# Patient Record
Sex: FEMALE | Race: WHITE | NOT HISPANIC OR LATINO | Employment: OTHER | URBAN - METROPOLITAN AREA
[De-identification: names, ages, dates, MRNs, and addresses within clinical notes are randomized per-mention and may not be internally consistent; named-entity substitution may affect disease eponyms.]

---

## 2017-01-16 DIAGNOSIS — C25.0 MALIGNANT NEOPLASM OF HEAD OF PANCREAS (H): ICD-10-CM

## 2017-01-16 LAB
ALBUMIN SERPL-MCNC: 3.6 G/DL (ref 3.4–5)
ALP SERPL-CCNC: 152 U/L (ref 40–150)
ALT SERPL W P-5'-P-CCNC: 41 U/L (ref 0–50)
ANION GAP SERPL CALCULATED.3IONS-SCNC: 6 MMOL/L (ref 3–14)
AST SERPL W P-5'-P-CCNC: 36 U/L (ref 0–45)
BASOPHILS # BLD AUTO: 0 10E9/L (ref 0–0.2)
BASOPHILS NFR BLD AUTO: 0.3 %
BILIRUB SERPL-MCNC: 0.6 MG/DL (ref 0.2–1.3)
BUN SERPL-MCNC: 9 MG/DL (ref 7–30)
CALCIUM SERPL-MCNC: 8.8 MG/DL (ref 8.5–10.1)
CHLORIDE SERPL-SCNC: 106 MMOL/L (ref 94–109)
CO2 SERPL-SCNC: 28 MMOL/L (ref 20–32)
CREAT SERPL-MCNC: 0.65 MG/DL (ref 0.52–1.04)
DIFFERENTIAL METHOD BLD: ABNORMAL
EOSINOPHIL # BLD AUTO: 0 10E9/L (ref 0–0.7)
EOSINOPHIL NFR BLD AUTO: 0.4 %
ERYTHROCYTE [DISTWIDTH] IN BLOOD BY AUTOMATED COUNT: 15.9 % (ref 10–15)
GFR SERPL CREATININE-BSD FRML MDRD: ABNORMAL ML/MIN/1.7M2
GLUCOSE SERPL-MCNC: 57 MG/DL (ref 70–99)
HCT VFR BLD AUTO: 36.5 % (ref 35–47)
HGB BLD-MCNC: 12.3 G/DL (ref 11.7–15.7)
IMM GRANULOCYTES # BLD: 0 10E9/L (ref 0–0.4)
IMM GRANULOCYTES NFR BLD: 0.5 %
LYMPHOCYTES # BLD AUTO: 1.7 10E9/L (ref 0.8–5.3)
LYMPHOCYTES NFR BLD AUTO: 22.9 %
MCH RBC QN AUTO: 34.4 PG (ref 26.5–33)
MCHC RBC AUTO-ENTMCNC: 33.7 G/DL (ref 31.5–36.5)
MCV RBC AUTO: 102 FL (ref 78–100)
MONOCYTES # BLD AUTO: 0.6 10E9/L (ref 0–1.3)
MONOCYTES NFR BLD AUTO: 8.2 %
NEUTROPHILS # BLD AUTO: 5 10E9/L (ref 1.6–8.3)
NEUTROPHILS NFR BLD AUTO: 67.7 %
NRBC # BLD AUTO: 0 10*3/UL
NRBC BLD AUTO-RTO: 0 /100
PLATELET # BLD AUTO: 288 10E9/L (ref 150–450)
POTASSIUM SERPL-SCNC: 3.9 MMOL/L (ref 3.4–5.3)
PROT SERPL-MCNC: 7.3 G/DL (ref 6.8–8.8)
RBC # BLD AUTO: 3.58 10E12/L (ref 3.8–5.2)
SODIUM SERPL-SCNC: 140 MMOL/L (ref 133–144)
WBC # BLD AUTO: 7.4 10E9/L (ref 4–11)

## 2017-01-16 PROCEDURE — 85025 COMPLETE CBC W/AUTO DIFF WBC: CPT | Performed by: INTERNAL MEDICINE

## 2017-01-16 PROCEDURE — 80053 COMPREHEN METABOLIC PANEL: CPT | Performed by: INTERNAL MEDICINE

## 2017-01-16 PROCEDURE — 25000125 ZZHC RX 250: Performed by: INTERNAL MEDICINE

## 2017-01-16 RX ORDER — HEPARIN SODIUM (PORCINE) LOCK FLUSH IV SOLN 100 UNIT/ML 100 UNIT/ML
5 SOLUTION INTRAVENOUS ONCE
Status: COMPLETED | OUTPATIENT
Start: 2017-01-16 | End: 2017-01-16

## 2017-01-16 RX ADMIN — SODIUM CHLORIDE, PRESERVATIVE FREE 5 ML: 5 INJECTION INTRAVENOUS at 10:36

## 2017-01-16 NOTE — PROGRESS NOTES
Chief Complaint   Patient presents with     Port Draw     labs drawn from port by RN     Port accessed, labs drawn, flushed with NS & heparin.  Then de-accessed, no further visits today.  Gardenia Scott RN

## 2017-01-17 ENCOUNTER — TELEPHONE (OUTPATIENT)
Dept: ONCOLOGY | Facility: CLINIC | Age: 62
End: 2017-01-17

## 2017-01-17 NOTE — TELEPHONE ENCOUNTER
Oral Chemotherapy Monitoring Program   Placed call to patient in follow up of Xeloda (capecitabine) therapy.   Left message requesting call back.   No drug names were mentioned.    Didier Buckner, PharmD  Therapy Management Oncology Pharmacist  [facility]   Phone: [phone]

## 2017-02-06 DIAGNOSIS — C25.0 MALIGNANT NEOPLASM OF HEAD OF PANCREAS (H): ICD-10-CM

## 2017-02-06 LAB
ALBUMIN SERPL-MCNC: 3.6 G/DL (ref 3.4–5)
ALP SERPL-CCNC: 156 U/L (ref 40–150)
ALT SERPL W P-5'-P-CCNC: 58 U/L (ref 0–50)
ANION GAP SERPL CALCULATED.3IONS-SCNC: 7 MMOL/L (ref 3–14)
AST SERPL W P-5'-P-CCNC: 49 U/L (ref 0–45)
BASOPHILS # BLD AUTO: 0 10E9/L (ref 0–0.2)
BASOPHILS NFR BLD AUTO: 0.4 %
BILIRUB SERPL-MCNC: 0.5 MG/DL (ref 0.2–1.3)
BUN SERPL-MCNC: 9 MG/DL (ref 7–30)
CALCIUM SERPL-MCNC: 8.7 MG/DL (ref 8.5–10.1)
CHLORIDE SERPL-SCNC: 105 MMOL/L (ref 94–109)
CO2 SERPL-SCNC: 29 MMOL/L (ref 20–32)
CREAT SERPL-MCNC: 0.7 MG/DL (ref 0.52–1.04)
DIFFERENTIAL METHOD BLD: ABNORMAL
EOSINOPHIL # BLD AUTO: 0.1 10E9/L (ref 0–0.7)
EOSINOPHIL NFR BLD AUTO: 1.3 %
ERYTHROCYTE [DISTWIDTH] IN BLOOD BY AUTOMATED COUNT: 16.6 % (ref 10–15)
GFR SERPL CREATININE-BSD FRML MDRD: 84 ML/MIN/1.7M2
GLUCOSE SERPL-MCNC: 151 MG/DL (ref 70–99)
HCT VFR BLD AUTO: 35.7 % (ref 35–47)
HGB BLD-MCNC: 11.8 G/DL (ref 11.7–15.7)
IMM GRANULOCYTES # BLD: 0 10E9/L (ref 0–0.4)
IMM GRANULOCYTES NFR BLD: 0.4 %
LYMPHOCYTES # BLD AUTO: 1 10E9/L (ref 0.8–5.3)
LYMPHOCYTES NFR BLD AUTO: 22.3 %
MCH RBC QN AUTO: 33.8 PG (ref 26.5–33)
MCHC RBC AUTO-ENTMCNC: 33.1 G/DL (ref 31.5–36.5)
MCV RBC AUTO: 102 FL (ref 78–100)
MONOCYTES # BLD AUTO: 0.4 10E9/L (ref 0–1.3)
MONOCYTES NFR BLD AUTO: 8.1 %
NEUTROPHILS # BLD AUTO: 3.1 10E9/L (ref 1.6–8.3)
NEUTROPHILS NFR BLD AUTO: 67.5 %
NRBC # BLD AUTO: 0 10*3/UL
NRBC BLD AUTO-RTO: 0 /100
PLATELET # BLD AUTO: 188 10E9/L (ref 150–450)
POTASSIUM SERPL-SCNC: 4.1 MMOL/L (ref 3.4–5.3)
PROT SERPL-MCNC: 7.1 G/DL (ref 6.8–8.8)
RBC # BLD AUTO: 3.49 10E12/L (ref 3.8–5.2)
SODIUM SERPL-SCNC: 140 MMOL/L (ref 133–144)
WBC # BLD AUTO: 4.6 10E9/L (ref 4–11)

## 2017-02-06 PROCEDURE — 80053 COMPREHEN METABOLIC PANEL: CPT | Performed by: INTERNAL MEDICINE

## 2017-02-06 PROCEDURE — 25000128 H RX IP 250 OP 636: Performed by: INTERNAL MEDICINE

## 2017-02-06 PROCEDURE — 85025 COMPLETE CBC W/AUTO DIFF WBC: CPT | Performed by: INTERNAL MEDICINE

## 2017-02-06 PROCEDURE — 86301 IMMUNOASSAY TUMOR CA 19-9: CPT | Performed by: INTERNAL MEDICINE

## 2017-02-06 RX ORDER — HEPARIN SODIUM (PORCINE) LOCK FLUSH IV SOLN 100 UNIT/ML 100 UNIT/ML
5 SOLUTION INTRAVENOUS DAILY PRN
Status: DISCONTINUED | OUTPATIENT
Start: 2017-02-06 | End: 2017-02-14 | Stop reason: HOSPADM

## 2017-02-06 RX ADMIN — SODIUM CHLORIDE, PRESERVATIVE FREE 5 ML: 5 INJECTION INTRAVENOUS at 10:35

## 2017-02-07 ENCOUNTER — TELEPHONE (OUTPATIENT)
Dept: ONCOLOGY | Facility: CLINIC | Age: 62
End: 2017-02-07

## 2017-02-07 LAB — CANCER AG19-9 SERPL-ACNC: NORMAL

## 2017-02-07 NOTE — TELEPHONE ENCOUNTER
Oral Chemotherapy Monitoring Program    Placed call to patient in follow up of lab results from 2/6/17. LFTs slightly elevated.    OK to continue at current dose of Xeloda per Dr. Wade. Patient aware and in agreement with plan.    Belem Mock, Pharmacy Intern  [facility]  [phone]

## 2017-02-15 ENCOUNTER — TELEPHONE (OUTPATIENT)
Dept: ONCOLOGY | Facility: CLINIC | Age: 62
End: 2017-02-15

## 2017-02-27 DIAGNOSIS — C25.0 MALIGNANT NEOPLASM OF HEAD OF PANCREAS (H): ICD-10-CM

## 2017-02-27 LAB
ALBUMIN SERPL-MCNC: 3.7 G/DL (ref 3.4–5)
ALP SERPL-CCNC: 183 U/L (ref 40–150)
ALT SERPL W P-5'-P-CCNC: 48 U/L (ref 0–50)
ANION GAP SERPL CALCULATED.3IONS-SCNC: 8 MMOL/L (ref 3–14)
AST SERPL W P-5'-P-CCNC: 46 U/L (ref 0–45)
BASOPHILS # BLD AUTO: 0 10E9/L (ref 0–0.2)
BASOPHILS NFR BLD AUTO: 0.6 %
BILIRUB SERPL-MCNC: 0.5 MG/DL (ref 0.2–1.3)
BUN SERPL-MCNC: 7 MG/DL (ref 7–30)
CALCIUM SERPL-MCNC: 8.9 MG/DL (ref 8.5–10.1)
CHLORIDE SERPL-SCNC: 102 MMOL/L (ref 94–109)
CO2 SERPL-SCNC: 28 MMOL/L (ref 20–32)
CREAT SERPL-MCNC: 0.82 MG/DL (ref 0.52–1.04)
DIFFERENTIAL METHOD BLD: ABNORMAL
EOSINOPHIL # BLD AUTO: 0 10E9/L (ref 0–0.7)
EOSINOPHIL NFR BLD AUTO: 0.3 %
ERYTHROCYTE [DISTWIDTH] IN BLOOD BY AUTOMATED COUNT: 16.9 % (ref 10–15)
GFR SERPL CREATININE-BSD FRML MDRD: 70 ML/MIN/1.7M2
GLUCOSE SERPL-MCNC: 96 MG/DL (ref 70–99)
HCT VFR BLD AUTO: 35.8 % (ref 35–47)
HGB BLD-MCNC: 11.9 G/DL (ref 11.7–15.7)
IMM GRANULOCYTES # BLD: 0 10E9/L (ref 0–0.4)
IMM GRANULOCYTES NFR BLD: 0.6 %
LYMPHOCYTES # BLD AUTO: 1 10E9/L (ref 0.8–5.3)
LYMPHOCYTES NFR BLD AUTO: 27.3 %
MCH RBC QN AUTO: 34.1 PG (ref 26.5–33)
MCHC RBC AUTO-ENTMCNC: 33.2 G/DL (ref 31.5–36.5)
MCV RBC AUTO: 103 FL (ref 78–100)
MONOCYTES # BLD AUTO: 0.5 10E9/L (ref 0–1.3)
MONOCYTES NFR BLD AUTO: 14.8 %
NEUTROPHILS # BLD AUTO: 2 10E9/L (ref 1.6–8.3)
NEUTROPHILS NFR BLD AUTO: 56.4 %
NRBC # BLD AUTO: 0 10*3/UL
NRBC BLD AUTO-RTO: 0 /100
PLATELET # BLD AUTO: 173 10E9/L (ref 150–450)
POTASSIUM SERPL-SCNC: 4.1 MMOL/L (ref 3.4–5.3)
PROT SERPL-MCNC: 7.2 G/DL (ref 6.8–8.8)
RBC # BLD AUTO: 3.49 10E12/L (ref 3.8–5.2)
SODIUM SERPL-SCNC: 138 MMOL/L (ref 133–144)
WBC # BLD AUTO: 3.5 10E9/L (ref 4–11)

## 2017-02-27 PROCEDURE — 85025 COMPLETE CBC W/AUTO DIFF WBC: CPT | Performed by: INTERNAL MEDICINE

## 2017-02-27 PROCEDURE — 80053 COMPREHEN METABOLIC PANEL: CPT | Performed by: INTERNAL MEDICINE

## 2017-02-27 PROCEDURE — 86301 IMMUNOASSAY TUMOR CA 19-9: CPT | Performed by: INTERNAL MEDICINE

## 2017-02-28 ENCOUNTER — TELEPHONE (OUTPATIENT)
Dept: ONCOLOGY | Facility: CLINIC | Age: 62
End: 2017-02-28

## 2017-03-01 ENCOUNTER — TELEPHONE (OUTPATIENT)
Dept: ONCOLOGY | Facility: CLINIC | Age: 62
End: 2017-03-01

## 2017-03-01 LAB — CANCER AG19-9 SERPL-ACNC: 2

## 2017-03-01 NOTE — TELEPHONE ENCOUNTER
Oral Chemotherapy Monitoring Program    Patient aware of lab results and has no questions/concerns at this time.    Belem Mock, Pharmacy Intern  [facility]  [phone]

## 2017-03-01 NOTE — TELEPHONE ENCOUNTER
Oral Chemotherapy Monitoring Program    Placed call to patient in follow up of lab results from 2/27/17.    Labs showed no concerning abnormalities with the exception of elevation in alk phos (up from 156 on 2/6 to 183).    Messaged Dr. Wade to see if he would like to make any therapy changes at this time.    Left message with patient to please call back in follow up of therapy. No patient or drug names were mentioned.    Belem Mock, Pharmacy Intern  [facility]  [phone]

## 2017-03-03 PROCEDURE — 25000128 H RX IP 250 OP 636: Performed by: INTERNAL MEDICINE

## 2017-03-03 PROCEDURE — 96523 IRRIG DRUG DELIVERY DEVICE: CPT

## 2017-03-03 RX ORDER — HEPARIN SODIUM (PORCINE) LOCK FLUSH IV SOLN 100 UNIT/ML 100 UNIT/ML
5 SOLUTION INTRAVENOUS EVERY 8 HOURS
Status: DISCONTINUED | OUTPATIENT
Start: 2017-03-03 | End: 2017-03-03 | Stop reason: HOSPADM

## 2017-03-03 RX ADMIN — SODIUM CHLORIDE, PRESERVATIVE FREE 5 ML: 5 INJECTION INTRAVENOUS at 09:12

## 2017-03-06 ENCOUNTER — ONCOLOGY VISIT (OUTPATIENT)
Dept: ONCOLOGY | Facility: CLINIC | Age: 62
End: 2017-03-06
Attending: INTERNAL MEDICINE
Payer: MEDICARE

## 2017-03-06 VITALS
BODY MASS INDEX: 19.59 KG/M2 | HEIGHT: 66 IN | TEMPERATURE: 97.6 F | WEIGHT: 121.9 LBS | HEART RATE: 82 BPM | SYSTOLIC BLOOD PRESSURE: 147 MMHG | OXYGEN SATURATION: 99 % | RESPIRATION RATE: 16 BRPM | DIASTOLIC BLOOD PRESSURE: 85 MMHG

## 2017-03-06 DIAGNOSIS — C25.0 MALIGNANT NEOPLASM OF HEAD OF PANCREAS (H): Primary | ICD-10-CM

## 2017-03-06 PROCEDURE — 99212 OFFICE O/P EST SF 10 MIN: CPT | Mod: ZF

## 2017-03-06 PROCEDURE — 99214 OFFICE O/P EST MOD 30 MIN: CPT | Mod: ZP | Performed by: INTERNAL MEDICINE

## 2017-03-06 RX ORDER — LIDOCAINE/PRILOCAINE 2.5 %-2.5%
CREAM (GRAM) TOPICAL PRN
Qty: 30 G | Refills: 3 | Status: SHIPPED | OUTPATIENT
Start: 2017-03-06 | End: 2019-09-09

## 2017-03-06 RX ORDER — PANTOPRAZOLE SODIUM 20 MG/1
TABLET, DELAYED RELEASE ORAL
COMMUNITY
End: 2017-05-22

## 2017-03-06 ASSESSMENT — PAIN SCALES - GENERAL: PAINLEVEL: NO PAIN (0)

## 2017-03-06 NOTE — LETTER
3/6/2017      RE: Emelia Weathers  [address]  [place] [facility] [address]       HISTORY OF PRESENT ILLNESS:  Emelia Weathers is here today in followup of recurrent pancreatic cancer.  She has regionally recurrent disease that recurred within a few months of completing adjuvant gemcitabine after having had neoadjuvant gem/Abraxane.  She started FOLFIRINOX at the end of 2014, did not tolerate that well and then was on XELOX for quite a while and now has been on just Xeloda with good control since 11/2015.  She tells me things are going quite well for her.  She continues to have modest hand-foot syndrome.  Generally that is not a large issue unless she is walking a lot.  She sometimes gets blisters on her feet.  She still has a little bit of residual numbness from her prior oxaliplatin, but aside that issue, she feels great.  She is participating in all physical activities she would like.  Her appetite is good.  She does not have much trouble with her bowels.        REVIEW OF SYSTEMS:  The remainder of a 10-point review of systems is otherwise unremarkable.      PHYSICAL EXAMINATION:   GENERAL:  Ms. Weathers is slender as always, but appears quite well.   VITAL SIGNS:  Noted in the chart and are unremarkable.   HEENT:  She has no icterus.  She has no visible lesions in the oropharynx.   NECK:  No adenopathy is palpable in the neck or supraclavicular spaces.   LUNGS:  Clear to auscultation without dullness to percussion.   HEART:  Rate and rhythm are regular with a grade 2/6 systolic ejection murmur.   ABDOMEN:  Soft and nontender, without palpable mass, organomegaly or ascites.   EXTREMITIES:  She has no peripheral edema.  There is no tenderness in her calves or thighs.   SKIN:  She has modest dry desquamation on her palms.  I did not examine her feet.   NEUROLOGIC:  Her speech is fluent.  Her cranial nerves are grossly intact.  She has an unremarkable gait and station.      RESULTS:  I reviewed with the patient and  her daughter her lab work and CT scan.  She has minimal elevation of her liver enzymes and a marginally low white count with a mildly elevated MCV consistent with her chemotherapy.  Her CT scan shows minimal residual haziness in the mesenteric fat and numerous small mesenteric nodes.  All of this is stable to better.  There are no new sites of disease apparent.      ASSESSMENT:  Regionally recurrent pancreatic cancer.  The patient has excellent ongoing control with single-agent Xeloda with modest tolerable toxicity and mild steatohepatitis.  We will continue on without change to her regimen and re-evaluate after another 3 cycles of therapy.         Yasmany Wade MD

## 2017-03-06 NOTE — NURSING NOTE
"Emelia Weathers is a 61 year old female who presents for:  Chief Complaint   Patient presents with     Oncology Clinic Visit     Patient is seeing MD relating to Scan result        Initial Vitals:  /85 (BP Location: Left arm, Patient Position: Chair, Cuff Size: Adult Regular)  Pulse 82  Temp 97.6  F (36.4  C) (Oral)  Resp 16  Ht 1.676 m (5' 6\")  Wt 55.3 kg (121 lb 14.4 oz)  SpO2 99%  BMI 19.68 kg/m2 Estimated body mass index is 19.68 kg/(m^2) as calculated from the following:    Height as of this encounter: 1.676 m (5' 6\").    Weight as of this encounter: 55.3 kg (121 lb 14.4 oz).. Body surface area is 1.6 meters squared. BP completed using cuff size: regular  No Pain (0) No LMP recorded. Patient is postmenopausal. Allergies and medications reviewed.     Medications: MEDICATION REFILLS NEEDED TODAY.  Pharmacy name entered into [facility]:    [id number] - [place], [place] - [address] [place] [address] PHARMACY [facility] [place], [place] - [address] [facility] PH[facility]ACY [place], [place] - [address] [place] [address] [phone]  [facility] # [id number] - [place], [place] - [facility] [address]    Comments: Patient is not in any pain today. Refill for EMLA cream.    6 minutes for nursing intake (face to face time)   Rosemary Mcdonald LPN      "

## 2017-03-06 NOTE — PROGRESS NOTES
HISTORY OF PRESENT ILLNESS:  Emelia Weathers is here today in followup of recurrent pancreatic cancer.  She has regionally recurrent disease that recurred within a few months of completing adjuvant gemcitabine after having had neoadjuvant gem/Abraxane.  She started FOLFIRINOX at the end of 2014, did not tolerate that well and then was on XELOX for quite a while and now has been on just Xeloda with good control since 11/2015.  She tells me things are going quite well for her.  She continues to have modest hand-foot syndrome.  Generally that is not a large issue unless she is walking a lot.  She sometimes gets blisters on her feet.  She still has a little bit of residual numbness from her prior oxaliplatin, but aside that issue, she feels great.  She is participating in all physical activities she would like.  Her appetite is good.  She does not have much trouble with her bowels.        REVIEW OF SYSTEMS:  The remainder of a 10-point review of systems is otherwise unremarkable.      PHYSICAL EXAMINATION:   GENERAL:  Ms. Weathers is slender as always, but appears quite well.   VITAL SIGNS:  Noted in the chart and are unremarkable.   HEENT:  She has no icterus.  She has no visible lesions in the oropharynx.   NECK:  No adenopathy is palpable in the neck or supraclavicular spaces.   LUNGS:  Clear to auscultation without dullness to percussion.   HEART:  Rate and rhythm are regular with a grade 2/6 systolic ejection murmur.   ABDOMEN:  Soft and nontender, without palpable mass, organomegaly or ascites.   EXTREMITIES:  She has no peripheral edema.  There is no tenderness in her calves or thighs.   SKIN:  She has modest dry desquamation on her palms.  I did not examine her feet.   NEUROLOGIC:  Her speech is fluent.  Her cranial nerves are grossly intact.  She has an unremarkable gait and station.      RESULTS:  I reviewed with the patient and her daughter her lab work and CT scan.  She has minimal elevation of her liver enzymes  and a marginally low white count with a mildly elevated MCV consistent with her chemotherapy.  Her CT scan shows minimal residual haziness in the mesenteric fat and numerous small mesenteric nodes.  All of this is stable to better.  There are no new sites of disease apparent.      ASSESSMENT:  Regionally recurrent pancreatic cancer.  The patient has excellent ongoing control with single-agent Xeloda with modest tolerable toxicity and mild steatohepatitis.  We will continue on without change to her regimen and re-evaluate after another 3 cycles of therapy.

## 2017-03-06 NOTE — Clinical Note
3/6/2017       RE: Emelia Weathers  [address]  [place] [facility] [address]     Dear Colleague,    Thank you for referring your patient, Emelia Weathers, to the [facility]. Please see a copy of my visit note below.     Dictation on: 03/06/2017  7:44 AM by: NIDIA NERI [[id number]]         HISTORY OF PRESENT ILLNESS:  Emelia Weathers is here today in followup of recurrent pancreatic cancer.  She has regionally recurrent disease that recurred within a few months of completing adjuvant gemcitabine after having had neoadjuvant gem/Abraxane.  She started FOLFIRINOX at the end of 2014, did not tolerate that well and then was on XELOX for quite a while and now has been on just Xeloda with good control since 11/2015.  She tells me things are going quite well for her.  She continues to have modest hand-foot syndrome.  Generally that is not a large issue unless she is walking a lot.  She sometimes gets blisters on her feet.  She still has a little bit of residual numbness from her prior oxaliplatin, but aside that issue, she feels great.  She is participating in all physical activities she would like.  Her appetite is good.  She does not have much trouble with her bowels.        REVIEW OF SYSTEMS:  The remainder of a 10-point review of systems is otherwise unremarkable.      PHYSICAL EXAMINATION:   GENERAL:  Ms. Weathers is slender as always, but appears quite well.   VITAL SIGNS:  Noted in the chart and are unremarkable.   HEENT:  She has no icterus.  She has no visible lesions in the oropharynx.   NECK:  No adenopathy is palpable in the neck or supraclavicular spaces.   LUNGS:  Clear to auscultation without dullness to percussion.   HEART:  Rate and rhythm are regular with a grade 2/6 systolic ejection murmur.   ABDOMEN:  Soft and nontender, without palpable mass, organomegaly or ascites.   EXTREMITIES:  She has no peripheral edema.  There is no tenderness in her calves or thighs.   SKIN:  She has modest dry  desquamation on her palms.  I did not examine her feet.   NEUROLOGIC:  Her speech is fluent.  Her cranial nerves are grossly intact.  She has an unremarkable gait and station.      RESULTS:  I reviewed with the patient and her daughter her lab work and CT scan.  She has minimal elevation of her liver enzymes and a marginally low white count with a mildly elevated MCV consistent with her chemotherapy.  Her CT scan shows minimal residual haziness in the mesenteric fat and numerous small mesenteric nodes.  All of this is stable to better.  There are no new sites of disease apparent.      ASSESSMENT:  Regionally recurrent pancreatic cancer.  The patient has excellent ongoing control with single-agent Xeloda with modest tolerable toxicity and mild steatohepatitis.  We will continue on without change to her regimen and re-evaluate after another 3 cycles of therapy.      Again, thank you for allowing me to participate in the care of your patient.      Sincerely,    Yasmany Wade MD

## 2017-03-06 NOTE — MR AVS SNAPSHOT
After Visit Summary   3/6/2017    Emelia Weathers    MRN: [id number]           Patient Information     Date Of Birth          1955        Visit Information        Provider Department      3/6/2017 7:15 AM Yasmany Wade MD [facility]        Today's Diagnoses     Malignant neoplasm of head of pancreas (H)    -  1       Follow-ups after your visit        Follow-up notes from your care team     Return in about 3 months (around 5/22/2017) for MD visit with CT and labs.      Your next 10 appointments already scheduled     Mar 20, 2017 10:00 AM CDT   [facility] Lab Draw with  MASONIC LAB DRAW   [facility] Lab Draw ([facility])    [address] [facility] [address] [facility]  [address] [facility] [address]   [phone]            Apr 10, 2017 10:00 AM CDT   [facility]ic Lab Draw with  MASONIC LAB DRAW   [facility] Lab Draw ([facility])    [address]  [address] [facility] [address]   [phone]            May 01, 2017 10:00 AM CDT   Masonic Lab Draw with  MASONIC LAB DRAW   [facility] Lab Draw ([facility])    [address] [address]   [phone]            May 19, 2017  9:00 AM CDT   Masonic Lab Draw with  MASONIC LAB DRAW   [facility]ic Lab Draw ([facility])    [address] [address]   [phone]            May 19, 2017  9:40 AM CDT   (Arrive by 9:25 AM)   CT CHEST ABDOMEN PELVIS W/O & W CONTRAST with UCCT2   [facility] Imaging [place] [facility] ([facility])    [address] [place] [address]  [address] [facility] [address]   [phone]           Please bring any scans or X-rays taken at other hospitals, if similar tests were done. Also bring a list of your medicines, including vitamins, minerals and over-the-counter drugs. It is safest to leave personal items at home.  Be  sure to tell your doctor:   If you have any allergies.   If there s any chance you are pregnant.   If you are breastfeeding.   If you have any special needs.  You may have contrast for this exam. To prepare:   Do not eat or drink for 2 hours before your exam. If you need to take medicine, you may take it with small sips of water. (We may ask you to take liquid medicine as well.)   The day before your exam, drink extra fluids at least six 8-ounce glasses (unless your doctor tells you to restrict your fluids).  Patients over 70 or patients with diabetes or kidney problems:   If you haven t had a blood test (creatinine test) within the last 30 days, go to your clinic or Diagnostic Imaging Department for this test.  If you have diabetes:   If your kidney function is normal, continue taking your metformin (Avandamet, Glucophage, Glucovance, Metaglip) on the day of your exam.   If your kidney function is abnormal, wait 48 hours before restarting this medicine.  You will have oral contrast for this exam:   You will drink the contrast at home. Get this from your clinic or Diagnostic Imaging Department. Please follow the directions given.  Please wear loose clothing, such as a sweat suit or jogging clothes. Avoid snaps, zippers and other metal. We may ask you to undress and put on a hospital gown.  If you have any questions, please call the Imaging Department where you will have your exam.            May 22, 2017  7:15 AM CDT   (Arrive by 7:00 AM)   Return Visit with Yasmany Wade MD   [facility] ([facility])    [address] [place] [address] [phone]              Future tests that were ordered for you today     Open Future Orders        Priority Expected Expires Ordered    CT Chest Abdomen Pelvis w/o & w Contrast Routine 5/22/2017 6/26/2017 3/6/2017    Comprehensive metabolic panel Routine 5/22/2017 6/26/2017 3/6/2017    CBC with  "platelets differential Routine 5/22/2017 6/26/2017 3/6/2017            Who to contact     If you have questions or need follow up information about today's clinic visit or your schedule please contact [facility] directly at [phone].  Normal or non-critical lab and imaging results will be communicated to you by MyChart, letter or phone within 4 business days after the clinic has received the results. If you do not hear from us within 7 days, please contact the clinic through [facility] or phone. If you have a critical or abnormal lab result, we will notify you by phone as soon as possible.  Submit refill requests through [facility] or call your pharmacy and they will forward the refill request to us. Please allow 3 business days for your refill to be completed.          Additional Information About Your Visit        [facility] Information     Presdo gives you secure access to your electronic health record. If you see a primary care provider, you can also send messages to your care team and make appointments. If you have questions, please call your primary care clinic.  If you do not have a primary care provider, please call [phone] and they will assist you.        Care EveryWhere ID     This is your Care EveryWhere ID. This could be used by other organizations to access your [facility] medical records  [id number]        Your Vitals Were     Pulse Temperature Respirations Height Pulse Oximetry BMI (Body Mass Index)    82 97.6  F (36.4  C) (Oral) 16 1.676 m (5' 6\") 99% 19.68 kg/m2       Blood Pressure from Last 3 Encounters:   03/06/17 147/85   12/05/16 156/82   08/29/16 134/82    Weight from Last 3 Encounters:   03/06/17 55.3 kg (121 lb 14.4 oz)   12/05/16 54.3 kg (119 lb 12.8 oz)   08/29/16 53.6 kg (118 lb 1.6 oz)                 Where to get your medicines      These medications were sent to [facility] [place], [place] - [phone], [facility] [address]    Hours:  TRANSPLANT PHONE NUMBER [phone] Phone:  [phone]     lidocaine-prilocaine cream          Primary Care Provider Office Phone # Fax #    Benny Garrett [phone]       [address]  [facility] [address]        Thank you!     Thank you for choosing [facility]  for your care. Our goal is always to provide you with excellent care. Hearing back from our patients is one way we can continue to improve our services. Please take a few minutes to complete the written survey that you may receive in the mail after your visit with us. Thank you!             Your Updated Medication List - Protect others around you: Learn how to safely use, store and throw away your medicines at www.disposemymeds.org.          This list is accurate as of: 3/6/17  8:09 AM.  Always use your most recent med list.                   Brand Name Dispense Instructions for use    amylase-lipase-protease 84986 UNITS Cpep    CREON 12    540 capsule    Take 1 capsule (12,000 Units) by mouth 3 times daily (with meals)       CALCIUM CITRATE + D PO      2 tablets daily       * capecitabine 500 MG tablet CHEMO    XELODA    42 tablet    Take 2 tabs in the AM and 1 tab in the PM, Days 1 through 14, then off for 7 days. Take within 30 mins after meal.       * capecitabine 500 MG tablet CHEMO    XELODA    42 tablet    Take 2 tabs in the AM and 1 tab in the PM, Days 1 through 14, then off for 7 days. Take within 30 mins after meal.       diphenoxylate-atropine 2.5-0.025 MG per tablet    LOMOTIL    100 tablet    Take 1-2 tablets by mouth 4 times daily as needed for diarrhea       lidocaine-prilocaine cream    EMLA    30 g    Apply topically as needed for moderate pain       MELATONIN PO      Take 3 mg by mouth At Bedtime       MULTIVITAMIN TABS   OR      1 TABLET DAILY       ondansetron 8 MG tablet    ZOFRAN    30 tablet    Take 1 tablet (8 mg) by mouth every 8  hours as needed for nausea       * pantoprazole 20 MG EC tablet    PROTONIX         * pantoprazole 40 MG EC tablet    PROTONIX    90 tablet    Take 1 tablet (40 mg) by mouth every morning (before breakfast)       TYLENOL 325 MG tablet   Generic drug:  acetaminophen      Take 500 mg by mouth every 6 hours as needed       VITAMIN C PO      Take 500 mg by mouth daily.       * Notice:  This list has 4 medication(s) that are the same as other medications prescribed for you. Read the directions carefully, and ask your doctor or other care provider to review them with you.

## 2017-03-08 NOTE — TELEPHONE ENCOUNTER
Oral Chemotherapy Monitoring Program    No changes at this time per Dr. Wade.    Belem Mock, Pharmacy Intern  Oral Chemotherapy Monitoring Program  [facility]  [phone]

## 2017-03-14 DIAGNOSIS — C25.0 MALIGNANT NEOPLASM OF HEAD OF PANCREAS (H): Primary | ICD-10-CM

## 2017-03-14 RX ORDER — CAPECITABINE 500 MG/1
TABLET, FILM COATED ORAL
Qty: 42 TABLET | Refills: 3 | Status: SHIPPED | OUTPATIENT
Start: 2017-03-14 | End: 2018-05-21

## 2017-03-20 DIAGNOSIS — C25.0 MALIGNANT NEOPLASM OF HEAD OF PANCREAS (H): ICD-10-CM

## 2017-03-20 LAB
ALBUMIN SERPL-MCNC: 3.7 G/DL (ref 3.4–5)
ALP SERPL-CCNC: 163 U/L (ref 40–150)
ALT SERPL W P-5'-P-CCNC: 47 U/L (ref 0–50)
ANION GAP SERPL CALCULATED.3IONS-SCNC: 6 MMOL/L (ref 3–14)
AST SERPL W P-5'-P-CCNC: 43 U/L (ref 0–45)
BASOPHILS # BLD AUTO: 0 10E9/L (ref 0–0.2)
BASOPHILS NFR BLD AUTO: 0.2 %
BILIRUB SERPL-MCNC: 0.5 MG/DL (ref 0.2–1.3)
BUN SERPL-MCNC: 9 MG/DL (ref 7–30)
CALCIUM SERPL-MCNC: 8.6 MG/DL (ref 8.5–10.1)
CHLORIDE SERPL-SCNC: 107 MMOL/L (ref 94–109)
CO2 SERPL-SCNC: 28 MMOL/L (ref 20–32)
CREAT SERPL-MCNC: 0.57 MG/DL (ref 0.52–1.04)
DIFFERENTIAL METHOD BLD: ABNORMAL
EOSINOPHIL # BLD AUTO: 0.1 10E9/L (ref 0–0.7)
EOSINOPHIL NFR BLD AUTO: 1.2 %
ERYTHROCYTE [DISTWIDTH] IN BLOOD BY AUTOMATED COUNT: 17.1 % (ref 10–15)
GFR SERPL CREATININE-BSD FRML MDRD: ABNORMAL ML/MIN/1.7M2
GLUCOSE SERPL-MCNC: 84 MG/DL (ref 70–99)
HCT VFR BLD AUTO: 35 % (ref 35–47)
HGB BLD-MCNC: 11.7 G/DL (ref 11.7–15.7)
IMM GRANULOCYTES # BLD: 0 10E9/L (ref 0–0.4)
IMM GRANULOCYTES NFR BLD: 0.2 %
LYMPHOCYTES # BLD AUTO: 1.1 10E9/L (ref 0.8–5.3)
LYMPHOCYTES NFR BLD AUTO: 25.3 %
MCH RBC QN AUTO: 34.4 PG (ref 26.5–33)
MCHC RBC AUTO-ENTMCNC: 33.4 G/DL (ref 31.5–36.5)
MCV RBC AUTO: 103 FL (ref 78–100)
MONOCYTES # BLD AUTO: 0.3 10E9/L (ref 0–1.3)
MONOCYTES NFR BLD AUTO: 7.1 %
NEUTROPHILS # BLD AUTO: 2.9 10E9/L (ref 1.6–8.3)
NEUTROPHILS NFR BLD AUTO: 66 %
NRBC # BLD AUTO: 0 10*3/UL
NRBC BLD AUTO-RTO: 0 /100
PLATELET # BLD AUTO: 174 10E9/L (ref 150–450)
POTASSIUM SERPL-SCNC: 4.1 MMOL/L (ref 3.4–5.3)
PROT SERPL-MCNC: 7 G/DL (ref 6.8–8.8)
RBC # BLD AUTO: 3.4 10E12/L (ref 3.8–5.2)
SODIUM SERPL-SCNC: 142 MMOL/L (ref 133–144)
WBC # BLD AUTO: 4.3 10E9/L (ref 4–11)

## 2017-03-20 PROCEDURE — 80053 COMPREHEN METABOLIC PANEL: CPT | Performed by: INTERNAL MEDICINE

## 2017-03-20 PROCEDURE — 85025 COMPLETE CBC W/AUTO DIFF WBC: CPT | Performed by: INTERNAL MEDICINE

## 2017-03-20 PROCEDURE — 25000128 H RX IP 250 OP 636: Performed by: INTERNAL MEDICINE

## 2017-03-20 RX ORDER — HEPARIN SODIUM (PORCINE) LOCK FLUSH IV SOLN 100 UNIT/ML 100 UNIT/ML
5 SOLUTION INTRAVENOUS EVERY 8 HOURS
Status: DISCONTINUED | OUTPATIENT
Start: 2017-03-20 | End: 2017-03-28 | Stop reason: HOSPADM

## 2017-03-20 RX ADMIN — SODIUM CHLORIDE, PRESERVATIVE FREE 5 ML: 5 INJECTION INTRAVENOUS at 10:35

## 2017-04-10 DIAGNOSIS — C25.0 MALIGNANT NEOPLASM OF HEAD OF PANCREAS (H): ICD-10-CM

## 2017-04-10 LAB
ALBUMIN SERPL-MCNC: 3.6 G/DL (ref 3.4–5)
ALP SERPL-CCNC: 155 U/L (ref 40–150)
ALT SERPL W P-5'-P-CCNC: 57 U/L (ref 0–50)
ANION GAP SERPL CALCULATED.3IONS-SCNC: 7 MMOL/L (ref 3–14)
AST SERPL W P-5'-P-CCNC: 52 U/L (ref 0–45)
BASOPHILS # BLD AUTO: 0 10E9/L (ref 0–0.2)
BASOPHILS NFR BLD AUTO: 0.4 %
BILIRUB SERPL-MCNC: 0.6 MG/DL (ref 0.2–1.3)
BUN SERPL-MCNC: 6 MG/DL (ref 7–30)
CALCIUM SERPL-MCNC: 8.9 MG/DL (ref 8.5–10.1)
CHLORIDE SERPL-SCNC: 108 MMOL/L (ref 94–109)
CO2 SERPL-SCNC: 27 MMOL/L (ref 20–32)
CREAT SERPL-MCNC: 0.72 MG/DL (ref 0.52–1.04)
DIFFERENTIAL METHOD BLD: ABNORMAL
EOSINOPHIL # BLD AUTO: 0.1 10E9/L (ref 0–0.7)
EOSINOPHIL NFR BLD AUTO: 1.3 %
ERYTHROCYTE [DISTWIDTH] IN BLOOD BY AUTOMATED COUNT: 17.1 % (ref 10–15)
GFR SERPL CREATININE-BSD FRML MDRD: 82 ML/MIN/1.7M2
GLUCOSE SERPL-MCNC: 161 MG/DL (ref 70–99)
HCT VFR BLD AUTO: 34.7 % (ref 35–47)
HGB BLD-MCNC: 11.5 G/DL (ref 11.7–15.7)
IMM GRANULOCYTES # BLD: 0 10E9/L (ref 0–0.4)
IMM GRANULOCYTES NFR BLD: 0.4 %
LYMPHOCYTES # BLD AUTO: 1.2 10E9/L (ref 0.8–5.3)
LYMPHOCYTES NFR BLD AUTO: 25.7 %
MCH RBC QN AUTO: 34.6 PG (ref 26.5–33)
MCHC RBC AUTO-ENTMCNC: 33.1 G/DL (ref 31.5–36.5)
MCV RBC AUTO: 105 FL (ref 78–100)
MONOCYTES # BLD AUTO: 0.3 10E9/L (ref 0–1.3)
MONOCYTES NFR BLD AUTO: 7 %
NEUTROPHILS # BLD AUTO: 3.1 10E9/L (ref 1.6–8.3)
NEUTROPHILS NFR BLD AUTO: 65.2 %
NRBC # BLD AUTO: 0 10*3/UL
NRBC BLD AUTO-RTO: 0 /100
PLATELET # BLD AUTO: 169 10E9/L (ref 150–450)
POTASSIUM SERPL-SCNC: 4.2 MMOL/L (ref 3.4–5.3)
PROT SERPL-MCNC: 6.8 G/DL (ref 6.8–8.8)
RBC # BLD AUTO: 3.32 10E12/L (ref 3.8–5.2)
SODIUM SERPL-SCNC: 141 MMOL/L (ref 133–144)
WBC # BLD AUTO: 4.7 10E9/L (ref 4–11)

## 2017-04-10 PROCEDURE — 86301 IMMUNOASSAY TUMOR CA 19-9: CPT | Performed by: INTERNAL MEDICINE

## 2017-04-10 PROCEDURE — 85025 COMPLETE CBC W/AUTO DIFF WBC: CPT | Performed by: INTERNAL MEDICINE

## 2017-04-10 PROCEDURE — 25000128 H RX IP 250 OP 636: Performed by: INTERNAL MEDICINE

## 2017-04-10 PROCEDURE — 80053 COMPREHEN METABOLIC PANEL: CPT | Performed by: INTERNAL MEDICINE

## 2017-04-10 RX ORDER — HEPARIN SODIUM (PORCINE) LOCK FLUSH IV SOLN 100 UNIT/ML 100 UNIT/ML
5 SOLUTION INTRAVENOUS EVERY 8 HOURS
Status: DISCONTINUED | OUTPATIENT
Start: 2017-04-10 | End: 2017-04-18 | Stop reason: HOSPADM

## 2017-04-10 RX ADMIN — SODIUM CHLORIDE, PRESERVATIVE FREE 5 ML: 5 INJECTION INTRAVENOUS at 10:31

## 2017-04-10 NOTE — NURSING NOTE
Chief Complaint   Patient presents with     Port Draw     Patient is here today for labs to be drawn via her Port by RN. Vitals charted,labs collected and patient checked into next appt.

## 2017-04-12 LAB — CANCER AG19-9 SERPL-ACNC: 1

## 2017-04-27 ENCOUNTER — TELEPHONE (OUTPATIENT)
Dept: ONCOLOGY | Facility: CLINIC | Age: 62
End: 2017-04-27

## 2017-04-27 NOTE — TELEPHONE ENCOUNTER
Oral Chemotherapy Monitoring Program   Placed call to patient in follow up of Xeloda (capecitabine) therapy.     Called Emelia on 4/25 and 4/27. Unable to leave voicemail, voicemail box is full.    Didier Buckner, PharmD  Therapy Management Oncology Pharmacist  [facility]   Phone: [phone]

## 2017-05-01 DIAGNOSIS — C25.0 MALIGNANT NEOPLASM OF HEAD OF PANCREAS (H): ICD-10-CM

## 2017-05-01 LAB
ALBUMIN SERPL-MCNC: 3.4 G/DL (ref 3.4–5)
ALP SERPL-CCNC: 158 U/L (ref 40–150)
ALT SERPL W P-5'-P-CCNC: 67 U/L (ref 0–50)
ANION GAP SERPL CALCULATED.3IONS-SCNC: 9 MMOL/L (ref 3–14)
AST SERPL W P-5'-P-CCNC: 72 U/L (ref 0–45)
BASOPHILS # BLD AUTO: 0 10E9/L (ref 0–0.2)
BASOPHILS NFR BLD AUTO: 0.2 %
BILIRUB SERPL-MCNC: 0.7 MG/DL (ref 0.2–1.3)
BUN SERPL-MCNC: 5 MG/DL (ref 7–30)
CALCIUM SERPL-MCNC: 8.4 MG/DL (ref 8.5–10.1)
CHLORIDE SERPL-SCNC: 107 MMOL/L (ref 94–109)
CO2 SERPL-SCNC: 25 MMOL/L (ref 20–32)
CREAT SERPL-MCNC: 0.53 MG/DL (ref 0.52–1.04)
DIFFERENTIAL METHOD BLD: ABNORMAL
EOSINOPHIL # BLD AUTO: 0.1 10E9/L (ref 0–0.7)
EOSINOPHIL NFR BLD AUTO: 1.5 %
ERYTHROCYTE [DISTWIDTH] IN BLOOD BY AUTOMATED COUNT: 17.1 % (ref 10–15)
GFR SERPL CREATININE-BSD FRML MDRD: >90 ML/MIN/1.7M2
GLUCOSE SERPL-MCNC: 136 MG/DL (ref 70–99)
HCT VFR BLD AUTO: 34.6 % (ref 35–47)
HGB BLD-MCNC: 11.6 G/DL (ref 11.7–15.7)
IMM GRANULOCYTES # BLD: 0 10E9/L (ref 0–0.4)
IMM GRANULOCYTES NFR BLD: 0.5 %
LYMPHOCYTES # BLD AUTO: 1 10E9/L (ref 0.8–5.3)
LYMPHOCYTES NFR BLD AUTO: 24.5 %
MCH RBC QN AUTO: 34.8 PG (ref 26.5–33)
MCHC RBC AUTO-ENTMCNC: 33.5 G/DL (ref 31.5–36.5)
MCV RBC AUTO: 104 FL (ref 78–100)
MONOCYTES # BLD AUTO: 0.4 10E9/L (ref 0–1.3)
MONOCYTES NFR BLD AUTO: 8.5 %
NEUTROPHILS # BLD AUTO: 2.7 10E9/L (ref 1.6–8.3)
NEUTROPHILS NFR BLD AUTO: 64.8 %
NRBC # BLD AUTO: 0 10*3/UL
NRBC BLD AUTO-RTO: 0 /100
PLATELET # BLD AUTO: 173 10E9/L (ref 150–450)
POTASSIUM SERPL-SCNC: 4 MMOL/L (ref 3.4–5.3)
PROT SERPL-MCNC: 6.8 G/DL (ref 6.8–8.8)
RBC # BLD AUTO: 3.33 10E12/L (ref 3.8–5.2)
SODIUM SERPL-SCNC: 141 MMOL/L (ref 133–144)
WBC # BLD AUTO: 4.1 10E9/L (ref 4–11)

## 2017-05-01 PROCEDURE — 80053 COMPREHEN METABOLIC PANEL: CPT | Performed by: INTERNAL MEDICINE

## 2017-05-01 PROCEDURE — 25000128 H RX IP 250 OP 636: Performed by: INTERNAL MEDICINE

## 2017-05-01 PROCEDURE — 85025 COMPLETE CBC W/AUTO DIFF WBC: CPT | Performed by: INTERNAL MEDICINE

## 2017-05-01 RX ORDER — HEPARIN SODIUM (PORCINE) LOCK FLUSH IV SOLN 100 UNIT/ML 100 UNIT/ML
5 SOLUTION INTRAVENOUS
Status: COMPLETED | OUTPATIENT
Start: 2017-05-01 | End: 2017-05-01

## 2017-05-01 RX ADMIN — SODIUM CHLORIDE, PRESERVATIVE FREE 5 ML: 5 INJECTION INTRAVENOUS at 10:29

## 2017-05-01 NOTE — NURSING NOTE
"Chief Complaint   Patient presents with     Port Draw     port accessed and labs drawn by rn.       Port accessed with 20g 3/4\" gripper needle, labs drawn, port flushed with saline and heparin, port de-accessed.  Meghna Leonardo RN    "

## 2017-05-19 ENCOUNTER — TELEPHONE (OUTPATIENT)
Dept: ONCOLOGY | Facility: CLINIC | Age: 62
End: 2017-05-19

## 2017-05-19 DIAGNOSIS — C25.0 MALIGNANT NEOPLASM OF HEAD OF PANCREAS (H): ICD-10-CM

## 2017-05-19 LAB
ALBUMIN SERPL-MCNC: 3.6 G/DL (ref 3.4–5)
ALP SERPL-CCNC: 144 U/L (ref 40–150)
ALT SERPL W P-5'-P-CCNC: 46 U/L (ref 0–50)
ANION GAP SERPL CALCULATED.3IONS-SCNC: 8 MMOL/L (ref 3–14)
AST SERPL W P-5'-P-CCNC: 49 U/L (ref 0–45)
BASOPHILS # BLD AUTO: 0 10E9/L (ref 0–0.2)
BASOPHILS NFR BLD AUTO: 0.6 %
BILIRUB SERPL-MCNC: 0.5 MG/DL (ref 0.2–1.3)
BUN SERPL-MCNC: 7 MG/DL (ref 7–30)
CALCIUM SERPL-MCNC: 8.7 MG/DL (ref 8.5–10.1)
CHLORIDE SERPL-SCNC: 107 MMOL/L (ref 94–109)
CO2 SERPL-SCNC: 28 MMOL/L (ref 20–32)
CREAT SERPL-MCNC: 0.59 MG/DL (ref 0.52–1.04)
DIFFERENTIAL METHOD BLD: ABNORMAL
EOSINOPHIL # BLD AUTO: 0.1 10E9/L (ref 0–0.7)
EOSINOPHIL NFR BLD AUTO: 1.4 %
ERYTHROCYTE [DISTWIDTH] IN BLOOD BY AUTOMATED COUNT: 16.9 % (ref 10–15)
GFR SERPL CREATININE-BSD FRML MDRD: ABNORMAL ML/MIN/1.7M2
GLUCOSE SERPL-MCNC: 95 MG/DL (ref 70–99)
HCT VFR BLD AUTO: 33.8 % (ref 35–47)
HGB BLD-MCNC: 11.3 G/DL (ref 11.7–15.7)
IMM GRANULOCYTES # BLD: 0 10E9/L (ref 0–0.4)
IMM GRANULOCYTES NFR BLD: 0.3 %
LYMPHOCYTES # BLD AUTO: 1.2 10E9/L (ref 0.8–5.3)
LYMPHOCYTES NFR BLD AUTO: 33.4 %
MCH RBC QN AUTO: 34.5 PG (ref 26.5–33)
MCHC RBC AUTO-ENTMCNC: 33.4 G/DL (ref 31.5–36.5)
MCV RBC AUTO: 103 FL (ref 78–100)
MONOCYTES # BLD AUTO: 0.3 10E9/L (ref 0–1.3)
MONOCYTES NFR BLD AUTO: 8.1 %
NEUTROPHILS # BLD AUTO: 2 10E9/L (ref 1.6–8.3)
NEUTROPHILS NFR BLD AUTO: 56.2 %
NRBC # BLD AUTO: 0 10*3/UL
NRBC BLD AUTO-RTO: 0 /100
PLATELET # BLD AUTO: 174 10E9/L (ref 150–450)
POTASSIUM SERPL-SCNC: 3.8 MMOL/L (ref 3.4–5.3)
PROT SERPL-MCNC: 6.8 G/DL (ref 6.8–8.8)
RBC # BLD AUTO: 3.28 10E12/L (ref 3.8–5.2)
SODIUM SERPL-SCNC: 143 MMOL/L (ref 133–144)
WBC # BLD AUTO: 3.6 10E9/L (ref 4–11)

## 2017-05-19 PROCEDURE — 80053 COMPREHEN METABOLIC PANEL: CPT | Performed by: INTERNAL MEDICINE

## 2017-05-19 PROCEDURE — 85025 COMPLETE CBC W/AUTO DIFF WBC: CPT | Performed by: INTERNAL MEDICINE

## 2017-05-19 PROCEDURE — 25000128 H RX IP 250 OP 636: Performed by: INTERNAL MEDICINE

## 2017-05-19 RX ORDER — HEPARIN SODIUM (PORCINE) LOCK FLUSH IV SOLN 100 UNIT/ML 100 UNIT/ML
5 SOLUTION INTRAVENOUS
Status: COMPLETED | OUTPATIENT
Start: 2017-05-19 | End: 2017-05-19

## 2017-05-19 RX ADMIN — SODIUM CHLORIDE, PRESERVATIVE FREE 5 ML: 5 INJECTION INTRAVENOUS at 09:26

## 2017-05-19 NOTE — NURSING NOTE
"Chief Complaint   Patient presents with     Port Draw     port accessed and labs drawn by rn.       Port accessed with 20g 3/4\" power needle, labs drawn, port flushed with saline and heparin.  Meghna Leonardo RN    "

## 2017-05-19 NOTE — TELEPHONE ENCOUNTER
Oral Chemotherapy Monitoring Program   Placed call to patient in follow up of Xeloda (capecitabine) therapy.   Emelia was busy and not able to talk, she requested I call back on Monday, 5/22.     Didier Buckner, PharmD  Therapy Management Oncology Pharmacist  [facility]   Phone: [phone]

## 2017-05-22 ENCOUNTER — ONCOLOGY VISIT (OUTPATIENT)
Dept: ONCOLOGY | Facility: CLINIC | Age: 62
End: 2017-05-22
Attending: INTERNAL MEDICINE
Payer: MEDICARE

## 2017-05-22 VITALS
TEMPERATURE: 97.5 F | WEIGHT: 123.8 LBS | HEIGHT: 66 IN | SYSTOLIC BLOOD PRESSURE: 152 MMHG | RESPIRATION RATE: 18 BRPM | OXYGEN SATURATION: 97 % | HEART RATE: 69 BPM | BODY MASS INDEX: 19.89 KG/M2 | DIASTOLIC BLOOD PRESSURE: 88 MMHG

## 2017-05-22 DIAGNOSIS — R73.02 GLUCOSE INTOLERANCE (IMPAIRED GLUCOSE TOLERANCE): ICD-10-CM

## 2017-05-22 DIAGNOSIS — C25.0 MALIGNANT NEOPLASM OF HEAD OF PANCREAS (H): Primary | ICD-10-CM

## 2017-05-22 PROCEDURE — 99215 OFFICE O/P EST HI 40 MIN: CPT | Mod: ZP | Performed by: INTERNAL MEDICINE

## 2017-05-22 PROCEDURE — 99212 OFFICE O/P EST SF 10 MIN: CPT | Mod: ZF

## 2017-05-22 ASSESSMENT — PAIN SCALES - GENERAL: PAINLEVEL: NO PAIN (0)

## 2017-05-22 NOTE — MR AVS SNAPSHOT
After Visit Summary   5/22/2017    Emelia Weathers    MRN: [id number]           Patient Information     Date Of Birth          1955        Visit Information        Provider Department      5/22/2017 7:15 AM Yasmany Wade MD [facility]        Today's Diagnoses     Malignant neoplasm of head of pancreas (H)    -  1    Glucose intolerance (impaired glucose tolerance)           Follow-ups after your visit        Follow-up notes from your care team     Return in about 3 months (around 8/22/2017) for MD visit with CT and labs.      Your next 10 appointments already scheduled     Jun 12, 2017 10:00 AM CDT   [facility] Draw with [facility]   [facility] ([facility])    [address] [address]   [phone]            Jul 03, 2017 10:00 AM CDT   [facility] Draw with [facility] DRAW   [facility] ([facility])    [address] [address]   [phone]            Jul 24, 2017 10:00 AM CDT   [facility] Draw with [facility] DRAW   [facility] Draw ([facility])    [address] [address]   [phone]            Aug 14, 2017 10:00 AM CDT   [facility] Draw with [facility] DRAW   [facility] ([facility])    [address] [address]   [phone]            Aug 18, 2017  9:00 AM CDT   [facility] Lab Draw with [facility] DRAW   [facility] Draw ([facility])    [address] [address]   [phone]            Aug 18, 2017  9:40 AM CDT   (Arrive by 9:25 AM)   CT CHEST ABDOMEN PELVIS W/O & W CONTRAST with UCCT2   [facility] Imaging [place] [facility] ([facility])    [address] [place] [address]  [facility] [address] [phone]           Please bring any scans or X-rays taken at other hospitals, if similar tests were done. Also bring a list of your medicines, including vitamins, minerals and over-the-counter drugs. It is safest to leave personal items at home.  Be sure to tell your doctor:   If you have any allergies.   If there s any chance you are pregnant.   If you are breastfeeding.   If you have any special needs.  You may have contrast for this exam. To prepare:   Do not eat or drink for 2 hours before your exam. If you need to take medicine, you may take it with small sips of water. (We may ask you to take liquid medicine as well.)   The day before your exam, drink extra fluids at least six 8-ounce glasses (unless your doctor tells you to restrict your fluids).  Patients over 70 or patients with diabetes or kidney problems:   If you haven t had a blood test (creatinine test) within the last 30 days, go to your clinic or Diagnostic Imaging Department for this test.  If you have diabetes:   If your kidney function is normal, continue taking your metformin (Avandamet, Glucophage, Glucovance, Metaglip) on the day of your exam.   If your kidney function is abnormal, wait 48 hours before restarting this medicine.  You will have oral contrast for this exam:   You will drink the contrast at home. Get this from your clinic or Diagnostic Imaging Department. Please follow the directions given.  Please wear loose clothing, such as a sweat suit or jogging clothes. Avoid snaps, zippers and other metal. We may ask you to undress and put on a hospital gown.  If you have any questions, please call the Imaging Department where you will have your exam.            Aug 21, 2017  7:15 AM CDT   (Arrive by 7:00 AM)   Return Visit with Yasmany Wade MD   [facility] ([facility])    [address] [facility]  [address]nd [facility] [id number] [phone]     "          Future tests that were ordered for you today     Open Future Orders        Priority Expected Expires Ordered    Hemoglobin A1c Routine 6/19/2017 5/22/2018 5/22/2017    CT Chest Abdomen Pelvis w/o & w Contrast Routine 8/21/2017 9/25/2017 5/22/2017    Comprehensive metabolic panel Routine 8/21/2017 9/25/2017 5/22/2017    CBC with platelets differential Routine 8/21/2017 9/25/2017 5/22/2017            Who to contact     If you have questions or need follow up information about today's clinic visit or your schedule please contact [facility] directly at [phone].  Normal or non-critical lab and imaging results will be communicated to you by OYE!hart, letter or phone within 4 business days after the clinic has received the results. If you do not hear from us within 7 days, please contact the clinic through "[employer]t or phone. If you have a critical or abnormal lab result, we will notify you by phone as soon as possible.  Submit refill requests through [employer] or call your pharmacy and they will forward the refill request to us. Please allow 3 business days for your refill to be completed.          Additional Information About Your Visit        [facility] Information     [facility] gives you secure access to your electronic health record. If you see a primary care provider, you can also send messages to your care team and make appointments. If you have questions, please call your primary care clinic.  If you do not have a primary care provider, please call [phone] and they will assist you.        Care EveryWhere ID     This is your Care EveryWhere ID. This could be used by other organizations to access your [place] medical records  [id number]        Your Vitals Were     Pulse Temperature Respirations Height Pulse Oximetry BMI (Body Mass Index)    69 97.5  F (36.4  C) (Oral) 18 1.676 m (5' 6\") 97% 19.98 kg/m2       Blood Pressure from Last 3 Encounters:   05/22/17 152/88   03/06/17 147/85 "   12/05/16 156/82    Weight from Last 3 Encounters:   05/22/17 56.2 kg (123 lb 12.8 oz)   03/06/17 55.3 kg (121 lb 14.4 oz)   12/05/16 54.3 kg (119 lb 12.8 oz)                 Today's Medication Changes          These changes are accurate as of: 5/22/17  8:00 AM.  If you have any questions, ask your nurse or doctor.               These medicines have changed or have updated prescriptions.        Dose/Directions    amylase-lipase-protease 75757 UNITS Cpep   Commonly known as:  CREON 12   This may have changed:  how much to take   Used for:  Malignant neoplasm of head of pancreas (H)        Dose:  1 capsule   Take 1 capsule (12,000 Units) by mouth 3 times daily (with meals)   Quantity:  540 capsule   Refills:  3                Primary Care Provider Office Phone # Fax #    Benny Garrett [phone]       [address]  [facility] [address]        Thank you!     Thank you for choosing [facility]  for your care. Our goal is always to provide you with excellent care. Hearing back from our patients is one way we can continue to improve our services. Please take a few minutes to complete the written survey that you may receive in the mail after your visit with us. Thank you!             Your Updated Medication List - Protect others around you: Learn how to safely use, store and throw away your medicines at www.disposemymeds.org.          This list is accurate as of: 5/22/17  8:00 AM.  Always use your most recent med list.                   Brand Name Dispense Instructions for use    amylase-lipase-protease 55514 UNITS Cpep    CREON 12    540 capsule    Take 1 capsule (12,000 Units) by mouth 3 times daily (with meals)       CALCIUM CITRATE + D PO      2 tablets daily       * capecitabine 500 MG tablet CHEMO    XELODA    42 tablet    Take 2 tabs in the AM and 1 tab in the PM, Days 1 through 14, then off for 7 days. Take within 30 mins after meal.       * capecitabine  500 MG tablet CHEMO    XELODA    42 tablet    Take 2 tabs in the AM and 1 tab in the PM, Days 1 through 14, then off for 7 days. Take within 30 mins after meal.       * capecitabine 500 MG tablet CHEMO    XELODA    42 tablet    Take 2 tabs in the AM and 1 tab in the PM, Days 1 through 14, then off for 7 days. Take within 30 mins after meal.       diphenoxylate-atropine 2.5-0.025 MG per tablet    LOMOTIL    100 tablet    Take 1-2 tablets by mouth 4 times daily as needed for diarrhea       lidocaine-prilocaine cream    EMLA    30 g    Apply topically as needed for moderate pain       MELATONIN PO      Take 3 mg by mouth At Bedtime       MULTIVITAMIN TABS   OR      1 TABLET DAILY       ondansetron 8 MG tablet    ZOFRAN    30 tablet    Take 1 tablet (8 mg) by mouth every 8 hours as needed for nausea       pantoprazole 40 MG EC tablet    PROTONIX    90 tablet    Take 1 tablet (40 mg) by mouth every morning (before breakfast)       TYLENOL 325 MG tablet   Generic drug:  acetaminophen      Take 500 mg by mouth every 6 hours as needed       VITAMIN C PO      Take 500 mg by mouth daily.       * Notice:  This list has 3 medication(s) that are the same as other medications prescribed for you. Read the directions carefully, and ask your doctor or other care provider to review them with you.

## 2017-05-22 NOTE — LETTER
5/22/2017      RE: Emelia Weathers  [address]  [place] [facility] [address]       HISTORY OF PRESENT ILLNESS:  Emelia Weathers is here today in followup of pancreas cancer.  She has regionally recurrent disease for which she started FOLFIRINOX back at the end of 2014.  This was reduced to XELOX and now she has been on just Xeloda alone since 11/2015.  She is doing quite well.  She weighs more now than she has in the last couple of years at 125 pounds.  Her appetite is good.  She gets just a little bit of hand-foot toxicity and some tenderness in her mouth, occasionally a little bit of a sore spot on the buccal mucosa on the left, but otherwise not a lot of toxicity from her Xeloda.  She still has just a little bit of residual numbness from the oxaliplatin.  She has been doing a lot of bike riding and other exercises.      REVIEW OF SYSTEMS:  The remainder of a 10-point review of systems is otherwise completely unremarkable.      PHYSICAL EXAMINATION:   GENERAL:  She appears well.   VITAL SIGNS:  She has unremarkable vital signs.   HEENT:  She has no scleral icterus.  There are no visible lesions within the oropharynx.   NECK:  She has no adenopathy palpable in the neck or supraclavicular spaces.   LUNGS:  Clear to auscultation without dullness to percussion.   HEART:  Rate and rhythm are regular with a grade 2/6 systolic ejection murmur.   ABDOMEN:  Soft and nontender, without palpable mass or organomegaly and no demonstrable ascites.   EXTREMITIES:  She has no tenderness in her calves or thighs.   NEUROLOGIC:  Her speech is fluent.  Her cranial nerves are grossly intact.  Her gait and station are unremarkable.      RESULTS:  I reviewed with the patient and her family her lab work and CT scan.  She has some mild cytopenias consistent with her chemotherapy.  Her electrolytes, renal function and liver enzymes are all normal.  Her CT scan shows some tiny lung nodules that are unchanged and probably benign.  She has  very little residual stranding in her resection bed.  She has a little bit of a fatty liver and nothing to suggest disease progression.      ASSESSMENT:  Recurrent pancreatic cancer with limited regional disease.  She has excellent ongoing control with Xeloda, which she is tolerating quite well.  Her fatty liver is probably a function of her Xeloda and perhaps a little bit of glucose intolerance.  We will recheck her hemoglobin A1c, but she has been occasionally checking her blood sugars in the morning and her fasting sugars are rarely over 100.  We will see her back for another response assessment after 3 months.           Yasmany Wade MD

## 2017-05-22 NOTE — PROGRESS NOTES
HISTORY OF PRESENT ILLNESS:  Emelia Weathers is here today in followup of pancreas cancer.  She has regionally recurrent disease for which she started FOLFIRINOX back at the end of 2014.  This was reduced to XELOX and now she has been on just Xeloda alone since 11/2015.  She is doing quite well.  She weighs more now than she has in the last couple of years at 125 pounds.  Her appetite is good.  She gets just a little bit of hand-foot toxicity and some tenderness in her mouth, occasionally a little bit of a sore spot on the buccal mucosa on the left, but otherwise not a lot of toxicity from her Xeloda.  She still has just a little bit of residual numbness from the oxaliplatin.  She has been doing a lot of bike riding and other exercises.      REVIEW OF SYSTEMS:  The remainder of a 10-point review of systems is otherwise completely unremarkable.      PHYSICAL EXAMINATION:   GENERAL:  She appears well.   VITAL SIGNS:  She has unremarkable vital signs.   HEENT:  She has no scleral icterus.  There are no visible lesions within the oropharynx.   NECK:  She has no adenopathy palpable in the neck or supraclavicular spaces.   LUNGS:  Clear to auscultation without dullness to percussion.   HEART:  Rate and rhythm are regular with a grade 2/6 systolic ejection murmur.   ABDOMEN:  Soft and nontender, without palpable mass or organomegaly and no demonstrable ascites.   EXTREMITIES:  She has no tenderness in her calves or thighs.   NEUROLOGIC:  Her speech is fluent.  Her cranial nerves are grossly intact.  Her gait and station are unremarkable.      RESULTS:  I reviewed with the patient and her family her lab work and CT scan.  She has some mild cytopenias consistent with her chemotherapy.  Her electrolytes, renal function and liver enzymes are all normal.  Her CT scan shows some tiny lung nodules that are unchanged and probably benign.  She has very little residual stranding in her resection bed.  She has a little bit of a fatty  liver and nothing to suggest disease progression.      ASSESSMENT:  Recurrent pancreatic cancer with limited regional disease.  She has excellent ongoing control with Xeloda, which she is tolerating quite well.  Her fatty liver is probably a function of her Xeloda and perhaps a little bit of glucose intolerance.  We will recheck her hemoglobin A1c, but she has been occasionally checking her blood sugars in the morning and her fasting sugars are rarely over 100.  We will see her back for another response assessment after 3 months.

## 2017-05-22 NOTE — NURSING NOTE
"Oncology Rooming Note    May 22, 2017 7:24 AM   Emelia Weathers is a 61 year old female who presents for:    Chief Complaint   Patient presents with     Oncology Clinic Visit     Patient with Hypokalemia here for provider visit      Initial Vitals: /88 (BP Location: Left arm, Patient Position: Chair, Cuff Size: Adult Regular)  Pulse 69  Temp 97.5  F (36.4  C) (Oral)  Resp 18  Ht 1.676 m (5' 6\")  Wt 56.2 kg (123 lb 12.8 oz)  SpO2 97%  BMI 19.98 kg/m2 Estimated body mass index is 19.98 kg/(m^2) as calculated from the following:    Height as of this encounter: 1.676 m (5' 6\").    Weight as of this encounter: 56.2 kg (123 lb 12.8 oz). Body surface area is 1.62 meters squared.  No Pain (0) Comment: Data Unavailable   No LMP recorded. Patient is postmenopausal.  Allergies reviewed: Yes  Medications reviewed: Yes    Medications: Medication refills not needed today.  Pharmacy name entered into [facility]:    [id number] - [place], [place] - [address] [place] [address] PHARMACY [facility] - [place], [place] - [address] [facility] PHARMACY [facility] [place], [place] [address] [facility] [phone]  [id number] - [place], [place] - [address] [place] [address] [facility] - [place], [place] - [address] [place] [address]    Clinical concerns:   5 minutes for nursing intake (face to face time)     Korin Saez CMA              "

## 2017-06-06 DIAGNOSIS — C25.0 MALIGNANT NEOPLASM OF HEAD OF PANCREAS (H): Primary | ICD-10-CM

## 2017-06-06 RX ORDER — CAPECITABINE 500 MG/1
TABLET, FILM COATED ORAL
Qty: 42 TABLET | Refills: 3 | Status: SHIPPED | OUTPATIENT
Start: 2017-06-06 | End: 2018-05-21

## 2017-06-13 NOTE — NURSING NOTE
Pt's port flushed with 5 mL (100units/mL) heparin flush and 20ml normal saline flush per protocol.  Unable to chart in mar due to downtime.   Meghna Leonardo RN

## 2017-06-13 NOTE — NURSING NOTE
"Late entry (downtime on 6/12)  Chief Complaint   Patient presents with     Port Draw     labs drawn from port by rn.  lab only appointment.     Port accessed with 20g 3/4\" gripper needle, labs drawn, port flushed with saline and heparin, port de-accessed.  Meghna Leonardo RN    "

## 2017-07-03 ENCOUNTER — TELEPHONE (OUTPATIENT)
Dept: ONCOLOGY | Facility: CLINIC | Age: 62
End: 2017-07-03

## 2017-07-03 DIAGNOSIS — R73.02 GLUCOSE INTOLERANCE (IMPAIRED GLUCOSE TOLERANCE): ICD-10-CM

## 2017-07-03 DIAGNOSIS — C25.0 MALIGNANT NEOPLASM OF HEAD OF PANCREAS (H): ICD-10-CM

## 2017-07-03 LAB
ALBUMIN SERPL-MCNC: 3.6 G/DL (ref 3.4–5)
ALP SERPL-CCNC: 153 U/L (ref 40–150)
ALT SERPL W P-5'-P-CCNC: 46 U/L (ref 0–50)
ANION GAP SERPL CALCULATED.3IONS-SCNC: 7 MMOL/L (ref 3–14)
AST SERPL W P-5'-P-CCNC: 42 U/L (ref 0–45)
BASOPHILS # BLD AUTO: 0 10E9/L (ref 0–0.2)
BASOPHILS NFR BLD AUTO: 0.4 %
BILIRUB SERPL-MCNC: 0.6 MG/DL (ref 0.2–1.3)
BUN SERPL-MCNC: 11 MG/DL (ref 7–30)
CALCIUM SERPL-MCNC: 8.5 MG/DL (ref 8.5–10.1)
CHLORIDE SERPL-SCNC: 106 MMOL/L (ref 94–109)
CO2 SERPL-SCNC: 28 MMOL/L (ref 20–32)
CREAT SERPL-MCNC: 0.68 MG/DL (ref 0.52–1.04)
DIFFERENTIAL METHOD BLD: ABNORMAL
EOSINOPHIL # BLD AUTO: 0.1 10E9/L (ref 0–0.7)
EOSINOPHIL NFR BLD AUTO: 1.6 %
ERYTHROCYTE [DISTWIDTH] IN BLOOD BY AUTOMATED COUNT: 17.2 % (ref 10–15)
GFR SERPL CREATININE-BSD FRML MDRD: 86 ML/MIN/1.7M2
GLUCOSE SERPL-MCNC: 94 MG/DL (ref 70–99)
HBA1C MFR BLD: 6 % (ref 4.3–6)
HCT VFR BLD AUTO: 34.2 % (ref 35–47)
HGB BLD-MCNC: 11.2 G/DL (ref 11.7–15.7)
IMM GRANULOCYTES # BLD: 0 10E9/L (ref 0–0.4)
IMM GRANULOCYTES NFR BLD: 0.4 %
LYMPHOCYTES # BLD AUTO: 1 10E9/L (ref 0.8–5.3)
LYMPHOCYTES NFR BLD AUTO: 23.3 %
MCH RBC QN AUTO: 34 PG (ref 26.5–33)
MCHC RBC AUTO-ENTMCNC: 32.7 G/DL (ref 31.5–36.5)
MCV RBC AUTO: 104 FL (ref 78–100)
MONOCYTES # BLD AUTO: 0.4 10E9/L (ref 0–1.3)
MONOCYTES NFR BLD AUTO: 9.9 %
NEUTROPHILS # BLD AUTO: 2.9 10E9/L (ref 1.6–8.3)
NEUTROPHILS NFR BLD AUTO: 64.4 %
NRBC # BLD AUTO: 0 10*3/UL
NRBC BLD AUTO-RTO: 0 /100
PLATELET # BLD AUTO: 182 10E9/L (ref 150–450)
POTASSIUM SERPL-SCNC: 3.9 MMOL/L (ref 3.4–5.3)
PROT SERPL-MCNC: 6.9 G/DL (ref 6.8–8.8)
RBC # BLD AUTO: 3.29 10E12/L (ref 3.8–5.2)
SODIUM SERPL-SCNC: 141 MMOL/L (ref 133–144)
WBC # BLD AUTO: 4.5 10E9/L (ref 4–11)

## 2017-07-03 PROCEDURE — 25000128 H RX IP 250 OP 636: Performed by: INTERNAL MEDICINE

## 2017-07-03 PROCEDURE — 36591 DRAW BLOOD OFF VENOUS DEVICE: CPT

## 2017-07-03 RX ORDER — HEPARIN SODIUM (PORCINE) LOCK FLUSH IV SOLN 100 UNIT/ML 100 UNIT/ML
500 SOLUTION INTRAVENOUS ONCE
Status: COMPLETED | OUTPATIENT
Start: 2017-07-03 | End: 2017-07-03

## 2017-07-03 RX ADMIN — SODIUM CHLORIDE, PRESERVATIVE FREE 500 UNITS: 5 INJECTION INTRAVENOUS at 10:27

## 2017-07-03 NOTE — TELEPHONE ENCOUNTER
Oral Chemo Monitoring Program    Placed call to patient to discuss 7/03/17 labs    Left message for patient to call back. No patient or drug names were used.    No clinically relevant abnormalities at this time.      Wong Franklin  Pharmacy Intern  [facility]  [phone]

## 2017-07-03 NOTE — NURSING NOTE
Chief Complaint   Patient presents with     Port Draw     Labs Drawn      Port accessed. Labs drawn. Flushed with heparin and NS. Port de accessed.     Lauren Schoen, RN

## 2017-07-04 LAB — CANCER AG19-9 SERPL-ACNC: NORMAL

## 2017-07-07 ENCOUNTER — TELEPHONE (OUTPATIENT)
Dept: ONCOLOGY | Facility: CLINIC | Age: 62
End: 2017-07-07

## 2017-07-07 NOTE — TELEPHONE ENCOUNTER
Oral Chemo Monitoring Program    Placed call to patient to discuss 7/03/17 labs  No clinically relevant abnormalities at this time.    Wong Franklin  Pharmacy Intern  [facility]  [phone]

## 2017-07-15 ENCOUNTER — HEALTH MAINTENANCE LETTER (OUTPATIENT)
Age: 62
End: 2017-07-15

## 2017-07-24 DIAGNOSIS — C25.0 MALIGNANT NEOPLASM OF HEAD OF PANCREAS (H): ICD-10-CM

## 2017-07-24 LAB
ALBUMIN SERPL-MCNC: 3.4 G/DL (ref 3.4–5)
ALP SERPL-CCNC: 152 U/L (ref 40–150)
ALT SERPL W P-5'-P-CCNC: 32 U/L (ref 0–50)
ANION GAP SERPL CALCULATED.3IONS-SCNC: 6 MMOL/L (ref 3–14)
AST SERPL W P-5'-P-CCNC: 34 U/L (ref 0–45)
BASOPHILS # BLD AUTO: 0 10E9/L (ref 0–0.2)
BASOPHILS NFR BLD AUTO: 0.5 %
BILIRUB SERPL-MCNC: 0.5 MG/DL (ref 0.2–1.3)
BUN SERPL-MCNC: 8 MG/DL (ref 7–30)
CALCIUM SERPL-MCNC: 8.6 MG/DL (ref 8.5–10.1)
CHLORIDE SERPL-SCNC: 107 MMOL/L (ref 94–109)
CO2 SERPL-SCNC: 28 MMOL/L (ref 20–32)
CREAT SERPL-MCNC: 0.67 MG/DL (ref 0.52–1.04)
DIFFERENTIAL METHOD BLD: ABNORMAL
EOSINOPHIL # BLD AUTO: 0.1 10E9/L (ref 0–0.7)
EOSINOPHIL NFR BLD AUTO: 1.3 %
ERYTHROCYTE [DISTWIDTH] IN BLOOD BY AUTOMATED COUNT: 17.2 % (ref 10–15)
GFR SERPL CREATININE-BSD FRML MDRD: 89 ML/MIN/1.7M2
GLUCOSE SERPL-MCNC: 110 MG/DL (ref 70–99)
HCT VFR BLD AUTO: 33 % (ref 35–47)
HGB BLD-MCNC: 10.9 G/DL (ref 11.7–15.7)
IMM GRANULOCYTES # BLD: 0 10E9/L (ref 0–0.4)
IMM GRANULOCYTES NFR BLD: 0.5 %
LYMPHOCYTES # BLD AUTO: 1.1 10E9/L (ref 0.8–5.3)
LYMPHOCYTES NFR BLD AUTO: 26.4 %
MCH RBC QN AUTO: 34.5 PG (ref 26.5–33)
MCHC RBC AUTO-ENTMCNC: 33 G/DL (ref 31.5–36.5)
MCV RBC AUTO: 104 FL (ref 78–100)
MONOCYTES # BLD AUTO: 0.4 10E9/L (ref 0–1.3)
MONOCYTES NFR BLD AUTO: 9.1 %
NEUTROPHILS # BLD AUTO: 2.5 10E9/L (ref 1.6–8.3)
NEUTROPHILS NFR BLD AUTO: 62.2 %
NRBC # BLD AUTO: 0 10*3/UL
NRBC BLD AUTO-RTO: 0 /100
PLATELET # BLD AUTO: 182 10E9/L (ref 150–450)
POTASSIUM SERPL-SCNC: 3.8 MMOL/L (ref 3.4–5.3)
PROT SERPL-MCNC: 6.6 G/DL (ref 6.8–8.8)
RBC # BLD AUTO: 3.16 10E12/L (ref 3.8–5.2)
SODIUM SERPL-SCNC: 140 MMOL/L (ref 133–144)
WBC # BLD AUTO: 4 10E9/L (ref 4–11)

## 2017-07-24 PROCEDURE — 25000128 H RX IP 250 OP 636: Performed by: INTERNAL MEDICINE

## 2017-07-24 PROCEDURE — 85025 COMPLETE CBC W/AUTO DIFF WBC: CPT | Performed by: INTERNAL MEDICINE

## 2017-07-24 PROCEDURE — 36592 COLLECT BLOOD FROM PICC: CPT

## 2017-07-24 PROCEDURE — 80053 COMPREHEN METABOLIC PANEL: CPT | Performed by: INTERNAL MEDICINE

## 2017-07-24 RX ORDER — HEPARIN SODIUM (PORCINE) LOCK FLUSH IV SOLN 100 UNIT/ML 100 UNIT/ML
5 SOLUTION INTRAVENOUS ONCE
Status: COMPLETED | OUTPATIENT
Start: 2017-07-24 | End: 2017-07-24

## 2017-07-24 RX ADMIN — SODIUM CHLORIDE, PRESERVATIVE FREE 5 ML: 5 INJECTION INTRAVENOUS at 10:25

## 2017-07-24 NOTE — NURSING NOTE
Chief Complaint   Patient presents with     Port Draw     labs drawn     There were no vitals taken for this visit.    Port accessed by RN. Labs collected and sent. Line flushed with NS & Heparin. Pt tolerated well.    Priscilla Gonzalez RN

## 2017-07-25 ENCOUNTER — TELEPHONE (OUTPATIENT)
Dept: ONCOLOGY | Facility: CLINIC | Age: 62
End: 2017-07-25

## 2017-07-25 NOTE — TELEPHONE ENCOUNTER
Oral Chemotherapy Monitoring Program    Placed a call to Emelia in review of her lab results from 7/24. No answer. Left message stating that no clinically significant abnormalities were found at this time, and that she can call [phone] if she has any questions. No patient or medication names were mentioned.    Michelle Aldana  Pharmacy Intern   [facility]  Oral Chemotherapy Monitoring Program  [phone]

## 2017-08-11 ENCOUNTER — TELEPHONE (OUTPATIENT)
Dept: ONCOLOGY | Facility: CLINIC | Age: 62
End: 2017-08-11

## 2017-08-11 NOTE — TELEPHONE ENCOUNTER
Oral Chemotherapy Monitoring Program    Primary Oncologist: Dr. Wade  Primary Oncology Clinic: [facility]  Cancer Diagnosis: Pancreatic     Therapy History: Patient verbally confirmed capecitabine 500mg- 2 tablets (1000mg) every morning and 1 tablet (500mg) every evening days 1 to 14, then 7 days off  Xeloda alone started February 2015.     Drug Interaction Assessment: No new drug interactions.    Lab Monitoring Plan    Subjective/Objective:  Emelia Weathers is a 61 year old female contacted by phone for a follow-up visit for oral chemotherapy.  Emelia reports she has continued to do well on capecitabine, denies side effects and medication changes. No nausea, vomiting, constipation, diarrhea, oral mucositis, myalgias, fatigue, HFS, and other side effects. She continues to stay active and go out biking. She likes to continue to  her medication at site [facility] ([facility] pharmacy) following labs. She is completing her off week and will start her next cycle 8/15. She verbally confirmed her dose and takes her medication after meals. Her Medication Possession Ratio (MPR) = 0.93, patient is adherent and reports not missing a dose in over 2 years.     ORAL CHEMOTHERAPY 12/1/2015 12/14/2015 12/30/2015 1/27/2016 8/11/2017   Drug Name Xeloda (Capecitabine) Xeloda (Capecitabine) Xeloda (Capecitabine) Xeloda (Capecitabine) Xeloda (Capecitabine)   Current Dosage 1000mg 1000mg 1000mg 1000mg Other   Current Schedule Daily Daily Daily Daily BID   Cycle Details 2 weeks on 1 week off 2 weeks on 1 week off 2 weeks on 1 week off 2 weeks on 1 week off 2 weeks on 1 week off   Start Date of Last Cycle 11/24/2015 12/15/2015 12/15/2015 1/26/2016 7/25/2017   Planned next cycle start date - - - - 8/15/2017   Doses missed in last 2 weeks 0 0 0 0 0   Adherence Assessment - - - - Adherent   Adverse Effects None None None Hand-Foot Syndrome/ Hand-Foot Skin Reaction No AE identified during assessment   Palmar-plantar Erythrodysethesia  "syndrome[hand-foot syndrome] - - - mild -   Home BPs - not needed not needed not needed not needed   Any new drug interactions? - - - - No   Is the dose as ordered appropriate for the patient? - - - - Yes   Is the patient currently in pain? - - - - No   Has the patient been assessed within the past 6 months for depression? - - - - Yes   Has the patient missed any days of school, work, or other routine activity? - - - - No       Last PHQ-2 Score on record:   PHQ-2 ( [facility]) 3/6/2017 12/5/2016   Q1: Little interest or pleasure in doing things 0 0   Q2: Feeling down, depressed or hopeless 0 0   PHQ-2 Score 0 0       Patient does not report depression symptoms.      Vitals:  BP:   BP Readings from Last 1 Encounters:   05/22/17 152/88     Wt Readings from Last 1 Encounters:   05/22/17 56.2 kg (123 lb 12.8 oz)     Estimated body surface area is 1.62 meters squared as calculated from the following:    Height as of 5/22/17: 1.676 m (5' 6\").    Weight as of 5/22/17: 56.2 kg (123 lb 12.8 oz).    Labs:  Lab Results   Component Value Date     07/24/2017      Lab Results   Component Value Date    POTASSIUM 3.8 07/24/2017     Lab Results   Component Value Date    LEONOR 8.6 07/24/2017     Lab Results   Component Value Date    ALBUMIN 3.4 07/24/2017     Lab Results   Component Value Date    MAG 1.7 04/20/2015     Lab Results   Component Value Date    PHOS 3.0 04/20/2015     Lab Results   Component Value Date    BUN 8 07/24/2017     Lab Results   Component Value Date    CR 0.67 07/24/2017       Lab Results   Component Value Date    AST 34 07/24/2017     Lab Results   Component Value Date    ALT 32 07/24/2017     Lab Results   Component Value Date    BILITOTAL 0.5 07/24/2017       Lab Results   Component Value Date    WBC 4.0 07/24/2017     Lab Results   Component Value Date    HGB 10.9 07/24/2017     Lab Results   Component Value Date     07/24/2017     Lab Results   Component Value Date    ANEU 2.5 07/24/2017 "           Assessment:  Emelia is tolerating capecitabine well with no side effects.    Plan:  Continue capecitabine 1000mg qAM and 500mg qPM x 14 days then 7 days off.     Follow-Up:  Labs 8/14/2017.  Labs and radiology 8/18/2017.  Dr. Wade clinic appointment 8/21/2017.      Refill Due:  Emelia will  refill at site [facility] on 8/14.  Next refill released 8/28/2017.     Thank you for the opportunity to care for the above patient,    Didier Buckner, PharmD  Therapy Management Oncology Pharmacist  [facility]   Phone: [phone]

## 2017-08-18 DIAGNOSIS — C25.0 MALIGNANT NEOPLASM OF HEAD OF PANCREAS (H): ICD-10-CM

## 2017-08-18 LAB
ALBUMIN SERPL-MCNC: 3.6 G/DL (ref 3.4–5)
ALP SERPL-CCNC: 175 U/L (ref 40–150)
ALT SERPL W P-5'-P-CCNC: 42 U/L (ref 0–50)
ANION GAP SERPL CALCULATED.3IONS-SCNC: 7 MMOL/L (ref 3–14)
AST SERPL W P-5'-P-CCNC: 47 U/L (ref 0–45)
BASOPHILS # BLD AUTO: 0 10E9/L (ref 0–0.2)
BASOPHILS NFR BLD AUTO: 0.7 %
BILIRUB SERPL-MCNC: 0.6 MG/DL (ref 0.2–1.3)
BUN SERPL-MCNC: 6 MG/DL (ref 7–30)
CALCIUM SERPL-MCNC: 8.7 MG/DL (ref 8.5–10.1)
CHLORIDE SERPL-SCNC: 106 MMOL/L (ref 94–109)
CO2 SERPL-SCNC: 28 MMOL/L (ref 20–32)
CREAT SERPL-MCNC: 0.67 MG/DL (ref 0.52–1.04)
DIFFERENTIAL METHOD BLD: ABNORMAL
EOSINOPHIL # BLD AUTO: 0 10E9/L (ref 0–0.7)
EOSINOPHIL NFR BLD AUTO: 0.9 %
ERYTHROCYTE [DISTWIDTH] IN BLOOD BY AUTOMATED COUNT: 16.9 % (ref 10–15)
GFR SERPL CREATININE-BSD FRML MDRD: 89 ML/MIN/1.7M2
GLUCOSE SERPL-MCNC: 91 MG/DL (ref 70–99)
HCT VFR BLD AUTO: 35 % (ref 35–47)
HGB BLD-MCNC: 11.2 G/DL (ref 11.7–15.7)
IMM GRANULOCYTES # BLD: 0 10E9/L (ref 0–0.4)
IMM GRANULOCYTES NFR BLD: 0.5 %
LYMPHOCYTES # BLD AUTO: 1 10E9/L (ref 0.8–5.3)
LYMPHOCYTES NFR BLD AUTO: 22 %
MCH RBC QN AUTO: 33.4 PG (ref 26.5–33)
MCHC RBC AUTO-ENTMCNC: 32 G/DL (ref 31.5–36.5)
MCV RBC AUTO: 105 FL (ref 78–100)
MONOCYTES # BLD AUTO: 0.4 10E9/L (ref 0–1.3)
MONOCYTES NFR BLD AUTO: 8.4 %
NEUTROPHILS # BLD AUTO: 3 10E9/L (ref 1.6–8.3)
NEUTROPHILS NFR BLD AUTO: 67.5 %
NRBC # BLD AUTO: 0 10*3/UL
NRBC BLD AUTO-RTO: 0 /100
PLATELET # BLD AUTO: 186 10E9/L (ref 150–450)
POTASSIUM SERPL-SCNC: 4 MMOL/L (ref 3.4–5.3)
PROT SERPL-MCNC: 7.3 G/DL (ref 6.8–8.8)
RBC # BLD AUTO: 3.35 10E12/L (ref 3.8–5.2)
SODIUM SERPL-SCNC: 141 MMOL/L (ref 133–144)
WBC # BLD AUTO: 4.4 10E9/L (ref 4–11)

## 2017-08-18 PROCEDURE — 85025 COMPLETE CBC W/AUTO DIFF WBC: CPT | Performed by: INTERNAL MEDICINE

## 2017-08-18 PROCEDURE — 25000128 H RX IP 250 OP 636: Performed by: PHYSICIAN ASSISTANT

## 2017-08-18 PROCEDURE — 80053 COMPREHEN METABOLIC PANEL: CPT | Performed by: INTERNAL MEDICINE

## 2017-08-18 RX ORDER — HEPARIN SODIUM (PORCINE) LOCK FLUSH IV SOLN 100 UNIT/ML 100 UNIT/ML
5 SOLUTION INTRAVENOUS DAILY PRN
Status: DISCONTINUED | OUTPATIENT
Start: 2017-08-18 | End: 2017-08-26 | Stop reason: HOSPADM

## 2017-08-18 RX ADMIN — SODIUM CHLORIDE, PRESERVATIVE FREE 5 ML: 5 INJECTION INTRAVENOUS at 09:20

## 2017-08-18 NOTE — NURSING NOTE
Chief Complaint   Patient presents with     Port Draw     powerport for CT used to access port-lab only appt.     Pt tolerated well-line flushed with NS and heparin. Itzel Benitez

## 2017-08-21 ENCOUNTER — ONCOLOGY VISIT (OUTPATIENT)
Dept: ONCOLOGY | Facility: CLINIC | Age: 62
End: 2017-08-21
Attending: INTERNAL MEDICINE
Payer: MEDICARE

## 2017-08-21 VITALS
HEART RATE: 70 BPM | OXYGEN SATURATION: 99 % | RESPIRATION RATE: 16 BRPM | SYSTOLIC BLOOD PRESSURE: 151 MMHG | WEIGHT: 118.6 LBS | TEMPERATURE: 96.8 F | DIASTOLIC BLOOD PRESSURE: 82 MMHG | BODY MASS INDEX: 19.06 KG/M2 | HEIGHT: 66 IN

## 2017-08-21 DIAGNOSIS — C25.0 MALIGNANT NEOPLASM OF HEAD OF PANCREAS (H): Primary | ICD-10-CM

## 2017-08-21 PROCEDURE — 99214 OFFICE O/P EST MOD 30 MIN: CPT | Mod: GC | Performed by: INTERNAL MEDICINE

## 2017-08-21 PROCEDURE — 99212 OFFICE O/P EST SF 10 MIN: CPT | Mod: ZF

## 2017-08-21 ASSESSMENT — PAIN SCALES - GENERAL: PAINLEVEL: NO PAIN (0)

## 2017-08-21 NOTE — LETTER
"8/21/2017       RE: Emelia Weathers  [address]  [place] [facility] [address]     Dear Colleague,    Thank you for referring your patient, Emelia Weathers, to the [facility]. Please see a copy of my visit note below.    Date of Service:August 21, 2017    HISTORY OF PRESENT ILLNESS:  Emelia Weathers is here today in followup of pancreas cancer.  She has regionally recurrent disease for which she started FOLFIRINOX back at the end of 2014.  This was reduced to XELOX and now she has been on just Xeloda alone since 11/2015.  She is doing quite well.     Interval History: She is doing well overall. She continues to have neuropathy in both hands and feet. She does have peeling of skin in her hands and feet. She has diarrhea but takes Creon. She has 3-4 episodes of bowel movement in the morning hours. She denies any blood in stool. She takes Xeloda 2 weeks on and one-week off. She is planning to have a trip to [facility] next week. She remains physically very active.     REVIEW OF SYSTEMS:  The remainder of a 10-point review of systems is otherwise completely unremarkable.      PHYSICAL EXAMINATION:   /82  Pulse 70  Temp 96.8  F (36  C) (Oral)  Resp 16  Ht 1.676 m (5' 5.98\")  Wt 53.8 kg (118 lb 9.6 oz)  SpO2 99%  BMI 19.15 kg/m2  GENERAL:  Alert, oriented, comfortable  HEENT:  anicterus.  Mucous membranes moist  NECK:  No adenopathy palpable in the neck or supraclavicular spaces.   LUNGS:  Clear to auscultation without dullness to percussion. No obese, no crackles  HEART:  Rate and rhythm are regular , no murmur  ABDOMEN:  Soft and nontender, without palpable mass or organomegaly.  EXTREMITIES:  No pedal edema, peeling skin noted in the hands.  NEUROLOGIC: Motor function grossly intact    Labs:  Component      Latest Ref Rng & Units 8/18/2017   WBC      4.0 - 11.0 10e9/L 4.4   RBC Count      3.8 - 5.2 10e12/L 3.35 (L)   Hemoglobin      11.7 - 15.7 g/dL 11.2 (L)   Hematocrit      35.0 - 47.0 % " 35.0   MCV      78 - 100 fl 105 (H)   MCH      26.5 - 33.0 pg 33.4 (H)   MCHC      31.5 - 36.5 g/dL 32.0   RDW      10.0 - 15.0 % 16.9 (H)   Platelet Count      150 - 450 10e9/L 186   Diff Method       Automated Method   % Neutrophils      % 67.5   % Lymphocytes      % 22.0   % Monocytes      % 8.4   % Eosinophils      % 0.9   % Basophils      % 0.7   % Immature Granulocytes      % 0.5   Nucleated RBCs      0 /100 0   Absolute Neutrophil      1.6 - 8.3 10e9/L 3.0   Absolute Lymphocytes      0.8 - 5.3 10e9/L 1.0   Absolute Monocytes      0.0 - 1.3 10e9/L 0.4   Absolute Eosinophils      0.0 - 0.7 10e9/L 0.0   Absolute Basophils      0.0 - 0.2 10e9/L 0.0   Abs Immature Granulocytes      0 - 0.4 10e9/L 0.0   Absolute Nucleated RBC       0.0   Sodium      133 - 144 mmol/L 141   Potassium      3.4 - 5.3 mmol/L 4.0   Chloride      94 - 109 mmol/L 106   Carbon Dioxide      20 - 32 mmol/L 28   Anion Gap      3 - 14 mmol/L 7   Glucose      70 - 99 mg/dL 91   Urea Nitrogen      7 - 30 mg/dL 6 (L)   Creatinine      0.52 - 1.04 mg/dL 0.67   GFR Estimate      >60 mL/min/1.7m2 89   GFR Estimate If Black      >60 mL/min/1.7m2 >90   Calcium      8.5 - 10.1 mg/dL 8.7   Bilirubin Total      0.2 - 1.3 mg/dL 0.6   Albumin      3.4 - 5.0 g/dL 3.6   Protein Total      6.8 - 8.8 g/dL 7.3   Alkaline Phosphatase      40 - 150 U/L 175 (H)   ALT      0 - 50 U/L 42   AST      0 - 45 U/L 47 (H)     8/18/2017:CT CAP  Impression:   1. Stable postoperative changes in the upper abdomen after Whipple  procedure, without evidence of local recurrence or metastatic disease  in the chest, abdomen, or pelvis.  2. No significant change in appearance of prominent mesenteric and  retroperitoneal lymph nodes dating back to 2014.    ASSESSMENT:  Recurrent pancreatic cancer with limited regional disease.  She has excellent ongoing control with Xeloda, which she is tolerating quite well.  We will see her back for another CT CAP after 3 months.     Patient seen  and discussed with AURELIO Vickers, MS.  Hematology/Oncology Fellow  [facility]            Attending note: I saw the patient in conjunction with the fellow and agree with the above.    Again, thank you for allowing me to participate in the care of your patient.      Sincerely,    Yasmany Wade MD

## 2017-08-21 NOTE — MR AVS SNAPSHOT
After Visit Summary   8/21/2017    Emelia Weathers    MRN: [id number]           Patient Information     Date Of Birth          1955        Visit Information        Provider Department      8/21/2017 11:15 AM Yasmany Wade MD [facility]        Today's Diagnoses     Malignant neoplasm of head of pancreas (H)    -  1       Follow-ups after your visit        Follow-up notes from your care team     Return in about 3 months (around 11/21/2017) for Physical Exam, Lab Work.      Your next 10 appointments already scheduled     Sep 12, 2017 10:00 AM CDT   [facility] Lab Draw with [facility]RAW   [facility] Draw ([facility])    [address] [address]   [phone]            Sep 25, 2017 10:00 AM CDT   [facility] Lab Draw with [facility] DRAW   [facility] Draw ([facility])    [address] [place] [address]  2nd [facility] [address]   [phone]            Oct 16, 2017 10:00 AM CDT   [facility] Lab Draw with [facility] DRAW   [facility] Draw ([facility])    [address] [place] [address]   [phone]            Nov 06, 2017 10:00 AM CST   [facility] Lab Draw with [facility] DRAW   [facility] Lab Draw ([facility])    [address] [place] [address]   [phone]            Nov 17, 2017  9:00 AM CST   [facility] Lab Draw with [facility] LAB DRAW   [facility] Lab Draw ([facility])    [address] [place] [address]   [phone]            Nov 17, 2017  9:40 AM CST   (Arrive by 9:25 AM)   CT CHEST/ABDOMEN/PELVIS W CONTRAST with UCCT2   [facility] Imaging [place] ([facility])    [address] [place] [address]  [address]st [facility] [address]   [phone]           Please  bring any scans or X-rays taken at other hospitals, if similar tests were done. Also bring a list of your medicines, including vitamins, minerals and over-the-counter drugs. It is safest to leave personal items at home.  Be sure to tell your doctor:   If you have any allergies.   If there s any chance you are pregnant.   If you are breastfeeding.   If you have any special needs.  You may have contrast for this exam. To prepare:   Do not eat or drink for 2 hours before your exam. If you need to take medicine, you may take it with small sips of water. (We may ask you to take liquid medicine as well.)   The day before your exam, drink extra fluids at least six 8-ounce glasses (unless your doctor tells you to restrict your fluids).  Patients over 70 or patients with diabetes or kidney problems:   If you haven t had a blood test (creatinine test) within the last 30 days, go to your clinic or Diagnostic Imaging Department for this test.  If you have diabetes:   If your kidney function is normal, continue taking your metformin (Avandamet, Glucophage, Glucovance, Metaglip) on the day of your exam.   If your kidney function is abnormal, wait 48 hours before restarting this medicine.  You will have oral contrast for this exam:   You will drink the contrast at home. Get this from your clinic or Diagnostic Imaging Department. Please follow the directions given.  Please wear loose clothing, such as a sweat suit or jogging clothes. Avoid snaps, zippers and other metal. We may ask you to undress and put on a hospital gown.  If you have any questions, please call the Imaging Department where you will have your exam.            Nov 20, 2017  7:45 AM CST   (Arrive by 7:30 AM)   Return Visit with Yasmany Wade MD   [facility] ([facility])    [address] [facility]  2nd [facility] [address] [phone]              Future tests that were ordered for you today     Open  "Future Orders        Priority Expected Expires Ordered    CT Chest/Abdomen/Pelvis w Contrast Routine 11/21/2017 3/19/2018 8/21/2017    CBC with platelets differential Routine 11/21/2017 8/21/2018 8/21/2017    Comprehensive metabolic panel Routine 11/21/2017 8/21/2018 8/21/2017            Who to contact     If you have questions or need follow up information about today's clinic visit or your schedule please contact [facility] directly at [phone].  Normal or non-critical lab and imaging results will be communicated to you by [employer], letter or phone within 4 business days after the clinic has received the results. If you do not hear from us within 7 days, please contact the clinic through [facility] or phone. If you have a critical or abnormal lab result, we will notify you by phone as soon as possible.  Submit refill requests through [facility] or call your pharmacy and they will forward the refill request to us. Please allow 3 business days for your refill to be completed.          Additional Information About Your Visit        [facility] Information     [facility] gives you secure access to your electronic health record. If you see a primary care provider, you can also send messages to your care team and make appointments. If you have questions, please call your primary care clinic.  If you do not have a primary care provider, please call [phone] and they will assist you.        Care EveryWhere ID     This is your Care EveryWhere ID. This could be used by other organizations to access your [place] medical records  [id number]        Your Vitals Were     Pulse Temperature Respirations Height Pulse Oximetry BMI (Body Mass Index)    70 96.8  F (36  C) (Oral) 16 1.676 m (5' 5.98\") 99% 19.15 kg/m2       Blood Pressure from Last 3 Encounters:   08/21/17 151/82   05/22/17 152/88   03/06/17 147/85    Weight from Last 3 Encounters:   08/21/17 53.8 kg (118 lb 9.6 oz)   05/22/17 56.2 kg (123 lb 12.8 oz) "   03/06/17 55.3 kg (121 lb 14.4 oz)                 Today's Medication Changes          These changes are accurate as of: 8/21/17 12:29 PM.  If you have any questions, ask your nurse or doctor.               These medicines have changed or have updated prescriptions.        Dose/Directions    amylase-lipase-protease 36465 UNITS Cpep   Commonly known as:  CREON 12   This may have changed:  how much to take   Used for:  Malignant neoplasm of head of pancreas (H)        Dose:  1 capsule   Take 1 capsule (12,000 Units) by mouth 3 times daily (with meals)   Quantity:  540 capsule   Refills:  3                Primary Care Provider Office Phone # Fax #    Delmar MD Gerry [phone]       [address] [place] [address] [address] [place] [phone]        Equal Access to Services     MAVERICK HALE : Zina beaver Soshannan, waaxkorey luqadaha, qaybta kaalmada adeegyada, rich perez . So [facility] [phone].    ATENCIÓN: Si habla español, tiene a hull disposición servicios gratuitos de asistencia lingüística. Ll[facility] [phone].    We comply with applicable federal civil rights laws and [place] laws. We do not discriminate on the basis of race, color, national origin, age, disability sex, sexual orientation or gender identity.            Thank you!     Thank you for choosing [facility]  for your care. Our goal is always to provide you with excellent care. Hearing back from our patients is one way we can continue to improve our services. Please take a few minutes to complete the written survey that you may receive in the mail after your visit with us. Thank you!             Your Updated Medication List - Protect others around you: Learn how to safely use, store and throw away your medicines at www.disposemymeds.org.          This list is accurate as of: 8/21/17 12:29 PM.  Always use your most recent med list.                   Brand Name Dispense Instructions for use  Diagnosis    amylase-lipase-protease 84081 UNITS Cpep    CREON 12    540 capsule    Take 1 capsule (12,000 Units) by mouth 3 times daily (with meals)    Malignant neoplasm of head of pancreas (H)       CALCIUM CITRATE + D PO      2 tablets daily        * capecitabine 500 MG tablet CHEMO    XELODA    42 tablet    Take 2 tabs in the AM and 1 tab in the PM, Days 1 through 14, then off for 7 days. Take within 30 mins after meal.    Malignant neoplasm of head of pancreas (H)       * capecitabine 500 MG tablet CHEMO    XELODA    42 tablet    Take 2 tabs in the AM and 1 tab in the PM, Days 1 through 14, then off for 7 days. Take within 30 mins after meal.    Malignant neoplasm of head of pancreas (H)       * capecitabine 500 MG tablet CHEMO    XELODA    42 tablet    Take 2 tabs in the AM and 1 tab in the PM, Days 1 through 14, then off for 7 days. Take within 30 mins after meal.    Malignant neoplasm of head of pancreas (H)       * capecitabine 500 MG tablet CHEMO    XELODA    42 tablet    Take 2 tabs in the AM and 1 tab in the PM, Days 1 through 14, then off for 7 days. Take within 30 mins after meal.    Malignant neoplasm of head of pancreas (H)       diphenoxylate-atropine 2.5-0.025 MG per tablet    LOMOTIL    100 tablet    Take 1-2 tablets by mouth 4 times daily as needed for diarrhea    Malignant neoplasm of head of pancreas (H), Diarrhea       lidocaine-prilocaine cream    EMLA    30 g    Apply topically as needed for moderate pain    Malignant neoplasm of head of pancreas (H)       MELATONIN PO      Take 3 mg by mouth At Bedtime        MULTIVITAMIN TABS   OR      1 TABLET DAILY        ondansetron 8 MG tablet    ZOFRAN    30 tablet    Take 1 tablet (8 mg) by mouth every 8 hours as needed for nausea    Malignant neoplasm of head of pancreas (H)       pantoprazole 40 MG EC tablet    PROTONIX    90 tablet    Take 1 tablet (40 mg) by mouth every morning (before breakfast)    Malignant neoplasm of head of pancreas (H)        TYLENOL 325 MG tablet   Generic drug:  acetaminophen      Take 500 mg by mouth every 6 hours as needed        VITAMIN C PO      Take 500 mg by mouth daily.        * Notice:  This list has 4 medication(s) that are the same as other medications prescribed for you. Read the directions carefully, and ask your doctor or other care provider to review them with you.

## 2017-08-21 NOTE — PROGRESS NOTES
"Date of Service:August 21, 2017    HISTORY OF PRESENT ILLNESS:  Emelia Weathers is here today in followup of pancreas cancer.  She has regionally recurrent disease for which she started FOLFIRINOX back at the end of 2014.  This was reduced to XELOX and now she has been on just Xeloda alone since 11/2015.  She is doing quite well.     Interval History: She is doing well overall. She continues to have neuropathy in both hands and feet. She does have peeling of skin in her hands and feet. She has diarrhea but takes Creon. She has 3-4 episodes of bowel movement in the morning hours. She denies any blood in stool. She takes Xeloda 2 weeks on and one-week off. She is planning to have a trip to [facility] next week. She remains physically very active.     REVIEW OF SYSTEMS:  The remainder of a 10-point review of systems is otherwise completely unremarkable.      PHYSICAL EXAMINATION:   /82  Pulse 70  Temp 96.8  F (36  C) (Oral)  Resp 16  Ht 1.676 m (5' 5.98\")  Wt 53.8 kg (118 lb 9.6 oz)  SpO2 99%  BMI 19.15 kg/m2  GENERAL:  Alert, oriented, comfortable  HEENT:  anicterus.  Mucous membranes moist  NECK:  No adenopathy palpable in the neck or supraclavicular spaces.   LUNGS:  Clear to auscultation without dullness to percussion. No obese, no crackles  HEART:  Rate and rhythm are regular , no murmur  ABDOMEN:  Soft and nontender, without palpable mass or organomegaly.  EXTREMITIES:  No pedal edema, peeling skin noted in the hands.  NEUROLOGIC: Motor function grossly intact    Labs:  Component      Latest Ref Rng & Units 8/18/2017   WBC      4.0 - 11.0 10e9/L 4.4   RBC Count      3.8 - 5.2 10e12/L 3.35 (L)   Hemoglobin      11.7 - 15.7 g/dL 11.2 (L)   Hematocrit      35.0 - 47.0 % 35.0   MCV      78 - 100 fl 105 (H)   MCH      26.5 - 33.0 pg 33.4 (H)   MCHC      31.5 - 36.5 g/dL 32.0   RDW      10.0 - 15.0 % 16.9 (H)   Platelet Count      150 - 450 10e9/L 186   Diff Method       Automated Method   % Neutrophils      % " 67.5   % Lymphocytes      % 22.0   % Monocytes      % 8.4   % Eosinophils      % 0.9   % Basophils      % 0.7   % Immature Granulocytes      % 0.5   Nucleated RBCs      0 /100 0   Absolute Neutrophil      1.6 - 8.3 10e9/L 3.0   Absolute Lymphocytes      0.8 - 5.3 10e9/L 1.0   Absolute Monocytes      0.0 - 1.3 10e9/L 0.4   Absolute Eosinophils      0.0 - 0.7 10e9/L 0.0   Absolute Basophils      0.0 - 0.2 10e9/L 0.0   Abs Immature Granulocytes      0 - 0.4 10e9/L 0.0   Absolute Nucleated RBC       0.0   Sodium      133 - 144 mmol/L 141   Potassium      3.4 - 5.3 mmol/L 4.0   Chloride      94 - 109 mmol/L 106   Carbon Dioxide      20 - 32 mmol/L 28   Anion Gap      3 - 14 mmol/L 7   Glucose      70 - 99 mg/dL 91   Urea Nitrogen      7 - 30 mg/dL 6 (L)   Creatinine      0.52 - 1.04 mg/dL 0.67   GFR Estimate      >60 mL/min/1.7m2 89   GFR Estimate If Black      >60 mL/min/1.7m2 >90   Calcium      8.5 - 10.1 mg/dL 8.7   Bilirubin Total      0.2 - 1.3 mg/dL 0.6   Albumin      3.4 - 5.0 g/dL 3.6   Protein Total      6.8 - 8.8 g/dL 7.3   Alkaline Phosphatase      40 - 150 U/L 175 (H)   ALT      0 - 50 U/L 42   AST      0 - 45 U/L 47 (H)     8/18/2017:CT CAP  Impression:   1. Stable postoperative changes in the upper abdomen after Whipple  procedure, without evidence of local recurrence or metastatic disease  in the chest, abdomen, or pelvis.  2. No significant change in appearance of prominent mesenteric and  retroperitoneal lymph nodes dating back to 2014.    ASSESSMENT:  Recurrent pancreatic cancer with limited regional disease.  She has excellent ongoing control with Xeloda, which she is tolerating quite well.  We will see her back for another CT CAP after 3 months.     Patient seen and discussed with Dr Mauro Guevara, AURELIO, MS.  Hematology/Oncology Fellow  [facility]            Attending note: I saw the patient in conjunction with the fellow and agree with the above.

## 2017-08-21 NOTE — NURSING NOTE
"Oncology Rooming Note    August 21, 2017 11:03 AM   Emelia Weathers is a 61 year old female who presents for:    Chief Complaint   Patient presents with     Oncology Clinic Visit     Return: Pancreatic ca      Initial Vitals: /82  Pulse 70  Temp 96.8  F (36  C) (Oral)  Resp 16  Ht 1.676 m (5' 5.98\")  Wt 53.8 kg (118 lb 9.6 oz)  SpO2 99%  BMI 19.15 kg/m2 Estimated body mass index is 19.15 kg/(m^2) as calculated from the following:    Height as of this encounter: 1.676 m (5' 5.98\").    Weight as of this encounter: 53.8 kg (118 lb 9.6 oz). Body surface area is 1.58 meters squared.  No Pain (0) Comment: Data Unavailable   No LMP recorded. Patient is postmenopausal.  Allergies reviewed: Yes  Medications reviewed: Yes    Medications: Medication refills not needed today.  Pharmacy name entered into [facility]:    [id number] - [place], [place] - [address] [place] [address] E  [facility] - [place], [place] - [address] [facility] [place] [address] [facility] [phone]  [id number] - [place], [place] - [address] [place], [place] [address]E  [facility] - [place], [place] - [address] [place] [address]    Clinical concerns: no new concerns     6 minutes for nursing intake (face to face time)     Liz Coleman CMA                "

## 2017-08-25 DIAGNOSIS — C25.0 MALIGNANT NEOPLASM OF HEAD OF PANCREAS (H): Primary | ICD-10-CM

## 2017-08-25 RX ORDER — CAPECITABINE 500 MG/1
TABLET, FILM COATED ORAL
Qty: 42 TABLET | Refills: 3 | Status: SHIPPED | OUTPATIENT
Start: 2017-08-25 | End: 2018-05-21

## 2017-09-25 DIAGNOSIS — C25.0 MALIGNANT NEOPLASM OF HEAD OF PANCREAS (H): ICD-10-CM

## 2017-09-25 LAB
ALBUMIN SERPL-MCNC: 3.5 G/DL (ref 3.4–5)
ALP SERPL-CCNC: 192 U/L (ref 40–150)
ALT SERPL W P-5'-P-CCNC: 61 U/L (ref 0–50)
ANION GAP SERPL CALCULATED.3IONS-SCNC: 5 MMOL/L (ref 3–14)
AST SERPL W P-5'-P-CCNC: 67 U/L (ref 0–45)
BASOPHILS # BLD AUTO: 0 10E9/L (ref 0–0.2)
BASOPHILS NFR BLD AUTO: 0.4 %
BILIRUB SERPL-MCNC: 1 MG/DL (ref 0.2–1.3)
BUN SERPL-MCNC: 7 MG/DL (ref 7–30)
CALCIUM SERPL-MCNC: 8.7 MG/DL (ref 8.5–10.1)
CHLORIDE SERPL-SCNC: 108 MMOL/L (ref 94–109)
CO2 SERPL-SCNC: 26 MMOL/L (ref 20–32)
CREAT SERPL-MCNC: 0.66 MG/DL (ref 0.52–1.04)
DIFFERENTIAL METHOD BLD: ABNORMAL
EOSINOPHIL # BLD AUTO: 0.1 10E9/L (ref 0–0.7)
EOSINOPHIL NFR BLD AUTO: 1.1 %
ERYTHROCYTE [DISTWIDTH] IN BLOOD BY AUTOMATED COUNT: 17.2 % (ref 10–15)
GFR SERPL CREATININE-BSD FRML MDRD: >90 ML/MIN/1.7M2
GLUCOSE SERPL-MCNC: 86 MG/DL (ref 70–99)
HCT VFR BLD AUTO: 34.4 % (ref 35–47)
HGB BLD-MCNC: 11.2 G/DL (ref 11.7–15.7)
IMM GRANULOCYTES # BLD: 0 10E9/L (ref 0–0.4)
IMM GRANULOCYTES NFR BLD: 0.2 %
LYMPHOCYTES # BLD AUTO: 1.2 10E9/L (ref 0.8–5.3)
LYMPHOCYTES NFR BLD AUTO: 23.7 %
MCH RBC QN AUTO: 33.9 PG (ref 26.5–33)
MCHC RBC AUTO-ENTMCNC: 32.6 G/DL (ref 31.5–36.5)
MCV RBC AUTO: 104 FL (ref 78–100)
MONOCYTES # BLD AUTO: 0.4 10E9/L (ref 0–1.3)
MONOCYTES NFR BLD AUTO: 7.1 %
NEUTROPHILS # BLD AUTO: 3.5 10E9/L (ref 1.6–8.3)
NEUTROPHILS NFR BLD AUTO: 67.5 %
NRBC # BLD AUTO: 0 10*3/UL
NRBC BLD AUTO-RTO: 0 /100
PLATELET # BLD AUTO: 217 10E9/L (ref 150–450)
POTASSIUM SERPL-SCNC: 4.4 MMOL/L (ref 3.4–5.3)
PROT SERPL-MCNC: 7.2 G/DL (ref 6.8–8.8)
RBC # BLD AUTO: 3.3 10E12/L (ref 3.8–5.2)
SODIUM SERPL-SCNC: 139 MMOL/L (ref 133–144)
WBC # BLD AUTO: 5.2 10E9/L (ref 4–11)

## 2017-09-25 PROCEDURE — 80053 COMPREHEN METABOLIC PANEL: CPT | Performed by: INTERNAL MEDICINE

## 2017-09-25 PROCEDURE — 85025 COMPLETE CBC W/AUTO DIFF WBC: CPT | Performed by: INTERNAL MEDICINE

## 2017-09-25 PROCEDURE — 25000128 H RX IP 250 OP 636: Performed by: INTERNAL MEDICINE

## 2017-09-25 RX ORDER — HEPARIN SODIUM (PORCINE) LOCK FLUSH IV SOLN 100 UNIT/ML 100 UNIT/ML
5 SOLUTION INTRAVENOUS EVERY 8 HOURS
Status: DISCONTINUED | OUTPATIENT
Start: 2017-09-25 | End: 2017-10-03 | Stop reason: HOSPADM

## 2017-09-25 RX ADMIN — SODIUM CHLORIDE, PRESERVATIVE FREE 5 ML: 5 INJECTION INTRAVENOUS at 11:08

## 2017-09-26 ENCOUNTER — TELEPHONE (OUTPATIENT)
Dept: ONCOLOGY | Facility: CLINIC | Age: 62
End: 2017-09-26

## 2017-09-26 NOTE — TELEPHONE ENCOUNTER
Oral Chemotherapy Monitoring Program    Placed call to patient on Xeloda labs from 9/25/17. CBC looked stable and no concerning lab abnormalities. CMP showed elevated liver enzymes. Emelia said these have fluctuated in the past. Will monitor AST, ALT, and alkaline phosphatase and Dr. Wade messaged to make aware.     Alena Zhao   Pharmacy Intern  [facility]   [phone]

## 2017-10-16 DIAGNOSIS — C25.0 MALIGNANT NEOPLASM OF HEAD OF PANCREAS (H): ICD-10-CM

## 2017-10-16 LAB
ALBUMIN SERPL-MCNC: 3.4 G/DL (ref 3.4–5)
ALP SERPL-CCNC: 177 U/L (ref 40–150)
ALT SERPL W P-5'-P-CCNC: 42 U/L (ref 0–50)
ANION GAP SERPL CALCULATED.3IONS-SCNC: 5 MMOL/L (ref 3–14)
AST SERPL W P-5'-P-CCNC: 38 U/L (ref 0–45)
BASOPHILS # BLD AUTO: 0 10E9/L (ref 0–0.2)
BASOPHILS NFR BLD AUTO: 0.2 %
BILIRUB SERPL-MCNC: 0.9 MG/DL (ref 0.2–1.3)
BUN SERPL-MCNC: 13 MG/DL (ref 7–30)
CALCIUM SERPL-MCNC: 8.5 MG/DL (ref 8.5–10.1)
CHLORIDE SERPL-SCNC: 106 MMOL/L (ref 94–109)
CO2 SERPL-SCNC: 26 MMOL/L (ref 20–32)
CREAT SERPL-MCNC: 0.73 MG/DL (ref 0.52–1.04)
DIFFERENTIAL METHOD BLD: ABNORMAL
EOSINOPHIL # BLD AUTO: 0 10E9/L (ref 0–0.7)
EOSINOPHIL NFR BLD AUTO: 0.8 %
ERYTHROCYTE [DISTWIDTH] IN BLOOD BY AUTOMATED COUNT: 17 % (ref 10–15)
GFR SERPL CREATININE-BSD FRML MDRD: 81 ML/MIN/1.7M2
GLUCOSE SERPL-MCNC: 93 MG/DL (ref 70–99)
HCT VFR BLD AUTO: 34.4 % (ref 35–47)
HGB BLD-MCNC: 11.3 G/DL (ref 11.7–15.7)
IMM GRANULOCYTES # BLD: 0 10E9/L (ref 0–0.4)
IMM GRANULOCYTES NFR BLD: 0.4 %
LYMPHOCYTES # BLD AUTO: 1.1 10E9/L (ref 0.8–5.3)
LYMPHOCYTES NFR BLD AUTO: 23.2 %
MCH RBC QN AUTO: 34.2 PG (ref 26.5–33)
MCHC RBC AUTO-ENTMCNC: 32.8 G/DL (ref 31.5–36.5)
MCV RBC AUTO: 104 FL (ref 78–100)
MONOCYTES # BLD AUTO: 0.4 10E9/L (ref 0–1.3)
MONOCYTES NFR BLD AUTO: 8.8 %
NEUTROPHILS # BLD AUTO: 3.2 10E9/L (ref 1.6–8.3)
NEUTROPHILS NFR BLD AUTO: 66.6 %
NRBC # BLD AUTO: 0 10*3/UL
NRBC BLD AUTO-RTO: 0 /100
PLATELET # BLD AUTO: 192 10E9/L (ref 150–450)
POTASSIUM SERPL-SCNC: 4 MMOL/L (ref 3.4–5.3)
PROT SERPL-MCNC: 7 G/DL (ref 6.8–8.8)
RBC # BLD AUTO: 3.3 10E12/L (ref 3.8–5.2)
SODIUM SERPL-SCNC: 138 MMOL/L (ref 133–144)
WBC # BLD AUTO: 4.8 10E9/L (ref 4–11)

## 2017-10-16 PROCEDURE — 85025 COMPLETE CBC W/AUTO DIFF WBC: CPT | Performed by: INTERNAL MEDICINE

## 2017-10-16 PROCEDURE — 80053 COMPREHEN METABOLIC PANEL: CPT | Performed by: INTERNAL MEDICINE

## 2017-10-16 PROCEDURE — 25000128 H RX IP 250 OP 636: Performed by: INTERNAL MEDICINE

## 2017-10-16 RX ORDER — HEPARIN SODIUM (PORCINE) LOCK FLUSH IV SOLN 100 UNIT/ML 100 UNIT/ML
5 SOLUTION INTRAVENOUS EVERY 8 HOURS PRN
Status: DISCONTINUED | OUTPATIENT
Start: 2017-10-16 | End: 2017-10-24 | Stop reason: HOSPADM

## 2017-10-16 RX ADMIN — SODIUM CHLORIDE, PRESERVATIVE FREE 5 ML: 5 INJECTION INTRAVENOUS at 10:13

## 2017-10-16 NOTE — NURSING NOTE
Chief Complaint   Patient presents with     Port Draw     Labs drawn via port by RN. Line flushed, hep locked and deaccessed.     Shu Fajardo RN

## 2017-10-17 ENCOUNTER — TELEPHONE (OUTPATIENT)
Dept: ONCOLOGY | Facility: CLINIC | Age: 62
End: 2017-10-17

## 2017-10-17 NOTE — TELEPHONE ENCOUNTER
Oral Chemotherapy Monitoring Program.    Patient currently on Xeloda therapy.    Called to discuss lab results from 10/16/17.    No concerning abnormalities, liver enzymes have improved.    Questions answered to patient's satisfaction.    Will follow up on 11/6 lab results     Jazzy Quintero, Pharmacist  [facility]  [phone]

## 2017-11-06 DIAGNOSIS — C25.0 MALIGNANT NEOPLASM OF HEAD OF PANCREAS (H): ICD-10-CM

## 2017-11-06 LAB
ALBUMIN SERPL-MCNC: 3.5 G/DL (ref 3.4–5)
ALP SERPL-CCNC: 155 U/L (ref 40–150)
ALT SERPL W P-5'-P-CCNC: 44 U/L (ref 0–50)
ANION GAP SERPL CALCULATED.3IONS-SCNC: 8 MMOL/L (ref 3–14)
AST SERPL W P-5'-P-CCNC: 44 U/L (ref 0–45)
BASOPHILS # BLD AUTO: 0 10E9/L (ref 0–0.2)
BASOPHILS NFR BLD AUTO: 0.5 %
BILIRUB SERPL-MCNC: 0.4 MG/DL (ref 0.2–1.3)
BUN SERPL-MCNC: 5 MG/DL (ref 7–30)
CALCIUM SERPL-MCNC: 8.5 MG/DL (ref 8.5–10.1)
CHLORIDE SERPL-SCNC: 108 MMOL/L (ref 94–109)
CO2 SERPL-SCNC: 24 MMOL/L (ref 20–32)
CREAT SERPL-MCNC: 0.63 MG/DL (ref 0.52–1.04)
DIFFERENTIAL METHOD BLD: ABNORMAL
EOSINOPHIL # BLD AUTO: 0 10E9/L (ref 0–0.7)
EOSINOPHIL NFR BLD AUTO: 1 %
ERYTHROCYTE [DISTWIDTH] IN BLOOD BY AUTOMATED COUNT: 16.9 % (ref 10–15)
GFR SERPL CREATININE-BSD FRML MDRD: >90 ML/MIN/1.7M2
GLUCOSE SERPL-MCNC: 132 MG/DL (ref 70–99)
HCT VFR BLD AUTO: 34 % (ref 35–47)
HGB BLD-MCNC: 11.1 G/DL (ref 11.7–15.7)
IMM GRANULOCYTES # BLD: 0 10E9/L (ref 0–0.4)
IMM GRANULOCYTES NFR BLD: 0.2 %
LYMPHOCYTES # BLD AUTO: 1 10E9/L (ref 0.8–5.3)
LYMPHOCYTES NFR BLD AUTO: 25.5 %
MCH RBC QN AUTO: 34.4 PG (ref 26.5–33)
MCHC RBC AUTO-ENTMCNC: 32.6 G/DL (ref 31.5–36.5)
MCV RBC AUTO: 105 FL (ref 78–100)
MONOCYTES # BLD AUTO: 0.3 10E9/L (ref 0–1.3)
MONOCYTES NFR BLD AUTO: 7.6 %
NEUTROPHILS # BLD AUTO: 2.7 10E9/L (ref 1.6–8.3)
NEUTROPHILS NFR BLD AUTO: 65.2 %
NRBC # BLD AUTO: 0 10*3/UL
NRBC BLD AUTO-RTO: 0 /100
PLATELET # BLD AUTO: 175 10E9/L (ref 150–450)
POTASSIUM SERPL-SCNC: 4.2 MMOL/L (ref 3.4–5.3)
PROT SERPL-MCNC: 6.6 G/DL (ref 6.8–8.8)
RBC # BLD AUTO: 3.23 10E12/L (ref 3.8–5.2)
SODIUM SERPL-SCNC: 139 MMOL/L (ref 133–144)
WBC # BLD AUTO: 4.1 10E9/L (ref 4–11)

## 2017-11-06 PROCEDURE — 25000128 H RX IP 250 OP 636: Performed by: INTERNAL MEDICINE

## 2017-11-06 PROCEDURE — 85025 COMPLETE CBC W/AUTO DIFF WBC: CPT | Performed by: INTERNAL MEDICINE

## 2017-11-06 PROCEDURE — 80053 COMPREHEN METABOLIC PANEL: CPT | Performed by: INTERNAL MEDICINE

## 2017-11-06 RX ORDER — HEPARIN SODIUM (PORCINE) LOCK FLUSH IV SOLN 100 UNIT/ML 100 UNIT/ML
5 SOLUTION INTRAVENOUS ONCE
Status: COMPLETED | OUTPATIENT
Start: 2017-11-06 | End: 2017-11-06

## 2017-11-06 RX ADMIN — SODIUM CHLORIDE, PRESERVATIVE FREE 5 ML: 5 INJECTION INTRAVENOUS at 10:07

## 2017-11-06 NOTE — NURSING NOTE
"Chief Complaint   Patient presents with     Port Draw     Labs drawn from port by RN. Line flushed with saline and heparin.     Port accessed with 20g 3/4\" gripper needle by RN, labs collected, line flushed with saline and heparin.    Alia Jauregui, RN  "

## 2017-11-07 ENCOUNTER — TELEPHONE (OUTPATIENT)
Dept: ONCOLOGY | Facility: CLINIC | Age: 62
End: 2017-11-07

## 2017-11-07 NOTE — TELEPHONE ENCOUNTER
Oral Chemotherapy Monitoring Program    Patient currently on Xeloda therapy.    Called to discuss lab results from 11/6/17.    Left message for patient to follow up in regards to the results. No patient or drug names were mentioned.     No concerning abnormalities.    Jazzy Quintero [facility]  [phone]

## 2017-11-09 ENCOUNTER — TELEPHONE (OUTPATIENT)
Dept: ONCOLOGY | Facility: CLINIC | Age: 62
End: 2017-11-09

## 2017-11-09 NOTE — ORAL ONC MGMT
Oral Chemotherapy Monitoring Program     Placed call to patient in follow up of Xeloda therapy. Lab results show no concerning abnormalities. Emelia expressed understanding and thanked me for the call and care.    Angel OsullivanD  [facility]  [phone]  November 9, 2017

## 2017-11-17 PROCEDURE — 36591 DRAW BLOOD OFF VENOUS DEVICE: CPT

## 2017-11-17 PROCEDURE — 25000128 H RX IP 250 OP 636: Performed by: INTERNAL MEDICINE

## 2017-11-17 RX ORDER — HEPARIN SODIUM (PORCINE) LOCK FLUSH IV SOLN 100 UNIT/ML 100 UNIT/ML
5 SOLUTION INTRAVENOUS
Status: COMPLETED | OUTPATIENT
Start: 2017-11-17 | End: 2017-11-17

## 2017-11-17 RX ADMIN — SODIUM CHLORIDE, PRESERVATIVE FREE 5 ML: 5 INJECTION INTRAVENOUS at 09:19

## 2017-11-17 NOTE — NURSING NOTE
"Chief Complaint   Patient presents with     Port Draw     port accessed for ct by rn.       Port accessed with 20g 3/4\" power needle, labs drawn, port flushed with saline and heparin.  Meghna Leonardo RN    "

## 2017-11-20 ENCOUNTER — ONCOLOGY VISIT (OUTPATIENT)
Dept: ONCOLOGY | Facility: CLINIC | Age: 62
End: 2017-11-20
Attending: INTERNAL MEDICINE
Payer: MEDICARE

## 2017-11-20 VITALS
WEIGHT: 119.6 LBS | OXYGEN SATURATION: 98 % | HEART RATE: 78 BPM | BODY MASS INDEX: 19.31 KG/M2 | TEMPERATURE: 97.1 F | SYSTOLIC BLOOD PRESSURE: 132 MMHG | DIASTOLIC BLOOD PRESSURE: 83 MMHG | RESPIRATION RATE: 18 BRPM

## 2017-11-20 DIAGNOSIS — C25.0 MALIGNANT NEOPLASM OF HEAD OF PANCREAS (H): Primary | ICD-10-CM

## 2017-11-20 PROCEDURE — 99215 OFFICE O/P EST HI 40 MIN: CPT | Mod: ZP | Performed by: INTERNAL MEDICINE

## 2017-11-20 PROCEDURE — 99213 OFFICE O/P EST LOW 20 MIN: CPT | Mod: ZF

## 2017-11-20 RX ORDER — PANTOPRAZOLE SODIUM 40 MG/1
40 TABLET, DELAYED RELEASE ORAL
Qty: 90 TABLET | Refills: 3 | Status: SHIPPED | OUTPATIENT
Start: 2017-11-20 | End: 2019-01-21

## 2017-11-20 RX ORDER — CAPECITABINE 500 MG/1
TABLET, FILM COATED ORAL
Qty: 42 TABLET | Refills: 3 | Status: SHIPPED | OUTPATIENT
Start: 2017-11-20 | End: 2018-05-21

## 2017-11-20 ASSESSMENT — PAIN SCALES - GENERAL: PAINLEVEL: NO PAIN (0)

## 2017-11-20 NOTE — NURSING NOTE
"Oncology Rooming Note    November 20, 2017 7:59 AM   Emelia Weathers is a 62 year old female who presents for:    Chief Complaint   Patient presents with     RECHECK     Patient with pancreatic CA here for provider     Initial Vitals: /83  Pulse 78  Temp 97.1  F (36.2  C) (Oral)  Resp 18  Wt 54.3 kg (119 lb 9.6 oz)  SpO2 98%  BMI 19.31 kg/m2 Estimated body mass index is 19.31 kg/(m^2) as calculated from the following:    Height as of 8/21/17: 1.676 m (5' 5.98\").    Weight as of this encounter: 54.3 kg (119 lb 9.6 oz). Body surface area is 1.59 meters squared.  No Pain (0) Comment: Data Unavailable   No LMP recorded. Patient is postmenopausal.  Allergies reviewed: Yes  Medications reviewed: Yes    Medications: Medication refills not needed today.  Pharmacy name entered into [employer]:    [id number] - [place], [place] - [address] [place] [address] [facility] - [place], [place] - [address] [facility] - [place], [place] - [address] [place] [address] [phone]  [facility] [place], [place] - [address] [place] [address] [facility] - [place], [place] - [address] [place] [address]    Clinical concerns: NA NA was NOT notified.    9 minutes for nursing intake (face to face time)     Ximena Jordan CMA              "

## 2017-11-20 NOTE — LETTER
11/20/2017      RE: Emelia Weathers  [address]  [place] [facility] [address]       Emelia Weathers is here today in follow-up of recurrent pancreatic cancer.    She has regionally recurrent disease for which she started folfirinox 3 years ago with a good response, a subsequent reduction to xelox and now has been on just single agent Xeloda for the last 2 years. She continues to do quite well participating in a lot of physical activity. She remains quite thin as always, but her weight is stable. She has chronically loose stools and continues on Creon. She feels like her toxicity from the Xeloda is minor and stable. She had one episode of fairly severe crampy abdominal pain for several hours a few weeks ago that resolved spontaneously. The remainder of her 10 point review of systems is otherwise completely unremarkable.    On exam she is alert she appears well and younger than her stated age. Her vital signs as noted in the chart are unremarkable.  She has no scleral icterus and no visible lesions in the oropharynx.  She has no palpable adenopathy in the neck or supra-clavicular spaces.  Her lungs are clear to auscultation without dullness to percussion.  Her heart rate and rhythm or regular without audible murmur or gallop, and her jugular venous pressure appears normal.  Her abdomen is soft and nontender without palpable mass or organomegaly.  She has no peripheral edema, no tenderness in her calves or thighs, and no cyanosis or clubbing of her digits. She has mild dry desquamation of the skin on her palms. There is a little bit of fissuring.  Her speech is fluent, her cranial nerves are grossly intact and she has an unremarkable gait and station.    I reviewed with her her CT scan and lab work the CT scan shows just little bits of residual stranding in her resection bed with no evidence of disease progression she continues to have fatty liver. Labs from a couple weeks ago are unremarkable with essentially normal  blood counts, and unremarkable liver enzymes.    Assessment/plan regionally recurrent pancreatic cancer with ongoing stable disease and good tolerance of her current regimen with single agent Xeloda. We'll plan on another 3 months of therapy and have her back for repeat physical exam and lab work and CT scan.    Yasmany Wade MD

## 2017-11-20 NOTE — MR AVS SNAPSHOT
After Visit Summary   11/20/2017    Emelia Weathers    MRN: [id number]           Patient Information     Date Of Birth          1955        Visit Information        Provider Department      11/20/2017 7:45 AM Yasmany Wade MD [facility]        Today's Diagnoses     Malignant neoplasm of head of pancreas (H)    -  1       Follow-ups after your visit        Follow-up notes from your care team     Return in about 3 months (around 2/20/2018) for MD visit with CT and labs.      Your next 10 appointments already scheduled     Nov 27, 2017  1:00 PM CST   [facility] with [facility]   [facility] ([facility])    [address] [address]   [phone]            Dec 18, 2017  1:00 PM CST   [facility] Draw with [facility]   [facility] ([facility])    [address] [place] [address]   [phone]            Jan 08, 2018  1:00 PM CST   [facility] Draw with [facility]RAW   [facility] ([facility])    [address] [facility]  [address] [facility] [address]   [phone]            Jan 29, 2018  1:00 PM CST   [facility] Draw with [facility]RAW   [facility] Draw ([facility])    [address] [place] [address]   [phone]            Feb 16, 2018  9:00 AM CST   [facility] Lab Draw with [facility] DRAW   [facility] Draw ([facility])    [address] [place] [address]   [phone]            Feb 16, 2018  9:40 AM CST   (Arrive by 9:25 AM)   CT CHEST ABDOMEN PELVIS W/O & W CONTRAST with UCCT2   [facility] Imaging [place] [facility] ([facility])    [address] [facility]  [address] [facility] [address]   [phone]            Please bring any scans or X-rays taken at other hospitals, if similar tests were done. Also bring a list of your medicines, including vitamins, minerals and over-the-counter drugs. It is safest to leave personal items at home.  Be sure to tell your doctor:   If you have any allergies.   If there s any chance you are pregnant.   If you are breastfeeding.   If you have any special needs.  You may have contrast for this exam. To prepare:   Do not eat or drink for 2 hours before your exam. If you need to take medicine, you may take it with small sips of water. (We may ask you to take liquid medicine as well.)   The day before your exam, drink extra fluids at least six 8-ounce glasses (unless your doctor tells you to restrict your fluids).  Patients over 70 or patients with diabetes or kidney problems:   If you haven t had a blood test (creatinine test) within the last 30 days, go to your clinic or Diagnostic Imaging Department for this test.  If you have diabetes:   If your kidney function is normal, continue taking your metformin (Avandamet, Glucophage, Glucovance, Metaglip) on the day of your exam.   If your kidney function is abnormal, wait 48 hours before restarting this medicine.  You will have oral contrast for this exam:   You will drink the contrast at home. Get this from your clinic or Diagnostic Imaging Department. Please follow the directions given.  Please wear loose clothing, such as a sweat suit or jogging clothes. Avoid snaps, zippers and other metal. We may ask you to undress and put on a hospital gown.  If you have any questions, please call the Imaging Department where you will have your exam.            Feb 19, 2018  7:15 AM CST   (Arrive by 7:00 AM)   Return Visit with Yasmany Wade MD   [facility] ([facility])    [address] [facility]  2nd [facility] [address] [phone]              Future tests that were ordered for you today      Open Future Orders        Priority Expected Expires Ordered    CT Chest Abdomen Pelvis w/o & w Contrast Routine 2/19/2018 3/20/2018 11/20/2017    Comprehensive metabolic panel Routine 2/19/2018 3/20/2018 11/20/2017    CBC with platelets differential Routine 2/19/2018 3/20/2018 11/20/2017            Who to contact     If you have questions or need follow up information about today's clinic visit or your schedule please contact [facility] directly at [phone].  Normal or non-critical lab and imaging results will be communicated to you by [employer], letter or phone within 4 business days after the clinic has received the results. If you do not hear from us within 7 days, please contact the clinic through [employer] or phone. If you have a critical or abnormal lab result, we will notify you by phone as soon as possible.  Submit refill requests through [employer] or call your pharmacy and they will forward the refill request to us. Please allow 3 business days for your refill to be completed.          Additional Information About Your Visit        [employer] Information     [employer] gives you secure access to your electronic health record. If you see a primary care provider, you can also send messages to your care team and make appointments. If you have questions, please call your primary care clinic.  If you do not have a primary care provider, please call [phone] and they will assist you.        Care EveryWhere ID     This is your Care EveryWhere ID. This could be used by other organizations to access your [place] medical records  [id number]        Your Vitals Were     Pulse Temperature Respirations Pulse Oximetry BMI (Body Mass Index)       78 97.1  F (36.2  C) (Oral) 18 98% 19.31 kg/m2        Blood Pressure from Last 3 Encounters:   11/20/17 132/83   08/21/17 151/82   05/22/17 152/88    Weight from Last 3 Encounters:   11/20/17 54.3 kg (119 lb 9.6 oz)   08/21/17 53.8 kg (118 lb 9.6 oz)   05/22/17  56.2 kg (123 lb 12.8 oz)                 Today's Medication Changes          These changes are accurate as of: 11/20/17  9:56 AM.  If you have any questions, ask your nurse or doctor.               These medicines have changed or have updated prescriptions.        Dose/Directions    amylase-lipase-protease 73240 UNITS Cpep   Commonly known as:  CREON 12   This may have changed:  how much to take   Used for:  Malignant neoplasm of head of pancreas (H)        Dose:  1 capsule   Take 1 capsule (12,000 Units) by mouth 3 times daily (with meals)   Quantity:  540 capsule   Refills:  3            Where to get your medicines      These medications were sent to [id number] - [place], [place] - [address]  [address], [facility] [address]     Phone:  [phone]     pantoprazole 40 MG EC tablet                Primary Care Provider Office Phone # Fax #    Benny Garrett [phone]       [address] [place] [address]  [facility] [address]        Equal Access to Services     MAVERICK HALE AH: Hadii aad ku hadasho Soomaali, waaxda luqadaha, qaybta kaalmada adeegyada, waxay idiin haycamilan king perez . So [facility] [phone].    ATENCIÓN: Si habla español, tiene a hull disposición servicios gratuitos de asistencia lingüística. [facility] [phone].    We comply with applicable federal civil rights laws and [place] laws. We do not discriminate on the basis of race, color, national origin, age, disability, sex, sexual orientation, or gender identity.            Thank you!     Thank you for choosing [facility]  for your care. Our goal is always to provide you with excellent care. Hearing back from our patients is one way we can continue to improve our services. Please take a few minutes to complete the written survey that you may receive in the mail after your visit with us. Thank you!             Your Updated Medication List - Protect others around you: Learn how to safely use, store  and throw away your medicines at www.disposemymeds.org.          This list is accurate as of: 11/20/17  9:56 AM.  Always use your most recent med list.                   Brand Name Dispense Instructions for use Diagnosis    amylase-lipase-protease 38686 UNITS Cpep    CREON 12    540 capsule    Take 1 capsule (12,000 Units) by mouth 3 times daily (with meals)    Malignant neoplasm of head of pancreas (H)       CALCIUM CITRATE + D PO      2 tablets daily        * capecitabine 500 MG tablet CHEMO    XELODA    42 tablet    Take 2 tabs in the AM and 1 tab in the PM, Days 1 through 14, then off for 7 days. Take within 30 mins after meal.    Malignant neoplasm of head of pancreas (H)       * capecitabine 500 MG tablet CHEMO    XELODA    42 tablet    Take 2 tabs in the AM and 1 tab in the PM, Days 1 through 14, then off for 7 days. Take within 30 mins after meal.    Malignant neoplasm of head of pancreas (H)       * capecitabine 500 MG tablet CHEMO    XELODA    42 tablet    Take 2 tabs in the AM and 1 tab in the PM, Days 1 through 14, then off for 7 days. Take within 30 mins after meal.    Malignant neoplasm of head of pancreas (H)       * capecitabine 500 MG tablet CHEMO    XELODA    42 tablet    Take 2 tabs in the AM and 1 tab in the PM, Days 1 through 14, then off for 7 days. Take within 30 mins after meal.    Malignant neoplasm of head of pancreas (H)       * capecitabine 500 MG tablet CHEMO    XELODA    42 tablet    Take 2 tabs in the AM and 1 tab in the PM, Days 1 through 14, then off for 7 days. Take within 30 mins after meal.    Malignant neoplasm of head of pancreas (H)       diphenoxylate-atropine 2.5-0.025 MG per tablet    LOMOTIL    100 tablet    Take 1-2 tablets by mouth 4 times daily as needed for diarrhea    Malignant neoplasm of head of pancreas (H), Diarrhea       lidocaine-prilocaine cream    EMLA    30 g    Apply topically as needed for moderate pain    Malignant neoplasm of head of pancreas (H)        MELATONIN PO      Take 3 mg by mouth At Bedtime        MULTIVITAMIN TABS   OR      1 TABLET DAILY        ondansetron 8 MG tablet    ZOFRAN    30 tablet    Take 1 tablet (8 mg) by mouth every 8 hours as needed for nausea    Malignant neoplasm of head of pancreas (H)       pantoprazole 40 MG EC tablet    PROTONIX    90 tablet    Take 1 tablet (40 mg) by mouth every morning (before breakfast)    Malignant neoplasm of head of pancreas (H)       TYLENOL 325 MG tablet   Generic drug:  acetaminophen      Take 500 mg by mouth every 6 hours as needed        VITAMIN C PO      Take 500 mg by mouth daily.        * Notice:  This list has 5 medication(s) that are the same as other medications prescribed for you. Read the directions carefully, and ask your doctor or other care provider to review them with you.

## 2017-11-20 NOTE — PROGRESS NOTES
Emelia Weathers is here today in follow-up of recurrent pancreatic cancer.    She has regionally recurrent disease for which she started folfirinox 3 years ago with a good response, a subsequent reduction to xelox and now has been on just single agent Xeloda for the last 2 years. She continues to do quite well participating in a lot of physical activity. She remains quite thin as always, but her weight is stable. She has chronically loose stools and continues on Creon. She feels like her toxicity from the Xeloda is minor and stable. She had one episode of fairly severe crampy abdominal pain for several hours a few weeks ago that resolved spontaneously. The remainder of her 10 point review of systems is otherwise completely unremarkable.    On exam she is alert she appears well and younger than her stated age. Her vital signs as noted in the chart are unremarkable.  She has no scleral icterus and no visible lesions in the oropharynx.  She has no palpable adenopathy in the neck or supra-clavicular spaces.  Her lungs are clear to auscultation without dullness to percussion.  Her heart rate and rhythm or regular without audible murmur or gallop, and her jugular venous pressure appears normal.  Her abdomen is soft and nontender without palpable mass or organomegaly.  She has no peripheral edema, no tenderness in her calves or thighs, and no cyanosis or clubbing of her digits. She has mild dry desquamation of the skin on her palms. There is a little bit of fissuring.  Her speech is fluent, her cranial nerves are grossly intact and she has an unremarkable gait and station.    I reviewed with her her CT scan and lab work the CT scan shows just little bits of residual stranding in her resection bed with no evidence of disease progression she continues to have fatty liver. Labs from a couple weeks ago are unremarkable with essentially normal blood counts, and unremarkable liver enzymes.    Assessment/plan regionally recurrent  pancreatic cancer with ongoing stable disease and good tolerance of her current regimen with single agent Xeloda. We'll plan on another 3 months of therapy and have her back for repeat physical exam and lab work and CT scan.

## 2017-11-27 DIAGNOSIS — C25.0 MALIGNANT NEOPLASM OF HEAD OF PANCREAS (H): ICD-10-CM

## 2017-11-27 LAB
ALBUMIN SERPL-MCNC: 3.6 G/DL (ref 3.4–5)
ALP SERPL-CCNC: 129 U/L (ref 40–150)
ALT SERPL W P-5'-P-CCNC: 47 U/L (ref 0–50)
ANION GAP SERPL CALCULATED.3IONS-SCNC: 6 MMOL/L (ref 3–14)
AST SERPL W P-5'-P-CCNC: 44 U/L (ref 0–45)
BASOPHILS # BLD AUTO: 0 10E9/L (ref 0–0.2)
BASOPHILS NFR BLD AUTO: 0.3 %
BILIRUB SERPL-MCNC: 0.4 MG/DL (ref 0.2–1.3)
BUN SERPL-MCNC: 7 MG/DL (ref 7–30)
CALCIUM SERPL-MCNC: 8.7 MG/DL (ref 8.5–10.1)
CHLORIDE SERPL-SCNC: 107 MMOL/L (ref 94–109)
CO2 SERPL-SCNC: 27 MMOL/L (ref 20–32)
CREAT SERPL-MCNC: 0.77 MG/DL (ref 0.52–1.04)
DIFFERENTIAL METHOD BLD: ABNORMAL
EOSINOPHIL # BLD AUTO: 0.1 10E9/L (ref 0–0.7)
EOSINOPHIL NFR BLD AUTO: 0.8 %
ERYTHROCYTE [DISTWIDTH] IN BLOOD BY AUTOMATED COUNT: 17 % (ref 10–15)
GFR SERPL CREATININE-BSD FRML MDRD: 75 ML/MIN/1.7M2
GLUCOSE SERPL-MCNC: 131 MG/DL (ref 70–99)
HCT VFR BLD AUTO: 32.4 % (ref 35–47)
HGB BLD-MCNC: 10.7 G/DL (ref 11.7–15.7)
IMM GRANULOCYTES # BLD: 0 10E9/L (ref 0–0.4)
IMM GRANULOCYTES NFR BLD: 0.2 %
LYMPHOCYTES # BLD AUTO: 1.4 10E9/L (ref 0.8–5.3)
LYMPHOCYTES NFR BLD AUTO: 23 %
MCH RBC QN AUTO: 34.1 PG (ref 26.5–33)
MCHC RBC AUTO-ENTMCNC: 33 G/DL (ref 31.5–36.5)
MCV RBC AUTO: 103 FL (ref 78–100)
MONOCYTES # BLD AUTO: 0.4 10E9/L (ref 0–1.3)
MONOCYTES NFR BLD AUTO: 6.3 %
NEUTROPHILS # BLD AUTO: 4.2 10E9/L (ref 1.6–8.3)
NEUTROPHILS NFR BLD AUTO: 69.4 %
NRBC # BLD AUTO: 0 10*3/UL
NRBC BLD AUTO-RTO: 0 /100
PLATELET # BLD AUTO: 194 10E9/L (ref 150–450)
POTASSIUM SERPL-SCNC: 4 MMOL/L (ref 3.4–5.3)
PROT SERPL-MCNC: 6.7 G/DL (ref 6.8–8.8)
RBC # BLD AUTO: 3.14 10E12/L (ref 3.8–5.2)
SODIUM SERPL-SCNC: 140 MMOL/L (ref 133–144)
WBC # BLD AUTO: 6.1 10E9/L (ref 4–11)

## 2017-11-27 PROCEDURE — 80053 COMPREHEN METABOLIC PANEL: CPT | Performed by: INTERNAL MEDICINE

## 2017-11-27 PROCEDURE — 25000128 H RX IP 250 OP 636: Performed by: INTERNAL MEDICINE

## 2017-11-27 PROCEDURE — 85025 COMPLETE CBC W/AUTO DIFF WBC: CPT | Performed by: INTERNAL MEDICINE

## 2017-11-27 RX ORDER — HEPARIN SODIUM (PORCINE) LOCK FLUSH IV SOLN 100 UNIT/ML 100 UNIT/ML
5 SOLUTION INTRAVENOUS EVERY 8 HOURS
Status: DISCONTINUED | OUTPATIENT
Start: 2017-11-27 | End: 2017-12-05 | Stop reason: HOSPADM

## 2017-11-27 RX ADMIN — SODIUM CHLORIDE, PRESERVATIVE FREE 5 ML: 5 INJECTION INTRAVENOUS at 13:11

## 2017-11-27 NOTE — NURSING NOTE
Chief Complaint   Patient presents with     Port Draw     accessed with Gripper needle forlabs, heparin locked,

## 2017-12-18 DIAGNOSIS — C25.0 MALIGNANT NEOPLASM OF HEAD OF PANCREAS (H): ICD-10-CM

## 2017-12-18 LAB
ALBUMIN SERPL-MCNC: 3.4 G/DL (ref 3.4–5)
ALP SERPL-CCNC: 152 U/L (ref 40–150)
ALT SERPL W P-5'-P-CCNC: 65 U/L (ref 0–50)
ANION GAP SERPL CALCULATED.3IONS-SCNC: 8 MMOL/L (ref 3–14)
AST SERPL W P-5'-P-CCNC: 96 U/L (ref 0–45)
BASOPHILS # BLD AUTO: 0 10E9/L (ref 0–0.2)
BASOPHILS NFR BLD AUTO: 0.1 %
BILIRUB SERPL-MCNC: 0.8 MG/DL (ref 0.2–1.3)
BUN SERPL-MCNC: 8 MG/DL (ref 7–30)
CALCIUM SERPL-MCNC: 8 MG/DL (ref 8.5–10.1)
CHLORIDE SERPL-SCNC: 102 MMOL/L (ref 94–109)
CO2 SERPL-SCNC: 25 MMOL/L (ref 20–32)
CREAT SERPL-MCNC: 0.76 MG/DL (ref 0.52–1.04)
DIFFERENTIAL METHOD BLD: ABNORMAL
EOSINOPHIL # BLD AUTO: 0 10E9/L (ref 0–0.7)
EOSINOPHIL NFR BLD AUTO: 0 %
ERYTHROCYTE [DISTWIDTH] IN BLOOD BY AUTOMATED COUNT: 17.1 % (ref 10–15)
GFR SERPL CREATININE-BSD FRML MDRD: 76 ML/MIN/1.7M2
GLUCOSE SERPL-MCNC: 207 MG/DL (ref 70–99)
HCT VFR BLD AUTO: 33.5 % (ref 35–47)
HGB BLD-MCNC: 10.9 G/DL (ref 11.7–15.7)
IMM GRANULOCYTES # BLD: 0 10E9/L (ref 0–0.4)
IMM GRANULOCYTES NFR BLD: 0.3 %
LYMPHOCYTES # BLD AUTO: 0.4 10E9/L (ref 0.8–5.3)
LYMPHOCYTES NFR BLD AUTO: 4.7 %
MCH RBC QN AUTO: 34 PG (ref 26.5–33)
MCHC RBC AUTO-ENTMCNC: 32.5 G/DL (ref 31.5–36.5)
MCV RBC AUTO: 104 FL (ref 78–100)
MONOCYTES # BLD AUTO: 0.2 10E9/L (ref 0–1.3)
MONOCYTES NFR BLD AUTO: 2.8 %
NEUTROPHILS # BLD AUTO: 7.3 10E9/L (ref 1.6–8.3)
NEUTROPHILS NFR BLD AUTO: 92.1 %
NRBC # BLD AUTO: 0 10*3/UL
NRBC BLD AUTO-RTO: 0 /100
PLATELET # BLD AUTO: 151 10E9/L (ref 150–450)
POTASSIUM SERPL-SCNC: 4.1 MMOL/L (ref 3.4–5.3)
PROT SERPL-MCNC: 6.9 G/DL (ref 6.8–8.8)
RBC # BLD AUTO: 3.21 10E12/L (ref 3.8–5.2)
SODIUM SERPL-SCNC: 136 MMOL/L (ref 133–144)
WBC # BLD AUTO: 7.9 10E9/L (ref 4–11)

## 2017-12-18 PROCEDURE — 85025 COMPLETE CBC W/AUTO DIFF WBC: CPT | Performed by: INTERNAL MEDICINE

## 2017-12-18 PROCEDURE — 25000128 H RX IP 250 OP 636: Performed by: INTERNAL MEDICINE

## 2017-12-18 PROCEDURE — 80053 COMPREHEN METABOLIC PANEL: CPT | Performed by: INTERNAL MEDICINE

## 2017-12-18 RX ORDER — HEPARIN SODIUM (PORCINE) LOCK FLUSH IV SOLN 100 UNIT/ML 100 UNIT/ML
5 SOLUTION INTRAVENOUS
Status: COMPLETED | OUTPATIENT
Start: 2017-12-18 | End: 2017-12-18

## 2017-12-18 RX ADMIN — SODIUM CHLORIDE, PRESERVATIVE FREE 5 ML: 5 INJECTION INTRAVENOUS at 13:08

## 2017-12-18 NOTE — NURSING NOTE
"Chief Complaint   Patient presents with     Port Draw     labs drawn from port by rn.       Port accessed with 20 gauge 3/4\" gripper needle and labs drawn by rn.  Port flushed with NS and heparin.  Pt tolerated well.   Quin Perdue RN      "

## 2018-01-08 DIAGNOSIS — C25.0 MALIGNANT NEOPLASM OF HEAD OF PANCREAS (H): ICD-10-CM

## 2018-01-08 LAB
ALBUMIN SERPL-MCNC: 3.7 G/DL (ref 3.4–5)
ALP SERPL-CCNC: 124 U/L (ref 40–150)
ALT SERPL W P-5'-P-CCNC: 41 U/L (ref 0–50)
ANION GAP SERPL CALCULATED.3IONS-SCNC: 5 MMOL/L (ref 3–14)
AST SERPL W P-5'-P-CCNC: 39 U/L (ref 0–45)
BASOPHILS # BLD AUTO: 0 10E9/L (ref 0–0.2)
BASOPHILS NFR BLD AUTO: 0.4 %
BILIRUB SERPL-MCNC: 0.5 MG/DL (ref 0.2–1.3)
BUN SERPL-MCNC: 7 MG/DL (ref 7–30)
CALCIUM SERPL-MCNC: 8.5 MG/DL (ref 8.5–10.1)
CHLORIDE SERPL-SCNC: 106 MMOL/L (ref 94–109)
CO2 SERPL-SCNC: 27 MMOL/L (ref 20–32)
CREAT SERPL-MCNC: 0.72 MG/DL (ref 0.52–1.04)
DIFFERENTIAL METHOD BLD: ABNORMAL
EOSINOPHIL # BLD AUTO: 0 10E9/L (ref 0–0.7)
EOSINOPHIL NFR BLD AUTO: 0.8 %
ERYTHROCYTE [DISTWIDTH] IN BLOOD BY AUTOMATED COUNT: 17.2 % (ref 10–15)
GFR SERPL CREATININE-BSD FRML MDRD: 82 ML/MIN/1.7M2
GLUCOSE SERPL-MCNC: 134 MG/DL (ref 70–99)
HCT VFR BLD AUTO: 33.3 % (ref 35–47)
HGB BLD-MCNC: 11 G/DL (ref 11.7–15.7)
IMM GRANULOCYTES # BLD: 0 10E9/L (ref 0–0.4)
IMM GRANULOCYTES NFR BLD: 0.2 %
LYMPHOCYTES # BLD AUTO: 1.3 10E9/L (ref 0.8–5.3)
LYMPHOCYTES NFR BLD AUTO: 24.8 %
MCH RBC QN AUTO: 34.6 PG (ref 26.5–33)
MCHC RBC AUTO-ENTMCNC: 33 G/DL (ref 31.5–36.5)
MCV RBC AUTO: 105 FL (ref 78–100)
MONOCYTES # BLD AUTO: 0.3 10E9/L (ref 0–1.3)
MONOCYTES NFR BLD AUTO: 5.9 %
NEUTROPHILS # BLD AUTO: 3.5 10E9/L (ref 1.6–8.3)
NEUTROPHILS NFR BLD AUTO: 67.9 %
NRBC # BLD AUTO: 0 10*3/UL
NRBC BLD AUTO-RTO: 0 /100
PLATELET # BLD AUTO: 166 10E9/L (ref 150–450)
POTASSIUM SERPL-SCNC: 3.9 MMOL/L (ref 3.4–5.3)
PROT SERPL-MCNC: 7.1 G/DL (ref 6.8–8.8)
RBC # BLD AUTO: 3.18 10E12/L (ref 3.8–5.2)
SODIUM SERPL-SCNC: 138 MMOL/L (ref 133–144)
WBC # BLD AUTO: 5.1 10E9/L (ref 4–11)

## 2018-01-08 PROCEDURE — 25000128 H RX IP 250 OP 636: Performed by: INTERNAL MEDICINE

## 2018-01-08 PROCEDURE — 80053 COMPREHEN METABOLIC PANEL: CPT | Performed by: INTERNAL MEDICINE

## 2018-01-08 PROCEDURE — 85025 COMPLETE CBC W/AUTO DIFF WBC: CPT | Performed by: INTERNAL MEDICINE

## 2018-01-08 RX ORDER — HEPARIN SODIUM (PORCINE) LOCK FLUSH IV SOLN 100 UNIT/ML 100 UNIT/ML
5 SOLUTION INTRAVENOUS ONCE
Status: COMPLETED | OUTPATIENT
Start: 2018-01-08 | End: 2018-01-08

## 2018-01-08 RX ADMIN — SODIUM CHLORIDE, PRESERVATIVE FREE 5 ML: 5 INJECTION INTRAVENOUS at 12:56

## 2018-01-08 NOTE — NURSING NOTE
"Chief Complaint   Patient presents with     Port Draw     Labs drawn from port by RN. Line flushed with saline and heparin.     Port accessed with 20g 3/4\" gripper needle by RN, labs collected, line flushed with saline and heparin.     Alia Jauregui, DENZEL  "

## 2018-01-12 ENCOUNTER — TELEPHONE (OUTPATIENT)
Dept: ONCOLOGY | Facility: CLINIC | Age: 63
End: 2018-01-12

## 2018-01-12 NOTE — TELEPHONE ENCOUNTER
Oral Chemotherapy Monitoring Program   Placed call to patient in follow up of Xeloda (capecitabine) therapy.   Left message requesting call back.   No drug names were mentioned.    Miladis Mcmillan RN  [facility]  [phone]

## 2018-01-26 DIAGNOSIS — C25.0 MALIGNANT NEOPLASM OF HEAD OF PANCREAS (H): ICD-10-CM

## 2018-01-26 LAB
ALBUMIN SERPL-MCNC: 3.7 G/DL (ref 3.4–5)
ALP SERPL-CCNC: 120 U/L (ref 40–150)
ALT SERPL W P-5'-P-CCNC: 40 U/L (ref 0–50)
ANION GAP SERPL CALCULATED.3IONS-SCNC: 6 MMOL/L (ref 3–14)
AST SERPL W P-5'-P-CCNC: 37 U/L (ref 0–45)
BASOPHILS # BLD AUTO: 0 10E9/L (ref 0–0.2)
BASOPHILS NFR BLD AUTO: 0.4 %
BILIRUB SERPL-MCNC: 0.4 MG/DL (ref 0.2–1.3)
BUN SERPL-MCNC: 10 MG/DL (ref 7–30)
CALCIUM SERPL-MCNC: 8.7 MG/DL (ref 8.5–10.1)
CHLORIDE SERPL-SCNC: 107 MMOL/L (ref 94–109)
CO2 SERPL-SCNC: 26 MMOL/L (ref 20–32)
CREAT SERPL-MCNC: 0.82 MG/DL (ref 0.52–1.04)
DIFFERENTIAL METHOD BLD: ABNORMAL
EOSINOPHIL # BLD AUTO: 0 10E9/L (ref 0–0.7)
EOSINOPHIL NFR BLD AUTO: 0.8 %
ERYTHROCYTE [DISTWIDTH] IN BLOOD BY AUTOMATED COUNT: 17.1 % (ref 10–15)
GFR SERPL CREATININE-BSD FRML MDRD: 71 ML/MIN/1.7M2
GLUCOSE SERPL-MCNC: 86 MG/DL (ref 70–99)
HCT VFR BLD AUTO: 33.9 % (ref 35–47)
HGB BLD-MCNC: 11.1 G/DL (ref 11.7–15.7)
IMM GRANULOCYTES # BLD: 0 10E9/L (ref 0–0.4)
IMM GRANULOCYTES NFR BLD: 0.2 %
LYMPHOCYTES # BLD AUTO: 1.2 10E9/L (ref 0.8–5.3)
LYMPHOCYTES NFR BLD AUTO: 23.4 %
MCH RBC QN AUTO: 34 PG (ref 26.5–33)
MCHC RBC AUTO-ENTMCNC: 32.7 G/DL (ref 31.5–36.5)
MCV RBC AUTO: 104 FL (ref 78–100)
MONOCYTES # BLD AUTO: 0.4 10E9/L (ref 0–1.3)
MONOCYTES NFR BLD AUTO: 7.7 %
NEUTROPHILS # BLD AUTO: 3.5 10E9/L (ref 1.6–8.3)
NEUTROPHILS NFR BLD AUTO: 67.5 %
NRBC # BLD AUTO: 0 10*3/UL
NRBC BLD AUTO-RTO: 0 /100
PLATELET # BLD AUTO: 168 10E9/L (ref 150–450)
POTASSIUM SERPL-SCNC: 4 MMOL/L (ref 3.4–5.3)
PROT SERPL-MCNC: 7.2 G/DL (ref 6.8–8.8)
RBC # BLD AUTO: 3.26 10E12/L (ref 3.8–5.2)
SODIUM SERPL-SCNC: 138 MMOL/L (ref 133–144)
WBC # BLD AUTO: 5.2 10E9/L (ref 4–11)

## 2018-01-26 PROCEDURE — 25000128 H RX IP 250 OP 636: Performed by: INTERNAL MEDICINE

## 2018-01-26 PROCEDURE — 80053 COMPREHEN METABOLIC PANEL: CPT | Performed by: INTERNAL MEDICINE

## 2018-01-26 PROCEDURE — 85025 COMPLETE CBC W/AUTO DIFF WBC: CPT | Performed by: INTERNAL MEDICINE

## 2018-01-26 RX ORDER — HEPARIN SODIUM (PORCINE) LOCK FLUSH IV SOLN 100 UNIT/ML 100 UNIT/ML
5 SOLUTION INTRAVENOUS
Status: COMPLETED | OUTPATIENT
Start: 2018-01-26 | End: 2018-01-26

## 2018-01-26 RX ADMIN — SODIUM CHLORIDE, PRESERVATIVE FREE 5 ML: 5 INJECTION INTRAVENOUS at 12:01

## 2018-01-26 NOTE — NURSING NOTE
"Chief Complaint   Patient presents with     Lab Only     port accessed and labs drawn by rn.       Port accessed with 20g 3/4\" gripper needle, labs drawn, port flushed with saline and heparin.  Port de-accessed.  Meghna Leonardo RN    "

## 2018-02-12 DIAGNOSIS — C25.0 MALIGNANT NEOPLASM OF HEAD OF PANCREAS (H): Primary | ICD-10-CM

## 2018-02-12 RX ORDER — CAPECITABINE 500 MG/1
TABLET, FILM COATED ORAL
Qty: 42 TABLET | Refills: 3 | Status: SHIPPED | OUTPATIENT
Start: 2018-02-12 | End: 2018-05-21

## 2018-02-13 ENCOUNTER — TELEPHONE (OUTPATIENT)
Dept: ONCOLOGY | Facility: CLINIC | Age: 63
End: 2018-02-13

## 2018-02-13 NOTE — TELEPHONE ENCOUNTER
Oral Chemotherapy Monitoring Program   Placed call to patient in follow up of Xeloda (capecitabine) therapy.   Left message requesting call back.   No drug names were mentioned.  Miladis Mcmillan RN  [facility]  [phone]

## 2018-02-16 ENCOUNTER — RADIANT APPOINTMENT (OUTPATIENT)
Dept: CT IMAGING | Facility: CLINIC | Age: 63
End: 2018-02-16
Attending: INTERNAL MEDICINE
Payer: MEDICARE

## 2018-02-16 DIAGNOSIS — C25.0 MALIGNANT NEOPLASM OF HEAD OF PANCREAS (H): ICD-10-CM

## 2018-02-16 LAB
ALBUMIN SERPL-MCNC: 3.9 G/DL (ref 3.4–5)
ALP SERPL-CCNC: 118 U/L (ref 40–150)
ALT SERPL W P-5'-P-CCNC: 44 U/L (ref 0–50)
ANION GAP SERPL CALCULATED.3IONS-SCNC: 6 MMOL/L (ref 3–14)
AST SERPL W P-5'-P-CCNC: 44 U/L (ref 0–45)
BASOPHILS # BLD AUTO: 0 10E9/L (ref 0–0.2)
BASOPHILS NFR BLD AUTO: 0.6 %
BILIRUB SERPL-MCNC: 0.5 MG/DL (ref 0.2–1.3)
BUN SERPL-MCNC: 7 MG/DL (ref 7–30)
CALCIUM SERPL-MCNC: 8.6 MG/DL (ref 8.5–10.1)
CHLORIDE SERPL-SCNC: 107 MMOL/L (ref 94–109)
CO2 SERPL-SCNC: 27 MMOL/L (ref 20–32)
CREAT SERPL-MCNC: 0.66 MG/DL (ref 0.52–1.04)
DIFFERENTIAL METHOD BLD: ABNORMAL
EOSINOPHIL # BLD AUTO: 0.1 10E9/L (ref 0–0.7)
EOSINOPHIL NFR BLD AUTO: 0.9 %
ERYTHROCYTE [DISTWIDTH] IN BLOOD BY AUTOMATED COUNT: 17.2 % (ref 10–15)
GFR SERPL CREATININE-BSD FRML MDRD: 90 ML/MIN/1.7M2
GLUCOSE SERPL-MCNC: 98 MG/DL (ref 70–99)
HCT VFR BLD AUTO: 34 % (ref 35–47)
HGB BLD-MCNC: 11.4 G/DL (ref 11.7–15.7)
IMM GRANULOCYTES # BLD: 0 10E9/L (ref 0–0.4)
IMM GRANULOCYTES NFR BLD: 0.6 %
LYMPHOCYTES # BLD AUTO: 1.2 10E9/L (ref 0.8–5.3)
LYMPHOCYTES NFR BLD AUTO: 23.1 %
MCH RBC QN AUTO: 34.9 PG (ref 26.5–33)
MCHC RBC AUTO-ENTMCNC: 33.5 G/DL (ref 31.5–36.5)
MCV RBC AUTO: 104 FL (ref 78–100)
MONOCYTES # BLD AUTO: 0.4 10E9/L (ref 0–1.3)
MONOCYTES NFR BLD AUTO: 6.7 %
NEUTROPHILS # BLD AUTO: 3.7 10E9/L (ref 1.6–8.3)
NEUTROPHILS NFR BLD AUTO: 68.1 %
NRBC # BLD AUTO: 0 10*3/UL
NRBC BLD AUTO-RTO: 0 /100
PLATELET # BLD AUTO: 188 10E9/L (ref 150–450)
POTASSIUM SERPL-SCNC: 4 MMOL/L (ref 3.4–5.3)
PROT SERPL-MCNC: 7.1 G/DL (ref 6.8–8.8)
RBC # BLD AUTO: 3.27 10E12/L (ref 3.8–5.2)
SODIUM SERPL-SCNC: 140 MMOL/L (ref 133–144)
WBC # BLD AUTO: 5.4 10E9/L (ref 4–11)

## 2018-02-16 PROCEDURE — 25000128 H RX IP 250 OP 636: Performed by: INTERNAL MEDICINE

## 2018-02-16 PROCEDURE — 85025 COMPLETE CBC W/AUTO DIFF WBC: CPT | Performed by: INTERNAL MEDICINE

## 2018-02-16 PROCEDURE — 80053 COMPREHEN METABOLIC PANEL: CPT | Performed by: INTERNAL MEDICINE

## 2018-02-16 RX ORDER — HEPARIN SODIUM (PORCINE) LOCK FLUSH IV SOLN 100 UNIT/ML 100 UNIT/ML
5 SOLUTION INTRAVENOUS ONCE
Status: COMPLETED | OUTPATIENT
Start: 2018-02-16 | End: 2018-02-16

## 2018-02-16 RX ORDER — IOPAMIDOL 755 MG/ML
73 INJECTION, SOLUTION INTRAVASCULAR ONCE
Status: COMPLETED | OUTPATIENT
Start: 2018-02-16 | End: 2018-02-16

## 2018-02-16 RX ADMIN — SODIUM CHLORIDE, PRESERVATIVE FREE 5 ML: 5 INJECTION INTRAVENOUS at 08:50

## 2018-02-16 RX ADMIN — IOPAMIDOL 73 ML: 755 INJECTION, SOLUTION INTRAVASCULAR at 09:31

## 2018-02-16 RX ADMIN — HEPARIN SODIUM (PORCINE) LOCK FLUSH IV SOLN 100 UNIT/ML 5 ML: 100 SOLUTION at 09:39

## 2018-02-16 NOTE — DISCHARGE INSTRUCTIONS

## 2018-02-16 NOTE — NURSING NOTE
Chief Complaint   Patient presents with     Labs Only     labs drawn via port by RN     There were no vitals taken for this visit.    Port accessed by RN. Labs collected and sent. Line flushed with NS & Heparin. Pt tolerated wel.    Priscilla Gonzalez, RN

## 2018-02-19 ENCOUNTER — ONCOLOGY VISIT (OUTPATIENT)
Dept: ONCOLOGY | Facility: CLINIC | Age: 63
End: 2018-02-19
Attending: INTERNAL MEDICINE
Payer: MEDICARE

## 2018-02-19 VITALS
OXYGEN SATURATION: 100 % | RESPIRATION RATE: 16 BRPM | SYSTOLIC BLOOD PRESSURE: 139 MMHG | WEIGHT: 122.4 LBS | HEART RATE: 72 BPM | DIASTOLIC BLOOD PRESSURE: 83 MMHG | HEIGHT: 66 IN | BODY MASS INDEX: 19.67 KG/M2 | TEMPERATURE: 97 F

## 2018-02-19 DIAGNOSIS — C25.0 MALIGNANT NEOPLASM OF HEAD OF PANCREAS (H): Primary | ICD-10-CM

## 2018-02-19 PROCEDURE — 99215 OFFICE O/P EST HI 40 MIN: CPT | Mod: ZP | Performed by: INTERNAL MEDICINE

## 2018-02-19 PROCEDURE — G0463 HOSPITAL OUTPT CLINIC VISIT: HCPCS | Mod: ZF

## 2018-02-19 ASSESSMENT — PAIN SCALES - GENERAL: PAINLEVEL: NO PAIN (0)

## 2018-02-19 NOTE — MR AVS SNAPSHOT
After Visit Summary   2/19/2018    Emelia Weathers    MRN: [id number]           Patient Information     Date Of Birth          1955        Visit Information        Provider Department      2/19/2018 7:15 AM Yasmany Wade MD [facility]        Today's Diagnoses     Malignant neoplasm of head of pancreas (H)    -  1       Follow-ups after your visit        Follow-up notes from your care team     Return in about 3 months (around 5/19/2018) for MD visit with CT and labs.      Your next 10 appointments already scheduled     May 18, 2018  9:00 AM CDT   [facility] Lab Draw with  [facility] DRAW   [facility] ([facility])    [address]  [address]  [facility] [id number] [phone]            May 18, 2018  9:40 AM CDT   (Arrive by 9:25 AM)   CT CHEST ABDOMEN PELVIS W/O & W CONTRAST with UCCT1   [facility] [place] [facility] ([facility])    [address]  1st Floor  [facility] [id number] [phone]           Please bring any scans or X-rays taken at other hospitals, if similar tests were done. Also bring a list of your medicines, including vitamins, minerals and over-the-counter drugs. It is safest to leave personal items at home.  Be sure to tell your doctor:   If you have any allergies.   If there s any chance you are pregnant.   If you are breastfeeding.  You may have contrast for this exam. To prepare:   Do not eat or drink for 2 hours before your exam. If you need to take medicine, you may take it with small sips of water. (We may ask you to take liquid medicine as well.)   The day before your exam, drink extra fluids at least six 8-ounce glasses (unless your doctor tells you to restrict your fluids).   You will be given instructions on how to drink the contrast.  Patients over 70 or patients with diabetes or kidney problems:   If you haven t had a blood test (creatinine test) within  the last 30 days, the Cardiologist/Radiologist may require you to get this test prior to your exam.  If you have diabetes:   Continue to take your metformin medication on the day of your exam  Please wear loose clothing, such as a sweat suit or jogging clothes. Avoid snaps, zippers and other metal. We may ask you to undress and put on a hospital gown.  If you have any questions, please call the Imaging Department where you will have your exam.            May 21, 2018  7:15 AM CDT   (Arrive by 7:00 AM)   Return Visit with Yasmany Wade MD   [facility] ([facility] [place])    [address]  [address]  [facility] [address] [phone]              Who to contact     If you have questions or need follow up information about today's clinic visit or your schedule please contact [facility] directly at [phone].  Normal or non-critical lab and imaging results will be communicated to you by [employer], letter or phone within 4 business days after the clinic has received the results. If you do not hear from us within 7 days, please contact the clinic through [employer] or phone. If you have a critical or abnormal lab result, we will notify you by phone as soon as possible.  Submit refill requests through MD2U or call your pharmacy and they will forward the refill request to us. Please allow 3 business days for your refill to be completed.          Additional Information About Your Visit        [employer] Information     [employer]U gives you secure access to your electronic health record. If you see a primary care provider, you can also send messages to your care team and make appointments. If you have questions, please call your primary care clinic.  If you do not have a primary care provider, please call [phone] and they will assist you.        Care EveryWhere ID     This is your Care EveryWhere ID. This could be used by other organizations to  "access your [facility] medical records  [id number]        Your Vitals Were     Pulse Temperature Respirations Height Pulse Oximetry BMI (Body Mass Index)    72 97  F (36.1  C) (Oral) 16 1.676 m (5' 5.98\") 100% 19.77 kg/m2       Blood Pressure from Last 3 Encounters:   02/19/18 139/83   11/20/17 132/83   08/21/17 151/82    Weight from Last 3 Encounters:   02/19/18 55.5 kg (122 lb 6.4 oz)   11/20/17 54.3 kg (119 lb 9.6 oz)   08/21/17 53.8 kg (118 lb 9.6 oz)                 Today's Medication Changes          These changes are accurate as of 2/19/18 11:59 PM.  If you have any questions, ask your nurse or doctor.               These medicines have changed or have updated prescriptions.        Dose/Directions    amylase-lipase-protease 04640 UNITS Cpep   Commonly known as:  CREON 12   This may have changed:  how much to take   Used for:  Malignant neoplasm of head of pancreas (H)        Dose:  1 capsule   Take 1 capsule (12,000 Units) by mouth 3 times daily (with meals)   Quantity:  540 capsule   Refills:  3                Primary Care Provider Office Phone # Fax #    Benny Garrett [phone]       [address] [place] [address]        Equal Access to Services     MAVERICK HALE : Zina bello Soshannan, waaxda luqadaha, qaybta kaalmada elizabeth, rich amaral. So [facility] [phone].    ATENCIÓN: Si habla español, tiene a hull disposición servicios gratuitos de asistencia lingüística. Garo al [phone].    We comply with applicable federal civil rights laws and [place] laws. We do not discriminate on the basis of race, color, national origin, age, disability, sex, sexual orientation, or gender identity.            Thank you!     Thank you for choosing [facility]  for your care. Our goal is always to provide you with excellent care. Hearing back from our patients is one way we can continue to improve our services. Please take a few " minutes to complete the written survey that you may receive in the mail after your visit with us. Thank you!             Your Updated Medication List - Protect others around you: Learn how to safely use, store and throw away your medicines at www.disposemymeds.org.          This list is accurate as of 2/19/18 11:59 PM.  Always use your most recent med list.                   Brand Name Dispense Instructions for use Diagnosis    amylase-lipase-protease 91161 UNITS Cpep    CREON 12    540 capsule    Take 1 capsule (12,000 Units) by mouth 3 times daily (with meals)    Malignant neoplasm of head of pancreas (H)       CALCIUM CITRATE + D PO      2 tablets daily        * capecitabine 500 MG tablet CHEMO    XELODA    42 tablet    Take 2 tabs in the AM and 1 tab in the PM, Days 1 through 14, then off for 7 days. Take within 30 mins after meal.    Malignant neoplasm of head of pancreas (H)       * capecitabine 500 MG tablet CHEMO    XELODA    42 tablet    Take 2 tabs in the AM and 1 tab in the PM, Days 1 through 14, then off for 7 days. Take within 30 mins after meal.    Malignant neoplasm of head of pancreas (H)       * capecitabine 500 MG tablet CHEMO    XELODA    42 tablet    Take 2 tabs in the AM and 1 tab in the PM, Days 1 through 14, then off for 7 days. Take within 30 mins after meal.    Malignant neoplasm of head of pancreas (H)       * capecitabine 500 MG tablet CHEMO    XELODA    42 tablet    Take 2 tabs in the AM and 1 tab in the PM, Days 1 through 14, then off for 7 days. Take within 30 mins after meal.    Malignant neoplasm of head of pancreas (H)       * capecitabine 500 MG tablet CHEMO    XELODA    42 tablet    Take 2 tabs in the AM and 1 tab in the PM, Days 1 through 14, then off for 7 days. Take within 30 mins after meal.    Malignant neoplasm of head of pancreas (H)       * capecitabine 500 MG tablet CHEMO    XELODA    42 tablet    Take 2 tabs in the AM and 1 tab in the PM, Days 1 through 14, then off for 7  days. Take within 30 mins after meal.    Malignant neoplasm of head of pancreas (H)       * capecitabine 500 MG tablet CHEMO    XELODA    42 tablet    Take 2 tabs in the AM and 1 tab in the PM, Days 1 through 14, then off for 7 days. Take within 30 mins after meal.    Malignant neoplasm of head of pancreas (H)       diphenoxylate-atropine 2.5-0.025 MG per tablet    LOMOTIL    100 tablet    Take 1-2 tablets by mouth 4 times daily as needed for diarrhea    Malignant neoplasm of head of pancreas (H), Diarrhea       lidocaine-prilocaine cream    EMLA    30 g    Apply topically as needed for moderate pain    Malignant neoplasm of head of pancreas (H)       MELATONIN PO      Take 3 mg by mouth At Bedtime        MULTIVITAMIN TABS   OR      1 TABLET DAILY        ondansetron 8 MG tablet    ZOFRAN    30 tablet    Take 1 tablet (8 mg) by mouth every 8 hours as needed for nausea    Malignant neoplasm of head of pancreas (H)       pantoprazole 40 MG EC tablet    PROTONIX    90 tablet    Take 1 tablet (40 mg) by mouth every morning (before breakfast)    Malignant neoplasm of head of pancreas (H)       TYLENOL 325 MG tablet   Generic drug:  acetaminophen      Take 500 mg by mouth every 6 hours as needed        VITAMIN C PO      Take 500 mg by mouth daily.        * Notice:  This list has 7 medication(s) that are the same as other medications prescribed for you. Read the directions carefully, and ask your doctor or other care provider to review them with you.

## 2018-02-19 NOTE — PROGRESS NOTES
Emelia Weathers is here today in follow-up of recurrent pancreatic cancer.    She has disease that recurred in the resection bed and has been on active treatment with good control for about 3 years now. She was originally on folfirinox which was subsequently reduced to xelox and now is just on single agent Xeloda. She is here for a planned response assessment. She continues to do well, and has increased her exercise now to where she is going back to the gym for spin classes on a regular basis. She has family does cut back from a cruise in the [place]. She has chronically loose stools which have not changed in pattern. She gets some minor peeling and cracking on her hands and feet. She's had no fevers chills or sweats. She's had no other interval illnesses. The remainder of the 10 point review of systems is otherwise unremarkable.    On exam today she appears well and other than her stated age. Her vital signs as noted in the chart unremarkable.  She has no scleral icterus and no visible lesions in the oropharynx.  No adenopathy is palpable in the neck or supraclavicular spaces under thyroid is unremarkable.  Her lungs are clear to auscultation without office to percussion.  Her heart rate and rhythm are regular without audible murmur or gallop. Her port site is unremarkable.  Her abdomen is soft and nontender without palpable mass or organomegaly.  She has no peripheral edema no tenderness in her calves or thighs and no cyanosis or clubbing of her digits. She has moderate dry desquamation on her palms.  Her speech is fluent cranial nerves are grossly intact just unremarkable gait and station.    Reviewed with the patient and her family her lab work and CT scan. She has no evidence of disease progression. She still has a little bit of fatty liver although that looks better. She has normal electrolytes renal function and liver enzymes. She has mild abnormalities of her blood counts consistent with her  chemotherapy.    Assessment/plan: Recurrent pancreatic cancer with ongoing excellent disease control. We will continue on with the same dose and schedule of Xeloda and reevaluate her disease status in another 3 months.

## 2018-02-19 NOTE — NURSING NOTE
"Oncology Rooming Note    February 19, 2018 7:30 AM   Emelia Weathers is a 62 year old female who presents for:    Chief Complaint   Patient presents with     Oncology Clinic Visit     F/U Pancreatic ca     Initial Vitals: /83 (BP Location: Left arm, Patient Position: Sitting, Cuff Size: Adult Regular)  Pulse 72  Temp 97  F (36.1  C) (Oral)  Resp 16  Ht 1.676 m (5' 5.98\")  Wt 55.5 kg (122 lb 6.4 oz)  SpO2 100%  BMI 19.77 kg/m2 Estimated body mass index is 19.77 kg/(m^2) as calculated from the following:    Height as of this encounter: 1.676 m (5' 5.98\").    Weight as of this encounter: 55.5 kg (122 lb 6.4 oz). Body surface area is 1.61 meters squared.  No Pain (0) Comment: Data Unavailable   No LMP recorded. Patient is postmenopausal.  Allergies reviewed: Yes  Medications reviewed: Yes    Medications: Medication refills not needed today.  Pharmacy name entered into [facility]:    [id number] [place], [place] - [address] [place] [address] PHARMACY [facility] - [place], [place] - [address] [facility] PHARMACY [facility] [place] [address]  [id number] - [place], [place] - [address] [place] [address] [facility] - [place], [place] - [address] [place] [address]    Clinical concerns: none Dr Wade was NOT notified.    10 minutes for nursing intake (face to face time)     MARGARITA ACHARYA LPN            "

## 2018-03-12 DIAGNOSIS — C25.0 MALIGNANT NEOPLASM OF HEAD OF PANCREAS (H): ICD-10-CM

## 2018-03-12 LAB
ALBUMIN SERPL-MCNC: 3.8 G/DL (ref 3.4–5)
ALP SERPL-CCNC: 142 U/L (ref 40–150)
ALT SERPL W P-5'-P-CCNC: 48 U/L (ref 0–50)
ANION GAP SERPL CALCULATED.3IONS-SCNC: 8 MMOL/L (ref 3–14)
AST SERPL W P-5'-P-CCNC: 44 U/L (ref 0–45)
BASOPHILS # BLD AUTO: 0 10E9/L (ref 0–0.2)
BASOPHILS NFR BLD AUTO: 0.6 %
BILIRUB SERPL-MCNC: 0.4 MG/DL (ref 0.2–1.3)
BUN SERPL-MCNC: 9 MG/DL (ref 7–30)
CALCIUM SERPL-MCNC: 8.8 MG/DL (ref 8.5–10.1)
CHLORIDE SERPL-SCNC: 105 MMOL/L (ref 94–109)
CO2 SERPL-SCNC: 27 MMOL/L (ref 20–32)
CREAT SERPL-MCNC: 0.68 MG/DL (ref 0.52–1.04)
DIFFERENTIAL METHOD BLD: ABNORMAL
EOSINOPHIL # BLD AUTO: 0.1 10E9/L (ref 0–0.7)
EOSINOPHIL NFR BLD AUTO: 1 %
ERYTHROCYTE [DISTWIDTH] IN BLOOD BY AUTOMATED COUNT: 17.1 % (ref 10–15)
GFR SERPL CREATININE-BSD FRML MDRD: 86 ML/MIN/1.7M2
GLUCOSE SERPL-MCNC: 90 MG/DL (ref 70–99)
HCT VFR BLD AUTO: 35.2 % (ref 35–47)
HGB BLD-MCNC: 11.5 G/DL (ref 11.7–15.7)
IMM GRANULOCYTES # BLD: 0 10E9/L (ref 0–0.4)
IMM GRANULOCYTES NFR BLD: 0.2 %
LYMPHOCYTES # BLD AUTO: 1 10E9/L (ref 0.8–5.3)
LYMPHOCYTES NFR BLD AUTO: 21 %
MCH RBC QN AUTO: 34.4 PG (ref 26.5–33)
MCHC RBC AUTO-ENTMCNC: 32.7 G/DL (ref 31.5–36.5)
MCV RBC AUTO: 105 FL (ref 78–100)
MONOCYTES # BLD AUTO: 0.3 10E9/L (ref 0–1.3)
MONOCYTES NFR BLD AUTO: 7.1 %
NEUTROPHILS # BLD AUTO: 3.4 10E9/L (ref 1.6–8.3)
NEUTROPHILS NFR BLD AUTO: 70.1 %
NRBC # BLD AUTO: 0 10*3/UL
NRBC BLD AUTO-RTO: 0 /100
PLATELET # BLD AUTO: 186 10E9/L (ref 150–450)
POTASSIUM SERPL-SCNC: 4.2 MMOL/L (ref 3.4–5.3)
PROT SERPL-MCNC: 7.2 G/DL (ref 6.8–8.8)
RBC # BLD AUTO: 3.34 10E12/L (ref 3.8–5.2)
SODIUM SERPL-SCNC: 139 MMOL/L (ref 133–144)
WBC # BLD AUTO: 4.8 10E9/L (ref 4–11)

## 2018-03-12 PROCEDURE — 25000128 H RX IP 250 OP 636: Performed by: INTERNAL MEDICINE

## 2018-03-12 PROCEDURE — 85025 COMPLETE CBC W/AUTO DIFF WBC: CPT | Performed by: INTERNAL MEDICINE

## 2018-03-12 PROCEDURE — 80053 COMPREHEN METABOLIC PANEL: CPT | Performed by: INTERNAL MEDICINE

## 2018-03-12 RX ORDER — HEPARIN SODIUM (PORCINE) LOCK FLUSH IV SOLN 100 UNIT/ML 100 UNIT/ML
5 SOLUTION INTRAVENOUS DAILY PRN
Status: DISCONTINUED | OUTPATIENT
Start: 2018-03-12 | End: 2018-03-20 | Stop reason: HOSPADM

## 2018-03-12 RX ADMIN — SODIUM CHLORIDE, PRESERVATIVE FREE 5 ML: 5 INJECTION INTRAVENOUS at 09:03

## 2018-03-12 NOTE — NURSING NOTE
Port accessed by RN, pt tolerated well. Labs collected and sent, line flushed with NS and heparin de-accessed immediately.. Vitals taken and pt checked in for next appt.   Itzel Benitez

## 2018-04-02 DIAGNOSIS — C25.0 MALIGNANT NEOPLASM OF HEAD OF PANCREAS (H): ICD-10-CM

## 2018-04-02 LAB
ALBUMIN SERPL-MCNC: 3.8 G/DL (ref 3.4–5)
ALP SERPL-CCNC: 135 U/L (ref 40–150)
ALT SERPL W P-5'-P-CCNC: 34 U/L (ref 0–50)
ANION GAP SERPL CALCULATED.3IONS-SCNC: 6 MMOL/L (ref 3–14)
AST SERPL W P-5'-P-CCNC: 37 U/L (ref 0–45)
BASOPHILS # BLD AUTO: 0 10E9/L (ref 0–0.2)
BASOPHILS NFR BLD AUTO: 0.2 %
BILIRUB SERPL-MCNC: 0.5 MG/DL (ref 0.2–1.3)
BUN SERPL-MCNC: 10 MG/DL (ref 7–30)
CALCIUM SERPL-MCNC: 8.8 MG/DL (ref 8.5–10.1)
CHLORIDE SERPL-SCNC: 108 MMOL/L (ref 94–109)
CO2 SERPL-SCNC: 26 MMOL/L (ref 20–32)
CREAT SERPL-MCNC: 0.7 MG/DL (ref 0.52–1.04)
DIFFERENTIAL METHOD BLD: ABNORMAL
EOSINOPHIL # BLD AUTO: 0.1 10E9/L (ref 0–0.7)
EOSINOPHIL NFR BLD AUTO: 1.2 %
ERYTHROCYTE [DISTWIDTH] IN BLOOD BY AUTOMATED COUNT: 17.3 % (ref 10–15)
GFR SERPL CREATININE-BSD FRML MDRD: 84 ML/MIN/1.7M2
GLUCOSE SERPL-MCNC: 89 MG/DL (ref 70–99)
HCT VFR BLD AUTO: 34.6 % (ref 35–47)
HGB BLD-MCNC: 11.4 G/DL (ref 11.7–15.7)
IMM GRANULOCYTES # BLD: 0 10E9/L (ref 0–0.4)
IMM GRANULOCYTES NFR BLD: 0.2 %
LYMPHOCYTES # BLD AUTO: 1.1 10E9/L (ref 0.8–5.3)
LYMPHOCYTES NFR BLD AUTO: 27.6 %
MCH RBC QN AUTO: 34.4 PG (ref 26.5–33)
MCHC RBC AUTO-ENTMCNC: 32.9 G/DL (ref 31.5–36.5)
MCV RBC AUTO: 105 FL (ref 78–100)
MONOCYTES # BLD AUTO: 0.4 10E9/L (ref 0–1.3)
MONOCYTES NFR BLD AUTO: 8.6 %
NEUTROPHILS # BLD AUTO: 2.5 10E9/L (ref 1.6–8.3)
NEUTROPHILS NFR BLD AUTO: 62.2 %
NRBC # BLD AUTO: 0 10*3/UL
NRBC BLD AUTO-RTO: 0 /100
PLATELET # BLD AUTO: 173 10E9/L (ref 150–450)
POTASSIUM SERPL-SCNC: 4.1 MMOL/L (ref 3.4–5.3)
PROT SERPL-MCNC: 7.2 G/DL (ref 6.8–8.8)
RBC # BLD AUTO: 3.31 10E12/L (ref 3.8–5.2)
SODIUM SERPL-SCNC: 141 MMOL/L (ref 133–144)
WBC # BLD AUTO: 4.1 10E9/L (ref 4–11)

## 2018-04-02 PROCEDURE — 25000128 H RX IP 250 OP 636: Performed by: INTERNAL MEDICINE

## 2018-04-02 PROCEDURE — 85025 COMPLETE CBC W/AUTO DIFF WBC: CPT | Performed by: INTERNAL MEDICINE

## 2018-04-02 PROCEDURE — 36591 DRAW BLOOD OFF VENOUS DEVICE: CPT

## 2018-04-02 PROCEDURE — 80053 COMPREHEN METABOLIC PANEL: CPT | Performed by: INTERNAL MEDICINE

## 2018-04-02 RX ORDER — HEPARIN SODIUM (PORCINE) LOCK FLUSH IV SOLN 100 UNIT/ML 100 UNIT/ML
5 SOLUTION INTRAVENOUS
Status: COMPLETED | OUTPATIENT
Start: 2018-04-02 | End: 2018-04-02

## 2018-04-02 RX ADMIN — SODIUM CHLORIDE, PRESERVATIVE FREE 5 ML: 5 INJECTION INTRAVENOUS at 08:58

## 2018-04-02 NOTE — NURSING NOTE
"Chief Complaint   Patient presents with     Port Draw     port accessed and labs drawn by rn.       Port accessed with 20g 3/4\" gripper needle, labs drawn, port flushed with saline and heparin, vitals checked, port de-accessed.  Meghna Leonardo RN    "

## 2018-04-19 DIAGNOSIS — C25.0 MALIGNANT NEOPLASM OF HEAD OF PANCREAS (H): ICD-10-CM

## 2018-04-19 LAB
ALBUMIN SERPL-MCNC: 3.6 G/DL (ref 3.4–5)
ALP SERPL-CCNC: 122 U/L (ref 40–150)
ALT SERPL W P-5'-P-CCNC: 31 U/L (ref 0–50)
ANION GAP SERPL CALCULATED.3IONS-SCNC: 8 MMOL/L (ref 3–14)
AST SERPL W P-5'-P-CCNC: 35 U/L (ref 0–45)
BASOPHILS # BLD AUTO: 0 10E9/L (ref 0–0.2)
BASOPHILS NFR BLD AUTO: 0.5 %
BILIRUB SERPL-MCNC: 0.6 MG/DL (ref 0.2–1.3)
BUN SERPL-MCNC: 8 MG/DL (ref 7–30)
CALCIUM SERPL-MCNC: 8.6 MG/DL (ref 8.5–10.1)
CHLORIDE SERPL-SCNC: 107 MMOL/L (ref 94–109)
CO2 SERPL-SCNC: 28 MMOL/L (ref 20–32)
CREAT SERPL-MCNC: 0.64 MG/DL (ref 0.52–1.04)
DIFFERENTIAL METHOD BLD: ABNORMAL
EOSINOPHIL # BLD AUTO: 0.1 10E9/L (ref 0–0.7)
EOSINOPHIL NFR BLD AUTO: 1.4 %
ERYTHROCYTE [DISTWIDTH] IN BLOOD BY AUTOMATED COUNT: 17.2 % (ref 10–15)
GFR SERPL CREATININE-BSD FRML MDRD: >90 ML/MIN/1.7M2
GLUCOSE SERPL-MCNC: 111 MG/DL (ref 70–99)
HCT VFR BLD AUTO: 32.9 % (ref 35–47)
HGB BLD-MCNC: 11.3 G/DL (ref 11.7–15.7)
IMM GRANULOCYTES # BLD: 0 10E9/L (ref 0–0.4)
IMM GRANULOCYTES NFR BLD: 0.2 %
LYMPHOCYTES # BLD AUTO: 1.2 10E9/L (ref 0.8–5.3)
LYMPHOCYTES NFR BLD AUTO: 27.1 %
MCH RBC QN AUTO: 35 PG (ref 26.5–33)
MCHC RBC AUTO-ENTMCNC: 34.3 G/DL (ref 31.5–36.5)
MCV RBC AUTO: 102 FL (ref 78–100)
MONOCYTES # BLD AUTO: 0.3 10E9/L (ref 0–1.3)
MONOCYTES NFR BLD AUTO: 7.8 %
NEUTROPHILS # BLD AUTO: 2.7 10E9/L (ref 1.6–8.3)
NEUTROPHILS NFR BLD AUTO: 63 %
NRBC # BLD AUTO: 0 10*3/UL
NRBC BLD AUTO-RTO: 0 /100
PLATELET # BLD AUTO: 186 10E9/L (ref 150–450)
POTASSIUM SERPL-SCNC: 3.9 MMOL/L (ref 3.4–5.3)
PROT SERPL-MCNC: 6.9 G/DL (ref 6.8–8.8)
RBC # BLD AUTO: 3.23 10E12/L (ref 3.8–5.2)
SODIUM SERPL-SCNC: 142 MMOL/L (ref 133–144)
WBC # BLD AUTO: 4.2 10E9/L (ref 4–11)

## 2018-04-19 PROCEDURE — 80053 COMPREHEN METABOLIC PANEL: CPT | Performed by: INTERNAL MEDICINE

## 2018-04-19 PROCEDURE — 25000128 H RX IP 250 OP 636: Performed by: INTERNAL MEDICINE

## 2018-04-19 PROCEDURE — 85025 COMPLETE CBC W/AUTO DIFF WBC: CPT | Performed by: INTERNAL MEDICINE

## 2018-04-19 RX ORDER — HEPARIN SODIUM (PORCINE) LOCK FLUSH IV SOLN 100 UNIT/ML 100 UNIT/ML
5 SOLUTION INTRAVENOUS ONCE
Status: COMPLETED | OUTPATIENT
Start: 2018-04-19 | End: 2018-04-19

## 2018-04-19 RX ADMIN — SODIUM CHLORIDE, PRESERVATIVE FREE 5 ML: 5 INJECTION INTRAVENOUS at 09:26

## 2018-04-19 NOTE — NURSING NOTE
Chief Complaint   Patient presents with     Labs Only     labs drawn via port by RN     There were no vitals taken for this visit.    Port accessed by RN. Labs collected and sent. Line flushed with NS & Heparin. Pt tolerated well.    Priscilla Gonzalez RN

## 2018-05-08 DIAGNOSIS — C25.0 MALIGNANT NEOPLASM OF HEAD OF PANCREAS (H): Primary | ICD-10-CM

## 2018-05-08 RX ORDER — CAPECITABINE 500 MG/1
TABLET, FILM COATED ORAL
Qty: 42 TABLET | Refills: 3 | Status: SHIPPED | OUTPATIENT
Start: 2018-05-08 | End: 2019-04-08

## 2018-05-08 NOTE — ORAL ONC MGMT
Patient will get labs drawn on 5/14 @ 8:45 and we will call patient with lab results and if okay to start next cycle of Xeloda.  I released Xeloda to [facility] on 5/8 so she could  after her lab appointment on 5/14 but knows not to start until we review her labs and call her.      Gardenia Ortega, Pharm.D., [facility]  [phone]  05/08/18

## 2018-05-14 ENCOUNTER — TELEPHONE (OUTPATIENT)
Dept: ONCOLOGY | Facility: CLINIC | Age: 63
End: 2018-05-14

## 2018-05-14 DIAGNOSIS — C25.0 MALIGNANT NEOPLASM OF HEAD OF PANCREAS (H): ICD-10-CM

## 2018-05-14 LAB
ALBUMIN SERPL-MCNC: 3.6 G/DL (ref 3.4–5)
ALP SERPL-CCNC: 145 U/L (ref 40–150)
ALT SERPL W P-5'-P-CCNC: 37 U/L (ref 0–50)
ANION GAP SERPL CALCULATED.3IONS-SCNC: 6 MMOL/L (ref 3–14)
AST SERPL W P-5'-P-CCNC: 35 U/L (ref 0–45)
BASOPHILS # BLD AUTO: 0 10E9/L (ref 0–0.2)
BASOPHILS NFR BLD AUTO: 0.5 %
BILIRUB SERPL-MCNC: 0.3 MG/DL (ref 0.2–1.3)
BUN SERPL-MCNC: 6 MG/DL (ref 7–30)
CALCIUM SERPL-MCNC: 8.9 MG/DL (ref 8.5–10.1)
CHLORIDE SERPL-SCNC: 110 MMOL/L (ref 94–109)
CO2 SERPL-SCNC: 26 MMOL/L (ref 20–32)
CREAT SERPL-MCNC: 0.65 MG/DL (ref 0.52–1.04)
DIFFERENTIAL METHOD BLD: ABNORMAL
EOSINOPHIL # BLD AUTO: 0.1 10E9/L (ref 0–0.7)
EOSINOPHIL NFR BLD AUTO: 1.3 %
ERYTHROCYTE [DISTWIDTH] IN BLOOD BY AUTOMATED COUNT: 17 % (ref 10–15)
GFR SERPL CREATININE-BSD FRML MDRD: >90 ML/MIN/1.7M2
GLUCOSE SERPL-MCNC: 74 MG/DL (ref 70–99)
HCT VFR BLD AUTO: 33 % (ref 35–47)
HGB BLD-MCNC: 10.9 G/DL (ref 11.7–15.7)
IMM GRANULOCYTES # BLD: 0 10E9/L (ref 0–0.4)
IMM GRANULOCYTES NFR BLD: 0.3 %
LYMPHOCYTES # BLD AUTO: 1 10E9/L (ref 0.8–5.3)
LYMPHOCYTES NFR BLD AUTO: 26.2 %
MCH RBC QN AUTO: 34.2 PG (ref 26.5–33)
MCHC RBC AUTO-ENTMCNC: 33 G/DL (ref 31.5–36.5)
MCV RBC AUTO: 103 FL (ref 78–100)
MONOCYTES # BLD AUTO: 0.3 10E9/L (ref 0–1.3)
MONOCYTES NFR BLD AUTO: 7.9 %
NEUTROPHILS # BLD AUTO: 2.5 10E9/L (ref 1.6–8.3)
NEUTROPHILS NFR BLD AUTO: 63.8 %
NRBC # BLD AUTO: 0 10*3/UL
NRBC BLD AUTO-RTO: 0 /100
PLATELET # BLD AUTO: 201 10E9/L (ref 150–450)
POTASSIUM SERPL-SCNC: 4.3 MMOL/L (ref 3.4–5.3)
PROT SERPL-MCNC: 7 G/DL (ref 6.8–8.8)
RBC # BLD AUTO: 3.19 10E12/L (ref 3.8–5.2)
SODIUM SERPL-SCNC: 141 MMOL/L (ref 133–144)
WBC # BLD AUTO: 3.9 10E9/L (ref 4–11)

## 2018-05-14 PROCEDURE — 80053 COMPREHEN METABOLIC PANEL: CPT | Performed by: INTERNAL MEDICINE

## 2018-05-14 PROCEDURE — 85025 COMPLETE CBC W/AUTO DIFF WBC: CPT | Performed by: INTERNAL MEDICINE

## 2018-05-14 PROCEDURE — 25000128 H RX IP 250 OP 636: Performed by: INTERNAL MEDICINE

## 2018-05-14 RX ORDER — HEPARIN SODIUM (PORCINE) LOCK FLUSH IV SOLN 100 UNIT/ML 100 UNIT/ML
5 SOLUTION INTRAVENOUS ONCE
Status: COMPLETED | OUTPATIENT
Start: 2018-05-14 | End: 2018-05-14

## 2018-05-14 RX ADMIN — SODIUM CHLORIDE, PRESERVATIVE FREE 5 ML: 5 INJECTION INTRAVENOUS at 09:23

## 2018-05-14 NOTE — NURSING NOTE
Chief Complaint   Patient presents with     Port Draw     labs drawn from port by RN     Port accessed, labs drawn, flushed with NS & heparin, then de-accessed.  No further visits.  Gardenia Scott RN

## 2018-05-14 NOTE — ORAL ONC MGMT
Oral Chemotherapy Monitoring Program     Placed call to patient in follow up of Xeloda therapy. Lab results are stable and therefore Emelia may start Xeloda cycle tomorrow night as planned. She expressed understanding and agreement with this plan and thanked me for the call.    Angel OsullivanD  [facility]  [phone]  May 14, 2018

## 2018-05-18 ENCOUNTER — RADIANT APPOINTMENT (OUTPATIENT)
Dept: CT IMAGING | Facility: CLINIC | Age: 63
End: 2018-05-18
Attending: INTERNAL MEDICINE
Payer: MEDICARE

## 2018-05-18 DIAGNOSIS — C25.0 MALIGNANT NEOPLASM OF HEAD OF PANCREAS (H): ICD-10-CM

## 2018-05-18 RX ORDER — IOPAMIDOL 755 MG/ML
74 INJECTION, SOLUTION INTRAVASCULAR ONCE
Status: COMPLETED | OUTPATIENT
Start: 2018-05-18 | End: 2018-05-18

## 2018-05-18 RX ORDER — HEPARIN SODIUM (PORCINE) LOCK FLUSH IV SOLN 100 UNIT/ML 100 UNIT/ML
5 SOLUTION INTRAVENOUS ONCE
Status: COMPLETED | OUTPATIENT
Start: 2018-05-18 | End: 2018-05-18

## 2018-05-18 RX ADMIN — HEPARIN SODIUM (PORCINE) LOCK FLUSH IV SOLN 100 UNIT/ML 5 ML: 100 SOLUTION at 09:56

## 2018-05-18 RX ADMIN — IOPAMIDOL 74 ML: 755 INJECTION, SOLUTION INTRAVASCULAR at 09:44

## 2018-05-18 NOTE — NURSING NOTE
Chief Complaint   Patient presents with     Blood Draw     PORT ACCESSED FOR CT BY RN   Vitals done and labs drawn, see flow sheets.  ROSAURA NevesA

## 2018-05-18 NOTE — DISCHARGE INSTRUCTIONS

## 2018-05-21 ENCOUNTER — ONCOLOGY VISIT (OUTPATIENT)
Dept: ONCOLOGY | Facility: CLINIC | Age: 63
End: 2018-05-21
Attending: INTERNAL MEDICINE
Payer: MEDICARE

## 2018-05-21 VITALS
SYSTOLIC BLOOD PRESSURE: 145 MMHG | TEMPERATURE: 97.3 F | WEIGHT: 121.25 LBS | DIASTOLIC BLOOD PRESSURE: 79 MMHG | RESPIRATION RATE: 16 BRPM | HEIGHT: 66 IN | BODY MASS INDEX: 19.49 KG/M2 | HEART RATE: 80 BPM | OXYGEN SATURATION: 99 %

## 2018-05-21 DIAGNOSIS — C25.0 MALIGNANT NEOPLASM OF HEAD OF PANCREAS (H): Primary | ICD-10-CM

## 2018-05-21 PROCEDURE — 99214 OFFICE O/P EST MOD 30 MIN: CPT | Mod: ZP | Performed by: INTERNAL MEDICINE

## 2018-05-21 PROCEDURE — G0463 HOSPITAL OUTPT CLINIC VISIT: HCPCS | Mod: ZF

## 2018-05-21 ASSESSMENT — PAIN SCALES - GENERAL: PAINLEVEL: NO PAIN (0)

## 2018-05-21 NOTE — NURSING NOTE
"Oncology Rooming Note    May 21, 2018 7:15 AM   Emelia Weathers is a 62 year old female who presents for:    Chief Complaint   Patient presents with     Oncology Clinic Visit     Return: Pancreatic CA     Initial Vitals: /79  Pulse 80  Temp 97.3  F (36.3  C) (Oral)  Resp 16  Ht 1.676 m (5' 6\")  Wt 55 kg (121 lb 4 oz)  SpO2 99%  BMI 19.57 kg/m2 Estimated body mass index is 19.57 kg/(m^2) as calculated from the following:    Height as of this encounter: 1.676 m (5' 6\").    Weight as of this encounter: 55 kg (121 lb 4 oz). Body surface area is 1.6 meters squared.  No Pain (0) Comment: Data Unavailable   No LMP recorded. Patient is postmenopausal.  Allergies reviewed: Yes  Medications reviewed: Yes    Medications: MEDICATION REFILLS NEEDED TODAY. Provider was notified.  Creon  Pharmacy name entered into [employer]:    [id number] - [place], [place] - [address] [place] [address] E  [place] PHARMACY [facility] [place], [place] - [address] [facility] PHARMACY [facility] [place], [place] - [address]  [facility] # 10[facility] [place], [place] - [address] [place] [address] [facility] - [place], [place] - [address] [place] [address]    Clinical concerns: No new concerns Dr. Wade was notified.    10 minutes for nursing intake (face to face time)     Dwight Melo CMA              "

## 2018-05-21 NOTE — PROGRESS NOTES
Emelia Weathers is here today in follow-up for locally recurrent pancreatic cancer.    She was originally diagnosed in 2013 with stage IIb disease, had a couple months of neoadjuvant gemcitabine plus Abraxane and then 4 months of adjuvant gemcitabine following her Whipple. Within a few months of completing her adjuvant therapy she began to have changes in the resection bed and mesentery suggestive of recurrence. These grew only very slowly but by November 2014, she had more clear-cut radiographic evidence of progression and had an endoscopic biopsy confirming the presence of recurrent disease. She had a few months of folfirinox with response with poor tolerance, and then xelox until October 2015, and now just single agent capecitabine. She continues to do quite well. She gets occasional mouth sores and some minor erythema and peeling of her palms and soles. None of this interferes with her doing all the things she would like including a recent trip mountain climbing out to [place]. She continues to use to use her pancreatic enzyme replacement, and occasionally has only minor problems with gas or diarrhea. Her weight has been stable. She has no pain. The remainder of a 10 point complete review of systems is otherwise remarkable only for some minor nasal stuffiness and congestion, which she attributes to her usual spring allergies.    On physical exam Mrs. Weathers is quite slender as always but appears robustly healthy. Her vital signs as noted in the chart are unremarkable.  She has no scleral icterus and no visible lesions in the oropharynx.  She has no adenopathy in the neck or supra-clavicular spaces.  Her lungs are clear to auscultation without dullness to percussion.  Her heart rate and rhythm are regular without audible murmur or gallop.  Her abdomen is soft and nontender without palpable mass or organomegaly.  She has no peripheral edema and no tenderness in the thighs. There is no cyanosis or clubbing of her  digits.  Her speech is fluent and her cranial nerves are grossly intact. Her gait and station are unremarkable.    I reviewed with patient and her family her lab work and CT scan. CT scan shows the mild haziness around her mesenteric vessels with some stable adenopathy. There is nothing to suggest progression of her disease. Her electrolytes, renal function and liver enzymes are all normal. Her blood counts are mildly depressed as expected with her chemotherapy.    Assessment/plan:     Locally recurrent pancreatic cancer currently without any evidence of disease progression with ongoing disease control and a normal performance status on single agent capecitabine.     We discussed whether she will take a break from chemotherapy or continue, and she is quite clear that she sees no reason to discontinue her current treatment has she has minimal toxicity and I can't assure her that her disease won't progress as soon as we stopped therapy. Given her long period of stability we will begin to stretch out the intervals between her response assessment. I'll plan on seeing her back in about 4 months with repeat labs and CT scan.

## 2018-05-21 NOTE — MR AVS SNAPSHOT
After Visit Summary   5/21/2018    Emelia Weathers    MRN: [id number]           Patient Information     Date Of Birth          1955        Visit Information        Provider Department      5/21/2018 7:15 AM Yasmany Wade MD [facility]        Today's Diagnoses     Malignant neoplasm of head of pancreas (H)    -  1       Follow-ups after your visit        Follow-up notes from your care team     Return in about 4 months (around 9/21/2018) for MD visit with CT and labs.      Your next 10 appointments already scheduled     Jun 25, 2018  8:45 AM CDT   [facility] Draw with [facility]   [facility] ([facility])    [address]  [address]  [facility] [address]   [phone]            Jul 16, 2018  8:45 AM CDT   [facility] Lab Draw with [facility] DRAW   [facility] ([facility])    [address]  [address]  [facility] [address]   [phone]            Aug 06, 2018  8:45 AM CDT   [facility] Lab Draw with [facility] DRAW   [facility] ([facility])    [address]  [address]  [facility] [address]   [phone]            Aug 27, 2018  8:45 AM CDT   [facility] Lab Draw with [facility] DRAW   [facility] ([facility])    [address]  [address]  [facility] [address]   [phone]            Sep 14, 2018  9:00 AM CDT   [facility] Lab Draw with [facility] DRAW   [facility] Draw ([facility])    [address] [place] [address]  [address]  [facility] [address]   [phone]            Sep 14, 2018  9:40 AM CDT   CT CHEST ABDOMEN PELVIS W/O & W CONTRAST with UCCT2   [facility] [place] [facility] ([facility])    [address]  1st Floor  [facility] [address]   [phone]           Please bring any scans or  X-rays taken at other hospitals, if similar tests were done. Also bring a list of your medicines, including vitamins, minerals and over-the-counter drugs. It is safest to leave personal items at home.  Be sure to tell your doctor:   If you have any allergies.   If there s any chance you are pregnant.   If you are breastfeeding.  How to prepare:   Do not eat or drink for 2 hours before your exam. If you need to take medicine, you may take it with small sips of water. (We may ask you to take liquid medicine as well.)   Please wear loose clothing, such as a sweat suit or jogging clothes. Avoid snaps, zippers and other metal. We may ask you to undress and put on a hospital gown.  Please arrive 30 minutes early for your CT. Once in the department you might be asked to drink water 15-20 minutes prior to your exam.  If indicated you may be asked to drink an oral contrast in advance of your CT.  If this is the case, the imaging team will let you know or be in contact with you prior to your appointment  Patients over 70 or patients with diabetes or kidney problems:   If you haven t had a blood test (creatinine test) within the last 30 days, the Cardiologist/Radiologist may require you to get this test prior to your exam.  If you have diabetes:   Continue to take your metformin medication on the day of your exam  If you have any questions, please call the Imaging Department where you will have your exam.            Sep 17, 2018  7:15 AM CDT   (Arrive by 7:00 AM)   Return Visit with Yasmany Wade MD   [facility] ([facility])    [address] [place] [address]  [address]  [facility] [address] [phone]              Future tests that were ordered for you today     Open Future Orders        Priority Expected Expires Ordered    CT Chest Abdomen Pelvis w/o & w Contrast Routine 9/17/2018 10/21/2018 5/21/2018    Comprehensive metabolic panel Routine 9/17/2018 10/21/2018 5/21/2018  "   CBC with platelets differential Routine 9/17/2018 10/21/2018 5/21/2018            Who to contact     If you have questions or need follow up information about today's clinic visit or your schedule please contact [facility] directly at [phone].  Normal or non-critical lab and imaging results will be communicated to you by OrthoPediactricshart, letter or phone within 4 business days after the clinic has received the results. If you do not hear from us within 7 days, please contact the clinic through OrthoPediactricshart or phone. If you have a critical or abnormal lab result, we will notify you by phone as soon as possible.  Submit refill requests through [employer] or call your pharmacy and they will forward the refill request to us. Please allow 3 business days for your refill to be completed.          Additional Information About Your Visit        [employer] [employer] Information     [employer] gives you secure access to your electronic health record. If you see a primary care provider, you can also send messages to your care team and make appointments. If you have questions, please call your primary care clinic.  If you do not have a primary care provider, please call [phone] and they will assist you.        Care EveryWhere ID     This is your Care EveryWhere ID. This could be used by other organizations to access your [facility] medical records  [id number]        Your Vitals Were     Pulse Temperature Respirations Height Pulse Oximetry BMI (Body Mass Index)    80 97.3  F (36.3  C) (Oral) 16 1.676 m (5' 6\") 99% 19.57 kg/m2       Blood Pressure from Last 3 Encounters:   05/21/18 145/79   02/19/18 139/83   11/20/17 132/83    Weight from Last 3 Encounters:   05/21/18 55 kg (121 lb 4 oz)   02/19/18 55.5 kg (122 lb 6.4 oz)   11/20/17 54.3 kg (119 lb 9.6 oz)                 Today's Medication Changes          These changes are accurate as of 5/21/18  8:13 AM.  If you have any questions, ask your nurse or doctor.               Start " taking these medicines.        Dose/Directions    magic mouthwash suspension   Commonly known as:  ENTER INGREDIENTS IN COMMENTS   Used for:  Malignant neoplasm of head of pancreas (H)   Started by:  Yasmany Wade MD        As directed   Quantity:  250 mL   Refills:  11         These medicines have changed or have updated prescriptions.        Dose/Directions    amylase-lipase-protease 89854 units Cpep   Commonly known as:  CREON 12   This may have changed:  how much to take   Used for:  Malignant neoplasm of head of pancreas (H)   Changed by:  Yasmany Wade MD        Dose:  3 capsule   Take 3 capsules (36,000 Units) by mouth 3 times daily (with meals)   Quantity:  540 capsule   Refills:  3       * capecitabine 500 MG tablet CHEMO   Commonly known as:  XELODA   This may have changed:  Another medication with the same name was removed. Continue taking this medication, and follow the directions you see here.   Used for:  Malignant neoplasm of head of pancreas (H)   Changed by:  Yasmany Wade MD        Take 2 tabs in the AM and 1 tab in the PM, Days 1 through 14, then off for 7 days. Take within 30 mins after meal.   Quantity:  42 tablet   Refills:  3       * capecitabine 500 MG tablet CHEMO   Commonly known as:  XELODA   This may have changed:  Another medication with the same name was removed. Continue taking this medication, and follow the directions you see here.   Used for:  Malignant neoplasm of head of pancreas (H)   Changed by:  Yasmany Wdae MD        Take 2 tabs in the AM and 1 tab in the PM, Days 1 through 14, then off for 7 days. Take within 30 mins after meal.   Quantity:  42 tablet   Refills:  3       * Notice:  This list has 2 medication(s) that are the same as other medications prescribed for you. Read the directions carefully, and ask your doctor or other care provider to review them with you.         Where to get your medicines      These medications were sent  to [id number] - [place], [place] - [address]  [address], [facility] [address]     Phone:  [phone]     amylase-lipase-protease 31866 units Cpep         Some of these will need a paper prescription and others can be bought over the counter.  Ask your nurse if you have questions.     Bring a paper prescription for each of these medications     magic mouthwash suspension                Primary Care Provider Office Phone # Fax #    Benny Garrett [phone]       [address] [place] [address]  [facility] [address]        Equal Access to Services     [facility]: Hadii aad ku hadasho Soomaali, waaxda luqadaha, qaybta kaalmada adeegyada, rich perez . So [facility] [phone].    ATENCIÓN: Si habla español, tiene a hull disposición servicios gratuitos de asistencia lingüística. [facility] [phone].    We comply with applicable federal civil rights laws and [place] laws. We do not discriminate on the basis of race, color, national origin, age, disability, sex, sexual orientation, or gender identity.            Thank you!     Thank you for choosing [facility]  for your care. Our goal is always to provide you with excellent care. Hearing back from our patients is one way we can continue to improve our services. Please take a few minutes to complete the written survey that you may receive in the mail after your visit with us. Thank you!             Your Updated Medication List - Protect others around you: Learn how to safely use, store and throw away your medicines at www.disposemymeds.org.          This list is accurate as of 5/21/18  8:13 AM.  Always use your most recent med list.                   Brand Name Dispense Instructions for use Diagnosis    amylase-lipase-protease 55078 units Cpep    CREON 12    540 capsule    Take 3 capsules (36,000 Units) by mouth 3 times daily (with meals)    Malignant neoplasm of head of pancreas (H)       CALCIUM  CITRATE + D PO      2 tablets daily        * capecitabine 500 MG tablet CHEMO    XELODA    42 tablet    Take 2 tabs in the AM and 1 tab in the PM, Days 1 through 14, then off for 7 days. Take within 30 mins after meal.    Malignant neoplasm of head of pancreas (H)       * capecitabine 500 MG tablet CHEMO    XELODA    42 tablet    Take 2 tabs in the AM and 1 tab in the PM, Days 1 through 14, then off for 7 days. Take within 30 mins after meal.    Malignant neoplasm of head of pancreas (H)       diphenoxylate-atropine 2.5-0.025 MG per tablet    LOMOTIL    100 tablet    Take 1-2 tablets by mouth 4 times daily as needed for diarrhea    Malignant neoplasm of head of pancreas (H), Diarrhea       lidocaine-prilocaine cream    EMLA    30 g    Apply topically as needed for moderate pain    Malignant neoplasm of head of pancreas (H)       magic mouthwash suspension    ENTER INGREDIENTS IN COMMENTS    250 mL    As directed    Malignant neoplasm of head of pancreas (H)       MELATONIN PO      Take 3 mg by mouth At Bedtime        MULTIVITAMIN TABS   OR      1 TABLET DAILY        ondansetron 8 MG tablet    ZOFRAN    30 tablet    Take 1 tablet (8 mg) by mouth every 8 hours as needed for nausea    Malignant neoplasm of head of pancreas (H)       pantoprazole 40 MG EC tablet    PROTONIX    90 tablet    Take 1 tablet (40 mg) by mouth every morning (before breakfast)    Malignant neoplasm of head of pancreas (H)       TYLENOL 325 MG tablet   Generic drug:  acetaminophen      Take 500 mg by mouth every 6 hours as needed        VITAMIN C PO      Take 500 mg by mouth daily.        * Notice:  This list has 2 medication(s) that are the same as other medications prescribed for you. Read the directions carefully, and ask your doctor or other care provider to review them with you.

## 2018-05-21 NOTE — LETTER
5/21/2018      RE: Emelia Weathers  [address]  [place] [facility] [address]       Emelia Weathers is here today in follow-up for locally recurrent pancreatic cancer.    She was originally diagnosed in 2013 with stage IIb disease, had a couple months of neoadjuvant gemcitabine plus Abraxane and then 4 months of adjuvant gemcitabine following her Whipple. Within a few months of completing her adjuvant therapy she began to have changes in the resection bed and mesentery suggestive of recurrence. These grew only very slowly but by November 2014, she had more clear-cut radiographic evidence of progression and had an endoscopic biopsy confirming the presence of recurrent disease. She had a few months of folfirinox with response with poor tolerance, and then xelox until October 2015, and now just single agent capecitabine. She continues to do quite well. She gets occasional mouth sores and some minor erythema and peeling of her palms and soles. None of this interferes with her doing all the things she would like including a recent trip mountain climbing out to [place]. She continues to use to use her pancreatic enzyme replacement, and occasionally has only minor problems with gas or diarrhea. Her weight has been stable. She has no pain. The remainder of a 10 point complete review of systems is otherwise remarkable only for some minor nasal stuffiness and congestion, which she attributes to her usual spring allergies.    On physical exam Mrs. Weathers is quite slender as always but appears robustly healthy. Her vital signs as noted in the chart are unremarkable.  She has no scleral icterus and no visible lesions in the oropharynx.  She has no adenopathy in the neck or supra-clavicular spaces.  Her lungs are clear to auscultation without dullness to percussion.  Her heart rate and rhythm are regular without audible murmur or gallop.  Her abdomen is soft and nontender without palpable mass or organomegaly.  She has no peripheral  edema and no tenderness in the thighs. There is no cyanosis or clubbing of her digits.  Her speech is fluent and her cranial nerves are grossly intact. Her gait and station are unremarkable.    I reviewed with patient and her family her lab work and CT scan. CT scan shows the mild haziness around her mesenteric vessels with some stable adenopathy. There is nothing to suggest progression of her disease. Her electrolytes, renal function and liver enzymes are all normal. Her blood counts are mildly depressed as expected with her chemotherapy.    Assessment/plan:     Locally recurrent pancreatic cancer currently without any evidence of disease progression with ongoing disease control and a normal performance status on single agent capecitabine.     We discussed whether she will take a break from chemotherapy or continue, and she is quite clear that she sees no reason to discontinue her current treatment has she has minimal toxicity and I can't assure her that her disease won't progress as soon as we stopped therapy. Given her long period of stability we will begin to stretch out the intervals between her response assessment. I'll plan on seeing her back in about 4 months with repeat labs and CT scan.    Yasmany Wade MD

## 2018-06-04 DIAGNOSIS — C25.0 MALIGNANT NEOPLASM OF HEAD OF PANCREAS (H): ICD-10-CM

## 2018-06-04 LAB
ALBUMIN SERPL-MCNC: 3.5 G/DL (ref 3.4–5)
ALP SERPL-CCNC: 135 U/L (ref 40–150)
ALT SERPL W P-5'-P-CCNC: 33 U/L (ref 0–50)
ANION GAP SERPL CALCULATED.3IONS-SCNC: 9 MMOL/L (ref 3–14)
AST SERPL W P-5'-P-CCNC: 34 U/L (ref 0–45)
BASOPHILS # BLD AUTO: 0 10E9/L (ref 0–0.2)
BASOPHILS NFR BLD AUTO: 0.4 %
BILIRUB SERPL-MCNC: 0.5 MG/DL (ref 0.2–1.3)
BUN SERPL-MCNC: 9 MG/DL (ref 7–30)
CALCIUM SERPL-MCNC: 8.6 MG/DL (ref 8.5–10.1)
CHLORIDE SERPL-SCNC: 109 MMOL/L (ref 94–109)
CO2 SERPL-SCNC: 22 MMOL/L (ref 20–32)
CREAT SERPL-MCNC: 0.75 MG/DL (ref 0.52–1.04)
DIFFERENTIAL METHOD BLD: ABNORMAL
EOSINOPHIL # BLD AUTO: 0.1 10E9/L (ref 0–0.7)
EOSINOPHIL NFR BLD AUTO: 1.8 %
ERYTHROCYTE [DISTWIDTH] IN BLOOD BY AUTOMATED COUNT: 17.2 % (ref 10–15)
GFR SERPL CREATININE-BSD FRML MDRD: 78 ML/MIN/1.7M2
GLUCOSE SERPL-MCNC: 102 MG/DL (ref 70–99)
HCT VFR BLD AUTO: 34.2 % (ref 35–47)
HGB BLD-MCNC: 11.3 G/DL (ref 11.7–15.7)
IMM GRANULOCYTES # BLD: 0 10E9/L (ref 0–0.4)
IMM GRANULOCYTES NFR BLD: 0.2 %
LYMPHOCYTES # BLD AUTO: 1.1 10E9/L (ref 0.8–5.3)
LYMPHOCYTES NFR BLD AUTO: 22.3 %
MCH RBC QN AUTO: 34.5 PG (ref 26.5–33)
MCHC RBC AUTO-ENTMCNC: 33 G/DL (ref 31.5–36.5)
MCV RBC AUTO: 104 FL (ref 78–100)
MONOCYTES # BLD AUTO: 0.4 10E9/L (ref 0–1.3)
MONOCYTES NFR BLD AUTO: 8.5 %
NEUTROPHILS # BLD AUTO: 3.3 10E9/L (ref 1.6–8.3)
NEUTROPHILS NFR BLD AUTO: 66.8 %
NRBC # BLD AUTO: 0 10*3/UL
NRBC BLD AUTO-RTO: 0 /100
PLATELET # BLD AUTO: 232 10E9/L (ref 150–450)
POTASSIUM SERPL-SCNC: 4.3 MMOL/L (ref 3.4–5.3)
PROT SERPL-MCNC: 7 G/DL (ref 6.8–8.8)
RBC # BLD AUTO: 3.28 10E12/L (ref 3.8–5.2)
SODIUM SERPL-SCNC: 140 MMOL/L (ref 133–144)
WBC # BLD AUTO: 5 10E9/L (ref 4–11)

## 2018-06-04 PROCEDURE — 25000128 H RX IP 250 OP 636: Performed by: INTERNAL MEDICINE

## 2018-06-04 PROCEDURE — 85025 COMPLETE CBC W/AUTO DIFF WBC: CPT | Performed by: INTERNAL MEDICINE

## 2018-06-04 PROCEDURE — 80053 COMPREHEN METABOLIC PANEL: CPT | Performed by: INTERNAL MEDICINE

## 2018-06-04 RX ORDER — HEPARIN SODIUM (PORCINE) LOCK FLUSH IV SOLN 100 UNIT/ML 100 UNIT/ML
5 SOLUTION INTRAVENOUS ONCE
Status: COMPLETED | OUTPATIENT
Start: 2018-06-04 | End: 2018-06-04

## 2018-06-04 RX ADMIN — SODIUM CHLORIDE, PRESERVATIVE FREE 5 ML: 5 INJECTION INTRAVENOUS at 08:54

## 2018-06-25 DIAGNOSIS — C25.0 MALIGNANT NEOPLASM OF HEAD OF PANCREAS (H): Primary | ICD-10-CM

## 2018-06-25 LAB
ALBUMIN SERPL-MCNC: 3.7 G/DL (ref 3.4–5)
ALP SERPL-CCNC: 168 U/L (ref 40–150)
ALT SERPL W P-5'-P-CCNC: 35 U/L (ref 0–50)
ANION GAP SERPL CALCULATED.3IONS-SCNC: 7 MMOL/L (ref 3–14)
AST SERPL W P-5'-P-CCNC: 37 U/L (ref 0–45)
BASOPHILS # BLD AUTO: 0 10E9/L (ref 0–0.2)
BASOPHILS NFR BLD AUTO: 0.4 %
BILIRUB SERPL-MCNC: 0.4 MG/DL (ref 0.2–1.3)
BUN SERPL-MCNC: 9 MG/DL (ref 7–30)
CALCIUM SERPL-MCNC: 9 MG/DL (ref 8.5–10.1)
CHLORIDE SERPL-SCNC: 108 MMOL/L (ref 94–109)
CO2 SERPL-SCNC: 27 MMOL/L (ref 20–32)
CREAT SERPL-MCNC: 0.72 MG/DL (ref 0.52–1.04)
DIFFERENTIAL METHOD BLD: ABNORMAL
EOSINOPHIL # BLD AUTO: 0.1 10E9/L (ref 0–0.7)
EOSINOPHIL NFR BLD AUTO: 1.2 %
ERYTHROCYTE [DISTWIDTH] IN BLOOD BY AUTOMATED COUNT: 17.3 % (ref 10–15)
GFR SERPL CREATININE-BSD FRML MDRD: 81 ML/MIN/1.7M2
GLUCOSE SERPL-MCNC: 89 MG/DL (ref 70–99)
HCT VFR BLD AUTO: 35.6 % (ref 35–47)
HGB BLD-MCNC: 11.5 G/DL (ref 11.7–15.7)
IMM GRANULOCYTES # BLD: 0 10E9/L (ref 0–0.4)
IMM GRANULOCYTES NFR BLD: 0.3 %
LYMPHOCYTES # BLD AUTO: 1.1 10E9/L (ref 0.8–5.3)
LYMPHOCYTES NFR BLD AUTO: 15.6 %
MCH RBC QN AUTO: 33.8 PG (ref 26.5–33)
MCHC RBC AUTO-ENTMCNC: 32.3 G/DL (ref 31.5–36.5)
MCV RBC AUTO: 105 FL (ref 78–100)
MONOCYTES # BLD AUTO: 0.5 10E9/L (ref 0–1.3)
MONOCYTES NFR BLD AUTO: 7.2 %
NEUTROPHILS # BLD AUTO: 5.1 10E9/L (ref 1.6–8.3)
NEUTROPHILS NFR BLD AUTO: 75.3 %
NRBC # BLD AUTO: 0 10*3/UL
NRBC BLD AUTO-RTO: 0 /100
PLATELET # BLD AUTO: 228 10E9/L (ref 150–450)
POTASSIUM SERPL-SCNC: 4 MMOL/L (ref 3.4–5.3)
PROT SERPL-MCNC: 7.3 G/DL (ref 6.8–8.8)
RBC # BLD AUTO: 3.4 10E12/L (ref 3.8–5.2)
SODIUM SERPL-SCNC: 142 MMOL/L (ref 133–144)
WBC # BLD AUTO: 6.8 10E9/L (ref 4–11)

## 2018-06-25 PROCEDURE — 85025 COMPLETE CBC W/AUTO DIFF WBC: CPT | Performed by: INTERNAL MEDICINE

## 2018-06-25 PROCEDURE — 80053 COMPREHEN METABOLIC PANEL: CPT | Performed by: INTERNAL MEDICINE

## 2018-06-25 PROCEDURE — 25000128 H RX IP 250 OP 636: Performed by: INTERNAL MEDICINE

## 2018-06-25 RX ORDER — HEPARIN SODIUM (PORCINE) LOCK FLUSH IV SOLN 100 UNIT/ML 100 UNIT/ML
5 SOLUTION INTRAVENOUS EVERY 8 HOURS
Status: DISCONTINUED | OUTPATIENT
Start: 2018-06-25 | End: 2018-07-03 | Stop reason: HOSPADM

## 2018-06-25 RX ADMIN — SODIUM CHLORIDE, PRESERVATIVE FREE 5 ML: 5 INJECTION INTRAVENOUS at 09:43

## 2018-07-12 DIAGNOSIS — C25.0 MALIGNANT NEOPLASM OF HEAD OF PANCREAS (H): Primary | ICD-10-CM

## 2018-07-16 DIAGNOSIS — C25.0 MALIGNANT NEOPLASM OF HEAD OF PANCREAS (H): ICD-10-CM

## 2018-07-16 LAB
ALBUMIN SERPL-MCNC: 3.6 G/DL (ref 3.4–5)
ALP SERPL-CCNC: 138 U/L (ref 40–150)
ALT SERPL W P-5'-P-CCNC: 39 U/L (ref 0–50)
ANION GAP SERPL CALCULATED.3IONS-SCNC: 5 MMOL/L (ref 3–14)
AST SERPL W P-5'-P-CCNC: 42 U/L (ref 0–45)
BASOPHILS # BLD AUTO: 0 10E9/L (ref 0–0.2)
BASOPHILS NFR BLD AUTO: 0.2 %
BILIRUB SERPL-MCNC: 0.5 MG/DL (ref 0.2–1.3)
BUN SERPL-MCNC: 11 MG/DL (ref 7–30)
CALCIUM SERPL-MCNC: 8.5 MG/DL (ref 8.5–10.1)
CHLORIDE SERPL-SCNC: 108 MMOL/L (ref 94–109)
CO2 SERPL-SCNC: 27 MMOL/L (ref 20–32)
CREAT SERPL-MCNC: 0.69 MG/DL (ref 0.52–1.04)
DIFFERENTIAL METHOD BLD: ABNORMAL
EOSINOPHIL # BLD AUTO: 0.1 10E9/L (ref 0–0.7)
EOSINOPHIL NFR BLD AUTO: 1.7 %
ERYTHROCYTE [DISTWIDTH] IN BLOOD BY AUTOMATED COUNT: 17.2 % (ref 10–15)
GFR SERPL CREATININE-BSD FRML MDRD: 85 ML/MIN/1.7M2
GLUCOSE SERPL-MCNC: 95 MG/DL (ref 70–99)
HCT VFR BLD AUTO: 34.4 % (ref 35–47)
HGB BLD-MCNC: 11.3 G/DL (ref 11.7–15.7)
IMM GRANULOCYTES # BLD: 0 10E9/L (ref 0–0.4)
IMM GRANULOCYTES NFR BLD: 0.2 %
LYMPHOCYTES # BLD AUTO: 1 10E9/L (ref 0.8–5.3)
LYMPHOCYTES NFR BLD AUTO: 21.8 %
MCH RBC QN AUTO: 34.3 PG (ref 26.5–33)
MCHC RBC AUTO-ENTMCNC: 32.8 G/DL (ref 31.5–36.5)
MCV RBC AUTO: 105 FL (ref 78–100)
MONOCYTES # BLD AUTO: 0.4 10E9/L (ref 0–1.3)
MONOCYTES NFR BLD AUTO: 9.2 %
NEUTROPHILS # BLD AUTO: 3.1 10E9/L (ref 1.6–8.3)
NEUTROPHILS NFR BLD AUTO: 66.9 %
NRBC # BLD AUTO: 0 10*3/UL
NRBC BLD AUTO-RTO: 0 /100
PLATELET # BLD AUTO: 194 10E9/L (ref 150–450)
POTASSIUM SERPL-SCNC: 4.3 MMOL/L (ref 3.4–5.3)
PROT SERPL-MCNC: 7.1 G/DL (ref 6.8–8.8)
RBC # BLD AUTO: 3.29 10E12/L (ref 3.8–5.2)
SODIUM SERPL-SCNC: 140 MMOL/L (ref 133–144)
WBC # BLD AUTO: 4.6 10E9/L (ref 4–11)

## 2018-07-16 PROCEDURE — 85025 COMPLETE CBC W/AUTO DIFF WBC: CPT | Performed by: INTERNAL MEDICINE

## 2018-07-16 PROCEDURE — 80053 COMPREHEN METABOLIC PANEL: CPT | Performed by: INTERNAL MEDICINE

## 2018-07-16 PROCEDURE — 25000128 H RX IP 250 OP 636: Performed by: INTERNAL MEDICINE

## 2018-07-16 RX ORDER — HEPARIN SODIUM (PORCINE) LOCK FLUSH IV SOLN 100 UNIT/ML 100 UNIT/ML
5 SOLUTION INTRAVENOUS EVERY 8 HOURS PRN
Status: DISCONTINUED | OUTPATIENT
Start: 2018-07-16 | End: 2018-07-24 | Stop reason: HOSPADM

## 2018-07-16 RX ADMIN — SODIUM CHLORIDE, PRESERVATIVE FREE 5 ML: 5 INJECTION INTRAVENOUS at 09:38

## 2018-07-16 NOTE — NURSING NOTE
Chief Complaint   Patient presents with     Port Draw     Labs drawn via port by RN. Line flushed and hep locked, then de-accessed. VS taken.     Shu Fajardo RN

## 2018-07-23 ENCOUNTER — TELEPHONE (OUTPATIENT)
Dept: ONCOLOGY | Facility: CLINIC | Age: 63
End: 2018-07-23

## 2018-07-23 NOTE — ORAL ONC MGMT
Oral Chemotherapy Monitoring Program   Placed call to patient in follow up of Xeloda (capecitabine) therapy.   Left message requesting call back on 7/19, 7/23, and 7/27.   No drug names were mentioned. Schedule next follow up calls in 3 months.     Medication possession ratio (MPR) = 1.01 with 17 fills over the past 12 months, refill history appropriate for q3week cycle. Last filled at [facility] pharmacy and no refills remaining on Rx. Next Rx release 7/31/2018. Last cycle start date 7/17. Next cycle start is 8/7.       Thank you for the opportunity to participate in the care of the above patient,  Didier Buckner, PharmD  Therapy Management Oncology Pharmacist  [facility]  [phone]

## 2018-07-27 ENCOUNTER — TELEPHONE (OUTPATIENT)
Dept: ONCOLOGY | Facility: CLINIC | Age: 63
End: 2018-07-27

## 2018-07-27 NOTE — ORAL ONC MGMT
Oral Chemotherapy Monitoring Program    Primary Oncologist: Dr. Wade  Primary Oncology Clinic: [facility]  Cancer Diagnosis: Pancreatic      Therapy History: Patient verbally confirmed capecitabine 500mg- 2 tablets (1000mg) every morning and 1 tablet (500mg) every evening days 1 to 14, then 7 days off  Xeloda alone started February 2015.      Drug Interaction Assessment: No new drug interactions.     Lab Monitoring Plan    Subjective/Objective:  Emelia Weathers is a 62 year old female contacted by phone for a follow-up visit for oral chemotherapy. She discussed sun burn blisters on her lips this month. Discussed using lip-balm with sun screen and reapply during the day. Encouraged increased water intake during summer months. Advised her to discard the magic mouth wash she has since it will not help her lips and is over 3-years-old. Emelia reports she has continued to do well on capecitabine, denies side effects and medication changes. No nausea, vomiting, constipation, diarrhea, oral mucositis, myalgias, fatigue, HFS, and other side effects. She continues to stay active and just returned from a 2-mile walk this morning. She likes to continue to  her medication at site 19 ([facility]) following labs. She is on her second week today and her next cycle will begin 8/7/2018. She verbally confirmed her dose and takes her medication after meals. Her Medication possession ratio (MPR) = 1.01 with 17 fills over the past 12 months, refill history appropriate for q3week cycle. Patient is adherent and reports not missing a dose in over 3 years.     ORAL CHEMOTHERAPY 2/20/2018 3/13/2018 4/3/2018 4/24/2018 5/8/2018 5/14/2018 7/27/2018   Drug Name Xeloda (Capecitabine) Xeloda (Capecitabine) Xeloda (Capecitabine) Xeloda (Capecitabine) Xeloda (Capecitabine) Xeloda (Capecitabine) Xeloda (Capecitabine)   Current Dosage Other Other Other Other Other - Other   Current Schedule BID BID BID BID BID - BID   Cycle Details 2 weeks  "on 1 week off 2 weeks on 1 week off 2 weeks on 1 week off 2 weeks on 1 week off 2 weeks on 1 week off - 2 weeks on 1 week off   Start Date of Last Cycle 1/30/2018 2/20/2018 3/13/2018 4/3/2018 4/24/2018 - 7/17/2018   Planned next cycle start date 2/20/2018 3/13/2018 4/3/2018 4/24/2018 5/15/2018 5/15/2018 8/7/2018   Doses missed in last 2 weeks - - - - - - 0   Adherence Assessment - - - - - - Adherent   Adverse Effects - - - - - - No AE identified during assessment   Palmar-plantar Erythrodysethesia syndrome[hand-foot syndrome] - - - - - - -   Home BPs - - - - - - not needed   Any new drug interactions? - - - - - - No   Is the dose as ordered appropriate for the patient? - - - - - - Yes   Is the patient currently in pain? - - - - - - No   Has the patient been assessed within the past 6 months for depression? - - - - - - Yes   Has the patient missed any days of school, work, or other routine activity? - - - - - - No       Last PHQ-2 Score on record:   PHQ-2 ( 1999 [employer]) 3/6/2017 12/5/2016   Q1: Little interest or pleasure in doing things 0 0   Q2: Feeling down, depressed or hopeless 0 0   PHQ-2 Score 0 0       Patient does not report depression symptoms.      Vitals:  BP:   BP Readings from Last 1 Encounters:   05/21/18 145/79     Wt Readings from Last 1 Encounters:   05/21/18 55 kg (121 lb 4 oz)     Estimated body surface area is 1.6 meters squared as calculated from the following:    Height as of 5/21/18: 1.676 m (5' 6\").    Weight as of 5/21/18: 55 kg (121 lb 4 oz).    Labs:  _  Result Component Current Result Ref Range   Sodium 140 (7/16/2018) 133 - 144 mmol/L     _  Result Component Current Result Ref Range   Potassium 4.3 (7/16/2018) 3.4 - 5.3 mmol/L     _  Result Component Current Result Ref Range   Calcium 8.5 (7/16/2018) 8.5 - 10.1 mg/dL     No results found for Mag within last 30 days.     No results found for Phos within last 30 days.     _  Result Component Current Result Ref Range   Albumin 3.6 " (7/16/2018) 3.4 - 5.0 g/dL     _  Result Component Current Result Ref Range   Urea Nitrogen 11 (7/16/2018) 7 - 30 mg/dL     _  Result Component Current Result Ref Range   Creatinine 0.69 (7/16/2018) 0.52 - 1.04 mg/dL       _  Result Component Current Result Ref Range   AST 42 (7/16/2018) 0 - 45 U/L     _  Result Component Current Result Ref Range   ALT 39 (7/16/2018) 0 - 50 U/L     _  Result Component Current Result Ref Range   Bilirubin Total 0.5 (7/16/2018) 0.2 - 1.3 mg/dL       _  Result Component Current Result Ref Range   WBC 4.6 (7/16/2018) 4.0 - 11.0 10e9/L     _  Result Component Current Result Ref Range   Hemoglobin 11.3 (L) (7/16/2018) 11.7 - 15.7 g/dL     _  Result Component Current Result Ref Range   Platelet Count 194 (7/16/2018) 150 - 450 10e9/L     _  Result Component Current Result Ref Range   Absolute Neutrophil 3.1 (7/16/2018) 1.6 - 8.3 10e9/L           Assessment:  Emelia is tolerating capecitabine well with no side effects.     Plan:  Continue capecitabine 1000mg qAM and 500mg qPM x 14 days then 7 days off.      Follow-Up:  Labs 8/6/2018.  Labs and radiology 9/14/2018.  Dr. Wade clinic appointment 9/17/2018.        Refill Due:  Emelia will  refill at site 19 on 8/6/2018.  Next refill released scheduled for 7/31/2018.       Thank you for the opportunity to participate in the care of the above patient,  Didier Buckner, PharmD  Therapy Management Oncology Pharmacist  [facility]  [phone]

## 2018-07-31 DIAGNOSIS — C25.0 MALIGNANT NEOPLASM OF HEAD OF PANCREAS (H): Primary | ICD-10-CM

## 2018-07-31 RX ORDER — CAPECITABINE 500 MG/1
TABLET, FILM COATED ORAL
Qty: 42 TABLET | Refills: 3 | Status: SHIPPED | OUTPATIENT
Start: 2018-07-31 | End: 2019-04-08

## 2018-08-06 DIAGNOSIS — C25.0 MALIGNANT NEOPLASM OF HEAD OF PANCREAS (H): Primary | ICD-10-CM

## 2018-08-06 LAB
ALBUMIN SERPL-MCNC: 3.4 G/DL (ref 3.4–5)
ALP SERPL-CCNC: 136 U/L (ref 40–150)
ALT SERPL W P-5'-P-CCNC: 30 U/L (ref 0–50)
ANION GAP SERPL CALCULATED.3IONS-SCNC: 9 MMOL/L (ref 3–14)
AST SERPL W P-5'-P-CCNC: 28 U/L (ref 0–45)
BASOPHILS # BLD AUTO: 0 10E9/L (ref 0–0.2)
BASOPHILS NFR BLD AUTO: 0.5 %
BILIRUB SERPL-MCNC: 0.5 MG/DL (ref 0.2–1.3)
BUN SERPL-MCNC: 7 MG/DL (ref 7–30)
CALCIUM SERPL-MCNC: 8.6 MG/DL (ref 8.5–10.1)
CHLORIDE SERPL-SCNC: 109 MMOL/L (ref 94–109)
CO2 SERPL-SCNC: 24 MMOL/L (ref 20–32)
CREAT SERPL-MCNC: 0.68 MG/DL (ref 0.52–1.04)
DIFFERENTIAL METHOD BLD: ABNORMAL
EOSINOPHIL # BLD AUTO: 0.1 10E9/L (ref 0–0.7)
EOSINOPHIL NFR BLD AUTO: 1.4 %
ERYTHROCYTE [DISTWIDTH] IN BLOOD BY AUTOMATED COUNT: 17.3 % (ref 10–15)
GFR SERPL CREATININE-BSD FRML MDRD: 87 ML/MIN/1.7M2
GLUCOSE SERPL-MCNC: 96 MG/DL (ref 70–99)
HCT VFR BLD AUTO: 32.8 % (ref 35–47)
HGB BLD-MCNC: 10.8 G/DL (ref 11.7–15.7)
IMM GRANULOCYTES # BLD: 0 10E9/L (ref 0–0.4)
IMM GRANULOCYTES NFR BLD: 0.2 %
LYMPHOCYTES # BLD AUTO: 1.1 10E9/L (ref 0.8–5.3)
LYMPHOCYTES NFR BLD AUTO: 25.4 %
MCH RBC QN AUTO: 34.5 PG (ref 26.5–33)
MCHC RBC AUTO-ENTMCNC: 32.9 G/DL (ref 31.5–36.5)
MCV RBC AUTO: 105 FL (ref 78–100)
MONOCYTES # BLD AUTO: 0.3 10E9/L (ref 0–1.3)
MONOCYTES NFR BLD AUTO: 7.9 %
NEUTROPHILS # BLD AUTO: 2.7 10E9/L (ref 1.6–8.3)
NEUTROPHILS NFR BLD AUTO: 64.6 %
NRBC # BLD AUTO: 0 10*3/UL
NRBC BLD AUTO-RTO: 0 /100
PLATELET # BLD AUTO: 183 10E9/L (ref 150–450)
POTASSIUM SERPL-SCNC: 3.9 MMOL/L (ref 3.4–5.3)
PROT SERPL-MCNC: 6.7 G/DL (ref 6.8–8.8)
RBC # BLD AUTO: 3.13 10E12/L (ref 3.8–5.2)
SODIUM SERPL-SCNC: 142 MMOL/L (ref 133–144)
WBC # BLD AUTO: 4.2 10E9/L (ref 4–11)

## 2018-08-06 PROCEDURE — 80053 COMPREHEN METABOLIC PANEL: CPT | Performed by: INTERNAL MEDICINE

## 2018-08-06 PROCEDURE — 85025 COMPLETE CBC W/AUTO DIFF WBC: CPT | Performed by: INTERNAL MEDICINE

## 2018-08-06 PROCEDURE — 25000128 H RX IP 250 OP 636: Performed by: INTERNAL MEDICINE

## 2018-08-06 RX ORDER — HEPARIN SODIUM (PORCINE) LOCK FLUSH IV SOLN 100 UNIT/ML 100 UNIT/ML
5 SOLUTION INTRAVENOUS ONCE
Status: COMPLETED | OUTPATIENT
Start: 2018-08-06 | End: 2018-08-06

## 2018-08-06 RX ADMIN — SODIUM CHLORIDE, PRESERVATIVE FREE 5 ML: 5 INJECTION INTRAVENOUS at 09:07

## 2018-08-06 NOTE — NURSING NOTE
"Chief Complaint   Patient presents with     Port Draw     port accessed and labs drawn by rn.      Port accessed with 20g 3/4\" gripper needle and labs drawn by RN.  Port flushed with saline and heparin.  Port de-accessed.    Meghna Leonardo RN    "

## 2018-08-27 DIAGNOSIS — C25.0 MALIGNANT NEOPLASM OF HEAD OF PANCREAS (H): ICD-10-CM

## 2018-08-27 LAB
BASOPHILS # BLD AUTO: 0 10E9/L (ref 0–0.2)
BASOPHILS NFR BLD AUTO: 0.4 %
DIFFERENTIAL METHOD BLD: ABNORMAL
EOSINOPHIL # BLD AUTO: 0.1 10E9/L (ref 0–0.7)
EOSINOPHIL NFR BLD AUTO: 1.8 %
ERYTHROCYTE [DISTWIDTH] IN BLOOD BY AUTOMATED COUNT: 17.2 % (ref 10–15)
HCT VFR BLD AUTO: 35.1 % (ref 35–47)
HGB BLD-MCNC: 11.6 G/DL (ref 11.7–15.7)
IMM GRANULOCYTES # BLD: 0 10E9/L (ref 0–0.4)
IMM GRANULOCYTES NFR BLD: 0.4 %
LYMPHOCYTES # BLD AUTO: 1 10E9/L (ref 0.8–5.3)
LYMPHOCYTES NFR BLD AUTO: 22.9 %
MCH RBC QN AUTO: 34.1 PG (ref 26.5–33)
MCHC RBC AUTO-ENTMCNC: 33 G/DL (ref 31.5–36.5)
MCV RBC AUTO: 103 FL (ref 78–100)
MONOCYTES # BLD AUTO: 0.4 10E9/L (ref 0–1.3)
MONOCYTES NFR BLD AUTO: 9.7 %
NEUTROPHILS # BLD AUTO: 3 10E9/L (ref 1.6–8.3)
NEUTROPHILS NFR BLD AUTO: 64.8 %
NRBC # BLD AUTO: 0 10*3/UL
NRBC BLD AUTO-RTO: 0 /100
PLATELET # BLD AUTO: 192 10E9/L (ref 150–450)
RBC # BLD AUTO: 3.4 10E12/L (ref 3.8–5.2)
WBC # BLD AUTO: 4.6 10E9/L (ref 4–11)

## 2018-08-27 PROCEDURE — 85025 COMPLETE CBC W/AUTO DIFF WBC: CPT | Performed by: INTERNAL MEDICINE

## 2018-08-27 PROCEDURE — 25000128 H RX IP 250 OP 636: Performed by: INTERNAL MEDICINE

## 2018-08-27 RX ORDER — HEPARIN SODIUM (PORCINE) LOCK FLUSH IV SOLN 100 UNIT/ML 100 UNIT/ML
5 SOLUTION INTRAVENOUS ONCE
Status: COMPLETED | OUTPATIENT
Start: 2018-08-27 | End: 2018-08-27

## 2018-08-27 RX ADMIN — SODIUM CHLORIDE, PRESERVATIVE FREE 5 ML: 5 INJECTION INTRAVENOUS at 09:22

## 2018-08-27 NOTE — NURSING NOTE
"Chief Complaint   Patient presents with     Port Draw     Labs drawn from port by RN. Line flushed with saline and heparin.     Port accessed with 20g 3/4\" gripper needle by RN, labs collected, line flushed with saline and heparin. Paged [facility] to place CMP order if needed. JIC tube drawn.    Alia Kan, RN  "

## 2018-08-31 DIAGNOSIS — C25.0 MALIGNANT NEOPLASM OF HEAD OF PANCREAS (H): Primary | ICD-10-CM

## 2018-09-14 ENCOUNTER — RADIANT APPOINTMENT (OUTPATIENT)
Dept: CT IMAGING | Facility: CLINIC | Age: 63
End: 2018-09-14
Attending: INTERNAL MEDICINE
Payer: COMMERCIAL

## 2018-09-14 DIAGNOSIS — C25.0 MALIGNANT NEOPLASM OF HEAD OF PANCREAS (H): ICD-10-CM

## 2018-09-14 LAB
ALBUMIN SERPL-MCNC: 3.7 G/DL (ref 3.4–5)
ALP SERPL-CCNC: 120 U/L (ref 40–150)
ALT SERPL W P-5'-P-CCNC: 35 U/L (ref 0–50)
ANION GAP SERPL CALCULATED.3IONS-SCNC: 7 MMOL/L (ref 3–14)
AST SERPL W P-5'-P-CCNC: 36 U/L (ref 0–45)
BASOPHILS # BLD AUTO: 0 10E9/L (ref 0–0.2)
BASOPHILS NFR BLD AUTO: 0.6 %
BILIRUB SERPL-MCNC: 0.5 MG/DL (ref 0.2–1.3)
BUN SERPL-MCNC: 6 MG/DL (ref 7–30)
CALCIUM SERPL-MCNC: 8.9 MG/DL (ref 8.5–10.1)
CHLORIDE SERPL-SCNC: 108 MMOL/L (ref 94–109)
CO2 SERPL-SCNC: 26 MMOL/L (ref 20–32)
CREAT SERPL-MCNC: 0.72 MG/DL (ref 0.52–1.04)
DIFFERENTIAL METHOD BLD: ABNORMAL
EOSINOPHIL # BLD AUTO: 0.1 10E9/L (ref 0–0.7)
EOSINOPHIL NFR BLD AUTO: 1.1 %
ERYTHROCYTE [DISTWIDTH] IN BLOOD BY AUTOMATED COUNT: 17.3 % (ref 10–15)
GFR SERPL CREATININE-BSD FRML MDRD: 81 ML/MIN/1.7M2
GLUCOSE SERPL-MCNC: 94 MG/DL (ref 70–99)
HCT VFR BLD AUTO: 34.3 % (ref 35–47)
HGB BLD-MCNC: 11.2 G/DL (ref 11.7–15.7)
IMM GRANULOCYTES # BLD: 0 10E9/L (ref 0–0.4)
IMM GRANULOCYTES NFR BLD: 0.2 %
LYMPHOCYTES # BLD AUTO: 1.2 10E9/L (ref 0.8–5.3)
LYMPHOCYTES NFR BLD AUTO: 22.9 %
MCH RBC QN AUTO: 33.6 PG (ref 26.5–33)
MCHC RBC AUTO-ENTMCNC: 32.7 G/DL (ref 31.5–36.5)
MCV RBC AUTO: 103 FL (ref 78–100)
MONOCYTES # BLD AUTO: 0.4 10E9/L (ref 0–1.3)
MONOCYTES NFR BLD AUTO: 8.2 %
NEUTROPHILS # BLD AUTO: 3.6 10E9/L (ref 1.6–8.3)
NEUTROPHILS NFR BLD AUTO: 67 %
NRBC # BLD AUTO: 0 10*3/UL
NRBC BLD AUTO-RTO: 0 /100
PLATELET # BLD AUTO: 204 10E9/L (ref 150–450)
POTASSIUM SERPL-SCNC: 4 MMOL/L (ref 3.4–5.3)
PROT SERPL-MCNC: 7.1 G/DL (ref 6.8–8.8)
RBC # BLD AUTO: 3.33 10E12/L (ref 3.8–5.2)
SODIUM SERPL-SCNC: 142 MMOL/L (ref 133–144)
WBC # BLD AUTO: 5.4 10E9/L (ref 4–11)

## 2018-09-14 PROCEDURE — 85025 COMPLETE CBC W/AUTO DIFF WBC: CPT | Performed by: INTERNAL MEDICINE

## 2018-09-14 PROCEDURE — 25000128 H RX IP 250 OP 636: Performed by: INTERNAL MEDICINE

## 2018-09-14 PROCEDURE — 80053 COMPREHEN METABOLIC PANEL: CPT | Performed by: INTERNAL MEDICINE

## 2018-09-14 RX ORDER — HEPARIN SODIUM (PORCINE) LOCK FLUSH IV SOLN 100 UNIT/ML 100 UNIT/ML
5 SOLUTION INTRAVENOUS EVERY 8 HOURS PRN
Status: DISCONTINUED | OUTPATIENT
Start: 2018-09-14 | End: 2018-09-22 | Stop reason: HOSPADM

## 2018-09-14 RX ORDER — IOPAMIDOL 755 MG/ML
74 INJECTION, SOLUTION INTRAVASCULAR ONCE
Status: COMPLETED | OUTPATIENT
Start: 2018-09-14 | End: 2018-09-14

## 2018-09-14 RX ORDER — HEPARIN SODIUM (PORCINE) LOCK FLUSH IV SOLN 100 UNIT/ML 100 UNIT/ML
5 SOLUTION INTRAVENOUS ONCE
Status: COMPLETED | OUTPATIENT
Start: 2018-09-14 | End: 2018-09-14

## 2018-09-14 RX ADMIN — IOPAMIDOL 74 ML: 755 INJECTION, SOLUTION INTRAVASCULAR at 09:48

## 2018-09-14 RX ADMIN — Medication 5 ML: at 09:17

## 2018-09-14 RX ADMIN — HEPARIN SODIUM (PORCINE) LOCK FLUSH IV SOLN 100 UNIT/ML 5 ML: 100 SOLUTION at 09:54

## 2018-09-14 NOTE — DISCHARGE INSTRUCTIONS

## 2018-09-14 NOTE — NURSING NOTE
Chief Complaint   Patient presents with     Port Draw     Labs drawn via port by RN. Line flushed and hep locked. Ready for CT. Lab only appointment.     Shu Fajardo RN

## 2018-09-17 ENCOUNTER — ONCOLOGY VISIT (OUTPATIENT)
Dept: ONCOLOGY | Facility: CLINIC | Age: 63
End: 2018-09-17
Attending: INTERNAL MEDICINE
Payer: MEDICARE

## 2018-09-17 VITALS
DIASTOLIC BLOOD PRESSURE: 86 MMHG | BODY MASS INDEX: 19.39 KG/M2 | RESPIRATION RATE: 16 BRPM | OXYGEN SATURATION: 99 % | WEIGHT: 120.15 LBS | HEART RATE: 77 BPM | TEMPERATURE: 97.4 F | SYSTOLIC BLOOD PRESSURE: 142 MMHG

## 2018-09-17 DIAGNOSIS — C25.0 MALIGNANT NEOPLASM OF HEAD OF PANCREAS (H): Primary | ICD-10-CM

## 2018-09-17 PROCEDURE — 99214 OFFICE O/P EST MOD 30 MIN: CPT | Mod: ZP | Performed by: INTERNAL MEDICINE

## 2018-09-17 PROCEDURE — G0463 HOSPITAL OUTPT CLINIC VISIT: HCPCS | Mod: ZF

## 2018-09-17 ASSESSMENT — PAIN SCALES - GENERAL: PAINLEVEL: NO PAIN (0)

## 2018-09-17 NOTE — MR AVS SNAPSHOT
After Visit Summary   9/17/2018    Emelia Weathers    MRN: [id number]           Patient Information     Date Of Birth          1955        Visit Information        Provider Department      9/17/2018 11:30 AM Yasmany Wade MD [facility]        Today's Diagnoses     Malignant neoplasm of head of pancreas (H)    -  1       Follow-ups after your visit        Follow-up notes from your care team     Return in about 4 months (around 1/17/2019) for MD visit with CT and labs.      Your next 10 appointments already scheduled     Oct 08, 2018  8:45 AM CDT   [facility] with [facility]   [facility] ([facility])    [address] [place] [address]  [address]  [facility] [address]   [phone]            Oct 29, 2018  8:45 AM CDT   [facility] with [facility]   [facility] ([facility])    [address] [place] [address]  [address]  [facility] [address]   [phone]            Nov 19, 2018  8:45 AM CST   [facility] Draw with [facility]   [facility] ([facility])    [address] [place] [address]  [address]  [facility] [address]   [phone]            Dec 10, 2018  8:45 AM CST   [facility] with [facility]   [facility] ([facility])    [address] [place] [address]  [address]  [facility] [address]   [phone]            Dec 31, 2018  8:45 AM CST   [facility] Draw with [facility]   [facility] ([facility])    [address] [place] [address]  [address]  [facility] [address]   [phone]            Jan 18, 2019  9:00 AM CST   [facility] Draw with [facility]   [facility] ([facility])    [address] [place] [address]  [address]  [facility] [address]   [phone]            Jan 18, 2019  9:40 AM CST   CT  CHEST ABDOMEN PELVIS W/O & W CONTRAST with UCCT2   [facility] CT ([facility])    [address]  1st Floor  [facility] [address] [phone]           How do I prepare for my exam? (Food and drink instructions) To prepare: Do not eat or drink for 2 hours before your exam. If you need to take medicine, you may take it with small sips of water. (We may ask you to take liquid medicine as well.)  How do I prepare for my exam? (Other instructions) Please arrive 30 minutes early for your CT.  Once in the department you might be asked to drink water 15-20 minutes prior to your exam.  If indicated you may be asked to drink an oral contrast in advance of your CT.  If this is the case, the imaging team will let you know or be in contact with you prior to your appointment  Patients over 70 or patients with diabetes or kidney problems: If you haven t had a blood test (creatinine test) within the last 30 days, the Cardiologist/Radiologist may require you to get this test prior to your exam.  If you have diabetes:  Continue to take your metformin medication on the day of your exam  What should I wear: Please wear loose clothing, such as a sweat suit or jogging clothes. Avoid snaps, zippers and other metal. We may ask you to undress and put on a hospital gown.  How long does the exam take: Most scans take less than 20 minutes.  What should I bring: Please bring any scans or X-rays taken at other hospitals, if similar tests were done. Also bring a list of your medicines, including vitamins, minerals and over-the-counter drugs. It is safest to leave personal items at home.  Do I need a : No  is needed.  What do I need to tell my doctor? Be sure to tell your doctor: * If you have any allergies. * If there s any chance you are pregnant. * If you are breastfeeding.  What should I do after the exam: No restrictions, You may resume normal activities.  What is this test: A CT  (computed tomography) scan is a series of pictures that allows us to look inside your body. The scanner creates images of the body in cross sections, much like slices of bread. This helps us see any problems more clearly. You may receive contrast (X-ray dye) before or during your scan. You will be asked to drink the contrast.  Who should I call with questions: If you have any questions, please call the Imaging Department where you will have your exam. Directions, parking instructions, and other information is available on our website, [email or web address].            Jan 21, 2019  7:15 AM CST   (Arrive by 7:00 AM)   Return Visit with Yasmany Wade MD   [facility] ([facility])    [address]  [address]  [facility] [address] [phone]              Who to contact     If you have questions or need follow up information about today's clinic visit or your schedule please contact [facility] directly at [phone].  Normal or non-critical lab and imaging results will be communicated to you by MyChart, letter or phone within 4 business days after the clinic has received the results. If you do not hear from us within 7 days, please contact the clinic through [employer] or phone. If you have a critical or abnormal lab result, we will notify you by phone as soon as possible.  Submit refill requests through [facility] or call your pharmacy and they will forward the refill request to us. Please allow 3 business days for your refill to be completed.          Additional Information About Your Visit        [facility] Information     [facility] gives you secure access to your electronic health record. If you see a primary care provider, you can also send messages to your care team and make appointments. If you have questions, please call your primary care clinic.  If you do not have a primary care provider, please call [phone] and they will assist  you.        Care EveryWhere ID     This is your Care EveryWhere ID. This could be used by other organizations to access your [facility] medical records  [id number]        Your Vitals Were     Pulse Temperature Respirations Pulse Oximetry BMI (Body Mass Index)       77 97.4  F (36.3  C) (Oral) 16 99% 19.39 kg/m2        Blood Pressure from Last 3 Encounters:   09/17/18 142/86   05/21/18 145/79   02/19/18 139/83    Weight from Last 3 Encounters:   09/17/18 54.5 kg (120 lb 2.4 oz)   05/21/18 55 kg (121 lb 4 oz)   02/19/18 55.5 kg (122 lb 6.4 oz)               Primary Care Provider Office Phone # Fax #    Benny Garrett [phone]       [address] [place] [address]        Equal Access to Services     CAS[facility]ENDA : Hadii irene bello Soshannan, waaxda luqadaha, qaybta kaalmada adebrnedayada, rich perez . So [facility] [phone].    ATENCIÓN: Si habla español, tiene a hull disposición servicios gratuitos de asistencia lingüística. Garo al [phone].    We comply with applicable federal civil rights laws and [place] laws. We do not discriminate on the basis of race, color, national origin, age, disability, sex, sexual orientation, or gender identity.            Thank you!     Thank you for choosing [facility]  for your care. Our goal is always to provide you with excellent care. Hearing back from our patients is one way we can continue to improve our services. Please take a few minutes to complete the written survey that you may receive in the mail after your visit with us. Thank you!             Your Updated Medication List - Protect others around you: Learn how to safely use, store and throw away your medicines at www.disposemymeds.org.          This list is accurate as of 9/17/18 11:59 PM.  Always use your most recent med list.                   Brand Name Dispense Instructions for use Diagnosis    amylase-lipase-protease 77943 units Cpep     CREON 12    540 capsule    Take 3 capsules (36,000 Units) by mouth 3 times daily (with meals)    Malignant neoplasm of head of pancreas (H)       CALCIUM CITRATE + D PO      2 tablets daily        * capecitabine 500 MG tablet CHEMO    XELODA    42 tablet    Take 2 tabs in the AM and 1 tab in the PM, Days 1 through 14, then off for 7 days. Take within 30 mins after meal.    Malignant neoplasm of head of pancreas (H)       * capecitabine 500 MG tablet CHEMO    XELODA    42 tablet    Take 2 tabs in the AM and 1 tab in the PM, Days 1 through 14, then off for 7 days. Take within 30 mins after meal.    Malignant neoplasm of head of pancreas (H)       * capecitabine 500 MG tablet CHEMO    XELODA    42 tablet    Take 2 tabs in the AM and 1 tab in the PM, Days 1 through 14, then off for 7 days. Take within 30 mins after meal.    Malignant neoplasm of head of pancreas (H)       diphenoxylate-atropine 2.5-0.025 MG per tablet    LOMOTIL    100 tablet    Take 1-2 tablets by mouth 4 times daily as needed for diarrhea    Malignant neoplasm of head of pancreas (H), Diarrhea       lidocaine-prilocaine cream    EMLA    30 g    Apply topically as needed for moderate pain    Malignant neoplasm of head of pancreas (H)       magic mouthwash suspension    ENTER INGREDIENTS IN COMMENTS    250 mL    As directed    Malignant neoplasm of head of pancreas (H)       MELATONIN PO      Take 3 mg by mouth At Bedtime        MULTIVITAMIN TABS   OR      1 TABLET DAILY        ondansetron 8 MG tablet    ZOFRAN    30 tablet    Take 1 tablet (8 mg) by mouth every 8 hours as needed for nausea    Malignant neoplasm of head of pancreas (H)       pantoprazole 40 MG EC tablet    PROTONIX    90 tablet    Take 1 tablet (40 mg) by mouth every morning (before breakfast)    Malignant neoplasm of head of pancreas (H)       TYLENOL 325 MG tablet   Generic drug:  acetaminophen      Take 500 mg by mouth every 6 hours as needed        VITAMIN C PO      Take 500 mg  by mouth daily.        * Notice:  This list has 3 medication(s) that are the same as other medications prescribed for you. Read the directions carefully, and ask your doctor or other care provider to review them with you.

## 2018-09-17 NOTE — LETTER
9/17/2018      RE: Emelia Weathers  [address]  [place] [address]       Emelia Weathers is here today in follow-up of recurrent pancreatic cancer.    She has regionally recurrent disease, now for about 4 years with an excellent response to chemotherapy.  She was originally on XELOX but we eventually dropped the oxaliplatin and now she is on single agent Xeloda.  She returns today for a planned response assessment. She tells me overall she continues to feel great. She has been riding her bicycle and carrying on with all of her desired activities without much limitation.  The skin on her palms and soles is a little bit shiny and tender but not enough to cause any interference with activity.  She has regular bowel habits.  She has no pain she has no respiratory symptoms.  Remainder of her 10 point review of systems is unremarkable.    On physical exam she is always appears quite healthy.  Her vital signs are unremarkable.  She has no scleral icterus and no visible lesion in the oropharynx.  She has no adenopathy palpable in the neck or supraventricular spaces.  Her lungs are clear to auscultation without dullness to percussion.  Her heart rate and rhythm are regular.  Her abdomen is soft and nontender without palpable mass organomegaly.  She has no peripheral edema no tenderness in her calves or thighs.  Her speech is fluent and cranial nerves are grossly intact.  Her gait and station are unremarkable.    I reviewed with her her CT scan and lab work.  She has unremarkable electrolytes, renal function, liver enzymes and nearly normal blood counts.  On her CT scan she has some minor haziness in her resection bed but no significant tumor remaining.    Assessment/plan: Regionally recurrent pancreatic cancer currently with ongoing excellent disease control with single agent capecitabine.  We will continue on with the same dose and schedule and reevaluate her disease status after another 4 months.    Edward  Laron Wade MD

## 2018-09-17 NOTE — PROGRESS NOTES
Emelia Weathers is here today in follow-up of recurrent pancreatic cancer.    She has regionally recurrent disease, now for about 4 years with an excellent response to chemotherapy.  She was originally on XELOX but we eventually dropped the oxaliplatin and now she is on single agent Xeloda.  She returns today for a planned response assessment. She tells me overall she continues to feel great. She has been riding her bicycle and carrying on with all of her desired activities without much limitation.  The skin on her palms and soles is a little bit shiny and tender but not enough to cause any interference with activity.  She has regular bowel habits.  She has no pain she has no respiratory symptoms.  Remainder of her 10 point review of systems is unremarkable.    On physical exam she is always appears quite healthy.  Her vital signs are unremarkable.  She has no scleral icterus and no visible lesion in the oropharynx.  She has no adenopathy palpable in the neck or supraventricular spaces.  Her lungs are clear to auscultation without dullness to percussion.  Her heart rate and rhythm are regular.  Her abdomen is soft and nontender without palpable mass organomegaly.  She has no peripheral edema no tenderness in her calves or thighs.  Her speech is fluent and cranial nerves are grossly intact.  Her gait and station are unremarkable.    I reviewed with her her CT scan and lab work.  She has unremarkable electrolytes, renal function, liver enzymes and nearly normal blood counts.  On her CT scan she has some minor haziness in her resection bed but no significant tumor remaining.    Assessment/plan: Regionally recurrent pancreatic cancer currently with ongoing excellent disease control with single agent capecitabine.  We will continue on with the same dose and schedule and reevaluate her disease status after another 4 months.

## 2018-09-17 NOTE — NURSING NOTE
"Oncology Rooming Note    September 17, 2018 11:35 AM   Emelia Weathers is a 63 year old female who presents for:    Chief Complaint   Patient presents with     Oncology Clinic Visit     Return for Pancreatic Ca, CT , Lab results      Initial Vitals: /86  Pulse 77  Temp 97.4  F (36.3  C) (Oral)  Resp 16  Wt 54.5 kg (120 lb 2.4 oz)  SpO2 99%  BMI 19.39 kg/m2 Estimated body mass index is 19.39 kg/(m^2) as calculated from the following:    Height as of 5/21/18: 1.676 m (5' 6\").    Weight as of this encounter: 54.5 kg (120 lb 2.4 oz). Body surface area is 1.59 meters squared.  No Pain (0) Comment: Data Unavailable   No LMP recorded. Patient is postmenopausal.  Allergies reviewed: Yes  Medications reviewed: Yes    Medications: Medication refills not needed today.  Pharmacy name entered into [employer]:    [id number] - [place], [place] - [address] [place] [address] E  [facility] [place], [place] - [address] [facility] [place] - [address] [place] [address] [phone]  [id number] [place], [place] - [address] [place] [address] [facility] - [place], [place] - [address] [place] [address]    Clinical concerns: CT, lab results  Mauro was notified.    8  minutes for nursing intake (face to face time)     Saba Dotson MA              "

## 2018-10-08 DIAGNOSIS — C25.0 MALIGNANT NEOPLASM OF HEAD OF PANCREAS (H): ICD-10-CM

## 2018-10-08 LAB
ALBUMIN SERPL-MCNC: 3.5 G/DL (ref 3.4–5)
ALP SERPL-CCNC: 148 U/L (ref 40–150)
ALT SERPL W P-5'-P-CCNC: 37 U/L (ref 0–50)
ANION GAP SERPL CALCULATED.3IONS-SCNC: 5 MMOL/L (ref 3–14)
AST SERPL W P-5'-P-CCNC: 35 U/L (ref 0–45)
BASOPHILS # BLD AUTO: 0 10E9/L (ref 0–0.2)
BASOPHILS NFR BLD AUTO: 0.5 %
BILIRUB SERPL-MCNC: 0.5 MG/DL (ref 0.2–1.3)
BUN SERPL-MCNC: 7 MG/DL (ref 7–30)
CALCIUM SERPL-MCNC: 8.5 MG/DL (ref 8.5–10.1)
CHLORIDE SERPL-SCNC: 107 MMOL/L (ref 94–109)
CO2 SERPL-SCNC: 28 MMOL/L (ref 20–32)
CREAT SERPL-MCNC: 0.69 MG/DL (ref 0.52–1.04)
DIFFERENTIAL METHOD BLD: ABNORMAL
EOSINOPHIL # BLD AUTO: 0.1 10E9/L (ref 0–0.7)
EOSINOPHIL NFR BLD AUTO: 1.5 %
ERYTHROCYTE [DISTWIDTH] IN BLOOD BY AUTOMATED COUNT: 17.4 % (ref 10–15)
GFR SERPL CREATININE-BSD FRML MDRD: 86 ML/MIN/1.7M2
GLUCOSE SERPL-MCNC: 91 MG/DL (ref 70–99)
HCT VFR BLD AUTO: 34.1 % (ref 35–47)
HGB BLD-MCNC: 11.3 G/DL (ref 11.7–15.7)
IMM GRANULOCYTES # BLD: 0 10E9/L (ref 0–0.4)
IMM GRANULOCYTES NFR BLD: 0.2 %
LYMPHOCYTES # BLD AUTO: 1 10E9/L (ref 0.8–5.3)
LYMPHOCYTES NFR BLD AUTO: 23.4 %
MCH RBC QN AUTO: 33.9 PG (ref 26.5–33)
MCHC RBC AUTO-ENTMCNC: 33.1 G/DL (ref 31.5–36.5)
MCV RBC AUTO: 102 FL (ref 78–100)
MONOCYTES # BLD AUTO: 0.4 10E9/L (ref 0–1.3)
MONOCYTES NFR BLD AUTO: 9.5 %
NEUTROPHILS # BLD AUTO: 2.7 10E9/L (ref 1.6–8.3)
NEUTROPHILS NFR BLD AUTO: 64.9 %
NRBC # BLD AUTO: 0 10*3/UL
NRBC BLD AUTO-RTO: 0 /100
PLATELET # BLD AUTO: 173 10E9/L (ref 150–450)
POTASSIUM SERPL-SCNC: 4 MMOL/L (ref 3.4–5.3)
PROT SERPL-MCNC: 7 G/DL (ref 6.8–8.8)
RBC # BLD AUTO: 3.33 10E12/L (ref 3.8–5.2)
SODIUM SERPL-SCNC: 139 MMOL/L (ref 133–144)
WBC # BLD AUTO: 4.1 10E9/L (ref 4–11)

## 2018-10-08 PROCEDURE — 85025 COMPLETE CBC W/AUTO DIFF WBC: CPT | Performed by: INTERNAL MEDICINE

## 2018-10-08 PROCEDURE — 25000128 H RX IP 250 OP 636: Performed by: INTERNAL MEDICINE

## 2018-10-08 PROCEDURE — 80053 COMPREHEN METABOLIC PANEL: CPT | Performed by: INTERNAL MEDICINE

## 2018-10-08 RX ORDER — HEPARIN SODIUM (PORCINE) LOCK FLUSH IV SOLN 100 UNIT/ML 100 UNIT/ML
5 SOLUTION INTRAVENOUS EVERY 8 HOURS PRN
Status: DISCONTINUED | OUTPATIENT
Start: 2018-10-08 | End: 2018-10-16 | Stop reason: HOSPADM

## 2018-10-08 RX ADMIN — Medication 5 ML: at 09:01

## 2018-10-08 NOTE — NURSING NOTE
Chief Complaint   Patient presents with     Port Draw     Labs drawn via port by RN. Line flushed and hep locked, then de-accessed. Lab only appointment.     Shu Fajardo RN

## 2018-10-23 ENCOUNTER — TELEPHONE (OUTPATIENT)
Dept: PHARMACY | Facility: CLINIC | Age: 63
End: 2018-10-23

## 2018-10-23 DIAGNOSIS — C25.0 MALIGNANT NEOPLASM OF HEAD OF PANCREAS (H): Primary | ICD-10-CM

## 2018-10-23 RX ORDER — CAPECITABINE 500 MG/1
TABLET, FILM COATED ORAL
Qty: 42 TABLET | Refills: 3 | Status: SHIPPED | OUTPATIENT
Start: 2018-10-23 | End: 2019-04-08

## 2018-10-23 NOTE — ORAL ONC MGMT
Oral Chemotherapy Monitoring Program   Placed call to patient in follow up of Xeloda (capecitabine) therapy.   Left message requesting call back.   No drug names were mentioned.    Jan Alves, PharmD, MS  Hematology/Oncology Clinical Pharmacist  [facility]  [facility]

## 2018-10-24 ENCOUNTER — TELEPHONE (OUTPATIENT)
Dept: PHARMACY | Facility: CLINIC | Age: 63
End: 2018-10-24

## 2018-10-24 NOTE — ORAL ONC MGMT
Oral Chemotherapy Monitoring Program     Primary Oncologist: Dr. Wade  Primary Oncology Clinic: [facility]  Cancer Diagnosis: Pancreatic       Therapy History: Patient verbally confirmed capecitabine 500mg- 2 tablets (1000mg) every morning and 1 tablet (500mg) every evening days 1 to 14, then 7 days off  Xeloda alone started February 2015.       Drug Interaction Assessment: No new drug interactions.      Lab Monitoring Plan    Subjective/Objective:  Emelia Weathers is a 63 year old female contacted by phone for a follow-up visit for oral chemotherapy. Emelia started her last cycle of xeloda on 10/9/18 and continues on 2 tabs qAM and 1 tab qPM. She has been on this therapy since 2015 and tolerating therapy very well. She denies any new/worsening side effects, missed doses, or medication changes.     ORAL CHEMOTHERAPY 4/3/2018 4/24/2018 5/8/2018 5/14/2018 7/27/2018 10/24/2018 10/24/2018   Drug Name Xeloda (Capecitabine) Xeloda (Capecitabine) Xeloda (Capecitabine) Xeloda (Capecitabine) Xeloda (Capecitabine) Xeloda (Capecitabine) Xeloda (Capecitabine)   Current Dosage Other Other Other - Other 1000mg 500mg   Current Schedule BID BID BID - BID AM PM   Cycle Details 2 weeks on 1 week off 2 weeks on 1 week off 2 weeks on 1 week off - 2 weeks on 1 week off 2 weeks on 1 week off 2 weeks on 1 week off   Start Date of Last Cycle 3/13/2018 4/3/2018 4/24/2018 - 7/17/2018 10/9/2018 10/9/2018   Planned next cycle start date 4/3/2018 4/24/2018 5/15/2018 5/15/2018 8/7/2018 10/30/2018 10/30/2018   Doses missed in last 2 weeks - - - - 0 0 0   Adherence Assessment - - - - Adherent Adherent Adherent   Adverse Effects - - - - No AE identified during assessment No AE identified during assessment No AE identified during assessment   Palmar-plantar Erythrodysethesia syndrome[hand-foot syndrome] - - - - - - -   Home BPs - - - - not needed - -   Any new drug interactions? - - - - No No No   Is the dose as ordered appropriate for the  "patient? - - - - Yes Yes Yes   Is the patient currently in pain? - - - - No - -   Has the patient been assessed within the past 6 months for depression? - - - - Yes - -   Has the patient missed any days of school, work, or other routine activity? - - - - No - -       Vitals:  BP:   BP Readings from Last 1 Encounters:   09/17/18 142/86     Wt Readings from Last 1 Encounters:   09/17/18 54.5 kg (120 lb 2.4 oz)     Estimated body surface area is 1.59 meters squared as calculated from the following:    Height as of 5/21/18: 1.676 m (5' 6\").    Weight as of 9/17/18: 54.5 kg (120 lb 2.4 oz).    Labs:  _  Result Component Current Result Ref Range   Sodium 139 (10/8/2018) 133 - 144 mmol/L     _  Result Component Current Result Ref Range   Potassium 4.0 (10/8/2018) 3.4 - 5.3 mmol/L     _  Result Component Current Result Ref Range   Calcium 8.5 (10/8/2018) 8.5 - 10.1 mg/dL     No results found for Mag within last 30 days.     No results found for Phos within last 30 days.     _  Result Component Current Result Ref Range   Albumin 3.5 (10/8/2018) 3.4 - 5.0 g/dL     _  Result Component Current Result Ref Range   Urea Nitrogen 7 (10/8/2018) 7 - 30 mg/dL     _  Result Component Current Result Ref Range   Creatinine 0.69 (10/8/2018) 0.52 - 1.04 mg/dL       _  Result Component Current Result Ref Range   AST 35 (10/8/2018) 0 - 45 U/L     _  Result Component Current Result Ref Range   ALT 37 (10/8/2018) 0 - 50 U/L     _  Result Component Current Result Ref Range   Bilirubin Total 0.5 (10/8/2018) 0.2 - 1.3 mg/dL       _  Result Component Current Result Ref Range   WBC 4.1 (10/8/2018) 4.0 - 11.0 10e9/L     _  Result Component Current Result Ref Range   Hemoglobin 11.3 (L) (10/8/2018) 11.7 - 15.7 g/dL     _  Result Component Current Result Ref Range   Platelet Count 173 (10/8/2018) 150 - 450 10e9/L     _  Result Component Current Result Ref Range   Absolute Neutrophil 2.7 (10/8/2018) 1.6 - 8.3 10e9/L       Assessment:  Emelia continues " to tolerate therapy with minimal side effects. PDC = 1.0, no issues with adherence.     Plan:  Continue capecitabine 1000mg qAM and 500mg qPM for 2 weeks on and 1 week off.     Follow-Up:  January 2019 - quarterly assessment    Refill Due:  10/29: patient will  at [facility] after her labs. Medication is ready for  at [facility].    Jan Alves, PharmD, MS  Hematology/Oncology Clinical Pharmacist  [facility]  [facility]

## 2018-10-29 DIAGNOSIS — C25.0 MALIGNANT NEOPLASM OF HEAD OF PANCREAS (H): ICD-10-CM

## 2018-10-29 LAB
ALBUMIN SERPL-MCNC: 3.6 G/DL (ref 3.4–5)
ALP SERPL-CCNC: 168 U/L (ref 40–150)
ALT SERPL W P-5'-P-CCNC: 38 U/L (ref 0–50)
ANION GAP SERPL CALCULATED.3IONS-SCNC: 8 MMOL/L (ref 3–14)
AST SERPL W P-5'-P-CCNC: 41 U/L (ref 0–45)
BASOPHILS # BLD AUTO: 0 10E9/L (ref 0–0.2)
BASOPHILS NFR BLD AUTO: 0.4 %
BILIRUB SERPL-MCNC: 0.5 MG/DL (ref 0.2–1.3)
BUN SERPL-MCNC: 8 MG/DL (ref 7–30)
CALCIUM SERPL-MCNC: 8.6 MG/DL (ref 8.5–10.1)
CHLORIDE SERPL-SCNC: 106 MMOL/L (ref 94–109)
CO2 SERPL-SCNC: 25 MMOL/L (ref 20–32)
CREAT SERPL-MCNC: 0.8 MG/DL (ref 0.52–1.04)
DIFFERENTIAL METHOD BLD: ABNORMAL
EOSINOPHIL # BLD AUTO: 0.1 10E9/L (ref 0–0.7)
EOSINOPHIL NFR BLD AUTO: 1.7 %
ERYTHROCYTE [DISTWIDTH] IN BLOOD BY AUTOMATED COUNT: 17.5 % (ref 10–15)
GFR SERPL CREATININE-BSD FRML MDRD: 72 ML/MIN/1.7M2
GLUCOSE SERPL-MCNC: 99 MG/DL (ref 70–99)
HCT VFR BLD AUTO: 35.2 % (ref 35–47)
HGB BLD-MCNC: 11.5 G/DL (ref 11.7–15.7)
IMM GRANULOCYTES # BLD: 0 10E9/L (ref 0–0.4)
IMM GRANULOCYTES NFR BLD: 0.2 %
LYMPHOCYTES # BLD AUTO: 1 10E9/L (ref 0.8–5.3)
LYMPHOCYTES NFR BLD AUTO: 21.7 %
MCH RBC QN AUTO: 33.2 PG (ref 26.5–33)
MCHC RBC AUTO-ENTMCNC: 32.7 G/DL (ref 31.5–36.5)
MCV RBC AUTO: 102 FL (ref 78–100)
MONOCYTES # BLD AUTO: 0.4 10E9/L (ref 0–1.3)
MONOCYTES NFR BLD AUTO: 9.1 %
NEUTROPHILS # BLD AUTO: 3.1 10E9/L (ref 1.6–8.3)
NEUTROPHILS NFR BLD AUTO: 66.9 %
NRBC # BLD AUTO: 0 10*3/UL
NRBC BLD AUTO-RTO: 0 /100
PLATELET # BLD AUTO: 187 10E9/L (ref 150–450)
POTASSIUM SERPL-SCNC: 4.3 MMOL/L (ref 3.4–5.3)
PROT SERPL-MCNC: 7.2 G/DL (ref 6.8–8.8)
RBC # BLD AUTO: 3.46 10E12/L (ref 3.8–5.2)
SODIUM SERPL-SCNC: 140 MMOL/L (ref 133–144)
WBC # BLD AUTO: 4.6 10E9/L (ref 4–11)

## 2018-10-29 PROCEDURE — 80053 COMPREHEN METABOLIC PANEL: CPT | Performed by: INTERNAL MEDICINE

## 2018-10-29 PROCEDURE — 25000128 H RX IP 250 OP 636: Performed by: INTERNAL MEDICINE

## 2018-10-29 PROCEDURE — 85025 COMPLETE CBC W/AUTO DIFF WBC: CPT | Performed by: INTERNAL MEDICINE

## 2018-10-29 RX ORDER — HEPARIN SODIUM (PORCINE) LOCK FLUSH IV SOLN 100 UNIT/ML 100 UNIT/ML
5 SOLUTION INTRAVENOUS EVERY 8 HOURS PRN
Status: DISCONTINUED | OUTPATIENT
Start: 2018-10-29 | End: 2018-11-06 | Stop reason: HOSPADM

## 2018-10-29 RX ADMIN — Medication 5 ML: at 09:17

## 2018-10-29 NOTE — NURSING NOTE
Chief Complaint   Patient presents with     Port Draw     Labs drawn via port by RN. Line flushed and hep locked. Lab only appointment.     Shu Fajardo RN

## 2018-11-19 DIAGNOSIS — C25.0 MALIGNANT NEOPLASM OF HEAD OF PANCREAS (H): ICD-10-CM

## 2018-11-19 LAB
ALBUMIN SERPL-MCNC: 3.6 G/DL (ref 3.4–5)
ALP SERPL-CCNC: 146 U/L (ref 40–150)
ALT SERPL W P-5'-P-CCNC: 33 U/L (ref 0–50)
ANION GAP SERPL CALCULATED.3IONS-SCNC: 6 MMOL/L (ref 3–14)
AST SERPL W P-5'-P-CCNC: 36 U/L (ref 0–45)
BASOPHILS # BLD AUTO: 0 10E9/L (ref 0–0.2)
BASOPHILS NFR BLD AUTO: 0.4 %
BILIRUB SERPL-MCNC: 0.4 MG/DL (ref 0.2–1.3)
BUN SERPL-MCNC: 11 MG/DL (ref 7–30)
CALCIUM SERPL-MCNC: 8.5 MG/DL (ref 8.5–10.1)
CHLORIDE SERPL-SCNC: 108 MMOL/L (ref 94–109)
CO2 SERPL-SCNC: 25 MMOL/L (ref 20–32)
CREAT SERPL-MCNC: 0.9 MG/DL (ref 0.52–1.04)
DIFFERENTIAL METHOD BLD: ABNORMAL
EOSINOPHIL # BLD AUTO: 0.1 10E9/L (ref 0–0.7)
EOSINOPHIL NFR BLD AUTO: 1.2 %
ERYTHROCYTE [DISTWIDTH] IN BLOOD BY AUTOMATED COUNT: 17.8 % (ref 10–15)
GFR SERPL CREATININE-BSD FRML MDRD: 63 ML/MIN/1.7M2
GLUCOSE SERPL-MCNC: 86 MG/DL (ref 70–99)
HCT VFR BLD AUTO: 34.6 % (ref 35–47)
HGB BLD-MCNC: 11.4 G/DL (ref 11.7–15.7)
IMM GRANULOCYTES # BLD: 0 10E9/L (ref 0–0.4)
IMM GRANULOCYTES NFR BLD: 0.2 %
LYMPHOCYTES # BLD AUTO: 1 10E9/L (ref 0.8–5.3)
LYMPHOCYTES NFR BLD AUTO: 19.2 %
MCH RBC QN AUTO: 33.7 PG (ref 26.5–33)
MCHC RBC AUTO-ENTMCNC: 32.9 G/DL (ref 31.5–36.5)
MCV RBC AUTO: 102 FL (ref 78–100)
MONOCYTES # BLD AUTO: 0.4 10E9/L (ref 0–1.3)
MONOCYTES NFR BLD AUTO: 8.6 %
NEUTROPHILS # BLD AUTO: 3.5 10E9/L (ref 1.6–8.3)
NEUTROPHILS NFR BLD AUTO: 70.4 %
NRBC # BLD AUTO: 0 10*3/UL
NRBC BLD AUTO-RTO: 0 /100
PLATELET # BLD AUTO: 193 10E9/L (ref 150–450)
POTASSIUM SERPL-SCNC: 4.1 MMOL/L (ref 3.4–5.3)
PROT SERPL-MCNC: 7.1 G/DL (ref 6.8–8.8)
RBC # BLD AUTO: 3.38 10E12/L (ref 3.8–5.2)
SODIUM SERPL-SCNC: 139 MMOL/L (ref 133–144)
WBC # BLD AUTO: 5 10E9/L (ref 4–11)

## 2018-11-19 PROCEDURE — 85025 COMPLETE CBC W/AUTO DIFF WBC: CPT | Performed by: INTERNAL MEDICINE

## 2018-11-19 PROCEDURE — 80053 COMPREHEN METABOLIC PANEL: CPT | Performed by: INTERNAL MEDICINE

## 2018-11-19 PROCEDURE — 25000128 H RX IP 250 OP 636: Performed by: INTERNAL MEDICINE

## 2018-11-19 RX ORDER — HEPARIN SODIUM (PORCINE) LOCK FLUSH IV SOLN 100 UNIT/ML 100 UNIT/ML
5 SOLUTION INTRAVENOUS EVERY 8 HOURS
Status: DISCONTINUED | OUTPATIENT
Start: 2018-11-19 | End: 2018-11-27 | Stop reason: HOSPADM

## 2018-11-19 RX ADMIN — SODIUM CHLORIDE, PRESERVATIVE FREE 5 ML: 5 INJECTION INTRAVENOUS at 09:35

## 2018-12-10 DIAGNOSIS — C25.0 MALIGNANT NEOPLASM OF HEAD OF PANCREAS (H): ICD-10-CM

## 2018-12-10 LAB
ALBUMIN SERPL-MCNC: 3.6 G/DL (ref 3.4–5)
ALP SERPL-CCNC: 127 U/L (ref 40–150)
ALT SERPL W P-5'-P-CCNC: 32 U/L (ref 0–50)
ANION GAP SERPL CALCULATED.3IONS-SCNC: 6 MMOL/L (ref 3–14)
AST SERPL W P-5'-P-CCNC: 33 U/L (ref 0–45)
BASOPHILS # BLD AUTO: 0 10E9/L (ref 0–0.2)
BASOPHILS NFR BLD AUTO: 0.4 %
BILIRUB SERPL-MCNC: 0.5 MG/DL (ref 0.2–1.3)
BUN SERPL-MCNC: 10 MG/DL (ref 7–30)
CALCIUM SERPL-MCNC: 8.7 MG/DL (ref 8.5–10.1)
CHLORIDE SERPL-SCNC: 108 MMOL/L (ref 94–109)
CO2 SERPL-SCNC: 27 MMOL/L (ref 20–32)
CREAT SERPL-MCNC: 0.7 MG/DL (ref 0.52–1.04)
DIFFERENTIAL METHOD BLD: ABNORMAL
EOSINOPHIL # BLD AUTO: 0.1 10E9/L (ref 0–0.7)
EOSINOPHIL NFR BLD AUTO: 1.5 %
ERYTHROCYTE [DISTWIDTH] IN BLOOD BY AUTOMATED COUNT: 18 % (ref 10–15)
GFR SERPL CREATININE-BSD FRML MDRD: 83 ML/MIN/1.7M2
GLUCOSE SERPL-MCNC: 68 MG/DL (ref 70–99)
HCT VFR BLD AUTO: 34.7 % (ref 35–47)
HGB BLD-MCNC: 11.3 G/DL (ref 11.7–15.7)
IMM GRANULOCYTES # BLD: 0 10E9/L (ref 0–0.4)
IMM GRANULOCYTES NFR BLD: 0.2 %
LYMPHOCYTES # BLD AUTO: 1 10E9/L (ref 0.8–5.3)
LYMPHOCYTES NFR BLD AUTO: 19.3 %
MCH RBC QN AUTO: 33.2 PG (ref 26.5–33)
MCHC RBC AUTO-ENTMCNC: 32.6 G/DL (ref 31.5–36.5)
MCV RBC AUTO: 102 FL (ref 78–100)
MONOCYTES # BLD AUTO: 0.5 10E9/L (ref 0–1.3)
MONOCYTES NFR BLD AUTO: 10.1 %
NEUTROPHILS # BLD AUTO: 3.7 10E9/L (ref 1.6–8.3)
NEUTROPHILS NFR BLD AUTO: 68.5 %
NRBC # BLD AUTO: 0 10*3/UL
NRBC BLD AUTO-RTO: 0 /100
PLATELET # BLD AUTO: 196 10E9/L (ref 150–450)
POTASSIUM SERPL-SCNC: 4.1 MMOL/L (ref 3.4–5.3)
PROT SERPL-MCNC: 7 G/DL (ref 6.8–8.8)
RBC # BLD AUTO: 3.4 10E12/L (ref 3.8–5.2)
SODIUM SERPL-SCNC: 142 MMOL/L (ref 133–144)
WBC # BLD AUTO: 5.4 10E9/L (ref 4–11)

## 2018-12-10 PROCEDURE — 36591 DRAW BLOOD OFF VENOUS DEVICE: CPT

## 2018-12-10 PROCEDURE — 25000128 H RX IP 250 OP 636: Performed by: INTERNAL MEDICINE

## 2018-12-10 PROCEDURE — 80053 COMPREHEN METABOLIC PANEL: CPT | Performed by: INTERNAL MEDICINE

## 2018-12-10 PROCEDURE — 85025 COMPLETE CBC W/AUTO DIFF WBC: CPT | Performed by: INTERNAL MEDICINE

## 2018-12-10 RX ORDER — HEPARIN SODIUM (PORCINE) LOCK FLUSH IV SOLN 100 UNIT/ML 100 UNIT/ML
5 SOLUTION INTRAVENOUS ONCE
Status: COMPLETED | OUTPATIENT
Start: 2018-12-10 | End: 2018-12-10

## 2018-12-10 RX ADMIN — SODIUM CHLORIDE, PRESERVATIVE FREE 5 ML: 5 INJECTION INTRAVENOUS at 09:58

## 2018-12-31 DIAGNOSIS — C25.0 MALIGNANT NEOPLASM OF HEAD OF PANCREAS (H): ICD-10-CM

## 2018-12-31 LAB
ALBUMIN SERPL-MCNC: 3.3 G/DL (ref 3.4–5)
ALP SERPL-CCNC: 163 U/L (ref 40–150)
ALT SERPL W P-5'-P-CCNC: 34 U/L (ref 0–50)
ANION GAP SERPL CALCULATED.3IONS-SCNC: 8 MMOL/L (ref 3–14)
AST SERPL W P-5'-P-CCNC: 36 U/L (ref 0–45)
BASOPHILS # BLD AUTO: 0 10E9/L (ref 0–0.2)
BASOPHILS NFR BLD AUTO: 0.7 %
BILIRUB SERPL-MCNC: 0.5 MG/DL (ref 0.2–1.3)
BUN SERPL-MCNC: 8 MG/DL (ref 7–30)
CALCIUM SERPL-MCNC: 8.5 MG/DL (ref 8.5–10.1)
CHLORIDE SERPL-SCNC: 107 MMOL/L (ref 94–109)
CO2 SERPL-SCNC: 24 MMOL/L (ref 20–32)
CREAT SERPL-MCNC: 0.73 MG/DL (ref 0.52–1.04)
DIFFERENTIAL METHOD BLD: ABNORMAL
EOSINOPHIL # BLD AUTO: 0.1 10E9/L (ref 0–0.7)
EOSINOPHIL NFR BLD AUTO: 1.8 %
ERYTHROCYTE [DISTWIDTH] IN BLOOD BY AUTOMATED COUNT: 18 % (ref 10–15)
GFR SERPL CREATININE-BSD FRML MDRD: 86 ML/MIN/{1.73_M2}
GLUCOSE SERPL-MCNC: 131 MG/DL (ref 70–99)
HCT VFR BLD AUTO: 33.2 % (ref 35–47)
HGB BLD-MCNC: 10.8 G/DL (ref 11.7–15.7)
IMM GRANULOCYTES # BLD: 0 10E9/L (ref 0–0.4)
IMM GRANULOCYTES NFR BLD: 0.2 %
LYMPHOCYTES # BLD AUTO: 1 10E9/L (ref 0.8–5.3)
LYMPHOCYTES NFR BLD AUTO: 21.8 %
MCH RBC QN AUTO: 32.8 PG (ref 26.5–33)
MCHC RBC AUTO-ENTMCNC: 32.5 G/DL (ref 31.5–36.5)
MCV RBC AUTO: 101 FL (ref 78–100)
MONOCYTES # BLD AUTO: 0.4 10E9/L (ref 0–1.3)
MONOCYTES NFR BLD AUTO: 9.3 %
NEUTROPHILS # BLD AUTO: 3 10E9/L (ref 1.6–8.3)
NEUTROPHILS NFR BLD AUTO: 66.2 %
NRBC # BLD AUTO: 0 10*3/UL
NRBC BLD AUTO-RTO: 0 /100
PLATELET # BLD AUTO: 196 10E9/L (ref 150–450)
POTASSIUM SERPL-SCNC: 4.3 MMOL/L (ref 3.4–5.3)
PROT SERPL-MCNC: 6.9 G/DL (ref 6.8–8.8)
RBC # BLD AUTO: 3.29 10E12/L (ref 3.8–5.2)
SODIUM SERPL-SCNC: 138 MMOL/L (ref 133–144)
WBC # BLD AUTO: 4.5 10E9/L (ref 4–11)

## 2018-12-31 PROCEDURE — 80053 COMPREHEN METABOLIC PANEL: CPT | Performed by: INTERNAL MEDICINE

## 2018-12-31 PROCEDURE — 25000128 H RX IP 250 OP 636: Performed by: INTERNAL MEDICINE

## 2018-12-31 PROCEDURE — 86301 IMMUNOASSAY TUMOR CA 19-9: CPT | Performed by: INTERNAL MEDICINE

## 2018-12-31 PROCEDURE — 85025 COMPLETE CBC W/AUTO DIFF WBC: CPT | Performed by: INTERNAL MEDICINE

## 2018-12-31 RX ORDER — HEPARIN SODIUM (PORCINE) LOCK FLUSH IV SOLN 100 UNIT/ML 100 UNIT/ML
5 SOLUTION INTRAVENOUS EVERY 8 HOURS
Status: DISCONTINUED | OUTPATIENT
Start: 2018-12-31 | End: 2019-01-08 | Stop reason: HOSPADM

## 2018-12-31 RX ADMIN — Medication 5 ML: at 09:15

## 2018-12-31 NOTE — NURSING NOTE
Chief Complaint   Patient presents with     Port Draw     lab RN accessed with Gripper needle, heparin locked     Thelma Turcios RN on 12/31/2018 at 9:15 AM

## 2019-01-02 LAB — CANCER AG19-9 SERPL-ACNC: 2 U/ML (ref 0–37)

## 2019-01-08 ENCOUNTER — TELEPHONE (OUTPATIENT)
Dept: ONCOLOGY | Facility: CLINIC | Age: 64
End: 2019-01-08

## 2019-01-08 NOTE — ORAL ONC MGMT
Oral Chemotherapy Monitoring Program    Primary Oncologist: Dr. Wade  Primary Oncology Clinic: [facility]  Cancer Diagnosis: Pancreatic       Therapy History: Patient verbally confirmed capecitabine 500mg- 2 tablets (1000mg) every morning and 1 tablet (500mg) every evening days 1 to 14, then 7 days off  Xeloda alone started February 2015.     Cycle start dates:  12/19/2017, 1/9/2018, 1/30, 2/20, 3/13, 4/3, 4/24, 5/15, 6/5, 6/26, 7/17, 8/7, 8/28, 9/18, 10/9, 10/30, 11/20, 12/11, 1/1/2019, 1/22     Drug Interaction Assessment: No new drug interactions.      Lab Monitoring Plan      Subjective/Objective:  Emelia Weathers is a 63 year old female contacted by phone for a follow-up visit for oral chemotherapy. Emelia started her last cycle of Xeloda on 1/1/19 and continues on 2 tabs qAM and 1 tab qPM. She has been on this therapy since 2015 and tolerating therapy very well. She denies any new/worsening side effects, missed doses, or medication changes.  She would prefer to continue picking up medication at [facility] pharmacy and ok not getting refill calls.       ORAL CHEMOTHERAPY 5/8/2018 5/14/2018 7/27/2018 10/24/2018 10/24/2018 1/8/2019 1/8/2019   Drug Name Xeloda (Capecitabine) Xeloda (Capecitabine) Xeloda (Capecitabine) Xeloda (Capecitabine) Xeloda (Capecitabine) Xeloda (Capecitabine) Xeloda (Capecitabine)   Current Dosage Other - Other 1000mg 500mg 1000mg 500mg   Current Schedule BID - BID AM PM AM PM   Cycle Details 2 weeks on 1 week off - 2 weeks on 1 week off 2 weeks on 1 week off 2 weeks on 1 week off 2 weeks on 1 week off 2 weeks on 1 week off   Start Date of Last Cycle 4/24/2018 - 7/17/2018 10/9/2018 10/9/2018 1/1/2019 1/1/2019   Planned next cycle start date 5/15/2018 5/15/2018 8/7/2018 10/30/2018 10/30/2018 1/22/2019 1/22/2019   Doses missed in last 2 weeks - - 0 0 0 0 0   Adherence Assessment - - Adherent Adherent Adherent Adherent Adherent   Adverse Effects - - No AE identified during assessment No AE  "identified during assessment No AE identified during assessment No AE identified during assessment No AE identified during assessment   Palmar-plantar Erythrodysethesia syndrome[hand-foot syndrome] - - - - - - -   Home BPs - - not needed - - Not applicable Not applicable   Any new drug interactions? - - No No No No No   Is the dose as ordered appropriate for the patient? - - Yes Yes Yes Yes Yes   Is the patient currently in pain? - - No - - No No   Has the patient been assessed within the past 6 months for depression? - - Yes - - Yes Yes   Has the patient missed any days of school, work, or other routine activity? - - No - - No No       Last PHQ-2 Score on record:   PHQ-2 ( 1999 [employer]) 3/6/2017 12/5/2016   Q1: Little interest or pleasure in doing things 0 0   Q2: Feeling down, depressed or hopeless 0 0   PHQ-2 Score 0 0       Patient does not report depression symptoms.      Vitals:  BP:   BP Readings from Last 1 Encounters:   09/17/18 142/86     Wt Readings from Last 1 Encounters:   09/17/18 54.5 kg (120 lb 2.4 oz)     Estimated body surface area is 1.59 meters squared as calculated from the following:    Height as of 5/21/18: 1.676 m (5' 6\").    Weight as of 9/17/18: 54.5 kg (120 lb 2.4 oz).    Labs:  _  Result Component Current Result Ref Range   Sodium 138 (12/31/2018) 133 - 144 mmol/L     _  Result Component Current Result Ref Range   Potassium 4.3 (12/31/2018) 3.4 - 5.3 mmol/L     _  Result Component Current Result Ref Range   Calcium 8.5 (12/31/2018) 8.5 - 10.1 mg/dL     No results found for Mag within last 30 days.     No results found for Phos within last 30 days.     _  Result Component Current Result Ref Range   Albumin 3.3 (L) (12/31/2018) 3.4 - 5.0 g/dL     _  Result Component Current Result Ref Range   Urea Nitrogen 8 (12/31/2018) 7 - 30 mg/dL     _  Result Component Current Result Ref Range   Creatinine 0.73 (12/31/2018) 0.52 - 1.04 mg/dL       _  Result Component Current Result Ref Range   AST 36 " (12/31/2018) 0 - 45 U/L     _  Result Component Current Result Ref Range   ALT 34 (12/31/2018) 0 - 50 U/L     _  Result Component Current Result Ref Range   Bilirubin Total 0.5 (12/31/2018) 0.2 - 1.3 mg/dL       _  Result Component Current Result Ref Range   WBC 4.5 (12/31/2018) 4.0 - 11.0 10e9/L     _  Result Component Current Result Ref Range   Hemoglobin 10.8 (L) (12/31/2018) 11.7 - 15.7 g/dL     _  Result Component Current Result Ref Range   Platelet Count 196 (12/31/2018) 150 - 450 10e9/L     _  Result Component Current Result Ref Range   Absolute Neutrophil 3.0 (12/31/2018) 1.6 - 8.3 10e9/L       Assessment:  Day 8 of current cycle. Emelia continues to tolerate therapy with minimal side effects. PDC = 1.0, no issues with adherence.      Plan:  Continue capecitabine 1000mg qAM and 500mg qPM for 2 weeks on and 1 week off.      Follow-Up:  April 2019 - quarterly assessment     Refill Due:  Next cycle day 1: 1/22/19   She will  at [facility] pharmacy after 1/21/19 Dr. Wade appointment.       Thank you for the opportunity to participate in the care of the above patient,  Didier Buckner, PharmD  Hematology/Oncology Clinical Pharmacist  [facility] and [facility]   [phone]

## 2019-01-18 ENCOUNTER — ANCILLARY PROCEDURE (OUTPATIENT)
Dept: CT IMAGING | Facility: CLINIC | Age: 64
End: 2019-01-18
Attending: INTERNAL MEDICINE
Payer: MEDICARE

## 2019-01-18 DIAGNOSIS — C25.0 MALIGNANT NEOPLASM OF HEAD OF PANCREAS (H): ICD-10-CM

## 2019-01-18 LAB
ALBUMIN SERPL-MCNC: 3.6 G/DL (ref 3.4–5)
ALP SERPL-CCNC: 170 U/L (ref 40–150)
ALT SERPL W P-5'-P-CCNC: 34 U/L (ref 0–50)
ANION GAP SERPL CALCULATED.3IONS-SCNC: 7 MMOL/L (ref 3–14)
AST SERPL W P-5'-P-CCNC: 40 U/L (ref 0–45)
BASOPHILS # BLD AUTO: 0 10E9/L (ref 0–0.2)
BASOPHILS NFR BLD AUTO: 0.6 %
BILIRUB SERPL-MCNC: 0.4 MG/DL (ref 0.2–1.3)
BUN SERPL-MCNC: 6 MG/DL (ref 7–30)
CALCIUM SERPL-MCNC: 8.4 MG/DL (ref 8.5–10.1)
CHLORIDE SERPL-SCNC: 108 MMOL/L (ref 94–109)
CO2 SERPL-SCNC: 25 MMOL/L (ref 20–32)
CREAT SERPL-MCNC: 0.68 MG/DL (ref 0.52–1.04)
DIFFERENTIAL METHOD BLD: ABNORMAL
EOSINOPHIL # BLD AUTO: 0.1 10E9/L (ref 0–0.7)
EOSINOPHIL NFR BLD AUTO: 1.7 %
ERYTHROCYTE [DISTWIDTH] IN BLOOD BY AUTOMATED COUNT: 18.4 % (ref 10–15)
GFR SERPL CREATININE-BSD FRML MDRD: >90 ML/MIN/{1.73_M2}
GLUCOSE SERPL-MCNC: 95 MG/DL (ref 70–99)
HCT VFR BLD AUTO: 34.8 % (ref 35–47)
HGB BLD-MCNC: 11.2 G/DL (ref 11.7–15.7)
IMM GRANULOCYTES # BLD: 0 10E9/L (ref 0–0.4)
IMM GRANULOCYTES NFR BLD: 0.2 %
LYMPHOCYTES # BLD AUTO: 1.2 10E9/L (ref 0.8–5.3)
LYMPHOCYTES NFR BLD AUTO: 24.3 %
MCH RBC QN AUTO: 32.6 PG (ref 26.5–33)
MCHC RBC AUTO-ENTMCNC: 32.2 G/DL (ref 31.5–36.5)
MCV RBC AUTO: 101 FL (ref 78–100)
MONOCYTES # BLD AUTO: 0.4 10E9/L (ref 0–1.3)
MONOCYTES NFR BLD AUTO: 7.8 %
NEUTROPHILS # BLD AUTO: 3.1 10E9/L (ref 1.6–8.3)
NEUTROPHILS NFR BLD AUTO: 65.4 %
NRBC # BLD AUTO: 0 10*3/UL
NRBC BLD AUTO-RTO: 0 /100
PLATELET # BLD AUTO: 203 10E9/L (ref 150–450)
POTASSIUM SERPL-SCNC: 4 MMOL/L (ref 3.4–5.3)
PROT SERPL-MCNC: 7.2 G/DL (ref 6.8–8.8)
RBC # BLD AUTO: 3.44 10E12/L (ref 3.8–5.2)
SODIUM SERPL-SCNC: 140 MMOL/L (ref 133–144)
WBC # BLD AUTO: 4.7 10E9/L (ref 4–11)

## 2019-01-18 PROCEDURE — 25000128 H RX IP 250 OP 636: Performed by: INTERNAL MEDICINE

## 2019-01-18 PROCEDURE — 80053 COMPREHEN METABOLIC PANEL: CPT | Performed by: INTERNAL MEDICINE

## 2019-01-18 PROCEDURE — 85025 COMPLETE CBC W/AUTO DIFF WBC: CPT | Performed by: INTERNAL MEDICINE

## 2019-01-18 RX ORDER — HEPARIN SODIUM (PORCINE) LOCK FLUSH IV SOLN 100 UNIT/ML 100 UNIT/ML
500 SOLUTION INTRAVENOUS ONCE
Status: COMPLETED | OUTPATIENT
Start: 2019-01-18 | End: 2019-01-18

## 2019-01-18 RX ORDER — IOPAMIDOL 755 MG/ML
73 INJECTION, SOLUTION INTRAVASCULAR ONCE
Status: COMPLETED | OUTPATIENT
Start: 2019-01-18 | End: 2019-01-18

## 2019-01-18 RX ORDER — HEPARIN SODIUM (PORCINE) LOCK FLUSH IV SOLN 100 UNIT/ML 100 UNIT/ML
5 SOLUTION INTRAVENOUS ONCE
Status: COMPLETED | OUTPATIENT
Start: 2019-01-18 | End: 2019-01-18

## 2019-01-18 RX ADMIN — HEPARIN 5 ML: 100 SYRINGE at 09:06

## 2019-01-18 RX ADMIN — HEPARIN SODIUM (PORCINE) LOCK FLUSH IV SOLN 100 UNIT/ML 500 UNITS: 100 SOLUTION at 09:40

## 2019-01-18 RX ADMIN — IOPAMIDOL 73 ML: 755 INJECTION, SOLUTION INTRAVASCULAR at 09:21

## 2019-01-18 NOTE — DISCHARGE INSTRUCTIONS

## 2019-01-18 NOTE — NURSING NOTE
Chief Complaint   Patient presents with     Port Draw     labs drawn via port by RN     There were no vitals taken for this visit.    Port accessed by RN in lab. Labs collected and sent. Line flushed with NS & Heparin. Pt tolerated well.       Priscilla Gonzalez RN

## 2019-01-21 ENCOUNTER — ONCOLOGY VISIT (OUTPATIENT)
Dept: ONCOLOGY | Facility: CLINIC | Age: 64
End: 2019-01-21
Attending: INTERNAL MEDICINE
Payer: MEDICARE

## 2019-01-21 VITALS
TEMPERATURE: 95.4 F | HEART RATE: 77 BPM | DIASTOLIC BLOOD PRESSURE: 84 MMHG | RESPIRATION RATE: 16 BRPM | WEIGHT: 123.24 LBS | OXYGEN SATURATION: 100 % | BODY MASS INDEX: 19.89 KG/M2 | SYSTOLIC BLOOD PRESSURE: 149 MMHG

## 2019-01-21 DIAGNOSIS — C25.0 MALIGNANT NEOPLASM OF HEAD OF PANCREAS (H): Primary | ICD-10-CM

## 2019-01-21 PROCEDURE — 99214 OFFICE O/P EST MOD 30 MIN: CPT | Mod: ZP | Performed by: INTERNAL MEDICINE

## 2019-01-21 PROCEDURE — G0463 HOSPITAL OUTPT CLINIC VISIT: HCPCS | Mod: ZF

## 2019-01-21 RX ORDER — CAPECITABINE 500 MG/1
TABLET, FILM COATED ORAL
Qty: 42 TABLET | Refills: 3 | Status: SHIPPED | OUTPATIENT
Start: 2019-01-21 | End: 2020-04-27

## 2019-01-21 RX ORDER — PANTOPRAZOLE SODIUM 40 MG/1
40 TABLET, DELAYED RELEASE ORAL
Qty: 90 TABLET | Refills: 3 | Status: SHIPPED | OUTPATIENT
Start: 2019-01-21 | End: 2019-09-09

## 2019-01-21 RX ORDER — CAPECITABINE 500 MG/1
TABLET, FILM COATED ORAL
Qty: 42 TABLET | Refills: 3 | Status: CANCELLED | OUTPATIENT
Start: 2019-01-21

## 2019-01-21 ASSESSMENT — PAIN SCALES - GENERAL: PAINLEVEL: NO PAIN (0)

## 2019-01-21 NOTE — PROGRESS NOTES
Tiana Weathers is here today in follow-up of locally recurrent pancreatic cancer.    She was originally diagnosed more than 5 years ago, had neoadjuvant gem/Abraxane followed by a successful Whipple followed by 4 months of adjuvant gemcitabine.  She had a biopsy proven regional recurrence more than 4 years ago and had initial treatment with FOLFIRINOX which she did not tolerate.  She subsequently received XELOX, with the oxaliplatin dropped after several months for neuropathy, and now has been on single agent Xeloda for just about 4 years.  She is here today for ongoing response assessment.  Since I saw her last she continues to feel quite well with good appetite and energy.  Although slender as always she reports no weight loss.  She continues to take her Creon though at a relatively low dose.  She has no gas, diarrhea or steatorrhea.  She has ongoing hand-foot toxicity which does not anyway limit her activity and otherwise is unaware of any significant toxicity from her treatment.  A 10 point complete review of systems is otherwise negative.    On physical exam Ms. Weathers appears well with unremarkable vital signs.  She has no apparent scalp lesion.  She has no adenopathy palpable in her neck or supraclavicular spaces.    She has no icterus.  She has no oral lesions other than some very mild chelitis.  Her lungs are clear to auscultation without dullness to percussion.  Her heart rate and rhythm are regular.  Her jugular venous pressure appears normal.  Her abdomen is soft and nontender without palpable mass organomegaly.  She has no peripheral edema no tenderness in the calves or thighs.  She has marked dry desquamation on her palms without any fissuring or blistering and no significant tenderness.  Her speech is fluent.  Her cranial nerves are grossly intact.  Her gait and station are unremarkable.    I reviewed with her her CT scan and lab work.  She has normal electrolytes, renal function, will liver enzymes and  blood counts remarkable only for mild macrocytic anemia consistent with her chemotherapy.  She is a CA-19-9 not produce her.  Her CT scan shows no evidence of her disease currently.  Radiology questioned whether there might be some increased thickening of the endometrial stripe.    Assessment/plan: Regionally recurrent pancreatic cancer.  Patient has had a remarkably long period of control with reasonable level of toxicity from a very low dose of capecitabine (currently a gram in the morning and 1/2 g in the evening with a 2 weeks on, 1 week off schedule.)  We talked some about whether stopping or perhaps stretching out her time off treatment to 2 weeks made sense and she appropriately does not want to make any changes at this point.  I went over the questionable endometrial thickening and at this point in the face of her recurrent pancreatic cancer I do not think we need to be too aggressive in working that up.  We will get a view of that on her next CT scan and can work it up further at that point. In this context we also talked some about whether other typical screening makes sense since she has survived so long already but decided for now to defer other cancer screenings.  We will continue her current dose and schedule of capecitabine and plan on seeing her back in 4 months with repeat CT scan and labs

## 2019-01-21 NOTE — LETTER
1/21/2019      RE: Emelia Weathers  [address]  [place] [address]       Tiana Weathers is here today in follow-up of locally recurrent pancreatic cancer.    She was originally diagnosed more than 5 years ago, had neoadjuvant gem/Abraxane followed by a successful Whipple followed by 4 months of adjuvant gemcitabine.  She had a biopsy proven regional recurrence more than 4 years ago and had initial treatment with FOLFIRINOX which she did not tolerate.  She subsequently received XELOX, with the oxaliplatin dropped after several months for neuropathy, and now has been on single agent Xeloda for just about 4 years.  She is here today for ongoing response assessment.  Since I saw her last she continues to feel quite well with good appetite and energy.  Although slender as always she reports no weight loss.  She continues to take her Creon though at a relatively low dose.  She has no gas, diarrhea or steatorrhea.  She has ongoing hand-foot toxicity which does not anyway limit her activity and otherwise is unaware of any significant toxicity from her treatment.  A 10 point complete review of systems is otherwise negative.    On physical exam Ms. Weathers appears well with unremarkable vital signs.  She has no apparent scalp lesion.  She has no adenopathy palpable in her neck or supraclavicular spaces.    She has no icterus.  She has no oral lesions other than some very mild chelitis.  Her lungs are clear to auscultation without dullness to percussion.  Her heart rate and rhythm are regular.  Her jugular venous pressure appears normal.  Her abdomen is soft and nontender without palpable mass organomegaly.  She has no peripheral edema no tenderness in the calves or thighs.  She has marked dry desquamation on her palms without any fissuring or blistering and no significant tenderness.  Her speech is fluent.  Her cranial nerves are grossly intact.  Her gait and station are unremarkable.    I reviewed with her her CT  scan and lab work.  She has normal electrolytes, renal function, will liver enzymes and blood counts remarkable only for mild macrocytic anemia consistent with her chemotherapy.  She is a CA-19-9 not produce her.  Her CT scan shows no evidence of her disease currently.  Radiology questioned whether there might be some increased thickening of the endometrial stripe.    Assessment/plan: Regionally recurrent pancreatic cancer.  Patient has had a remarkably long period of control with reasonable level of toxicity from a very low dose of capecitabine (currently a gram in the morning and 1/2 g in the evening with a 2 weeks on, 1 week off schedule.)  We talked some about whether stopping or perhaps stretching out her time off treatment to 2 weeks made sense and she appropriately does not want to make any changes at this point.  I went over the questionable endometrial thickening and at this point in the face of her recurrent pancreatic cancer I do not think we need to be too aggressive in working that up.  We will get a view of that on her next CT scan and can work it up further at that point. In this context we also talked some about whether other typical screening makes sense since she has survived so long already but decided for now to defer other cancer screenings.  We will continue her current dose and schedule of capecitabine and plan on seeing her back in 4 months with repeat CT scan and labs    Yasmany Wade MD

## 2019-01-21 NOTE — NURSING NOTE
"Oncology Rooming Note    January 21, 2019 7:19 AM   Emelia Weathers is a 63 year old female who presents for:    Chief Complaint   Patient presents with     Oncology Clinic Visit     Pancreatic Ca , CT results      Initial Vitals: /84   Pulse 77   Temp 95.4  F (35.2  C) (Oral)   Resp 16   Wt 55.9 kg (123 lb 3.8 oz)   SpO2 100%   BMI 19.89 kg/m   Estimated body mass index is 19.89 kg/m  as calculated from the following:    Height as of 5/21/18: 1.676 m (5' 6\").    Weight as of this encounter: 55.9 kg (123 lb 3.8 oz). Body surface area is 1.61 meters squared.  No Pain (0) Comment: Data Unavailable   No LMP recorded. Patient is postmenopausal.  Allergies reviewed: Yes  Medications reviewed: Yes    Medications: MEDICATION REFILLS NEEDED TODAY. Provider was notified.  Pharmacy name entered into [employer]:    [id number] - [place], [place] - [address] [place] [address] PHARM[address] [facility] - [place], [place] - [address] [facility] [place], [place] - [address] [place] [address] [phone]  [id number] [place], [place] - [address] [place] [address] [facility] - [place], [place] - [address] [place] [address]    Clinical concerns: Refill needed  Mauro was notified.    8 minutes for nursing intake (face to face time)     Saba Dotson MA              "

## 2019-02-11 DIAGNOSIS — C25.0 MALIGNANT NEOPLASM OF HEAD OF PANCREAS (H): ICD-10-CM

## 2019-02-11 LAB
ALBUMIN SERPL-MCNC: 3.6 G/DL (ref 3.4–5)
ALP SERPL-CCNC: 161 U/L (ref 40–150)
ALT SERPL W P-5'-P-CCNC: 30 U/L (ref 0–50)
ANION GAP SERPL CALCULATED.3IONS-SCNC: 6 MMOL/L (ref 3–14)
AST SERPL W P-5'-P-CCNC: 29 U/L (ref 0–45)
BASOPHILS # BLD AUTO: 0 10E9/L (ref 0–0.2)
BASOPHILS NFR BLD AUTO: 0.3 %
BILIRUB SERPL-MCNC: 0.6 MG/DL (ref 0.2–1.3)
BUN SERPL-MCNC: 11 MG/DL (ref 7–30)
CALCIUM SERPL-MCNC: 8.6 MG/DL (ref 8.5–10.1)
CHLORIDE SERPL-SCNC: 107 MMOL/L (ref 94–109)
CO2 SERPL-SCNC: 27 MMOL/L (ref 20–32)
CREAT SERPL-MCNC: 0.72 MG/DL (ref 0.52–1.04)
DIFFERENTIAL METHOD BLD: ABNORMAL
EOSINOPHIL # BLD AUTO: 0 10E9/L (ref 0–0.7)
EOSINOPHIL NFR BLD AUTO: 0.7 %
ERYTHROCYTE [DISTWIDTH] IN BLOOD BY AUTOMATED COUNT: 18.7 % (ref 10–15)
GFR SERPL CREATININE-BSD FRML MDRD: 87 ML/MIN/{1.73_M2}
GLUCOSE SERPL-MCNC: 121 MG/DL (ref 70–99)
HCT VFR BLD AUTO: 34.4 % (ref 35–47)
HGB BLD-MCNC: 11 G/DL (ref 11.7–15.7)
IMM GRANULOCYTES # BLD: 0 10E9/L (ref 0–0.4)
IMM GRANULOCYTES NFR BLD: 0.3 %
LYMPHOCYTES # BLD AUTO: 1.2 10E9/L (ref 0.8–5.3)
LYMPHOCYTES NFR BLD AUTO: 19.5 %
MCH RBC QN AUTO: 32.1 PG (ref 26.5–33)
MCHC RBC AUTO-ENTMCNC: 32 G/DL (ref 31.5–36.5)
MCV RBC AUTO: 100 FL (ref 78–100)
MONOCYTES # BLD AUTO: 0.3 10E9/L (ref 0–1.3)
MONOCYTES NFR BLD AUTO: 5.5 %
NEUTROPHILS # BLD AUTO: 4.4 10E9/L (ref 1.6–8.3)
NEUTROPHILS NFR BLD AUTO: 73.7 %
NRBC # BLD AUTO: 0 10*3/UL
NRBC BLD AUTO-RTO: 0 /100
PLATELET # BLD AUTO: 200 10E9/L (ref 150–450)
POTASSIUM SERPL-SCNC: 3.6 MMOL/L (ref 3.4–5.3)
PROT SERPL-MCNC: 6.9 G/DL (ref 6.8–8.8)
RBC # BLD AUTO: 3.43 10E12/L (ref 3.8–5.2)
SODIUM SERPL-SCNC: 141 MMOL/L (ref 133–144)
WBC # BLD AUTO: 6 10E9/L (ref 4–11)

## 2019-02-11 PROCEDURE — 25000128 H RX IP 250 OP 636: Performed by: INTERNAL MEDICINE

## 2019-02-11 PROCEDURE — 85025 COMPLETE CBC W/AUTO DIFF WBC: CPT | Performed by: INTERNAL MEDICINE

## 2019-02-11 PROCEDURE — 36591 DRAW BLOOD OFF VENOUS DEVICE: CPT

## 2019-02-11 PROCEDURE — 80053 COMPREHEN METABOLIC PANEL: CPT | Performed by: INTERNAL MEDICINE

## 2019-02-11 RX ORDER — HEPARIN SODIUM (PORCINE) LOCK FLUSH IV SOLN 100 UNIT/ML 100 UNIT/ML
5 SOLUTION INTRAVENOUS ONCE
Status: COMPLETED | OUTPATIENT
Start: 2019-02-11 | End: 2019-02-11

## 2019-02-11 RX ADMIN — HEPARIN 5 ML: 100 SYRINGE at 13:14

## 2019-03-04 DIAGNOSIS — C25.0 MALIGNANT NEOPLASM OF HEAD OF PANCREAS (H): ICD-10-CM

## 2019-03-04 LAB
ALBUMIN SERPL-MCNC: 3.6 G/DL (ref 3.4–5)
ALP SERPL-CCNC: 162 U/L (ref 40–150)
ALT SERPL W P-5'-P-CCNC: 29 U/L (ref 0–50)
ANION GAP SERPL CALCULATED.3IONS-SCNC: 4 MMOL/L (ref 3–14)
AST SERPL W P-5'-P-CCNC: 28 U/L (ref 0–45)
BASOPHILS # BLD AUTO: 0 10E9/L (ref 0–0.2)
BASOPHILS NFR BLD AUTO: 0.2 %
BILIRUB SERPL-MCNC: 0.4 MG/DL (ref 0.2–1.3)
BUN SERPL-MCNC: 10 MG/DL (ref 7–30)
CALCIUM SERPL-MCNC: 8.4 MG/DL (ref 8.5–10.1)
CHLORIDE SERPL-SCNC: 107 MMOL/L (ref 94–109)
CO2 SERPL-SCNC: 27 MMOL/L (ref 20–32)
CREAT SERPL-MCNC: 0.7 MG/DL (ref 0.52–1.04)
DIFFERENTIAL METHOD BLD: ABNORMAL
EOSINOPHIL # BLD AUTO: 0.1 10E9/L (ref 0–0.7)
EOSINOPHIL NFR BLD AUTO: 0.8 %
ERYTHROCYTE [DISTWIDTH] IN BLOOD BY AUTOMATED COUNT: 18.9 % (ref 10–15)
GFR SERPL CREATININE-BSD FRML MDRD: >90 ML/MIN/{1.73_M2}
GLUCOSE SERPL-MCNC: 73 MG/DL (ref 70–99)
HCT VFR BLD AUTO: 33.8 % (ref 35–47)
HGB BLD-MCNC: 11.2 G/DL (ref 11.7–15.7)
IMM GRANULOCYTES # BLD: 0 10E9/L (ref 0–0.4)
IMM GRANULOCYTES NFR BLD: 0.2 %
LYMPHOCYTES # BLD AUTO: 1 10E9/L (ref 0.8–5.3)
LYMPHOCYTES NFR BLD AUTO: 16.6 %
MCH RBC QN AUTO: 33.4 PG (ref 26.5–33)
MCHC RBC AUTO-ENTMCNC: 33.1 G/DL (ref 31.5–36.5)
MCV RBC AUTO: 101 FL (ref 78–100)
MONOCYTES # BLD AUTO: 0.5 10E9/L (ref 0–1.3)
MONOCYTES NFR BLD AUTO: 8 %
NEUTROPHILS # BLD AUTO: 4.4 10E9/L (ref 1.6–8.3)
NEUTROPHILS NFR BLD AUTO: 74.2 %
NRBC # BLD AUTO: 0 10*3/UL
NRBC BLD AUTO-RTO: 0 /100
PLATELET # BLD AUTO: 197 10E9/L (ref 150–450)
POTASSIUM SERPL-SCNC: 4 MMOL/L (ref 3.4–5.3)
PROT SERPL-MCNC: 7 G/DL (ref 6.8–8.8)
RBC # BLD AUTO: 3.35 10E12/L (ref 3.8–5.2)
SODIUM SERPL-SCNC: 138 MMOL/L (ref 133–144)
WBC # BLD AUTO: 5.9 10E9/L (ref 4–11)

## 2019-03-04 PROCEDURE — 80053 COMPREHEN METABOLIC PANEL: CPT | Performed by: INTERNAL MEDICINE

## 2019-03-04 PROCEDURE — 36591 DRAW BLOOD OFF VENOUS DEVICE: CPT

## 2019-03-04 PROCEDURE — 85025 COMPLETE CBC W/AUTO DIFF WBC: CPT | Performed by: INTERNAL MEDICINE

## 2019-03-25 DIAGNOSIS — C25.0 MALIGNANT NEOPLASM OF HEAD OF PANCREAS (H): ICD-10-CM

## 2019-03-25 LAB
ALBUMIN SERPL-MCNC: 3.7 G/DL (ref 3.4–5)
ALP SERPL-CCNC: 157 U/L (ref 40–150)
ALT SERPL W P-5'-P-CCNC: 34 U/L (ref 0–50)
ANION GAP SERPL CALCULATED.3IONS-SCNC: 5 MMOL/L (ref 3–14)
AST SERPL W P-5'-P-CCNC: 39 U/L (ref 0–45)
BASOPHILS # BLD AUTO: 0 10E9/L (ref 0–0.2)
BASOPHILS NFR BLD AUTO: 0.4 %
BILIRUB SERPL-MCNC: 0.6 MG/DL (ref 0.2–1.3)
BUN SERPL-MCNC: 9 MG/DL (ref 7–30)
CALCIUM SERPL-MCNC: 8.9 MG/DL (ref 8.5–10.1)
CHLORIDE SERPL-SCNC: 106 MMOL/L (ref 94–109)
CO2 SERPL-SCNC: 28 MMOL/L (ref 20–32)
CREAT SERPL-MCNC: 0.69 MG/DL (ref 0.52–1.04)
DIFFERENTIAL METHOD BLD: ABNORMAL
EOSINOPHIL # BLD AUTO: 0.1 10E9/L (ref 0–0.7)
EOSINOPHIL NFR BLD AUTO: 1.5 %
ERYTHROCYTE [DISTWIDTH] IN BLOOD BY AUTOMATED COUNT: 19 % (ref 10–15)
GFR SERPL CREATININE-BSD FRML MDRD: >90 ML/MIN/{1.73_M2}
GLUCOSE SERPL-MCNC: 74 MG/DL (ref 70–99)
HCT VFR BLD AUTO: 34.8 % (ref 35–47)
HGB BLD-MCNC: 11.4 G/DL (ref 11.7–15.7)
IMM GRANULOCYTES # BLD: 0 10E9/L (ref 0–0.4)
IMM GRANULOCYTES NFR BLD: 0.2 %
LYMPHOCYTES # BLD AUTO: 1.1 10E9/L (ref 0.8–5.3)
LYMPHOCYTES NFR BLD AUTO: 21.1 %
MCH RBC QN AUTO: 32.9 PG (ref 26.5–33)
MCHC RBC AUTO-ENTMCNC: 32.8 G/DL (ref 31.5–36.5)
MCV RBC AUTO: 101 FL (ref 78–100)
MONOCYTES # BLD AUTO: 0.4 10E9/L (ref 0–1.3)
MONOCYTES NFR BLD AUTO: 7.6 %
NEUTROPHILS # BLD AUTO: 3.6 10E9/L (ref 1.6–8.3)
NEUTROPHILS NFR BLD AUTO: 69.2 %
NRBC # BLD AUTO: 0 10*3/UL
NRBC BLD AUTO-RTO: 0 /100
PLATELET # BLD AUTO: 198 10E9/L (ref 150–450)
POTASSIUM SERPL-SCNC: 3.9 MMOL/L (ref 3.4–5.3)
PROT SERPL-MCNC: 6.8 G/DL (ref 6.8–8.8)
RBC # BLD AUTO: 3.46 10E12/L (ref 3.8–5.2)
SODIUM SERPL-SCNC: 139 MMOL/L (ref 133–144)
WBC # BLD AUTO: 5.3 10E9/L (ref 4–11)

## 2019-03-25 PROCEDURE — 25000128 H RX IP 250 OP 636: Performed by: INTERNAL MEDICINE

## 2019-03-25 PROCEDURE — 80053 COMPREHEN METABOLIC PANEL: CPT | Performed by: INTERNAL MEDICINE

## 2019-03-25 PROCEDURE — 85025 COMPLETE CBC W/AUTO DIFF WBC: CPT | Performed by: INTERNAL MEDICINE

## 2019-03-25 RX ORDER — HEPARIN SODIUM (PORCINE) LOCK FLUSH IV SOLN 100 UNIT/ML 100 UNIT/ML
5 SOLUTION INTRAVENOUS EVERY 8 HOURS
Status: DISCONTINUED | OUTPATIENT
Start: 2019-03-25 | End: 2019-04-02 | Stop reason: HOSPADM

## 2019-03-25 RX ADMIN — HEPARIN SODIUM (PORCINE) LOCK FLUSH IV SOLN 100 UNIT/ML 5 ML: 100 SOLUTION at 10:32

## 2019-03-25 NOTE — NURSING NOTE
Chief Complaint   Patient presents with     Port Draw     Labs drawn via PORT by RN in lab. Line flushed with saline and heparin. VS taken.      Tara Mesa RN

## 2019-04-02 ENCOUNTER — TELEPHONE (OUTPATIENT)
Dept: ONCOLOGY | Facility: CLINIC | Age: 64
End: 2019-04-02

## 2019-04-02 NOTE — TELEPHONE ENCOUNTER
LTD paperwork completed, signed and faxed to Lauro @ [phone]. A copy was made, filed and original mailed to patient at home address.    ANDREW BRADLEY CMA

## 2019-04-10 DIAGNOSIS — C25.0 MALIGNANT NEOPLASM OF HEAD OF PANCREAS (H): Primary | ICD-10-CM

## 2019-04-10 DIAGNOSIS — C25.0 MALIGNANT NEOPLASM OF HEAD OF PANCREAS (H): ICD-10-CM

## 2019-04-10 RX ORDER — CAPECITABINE 500 MG/1
TABLET, FILM COATED ORAL
Qty: 42 TABLET | Refills: 3 | Status: SHIPPED | OUTPATIENT
Start: 2019-04-10 | End: 2019-04-11

## 2019-04-11 DIAGNOSIS — C25.0 MALIGNANT NEOPLASM OF HEAD OF PANCREAS (H): ICD-10-CM

## 2019-04-11 RX ORDER — CAPECITABINE 500 MG/1
TABLET, FILM COATED ORAL
Qty: 42 TABLET | Refills: 3 | Status: SHIPPED | OUTPATIENT
Start: 2019-04-11 | End: 2019-09-09

## 2019-04-16 DIAGNOSIS — C25.0 MALIGNANT NEOPLASM OF HEAD OF PANCREAS (H): ICD-10-CM

## 2019-04-16 LAB
ALBUMIN SERPL-MCNC: 3.5 G/DL (ref 3.4–5)
ALP SERPL-CCNC: 119 U/L (ref 40–150)
ALT SERPL W P-5'-P-CCNC: 28 U/L (ref 0–50)
ANION GAP SERPL CALCULATED.3IONS-SCNC: 4 MMOL/L (ref 3–14)
AST SERPL W P-5'-P-CCNC: 27 U/L (ref 0–45)
BASOPHILS # BLD AUTO: 0 10E9/L (ref 0–0.2)
BASOPHILS NFR BLD AUTO: 0.4 %
BILIRUB SERPL-MCNC: 0.5 MG/DL (ref 0.2–1.3)
BUN SERPL-MCNC: 10 MG/DL (ref 7–30)
CALCIUM SERPL-MCNC: 8.9 MG/DL (ref 8.5–10.1)
CHLORIDE SERPL-SCNC: 108 MMOL/L (ref 94–109)
CO2 SERPL-SCNC: 27 MMOL/L (ref 20–32)
CREAT SERPL-MCNC: 0.68 MG/DL (ref 0.52–1.04)
DIFFERENTIAL METHOD BLD: ABNORMAL
EOSINOPHIL # BLD AUTO: 0.1 10E9/L (ref 0–0.7)
EOSINOPHIL NFR BLD AUTO: 1.1 %
ERYTHROCYTE [DISTWIDTH] IN BLOOD BY AUTOMATED COUNT: 18.9 % (ref 10–15)
GFR SERPL CREATININE-BSD FRML MDRD: >90 ML/MIN/{1.73_M2}
GLUCOSE SERPL-MCNC: 69 MG/DL (ref 70–99)
HCT VFR BLD AUTO: 32.3 % (ref 35–47)
HGB BLD-MCNC: 10.6 G/DL (ref 11.7–15.7)
IMM GRANULOCYTES # BLD: 0 10E9/L (ref 0–0.4)
IMM GRANULOCYTES NFR BLD: 0.4 %
LYMPHOCYTES # BLD AUTO: 1.1 10E9/L (ref 0.8–5.3)
LYMPHOCYTES NFR BLD AUTO: 23.1 %
MCH RBC QN AUTO: 33.2 PG (ref 26.5–33)
MCHC RBC AUTO-ENTMCNC: 32.8 G/DL (ref 31.5–36.5)
MCV RBC AUTO: 101 FL (ref 78–100)
MONOCYTES # BLD AUTO: 0.4 10E9/L (ref 0–1.3)
MONOCYTES NFR BLD AUTO: 9 %
NEUTROPHILS # BLD AUTO: 3 10E9/L (ref 1.6–8.3)
NEUTROPHILS NFR BLD AUTO: 66 %
NRBC # BLD AUTO: 0 10*3/UL
NRBC BLD AUTO-RTO: 0 /100
PLATELET # BLD AUTO: 179 10E9/L (ref 150–450)
POTASSIUM SERPL-SCNC: 4 MMOL/L (ref 3.4–5.3)
PROT SERPL-MCNC: 6.6 G/DL (ref 6.8–8.8)
RBC # BLD AUTO: 3.19 10E12/L (ref 3.8–5.2)
SODIUM SERPL-SCNC: 139 MMOL/L (ref 133–144)
WBC # BLD AUTO: 4.6 10E9/L (ref 4–11)

## 2019-04-16 PROCEDURE — 25000128 H RX IP 250 OP 636: Performed by: INTERNAL MEDICINE

## 2019-04-16 PROCEDURE — 85025 COMPLETE CBC W/AUTO DIFF WBC: CPT | Performed by: NURSE PRACTITIONER

## 2019-04-16 PROCEDURE — 80053 COMPREHEN METABOLIC PANEL: CPT | Performed by: NURSE PRACTITIONER

## 2019-04-16 RX ORDER — HEPARIN SODIUM (PORCINE) LOCK FLUSH IV SOLN 100 UNIT/ML 100 UNIT/ML
5 SOLUTION INTRAVENOUS EVERY 8 HOURS PRN
Status: DISCONTINUED | OUTPATIENT
Start: 2019-04-16 | End: 2019-04-24 | Stop reason: HOSPADM

## 2019-04-16 RX ADMIN — HEPARIN 5 ML: 100 SYRINGE at 11:30

## 2019-04-16 NOTE — NURSING NOTE
Chief Complaint   Patient presents with     Port Draw     Labs drawn via port by RN in lab. Line flushed and hep locked, then de-accessed. VS taken.     Shu Fajardo RN

## 2019-05-06 DIAGNOSIS — C25.0 MALIGNANT NEOPLASM OF HEAD OF PANCREAS (H): ICD-10-CM

## 2019-05-06 LAB
ALBUMIN SERPL-MCNC: 3.6 G/DL (ref 3.4–5)
ALP SERPL-CCNC: 126 U/L (ref 40–150)
ALT SERPL W P-5'-P-CCNC: 32 U/L (ref 0–50)
ANION GAP SERPL CALCULATED.3IONS-SCNC: 5 MMOL/L (ref 3–14)
AST SERPL W P-5'-P-CCNC: 32 U/L (ref 0–45)
BASOPHILS # BLD AUTO: 0 10E9/L (ref 0–0.2)
BASOPHILS NFR BLD AUTO: 0.4 %
BILIRUB SERPL-MCNC: 0.5 MG/DL (ref 0.2–1.3)
BUN SERPL-MCNC: 7 MG/DL (ref 7–30)
CALCIUM SERPL-MCNC: 9 MG/DL (ref 8.5–10.1)
CHLORIDE SERPL-SCNC: 108 MMOL/L (ref 94–109)
CO2 SERPL-SCNC: 26 MMOL/L (ref 20–32)
CREAT SERPL-MCNC: 0.71 MG/DL (ref 0.52–1.04)
DIFFERENTIAL METHOD BLD: ABNORMAL
EOSINOPHIL # BLD AUTO: 0.1 10E9/L (ref 0–0.7)
EOSINOPHIL NFR BLD AUTO: 1 %
ERYTHROCYTE [DISTWIDTH] IN BLOOD BY AUTOMATED COUNT: 19.1 % (ref 10–15)
GFR SERPL CREATININE-BSD FRML MDRD: 89 ML/MIN/{1.73_M2}
GLUCOSE SERPL-MCNC: 73 MG/DL (ref 70–99)
HCT VFR BLD AUTO: 34 % (ref 35–47)
HGB BLD-MCNC: 11.1 G/DL (ref 11.7–15.7)
IMM GRANULOCYTES # BLD: 0 10E9/L (ref 0–0.4)
IMM GRANULOCYTES NFR BLD: 0.4 %
LYMPHOCYTES # BLD AUTO: 1.1 10E9/L (ref 0.8–5.3)
LYMPHOCYTES NFR BLD AUTO: 22.2 %
MCH RBC QN AUTO: 33.1 PG (ref 26.5–33)
MCHC RBC AUTO-ENTMCNC: 32.6 G/DL (ref 31.5–36.5)
MCV RBC AUTO: 102 FL (ref 78–100)
MONOCYTES # BLD AUTO: 0.4 10E9/L (ref 0–1.3)
MONOCYTES NFR BLD AUTO: 8.9 %
NEUTROPHILS # BLD AUTO: 3.3 10E9/L (ref 1.6–8.3)
NEUTROPHILS NFR BLD AUTO: 67.1 %
NRBC # BLD AUTO: 0 10*3/UL
NRBC BLD AUTO-RTO: 0 /100
PLATELET # BLD AUTO: 197 10E9/L (ref 150–450)
POTASSIUM SERPL-SCNC: 4.3 MMOL/L (ref 3.4–5.3)
PROT SERPL-MCNC: 7 G/DL (ref 6.8–8.8)
RBC # BLD AUTO: 3.35 10E12/L (ref 3.8–5.2)
SODIUM SERPL-SCNC: 139 MMOL/L (ref 133–144)
WBC # BLD AUTO: 5 10E9/L (ref 4–11)

## 2019-05-06 PROCEDURE — 80053 COMPREHEN METABOLIC PANEL: CPT | Performed by: INTERNAL MEDICINE

## 2019-05-06 PROCEDURE — 85025 COMPLETE CBC W/AUTO DIFF WBC: CPT | Performed by: INTERNAL MEDICINE

## 2019-05-06 PROCEDURE — 25000128 H RX IP 250 OP 636: Performed by: INTERNAL MEDICINE

## 2019-05-06 RX ORDER — HEPARIN SODIUM (PORCINE) LOCK FLUSH IV SOLN 100 UNIT/ML 100 UNIT/ML
5 SOLUTION INTRAVENOUS ONCE
Status: COMPLETED | OUTPATIENT
Start: 2019-05-06 | End: 2019-05-06

## 2019-05-06 RX ADMIN — Medication 5 ML: at 11:10

## 2019-05-17 ENCOUNTER — ANCILLARY PROCEDURE (OUTPATIENT)
Dept: CT IMAGING | Facility: CLINIC | Age: 64
End: 2019-05-17
Payer: MEDICARE

## 2019-05-17 DIAGNOSIS — C25.0 MALIGNANT NEOPLASM OF HEAD OF PANCREAS (H): ICD-10-CM

## 2019-05-17 LAB
ALBUMIN SERPL-MCNC: 3.7 G/DL (ref 3.4–5)
ALP SERPL-CCNC: 120 U/L (ref 40–150)
ALT SERPL W P-5'-P-CCNC: 31 U/L (ref 0–50)
ANION GAP SERPL CALCULATED.3IONS-SCNC: 7 MMOL/L (ref 3–14)
AST SERPL W P-5'-P-CCNC: 36 U/L (ref 0–45)
BASOPHILS # BLD AUTO: 0 10E9/L (ref 0–0.2)
BASOPHILS NFR BLD AUTO: 0.5 %
BILIRUB SERPL-MCNC: 0.5 MG/DL (ref 0.2–1.3)
BUN SERPL-MCNC: 7 MG/DL (ref 7–30)
CALCIUM SERPL-MCNC: 8.9 MG/DL (ref 8.5–10.1)
CHLORIDE SERPL-SCNC: 109 MMOL/L (ref 94–109)
CO2 SERPL-SCNC: 25 MMOL/L (ref 20–32)
CREAT SERPL-MCNC: 0.71 MG/DL (ref 0.52–1.04)
DIFFERENTIAL METHOD BLD: ABNORMAL
EOSINOPHIL # BLD AUTO: 0.1 10E9/L (ref 0–0.7)
EOSINOPHIL NFR BLD AUTO: 1.1 %
ERYTHROCYTE [DISTWIDTH] IN BLOOD BY AUTOMATED COUNT: 18.5 % (ref 10–15)
GFR SERPL CREATININE-BSD FRML MDRD: 88 ML/MIN/{1.73_M2}
GLUCOSE SERPL-MCNC: 92 MG/DL (ref 70–99)
HCT VFR BLD AUTO: 33.4 % (ref 35–47)
HGB BLD-MCNC: 10.8 G/DL (ref 11.7–15.7)
IMM GRANULOCYTES # BLD: 0 10E9/L (ref 0–0.4)
IMM GRANULOCYTES NFR BLD: 0.2 %
LYMPHOCYTES # BLD AUTO: 1.1 10E9/L (ref 0.8–5.3)
LYMPHOCYTES NFR BLD AUTO: 25.3 %
MCH RBC QN AUTO: 32.9 PG (ref 26.5–33)
MCHC RBC AUTO-ENTMCNC: 32.3 G/DL (ref 31.5–36.5)
MCV RBC AUTO: 102 FL (ref 78–100)
MONOCYTES # BLD AUTO: 0.4 10E9/L (ref 0–1.3)
MONOCYTES NFR BLD AUTO: 8.4 %
NEUTROPHILS # BLD AUTO: 2.8 10E9/L (ref 1.6–8.3)
NEUTROPHILS NFR BLD AUTO: 64.5 %
NRBC # BLD AUTO: 0 10*3/UL
NRBC BLD AUTO-RTO: 0 /100
PLATELET # BLD AUTO: 160 10E9/L (ref 150–450)
POTASSIUM SERPL-SCNC: 4 MMOL/L (ref 3.4–5.3)
PROT SERPL-MCNC: 6.8 G/DL (ref 6.8–8.8)
RBC # BLD AUTO: 3.28 10E12/L (ref 3.8–5.2)
SODIUM SERPL-SCNC: 141 MMOL/L (ref 133–144)
WBC # BLD AUTO: 4.4 10E9/L (ref 4–11)

## 2019-05-17 PROCEDURE — 25000128 H RX IP 250 OP 636: Performed by: INTERNAL MEDICINE

## 2019-05-17 PROCEDURE — 85025 COMPLETE CBC W/AUTO DIFF WBC: CPT | Performed by: INTERNAL MEDICINE

## 2019-05-17 PROCEDURE — 80053 COMPREHEN METABOLIC PANEL: CPT | Performed by: INTERNAL MEDICINE

## 2019-05-17 RX ORDER — HEPARIN SODIUM (PORCINE) LOCK FLUSH IV SOLN 100 UNIT/ML 100 UNIT/ML
5 SOLUTION INTRAVENOUS ONCE
Status: COMPLETED | OUTPATIENT
Start: 2019-05-17 | End: 2019-05-17

## 2019-05-17 RX ORDER — HEPARIN SODIUM (PORCINE) LOCK FLUSH IV SOLN 100 UNIT/ML 100 UNIT/ML
5 SOLUTION INTRAVENOUS
Status: COMPLETED | OUTPATIENT
Start: 2019-05-17 | End: 2019-05-17

## 2019-05-17 RX ORDER — IOPAMIDOL 755 MG/ML
76 INJECTION, SOLUTION INTRAVASCULAR ONCE
Status: COMPLETED | OUTPATIENT
Start: 2019-05-17 | End: 2019-05-17

## 2019-05-17 RX ADMIN — IOPAMIDOL 76 ML: 755 INJECTION, SOLUTION INTRAVASCULAR at 09:53

## 2019-05-17 RX ADMIN — HEPARIN SODIUM (PORCINE) LOCK FLUSH IV SOLN 100 UNIT/ML 5 ML: 100 SOLUTION at 10:12

## 2019-05-17 RX ADMIN — HEPARIN 5 ML: 100 SYRINGE at 08:56

## 2019-05-17 NOTE — NURSING NOTE
"Chief Complaint   Patient presents with     Lab Only     labs drawn from port by rn.      Port accessed with 20 gauge 3/4\" Power needle and labs drawn by rn.  Port flushed with NS and heparin then de-accessed.  Pt tolerated well.      Quin Perdue RN      "

## 2019-05-17 NOTE — DISCHARGE INSTRUCTIONS

## 2019-05-20 ENCOUNTER — ONCOLOGY VISIT (OUTPATIENT)
Dept: ONCOLOGY | Facility: CLINIC | Age: 64
End: 2019-05-20
Attending: INTERNAL MEDICINE
Payer: MEDICARE

## 2019-05-20 VITALS
WEIGHT: 122.8 LBS | OXYGEN SATURATION: 99 % | HEART RATE: 69 BPM | DIASTOLIC BLOOD PRESSURE: 77 MMHG | BODY MASS INDEX: 19.82 KG/M2 | SYSTOLIC BLOOD PRESSURE: 149 MMHG | TEMPERATURE: 97.2 F

## 2019-05-20 DIAGNOSIS — C25.0 MALIGNANT NEOPLASM OF HEAD OF PANCREAS (H): Primary | ICD-10-CM

## 2019-05-20 PROCEDURE — 99214 OFFICE O/P EST MOD 30 MIN: CPT | Mod: ZP | Performed by: INTERNAL MEDICINE

## 2019-05-20 PROCEDURE — G0463 HOSPITAL OUTPT CLINIC VISIT: HCPCS | Mod: ZF

## 2019-05-20 ASSESSMENT — PAIN SCALES - GENERAL: PAINLEVEL: NO PAIN (0)

## 2019-05-20 NOTE — NURSING NOTE
"Oncology Rooming Note    May 20, 2019 7:17 AM   Emelia Weathers is a 63 year old female who presents for:    Chief Complaint   Patient presents with     Oncology Clinic Visit     Return; Pancreatic Ca     Initial Vitals: /77   Pulse 69   Temp 97.2  F (36.2  C) (Oral)   Wt 55.7 kg (122 lb 12.8 oz)   SpO2 99%   BMI 19.82 kg/m   Estimated body mass index is 19.82 kg/m  as calculated from the following:    Height as of 5/21/18: 1.676 m (5' 6\").    Weight as of this encounter: 55.7 kg (122 lb 12.8 oz). Body surface area is 1.61 meters squared.  No Pain (0) Comment: Data Unavailable   No LMP recorded. Patient is postmenopausal.  Allergies reviewed: Yes  Medications reviewed: Yes    Medications: Medication refills not needed today.  Pharmacy name entered into [employer]:    [id number] [place] [facility] - [place], [place] - [address] [place] [address] E  [place] [facility] - [place], [place] - [address] [facility] - [place], [place] - [address] [facility] [phone]  [id number] - [place], [place] - [address] [place] [address] [facility] - [place], [place] - [address] [place] [address]    Clinical concerns: No Concerns Mauro was NOT notified.      Shi Good CMA              "

## 2019-05-20 NOTE — PROGRESS NOTES
Tiana Weathers is here today in follow-up of recurrent pancreatic cancer.    She is status post Whipple with a biopsy-proven local recurrence more than 4 years ago.  She had initial treatment with combination therapy and ultimately had such poor tolerance that she ended up on single agent Xeloda.  She has now been on that for about 4 years with excellent control of her disease.  She has required signifcant dose reductions because of fairly severe hand-foot toxicity but with her current schedule continues to have only modest problems related to that.  She notes that the peeling of her hands is about the same as always.  She has no interference with function from that.  She has no blistering or fissuring.  She has rarely a mouth sore.  She has minimal to no diarrhea.  She has had no problems with fevers chills or sweats.  Her appetite is good and her weight is stable.  She continues to be physically quite active with a variety of outdoor activities.  Remainder of a 10 point review of systems is otherwise unremarkable.    On physical exam Ms. Weathers is quite slender as always but otherwise appears healthy.  Her vital signs are unremarkable.  She has no scleral icterus and no visible lesions in the oropharynx.  She has no adenopathy palpable in her neck or supraclavicular spaces.  Her lungs are clear to auscultation with dullness to percussion.  Her heart rate and rhythm are regular without audible murmur gallop.  The jugular venous pressure appears normal.  Her abdomen is soft and nontender without palpable mass or organomegaly.  She has no peripheral edema and no tenderness in her calves or thighs.  She has mild dry desquamation on her palms I did not examine her soles.  She has no cyanosis or clubbing of her digits.  Her speech is fluent and her cranial nerves are grossly intact.  Her gait and station are unremarkable.    I reviewed with the patient and her daughter her CT scan and lab work.  She has normal  electrolytes, renal function, liver enzymes, and mildly depressed blood counts as always.  Her CT scan shows no evidence of local progression or new sites of metastatic disease.  The previously questioned endometrial thickening is not commented on on the current scan and to my review of her uterus looks no different than previous.    Assessment/plan: Recurrent pancreatic cancer with ongoing complete disease control with single agent Xeloda.  We will continue on with the same reduced schedule and dose and reevaluate her disease status again in another 4 months.

## 2019-05-28 DIAGNOSIS — C25.0 MALIGNANT NEOPLASM OF HEAD OF PANCREAS (H): ICD-10-CM

## 2019-05-28 LAB
ALBUMIN SERPL-MCNC: 3.6 G/DL (ref 3.4–5)
ALP SERPL-CCNC: 124 U/L (ref 40–150)
ALT SERPL W P-5'-P-CCNC: 29 U/L (ref 0–50)
ANION GAP SERPL CALCULATED.3IONS-SCNC: 5 MMOL/L (ref 3–14)
AST SERPL W P-5'-P-CCNC: 29 U/L (ref 0–45)
BASOPHILS # BLD AUTO: 0 10E9/L (ref 0–0.2)
BASOPHILS NFR BLD AUTO: 0.4 %
BILIRUB SERPL-MCNC: 0.6 MG/DL (ref 0.2–1.3)
BUN SERPL-MCNC: 10 MG/DL (ref 7–30)
CALCIUM SERPL-MCNC: 8.8 MG/DL (ref 8.5–10.1)
CHLORIDE SERPL-SCNC: 108 MMOL/L (ref 94–109)
CO2 SERPL-SCNC: 25 MMOL/L (ref 20–32)
CREAT SERPL-MCNC: 0.68 MG/DL (ref 0.52–1.04)
DIFFERENTIAL METHOD BLD: ABNORMAL
EOSINOPHIL # BLD AUTO: 0.1 10E9/L (ref 0–0.7)
EOSINOPHIL NFR BLD AUTO: 1.1 %
ERYTHROCYTE [DISTWIDTH] IN BLOOD BY AUTOMATED COUNT: 18.6 % (ref 10–15)
GFR SERPL CREATININE-BSD FRML MDRD: >90 ML/MIN/{1.73_M2}
GLUCOSE SERPL-MCNC: 88 MG/DL (ref 70–99)
HCT VFR BLD AUTO: 32.6 % (ref 35–47)
HGB BLD-MCNC: 10.8 G/DL (ref 11.7–15.7)
IMM GRANULOCYTES # BLD: 0 10E9/L (ref 0–0.4)
IMM GRANULOCYTES NFR BLD: 0.4 %
LYMPHOCYTES # BLD AUTO: 1.1 10E9/L (ref 0.8–5.3)
LYMPHOCYTES NFR BLD AUTO: 19.6 %
MCH RBC QN AUTO: 33.6 PG (ref 26.5–33)
MCHC RBC AUTO-ENTMCNC: 33.1 G/DL (ref 31.5–36.5)
MCV RBC AUTO: 102 FL (ref 78–100)
MONOCYTES # BLD AUTO: 0.4 10E9/L (ref 0–1.3)
MONOCYTES NFR BLD AUTO: 6.6 %
NEUTROPHILS # BLD AUTO: 3.9 10E9/L (ref 1.6–8.3)
NEUTROPHILS NFR BLD AUTO: 71.9 %
NRBC # BLD AUTO: 0 10*3/UL
NRBC BLD AUTO-RTO: 0 /100
PLATELET # BLD AUTO: 190 10E9/L (ref 150–450)
POTASSIUM SERPL-SCNC: 3.9 MMOL/L (ref 3.4–5.3)
PROT SERPL-MCNC: 6.7 G/DL (ref 6.8–8.8)
RBC # BLD AUTO: 3.21 10E12/L (ref 3.8–5.2)
SODIUM SERPL-SCNC: 139 MMOL/L (ref 133–144)
WBC # BLD AUTO: 5.5 10E9/L (ref 4–11)

## 2019-05-28 PROCEDURE — 25000128 H RX IP 250 OP 636: Performed by: INTERNAL MEDICINE

## 2019-05-28 PROCEDURE — 80053 COMPREHEN METABOLIC PANEL: CPT | Performed by: INTERNAL MEDICINE

## 2019-05-28 PROCEDURE — 85025 COMPLETE CBC W/AUTO DIFF WBC: CPT | Performed by: INTERNAL MEDICINE

## 2019-05-28 RX ORDER — HEPARIN SODIUM (PORCINE) LOCK FLUSH IV SOLN 100 UNIT/ML 100 UNIT/ML
5 SOLUTION INTRAVENOUS ONCE
Status: COMPLETED | OUTPATIENT
Start: 2019-05-28 | End: 2019-05-28

## 2019-05-28 RX ADMIN — HEPARIN 5 ML: 100 SYRINGE at 10:41

## 2019-05-28 NOTE — NURSING NOTE
"Chief Complaint   Patient presents with     Port Draw     lab only appointment.  port accessed and labs drawn by rn.       Port accessed by RN in lab with 20g 3/4\" gripper needle, labs drawn, port flushed with saline and heparin, port de-accessed.  Meghna Leonardo RN      "

## 2019-06-17 DIAGNOSIS — C25.0 MALIGNANT NEOPLASM OF HEAD OF PANCREAS (H): ICD-10-CM

## 2019-06-17 LAB
ALBUMIN SERPL-MCNC: 3.5 G/DL (ref 3.4–5)
ALP SERPL-CCNC: 124 U/L (ref 40–150)
ALT SERPL W P-5'-P-CCNC: 35 U/L (ref 0–50)
ANION GAP SERPL CALCULATED.3IONS-SCNC: 5 MMOL/L (ref 3–14)
AST SERPL W P-5'-P-CCNC: 38 U/L (ref 0–45)
BASOPHILS # BLD AUTO: 0 10E9/L (ref 0–0.2)
BASOPHILS NFR BLD AUTO: 0.4 %
BILIRUB SERPL-MCNC: 0.6 MG/DL (ref 0.2–1.3)
BUN SERPL-MCNC: 10 MG/DL (ref 7–30)
CALCIUM SERPL-MCNC: 8.5 MG/DL (ref 8.5–10.1)
CHLORIDE SERPL-SCNC: 109 MMOL/L (ref 94–109)
CO2 SERPL-SCNC: 26 MMOL/L (ref 20–32)
CREAT SERPL-MCNC: 0.68 MG/DL (ref 0.52–1.04)
DIFFERENTIAL METHOD BLD: ABNORMAL
EOSINOPHIL # BLD AUTO: 0.1 10E9/L (ref 0–0.7)
EOSINOPHIL NFR BLD AUTO: 1.3 %
ERYTHROCYTE [DISTWIDTH] IN BLOOD BY AUTOMATED COUNT: 19.1 % (ref 10–15)
GFR SERPL CREATININE-BSD FRML MDRD: >90 ML/MIN/{1.73_M2}
GLUCOSE SERPL-MCNC: 98 MG/DL (ref 70–99)
HCT VFR BLD AUTO: 32.3 % (ref 35–47)
HGB BLD-MCNC: 10.4 G/DL (ref 11.7–15.7)
IMM GRANULOCYTES # BLD: 0 10E9/L (ref 0–0.4)
IMM GRANULOCYTES NFR BLD: 0.2 %
LYMPHOCYTES # BLD AUTO: 1 10E9/L (ref 0.8–5.3)
LYMPHOCYTES NFR BLD AUTO: 22.2 %
MCH RBC QN AUTO: 33.1 PG (ref 26.5–33)
MCHC RBC AUTO-ENTMCNC: 32.2 G/DL (ref 31.5–36.5)
MCV RBC AUTO: 103 FL (ref 78–100)
MONOCYTES # BLD AUTO: 0.4 10E9/L (ref 0–1.3)
MONOCYTES NFR BLD AUTO: 8.4 %
NEUTROPHILS # BLD AUTO: 3.1 10E9/L (ref 1.6–8.3)
NEUTROPHILS NFR BLD AUTO: 67.5 %
NRBC # BLD AUTO: 0 10*3/UL
NRBC BLD AUTO-RTO: 0 /100
PLATELET # BLD AUTO: 179 10E9/L (ref 150–450)
POTASSIUM SERPL-SCNC: 3.8 MMOL/L (ref 3.4–5.3)
PROT SERPL-MCNC: 6.6 G/DL (ref 6.8–8.8)
RBC # BLD AUTO: 3.14 10E12/L (ref 3.8–5.2)
SODIUM SERPL-SCNC: 140 MMOL/L (ref 133–144)
WBC # BLD AUTO: 4.6 10E9/L (ref 4–11)

## 2019-06-17 PROCEDURE — 85025 COMPLETE CBC W/AUTO DIFF WBC: CPT | Performed by: INTERNAL MEDICINE

## 2019-06-17 PROCEDURE — 80053 COMPREHEN METABOLIC PANEL: CPT | Performed by: INTERNAL MEDICINE

## 2019-06-17 PROCEDURE — 25000128 H RX IP 250 OP 636: Performed by: INTERNAL MEDICINE

## 2019-06-17 RX ORDER — HEPARIN SODIUM (PORCINE) LOCK FLUSH IV SOLN 100 UNIT/ML 100 UNIT/ML
5 SOLUTION INTRAVENOUS EVERY 8 HOURS PRN
Status: DISCONTINUED | OUTPATIENT
Start: 2019-06-17 | End: 2019-06-25 | Stop reason: HOSPADM

## 2019-06-17 RX ADMIN — HEPARIN 5 ML: 100 SYRINGE at 10:47

## 2019-06-17 NOTE — NURSING NOTE
Chief Complaint   Patient presents with     Blood Draw     Labs drawn via port by RN in lab. Line flushed and hep locked, then de-accessed. Lab only appt.     Shu Fajardo RN

## 2019-06-30 DIAGNOSIS — C25.0 MALIGNANT NEOPLASM OF HEAD OF PANCREAS (H): ICD-10-CM

## 2019-07-03 DIAGNOSIS — C25.0 MALIGNANT NEOPLASM OF HEAD OF PANCREAS (H): Primary | ICD-10-CM

## 2019-07-03 RX ORDER — CAPECITABINE 500 MG/1
TABLET, FILM COATED ORAL
Qty: 42 TABLET | Refills: 3 | Status: SHIPPED | OUTPATIENT
Start: 2019-07-03 | End: 2019-09-09

## 2019-07-08 DIAGNOSIS — C25.0 MALIGNANT NEOPLASM OF HEAD OF PANCREAS (H): ICD-10-CM

## 2019-07-08 LAB
ALBUMIN SERPL-MCNC: 3.5 G/DL (ref 3.4–5)
ALP SERPL-CCNC: 121 U/L (ref 40–150)
ALT SERPL W P-5'-P-CCNC: 32 U/L (ref 0–50)
ANION GAP SERPL CALCULATED.3IONS-SCNC: 6 MMOL/L (ref 3–14)
AST SERPL W P-5'-P-CCNC: 35 U/L (ref 0–45)
BASOPHILS # BLD AUTO: 0 10E9/L (ref 0–0.2)
BASOPHILS NFR BLD AUTO: 0.4 %
BILIRUB SERPL-MCNC: 0.5 MG/DL (ref 0.2–1.3)
BUN SERPL-MCNC: 7 MG/DL (ref 7–30)
CALCIUM SERPL-MCNC: 8.6 MG/DL (ref 8.5–10.1)
CHLORIDE SERPL-SCNC: 106 MMOL/L (ref 94–109)
CO2 SERPL-SCNC: 28 MMOL/L (ref 20–32)
CREAT SERPL-MCNC: 0.69 MG/DL (ref 0.52–1.04)
DIFFERENTIAL METHOD BLD: ABNORMAL
EOSINOPHIL # BLD AUTO: 0.1 10E9/L (ref 0–0.7)
EOSINOPHIL NFR BLD AUTO: 1.5 %
ERYTHROCYTE [DISTWIDTH] IN BLOOD BY AUTOMATED COUNT: 19.3 % (ref 10–15)
GFR SERPL CREATININE-BSD FRML MDRD: >90 ML/MIN/{1.73_M2}
GLUCOSE SERPL-MCNC: 86 MG/DL (ref 70–99)
HCT VFR BLD AUTO: 32.1 % (ref 35–47)
HGB BLD-MCNC: 10.5 G/DL (ref 11.7–15.7)
IMM GRANULOCYTES # BLD: 0 10E9/L (ref 0–0.4)
IMM GRANULOCYTES NFR BLD: 0.2 %
LYMPHOCYTES # BLD AUTO: 0.9 10E9/L (ref 0.8–5.3)
LYMPHOCYTES NFR BLD AUTO: 20 %
MCH RBC QN AUTO: 33.8 PG (ref 26.5–33)
MCHC RBC AUTO-ENTMCNC: 32.7 G/DL (ref 31.5–36.5)
MCV RBC AUTO: 103 FL (ref 78–100)
MONOCYTES # BLD AUTO: 0.4 10E9/L (ref 0–1.3)
MONOCYTES NFR BLD AUTO: 9.2 %
NEUTROPHILS # BLD AUTO: 3.1 10E9/L (ref 1.6–8.3)
NEUTROPHILS NFR BLD AUTO: 68.7 %
NRBC # BLD AUTO: 0 10*3/UL
NRBC BLD AUTO-RTO: 0 /100
PLATELET # BLD AUTO: 192 10E9/L (ref 150–450)
POTASSIUM SERPL-SCNC: 4.1 MMOL/L (ref 3.4–5.3)
PROT SERPL-MCNC: 6.7 G/DL (ref 6.8–8.8)
RBC # BLD AUTO: 3.11 10E12/L (ref 3.8–5.2)
SODIUM SERPL-SCNC: 140 MMOL/L (ref 133–144)
WBC # BLD AUTO: 4.6 10E9/L (ref 4–11)

## 2019-07-08 PROCEDURE — 80053 COMPREHEN METABOLIC PANEL: CPT | Performed by: INTERNAL MEDICINE

## 2019-07-08 PROCEDURE — 25000128 H RX IP 250 OP 636: Performed by: INTERNAL MEDICINE

## 2019-07-08 PROCEDURE — 36591 DRAW BLOOD OFF VENOUS DEVICE: CPT

## 2019-07-08 PROCEDURE — 85025 COMPLETE CBC W/AUTO DIFF WBC: CPT | Performed by: INTERNAL MEDICINE

## 2019-07-08 RX ORDER — HEPARIN SODIUM (PORCINE) LOCK FLUSH IV SOLN 100 UNIT/ML 100 UNIT/ML
5 SOLUTION INTRAVENOUS EVERY 8 HOURS PRN
Status: DISCONTINUED | OUTPATIENT
Start: 2019-07-08 | End: 2019-07-16 | Stop reason: HOSPADM

## 2019-07-08 RX ADMIN — HEPARIN 5 ML: 100 SYRINGE at 10:26

## 2019-07-08 NOTE — NURSING NOTE
Chief Complaint   Patient presents with     Port Draw     Labs drawn via port by RN in lab. Line flushed and hep locked. VS taken.     Shu Fajardo RN

## 2019-07-29 DIAGNOSIS — C25.0 MALIGNANT NEOPLASM OF HEAD OF PANCREAS (H): ICD-10-CM

## 2019-07-29 LAB
ALBUMIN SERPL-MCNC: 3.7 G/DL (ref 3.4–5)
ALP SERPL-CCNC: 136 U/L (ref 40–150)
ALT SERPL W P-5'-P-CCNC: 38 U/L (ref 0–50)
ANION GAP SERPL CALCULATED.3IONS-SCNC: 3 MMOL/L (ref 3–14)
AST SERPL W P-5'-P-CCNC: 41 U/L (ref 0–45)
BASOPHILS # BLD AUTO: 0 10E9/L (ref 0–0.2)
BASOPHILS NFR BLD AUTO: 0.5 %
BILIRUB SERPL-MCNC: 0.4 MG/DL (ref 0.2–1.3)
BUN SERPL-MCNC: 8 MG/DL (ref 7–30)
CALCIUM SERPL-MCNC: 8.6 MG/DL (ref 8.5–10.1)
CHLORIDE SERPL-SCNC: 108 MMOL/L (ref 94–109)
CO2 SERPL-SCNC: 27 MMOL/L (ref 20–32)
CREAT SERPL-MCNC: 0.67 MG/DL (ref 0.52–1.04)
DIFFERENTIAL METHOD BLD: ABNORMAL
EOSINOPHIL # BLD AUTO: 0.1 10E9/L (ref 0–0.7)
EOSINOPHIL NFR BLD AUTO: 1.1 %
ERYTHROCYTE [DISTWIDTH] IN BLOOD BY AUTOMATED COUNT: 19.5 % (ref 10–15)
GFR SERPL CREATININE-BSD FRML MDRD: >90 ML/MIN/{1.73_M2}
GLUCOSE SERPL-MCNC: 83 MG/DL (ref 70–99)
HCT VFR BLD AUTO: 33.6 % (ref 35–47)
HGB BLD-MCNC: 10.9 G/DL (ref 11.7–15.7)
IMM GRANULOCYTES # BLD: 0 10E9/L (ref 0–0.4)
IMM GRANULOCYTES NFR BLD: 0.2 %
LYMPHOCYTES # BLD AUTO: 0.9 10E9/L (ref 0.8–5.3)
LYMPHOCYTES NFR BLD AUTO: 21 %
MCH RBC QN AUTO: 33.1 PG (ref 26.5–33)
MCHC RBC AUTO-ENTMCNC: 32.4 G/DL (ref 31.5–36.5)
MCV RBC AUTO: 102 FL (ref 78–100)
MONOCYTES # BLD AUTO: 0.4 10E9/L (ref 0–1.3)
MONOCYTES NFR BLD AUTO: 8 %
NEUTROPHILS # BLD AUTO: 3 10E9/L (ref 1.6–8.3)
NEUTROPHILS NFR BLD AUTO: 69.2 %
NRBC # BLD AUTO: 0 10*3/UL
NRBC BLD AUTO-RTO: 0 /100
PLATELET # BLD AUTO: 207 10E9/L (ref 150–450)
POTASSIUM SERPL-SCNC: 4.1 MMOL/L (ref 3.4–5.3)
PROT SERPL-MCNC: 7 G/DL (ref 6.8–8.8)
RBC # BLD AUTO: 3.29 10E12/L (ref 3.8–5.2)
SODIUM SERPL-SCNC: 138 MMOL/L (ref 133–144)
WBC # BLD AUTO: 4.4 10E9/L (ref 4–11)

## 2019-07-29 PROCEDURE — 80053 COMPREHEN METABOLIC PANEL: CPT | Performed by: INTERNAL MEDICINE

## 2019-07-29 PROCEDURE — 36591 DRAW BLOOD OFF VENOUS DEVICE: CPT

## 2019-07-29 PROCEDURE — 25000128 H RX IP 250 OP 636: Performed by: INTERNAL MEDICINE

## 2019-07-29 PROCEDURE — 85025 COMPLETE CBC W/AUTO DIFF WBC: CPT | Performed by: INTERNAL MEDICINE

## 2019-07-29 RX ORDER — HEPARIN SODIUM (PORCINE) LOCK FLUSH IV SOLN 100 UNIT/ML 100 UNIT/ML
5 SOLUTION INTRAVENOUS EVERY 8 HOURS
Status: DISCONTINUED | OUTPATIENT
Start: 2019-07-29 | End: 2019-08-06 | Stop reason: HOSPADM

## 2019-07-29 RX ADMIN — HEPARIN SODIUM (PORCINE) LOCK FLUSH IV SOLN 100 UNIT/ML 5 ML: 100 SOLUTION at 10:55

## 2019-07-29 NOTE — NURSING NOTE
Chief Complaint   Patient presents with     Port Draw     LAbs drawn via PORT by RN in lab. Line lfushed with saline and heparin. VS taken.      Tara Mesa RN

## 2019-08-19 DIAGNOSIS — C25.0 MALIGNANT NEOPLASM OF HEAD OF PANCREAS (H): ICD-10-CM

## 2019-08-19 LAB
ALBUMIN SERPL-MCNC: 3.7 G/DL (ref 3.4–5)
ALP SERPL-CCNC: 116 U/L (ref 40–150)
ALT SERPL W P-5'-P-CCNC: 33 U/L (ref 0–50)
ANION GAP SERPL CALCULATED.3IONS-SCNC: 4 MMOL/L (ref 3–14)
AST SERPL W P-5'-P-CCNC: 36 U/L (ref 0–45)
BASOPHILS # BLD AUTO: 0 10E9/L (ref 0–0.2)
BASOPHILS NFR BLD AUTO: 0.6 %
BILIRUB SERPL-MCNC: 0.6 MG/DL (ref 0.2–1.3)
BUN SERPL-MCNC: 9 MG/DL (ref 7–30)
CALCIUM SERPL-MCNC: 8.4 MG/DL (ref 8.5–10.1)
CHLORIDE SERPL-SCNC: 104 MMOL/L (ref 94–109)
CO2 SERPL-SCNC: 28 MMOL/L (ref 20–32)
CREAT SERPL-MCNC: 0.75 MG/DL (ref 0.52–1.04)
DIFFERENTIAL METHOD BLD: ABNORMAL
EOSINOPHIL # BLD AUTO: 0 10E9/L (ref 0–0.7)
EOSINOPHIL NFR BLD AUTO: 0.8 %
ERYTHROCYTE [DISTWIDTH] IN BLOOD BY AUTOMATED COUNT: 20 % (ref 10–15)
GFR SERPL CREATININE-BSD FRML MDRD: 83 ML/MIN/{1.73_M2}
GLUCOSE SERPL-MCNC: 73 MG/DL (ref 70–99)
HCT VFR BLD AUTO: 33.9 % (ref 35–47)
HGB BLD-MCNC: 11.1 G/DL (ref 11.7–15.7)
IMM GRANULOCYTES # BLD: 0 10E9/L (ref 0–0.4)
IMM GRANULOCYTES NFR BLD: 0.2 %
LYMPHOCYTES # BLD AUTO: 1.1 10E9/L (ref 0.8–5.3)
LYMPHOCYTES NFR BLD AUTO: 22.3 %
MCH RBC QN AUTO: 33.2 PG (ref 26.5–33)
MCHC RBC AUTO-ENTMCNC: 32.7 G/DL (ref 31.5–36.5)
MCV RBC AUTO: 102 FL (ref 78–100)
MONOCYTES # BLD AUTO: 0.4 10E9/L (ref 0–1.3)
MONOCYTES NFR BLD AUTO: 8.5 %
NEUTROPHILS # BLD AUTO: 3.3 10E9/L (ref 1.6–8.3)
NEUTROPHILS NFR BLD AUTO: 67.6 %
NRBC # BLD AUTO: 0 10*3/UL
NRBC BLD AUTO-RTO: 0 /100
PLATELET # BLD AUTO: 199 10E9/L (ref 150–450)
POTASSIUM SERPL-SCNC: 3.9 MMOL/L (ref 3.4–5.3)
PROT SERPL-MCNC: 7.1 G/DL (ref 6.8–8.8)
RBC # BLD AUTO: 3.34 10E12/L (ref 3.8–5.2)
SODIUM SERPL-SCNC: 136 MMOL/L (ref 133–144)
WBC # BLD AUTO: 4.8 10E9/L (ref 4–11)

## 2019-08-19 PROCEDURE — 80053 COMPREHEN METABOLIC PANEL: CPT | Performed by: INTERNAL MEDICINE

## 2019-08-19 PROCEDURE — 85025 COMPLETE CBC W/AUTO DIFF WBC: CPT | Performed by: INTERNAL MEDICINE

## 2019-08-19 PROCEDURE — 36591 DRAW BLOOD OFF VENOUS DEVICE: CPT

## 2019-08-19 PROCEDURE — 25000128 H RX IP 250 OP 636: Performed by: INTERNAL MEDICINE

## 2019-08-19 RX ORDER — HEPARIN SODIUM (PORCINE) LOCK FLUSH IV SOLN 100 UNIT/ML 100 UNIT/ML
5 SOLUTION INTRAVENOUS ONCE
Status: COMPLETED | OUTPATIENT
Start: 2019-08-19 | End: 2019-08-19

## 2019-08-19 RX ADMIN — HEPARIN SODIUM (PORCINE) LOCK FLUSH IV SOLN 100 UNIT/ML 5 ML: 100 SOLUTION at 10:42

## 2019-08-19 NOTE — NURSING NOTE
Chief Complaint   Patient presents with     Port Draw     Labs drawn via port by RN in lab.      Port accessed with 20 gauge gripper needle by RN, labs collected, line flushed with saline and heparin.  Line de-accessed.    Maura Espana RN

## 2019-09-06 ENCOUNTER — ANCILLARY PROCEDURE (OUTPATIENT)
Dept: CT IMAGING | Facility: CLINIC | Age: 64
End: 2019-09-06
Attending: INTERNAL MEDICINE
Payer: MEDICARE

## 2019-09-06 DIAGNOSIS — C25.0 MALIGNANT NEOPLASM OF HEAD OF PANCREAS (H): ICD-10-CM

## 2019-09-06 LAB
ALBUMIN SERPL-MCNC: 3.8 G/DL (ref 3.4–5)
ALP SERPL-CCNC: 138 U/L (ref 40–150)
ALT SERPL W P-5'-P-CCNC: 43 U/L (ref 0–50)
ANION GAP SERPL CALCULATED.3IONS-SCNC: 4 MMOL/L (ref 3–14)
AST SERPL W P-5'-P-CCNC: 45 U/L (ref 0–45)
BASOPHILS # BLD AUTO: 0 10E9/L (ref 0–0.2)
BASOPHILS NFR BLD AUTO: 0.5 %
BILIRUB SERPL-MCNC: 0.6 MG/DL (ref 0.2–1.3)
BUN SERPL-MCNC: 8 MG/DL (ref 7–30)
CALCIUM SERPL-MCNC: 8.7 MG/DL (ref 8.5–10.1)
CHLORIDE SERPL-SCNC: 108 MMOL/L (ref 94–109)
CO2 SERPL-SCNC: 27 MMOL/L (ref 20–32)
CREAT SERPL-MCNC: 0.75 MG/DL (ref 0.52–1.04)
DIFFERENTIAL METHOD BLD: ABNORMAL
EOSINOPHIL # BLD AUTO: 0.1 10E9/L (ref 0–0.7)
EOSINOPHIL NFR BLD AUTO: 1.6 %
ERYTHROCYTE [DISTWIDTH] IN BLOOD BY AUTOMATED COUNT: 20.1 % (ref 10–15)
GFR SERPL CREATININE-BSD FRML MDRD: 83 ML/MIN/{1.73_M2}
GLUCOSE SERPL-MCNC: 86 MG/DL (ref 70–99)
HCT VFR BLD AUTO: 33.9 % (ref 35–47)
HGB BLD-MCNC: 11.1 G/DL (ref 11.7–15.7)
IMM GRANULOCYTES # BLD: 0 10E9/L (ref 0–0.4)
IMM GRANULOCYTES NFR BLD: 0.3 %
LYMPHOCYTES # BLD AUTO: 1.1 10E9/L (ref 0.8–5.3)
LYMPHOCYTES NFR BLD AUTO: 27.7 %
MCH RBC QN AUTO: 32.9 PG (ref 26.5–33)
MCHC RBC AUTO-ENTMCNC: 32.7 G/DL (ref 31.5–36.5)
MCV RBC AUTO: 101 FL (ref 78–100)
MONOCYTES # BLD AUTO: 0.4 10E9/L (ref 0–1.3)
MONOCYTES NFR BLD AUTO: 10.3 %
NEUTROPHILS # BLD AUTO: 2.3 10E9/L (ref 1.6–8.3)
NEUTROPHILS NFR BLD AUTO: 59.6 %
NRBC # BLD AUTO: 0 10*3/UL
NRBC BLD AUTO-RTO: 0 /100
PLATELET # BLD AUTO: 191 10E9/L (ref 150–450)
POTASSIUM SERPL-SCNC: 4 MMOL/L (ref 3.4–5.3)
PROT SERPL-MCNC: 7 G/DL (ref 6.8–8.8)
RBC # BLD AUTO: 3.37 10E12/L (ref 3.8–5.2)
SODIUM SERPL-SCNC: 139 MMOL/L (ref 133–144)
WBC # BLD AUTO: 3.8 10E9/L (ref 4–11)

## 2019-09-06 PROCEDURE — 80053 COMPREHEN METABOLIC PANEL: CPT | Performed by: INTERNAL MEDICINE

## 2019-09-06 PROCEDURE — 25000128 H RX IP 250 OP 636: Performed by: INTERNAL MEDICINE

## 2019-09-06 PROCEDURE — 85025 COMPLETE CBC W/AUTO DIFF WBC: CPT | Performed by: INTERNAL MEDICINE

## 2019-09-06 RX ORDER — IOPAMIDOL 755 MG/ML
74 INJECTION, SOLUTION INTRAVASCULAR ONCE
Status: COMPLETED | OUTPATIENT
Start: 2019-09-06 | End: 2019-09-06

## 2019-09-06 RX ORDER — HEPARIN SODIUM (PORCINE) LOCK FLUSH IV SOLN 100 UNIT/ML 100 UNIT/ML
5 SOLUTION INTRAVENOUS ONCE
Status: COMPLETED | OUTPATIENT
Start: 2019-09-06 | End: 2019-09-06

## 2019-09-06 RX ADMIN — HEPARIN SODIUM (PORCINE) LOCK FLUSH IV SOLN 100 UNIT/ML 5 ML: 100 SOLUTION at 09:32

## 2019-09-06 RX ADMIN — IOPAMIDOL 74 ML: 755 INJECTION, SOLUTION INTRAVASCULAR at 09:30

## 2019-09-06 RX ADMIN — HEPARIN SODIUM (PORCINE) LOCK FLUSH IV SOLN 100 UNIT/ML 5 ML: 100 SOLUTION at 09:03

## 2019-09-06 NOTE — NURSING NOTE
"Chief Complaint   Patient presents with     Port Draw     lab only appointment.  port accessed and labs drawn by rn.       Port accessed by RN in lab with 20g 3/4\" power needle, labs drawn, port flushed with saline and heparin.  Meghna Leonardo RN    "

## 2019-09-09 ENCOUNTER — ONCOLOGY VISIT (OUTPATIENT)
Dept: ONCOLOGY | Facility: CLINIC | Age: 64
End: 2019-09-09
Attending: INTERNAL MEDICINE
Payer: MEDICARE

## 2019-09-09 VITALS
TEMPERATURE: 98 F | WEIGHT: 119.49 LBS | DIASTOLIC BLOOD PRESSURE: 77 MMHG | BODY MASS INDEX: 19.29 KG/M2 | HEART RATE: 67 BPM | RESPIRATION RATE: 14 BRPM | SYSTOLIC BLOOD PRESSURE: 145 MMHG | OXYGEN SATURATION: 99 %

## 2019-09-09 DIAGNOSIS — C25.0 MALIGNANT NEOPLASM OF HEAD OF PANCREAS (H): Primary | ICD-10-CM

## 2019-09-09 PROCEDURE — 99214 OFFICE O/P EST MOD 30 MIN: CPT | Mod: ZP | Performed by: INTERNAL MEDICINE

## 2019-09-09 PROCEDURE — G0463 HOSPITAL OUTPT CLINIC VISIT: HCPCS | Mod: ZF

## 2019-09-09 RX ORDER — PANTOPRAZOLE SODIUM 40 MG/1
40 TABLET, DELAYED RELEASE ORAL
Qty: 90 TABLET | Refills: 3 | Status: SHIPPED | OUTPATIENT
Start: 2019-09-09 | End: 2020-10-23

## 2019-09-09 RX ORDER — LIDOCAINE/PRILOCAINE 2.5 %-2.5%
CREAM (GRAM) TOPICAL PRN
Qty: 30 G | Refills: 3 | Status: SHIPPED | OUTPATIENT
Start: 2019-09-09 | End: 2023-08-21

## 2019-09-09 ASSESSMENT — PAIN SCALES - GENERAL: PAINLEVEL: NO PAIN (0)

## 2019-09-09 NOTE — PROGRESS NOTES
Tiana Weathers is here today in follow-up of recurrent pancreatic cancer.    She is approaching 5 years out from biopsy-proven recurrence in her resection bed for which she has been on Xeloda ever since.  Her last evaluation 4 months ago showed no evidence of disease progression and returns today for ongoing response assessment.  Since her last visit she tells me things are going well for her. She continues to have minor hand-foot toxicity and otherwise no dramatic side effects that she can attribute to her chemotherapy.  She carries on with all of her usual activities without limitation although she did notice while hiking at altitude in the mountains in [place] this summer that may be she did not quite have the stamina she is used to.  A 10 point complete review of systems is unremarkable.    On physical exam Ms. Weathers is very slender as always but otherwise appears robustly healthy.  Her vital signs are unremarkable.  She has no scleral icterus and no visible lesion in the oropharynx.  She has no adenopathy palpable in the neck or supraclavicular spaces.  Her lungs are clear to auscultation without dullness to percussion.  Her heart rate and rhythm are regular without audible murmur gallop and the jugular venous pressure is unremarkable.  Her abdomen is soft and nontender without palpable mass organomegaly.  She has no peripheral edema and no cyanosis or clubbing of her digits.  She has mild dry desquamation on her palms.  She has a little bit of contractures of her fingers.  Her speech is fluent and her cranial nerves are grossly intact.  She has an unremarkable gait and station.    I reviewed with her her CT scan and lab work.  The CT scan shows no evidence of disease progression.  Her labs show normal electrolytes, renal function, liver enzymes and mild macrocytic anemia consistent with her chemotherapy.  She has no informative tumor marker.    A/P: Recurrent pancreatic cancer with ongoing excellent disease  control with single agent Xeloda at a markedly reduced dose which she is tolerating quite well.  At some point we may discuss whether taking a holiday from chemotherapy makes sense or not but for the time being the minor hand toxicity she is experience is not troublesome to her and she sees no reason to change.  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  We will follow-up with her again in another 4 months for ongoing response assessment.

## 2019-09-09 NOTE — LETTER
9/9/2019      RE: Emelia Weathers  [address]  [place] [address]       Tiana Weathers is here today in follow-up of recurrent pancreatic cancer.    She is approaching 5 years out from biopsy-proven recurrence in her resection bed for which she has been on Xeloda ever since.  Her last evaluation 4 months ago showed no evidence of disease progression and returns today for ongoing response assessment.  Since her last visit she tells me things are going well for her. She continues to have minor hand-foot toxicity and otherwise no dramatic side effects that she can attribute to her chemotherapy.  She carries on with all of her usual activities without limitation although she did notice while hiking at altitude in the mountains in [place] this summer that may be she did not quite have the stamina she is used to.  A 10 point complete review of systems is unremarkable.    On physical exam Ms. Weathers is very slender as always but otherwise appears robustly healthy.  Her vital signs are unremarkable.  She has no scleral icterus and no visible lesion in the oropharynx.  She has no adenopathy palpable in the neck or supraclavicular spaces.  Her lungs are clear to auscultation without dullness to percussion.  Her heart rate and rhythm are regular without audible murmur gallop and the jugular venous pressure is unremarkable.  Her abdomen is soft and nontender without palpable mass organomegaly.  She has no peripheral edema and no cyanosis or clubbing of her digits.  She has mild dry desquamation on her palms.  She has a little bit of contractures of her fingers.  Her speech is fluent and her cranial nerves are grossly intact.  She has an unremarkable gait and station.    I reviewed with her her CT scan and lab work.  The CT scan shows no evidence of disease progression.  Her labs show normal electrolytes, renal function, liver enzymes and mild macrocytic anemia consistent with her chemotherapy.  She has no  informative tumor marker.    A/P: Recurrent pancreatic cancer with ongoing excellent disease control with single agent Xeloda at a markedly reduced dose which she is tolerating quite well.  At some point we may discuss whether taking a holiday from chemotherapy makes sense or not but for the time being the minor hand toxicity she is experience is not troublesome to her and she sees no reason to change.  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  We will follow-up with her again in another 4 months for ongoing response assessment.    Yasmany Wade MD

## 2019-09-09 NOTE — NURSING NOTE
"Oncology Rooming Note    September 9, 2019 7:16 AM   Emelia Weathers is a 64 year old female who presents for:    Chief Complaint   Patient presents with     Oncology Clinic Visit     Pancreatic Ca , CT results      Initial Vitals: BP (!) 145/77   Pulse 67   Temp 98  F (36.7  C) (Oral)   Resp 14   Wt 54.2 kg (119 lb 7.8 oz)   SpO2 99%   BMI 19.29 kg/m   Estimated body mass index is 19.29 kg/m  as calculated from the following:    Height as of 5/21/18: 1.676 m (5' 6\").    Weight as of this encounter: 54.2 kg (119 lb 7.8 oz). Body surface area is 1.59 meters squared.  No Pain (0) Comment: Data Unavailable   No LMP recorded. Patient is postmenopausal.  Allergies reviewed: Yes  Medications reviewed: Yes    Medications: MEDICATION REFILLS NEEDED TODAY. Provider was notified.  Pharmacy name entered into [employer]:    [id number] [place] [facility] [place], [place] - [address] [place] [address] PHARMACY [facility] [place], [place] - [address] [facility] [place], [place] - [address] [place] [address] [phone]  [facility] [id number] [place], [place] - [address] [place] [address] [facility] - [place], [place] - [address] [place] [address]    Clinical concerns: Refills  Mauro  was notified.      Saba Dotson MA              "

## 2019-09-10 NOTE — TELEPHONE ENCOUNTER
ONCOLOGY INTAKE: Records Information      APPT INFORMATION:  Referring provider:  Yasmany Wade MD  Referring provider s clinic:   ONCOLOGY ADULT  Reason for visit/diagnosis:  C25.0 (ICD-10-CM) - Malignant neoplasm of head of pancreas (H)  Has patient been notified of appointment date and time?: Yes    RECORDS INFORMATION:  Were the records received with the referral (via [employer])? No, Internal    Has patient been seen for any external appt for this diagnosis? No    If yes, where? NA    Has patient had any imaging or procedures outside of [facility] for this condition? No      If Yes, where? NA    ADDITIONAL INFORMATION:

## 2019-09-29 DIAGNOSIS — C25.0 MALIGNANT NEOPLASM OF HEAD OF PANCREAS (H): ICD-10-CM

## 2019-09-30 DIAGNOSIS — C25.0 MALIGNANT NEOPLASM OF HEAD OF PANCREAS (H): ICD-10-CM

## 2019-09-30 DIAGNOSIS — C25.0 MALIGNANT NEOPLASM OF HEAD OF PANCREAS (H): Primary | ICD-10-CM

## 2019-09-30 LAB
ALBUMIN SERPL-MCNC: 3.5 G/DL (ref 3.4–5)
ALP SERPL-CCNC: 156 U/L (ref 40–150)
ALT SERPL W P-5'-P-CCNC: 38 U/L (ref 0–50)
ANION GAP SERPL CALCULATED.3IONS-SCNC: 5 MMOL/L (ref 3–14)
AST SERPL W P-5'-P-CCNC: 43 U/L (ref 0–45)
BASOPHILS # BLD AUTO: 0 10E9/L (ref 0–0.2)
BASOPHILS NFR BLD AUTO: 0.4 %
BILIRUB SERPL-MCNC: 0.5 MG/DL (ref 0.2–1.3)
BUN SERPL-MCNC: 7 MG/DL (ref 7–30)
CALCIUM SERPL-MCNC: 8.7 MG/DL (ref 8.5–10.1)
CHLORIDE SERPL-SCNC: 107 MMOL/L (ref 94–109)
CO2 SERPL-SCNC: 25 MMOL/L (ref 20–32)
CREAT SERPL-MCNC: 0.69 MG/DL (ref 0.52–1.04)
DIFFERENTIAL METHOD BLD: ABNORMAL
EOSINOPHIL # BLD AUTO: 0.1 10E9/L (ref 0–0.7)
EOSINOPHIL NFR BLD AUTO: 1.3 %
ERYTHROCYTE [DISTWIDTH] IN BLOOD BY AUTOMATED COUNT: 20.3 % (ref 10–15)
GFR SERPL CREATININE-BSD FRML MDRD: >90 ML/MIN/{1.73_M2}
GLUCOSE SERPL-MCNC: 159 MG/DL (ref 70–99)
HCT VFR BLD AUTO: 35.7 % (ref 35–47)
HGB BLD-MCNC: 11.7 G/DL (ref 11.7–15.7)
IMM GRANULOCYTES # BLD: 0 10E9/L (ref 0–0.4)
IMM GRANULOCYTES NFR BLD: 0.2 %
LYMPHOCYTES # BLD AUTO: 0.9 10E9/L (ref 0.8–5.3)
LYMPHOCYTES NFR BLD AUTO: 19 %
MCH RBC QN AUTO: 33.1 PG (ref 26.5–33)
MCHC RBC AUTO-ENTMCNC: 32.8 G/DL (ref 31.5–36.5)
MCV RBC AUTO: 101 FL (ref 78–100)
MONOCYTES # BLD AUTO: 0.4 10E9/L (ref 0–1.3)
MONOCYTES NFR BLD AUTO: 7.8 %
NEUTROPHILS # BLD AUTO: 3.4 10E9/L (ref 1.6–8.3)
NEUTROPHILS NFR BLD AUTO: 71.3 %
NRBC # BLD AUTO: 0 10*3/UL
NRBC BLD AUTO-RTO: 0 /100
PLATELET # BLD AUTO: 194 10E9/L (ref 150–450)
POTASSIUM SERPL-SCNC: 4.7 MMOL/L (ref 3.4–5.3)
PROT SERPL-MCNC: 7.2 G/DL (ref 6.8–8.8)
RBC # BLD AUTO: 3.53 10E12/L (ref 3.8–5.2)
SODIUM SERPL-SCNC: 137 MMOL/L (ref 133–144)
WBC # BLD AUTO: 4.7 10E9/L (ref 4–11)

## 2019-09-30 PROCEDURE — 80053 COMPREHEN METABOLIC PANEL: CPT | Performed by: INTERNAL MEDICINE

## 2019-09-30 PROCEDURE — 85025 COMPLETE CBC W/AUTO DIFF WBC: CPT | Performed by: INTERNAL MEDICINE

## 2019-09-30 PROCEDURE — 25000128 H RX IP 250 OP 636: Performed by: INTERNAL MEDICINE

## 2019-09-30 RX ORDER — CAPECITABINE 500 MG/1
TABLET, FILM COATED ORAL
Qty: 42 TABLET | Refills: 3 | Status: SHIPPED | OUTPATIENT
Start: 2019-09-30 | End: 2020-04-27

## 2019-09-30 RX ORDER — HEPARIN SODIUM (PORCINE) LOCK FLUSH IV SOLN 100 UNIT/ML 100 UNIT/ML
5 SOLUTION INTRAVENOUS ONCE
Status: COMPLETED | OUTPATIENT
Start: 2019-09-30 | End: 2019-09-30

## 2019-09-30 RX ADMIN — HEPARIN 5 ML: 100 SYRINGE at 10:35

## 2019-09-30 NOTE — NURSING NOTE
"Chief Complaint   Patient presents with     Port Draw     lab only appointment.  port accessed and labs drawn by rn.      Port accessed with 20g 3/4\" gripper needle, labs drawn by RN in lab.  Port flushed with saline and heparin.  Port de-accessed.  Meghna Leonardo RN      "

## 2019-10-21 DIAGNOSIS — C25.0 MALIGNANT NEOPLASM OF HEAD OF PANCREAS (H): ICD-10-CM

## 2019-10-21 LAB
ALBUMIN SERPL-MCNC: 3.4 G/DL (ref 3.4–5)
ALP SERPL-CCNC: 144 U/L (ref 40–150)
ALT SERPL W P-5'-P-CCNC: 37 U/L (ref 0–50)
ANION GAP SERPL CALCULATED.3IONS-SCNC: 7 MMOL/L (ref 3–14)
AST SERPL W P-5'-P-CCNC: 43 U/L (ref 0–45)
BASOPHILS # BLD AUTO: 0 10E9/L (ref 0–0.2)
BASOPHILS NFR BLD AUTO: 0.4 %
BILIRUB SERPL-MCNC: 0.7 MG/DL (ref 0.2–1.3)
BUN SERPL-MCNC: 11 MG/DL (ref 7–30)
CALCIUM SERPL-MCNC: 8.4 MG/DL (ref 8.5–10.1)
CHLORIDE SERPL-SCNC: 107 MMOL/L (ref 94–109)
CO2 SERPL-SCNC: 25 MMOL/L (ref 20–32)
CREAT SERPL-MCNC: 0.72 MG/DL (ref 0.52–1.04)
DIFFERENTIAL METHOD BLD: ABNORMAL
EOSINOPHIL # BLD AUTO: 0 10E9/L (ref 0–0.7)
EOSINOPHIL NFR BLD AUTO: 0.8 %
ERYTHROCYTE [DISTWIDTH] IN BLOOD BY AUTOMATED COUNT: 20.1 % (ref 10–15)
GFR SERPL CREATININE-BSD FRML MDRD: 87 ML/MIN/{1.73_M2}
GLUCOSE SERPL-MCNC: 76 MG/DL (ref 70–99)
HCT VFR BLD AUTO: 33.6 % (ref 35–47)
HGB BLD-MCNC: 11 G/DL (ref 11.7–15.7)
IMM GRANULOCYTES # BLD: 0 10E9/L (ref 0–0.4)
IMM GRANULOCYTES NFR BLD: 0.6 %
LYMPHOCYTES # BLD AUTO: 0.9 10E9/L (ref 0.8–5.3)
LYMPHOCYTES NFR BLD AUTO: 18 %
MCH RBC QN AUTO: 32.8 PG (ref 26.5–33)
MCHC RBC AUTO-ENTMCNC: 32.7 G/DL (ref 31.5–36.5)
MCV RBC AUTO: 100 FL (ref 78–100)
MONOCYTES # BLD AUTO: 0.4 10E9/L (ref 0–1.3)
MONOCYTES NFR BLD AUTO: 8.4 %
NEUTROPHILS # BLD AUTO: 3.7 10E9/L (ref 1.6–8.3)
NEUTROPHILS NFR BLD AUTO: 71.8 %
NRBC # BLD AUTO: 0 10*3/UL
NRBC BLD AUTO-RTO: 0 /100
PLATELET # BLD AUTO: 184 10E9/L (ref 150–450)
POTASSIUM SERPL-SCNC: 3.9 MMOL/L (ref 3.4–5.3)
PROT SERPL-MCNC: 6.8 G/DL (ref 6.8–8.8)
RBC # BLD AUTO: 3.35 10E12/L (ref 3.8–5.2)
SODIUM SERPL-SCNC: 139 MMOL/L (ref 133–144)
WBC # BLD AUTO: 5.2 10E9/L (ref 4–11)

## 2019-10-21 PROCEDURE — 25000128 H RX IP 250 OP 636: Performed by: INTERNAL MEDICINE

## 2019-10-21 PROCEDURE — 80053 COMPREHEN METABOLIC PANEL: CPT | Performed by: INTERNAL MEDICINE

## 2019-10-21 PROCEDURE — 85025 COMPLETE CBC W/AUTO DIFF WBC: CPT | Performed by: INTERNAL MEDICINE

## 2019-10-21 RX ORDER — HEPARIN SODIUM (PORCINE) LOCK FLUSH IV SOLN 100 UNIT/ML 100 UNIT/ML
5 SOLUTION INTRAVENOUS EVERY 8 HOURS
Status: DISCONTINUED | OUTPATIENT
Start: 2019-10-21 | End: 2019-10-29 | Stop reason: HOSPADM

## 2019-10-21 RX ADMIN — HEPARIN SODIUM (PORCINE) LOCK FLUSH IV SOLN 100 UNIT/ML 5 ML: 100 SOLUTION at 10:51

## 2019-11-03 ENCOUNTER — HEALTH MAINTENANCE LETTER (OUTPATIENT)
Age: 64
End: 2019-11-03

## 2019-11-10 DIAGNOSIS — C25.0 MALIGNANT NEOPLASM OF HEAD OF PANCREAS (H): ICD-10-CM

## 2019-11-11 DIAGNOSIS — C25.0 MALIGNANT NEOPLASM OF HEAD OF PANCREAS (H): ICD-10-CM

## 2019-11-11 LAB
ALBUMIN SERPL-MCNC: 3.5 G/DL (ref 3.4–5)
ALP SERPL-CCNC: 155 U/L (ref 40–150)
ALT SERPL W P-5'-P-CCNC: 32 U/L (ref 0–50)
ANION GAP SERPL CALCULATED.3IONS-SCNC: 6 MMOL/L (ref 3–14)
AST SERPL W P-5'-P-CCNC: 32 U/L (ref 0–45)
BASOPHILS # BLD AUTO: 0 10E9/L (ref 0–0.2)
BASOPHILS NFR BLD AUTO: 0.6 %
BILIRUB SERPL-MCNC: 0.5 MG/DL (ref 0.2–1.3)
BUN SERPL-MCNC: 6 MG/DL (ref 7–30)
CALCIUM SERPL-MCNC: 8.7 MG/DL (ref 8.5–10.1)
CHLORIDE SERPL-SCNC: 106 MMOL/L (ref 94–109)
CO2 SERPL-SCNC: 27 MMOL/L (ref 20–32)
CREAT SERPL-MCNC: 0.75 MG/DL (ref 0.52–1.04)
DIFFERENTIAL METHOD BLD: ABNORMAL
EOSINOPHIL # BLD AUTO: 0.1 10E9/L (ref 0–0.7)
EOSINOPHIL NFR BLD AUTO: 1.4 %
ERYTHROCYTE [DISTWIDTH] IN BLOOD BY AUTOMATED COUNT: 19.9 % (ref 10–15)
GFR SERPL CREATININE-BSD FRML MDRD: 83 ML/MIN/{1.73_M2}
GLUCOSE SERPL-MCNC: 67 MG/DL (ref 70–99)
HCT VFR BLD AUTO: 34.5 % (ref 35–47)
HGB BLD-MCNC: 11.3 G/DL (ref 11.7–15.7)
IMM GRANULOCYTES # BLD: 0 10E9/L (ref 0–0.4)
IMM GRANULOCYTES NFR BLD: 0.4 %
LYMPHOCYTES # BLD AUTO: 1 10E9/L (ref 0.8–5.3)
LYMPHOCYTES NFR BLD AUTO: 19.2 %
MCH RBC QN AUTO: 32.8 PG (ref 26.5–33)
MCHC RBC AUTO-ENTMCNC: 32.8 G/DL (ref 31.5–36.5)
MCV RBC AUTO: 100 FL (ref 78–100)
MONOCYTES # BLD AUTO: 0.4 10E9/L (ref 0–1.3)
MONOCYTES NFR BLD AUTO: 8 %
NEUTROPHILS # BLD AUTO: 3.5 10E9/L (ref 1.6–8.3)
NEUTROPHILS NFR BLD AUTO: 70.4 %
NRBC # BLD AUTO: 0 10*3/UL
NRBC BLD AUTO-RTO: 0 /100
PLATELET # BLD AUTO: 216 10E9/L (ref 150–450)
POTASSIUM SERPL-SCNC: 4.1 MMOL/L (ref 3.4–5.3)
PROT SERPL-MCNC: 6.7 G/DL (ref 6.8–8.8)
RBC # BLD AUTO: 3.45 10E12/L (ref 3.8–5.2)
SODIUM SERPL-SCNC: 139 MMOL/L (ref 133–144)
WBC # BLD AUTO: 5 10E9/L (ref 4–11)

## 2019-11-11 PROCEDURE — 80053 COMPREHEN METABOLIC PANEL: CPT | Performed by: INTERNAL MEDICINE

## 2019-11-11 PROCEDURE — 25000128 H RX IP 250 OP 636: Performed by: INTERNAL MEDICINE

## 2019-11-11 PROCEDURE — 85025 COMPLETE CBC W/AUTO DIFF WBC: CPT | Performed by: INTERNAL MEDICINE

## 2019-11-11 RX ORDER — HEPARIN SODIUM (PORCINE) LOCK FLUSH IV SOLN 100 UNIT/ML 100 UNIT/ML
5 SOLUTION INTRAVENOUS DAILY PRN
Status: DISCONTINUED | OUTPATIENT
Start: 2019-11-11 | End: 2019-11-19 | Stop reason: HOSPADM

## 2019-11-11 RX ADMIN — HEPARIN 5 ML: 100 SYRINGE at 10:50

## 2019-11-11 NOTE — NURSING NOTE
Chief Complaint   Patient presents with     Port Draw     Labs drawn via port by RN in lab.      Labs drawn via Port accessed using 20g gripper needle. Line flushed and Heparin locked.     Suzanne Vyas RN

## 2019-12-02 DIAGNOSIS — C25.0 MALIGNANT NEOPLASM OF HEAD OF PANCREAS (H): ICD-10-CM

## 2019-12-02 LAB
ALBUMIN SERPL-MCNC: 3.5 G/DL (ref 3.4–5)
ALP SERPL-CCNC: 148 U/L (ref 40–150)
ALT SERPL W P-5'-P-CCNC: 36 U/L (ref 0–50)
ANION GAP SERPL CALCULATED.3IONS-SCNC: 4 MMOL/L (ref 3–14)
AST SERPL W P-5'-P-CCNC: 35 U/L (ref 0–45)
BASOPHILS # BLD AUTO: 0 10E9/L (ref 0–0.2)
BASOPHILS NFR BLD AUTO: 0.4 %
BILIRUB SERPL-MCNC: 0.5 MG/DL (ref 0.2–1.3)
BUN SERPL-MCNC: 10 MG/DL (ref 7–30)
CALCIUM SERPL-MCNC: 8.4 MG/DL (ref 8.5–10.1)
CHLORIDE SERPL-SCNC: 106 MMOL/L (ref 94–109)
CO2 SERPL-SCNC: 27 MMOL/L (ref 20–32)
CREAT SERPL-MCNC: 0.77 MG/DL (ref 0.52–1.04)
DIFFERENTIAL METHOD BLD: ABNORMAL
EOSINOPHIL # BLD AUTO: 0 10E9/L (ref 0–0.7)
EOSINOPHIL NFR BLD AUTO: 0.6 %
ERYTHROCYTE [DISTWIDTH] IN BLOOD BY AUTOMATED COUNT: 19.2 % (ref 10–15)
GFR SERPL CREATININE-BSD FRML MDRD: 81 ML/MIN/{1.73_M2}
GLUCOSE SERPL-MCNC: 81 MG/DL (ref 70–99)
HCT VFR BLD AUTO: 33.6 % (ref 35–47)
HGB BLD-MCNC: 11.1 G/DL (ref 11.7–15.7)
IMM GRANULOCYTES # BLD: 0 10E9/L (ref 0–0.4)
IMM GRANULOCYTES NFR BLD: 0.2 %
LYMPHOCYTES # BLD AUTO: 1 10E9/L (ref 0.8–5.3)
LYMPHOCYTES NFR BLD AUTO: 20.4 %
MCH RBC QN AUTO: 32.8 PG (ref 26.5–33)
MCHC RBC AUTO-ENTMCNC: 33 G/DL (ref 31.5–36.5)
MCV RBC AUTO: 99 FL (ref 78–100)
MONOCYTES # BLD AUTO: 0.4 10E9/L (ref 0–1.3)
MONOCYTES NFR BLD AUTO: 7.4 %
NEUTROPHILS # BLD AUTO: 3.4 10E9/L (ref 1.6–8.3)
NEUTROPHILS NFR BLD AUTO: 71 %
NRBC # BLD AUTO: 0 10*3/UL
NRBC BLD AUTO-RTO: 0 /100
PLATELET # BLD AUTO: 183 10E9/L (ref 150–450)
POTASSIUM SERPL-SCNC: 4 MMOL/L (ref 3.4–5.3)
PROT SERPL-MCNC: 6.6 G/DL (ref 6.8–8.8)
RBC # BLD AUTO: 3.38 10E12/L (ref 3.8–5.2)
SODIUM SERPL-SCNC: 137 MMOL/L (ref 133–144)
WBC # BLD AUTO: 4.9 10E9/L (ref 4–11)

## 2019-12-02 PROCEDURE — 85025 COMPLETE CBC W/AUTO DIFF WBC: CPT | Performed by: INTERNAL MEDICINE

## 2019-12-02 PROCEDURE — 80053 COMPREHEN METABOLIC PANEL: CPT | Performed by: INTERNAL MEDICINE

## 2019-12-02 PROCEDURE — 25000128 H RX IP 250 OP 636: Performed by: INTERNAL MEDICINE

## 2019-12-02 RX ORDER — HEPARIN SODIUM (PORCINE) LOCK FLUSH IV SOLN 100 UNIT/ML 100 UNIT/ML
5 SOLUTION INTRAVENOUS EVERY 8 HOURS PRN
Status: DISCONTINUED | OUTPATIENT
Start: 2019-12-02 | End: 2019-12-10 | Stop reason: HOSPADM

## 2019-12-02 RX ADMIN — HEPARIN 5 ML: 100 SYRINGE at 10:32

## 2019-12-20 DIAGNOSIS — C25.0 MALIGNANT NEOPLASM OF HEAD OF PANCREAS (H): ICD-10-CM

## 2019-12-20 NOTE — LETTER
2/19/2018      RE: Emelia Weathers  [address]  [place] [facility] [address]       Emelia Weathers is here today in follow-up of recurrent pancreatic cancer.    She has disease that recurred in the resection bed and has been on active treatment with good control for about 3 years now. She was originally on folfirinox which was subsequently reduced to xelox and now is just on single agent Xeloda. She is here for a planned response assessment. She continues to do well, and has increased her exercise now to where she is going back to the gym for spin classes on a regular basis. She has family does cut back from a cruise in the [facility]. She has chronically loose stools which have not changed in pattern. She gets some minor peeling and cracking on her hands and feet. She's had no fevers chills or sweats. She's had no other interval illnesses. The remainder of the 10 point review of systems is otherwise unremarkable.    On exam today she appears well and other than her stated age. Her vital signs as noted in the chart unremarkable.  She has no scleral icterus and no visible lesions in the oropharynx.  No adenopathy is palpable in the neck or supraclavicular spaces under thyroid is unremarkable.  Her lungs are clear to auscultation without office to percussion.  Her heart rate and rhythm are regular without audible murmur or gallop. Her port site is unremarkable.  Her abdomen is soft and nontender without palpable mass or organomegaly.  She has no peripheral edema no tenderness in her calves or thighs and no cyanosis or clubbing of her digits. She has moderate dry desquamation on her palms.  Her speech is fluent cranial nerves are grossly intact just unremarkable gait and station.    Reviewed with the patient and her family her lab work and CT scan. She has no evidence of disease progression. She still has a little bit of fatty liver although that looks better. She has normal electrolytes renal function and liver  Had 90day w/ 1 refill [facility] on 10/21/19  Not yet due    enzymes. She has mild abnormalities of her blood counts consistent with her chemotherapy.    Assessment/plan: Recurrent pancreatic cancer with ongoing excellent disease control. We will continue on with the same dose and schedule of Xeloda and reevaluate her disease status in another 3 months.    Yasmany Wade MD

## 2019-12-23 DIAGNOSIS — C25.0 MALIGNANT NEOPLASM OF HEAD OF PANCREAS (H): Primary | ICD-10-CM

## 2019-12-23 LAB
ALBUMIN SERPL-MCNC: 3.5 G/DL (ref 3.4–5)
ALP SERPL-CCNC: 146 U/L (ref 40–150)
ALT SERPL W P-5'-P-CCNC: 39 U/L (ref 0–50)
ANION GAP SERPL CALCULATED.3IONS-SCNC: 4 MMOL/L (ref 3–14)
AST SERPL W P-5'-P-CCNC: 38 U/L (ref 0–45)
BASOPHILS # BLD AUTO: 0 10E9/L (ref 0–0.2)
BASOPHILS NFR BLD AUTO: 0.4 %
BILIRUB SERPL-MCNC: 0.5 MG/DL (ref 0.2–1.3)
BUN SERPL-MCNC: 7 MG/DL (ref 7–30)
CALCIUM SERPL-MCNC: 8.8 MG/DL (ref 8.5–10.1)
CHLORIDE SERPL-SCNC: 109 MMOL/L (ref 94–109)
CO2 SERPL-SCNC: 26 MMOL/L (ref 20–32)
CREAT SERPL-MCNC: 0.72 MG/DL (ref 0.52–1.04)
DIFFERENTIAL METHOD BLD: ABNORMAL
EOSINOPHIL # BLD AUTO: 0.1 10E9/L (ref 0–0.7)
EOSINOPHIL NFR BLD AUTO: 1.3 %
ERYTHROCYTE [DISTWIDTH] IN BLOOD BY AUTOMATED COUNT: 19.2 % (ref 10–15)
GFR SERPL CREATININE-BSD FRML MDRD: 88 ML/MIN/{1.73_M2}
GLUCOSE SERPL-MCNC: 68 MG/DL (ref 70–99)
HCT VFR BLD AUTO: 34.4 % (ref 35–47)
HGB BLD-MCNC: 11.1 G/DL (ref 11.7–15.7)
IMM GRANULOCYTES # BLD: 0 10E9/L (ref 0–0.4)
IMM GRANULOCYTES NFR BLD: 0.2 %
LYMPHOCYTES # BLD AUTO: 1 10E9/L (ref 0.8–5.3)
LYMPHOCYTES NFR BLD AUTO: 21.4 %
MCH RBC QN AUTO: 32.6 PG (ref 26.5–33)
MCHC RBC AUTO-ENTMCNC: 32.3 G/DL (ref 31.5–36.5)
MCV RBC AUTO: 101 FL (ref 78–100)
MONOCYTES # BLD AUTO: 0.4 10E9/L (ref 0–1.3)
MONOCYTES NFR BLD AUTO: 8.9 %
NEUTROPHILS # BLD AUTO: 3.1 10E9/L (ref 1.6–8.3)
NEUTROPHILS NFR BLD AUTO: 67.8 %
NRBC # BLD AUTO: 0 10*3/UL
NRBC BLD AUTO-RTO: 0 /100
PLATELET # BLD AUTO: 206 10E9/L (ref 150–450)
POTASSIUM SERPL-SCNC: 4.3 MMOL/L (ref 3.4–5.3)
PROT SERPL-MCNC: 6.8 G/DL (ref 6.8–8.8)
RBC # BLD AUTO: 3.41 10E12/L (ref 3.8–5.2)
SODIUM SERPL-SCNC: 140 MMOL/L (ref 133–144)
WBC # BLD AUTO: 4.6 10E9/L (ref 4–11)

## 2019-12-23 PROCEDURE — 80053 COMPREHEN METABOLIC PANEL: CPT

## 2019-12-23 PROCEDURE — 80053 COMPREHEN METABOLIC PANEL: CPT | Performed by: INTERNAL MEDICINE

## 2019-12-23 PROCEDURE — 85025 COMPLETE CBC W/AUTO DIFF WBC: CPT

## 2019-12-23 PROCEDURE — 25000128 H RX IP 250 OP 636: Performed by: INTERNAL MEDICINE

## 2019-12-23 PROCEDURE — 36591 DRAW BLOOD OFF VENOUS DEVICE: CPT

## 2019-12-23 RX ORDER — HEPARIN SODIUM (PORCINE) LOCK FLUSH IV SOLN 100 UNIT/ML 100 UNIT/ML
5 SOLUTION INTRAVENOUS ONCE
Status: COMPLETED | OUTPATIENT
Start: 2019-12-23 | End: 2019-12-23

## 2019-12-23 RX ORDER — PANCRELIPASE 60000; 12000; 38000 [USP'U]/1; [USP'U]/1; [USP'U]/1
CAPSULE, DELAYED RELEASE PELLETS ORAL
Qty: 540 CAPSULE | Refills: 2 | Status: SHIPPED | OUTPATIENT
Start: 2019-12-23 | End: 2020-12-23

## 2019-12-23 RX ORDER — CAPECITABINE 500 MG/1
TABLET, FILM COATED ORAL
Qty: 42 TABLET | Refills: 3 | Status: SHIPPED | OUTPATIENT
Start: 2019-12-23 | End: 2020-04-27

## 2019-12-23 RX ADMIN — Medication 5 ML: at 10:36

## 2019-12-23 NOTE — NURSING NOTE
"Chief Complaint   Patient presents with     Port Draw     port accessed by rn in lab.  lab only appointment.     Port accessed with 20g 3/4\" gripper needle by RN in lab.  No labs ordered; shlomo green and purple tubes and paged Dr Wade.  Port flushed with saline and heparin.  Port de-accessed.    Meghna Leonardo RN      "

## 2019-12-30 ENCOUNTER — ANCILLARY PROCEDURE (OUTPATIENT)
Dept: CT IMAGING | Facility: CLINIC | Age: 64
End: 2019-12-30
Attending: INTERNAL MEDICINE
Payer: MEDICARE

## 2019-12-30 ENCOUNTER — ONCOLOGY VISIT (OUTPATIENT)
Dept: ONCOLOGY | Facility: CLINIC | Age: 64
End: 2019-12-30
Attending: INTERNAL MEDICINE
Payer: MEDICARE

## 2019-12-30 VITALS
HEART RATE: 82 BPM | BODY MASS INDEX: 19.29 KG/M2 | DIASTOLIC BLOOD PRESSURE: 84 MMHG | WEIGHT: 119.49 LBS | TEMPERATURE: 97.5 F | OXYGEN SATURATION: 99 % | SYSTOLIC BLOOD PRESSURE: 144 MMHG

## 2019-12-30 DIAGNOSIS — C25.0 MALIGNANT NEOPLASM OF HEAD OF PANCREAS (H): Primary | ICD-10-CM

## 2019-12-30 DIAGNOSIS — Z23 ENCOUNTER FOR IMMUNIZATION: ICD-10-CM

## 2019-12-30 DIAGNOSIS — C25.0 MALIGNANT NEOPLASM OF HEAD OF PANCREAS (H): ICD-10-CM

## 2019-12-30 PROCEDURE — 99214 OFFICE O/P EST MOD 30 MIN: CPT | Mod: ZP | Performed by: INTERNAL MEDICINE

## 2019-12-30 PROCEDURE — 25000581 ZZH RX MED A9270 GY (STAT IND- M) 250: Mod: GY,ZF | Performed by: INTERNAL MEDICINE

## 2019-12-30 PROCEDURE — 90732 PPSV23 VACC 2 YRS+ SUBQ/IM: CPT | Mod: ZF | Performed by: INTERNAL MEDICINE

## 2019-12-30 PROCEDURE — 25000128 H RX IP 250 OP 636: Performed by: INTERNAL MEDICINE

## 2019-12-30 PROCEDURE — 25000128 H RX IP 250 OP 636: Mod: ZF | Performed by: INTERNAL MEDICINE

## 2019-12-30 PROCEDURE — 90472 IMMUNIZATION ADMIN EACH ADD: CPT

## 2019-12-30 PROCEDURE — G0463 HOSPITAL OUTPT CLINIC VISIT: HCPCS | Mod: 25

## 2019-12-30 PROCEDURE — 90750 HZV VACC RECOMBINANT IM: CPT | Mod: GY,ZF | Performed by: INTERNAL MEDICINE

## 2019-12-30 PROCEDURE — G0009 ADMIN PNEUMOCOCCAL VACCINE: HCPCS

## 2019-12-30 RX ORDER — HEPARIN SODIUM (PORCINE) LOCK FLUSH IV SOLN 100 UNIT/ML 100 UNIT/ML
5 SOLUTION INTRAVENOUS ONCE
Status: COMPLETED | OUTPATIENT
Start: 2019-12-30 | End: 2019-12-30

## 2019-12-30 RX ORDER — HEPARIN SODIUM (PORCINE) LOCK FLUSH IV SOLN 100 UNIT/ML 100 UNIT/ML
5 SOLUTION INTRAVENOUS EVERY 8 HOURS
Status: DISCONTINUED | OUTPATIENT
Start: 2019-12-30 | End: 2020-01-07 | Stop reason: HOSPADM

## 2019-12-30 RX ORDER — IOPAMIDOL 755 MG/ML
73 INJECTION, SOLUTION INTRAVASCULAR ONCE
Status: COMPLETED | OUTPATIENT
Start: 2019-12-30 | End: 2019-12-30

## 2019-12-30 RX ADMIN — ZOSTER VACCINE RECOMBINANT, ADJUVANTED 0.5 ML: KIT at 15:17

## 2019-12-30 RX ADMIN — PNEUMOCOCCAL VACCINE POLYVALENT 0.5 ML
25; 25; 25; 25; 25; 25; 25; 25; 25; 25; 25; 25; 25; 25; 25; 25; 25; 25; 25; 25; 25; 25; 25 INJECTION, SOLUTION INTRAMUSCULAR; SUBCUTANEOUS at 15:19

## 2019-12-30 RX ADMIN — HEPARIN SODIUM (PORCINE) LOCK FLUSH IV SOLN 100 UNIT/ML 5 ML: 100 SOLUTION at 12:16

## 2019-12-30 RX ADMIN — Medication 5 ML: at 11:35

## 2019-12-30 RX ADMIN — IOPAMIDOL 73 ML: 755 INJECTION, SOLUTION INTRAVASCULAR at 11:59

## 2019-12-30 ASSESSMENT — PAIN SCALES - GENERAL: PAINLEVEL: NO PAIN (0)

## 2019-12-30 NOTE — NURSING NOTE
"Oncology Rooming Note    December 30, 2019 2:28 PM   Emelia eWathers is a 64 year old female who presents for:    Chief Complaint   Patient presents with     Oncology Clinic Visit     Return visit; pancreatic cancer; vitals taken     Initial Vitals: BP (!) 144/84   Pulse 82   Temp 97.5  F (36.4  C) (Oral)   Wt 54.2 kg (119 lb 7.8 oz)   SpO2 99%   BMI 19.29 kg/m   Estimated body mass index is 19.29 kg/m  as calculated from the following:    Height as of 5/21/18: 1.676 m (5' 6\").    Weight as of this encounter: 54.2 kg (119 lb 7.8 oz). Body surface area is 1.59 meters squared.  No Pain (0) Comment: Data Unavailable   No LMP recorded. Patient is postmenopausal.  Allergies reviewed: Yes  Medications reviewed: Yes    Medications: Medication refills not needed today.  Pharmacy name entered into the[employer]:    [id number] [place] [facility] - [place], [place] - [address] [place] [address] PHARMACY [facility] [place], [place] - [address] [facility]ARMAC[facility] [place], [place] - [address] [place] [address] [phone]  [facility] # [id number] [place], [place] - [address] [place] [address] [facility]CIALTY PHARMACY - [place], [place] - [address] [place] [address]    Clinical concerns: No new concerns today. Dr. Wade was notified.      SABRINA ESCOBEDO, [facility]            "

## 2019-12-30 NOTE — Clinical Note
12/30/2019       RE: Emelia Weathers  [address]  [place] [address]     Dear Colleague,    Thank you for referring your patient, Emelia Weathers, to the [facility]. Please see a copy of my visit note below.    Tiana Weathers is here in follow-up of recurrent pancreas cancer.    She had biopsy-proven recurrence of her pancreas cancer in her resection bed and is now had complete control of her disease with Xeloda for almost 5 years.  She is here for ongoing response assessment.  She tells me she continues to feel great with good appetite and energy and off participate in all activities she would like her only ongoing toxicity is a moderate degree of peeling and cracking on her palms and soles but not enough to interfere with any of her activities.  She has had no other new illnesses.  A 10 point complete review of systems is entirely unremarkable.  She asked today about whether she should have a pneumonia and shingles vaccine.    On exam she is quite slender as always but appears robustly well and younger than her stated age.  Her vital signs are unremarkable.  She has no icterus and notes visible lesions in the oropharynx.  No adenopathy is palpable in her neck or supra Andrea spaces.  Her lungs are clear to auscultation without dullness to percussion.  Her heart rate and rhythm are regular without audible murmur gallop.  Her abdomen is soft and nontender without palpable mass organomegaly.  She has no edema no tenderness in her calves or thighs.  On her palms she has moderate desquamation with no fissuring or ulceration.  Her speech is fluent and clear nerves are grossly intact.    I reviewed with patient and her son her CT scan and lab work.  She has normal electrolytes, renal function, liver enzymes and nearly normal blood counts with mild macrocytic anemia as expected with her chemotherapy.  On her CT scan she has a changes of her prior Whipple and some minor adenopathy in the  resection bed which is stable and some tiny lung nodules which are stable.  There is nothing to suggest progression of her disease.    Assessment/plan: Recurrent pancreatic cancer with ongoing disease control with single agent Xeloda.  We discussed again whether there is value to ongoing treatment or whether she should have a treatment break.  She is in such an unusual circumstance there is no clear guidance for this but both of ours assessments was that she is having relatively little toxicity and there is no pressure to stop so we will continue on with the same dose and schedule.  We will see her back in about 4 months with another CT scan and lab work.  We will go ahead and give her Pneumovax and Shingrix today.    Again, thank you for allowing me to participate in the care of your patient.      Sincerely,    Yasmany Wade MD

## 2019-12-30 NOTE — LETTER
12/30/2019      RE: Emelia Weathers  [address]  [place] [address]       Tiana Weathers is here in follow-up of recurrent pancreas cancer.    She had biopsy-proven recurrence of her pancreas cancer in her resection bed and has now had complete control of her disease with Xeloda for almost 5 years.  She is here for ongoing response assessment.  She tells me she continues to feel great with good appetite and energy and able to participate in all activities she would like. Her only ongoing toxicity is a moderate degree of peeling and cracking on her palms and soles, but not enough to interfere with any of her activities.  She has had no other new illnesses.  A 10 point complete review of systems is entirely unremarkable.  She asked today about whether she should have a pneumonia and shingles vaccine.    On exam she is quite slender as always but appears robustly well and younger than her stated age.  Her vital signs are unremarkable.  She has no icterus and no visible lesions in the oropharynx.  No adenopathy is palpable in her neck or supraclavicular spaces.  Her lungs are clear to auscultation without dullness to percussion.  Her heart rate and rhythm are regular without audible murmur gallop.  Her abdomen is soft and nontender without palpable mass organomegaly.  She has no edema or tenderness in her calves or thighs.  On her palms she has moderate desquamation with no fissuring or ulceration and some minor contractures of a few digits.  Her speech is fluent and clear nerves are grossly intact.    I reviewed with the patient and her son her CT scan and lab work.  She has normal electrolytes, renal function, liver enzymes and nearly normal blood counts with mild macrocytic anemia as expected with her chemotherapy.  On her CT scan she has a changes of her prior Whipple and some minor adenopathy in the resection bed which is stable and some tiny lung nodules which are stable.  There is nothing to suggest  progression of her disease.    Assessment/plan: Recurrent pancreatic cancer with ongoing disease control with single agent Xeloda.  We discussed again whether there is value to ongoing treatment or whether she should have a treatment break.  She is in such an unusual circumstance there is no clear guidance for this but both of our assessments was that she is having relatively little toxicity and there is no pressure to stop so we will continue on with the same dose and schedule.  We will see her back in about 4 months with another CT scan and lab work.  We will go ahead and give her Pneumovax and Shingrix today.    Yasmany Wade MD

## 2019-12-30 NOTE — PROGRESS NOTES
Tiana Weathers is here in follow-up of recurrent pancreas cancer.    She had biopsy-proven recurrence of her pancreas cancer in her resection bed and has now had complete control of her disease with Xeloda for almost 5 years.  She is here for ongoing response assessment.  She tells me she continues to feel great with good appetite and energy and able to participate in all activities she would like. Her only ongoing toxicity is a moderate degree of peeling and cracking on her palms and soles, but not enough to interfere with any of her activities.  She has had no other new illnesses.  A 10 point complete review of systems is entirely unremarkable.  She asked today about whether she should have a pneumonia and shingles vaccine.    On exam she is quite slender as always but appears robustly well and younger than her stated age.  Her vital signs are unremarkable.  She has no icterus and no visible lesions in the oropharynx.  No adenopathy is palpable in her neck or supraclavicular spaces.  Her lungs are clear to auscultation without dullness to percussion.  Her heart rate and rhythm are regular without audible murmur gallop.  Her abdomen is soft and nontender without palpable mass organomegaly.  She has no edema or tenderness in her calves or thighs.  On her palms she has moderate desquamation with no fissuring or ulceration and some minor contractures of a few digits.  Her speech is fluent and clear nerves are grossly intact.    I reviewed with the patient and her son her CT scan and lab work.  She has normal electrolytes, renal function, liver enzymes and nearly normal blood counts with mild macrocytic anemia as expected with her chemotherapy.  On her CT scan she has a changes of her prior Whipple and some minor adenopathy in the resection bed which is stable and some tiny lung nodules which are stable.  There is nothing to suggest progression of her disease.    Assessment/plan: Recurrent pancreatic cancer with ongoing  disease control with single agent Xeloda.  We discussed again whether there is value to ongoing treatment or whether she should have a treatment break.  She is in such an unusual circumstance there is no clear guidance for this but both of our assessments was that she is having relatively little toxicity and there is no pressure to stop so we will continue on with the same dose and schedule.  We will see her back in about 4 months with another CT scan and lab work.  We will go ahead and give her Pneumovax and Shingrix today.

## 2020-01-13 DIAGNOSIS — C25.0 MALIGNANT NEOPLASM OF HEAD OF PANCREAS (H): ICD-10-CM

## 2020-01-13 LAB
ALBUMIN SERPL-MCNC: 3.6 G/DL (ref 3.4–5)
ALP SERPL-CCNC: 134 U/L (ref 40–150)
ALT SERPL W P-5'-P-CCNC: 33 U/L (ref 0–50)
ANION GAP SERPL CALCULATED.3IONS-SCNC: 5 MMOL/L (ref 3–14)
AST SERPL W P-5'-P-CCNC: 30 U/L (ref 0–45)
BASOPHILS # BLD AUTO: 0 10E9/L (ref 0–0.2)
BASOPHILS NFR BLD AUTO: 0.4 %
BILIRUB SERPL-MCNC: 0.5 MG/DL (ref 0.2–1.3)
BUN SERPL-MCNC: 12 MG/DL (ref 7–30)
CALCIUM SERPL-MCNC: 8.6 MG/DL (ref 8.5–10.1)
CHLORIDE SERPL-SCNC: 109 MMOL/L (ref 94–109)
CO2 SERPL-SCNC: 27 MMOL/L (ref 20–32)
CREAT SERPL-MCNC: 0.71 MG/DL (ref 0.52–1.04)
DIFFERENTIAL METHOD BLD: ABNORMAL
EOSINOPHIL # BLD AUTO: 0 10E9/L (ref 0–0.7)
EOSINOPHIL NFR BLD AUTO: 0.8 %
ERYTHROCYTE [DISTWIDTH] IN BLOOD BY AUTOMATED COUNT: 18.9 % (ref 10–15)
GFR SERPL CREATININE-BSD FRML MDRD: 88 ML/MIN/{1.73_M2}
GLUCOSE SERPL-MCNC: 80 MG/DL (ref 70–99)
HCT VFR BLD AUTO: 33.2 % (ref 35–47)
HGB BLD-MCNC: 10.8 G/DL (ref 11.7–15.7)
IMM GRANULOCYTES # BLD: 0 10E9/L (ref 0–0.4)
IMM GRANULOCYTES NFR BLD: 0.2 %
LYMPHOCYTES # BLD AUTO: 1 10E9/L (ref 0.8–5.3)
LYMPHOCYTES NFR BLD AUTO: 20.9 %
MCH RBC QN AUTO: 32.6 PG (ref 26.5–33)
MCHC RBC AUTO-ENTMCNC: 32.5 G/DL (ref 31.5–36.5)
MCV RBC AUTO: 100 FL (ref 78–100)
MONOCYTES # BLD AUTO: 0.4 10E9/L (ref 0–1.3)
MONOCYTES NFR BLD AUTO: 7.4 %
NEUTROPHILS # BLD AUTO: 3.4 10E9/L (ref 1.6–8.3)
NEUTROPHILS NFR BLD AUTO: 70.3 %
NRBC # BLD AUTO: 0 10*3/UL
NRBC BLD AUTO-RTO: 0 /100
PLATELET # BLD AUTO: 201 10E9/L (ref 150–450)
POTASSIUM SERPL-SCNC: 4 MMOL/L (ref 3.4–5.3)
PROT SERPL-MCNC: 6.8 G/DL (ref 6.8–8.8)
RBC # BLD AUTO: 3.31 10E12/L (ref 3.8–5.2)
SODIUM SERPL-SCNC: 141 MMOL/L (ref 133–144)
WBC # BLD AUTO: 4.9 10E9/L (ref 4–11)

## 2020-01-13 PROCEDURE — 85025 COMPLETE CBC W/AUTO DIFF WBC: CPT | Performed by: INTERNAL MEDICINE

## 2020-01-13 PROCEDURE — 25000128 H RX IP 250 OP 636: Performed by: INTERNAL MEDICINE

## 2020-01-13 PROCEDURE — 80053 COMPREHEN METABOLIC PANEL: CPT | Performed by: INTERNAL MEDICINE

## 2020-01-13 RX ORDER — HEPARIN SODIUM (PORCINE) LOCK FLUSH IV SOLN 100 UNIT/ML 100 UNIT/ML
5 SOLUTION INTRAVENOUS DAILY PRN
Status: DISCONTINUED | OUTPATIENT
Start: 2020-01-13 | End: 2020-01-21 | Stop reason: HOSPADM

## 2020-01-13 RX ADMIN — HEPARIN SODIUM (PORCINE) LOCK FLUSH IV SOLN 100 UNIT/ML 5 ML: 100 SOLUTION at 10:33

## 2020-01-22 ENCOUNTER — PRE VISIT (OUTPATIENT)
Dept: ONCOLOGY | Facility: CLINIC | Age: 65
End: 2020-01-22

## 2020-01-22 ENCOUNTER — OFFICE VISIT (OUTPATIENT)
Dept: ONCOLOGY | Facility: CLINIC | Age: 65
End: 2020-01-22
Attending: INTERNAL MEDICINE
Payer: MEDICARE

## 2020-01-22 DIAGNOSIS — Z80.0 FAMILY HISTORY OF COLON CANCER: ICD-10-CM

## 2020-01-22 DIAGNOSIS — Z80.0 FAMILY HISTORY OF PANCREATIC CANCER: ICD-10-CM

## 2020-01-22 DIAGNOSIS — C25.0 MALIGNANT NEOPLASM OF HEAD OF PANCREAS (H): Primary | ICD-10-CM

## 2020-01-22 PROCEDURE — 96040 ZZH GENETIC COUNSELING, EACH 30 MINUTES: CPT | Mod: ZF | Performed by: GENETIC COUNSELOR, MS

## 2020-01-22 NOTE — LETTER
Cancer Risk Management  Program Locations    [facility]  [facility]  Mailing Address  Cancer Risk Management Program  [facility]  [address]  [place] [address]    New patient appointments  [phone]  January 24, 2020    Emelia Weathers  [address]  [place] [facility] [address]      Dear Emelia,    It was a pleasure meeting with you at the [facility] on 1/22/2020.  Here is a copy of the progress note from your recent genetic counseling visit to the Cancer Risk Management Program.  If you have any additional questions, please feel free to call.    Referring Provider: Yasmany Wade MD    Presenting Information:   I met with Emelia Sarahy today for genetic counseling at the Cancer Risk Management Program at the [facility] to discuss her personal and family history of pancreatic cancer.  She is here today to review this history and available genetic testing options.    Personal History:  Emelia is a 64 year old female. She was diagnosed with pancreatic adenocarcinoma at age 57 followed by surgery and chemotherapy.  She has not had recent mammogram or colonoscopy screening.  Her ovaries, fallopian tubes and uterus are in place.       Family History: (Please see scanned pedigree for detailed family history information)    Her maternal uncle passed away at age 52 due to pancreatic cancer    One maternal aunt passed away in her 50's-60's due to lung cancer    Her maternal grandfather was diagnosed with colon cancer in his 70's    Her father was diagnosed with an oral cancer and passed away at age 62; he had a history of tobacco and significant alcohol use.      Limited family history information is available regarding her paternal relatives.    Her maternal ethnicity is  English/Kazakh/Jordanian. Her paternal ethnicity is Polish.  There is no known Ashkenazi Restorationism ancestry on either side of her family.     Discussion:    Emelia's personal and family history of pancreatic cancer is suggestive of a possible hereditary cancer syndrome.    We reviewed the features of sporadic, familial, and hereditary cancers. We discussed that current National Comprehensive Cancer Network guidelines recommend germline genetic testing for all patients diagnosed with exocrine pancreatic cancer.  This testing typically includes TELMA, BRCA1, BRCA2, CDKN2A, most Malagon syndrome genes (MLH1, MSH2, MSH6, EPCAM), PALB2, STK11 and TP53      We discussed the natural history and genetics of Hereditary Breast and Ovarian Cancer syndrome, caused by mutations in the BRCA1/2 genes.  We also reviewed the natural history and genetics of Malagon syndrome.     We discussed that there are additional genes that could cause increased risk for pancreatic cancer. As many of these genes present with overlapping features in a family and accurate cancer risk cannot always be established based upon the pedigree analysis alone, it would be reasonable for Emelia to consider panel genetic testing to analyze multiple genes at once.    A detailed handout regarding pancreatic cancer susceptibility genes and the information we discussed was provided to Emelia at the end of our appointment today and can be found in the after visit summary. Topics included: inheritance pattern, cancer risks, cancer screening recommendations, and also risks, benefits and limitations of testing.    We reviewed available genetic testing for hereditary pancreatic cancer, as well as expanded testing options for additional genes associated with other cancer risks.  Given her limited family history and interest in learning as much information as possible, we discussed the CancerNext-Expanded panel.    Genetic testing is available for 67 genes associated with  increased risk for many different cancers: CancerNext-Expanded (AIP, ALK, APC, TELMA, BAP1, BARD1, BLM, BMPR1A, BRCA1, BRCA2, BRIP1, CDH1, CDK4, CDKN1B, CDKN2A, CHEK2, DICER1, EPCAM, FANCC, FH, FLCN, GALNT12, GREM1, HOXB13, MAX, MEN1, MET, MITF, MLH1, MRE11A, MSH2, MSH6, MUTYH, NBN, NF1, NF2, PALB2, PHOX2B, PMS2, POLD1, POLE, POT1, KRRET5V, PTCH1, PTEN, RAD50, RAD51C, RAD51D, RB1, RET, SDHA, SDHAF2, SDHB, SDHC, SDHD, SMAD4, SMARCA4, SMARCB1, SMARCE1, STK11, SUFU, [id number], TP53, TSC1, TSC2, VHL, and XRCC2).  We discussed that many of the genes in the CancerNext-Expanded panel are associated with specific hereditary cancer syndromes and have published management guidelines:  Hereditary Breast and Ovarian Cancer syndrome (BRCA1, BRCA2), Malagon syndrome (MLH1, MSH2, MSH6, PMS2, EPCAM), Familial Adenomatous Polyposis (APC), Hereditary Diffuse Gastric Cancer (CDH1), Familial Atypical Multiple Mole Melanoma syndrome (CDK4, CDKN2A), Juvenile Polyposis syndrome (BMPR1A, SMAD4), Cowden syndrome (PTEN), Li Fraumeni syndrome (TP53), Peutz-Jeghers syndrome (STK11), MUTYH Associated Polyposis (MUTYH), Hereditary Leiomyomatosis and Renal Cell Cancer (FH), Vvnj-Rvqy-Ftqo (FLCN), Hereditary Papillary Renal Carcinoma (MET), Hereditary Paraganglioma and Pheochromocytoma syndrome (SDHA, SDHAF2, SDHB, SDHC, SDHD), Multiple Endocrine Neoplasia type 2 (RET), Tuberous sclerosis complex (TSC1, TSC2), Von Hippel-Lindau disease (VHL), Neurofibromatosis type 1 (NF1), Neurofibromatosis type 2 (NF1), Multiple Endocrine Neoplasia type 1 (MEN1), Multiple Endocrine Neoplasia type 4 (CDKN1B), Gorlin syndrome (SUFU, PTCH1), and Hartmann complex (VUTTH6I).  The TELMA, BRIP1, CHEK2, GREM1, NBN, PALB2, POLD1, POLE, RAD51C, and RAD51D genes are associated with increased cancer risk and have published management guidelines for certain cancers.    The remaining genes (AIP, ALK, BAP1, BARD1, BLM, DICER1, FANCC, GALNT12, HOXB13, MRE11A, MAX, MET, MITF, PHOX2B,  POT1, RAD50, SMARCA4, SMARCB1, SMARCE1, [id number], and XRCC2) are associated with increased cancer risk and may allow us to make medical recommendations when mutations are identified.    Emelia was provided with a detailed brochure from [employer] explaining the CancerNext-Expanded testing.    Medical Management: For Emelia, we reviewed that the information from genetic testing may determine:    additional cancer screening for which Emelia may qualify (i.e. mammogram and breast MRI, more frequent colonoscopies, more frequent dermatologic exams, etc.),    options for risk reducing surgeries Emelia could consider (i.e. bilateral mastectomy, surgery to remove her ovaries and/or uterus, etc.),      and targeted chemotherapies for certain cancers (i.e. immunotherapy for individuals with Malagon syndrome, PARP inhibitors, etc.).     These recommendations will be discussed in detail once genetic testing is completed.     Emelia elected to first proceed with a prior authorization to determine insurance coverage and estimated out of pocket cost of this test.  This will be submitted following our visit today, and Emelia will be contacted once this information is available.      Plan:  1) Today Emelia elected to pursue a benefits analysis through [employer] to better understand her out of pocket cost for the CancerNext-Expanded gene panel.  2) Emelia will be contacted with the expected out of pocket cost when available.  3) If Emelia is comfortable with the estimated out of pocket cost, she will return to clinic to sign the consent form and have her blood drawn.    Denise Zarate MS, [facility]  Licensed Genetic Counselor  [phone]

## 2020-01-22 NOTE — LETTER
1/22/2020       RE: Emelia Weathers  [address]  [place] [address]     Dear Colleague,    Thank you for referring your patient, Emelia Weathers, to the [facility]. Please see a copy of my visit note below.    1/22/2020    Referring Provider: Yasmany Wade MD    Presenting Information:   I met with Emelia Weathers today for genetic counseling at the Cancer Risk Management Program at the [facility] to discuss her personal and family history of pancreatic cancer.  She is here today to review this history and available genetic testing options.    Personal History:  Emelia is a 64 year old female. She was diagnosed with pancreatic adenocarcinoma at age 57 followed by surgery and chemotherapy.  She has not had recent mammogram or colonoscopy screening.  Her ovaries, fallopian tubes and uterus are in place.       Family History: (Please see scanned pedigree for detailed family history information)    Her maternal uncle passed away at age 52 due to pancreatic cancer    One maternal aunt passed away in her 50's-60's due to lung cancer    Her maternal grandfather was diagnosed with colon cancer in his 70's    Her father was diagnosed with an oral cancer and passed away at age 62; he had a history of tobacco and significant alcohol use.      Limited family history information is available regarding her paternal relatives.    Her maternal ethnicity is English/Breanna/Belarusian. Her paternal ethnicity is Polish.  There is no known Ashkenazi Orthodoxy ancestry on either side of her family.     Discussion:    Emelia's personal and family history of pancreatic cancer is suggestive of a possible hereditary cancer syndrome.    We reviewed the features of sporadic, familial, and hereditary cancers. We discussed that current National Comprehensive Cancer Network guidelines recommend germline genetic testing for all patients diagnosed with exocrine pancreatic cancer.  This testing typically includes  TELMA, BRCA1, BRCA2, CDKN2A, most Malagon syndrome genes (MLH1, MSH2, MSH6, EPCAM), PALB2, STK11 and TP53      We discussed the natural history and genetics of Hereditary Breast and Ovarian Cancer syndrome, caused by mutations in the BRCA1/2 genes.  We also reviewed the natural history and genetics of Malagon syndrome.     We discussed that there are additional genes that could cause increased risk for pancreatic cancer. As many of these genes present with overlapping features in a family and accurate cancer risk cannot always be established based upon the pedigree analysis alone, it would be reasonable for Emelia to consider panel genetic testing to analyze multiple genes at once.    A detailed handout regarding pancreatic cancer susceptibility genes and the information we discussed was provided to Emelia at the end of our appointment today and can be found in the after visit summary. Topics included: inheritance pattern, cancer risks, cancer screening recommendations, and also risks, benefits and limitations of testing.    We reviewed available genetic testing for hereditary pancreatic cancer, as well as expanded testing options for additional genes associated with other cancer risks.  Given her limited family history and interest in learning as much information as possible, we discussed the CancerNext-Expanded panel.    Genetic testing is available for 67 genes associated with increased risk for many different cancers: CancerNext-Expanded (AIP, ALK, APC, TELMA, BAP1, BARD1, BLM, BMPR1A, BRCA1, BRCA2, BRIP1, CDH1, CDK4, CDKN1B, CDKN2A, CHEK2, DICER1, EPCAM, FANCC, FH, FLCN, GALNT12, GREM1, HOXB13, MAX, MEN1, MET, MITF, MLH1, MRE11A, MSH2, MSH6, MUTYH, NBN, NF1, NF2, PALB2, PHOX2B, PMS2, POLD1, POLE, POT1, ERGOH0E, PTCH1, PTEN, RAD50, RAD51C, RAD51D, RB1, RET, SDHA, SDHAF2, SDHB, SDHC, SDHD, SMAD4, SMARCA4, SMARCB1, SMARCE1, STK11, SUFU, [id number], TP53, TSC1, TSC2, VHL, and XRCC2).  We discussed that many of the genes in  the CancerNext-Expanded panel are associated with specific hereditary cancer syndromes and have published management guidelines:  Hereditary Breast and Ovarian Cancer syndrome (BRCA1, BRCA2), Malagon syndrome (MLH1, MSH2, MSH6, PMS2, EPCAM), Familial Adenomatous Polyposis (APC), Hereditary Diffuse Gastric Cancer (CDH1), Familial Atypical Multiple Mole Melanoma syndrome (CDK4, CDKN2A), Juvenile Polyposis syndrome (BMPR1A, SMAD4), Cowden syndrome (PTEN), Li Fraumeni syndrome (TP53), Peutz-Jeghers syndrome (STK11), MUTYH Associated Polyposis (MUTYH), Hereditary Leiomyomatosis and Renal Cell Cancer (FH), Iaxu-Mqef-Igdl (FLCN), Hereditary Papillary Renal Carcinoma (MET), Hereditary Paraganglioma and Pheochromocytoma syndrome (SDHA, SDHAF2, SDHB, SDHC, SDHD), Multiple Endocrine Neoplasia type 2 (RET), Tuberous sclerosis complex (TSC1, TSC2), Von Hippel-Lindau disease (VHL), Neurofibromatosis type 1 (NF1), Neurofibromatosis type 2 (NF1), Multiple Endocrine Neoplasia type 1 (MEN1), Multiple Endocrine Neoplasia type 4 (CDKN1B), Gorlin syndrome (SUFU, PTCH1), and Hartmann complex ([id number]).  The TELMA, BRIP1, CHEK2, GREM1, NBN, PALB2, POLD1, POLE, RAD51C, and RAD51D genes are associated with increased cancer risk and have published management guidelines for certain cancers.    The remaining genes (AIP, ALK, BAP1, BARD1, BLM, DICER1, FANCC, GALNT12, HOXB13, MRE11A, MAX, MET, MITF, PHOX2B, POT1, RAD50, SMARCA4, SMARCB1, SMARCE1, [id number], and XRCC2) are associated with increased cancer risk and may allow us to make medical recommendations when mutations are identified.    Emelia was provided with a detailed brochure from [employer] explaining the CancerNext-Expanded testing.    Medical Management: For Emelia, we reviewed that the information from genetic testing may determine:    additional cancer screening for which Emelia may qualify (i.e. mammogram and breast MRI, more frequent colonoscopies, more frequent dermatologic exams,  etc.),    options for risk reducing surgeries Emelia could consider (i.e. bilateral mastectomy, surgery to remove her ovaries and/or uterus, etc.),      and targeted chemotherapies for certain cancers (i.e. immunotherapy for individuals with Malagon syndrome, PARP inhibitors, etc.).     These recommendations will be discussed in detail once genetic testing is completed.     Emelia elected to first proceed with a prior authorization to determine insurance coverage and estimated out of pocket cost of this test.  This will be submitted following our visit today, and Emelia will be contacted once this information is available.      Plan:  1) Today Emelia elected to pursue a benefits analysis through [employer] to better understand her out of pocket cost for the CancerNext-Expanded gene panel.  2) Emelia will be contacted with the expected out of pocket cost when available.  3) If Emelia is comfortable with the estimated out of pocket cost, she will return to clinic to sign the consent form and have her blood drawn.    Face to face time: 45 minutes    Denise Zarate MS, [facility]  Licensed Genetic Counselor  [phone]

## 2020-01-22 NOTE — PROGRESS NOTES
1/22/2020    Referring Provider: Yasmany Wade MD    Presenting Information:   I met with Emelia Weathers today for genetic counseling at the Cancer Risk Management Program at the [facility] to discuss her personal and family history of pancreatic cancer.  She is here today to review this history and available genetic testing options.    Personal History:  Emelia is a 64 year old female. She was diagnosed with pancreatic adenocarcinoma at age 57 followed by surgery and chemotherapy.  She has not had recent mammogram or colonoscopy screening.  Her ovaries, fallopian tubes and uterus are in place.       Family History: (Please see scanned pedigree for detailed family history information)    Her maternal uncle passed away at age 52 due to pancreatic cancer    One maternal aunt passed away in her 50's-60's due to lung cancer    Her maternal grandfather was diagnosed with colon cancer in his 70's    Her father was diagnosed with an oral cancer and passed away at age 62; he had a history of tobacco and significant alcohol use.      Limited family history information is available regarding her paternal relatives.    Her maternal ethnicity is English/Tanzanian/Croatian. Her paternal ethnicity is Polish.  There is no known Ashkenazi Judaism ancestry on either side of her family.     Discussion:    Emelia's personal and family history of pancreatic cancer is suggestive of a possible hereditary cancer syndrome.    We reviewed the features of sporadic, familial, and hereditary cancers. We discussed that current National Comprehensive Cancer Network guidelines recommend germline genetic testing for all patients diagnosed with exocrine pancreatic cancer.  This testing typically includes TELMA, BRCA1, BRCA2, CDKN2A, most Malagon syndrome genes (MLH1, MSH2, MSH6, EPCAM), PALB2, STK11 and TP53      We discussed the natural history and genetics of Hereditary Breast and Ovarian Cancer syndrome, caused by mutations in the BRCA1/2  genes.  We also reviewed the natural history and genetics of Malagon syndrome.     We discussed that there are additional genes that could cause increased risk for pancreatic cancer. As many of these genes present with overlapping features in a family and accurate cancer risk cannot always be established based upon the pedigree analysis alone, it would be reasonable for Emelia to consider panel genetic testing to analyze multiple genes at once.    A detailed handout regarding pancreatic cancer susceptibility genes and the information we discussed was provided to Emelia at the end of our appointment today and can be found in the after visit summary. Topics included: inheritance pattern, cancer risks, cancer screening recommendations, and also risks, benefits and limitations of testing.    We reviewed available genetic testing for hereditary pancreatic cancer, as well as expanded testing options for additional genes associated with other cancer risks.  Given her limited family history and interest in learning as much information as possible, we discussed the CancerNext-Expanded panel.    Genetic testing is available for 67 genes associated with increased risk for many different cancers: CancerNext-Expanded (AIP, ALK, APC, TELMA, BAP1, BARD1, BLM, BMPR1A, BRCA1, BRCA2, BRIP1, CDH1, CDK4, CDKN1B, CDKN2A, CHEK2, DICER1, EPCAM, FANCC, FH, FLCN, GALNT12, GREM1, HOXB13, MAX, MEN1, MET, MITF, MLH1, MRE11A, MSH2, MSH6, MUTYH, NBN, NF1, NF2, PALB2, PHOX2B, PMS2, POLD1, POLE, POT1, LPWOC9Q, PTCH1, PTEN, RAD50, RAD51C, RAD51D, RB1, RET, SDHA, SDHAF2, SDHB, SDHC, SDHD, SMAD4, SMARCA4, SMARCB1, SMARCE1, STK11, SUFU, [id number], TP53, TSC1, TSC2, VHL, and XRCC2).  We discussed that many of the genes in the CancerNext-Expanded panel are associated with specific hereditary cancer syndromes and have published management guidelines:  Hereditary Breast and Ovarian Cancer syndrome (BRCA1, BRCA2), Malagon syndrome (MLH1, MSH2, MSH6, PMS2, EPCAM),  Familial Adenomatous Polyposis (APC), Hereditary Diffuse Gastric Cancer (CDH1), Familial Atypical Multiple Mole Melanoma syndrome (CDK4, CDKN2A), Juvenile Polyposis syndrome (BMPR1A, SMAD4), Cowden syndrome (PTEN), Li Fraumeni syndrome (TP53), Peutz-Jeghers syndrome (STK11), MUTYH Associated Polyposis (MUTYH), Hereditary Leiomyomatosis and Renal Cell Cancer (FH), Qere-Glnk-Qson (FLCN), Hereditary Papillary Renal Carcinoma (MET), Hereditary Paraganglioma and Pheochromocytoma syndrome (SDHA, SDHAF2, SDHB, SDHC, SDHD), Multiple Endocrine Neoplasia type 2 (RET), Tuberous sclerosis complex (TSC1, TSC2), Von Hippel-Lindau disease (VHL), Neurofibromatosis type 1 (NF1), Neurofibromatosis type 2 (NF1), Multiple Endocrine Neoplasia type 1 (MEN1), Multiple Endocrine Neoplasia type 4 (CDKN1B), Gorlin syndrome (SUFU, PTCH1), and Hartmann complex (YQOOE2C).  The TELMA, BRIP1, CHEK2, GREM1, NBN, PALB2, POLD1, POLE, RAD51C, and RAD51D genes are associated with increased cancer risk and have published management guidelines for certain cancers.    The remaining genes (AIP, ALK, BAP1, BARD1, BLM, DICER1, FANCC, GALNT12, HOXB13, MRE11A, MAX, MET, MITF, PHOX2B, POT1, RAD50, SMARCA4, SMARCB1, SMARCE1, [id number], and XRCC2) are associated with increased cancer risk and may allow us to make medical recommendations when mutations are identified.    Emelia was provided with a detailed brochure from [employer] explaining the CancerNext-Expanded testing.    Medical Management: For Emelia, we reviewed that the information from genetic testing may determine:    additional cancer screening for which Emelia may qualify (i.e. mammogram and breast MRI, more frequent colonoscopies, more frequent dermatologic exams, etc.),    options for risk reducing surgeries Emelia could consider (i.e. bilateral mastectomy, surgery to remove her ovaries and/or uterus, etc.),      and targeted chemotherapies for certain cancers (i.e. immunotherapy for individuals with  Malagon syndrome, PARP inhibitors, etc.).     These recommendations will be discussed in detail once genetic testing is completed.     Emelia elected to first proceed with a prior authorization to determine insurance coverage and estimated out of pocket cost of this test.  This will be submitted following our visit today, and Emelia will be contacted once this information is available.      Plan:  1) Today Emelia elected to pursue a benefits analysis through [employer] to better understand her out of pocket cost for the CancerNext-Expanded gene panel.  2) Emelia will be contacted with the expected out of pocket cost when available.  3) If Emelia is comfortable with the estimated out of pocket cost, she will return to clinic to sign the consent form and have her blood drawn.    Face to face time: 45 minutes    Denise Zarate MS, [facility]  Licensed Genetic Counselor  [phone]

## 2020-01-22 NOTE — PATIENT INSTRUCTIONS
Assessing Cancer Risk  Only about 5-10% of cancers are thought to be due to an inherited cancer susceptibility gene. These families often have:    Several people with the same or related types of cancer    Cancers diagnosed at a young age (before age 50)    Individuals with more than one primary cancer    Multiple generations of the family affected with cancer    Some people may be candidates for genetic testing of more than one gene.  For these families, genetic testing using a cancer panel may be offered.  These panels can test many genes at once known to increase the risk for pancreatic (and other) cancers: APC, TELMA, BRCA1, BRCA2, CDKN2A, EPCAM, MLH1, MSH2, MSH6, PALB2, PMS2, STK11, and TP53. The purpose of this handout is to serve as a brief summary of the pancreatic cancer risk genes and cancer syndrome with pancreatic cancer risks.     Hereditary Breast and Ovarian Cancer Syndrome  (BRCA1 and BRCA2)    A single mutation in one of the copies of BRCA1 or BRCA2 increases the risk for breast and ovarian cancer.  The risk for pancreatic cancer and melanoma may be slightly increased in some families. BRCA2 is considered the most common gene responsible for familial pancreatic cancer.    A person s ethnic background is also important to consider, as individuals of Ashkenazi Mormonism ancestry have a higher chance of having a BRCA gene mutation.  There are three BRCA mutations that occur more frequently in this population.    Malagon Syndrome  (MLH1, MSH2, MSH6, PMS2, and EPCAM)    Currently five genes are known to cause Malagon Syndrome: MLH1, MSH2, MSH6, PMS2, and EPCAM.  A single mutation in one of the Malagon Syndrome genes increases the risk for colon, endometrial, ovarian, and stomach cancers.  Other cancers that occur less commonly in Malagon Syndrome include urinary tract cancers, brain cancers, and other cancers.  People who have Malagon Syndrome have up to a 6% risk of pancreatic cancer.     *Cancer risk varies  depending on Malagon syndrome gene found    Familial Atypical Multiple Mole Melanoma Syndrome  (CDKN2A)    Familial Atypical Multiple Mole Melanoma Syndrome (FAMMM) is caused by a single mutation in the CDKN2A gene. This gene used to be called p16. The lifetime risk for melanoma is between 58-92%5. People with FAMMM have increased risks for pancreatic cancer, and possibly other cancers.      People with FAMMM typically have many moles (more than 50), which can be atypical.The exact risk of pancreatic cancer can depend on a person s specific CDKN2A mutation. The risk for pancreatic cancer be as high as 60% depending on the mutation.    Peutz-Jeghers Syndrome  (STK11)    Peutz-Jeghers Syndrome is caused by a mutation in the STK11 gene. The main features of Peutz-Jeghers Syndrome are multiple hamartomatous colon polyps and blue pigmentation of the lips and oral mucosa. Cancers associated with Peutz-Jeghers Syndrome include: gastrointestinal, gynecological, lung, breast, and pancreatic cancer. Up to a 36% lifetime risk of developing pancreatic cancer has been reported for those with Peutz-Jeghers syndrome.     Li-Fraumeni Syndrome  (TP53)    Li-Fraumeni Syndrome (LFS) is a cancer predisposition syndrome caused by a mutation in the TP53 gene. A single mutation in one of the copies of TP53 increases the risk for multiple cancers. Individuals with LFS are at an increased risk for developing cancer at a young age. The lifetime risk for development of a LFS-associated cancer is 50% by age 30 and 90% by age 60.      Core Cancers: Sarcomas, Breast, Brain, Lung, Leukemias/Lymphomas, Adrenocortical carcinomas    Other Cancers: Gastrointestinal (including pancreatic), Thyroid, Skin, Genitourinary    Familial Adenomatous Polyposis Syndrome  (APC)    Familial Adenomatous Polyposis syndrome (FAP) is caused by a single mutation in the APC gene and is characterize by having over 100 adenomatous polyps in the colon and significantly  increased risk for colon cancer. These typically appear during adolescence. FAP is associated with other tumors in the thyroid, stomach, and duodenum. People with FAP have an increased lifetime pancreatic cancer risk over the general population.    Additional Genes  PALB2  Mutations in the PALB2 gene have been shown to increase the risk of breast cancer up to 58% in some families. PALB2 mutations have also been associated with increased risk for pancreatic cancer.  Individuals who inherit two PALB2 mutations--one from their mother and one from their father--have a condition called Fanconi Anemia.  This condition is associated with short stature, developmental delay, bone marrow failure, and increased risk for childhood cancers.    TELMA  Mutations in the TELMA gene typically increase the risk of breast cancer 2-4 times higher than an average woman. TELMA mutations have also been associated with an increased risk of pancreatic cancer. People who inherit an TELMA mutation from both their mother and father have a condition called ataxia-telangiectasia. Ataxia telangiectasia is associated with ataxia in childhood (trouble with balance and walking), telangiectasias (red or purple spotty clusters on the skin), involuntary movements, frequent infections, and increased risk of leukemia and lymphoma.     Inheritance    All of the genes reviewed above are inherited in an autosomal dominant pattern.  This means that if a parent has a mutation, each of their children will have a 50% chance of inheriting that same mutation.  Every child--male or female--would have a 50% chance of inheriting the mutation and being at increased risk for developing cancer.       https://r.nlm.nih.gov/primer/inheritance/inheritancepatterns    Mutations in some genes can occur de gavi, which means that a person s mutation occurred for the first time in them and was not inherited from a parent.  Now that they have the mutation, however, it can be passed on  to future generations.     Genetic Testing  Genetic testing involves a blood test and will look for any harmful mutations that are associated with increased cancer risk.  If possible, it is recommended that the person(s) who has had cancer be tested before other family members.  That person will give us the most useful information about whether or not a specific gene is associated with the cancer in the family.    Advantages and Disadvantages   There are advantages and disadvantages to genetic testing.  Advantages    May clarify your cancer risk and additional appropriate cancer screenings     Can help you make medical decisions    May explain the cancers in your family    May give useful information to your family members (if you share your results)     Disadvantages    Possible negative emotional impact of learning about inherited cancer risk    Uncertainty in interpreting a negative test result in some situations    Possible genetic discrimination concerns (see below)     Genetic Information Nondiscrimination Act (PAT)  PAT is a federal law that protects individuals from health insurance or employment discrimination based on a genetic test result.  There are currently no legal discrimination protections in terms of life insurance, long term care, or disability insurances.  Visit the National Human Genome Research [place] website to learn more: https://www.genome.gov/54877017/genetic-discrimination/    Questions to Think About Regarding Genetic Testing:    What effect will the test result have on me and my relationship with my family members if I have an inherited gene mutation?  If I don t have a gene mutation?    Should I share my test results, and how will my family react to this news, which may also affect them?    Are my children ready to learn new information that may one day affect their own health?    There are three possible results of genetic testing:    Positive--a harmful mutation was identified in  one or more of the genes    Negative--no mutation was identified in any of the genes on this panel    Variant of unknown significance (VUS)--a variation in one of the genes was identified, but it is unclear how this impacts cancer risk in the family  o Families with VUS results can contact their genetic counselor annually for updates     Reducing Cancer Risk  Recommendations are based upon an individual s genetic test result as well as their personal and family history of cancer. Pancreatic cancer screening may be available based on family history. Talk with your physician about screening options.     Cancer Resources      National Pancreatic Cancer Foundation: npcf.     Pancreatic Cancer Action Network: pancan.org     FORCE: Facing Our Risk of Cancer Empowered: facingourrisk.org    Bright Dubach: Paperhater.comk.org    Li-Fraumeni Syndrome Association: lfsassociation.org    Collaborative Group of the Americas on Inherited Colorectal Cancer (CGA)   o cgaicc.com http://www.facingInformaat.org/    Cancer Care: cancercare.org    American Cancer Society (ACS): cancer.org    National Cancer [place] (NCI): cancer.gov      Please call us if you have any questions or concerns.   Cancer Risk Management Program [phone] ([phone])  ? Hiro Zambrano, MS, [facility] [phone]  ? Sole Fournier, MS, [facility]  [phone]  ? Denise Zarate, MS, [facility]  [phone]  ? Zoya Navarrete, MS, [facility]  [phone]  ? Annalise Saldana, MS, [facility] [phone]  ? Deja Sadler, MS, [facility] [phone]   ? Estee Mahmood, MS, [facility]  [phone]    References  1. Genetic / Familial High-Risk Assessment?: Breast and Ovarian. NCCN Pract Guidel Oncol. 2017;1.2018.  2. Eliezer J, Oleg A, Paul J, et al. The incidence of pancreatic cancer in BRCA1 and BRCA2 mutation carriers. Br J Cancer. 2012;107(12):3552-7625. doi:10.1038/bjc.2012.483.  3. Mumtaz DB, Joy KG, Lorene S, et al. BRCA1, BRCA2, PALB2, and CDKN2A mutations in familial pancreatic cancer: a PACGENE study.  Marina Med. 2015;17(7):569-577. doi:10.1038/gim.2014.153.  4. Bryson G. Genetic / Familial High-Risk Assessment?: Colorectal. NCCN Pract Guidel Oncol. 2017;2.2017.  5. Karina PEREZ,  M, Giovanna E, Dontae TAYLOR. Familial Atypical Multiple Mole Melanoma Syndrome. National Center for [employer] Information (); 2009. http://www.ncbi.nlm.nih.gov/pubmed/13011175. Accessed October 10, 2017.  6. Malagon HT, Sarah RM, Anthony J, et al. Tumour spectrum in the FAMMM syndrome. Br J Cancer. 1981;44(4):553-560. http://www.ncbi.nlm.nih.gov/pubmed/0720922. Accessed October 10, 2017.  7. Geraldine F, Anali J, Rosaura A, et al. Very high risk of cancer in familial Peutz-Jeghers syndrome. Gastroenterology. 2000;119(6):6506-9105. doi:10.1053/BRIAN.2000.52035.  8. Girosalee FM, Offerhaus GJ, Landon DH, et al. Increased risk of thyroid and pancreatic carcinoma in familial adenomatous polyposis. Gut. 1993;34(10):7900-6174. doi:10.1136/GUT.34.10.1394.

## 2020-02-03 DIAGNOSIS — C25.0 MALIGNANT NEOPLASM OF HEAD OF PANCREAS (H): Primary | ICD-10-CM

## 2020-02-03 LAB
ALBUMIN SERPL-MCNC: 3.6 G/DL (ref 3.4–5)
ALP SERPL-CCNC: 142 U/L (ref 40–150)
ALT SERPL W P-5'-P-CCNC: 38 U/L (ref 0–50)
ANION GAP SERPL CALCULATED.3IONS-SCNC: 4 MMOL/L (ref 3–14)
AST SERPL W P-5'-P-CCNC: 30 U/L (ref 0–45)
BASOPHILS # BLD AUTO: 0 10E9/L (ref 0–0.2)
BASOPHILS NFR BLD AUTO: 0.7 %
BILIRUB SERPL-MCNC: 0.5 MG/DL (ref 0.2–1.3)
BUN SERPL-MCNC: 10 MG/DL (ref 7–30)
CALCIUM SERPL-MCNC: 8.9 MG/DL (ref 8.5–10.1)
CHLORIDE SERPL-SCNC: 107 MMOL/L (ref 94–109)
CO2 SERPL-SCNC: 28 MMOL/L (ref 20–32)
CREAT SERPL-MCNC: 0.79 MG/DL (ref 0.52–1.04)
DIFFERENTIAL METHOD BLD: ABNORMAL
EOSINOPHIL # BLD AUTO: 0.1 10E9/L (ref 0–0.7)
EOSINOPHIL NFR BLD AUTO: 1.3 %
ERYTHROCYTE [DISTWIDTH] IN BLOOD BY AUTOMATED COUNT: 19.1 % (ref 10–15)
GFR SERPL CREATININE-BSD FRML MDRD: 78 ML/MIN/{1.73_M2}
GLUCOSE SERPL-MCNC: 76 MG/DL (ref 70–99)
HCT VFR BLD AUTO: 34 % (ref 35–47)
HGB BLD-MCNC: 11.2 G/DL (ref 11.7–15.7)
IMM GRANULOCYTES # BLD: 0 10E9/L (ref 0–0.4)
IMM GRANULOCYTES NFR BLD: 0.4 %
LYMPHOCYTES # BLD AUTO: 1 10E9/L (ref 0.8–5.3)
LYMPHOCYTES NFR BLD AUTO: 22.4 %
MCH RBC QN AUTO: 33.2 PG (ref 26.5–33)
MCHC RBC AUTO-ENTMCNC: 32.9 G/DL (ref 31.5–36.5)
MCV RBC AUTO: 101 FL (ref 78–100)
MONOCYTES # BLD AUTO: 0.5 10E9/L (ref 0–1.3)
MONOCYTES NFR BLD AUTO: 10.4 %
NEUTROPHILS # BLD AUTO: 2.9 10E9/L (ref 1.6–8.3)
NEUTROPHILS NFR BLD AUTO: 64.8 %
NRBC # BLD AUTO: 0 10*3/UL
NRBC BLD AUTO-RTO: 0 /100
PLATELET # BLD AUTO: 228 10E9/L (ref 150–450)
POTASSIUM SERPL-SCNC: 4.2 MMOL/L (ref 3.4–5.3)
PROT SERPL-MCNC: 6.9 G/DL (ref 6.8–8.8)
RBC # BLD AUTO: 3.37 10E12/L (ref 3.8–5.2)
SODIUM SERPL-SCNC: 139 MMOL/L (ref 133–144)
WBC # BLD AUTO: 4.5 10E9/L (ref 4–11)

## 2020-02-03 PROCEDURE — 80053 COMPREHEN METABOLIC PANEL: CPT | Performed by: INTERNAL MEDICINE

## 2020-02-03 PROCEDURE — 25000128 H RX IP 250 OP 636: Performed by: INTERNAL MEDICINE

## 2020-02-03 PROCEDURE — 85025 COMPLETE CBC W/AUTO DIFF WBC: CPT | Performed by: INTERNAL MEDICINE

## 2020-02-03 RX ORDER — HEPARIN SODIUM (PORCINE) LOCK FLUSH IV SOLN 100 UNIT/ML 100 UNIT/ML
500 SOLUTION INTRAVENOUS ONCE
Status: COMPLETED | OUTPATIENT
Start: 2020-02-03 | End: 2020-02-03

## 2020-02-03 RX ADMIN — Medication 500 UNITS: at 10:34

## 2020-02-03 NOTE — NURSING NOTE
Chief Complaint   Patient presents with     Port Draw     Port accessed for labs. discontined/heparin locked.      Port heparin locked after labs drawn for this labs only visit..     Huma Crespo RN

## 2020-02-06 ENCOUNTER — TELEPHONE (OUTPATIENT)
Dept: ONCOLOGY | Facility: CLINIC | Age: 65
End: 2020-02-06

## 2020-02-06 NOTE — TELEPHONE ENCOUNTER
I called Emelia today regarding the genetic test prior authorization (sent following her visit 1/22/2020).  Per [employer], Emelia's expected out of pocket cost for the CancerNext-Expanded panel is $0.  Authorization is not required for this test.  I called Emelia to discuss next steps.      As Emelia was not available, I left a voicemail with my contact information and encouraged her to contact me to discus testing options.  For any questions regarding this pre-verification, please contact the [employer] Resolution Team at [phone] (regarding AP#: [id number]).    Denise Zarate MS, [facility]  Licensed Genetic Counselor  [phone]

## 2020-02-10 ENCOUNTER — HEALTH MAINTENANCE LETTER (OUTPATIENT)
Age: 65
End: 2020-02-10

## 2020-02-24 ENCOUNTER — OFFICE VISIT (OUTPATIENT)
Dept: ONCOLOGY | Facility: CLINIC | Age: 65
End: 2020-02-24
Attending: INTERNAL MEDICINE
Payer: MEDICARE

## 2020-02-24 DIAGNOSIS — Z80.0 FAMILY HISTORY OF COLON CANCER: ICD-10-CM

## 2020-02-24 DIAGNOSIS — Z80.0 FAMILY HISTORY OF PANCREATIC CANCER: ICD-10-CM

## 2020-02-24 DIAGNOSIS — C25.0 MALIGNANT NEOPLASM OF HEAD OF PANCREAS (H): ICD-10-CM

## 2020-02-24 DIAGNOSIS — C25.0 MALIGNANT NEOPLASM OF HEAD OF PANCREAS (H): Primary | ICD-10-CM

## 2020-02-24 LAB
ALBUMIN SERPL-MCNC: 3.3 G/DL (ref 3.4–5)
ALP SERPL-CCNC: 113 U/L (ref 40–150)
ALT SERPL W P-5'-P-CCNC: 37 U/L (ref 0–50)
ANION GAP SERPL CALCULATED.3IONS-SCNC: 3 MMOL/L (ref 3–14)
AST SERPL W P-5'-P-CCNC: 39 U/L (ref 0–45)
BASOPHILS # BLD AUTO: 0 10E9/L (ref 0–0.2)
BASOPHILS NFR BLD AUTO: 0.2 %
BILIRUB SERPL-MCNC: 0.4 MG/DL (ref 0.2–1.3)
BUN SERPL-MCNC: 7 MG/DL (ref 7–30)
CALCIUM SERPL-MCNC: 8.4 MG/DL (ref 8.5–10.1)
CHLORIDE SERPL-SCNC: 110 MMOL/L (ref 94–109)
CO2 SERPL-SCNC: 27 MMOL/L (ref 20–32)
CREAT SERPL-MCNC: 0.7 MG/DL (ref 0.52–1.04)
DIFFERENTIAL METHOD BLD: ABNORMAL
EOSINOPHIL # BLD AUTO: 0 10E9/L (ref 0–0.7)
EOSINOPHIL NFR BLD AUTO: 0.7 %
ERYTHROCYTE [DISTWIDTH] IN BLOOD BY AUTOMATED COUNT: 19.3 % (ref 10–15)
GFR SERPL CREATININE-BSD FRML MDRD: 90 ML/MIN/{1.73_M2}
GLUCOSE SERPL-MCNC: 169 MG/DL (ref 70–99)
HCT VFR BLD AUTO: 31.8 % (ref 35–47)
HGB BLD-MCNC: 10.2 G/DL (ref 11.7–15.7)
IMM GRANULOCYTES # BLD: 0 10E9/L (ref 0–0.4)
IMM GRANULOCYTES NFR BLD: 0.2 %
LYMPHOCYTES # BLD AUTO: 1 10E9/L (ref 0.8–5.3)
LYMPHOCYTES NFR BLD AUTO: 23.2 %
MCH RBC QN AUTO: 32.5 PG (ref 26.5–33)
MCHC RBC AUTO-ENTMCNC: 32.1 G/DL (ref 31.5–36.5)
MCV RBC AUTO: 101 FL (ref 78–100)
MISCELLANEOUS TEST: NORMAL
MONOCYTES # BLD AUTO: 0.4 10E9/L (ref 0–1.3)
MONOCYTES NFR BLD AUTO: 9.3 %
NEUTROPHILS # BLD AUTO: 2.7 10E9/L (ref 1.6–8.3)
NEUTROPHILS NFR BLD AUTO: 66.4 %
NRBC # BLD AUTO: 0 10*3/UL
NRBC BLD AUTO-RTO: 0 /100
PLATELET # BLD AUTO: 186 10E9/L (ref 150–450)
POTASSIUM SERPL-SCNC: 3.9 MMOL/L (ref 3.4–5.3)
PROT SERPL-MCNC: 6.4 G/DL (ref 6.8–8.8)
RBC # BLD AUTO: 3.14 10E12/L (ref 3.8–5.2)
SODIUM SERPL-SCNC: 140 MMOL/L (ref 133–144)
WBC # BLD AUTO: 4.1 10E9/L (ref 4–11)

## 2020-02-24 PROCEDURE — 85025 COMPLETE CBC W/AUTO DIFF WBC: CPT | Performed by: INTERNAL MEDICINE

## 2020-02-24 PROCEDURE — G0463 HOSPITAL OUTPT CLINIC VISIT: HCPCS

## 2020-02-24 PROCEDURE — 25000128 H RX IP 250 OP 636: Performed by: GENETIC COUNSELOR, MS

## 2020-02-24 PROCEDURE — 36591 DRAW BLOOD OFF VENOUS DEVICE: CPT

## 2020-02-24 PROCEDURE — 80053 COMPREHEN METABOLIC PANEL: CPT | Performed by: INTERNAL MEDICINE

## 2020-02-24 RX ORDER — HEPARIN SODIUM (PORCINE) LOCK FLUSH IV SOLN 100 UNIT/ML 100 UNIT/ML
5 SOLUTION INTRAVENOUS ONCE
Status: COMPLETED | OUTPATIENT
Start: 2020-02-24 | End: 2020-02-24

## 2020-02-24 RX ADMIN — Medication 5 ML: at 13:47

## 2020-02-24 NOTE — LETTER
RE: Emelia Weathers  [address]  [place] [address]     Dear Colleague,    Thank you for referring your patient, Emelia Weathers, to the [facility]. Please see a copy of my visit note below.    2/24/2020    Referring Provider:  Yasmany Wade MD    Presenting Information:   Emelia Weathers came in to clinic for a follow-up visit today. She had previously been seen in the Cancer Risk Management Program at the [facility] on 2/24/2020. Emelia came to clinic today to have her blood drawn for the CancerNext-Expanded gene panel through [facility].    Discussion:    We previously reviewed the details of Emelia's family and personal history. Please see the clinic note from our previous appointment for details.     Prior authorization for the CancerNext-Expanded panel was sent following her visit 1/22/2020.  Per [facility], Emelia's expected out of pocket cost for the CancerNext-Expanded panel is $0.  Authorization is not required for this test. Emelia elected to proceed with this test today.    Genetic testing is available for 67 genes associated with increased risk for many different cancers: CancerNext-Expanded (AIP, ALK, APC, TELMA, BAP1, BARD1, BLM, BMPR1A, BRCA1, BRCA2, BRIP1, CDH1, CDK4, CDKN1B, CDKN2A, CHEK2, DICER1, EPCAM, FANCC, FH, FLCN, GALNT12, GREM1, HOXB13, MAX, MEN1, MET, MITF, MLH1, MRE11A, MSH2, MSH6, MUTYH, NBN, NF1, NF2, PALB2, PHOX2B, PMS2, POLD1, POLE, POT1, FVREA7G, PTCH1, PTEN, RAD50, RAD51C, RAD51D, RB1, RET, SDHA, SDHAF2, SDHB, SDHC, SDHD, SMAD4, SMARCA4, SMARCB1, SMARCE1, STK11, SUFU, LDGS147, TP53, TSC1, TSC2, VHL, and XRCC2).  o We reviewed that many of the genes in the CancerNext-Expanded panel are associated with specific hereditary cancer syndromes and have published management guidelines:  Hereditary Breast and Ovarian Cancer syndrome (BRCA1, BRCA2), Malagon syndrome (MLH1, MSH2, MSH6, PMS2, EPCAM), Familial Adenomatous Polyposis (APC),  Hereditary Diffuse Gastric Cancer (CDH1), Familial Atypical Multiple Mole Melanoma syndrome (CDK4, CDKN2A), Juvenile Polyposis syndrome (BMPR1A, SMAD4), Cowden syndrome (PTEN), Li Fraumeni syndrome (TP53), Peutz-Jeghers syndrome (STK11), MUTYH Associated Polyposis (MUTYH), Hereditary Leiomyomatosis and Renal Cell Cancer (FH), Wity-Zgoj-Wijm (FLCN), Hereditary Papillary Renal Carcinoma (MET), Hereditary Paraganglioma and Pheochromocytoma syndrome (SDHA, SDHAF2, SDHB, SDHC, SDHD), Multiple Endocrine Neoplasia type 2 (RET), Tuberous sclerosis complex (TSC1, TSC2), Von Hippel-Lindau disease (VHL), Neurofibromatosis type 1 (NF1), Neurofibromatosis type 2 (NF1), Multiple Endocrine Neoplasia type 1 (MEN1), Multiple Endocrine Neoplasia type 4 (CDKN1B), Gorlin syndrome (SUFU, PTCH1), and Hartmann complex ([id number]).  o The TELMA, BRIP1, CHEK2, GREM1, NBN, PALB2, POLD1, POLE, RAD51C, and RAD51D genes are associated with increased cancer risk and have published management guidelines for certain cancers.    o The remaining genes (AIP, ALK, BAP1, BARD1, BLM, DICER1, FANCC, GALNT12, HOXB13, MRE11A, MAX, MET, MITF, PHOX2B, POT1, RAD50, SMARCA4, SMARCB1, SMARCE1, WDHT236, and XRCC2) are associated with increased cancer risk and may allow us to make medical recommendations when mutations are identified.    o Emelia was previously provided with a detailed brochure from [facility] explaining the CancerNext-Expanded testing.    Consent was obtained and genetic testing for CancerNext-Expanded was sent to [facility]. Turn around time: approximately 4 weeks.     Medical Management: For Emelia, we reviewed that the information from genetic testing may determine:  o additional cancer screening for which Emelia may qualify (i.e. mammogram and breast MRI, more frequent colonoscopies, more frequent dermatologic exams, etc.),  o options for risk reducing surgeries Emelia could consider (i.e. bilateral mastectomy, surgery to remove  her ovaries and/or uterus, etc.),    o and targeted chemotherapies for certain cancers (i.e. immunotherapy for individuals with Malagon syndrome, PARP inhibitors, etc.).   o These recommendations will be discussed in detail once genetic testing is completed.     Plan:  1. Emelia's questions were answered to her satisfaction.   2. Emelia elected to sign a consent form at the conclusion of this visit and had her blood drawn for the CancerNext-Expanded panel.   3. Emelia will return to clinic to discuss the results. Turn around time: 3-4 weeks     Face to face time: 15 minutes    Denise Zarate MS, [facility]  Licensed Genetic Counselor  [phone]

## 2020-02-24 NOTE — LETTER
Cancer Risk Management  Program Locations    [facility]  [facility]  Mailing Address  Cancer Risk Management Program  [facility]  [address]  [place] [address]    New patient appointments  [phone]  February 24, 2020    Emelia Weathers  [address]  [place] [address]      Dear Emelia,    It was a pleasure meeting with you at the [facility] on 2/24/2020. Here is a copy of the progress note from your recent genetic counseling visit to the Cancer Risk Management Program. If you have any additional questions, please feel free to call.    Referring Provider:  Yasmany Wade MD    Presenting Information:   Emelia Weathers came in to clinic for a follow-up visit today. She had previously been seen in the Cancer Risk Management Program at the [facility] on 2/24/2020. Emelia came to clinic today to have her blood drawn for the CancerNext-Expanded gene panel through [facility].    Discussion:    We previously reviewed the details of Emelia's family and personal history. Please see the clinic note from our previous appointment for details.     Prior authorization for the CancerNext-Expanded panel was sent following her visit 1/22/2020.  Per [facility], Emelia's expected out of pocket cost for the CancerNext-Expanded panel is $0.  Authorization is not required for this test. Emelia elected to proceed with this test today.    Genetic testing is available for 67 genes associated with increased risk for many different cancers: CancerNext-Expanded (AIP, ALK, APC, TELMA, BAP1, BARD1, BLM, BMPR1A, BRCA1, BRCA2, BRIP1, CDH1, CDK4, CDKN1B, CDKN2A, CHEK2, DICER1, EPCAM, FANCC, FH, FLCN, GALNT12, GREM1, HOXB13, MAX, MEN1, MET, MITF, MLH1, MRE11A, MSH2, MSH6, MUTYH, NBN, NF1, NF2, PALB2,  PHOX2B, PMS2, POLD1, POLE, POT1, EGFXP0K, PTCH1, PTEN, RAD50, RAD51C, RAD51D, RB1, RET, SDHA, SDHAF2, SDHB, SDHC, SDHD, SMAD4, SMARCA4, SMARCB1, SMARCE1, STK11, SUFU, LEEQ801, TP53, TSC1, TSC2, VHL, and XRCC2).  o We reviewed that many of the genes in the CancerNext-Expanded panel are associated with specific hereditary cancer syndromes and have published management guidelines:  Hereditary Breast and Ovarian Cancer syndrome (BRCA1, BRCA2), Malagon syndrome (MLH1, MSH2, MSH6, PMS2, EPCAM), Familial Adenomatous Polyposis (APC), Hereditary Diffuse Gastric Cancer (CDH1), Familial Atypical Multiple Mole Melanoma syndrome (CDK4, CDKN2A), Juvenile Polyposis syndrome (BMPR1A, SMAD4), Cowden syndrome (PTEN), Li Fraumeni syndrome (TP53), Peutz-Jeghers syndrome (STK11), MUTYH Associated Polyposis (MUTYH), Hereditary Leiomyomatosis and Renal Cell Cancer (FH), Lkwk-Nwvq-Yekm (FLCN), Hereditary Papillary Renal Carcinoma (MET), Hereditary Paraganglioma and Pheochromocytoma syndrome (SDHA, SDHAF2, SDHB, SDHC, SDHD), Multiple Endocrine Neoplasia type 2 (RET), Tuberous sclerosis complex (TSC1, TSC2), Von Hippel-Lindau disease (VHL), Neurofibromatosis type 1 (NF1), Neurofibromatosis type 2 (NF1), Multiple Endocrine Neoplasia type 1 (MEN1), Multiple Endocrine Neoplasia type 4 (CDKN1B), Gorlin syndrome (SUFU, PTCH1), and Hartmann complex ([id number]).  o The TELMA, BRIP1, CHEK2, GREM1, NBN, PALB2, POLD1, POLE, RAD51C, and RAD51D genes are associated with increased cancer risk and have published management guidelines for certain cancers.    o The remaining genes (AIP, ALK, BAP1, BARD1, BLM, DICER1, FANCC, GALNT12, HOXB13, MRE11A, MAX, MET, MITF, PHOX2B, POT1, RAD50, SMARCA4, SMARCB1, SMARCE1, YSEL288, and XRCC2) are associated with increased cancer risk and may allow us to make medical recommendations when mutations are identified.    o Emelia was previously provided with a detailed brochure from [facility] explaining the CancerNext-Expanded  testing.    Consent was obtained and genetic testing for CancerNext-Expanded was sent to [facility]. Turn around time: approximately 4 weeks.     Medical Management: For Emelia, we reviewed that the information from genetic testing may determine:  o additional cancer screening for which Emelia may qualify (i.e. mammogram and breast MRI, more frequent colonoscopies, more frequent dermatologic exams, etc.),  o options for risk reducing surgeries Emelia could consider (i.e. bilateral mastectomy, surgery to remove her ovaries and/or uterus, etc.),    o and targeted chemotherapies for certain cancers (i.e. immunotherapy for individuals with Malagon syndrome, PARP inhibitors, etc.).   o These recommendations will be discussed in detail once genetic testing is completed.     Plan:  1. Emelia's questions were answered to her satisfaction.   2. Emelia elected to sign a consent form at the conclusion of this visit and had her blood drawn for the CancerNext-Expanded panel.   3. Emelia will return to clinic to discuss the results. Turn around time: 3-4 weeks     edmar Zarate MS, [facility]  Licensed Genetic Counselor  [phone]

## 2020-02-24 NOTE — NURSING NOTE
"Chief Complaint   Patient presents with     Port Draw     lab only appointment.  port accessed and labs drawn by rn.       Port accessed with 20g 3/4\" gripper needle, labs drawn (including genetics labs) by RN in lab.  Port flushed with saline and heparin.  Port de-accessed.    Meghna Leonardo RN      "

## 2020-02-24 NOTE — PROGRESS NOTES
2/24/2020    Referring Provider:  Yasmany Wade MD    Presenting Information:   Emelia Weathers came in to clinic for a follow-up visit today. She had previously been seen in the Cancer Risk Management Program at the [facility] on 2/24/2020. Emelia came to clinic today to have her blood drawn for the CancerNext-Expanded gene panel through [facility].    Discussion:    We previously reviewed the details of Emelia's family and personal history. Please see the clinic note from our previous appointment for details.     Prior authorization for the CancerNext-Expanded panel was sent following her visit 1/22/2020.  Per [employer], Emelia's expected out of pocket cost for the CancerNext-Expanded panel is $0.  Authorization is not required for this test. Emelia elected to proceed with this test today.    Genetic testing is available for 67 genes associated with increased risk for many different cancers: CancerNext-Expanded (AIP, ALK, APC, TELMA, BAP1, BARD1, BLM, BMPR1A, BRCA1, BRCA2, BRIP1, CDH1, CDK4, CDKN1B, CDKN2A, CHEK2, DICER1, EPCAM, FANCC, FH, FLCN, GALNT12, GREM1, HOXB13, MAX, MEN1, MET, MITF, MLH1, MRE11A, MSH2, MSH6, MUTYH, NBN, NF1, NF2, PALB2, PHOX2B, PMS2, POLD1, POLE, POT1, MFWAQ9R, PTCH1, PTEN, RAD50, RAD51C, RAD51D, RB1, RET, SDHA, SDHAF2, SDHB, SDHC, SDHD, SMAD4, SMARCA4, SMARCB1, SMARCE1, STK11, SUFU, [id number], TP53, TSC1, TSC2, VHL, and XRCC2).  o We reviewed that many of the genes in the CancerNext-Expanded panel are associated with specific hereditary cancer syndromes and have published management guidelines:  Hereditary Breast and Ovarian Cancer syndrome (BRCA1, BRCA2), Malagon syndrome (MLH1, MSH2, MSH6, PMS2, EPCAM), Familial Adenomatous Polyposis (APC), Hereditary Diffuse Gastric Cancer (CDH1), Familial Atypical Multiple Mole Melanoma syndrome (CDK4, CDKN2A), Juvenile Polyposis syndrome (BMPR1A, SMAD4), Cowden syndrome (PTEN), Li Fraumeni syndrome (TP53), Peutz-Jeghers syndrome (STK11),  MUTYH Associated Polyposis (MUTYH), Hereditary Leiomyomatosis and Renal Cell Cancer (FH), Onwq-Jkbb-Fuio (FLCN), Hereditary Papillary Renal Carcinoma (MET), Hereditary Paraganglioma and Pheochromocytoma syndrome (SDHA, SDHAF2, SDHB, SDHC, SDHD), Multiple Endocrine Neoplasia type 2 (RET), Tuberous sclerosis complex (TSC1, TSC2), Von Hippel-Lindau disease (VHL), Neurofibromatosis type 1 (NF1), Neurofibromatosis type 2 (NF1), Multiple Endocrine Neoplasia type 1 (MEN1), Multiple Endocrine Neoplasia type 4 (CDKN1B), Gorlin syndrome (SUFU, PTCH1), and Hartmann complex (GDTBO3P).  o The TELMA, BRIP1, CHEK2, GREM1, NBN, PALB2, POLD1, POLE, RAD51C, and RAD51D genes are associated with increased cancer risk and have published management guidelines for certain cancers.    o The remaining genes (AIP, ALK, BAP1, BARD1, BLM, DICER1, FANCC, GALNT12, HOXB13, MRE11A, MAX, MET, MITF, PHOX2B, POT1, RAD50, SMARCA4, SMARCB1, SMARCE1, JFOX169, and XRCC2) are associated with increased cancer risk and may allow us to make medical recommendations when mutations are identified.    o Emelia was previously provided with a detailed brochure from [facility] explaining the CancerNext-Expanded testing.    Consent was obtained and genetic testing for CancerNext-Expanded was sent to [facility]. Turn around time: approximately 4 weeks.     Medical Management: For Emelia, we reviewed that the information from genetic testing may determine:  o additional cancer screening for which Emelia may qualify (i.e. mammogram and breast MRI, more frequent colonoscopies, more frequent dermatologic exams, etc.),  o options for risk reducing surgeries Emelia could consider (i.e. bilateral mastectomy, surgery to remove her ovaries and/or uterus, etc.),    o and targeted chemotherapies for certain cancers (i.e. immunotherapy for individuals with Malagon syndrome, PARP inhibitors, etc.).   o These recommendations will be discussed in detail once genetic  testing is completed.     Plan:  1. Emelia's questions were answered to her satisfaction.   2. Emelia elected to sign a consent form at the conclusion of this visit and had her blood drawn for the CancerNext-Expanded panel.   3. Emelia will return to clinic to discuss the results. Turn around time: 3-4 weeks     Face to face time: 15 minutes    Denise Zarate MS, [facility]  Licensed Genetic Counselor  [phone]

## 2020-03-16 DIAGNOSIS — C25.0 MALIGNANT NEOPLASM OF HEAD OF PANCREAS (H): Primary | ICD-10-CM

## 2020-03-16 RX ORDER — CAPECITABINE 500 MG/1
TABLET, FILM COATED ORAL
Qty: 42 TABLET | Refills: 3 | Status: SHIPPED | OUTPATIENT
Start: 2020-03-16 | End: 2020-04-27

## 2020-03-18 DIAGNOSIS — C25.0 MALIGNANT NEOPLASM OF HEAD OF PANCREAS (H): ICD-10-CM

## 2020-03-18 LAB
ALBUMIN SERPL-MCNC: 3.5 G/DL (ref 3.4–5)
ALP SERPL-CCNC: 142 U/L (ref 40–150)
ALT SERPL W P-5'-P-CCNC: 32 U/L (ref 0–50)
ANION GAP SERPL CALCULATED.3IONS-SCNC: 3 MMOL/L (ref 3–14)
AST SERPL W P-5'-P-CCNC: 33 U/L (ref 0–45)
BASOPHILS # BLD AUTO: 0 10E9/L (ref 0–0.2)
BASOPHILS NFR BLD AUTO: 0.6 %
BILIRUB SERPL-MCNC: 0.6 MG/DL (ref 0.2–1.3)
BUN SERPL-MCNC: 10 MG/DL (ref 7–30)
CALCIUM SERPL-MCNC: 8.6 MG/DL (ref 8.5–10.1)
CHLORIDE SERPL-SCNC: 109 MMOL/L (ref 94–109)
CO2 SERPL-SCNC: 27 MMOL/L (ref 20–32)
CREAT SERPL-MCNC: 0.67 MG/DL (ref 0.52–1.04)
DIFFERENTIAL METHOD BLD: ABNORMAL
EOSINOPHIL # BLD AUTO: 0.1 10E9/L (ref 0–0.7)
EOSINOPHIL NFR BLD AUTO: 1.5 %
ERYTHROCYTE [DISTWIDTH] IN BLOOD BY AUTOMATED COUNT: 18.9 % (ref 10–15)
GFR SERPL CREATININE-BSD FRML MDRD: >90 ML/MIN/{1.73_M2}
GLUCOSE SERPL-MCNC: 111 MG/DL (ref 70–99)
HCT VFR BLD AUTO: 35 % (ref 35–47)
HGB BLD-MCNC: 11.5 G/DL (ref 11.7–15.7)
IMM GRANULOCYTES # BLD: 0 10E9/L (ref 0–0.4)
IMM GRANULOCYTES NFR BLD: 0.4 %
LYMPHOCYTES # BLD AUTO: 1.2 10E9/L (ref 0.8–5.3)
LYMPHOCYTES NFR BLD AUTO: 21.3 %
MCH RBC QN AUTO: 32.6 PG (ref 26.5–33)
MCHC RBC AUTO-ENTMCNC: 32.9 G/DL (ref 31.5–36.5)
MCV RBC AUTO: 99 FL (ref 78–100)
MONOCYTES # BLD AUTO: 0.4 10E9/L (ref 0–1.3)
MONOCYTES NFR BLD AUTO: 8.1 %
NEUTROPHILS # BLD AUTO: 3.7 10E9/L (ref 1.6–8.3)
NEUTROPHILS NFR BLD AUTO: 68.1 %
NRBC # BLD AUTO: 0 10*3/UL
NRBC BLD AUTO-RTO: 0 /100
PLATELET # BLD AUTO: 190 10E9/L (ref 150–450)
POTASSIUM SERPL-SCNC: 4.3 MMOL/L (ref 3.4–5.3)
PROT SERPL-MCNC: 6.8 G/DL (ref 6.8–8.8)
RBC # BLD AUTO: 3.53 10E12/L (ref 3.8–5.2)
SODIUM SERPL-SCNC: 139 MMOL/L (ref 133–144)
WBC # BLD AUTO: 5.4 10E9/L (ref 4–11)

## 2020-03-18 PROCEDURE — 25000128 H RX IP 250 OP 636: Performed by: INTERNAL MEDICINE

## 2020-03-18 PROCEDURE — 80053 COMPREHEN METABOLIC PANEL: CPT | Performed by: INTERNAL MEDICINE

## 2020-03-18 PROCEDURE — 85025 COMPLETE CBC W/AUTO DIFF WBC: CPT | Performed by: INTERNAL MEDICINE

## 2020-03-18 RX ORDER — HEPARIN SODIUM (PORCINE) LOCK FLUSH IV SOLN 100 UNIT/ML 100 UNIT/ML
5 SOLUTION INTRAVENOUS EVERY 8 HOURS
Status: DISCONTINUED | OUTPATIENT
Start: 2020-03-18 | End: 2020-03-26 | Stop reason: HOSPADM

## 2020-03-18 RX ADMIN — Medication 5 ML: at 10:15

## 2020-03-18 NOTE — NURSING NOTE
Chief Complaint   Patient presents with     Blood Draw     Labs dran via PORT by RN in lab. VS taken      Tara Mesa RN

## 2020-03-19 NOTE — PROGRESS NOTES
"3/20/2020    Referring Provider:  Yasmany Wade MD    Presenting Information:   I spoke to Emelia by phone today to discuss her genetic testing results. Her blood was drawn on 2/24/2020. CancerNext-Expanded test was ordered from [facility]. This testing was done because of Emelia's personal and family history of pancreatic cancer.    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 We discussed that this mutation is associated with moderate increased in risk for breast, colorectal, and possibly other cancers. We discussed the impact of this testing on Emelia in detail.     No additional pathogenic mutations, variants of unknown significance, or gross deletions or duplications were detected. Genes Analyzed (67 total): AIP, ALK, APC, TELMA, BAP1, BARD1, BLM, BMPR1A, BRCA1, BRCA2, BRIP1, CDH1, CDK4, CDKN1B, CDKN2A, CHEK2, DICER1, FANCC, FH, FLCN, GALNT12, HOXB13, MAX, MEN1, MET, MLH1, MRE11A, MSH2, MSH6, MUTYH, NBN, NF1, NF2, PALB2, PHOX2B, PMS2, POLD1, POLE, POT1, VHKND0P, PTCH1, PTEN, RAD50, RAD51C, RAD51D, RB1, RET, SDHA, SDHAF2, SDHB, SDHC, SDHD, SMAD4, SMARCA4, SMARCB1, SMARCE1, STK11, SUFU, MGLV851, TP53, TSC1, TSC2, VHL and XRCC2 (sequencing and deletion/duplication); MITF (sequencing only); EPCAM and GREM1 (deletion/duplication only)    A copy of the test report can be found in the Laboratory tab, dated 2/24/2020, and named \"SEND OUTS MISC TEST\". The report is scanned in as a linked document.    Cancer Risks:    Mutations in the CHEK2 gene are associated with a moderate risk of breast and colon cancer.  We discussed that this I157T mutation confers a lower breast cancer risk than other mutations in the CHEK2 gene.     The lifetime breast cancer risk for women who carry this specific CHEK2 mutation is approximately 1.5x higher than the general population lifetime risk for breast cancer of about 12%. This equates " to a lifetime risk of about 18%. Other mutations in the CHEK2 gene cause about a 25-35% lifetime risk for breast cancer.     The risk of colon cancer may be twice as high as the general population risk of colon cancer of 5%.     There is also a possible association of CHEK2 mutations with increased risk for other cancers, such as prostate, melanoma, thyroid, and other cancers. However data is still limited in this area. No confirmed risk numbers are available for these additional cancers, though they have been reported in families that have a CHEK2 mutation.    We discussed that CHEK2 gene is currently considered a moderate-risk gene. This means that mutations in this gene increase the risk for certain cancers, but are unlikely to be the single cause for an individual's cancer. There are likely other genetic and/or environmental risk factors that, in combination with a CHEK2 gene mutation, cause cancer.    Cancer Screening and Prevention:  The following screening is recommended for individuals who have a mutation in the CHEK2 gene, per current National Comprehensive Cancer Network (NCCN) guidelines.    Typically, women with CHEK2 mutations qualify for high risk breast screening. However, as this mutation causes a lower risk than other mutations in the CHEK2 gene, NCCN states that patients should be managed per their specific gene mutation. As this mutation is associated with a breast cancer risk of <20%, Emelia would not qualify for high-risk breast screening based on this genetic test result alone.    Current NCCN guidelines recommend colon cancer screening in individuals who have a mutation in the CHEK2 gene.     Colonoscopies are done every 5 years, beginning at age 40 (or based on family history of colon cancer).  Emelia should discuss this colon screening with her physicians.     There are currently no other specific cancer screening guidelines for other cancers potentially associated with a CHEK2 mutation. As  "such, additional screening should be based on family history.    Of note, the above information is based on our current understanding of Emelia's genetic findings. Emelia is encouraged to reach out to me regularly regarding any pertinent updates to her personal and/or family history of cancer, as our understanding of the genetic findings in her family may change over time.     Implications for Family Members:  We reviewed that mutations in the CHEK2 gene are inherited in an autosomal dominant pattern. This means that each of Emelia's children have a 50% chance of inheriting the same mutation. Likewise, each of her siblings has a 50% risk of having the same mutation. Extended relatives may also carry this mutation, and she is encouraged to share this information with her family members on both sides of the family. I am happy to help her relatives connect with a genetic counselor in their area if they would like to discuss testing.    Plan:  1.  I will provide Emelia with a copy of her test results and support resources today.  2.  I will provide a \"Dear Relative\" letter for Emelia to share with her family members.  3.  She plans to follow up with Dr. Wade next month and plans to review these recommendations/next steps at that time.      If Emelia has additional questions, I encouraged her to contact me directly at [phone].     Time spent over phone: 15 minutes    Denise Zarate MS, [facility]  Licensed Genetic Counselor  [phone]            "

## 2020-03-20 ENCOUNTER — VIRTUAL VISIT (OUTPATIENT)
Dept: ONCOLOGY | Facility: CLINIC | Age: 65
End: 2020-03-20
Attending: GENETIC COUNSELOR, MS
Payer: MEDICARE

## 2020-03-20 DIAGNOSIS — Z80.0 FAMILY HISTORY OF PANCREATIC CANCER: ICD-10-CM

## 2020-03-20 DIAGNOSIS — Z80.0 FAMILY HISTORY OF COLON CANCER: ICD-10-CM

## 2020-03-20 DIAGNOSIS — C25.0 MALIGNANT NEOPLASM OF HEAD OF PANCREAS (H): Primary | ICD-10-CM

## 2020-03-20 PROCEDURE — 40000114 ZZH STATISTIC NO CHARGE CLINIC VISIT

## 2020-03-20 NOTE — LETTER
"    Cancer Risk Management  Program [facility]  [facility]  Mailing Address  Cancer Risk Management Program  [facility]  [address] [facility] [address]  [place] [address]    New patient appointments  [phone]  March 24, 2020    Emelia Weathers  [address]  [place] [facility] [address]      Dear Relative,     The purpose of this letter is to inform you that your relative recently underwent genetic counseling and genetic testing due to the family history of breast cancer. The testing done through [facility] identified a moderate risk mutation in the CHEK2 gene. Specifically, the mutation is called c.470T>C (I157T). The accession number linked to your relative's test report is [id number].    A mutation (or change in the genetic code) causes a specific gene to stop working properly. Ultimately, individuals who have a mutation in this CHEK2 gene have an increased risk for breast, colon cancer, and other cancers. This particular CHEK2 mutation is considered a  moderate risk  mutation.  This mutation is called \"moderate risk\" because other mutations in the CHEK2 gene cause higher cancer risks than this mutation.       This particular mutation increases the lifetime risk for female breast cancer by about 1.5 fold. The population lifetime risk for breast cancer in women is around 12%.  The lifetime risk for colon cancer for individuals with CHEK2 mutations may be as high as twice that in the general population. Mutations in this gene have also been associated with an increased risk for other cancers ; however the exact risks are not well defined at this time.     If individuals are found to have a mutation in the CHEK2 gene, we would discuss increased cancer screening at earlier ages. It is important to note that both men " and women have a 50% chance of passing this mutation on to each of their children.    I understand that this may be surprising, unexpected, and even scary news. Because this mutation has been identified in your family, you are at risk for having the same mutation. As mentioned earlier, your children may also be at risk.     Scheduling a genetic counseling appointment does not mean you have to undergo genetic testing. The decision to pursue such testing is a very personal one that is discussed in more detail during the session. Much of cancer genetic counseling is providing valuable information to individuals who are impacted by genetic information such as this.     If you are interested in scheduling a genetic counseling appointment at [facility], please call [phone] to schedule an appointment. You can also find a genetic counselor close to you or at another health system at "[employer]    Sincerely,     Denise Zarate MS, [facility]  Licensed, Certified Genetic Counselor  [facility]

## 2020-03-20 NOTE — LETTER
Cancer Risk Management  Program [facility]  [facility]  Mailing Address  Cancer Risk Management Program  [facility]  [address]  [place] [address]    New patient appointments  [phone]  March 24, 2020    Emelia Weathers  [address]  [place] [facility] [address]      Dear Emelia,    It was a pleasure speaking with you on the phone on 3/20/2020. Here is a copy of the progress note from our discussion. If you have any additional questions, please feel free to call.    Referring Provider:  Yasmany Wade MD    Presenting Information:   I spoke to Emelia by phone today to discuss her genetic testing results. Her blood was drawn on 2/24/2020. CancerNext-Expanded test was ordered from [facility]. This testing was done because of Emelia's personal and family history of pancreatic cancer.    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 We discussed that this mutation is associated with moderate increased in risk for breast, colorectal, and possibly other cancers. We discussed the impact of this testing on Emelia in detail.     No additional pathogenic mutations, variants of unknown significance, or gross deletions or duplications were detected. Genes Analyzed (67 total): AIP, ALK, APC, TELMA, BAP1, BARD1, BLM, BMPR1A, BRCA1, BRCA2, BRIP1, CDH1, CDK4, CDKN1B, CDKN2A, CHEK2, DICER1, FANCC, FH, FLCN, GALNT12, HOXB13, MAX, MEN1, MET, MLH1, MRE11A, MSH2, MSH6, MUTYH, NBN, NF1, NF2, PALB2, PHOX2B, PMS2, POLD1, POLE, POT1, TJUKH8T, PTCH1, PTEN, RAD50, RAD51C, RAD51D, RB1, RET, SDHA, SDHAF2, SDHB, SDHC, SDHD, SMAD4, SMARCA4, SMARCB1, SMARCE1, STK11, SUFU, GCDW950, TP53, TSC1, TSC2, VHL and XRCC2  (sequencing and deletion/duplication); MITF (sequencing only); EPCAM and GREM1 (deletion/duplication only)    Cancer Risks:    Mutations in the CHEK2 gene are associated with a moderate risk of breast and colon cancer.  We discussed that this I157T mutation confers a lower breast cancer risk than other mutations in the CHEK2 gene.     The lifetime breast cancer risk for women who carry this specific CHEK2 mutation is approximately 1.5x higher than the general population lifetime risk for breast cancer of about 12%. This equates to a lifetime risk of about 18%. Other mutations in the CHEK2 gene cause about a 25-35% lifetime risk for breast cancer.     The risk of colon cancer may be twice as high as the general population risk of colon cancer of 5%.     There is also a possible association of CHEK2 mutations with increased risk for other cancers, such as prostate, melanoma, thyroid, and other cancers. However data is still limited in this area. No confirmed risk numbers are available for these additional cancers, though they have been reported in families that have a CHEK2 mutation.    We discussed that CHEK2 gene is currently considered a moderate-risk gene. This means that mutations in this gene increase the risk for certain cancers, but are unlikely to be the single cause for an individual's cancer. There are likely other genetic and/or environmental risk factors that, in combination with a CHEK2 gene mutation, cause cancer.    Cancer Screening and Prevention:  The following screening is recommended for individuals who have a mutation in the CHEK2 gene, per current National Comprehensive Cancer Network (NCCN) guidelines.    Typically, women with CHEK2 mutations qualify for high risk breast screening. However, as this mutation causes a lower risk than other mutations in the CHEK2 gene, NCCN states that patients should be managed per their specific gene mutation. As this mutation is associated with a breast cancer  "risk of <20%, Emelia would not qualify for high-risk breast screening based on this genetic test result alone.    Current NCCN guidelines recommend colon cancer screening in individuals who have a mutation in the CHEK2 gene.     Colonoscopies are done every 5 years, beginning at age 40 (or based on family history of colon cancer).  Emelia should discuss this colon screening with her physicians.     There are currently no other specific cancer screening guidelines for other cancers potentially associated with a CHEK2 mutation. As such, additional screening should be based on family history.    Of note, the above information is based on our current understanding of Emelia's genetic findings. Emelia is encouraged to reach out to me regularly regarding any pertinent updates to her personal and/or family history of cancer, as our understanding of the genetic findings in her family may change over time.     Implications for Family Members:  We reviewed that mutations in the CHEK2 gene are inherited in an autosomal dominant pattern. This means that each of Emelia's children have a 50% chance of inheriting the same mutation. Likewise, each of her siblings has a 50% risk of having the same mutation. Extended relatives may also carry this mutation, and she is encouraged to share this information with her family members on both sides of the family. I am happy to help her relatives connect with a genetic counselor in their area if they would like to discuss testing.    Plan:  1.  I will provide Emelia with a copy of her test results and support resources today.  2.  I will provide a \"Dear Relative\" letter for Emelia to share with her family members.  3.  She plans to follow up with Dr. Wade next month and plans to review these recommendations/next steps at that time.      If Emelia has additional questions, I encouraged her to contact me directly at [phone].     Denise Zarate MS, [facility]  Licensed Genetic " Counselor  [phone]

## 2020-03-20 NOTE — Clinical Note
Please print and send a copy of this letter and the patient's genetic testing report to the patient.    Please enclose test report: SEND OUTS MISCELLANEOUS order [[id number]] (Order [id number])

## 2020-03-25 LAB — LAB SCANNED RESULT: ABNORMAL

## 2020-04-06 DIAGNOSIS — C25.0 MALIGNANT NEOPLASM OF HEAD OF PANCREAS (H): ICD-10-CM

## 2020-04-06 LAB
ALBUMIN SERPL-MCNC: 3.5 G/DL (ref 3.4–5)
ALP SERPL-CCNC: 143 U/L (ref 40–150)
ALT SERPL W P-5'-P-CCNC: 35 U/L (ref 0–50)
ANION GAP SERPL CALCULATED.3IONS-SCNC: 4 MMOL/L (ref 3–14)
AST SERPL W P-5'-P-CCNC: 31 U/L (ref 0–45)
BASOPHILS # BLD AUTO: 0 10E9/L (ref 0–0.2)
BASOPHILS NFR BLD AUTO: 0.3 %
BILIRUB SERPL-MCNC: 0.4 MG/DL (ref 0.2–1.3)
BUN SERPL-MCNC: 9 MG/DL (ref 7–30)
CALCIUM SERPL-MCNC: 8.9 MG/DL (ref 8.5–10.1)
CHLORIDE SERPL-SCNC: 109 MMOL/L (ref 94–109)
CO2 SERPL-SCNC: 26 MMOL/L (ref 20–32)
CREAT SERPL-MCNC: 0.88 MG/DL (ref 0.52–1.04)
DIFFERENTIAL METHOD BLD: ABNORMAL
EOSINOPHIL # BLD AUTO: 0.1 10E9/L (ref 0–0.7)
EOSINOPHIL NFR BLD AUTO: 1.2 %
ERYTHROCYTE [DISTWIDTH] IN BLOOD BY AUTOMATED COUNT: 19.7 % (ref 10–15)
GFR SERPL CREATININE-BSD FRML MDRD: 68 ML/MIN/{1.73_M2}
GLUCOSE SERPL-MCNC: 124 MG/DL (ref 70–99)
HCT VFR BLD AUTO: 34.8 % (ref 35–47)
HGB BLD-MCNC: 11.2 G/DL (ref 11.7–15.7)
IMM GRANULOCYTES # BLD: 0 10E9/L (ref 0–0.4)
IMM GRANULOCYTES NFR BLD: 0.5 %
LYMPHOCYTES # BLD AUTO: 1 10E9/L (ref 0.8–5.3)
LYMPHOCYTES NFR BLD AUTO: 16 %
MCH RBC QN AUTO: 32.5 PG (ref 26.5–33)
MCHC RBC AUTO-ENTMCNC: 32.2 G/DL (ref 31.5–36.5)
MCV RBC AUTO: 101 FL (ref 78–100)
MONOCYTES # BLD AUTO: 0.5 10E9/L (ref 0–1.3)
MONOCYTES NFR BLD AUTO: 7.9 %
NEUTROPHILS # BLD AUTO: 4.8 10E9/L (ref 1.6–8.3)
NEUTROPHILS NFR BLD AUTO: 74.1 %
NRBC # BLD AUTO: 0 10*3/UL
NRBC BLD AUTO-RTO: 0 /100
PLATELET # BLD AUTO: 239 10E9/L (ref 150–450)
POTASSIUM SERPL-SCNC: 4.5 MMOL/L (ref 3.4–5.3)
PROT SERPL-MCNC: 7 G/DL (ref 6.8–8.8)
RBC # BLD AUTO: 3.45 10E12/L (ref 3.8–5.2)
SODIUM SERPL-SCNC: 139 MMOL/L (ref 133–144)
WBC # BLD AUTO: 6.4 10E9/L (ref 4–11)

## 2020-04-06 PROCEDURE — 80053 COMPREHEN METABOLIC PANEL: CPT | Performed by: INTERNAL MEDICINE

## 2020-04-06 PROCEDURE — 85025 COMPLETE CBC W/AUTO DIFF WBC: CPT | Performed by: INTERNAL MEDICINE

## 2020-04-06 PROCEDURE — 25000128 H RX IP 250 OP 636: Performed by: INTERNAL MEDICINE

## 2020-04-06 RX ORDER — HEPARIN SODIUM (PORCINE) LOCK FLUSH IV SOLN 100 UNIT/ML 100 UNIT/ML
5 SOLUTION INTRAVENOUS ONCE
Status: COMPLETED | OUTPATIENT
Start: 2020-04-06 | End: 2020-04-06

## 2020-04-06 RX ADMIN — Medication 5 ML: at 10:49

## 2020-04-06 NOTE — NURSING NOTE
Chief Complaint   Patient presents with     Port Draw     Labs drawn via port by RN in lab.      Port accessed with 20g gripper needle by RN, labs collected, line flushed with saline and heparin.  Page out to Dr. Wade per pt request for CMP. Only CBC ordered for today. Green JIC tube drawn.     Nazia TOMPKINS RN PHN BSN  BMT/Oncology Lab

## 2020-04-16 ENCOUNTER — PATIENT OUTREACH (OUTPATIENT)
Dept: ONCOLOGY | Facility: CLINIC | Age: 65
End: 2020-04-16

## 2020-04-16 NOTE — PROGRESS NOTES
RN Care Coordination Note  Received voicemail from patient stating twice in the last month she has had cramping and burning across her abdomen, usually results in a couple episodes of vomiting. Returned call to patient to further discuss. Episodes last about 5-6 hours then goes away. After she feels fine with no after effects, except some sore muscles from the cramping. No fever, no other infectious symptoms.       Last occurrence was about 2 days ago. After she's able to continue with normal daily activities. She does a lot of walking and biking.     Denies obstruction symptoms. No jaundice, no dark urine. Bowel movements normal. A little bit of diarrhea during the cramping. She says it feels like the stomach flu but hasn't been around anyone.    RNCC discussed with Vangie Cueva CNP. Patient has follow-up appt and CT with Dr Wade on 4/27. Per Vangie, ok to monitor for now. Discussed with patient, she should keep us updated if symptoms should reoccur.       Cinthia Contreras RN, BSN, OCN   RN Care Coordinator   [facility]

## 2020-04-24 ENCOUNTER — ANCILLARY PROCEDURE (OUTPATIENT)
Dept: CT IMAGING | Facility: CLINIC | Age: 65
End: 2020-04-24
Attending: INTERNAL MEDICINE
Payer: MEDICARE

## 2020-04-24 DIAGNOSIS — C25.0 MALIGNANT NEOPLASM OF HEAD OF PANCREAS (H): Primary | ICD-10-CM

## 2020-04-24 DIAGNOSIS — C25.0 MALIGNANT NEOPLASM OF HEAD OF PANCREAS (H): ICD-10-CM

## 2020-04-24 LAB
ALBUMIN SERPL-MCNC: 3.3 G/DL (ref 3.4–5)
ALP SERPL-CCNC: 129 U/L (ref 40–150)
ALT SERPL W P-5'-P-CCNC: 35 U/L (ref 0–50)
ANION GAP SERPL CALCULATED.3IONS-SCNC: 5 MMOL/L (ref 3–14)
AST SERPL W P-5'-P-CCNC: 35 U/L (ref 0–45)
BASOPHILS # BLD AUTO: 0 10E9/L (ref 0–0.2)
BASOPHILS NFR BLD AUTO: 0.5 %
BILIRUB SERPL-MCNC: 0.4 MG/DL (ref 0.2–1.3)
BUN SERPL-MCNC: 10 MG/DL (ref 7–30)
CALCIUM SERPL-MCNC: 8.5 MG/DL (ref 8.5–10.1)
CHLORIDE SERPL-SCNC: 110 MMOL/L (ref 94–109)
CO2 SERPL-SCNC: 26 MMOL/L (ref 20–32)
CREAT SERPL-MCNC: 0.67 MG/DL (ref 0.52–1.04)
DIFFERENTIAL METHOD BLD: ABNORMAL
EOSINOPHIL # BLD AUTO: 0.1 10E9/L (ref 0–0.7)
EOSINOPHIL NFR BLD AUTO: 1.7 %
ERYTHROCYTE [DISTWIDTH] IN BLOOD BY AUTOMATED COUNT: 19.8 % (ref 10–15)
GFR SERPL CREATININE-BSD FRML MDRD: >90 ML/MIN/{1.73_M2}
GLUCOSE SERPL-MCNC: 98 MG/DL (ref 70–99)
HCT VFR BLD AUTO: 32.1 % (ref 35–47)
HGB BLD-MCNC: 10.6 G/DL (ref 11.7–15.7)
IMM GRANULOCYTES # BLD: 0 10E9/L (ref 0–0.4)
IMM GRANULOCYTES NFR BLD: 0.5 %
LYMPHOCYTES # BLD AUTO: 1.2 10E9/L (ref 0.8–5.3)
LYMPHOCYTES NFR BLD AUTO: 28.7 %
MCH RBC QN AUTO: 32.9 PG (ref 26.5–33)
MCHC RBC AUTO-ENTMCNC: 33 G/DL (ref 31.5–36.5)
MCV RBC AUTO: 100 FL (ref 78–100)
MONOCYTES # BLD AUTO: 0.4 10E9/L (ref 0–1.3)
MONOCYTES NFR BLD AUTO: 9.9 %
NEUTROPHILS # BLD AUTO: 2.4 10E9/L (ref 1.6–8.3)
NEUTROPHILS NFR BLD AUTO: 58.7 %
NRBC # BLD AUTO: 0 10*3/UL
NRBC BLD AUTO-RTO: 0 /100
PLATELET # BLD AUTO: 207 10E9/L (ref 150–450)
POTASSIUM SERPL-SCNC: 3.7 MMOL/L (ref 3.4–5.3)
PROT SERPL-MCNC: 6.6 G/DL (ref 6.8–8.8)
RBC # BLD AUTO: 3.22 10E12/L (ref 3.8–5.2)
SODIUM SERPL-SCNC: 141 MMOL/L (ref 133–144)
WBC # BLD AUTO: 4.2 10E9/L (ref 4–11)

## 2020-04-24 PROCEDURE — 36591 DRAW BLOOD OFF VENOUS DEVICE: CPT

## 2020-04-24 PROCEDURE — 80053 COMPREHEN METABOLIC PANEL: CPT | Performed by: INTERNAL MEDICINE

## 2020-04-24 PROCEDURE — 85025 COMPLETE CBC W/AUTO DIFF WBC: CPT | Performed by: INTERNAL MEDICINE

## 2020-04-24 PROCEDURE — 25000128 H RX IP 250 OP 636: Performed by: INTERNAL MEDICINE

## 2020-04-24 RX ORDER — HEPARIN SODIUM (PORCINE) LOCK FLUSH IV SOLN 100 UNIT/ML 100 UNIT/ML
5 SOLUTION INTRAVENOUS ONCE
Status: COMPLETED | OUTPATIENT
Start: 2020-04-24 | End: 2020-04-24

## 2020-04-24 RX ORDER — HEPARIN SODIUM (PORCINE) LOCK FLUSH IV SOLN 100 UNIT/ML 100 UNIT/ML
5 SOLUTION INTRAVENOUS EVERY 8 HOURS
Status: COMPLETED | OUTPATIENT
Start: 2020-04-24 | End: 2020-04-24

## 2020-04-24 RX ORDER — IOPAMIDOL 755 MG/ML
73 INJECTION, SOLUTION INTRAVASCULAR ONCE
Status: COMPLETED | OUTPATIENT
Start: 2020-04-24 | End: 2020-04-24

## 2020-04-24 RX ADMIN — HEPARIN SODIUM (PORCINE) LOCK FLUSH IV SOLN 100 UNIT/ML 5 ML: 100 SOLUTION at 09:45

## 2020-04-24 RX ADMIN — IOPAMIDOL 73 ML: 755 INJECTION, SOLUTION INTRAVASCULAR at 09:20

## 2020-04-24 RX ADMIN — Medication 5 ML: at 09:10

## 2020-04-27 ENCOUNTER — VIRTUAL VISIT (OUTPATIENT)
Dept: ONCOLOGY | Facility: CLINIC | Age: 65
End: 2020-04-27
Attending: INTERNAL MEDICINE
Payer: MEDICARE

## 2020-04-27 DIAGNOSIS — C25.0 MALIGNANT NEOPLASM OF HEAD OF PANCREAS (H): Primary | ICD-10-CM

## 2020-04-27 PROCEDURE — 99214 OFFICE O/P EST MOD 30 MIN: CPT | Mod: 95 | Performed by: INTERNAL MEDICINE

## 2020-04-27 RX ORDER — LORAZEPAM 0.5 MG/1
0.5 TABLET ORAL EVERY 4 HOURS PRN
Qty: 30 TABLET | Refills: 2 | Status: SHIPPED | OUTPATIENT
Start: 2020-04-27 | End: 2020-08-24

## 2020-04-27 NOTE — LETTER
"4/27/2020      RE: Emelia Weathers  [address]  [facility] [address]       Emelia Weathers is a 64 year old female who is being evaluated via a billable video visit.      The patient has been notified of following:     \"This video visit will be conducted via a call between you and your physician/provider. We have found that certain health care needs can be provided without the need for an in-person physical exam.  This service lets us provide the care you need with a video conversation.  If a prescription is necessary we can send it directly to your pharmacy.  If lab work is needed we can place an order for that and you can then stop by our lab to have the test done at a later time.    Video visits are billed at different rates depending on your insurance coverage.  Please reach out to your insurance provider with any questions.    If during the course of the call the physician/provider feels a video visit is not appropriate, you will not be charged for this service.\"    Patient has given verbal consent for Video visit? Yes    How would you like to obtain your AVS? E-Mail (inform patient AVS not encrypted)   [email or web address]    Patient would like the video invitation sent by: Text to cell phone: [phone]    Will anyone else be joining your video visit? Yes: Patient daughter . How would they like to receive their invitation? Other e-mail: [email or web address]       MAKAYLA Omer        Video-Visit Details    Type of service:  Video Visit    Video Start Time: 8:15  Video End Time: 8:45    Originating Location (pt. Location): Home    Distant Location (provider location):  [facility]     Mode of Communication:  Video Conference via [employer] converted to [employer] due to connection issues.    Ms. Weathers is being seen to follow up on recurrent pancreatic cancer.    She had biopsy-proven recurrence of her pancreas cancer in her resection bed and has now had complete control of " her disease with Xeloda for more than 5 years.  She is here for ongoing response assessment.  She tells me she continues to feel great with good appetite and energy and able to participate in all activities she would like. Her only ongoing toxicity is a moderate degree of peeling and cracking on her palms and soles, but not enough to interfere with any of her activities.  She has had two episodes in the past month of severe, crampy upper abdominal pain with associated vomiting  that lasted about 6-8 hours and then resolved without ongoing sequelae. She has noted no bleeding, fevers, chills or sweats.  There was no associated constipation/diarrhea. She has had no exposures to anyone else who is ill. She had  A 10 point complete review of systems is entirely unremarkable. She has been out on long bike rides and participating in other physical activities without limitation.    Since our last visit she has completed her genetic evaluation and has a CHEK2 mutation identified.    She appears her usual well self with no icterus.   The peeling/cracking on her palms appears similar to prior.    I have reviewed her imaging and went over the results with her. There is no evidence of recurrence/progressive disease. There is nothing to suggest bowel or gastric outlet obstruction. He lab work including electrolytes, liver enzymes and blood counts is all unremarkable.    A/P: Recurrent pancreatic cancer with a radiographic CR in a patient with a near normal performance status and mild toxicity from ongoing therapy with xeloda. We reviewed whether we should give her a treatment break, but given the uncertainty as to whether she would rapidly recur if we did that and the mild toxicity she very much wants to continue. We will re-evaluate her disease status in another 4 months.    I'm not sure what caused who two episodes of pain/vomitting, but am suspicious that represents transient bowel obstruction. Given the lack of ongoing  symptoms we will defer further evaluation unless this is a recurring problem. I asked that she let us know if it recurs so we can evaluate her in the acute setting.    We talked about the new findings of CHEK mutation. She has done so well with her pancreas cancer that we should probably pursue typical screening, but can defer that during the current pandemic. She is talking with her kids about getting tested.      Olvin Wade MD

## 2020-04-27 NOTE — PROGRESS NOTES
"Emelia Weathers is a 64 year old female who is being evaluated via a billable video visit.      The patient has been notified of following:     \"This video visit will be conducted via a call between you and your physician/provider. We have found that certain health care needs can be provided without the need for an in-person physical exam.  This service lets us provide the care you need with a video conversation.  If a prescription is necessary we can send it directly to your pharmacy.  If lab work is needed we can place an order for that and you can then stop by our lab to have the test done at a later time.    Video visits are billed at different rates depending on your insurance coverage.  Please reach out to your insurance provider with any questions.    If during the course of the call the physician/provider feels a video visit is not appropriate, you will not be charged for this service.\"    Patient has given verbal consent for Video visit? Yes    How would you like to obtain your AVS? E-Mail (inform patient AVS not encrypted)   [email or web address]    Patient would like the video invitation sent by: Text to cell phone: [phone]    Will anyone else be joining your video visit? Yes: Patient daughter . How would they like to receive their invitation? Other e-mail: [email or web address]       MAKAYLA Omer        Video-Visit Details    Type of service:  Video Visit    Video Start Time: 8:15  Video End Time: 8:45    Originating Location (pt. Location): Home    Distant Location (provider location):  [facility]     Mode of Communication:  Video Conference via [employer] converted to [employer] due to connection issues.    Ms. Weathers is being seen to follow up on recurrent pancreatic cancer.    She had biopsy-proven recurrence of her pancreas cancer in her resection bed and has now had complete control of her disease with Xeloda for more than 5 years.  She is here for ongoing response " assessment.  She tells me she continues to feel great with good appetite and energy and able to participate in all activities she would like. Her only ongoing toxicity is a moderate degree of peeling and cracking on her palms and soles, but not enough to interfere with any of her activities.  She has had two episodes in the past month of severe, crampy upper abdominal pain with associated vomiting  that lasted about 6-8 hours and then resolved without ongoing sequelae. She has noted no bleeding, fevers, chills or sweats.  There was no associated constipation/diarrhea. She has had no exposures to anyone else who is ill. She had  A 10 point complete review of systems is entirely unremarkable. She has been out on long bike rides and participating in other physical activities without limitation.    Since our last visit she has completed her genetic evaluation and has a CHEK2 mutation identified.    She appears her usual well self with no icterus.   The peeling/cracking on her palms appears similar to prior.    I have reviewed her imaging and went over the results with her. There is no evidence of recurrence/progressive disease. There is nothing to suggest bowel or gastric outlet obstruction. He lab work including electrolytes, liver enzymes and blood counts is all unremarkable.    A/P: Recurrent pancreatic cancer with a radiographic CR in a patient with a near normal performance status and mild toxicity from ongoing therapy with xeloda. We reviewed whether we should give her a treatment break, but given the uncertainty as to whether she would rapidly recur if we did that and the mild toxicity she very much wants to continue. We will re-evaluate her disease status in another 4 months.    I'm not sure what caused who two episodes of pain/vomitting, but am suspicious that represents transient bowel obstruction. Given the lack of ongoing symptoms we will defer further evaluation unless this is a recurring problem. I asked  that she let us know if it recurs so we can evaluate her in the acute setting.    We talked about the new findings of CHEK mutation. She has done so well with her pancreas cancer that we should probably pursue typical screening, but can defer that during the current pandemic. She is talking with her kids about getting tested.      Olvin Wade MD

## 2020-05-18 DIAGNOSIS — C25.0 MALIGNANT NEOPLASM OF HEAD OF PANCREAS (H): ICD-10-CM

## 2020-05-18 LAB
ALBUMIN SERPL-MCNC: 3.3 G/DL (ref 3.4–5)
ALP SERPL-CCNC: 133 U/L (ref 40–150)
ALT SERPL W P-5'-P-CCNC: 33 U/L (ref 0–50)
ANION GAP SERPL CALCULATED.3IONS-SCNC: 6 MMOL/L (ref 3–14)
AST SERPL W P-5'-P-CCNC: 32 U/L (ref 0–45)
BASOPHILS # BLD AUTO: 0 10E9/L (ref 0–0.2)
BASOPHILS NFR BLD AUTO: 0.5 %
BILIRUB SERPL-MCNC: 0.4 MG/DL (ref 0.2–1.3)
BUN SERPL-MCNC: 10 MG/DL (ref 7–30)
CALCIUM SERPL-MCNC: 9 MG/DL (ref 8.5–10.1)
CHLORIDE SERPL-SCNC: 109 MMOL/L (ref 94–109)
CO2 SERPL-SCNC: 26 MMOL/L (ref 20–32)
CREAT SERPL-MCNC: 0.8 MG/DL (ref 0.52–1.04)
DIFFERENTIAL METHOD BLD: ABNORMAL
EOSINOPHIL # BLD AUTO: 0.1 10E9/L (ref 0–0.7)
EOSINOPHIL NFR BLD AUTO: 1.5 %
ERYTHROCYTE [DISTWIDTH] IN BLOOD BY AUTOMATED COUNT: 19.6 % (ref 10–15)
GFR SERPL CREATININE-BSD FRML MDRD: 77 ML/MIN/{1.73_M2}
GLUCOSE SERPL-MCNC: 164 MG/DL (ref 70–99)
HCT VFR BLD AUTO: 35.1 % (ref 35–47)
HGB BLD-MCNC: 11.2 G/DL (ref 11.7–15.7)
IMM GRANULOCYTES # BLD: 0 10E9/L (ref 0–0.4)
IMM GRANULOCYTES NFR BLD: 0.3 %
LYMPHOCYTES # BLD AUTO: 1 10E9/L (ref 0.8–5.3)
LYMPHOCYTES NFR BLD AUTO: 25.3 %
MCH RBC QN AUTO: 32.7 PG (ref 26.5–33)
MCHC RBC AUTO-ENTMCNC: 31.9 G/DL (ref 31.5–36.5)
MCV RBC AUTO: 102 FL (ref 78–100)
MONOCYTES # BLD AUTO: 0.4 10E9/L (ref 0–1.3)
MONOCYTES NFR BLD AUTO: 8.8 %
NEUTROPHILS # BLD AUTO: 2.6 10E9/L (ref 1.6–8.3)
NEUTROPHILS NFR BLD AUTO: 63.6 %
NRBC # BLD AUTO: 0 10*3/UL
NRBC BLD AUTO-RTO: 0 /100
PLATELET # BLD AUTO: 198 10E9/L (ref 150–450)
POTASSIUM SERPL-SCNC: 4.2 MMOL/L (ref 3.4–5.3)
PROT SERPL-MCNC: 6.7 G/DL (ref 6.8–8.8)
RBC # BLD AUTO: 3.43 10E12/L (ref 3.8–5.2)
SODIUM SERPL-SCNC: 141 MMOL/L (ref 133–144)
WBC # BLD AUTO: 4 10E9/L (ref 4–11)

## 2020-05-18 PROCEDURE — 85025 COMPLETE CBC W/AUTO DIFF WBC: CPT | Performed by: INTERNAL MEDICINE

## 2020-05-18 PROCEDURE — 80053 COMPREHEN METABOLIC PANEL: CPT | Performed by: INTERNAL MEDICINE

## 2020-05-18 PROCEDURE — 25000128 H RX IP 250 OP 636: Performed by: INTERNAL MEDICINE

## 2020-05-18 PROCEDURE — 36415 COLL VENOUS BLD VENIPUNCTURE: CPT | Performed by: INTERNAL MEDICINE

## 2020-05-18 RX ORDER — HEPARIN SODIUM (PORCINE) LOCK FLUSH IV SOLN 100 UNIT/ML 100 UNIT/ML
5 SOLUTION INTRAVENOUS ONCE
Status: COMPLETED | OUTPATIENT
Start: 2020-05-18 | End: 2020-05-18

## 2020-05-18 RX ADMIN — HEPARIN SODIUM (PORCINE) LOCK FLUSH IV SOLN 100 UNIT/ML 5 ML: 100 SOLUTION at 10:26

## 2020-05-18 NOTE — NURSING NOTE
Chief Complaint   Patient presents with     Port Draw     Labs drawn via port by RN in lab.      Port accessed with 20g gripper needle by RN, labs collected, line flushed with saline and heparin.      Nazia TOMPKINS RN PHN BSN  BMT/Oncology Lab

## 2020-05-27 ENCOUNTER — PATIENT OUTREACH (OUTPATIENT)
Dept: ONCOLOGY | Facility: CLINIC | Age: 65
End: 2020-05-27

## 2020-05-27 NOTE — PROGRESS NOTES
RN Care Coordination Note  Incoming Call: Received call from patient stating she is needing her 2nd dose of shingles vaccine. She questions if she can come to [facility] to have it. Discussed she can come here. She also reports she did call [facility] and they are also able to give it. She feels she will go to [facility], as it is more convenient.     Patient also questions if she should have colonoscopy and mammogram. Reports she recently saw genetic counseling and this was recommended. Message sent to Dr Wade, asking if additional tests were advised.       Answered all of patient's questions to her stated satisfaction.       Cinthia Contreras RN, BSN, OCN   RN Care Coordinator   [facility]

## 2020-05-28 ENCOUNTER — TELEPHONE (OUTPATIENT)
Dept: ONCOLOGY | Facility: CLINIC | Age: 65
End: 2020-05-28

## 2020-05-28 NOTE — TELEPHONE ENCOUNTER
Writer called patient to discuss location of Capecitabine order. Patient usually has labs drawn @ [facility] and picks up order same day/same location. Now patient is having labs drawn @ [facility] location. Requested call back w/o naming med names.    [facility]  [facility]   Oncology Pharmacy Liaison  [email or web address]   [phone] (phone)   [phone] (fax)

## 2020-05-29 DIAGNOSIS — C25.0 MALIGNANT NEOPLASM OF HEAD OF PANCREAS (H): Primary | ICD-10-CM

## 2020-05-29 RX ORDER — CAPECITABINE 500 MG/1
TABLET, FILM COATED ORAL
Qty: 42 TABLET | Refills: 3 | Status: SHIPPED | OUTPATIENT
Start: 2020-05-29 | End: 2020-11-17

## 2020-05-29 NOTE — PROGRESS NOTES
Medication:   lisdexamfetamine (VYVANSE) 60 MG capsule 28          PCP: Dr. Barakat    Preferred Contact Number:      [place] [phone]       Who will be picking up: Patient    Advised patient:  ·  Nurse will call when prescription is ready to  within 48 -72 hours.  ·  A minimum of 24 hours is needed for all refill requests.  ·  Prescriptions will not be refilled on weekends or Holidays.  ·  All refill requests must be called in by 11 am on Fridays.          Releasing prescription to [facility] after confirmation from patient to do so. She usually picks up the prescription from [facility] after [facility], but she is not getting labs at the [place] location.      Nikki Vigil, PharmD  Oral Chemotherapy Monitoring Program  [facility]  [phone]

## 2020-06-08 DIAGNOSIS — C25.0 MALIGNANT NEOPLASM OF HEAD OF PANCREAS (H): ICD-10-CM

## 2020-06-08 LAB
ALBUMIN SERPL-MCNC: 3.4 G/DL (ref 3.4–5)
ALP SERPL-CCNC: 127 U/L (ref 40–150)
ALT SERPL W P-5'-P-CCNC: 31 U/L (ref 0–50)
ANION GAP SERPL CALCULATED.3IONS-SCNC: 5 MMOL/L (ref 3–14)
AST SERPL W P-5'-P-CCNC: 30 U/L (ref 0–45)
BASOPHILS # BLD AUTO: 0 10E9/L (ref 0–0.2)
BASOPHILS NFR BLD AUTO: 0.5 %
BILIRUB SERPL-MCNC: 0.5 MG/DL (ref 0.2–1.3)
BUN SERPL-MCNC: 7 MG/DL (ref 7–30)
CALCIUM SERPL-MCNC: 8.4 MG/DL (ref 8.5–10.1)
CHLORIDE SERPL-SCNC: 108 MMOL/L (ref 94–109)
CO2 SERPL-SCNC: 26 MMOL/L (ref 20–32)
CREAT SERPL-MCNC: 0.75 MG/DL (ref 0.52–1.04)
DIFFERENTIAL METHOD BLD: ABNORMAL
EOSINOPHIL # BLD AUTO: 0.1 10E9/L (ref 0–0.7)
EOSINOPHIL NFR BLD AUTO: 1.3 %
ERYTHROCYTE [DISTWIDTH] IN BLOOD BY AUTOMATED COUNT: 19.6 % (ref 10–15)
GFR SERPL CREATININE-BSD FRML MDRD: 83 ML/MIN/{1.73_M2}
GLUCOSE SERPL-MCNC: 119 MG/DL (ref 70–99)
HCT VFR BLD AUTO: 33.7 % (ref 35–47)
HGB BLD-MCNC: 10.9 G/DL (ref 11.7–15.7)
IMM GRANULOCYTES # BLD: 0 10E9/L (ref 0–0.4)
IMM GRANULOCYTES NFR BLD: 0.3 %
LYMPHOCYTES # BLD AUTO: 1 10E9/L (ref 0.8–5.3)
LYMPHOCYTES NFR BLD AUTO: 27.8 %
MCH RBC QN AUTO: 32.6 PG (ref 26.5–33)
MCHC RBC AUTO-ENTMCNC: 32.3 G/DL (ref 31.5–36.5)
MCV RBC AUTO: 101 FL (ref 78–100)
MONOCYTES # BLD AUTO: 0.4 10E9/L (ref 0–1.3)
MONOCYTES NFR BLD AUTO: 9.9 %
NEUTROPHILS # BLD AUTO: 2.3 10E9/L (ref 1.6–8.3)
NEUTROPHILS NFR BLD AUTO: 60.2 %
NRBC # BLD AUTO: 0 10*3/UL
NRBC BLD AUTO-RTO: 0 /100
PLATELET # BLD AUTO: 204 10E9/L (ref 150–450)
POTASSIUM SERPL-SCNC: 4.3 MMOL/L (ref 3.4–5.3)
PROT SERPL-MCNC: 6.7 G/DL (ref 6.8–8.8)
RBC # BLD AUTO: 3.34 10E12/L (ref 3.8–5.2)
SODIUM SERPL-SCNC: 139 MMOL/L (ref 133–144)
WBC # BLD AUTO: 3.7 10E9/L (ref 4–11)

## 2020-06-08 PROCEDURE — 85025 COMPLETE CBC W/AUTO DIFF WBC: CPT | Performed by: INTERNAL MEDICINE

## 2020-06-08 PROCEDURE — 36415 COLL VENOUS BLD VENIPUNCTURE: CPT | Performed by: INTERNAL MEDICINE

## 2020-06-08 PROCEDURE — 80053 COMPREHEN METABOLIC PANEL: CPT | Performed by: INTERNAL MEDICINE

## 2020-06-08 PROCEDURE — 25000128 H RX IP 250 OP 636: Performed by: INTERNAL MEDICINE

## 2020-06-08 RX ORDER — HEPARIN SODIUM (PORCINE) LOCK FLUSH IV SOLN 100 UNIT/ML 100 UNIT/ML
5 SOLUTION INTRAVENOUS ONCE
Status: COMPLETED | OUTPATIENT
Start: 2020-06-08 | End: 2020-06-08

## 2020-06-08 RX ADMIN — HEPARIN SODIUM (PORCINE) LOCK FLUSH IV SOLN 100 UNIT/ML 5 ML: 100 SOLUTION at 10:14

## 2020-06-29 DIAGNOSIS — C25.0 MALIGNANT NEOPLASM OF HEAD OF PANCREAS (H): Primary | ICD-10-CM

## 2020-06-29 LAB
ALBUMIN SERPL-MCNC: 3.4 G/DL (ref 3.4–5)
ALP SERPL-CCNC: 141 U/L (ref 40–150)
ALT SERPL W P-5'-P-CCNC: 34 U/L (ref 0–50)
ANION GAP SERPL CALCULATED.3IONS-SCNC: 4 MMOL/L (ref 3–14)
AST SERPL W P-5'-P-CCNC: 35 U/L (ref 0–45)
BASOPHILS # BLD AUTO: 0 10E9/L (ref 0–0.2)
BASOPHILS NFR BLD AUTO: 0.5 %
BILIRUB SERPL-MCNC: 0.5 MG/DL (ref 0.2–1.3)
BUN SERPL-MCNC: 11 MG/DL (ref 7–30)
CALCIUM SERPL-MCNC: 8.2 MG/DL (ref 8.5–10.1)
CHLORIDE SERPL-SCNC: 109 MMOL/L (ref 94–109)
CO2 SERPL-SCNC: 26 MMOL/L (ref 20–32)
CREAT SERPL-MCNC: 0.64 MG/DL (ref 0.52–1.04)
DIFFERENTIAL METHOD BLD: ABNORMAL
EOSINOPHIL # BLD AUTO: 0.1 10E9/L (ref 0–0.7)
EOSINOPHIL NFR BLD AUTO: 1.5 %
ERYTHROCYTE [DISTWIDTH] IN BLOOD BY AUTOMATED COUNT: 19.1 % (ref 10–15)
GFR SERPL CREATININE-BSD FRML MDRD: >90 ML/MIN/{1.73_M2}
GLUCOSE SERPL-MCNC: 153 MG/DL (ref 70–99)
HCT VFR BLD AUTO: 34.4 % (ref 35–47)
HGB BLD-MCNC: 10.8 G/DL (ref 11.7–15.7)
IMM GRANULOCYTES # BLD: 0 10E9/L (ref 0–0.4)
IMM GRANULOCYTES NFR BLD: 0.2 %
LYMPHOCYTES # BLD AUTO: 1.1 10E9/L (ref 0.8–5.3)
LYMPHOCYTES NFR BLD AUTO: 26.2 %
MCH RBC QN AUTO: 32.7 PG (ref 26.5–33)
MCHC RBC AUTO-ENTMCNC: 31.4 G/DL (ref 31.5–36.5)
MCV RBC AUTO: 104 FL (ref 78–100)
MONOCYTES # BLD AUTO: 0.4 10E9/L (ref 0–1.3)
MONOCYTES NFR BLD AUTO: 9 %
NEUTROPHILS # BLD AUTO: 2.5 10E9/L (ref 1.6–8.3)
NEUTROPHILS NFR BLD AUTO: 62.6 %
NRBC # BLD AUTO: 0 10*3/UL
NRBC BLD AUTO-RTO: 0 /100
PLATELET # BLD AUTO: 204 10E9/L (ref 150–450)
POTASSIUM SERPL-SCNC: 3.8 MMOL/L (ref 3.4–5.3)
PROT SERPL-MCNC: 6.6 G/DL (ref 6.8–8.8)
RBC # BLD AUTO: 3.3 10E12/L (ref 3.8–5.2)
SODIUM SERPL-SCNC: 139 MMOL/L (ref 133–144)
WBC # BLD AUTO: 4 10E9/L (ref 4–11)

## 2020-06-29 PROCEDURE — 80053 COMPREHEN METABOLIC PANEL: CPT | Performed by: INTERNAL MEDICINE

## 2020-06-29 PROCEDURE — 36415 COLL VENOUS BLD VENIPUNCTURE: CPT | Performed by: INTERNAL MEDICINE

## 2020-06-29 PROCEDURE — 25000128 H RX IP 250 OP 636: Performed by: INTERNAL MEDICINE

## 2020-06-29 PROCEDURE — 85025 COMPLETE CBC W/AUTO DIFF WBC: CPT | Performed by: INTERNAL MEDICINE

## 2020-06-29 RX ORDER — HEPARIN SODIUM (PORCINE) LOCK FLUSH IV SOLN 100 UNIT/ML 100 UNIT/ML
5 SOLUTION INTRAVENOUS DAILY PRN
Status: DISCONTINUED | OUTPATIENT
Start: 2020-06-29 | End: 2020-07-07 | Stop reason: HOSPADM

## 2020-06-29 RX ADMIN — HEPARIN SODIUM (PORCINE) LOCK FLUSH IV SOLN 100 UNIT/ML 5 ML: 100 SOLUTION at 10:23

## 2020-07-20 DIAGNOSIS — C91.01 ACUTE LYMPHOBLASTIC LEUKEMIA (ALL) IN REMISSION (H): Primary | ICD-10-CM

## 2020-07-20 LAB
ALBUMIN SERPL-MCNC: 3.4 G/DL (ref 3.4–5)
ALP SERPL-CCNC: 162 U/L (ref 40–150)
ALT SERPL W P-5'-P-CCNC: 30 U/L (ref 0–50)
ANION GAP SERPL CALCULATED.3IONS-SCNC: 5 MMOL/L (ref 3–14)
AST SERPL W P-5'-P-CCNC: 37 U/L (ref 0–45)
BASOPHILS # BLD AUTO: 0 10E9/L (ref 0–0.2)
BASOPHILS NFR BLD AUTO: 0.6 %
BILIRUB SERPL-MCNC: 0.4 MG/DL (ref 0.2–1.3)
BUN SERPL-MCNC: 8 MG/DL (ref 7–30)
CALCIUM SERPL-MCNC: 8.4 MG/DL (ref 8.5–10.1)
CHLORIDE SERPL-SCNC: 106 MMOL/L (ref 94–109)
CO2 SERPL-SCNC: 25 MMOL/L (ref 20–32)
CREAT SERPL-MCNC: 0.7 MG/DL (ref 0.52–1.04)
DIFFERENTIAL METHOD BLD: ABNORMAL
EOSINOPHIL # BLD AUTO: 0.1 10E9/L (ref 0–0.7)
EOSINOPHIL NFR BLD AUTO: 1.7 %
ERYTHROCYTE [DISTWIDTH] IN BLOOD BY AUTOMATED COUNT: 18.6 % (ref 10–15)
GFR SERPL CREATININE-BSD FRML MDRD: 90 ML/MIN/{1.73_M2}
GLUCOSE SERPL-MCNC: 146 MG/DL (ref 70–99)
HCT VFR BLD AUTO: 33.2 % (ref 35–47)
HGB BLD-MCNC: 10.5 G/DL (ref 11.7–15.7)
IMM GRANULOCYTES # BLD: 0 10E9/L (ref 0–0.4)
IMM GRANULOCYTES NFR BLD: 0.2 %
LYMPHOCYTES # BLD AUTO: 1.1 10E9/L (ref 0.8–5.3)
LYMPHOCYTES NFR BLD AUTO: 22.9 %
MCH RBC QN AUTO: 33.5 PG (ref 26.5–33)
MCHC RBC AUTO-ENTMCNC: 31.6 G/DL (ref 31.5–36.5)
MCV RBC AUTO: 106 FL (ref 78–100)
MONOCYTES # BLD AUTO: 0.4 10E9/L (ref 0–1.3)
MONOCYTES NFR BLD AUTO: 8.7 %
NEUTROPHILS # BLD AUTO: 3.2 10E9/L (ref 1.6–8.3)
NEUTROPHILS NFR BLD AUTO: 65.9 %
NRBC # BLD AUTO: 0 10*3/UL
NRBC BLD AUTO-RTO: 0 /100
PLATELET # BLD AUTO: 232 10E9/L (ref 150–450)
POTASSIUM SERPL-SCNC: 4.2 MMOL/L (ref 3.4–5.3)
PROT SERPL-MCNC: 7 G/DL (ref 6.8–8.8)
RBC # BLD AUTO: 3.13 10E12/L (ref 3.8–5.2)
SODIUM SERPL-SCNC: 136 MMOL/L (ref 133–144)
WBC # BLD AUTO: 4.8 10E9/L (ref 4–11)

## 2020-07-20 PROCEDURE — 25000128 H RX IP 250 OP 636: Performed by: INTERNAL MEDICINE

## 2020-07-20 PROCEDURE — 80053 COMPREHEN METABOLIC PANEL: CPT | Performed by: INTERNAL MEDICINE

## 2020-07-20 PROCEDURE — 85025 COMPLETE CBC W/AUTO DIFF WBC: CPT | Performed by: INTERNAL MEDICINE

## 2020-07-20 RX ORDER — HEPARIN SODIUM (PORCINE) LOCK FLUSH IV SOLN 100 UNIT/ML 100 UNIT/ML
5 SOLUTION INTRAVENOUS ONCE
Status: COMPLETED | OUTPATIENT
Start: 2020-07-20 | End: 2020-07-20

## 2020-07-20 RX ADMIN — HEPARIN SODIUM (PORCINE) LOCK FLUSH IV SOLN 100 UNIT/ML 5 ML: 100 SOLUTION at 10:18

## 2020-07-20 NOTE — NURSING NOTE
"Chief Complaint   Patient presents with     Port Draw     lab only appointment.  port accessed and labs drawn by rn in lab.      Port accessed with 20g 3/4\" gripper needle, labs drawn by RN in lab.  Port flushed with saline and heparin.  Port de-accessed.    Meghna Leonardo RN      "

## 2020-08-21 ENCOUNTER — ANCILLARY PROCEDURE (OUTPATIENT)
Dept: CT IMAGING | Facility: CLINIC | Age: 65
End: 2020-08-21
Attending: INTERNAL MEDICINE
Payer: MEDICARE

## 2020-08-21 DIAGNOSIS — C25.0 MALIGNANT NEOPLASM OF HEAD OF PANCREAS (H): ICD-10-CM

## 2020-08-21 LAB
ALBUMIN SERPL-MCNC: 3.6 G/DL (ref 3.4–5)
ALP SERPL-CCNC: 116 U/L (ref 40–150)
ALT SERPL W P-5'-P-CCNC: 33 U/L (ref 0–50)
ANION GAP SERPL CALCULATED.3IONS-SCNC: 6 MMOL/L (ref 3–14)
AST SERPL W P-5'-P-CCNC: 39 U/L (ref 0–45)
BASOPHILS # BLD AUTO: 0 10E9/L (ref 0–0.2)
BASOPHILS NFR BLD AUTO: 0.3 %
BILIRUB SERPL-MCNC: 0.6 MG/DL (ref 0.2–1.3)
BUN SERPL-MCNC: 5 MG/DL (ref 7–30)
CALCIUM SERPL-MCNC: 8.4 MG/DL (ref 8.5–10.1)
CHLORIDE SERPL-SCNC: 108 MMOL/L (ref 94–109)
CO2 SERPL-SCNC: 26 MMOL/L (ref 20–32)
CREAT SERPL-MCNC: 0.68 MG/DL (ref 0.52–1.04)
DIFFERENTIAL METHOD BLD: ABNORMAL
EOSINOPHIL # BLD AUTO: 0.1 10E9/L (ref 0–0.7)
EOSINOPHIL NFR BLD AUTO: 1.3 %
ERYTHROCYTE [DISTWIDTH] IN BLOOD BY AUTOMATED COUNT: 18.6 % (ref 10–15)
GFR SERPL CREATININE-BSD FRML MDRD: >90 ML/MIN/{1.73_M2}
GLUCOSE SERPL-MCNC: 90 MG/DL (ref 70–99)
HCT VFR BLD AUTO: 34 % (ref 35–47)
HGB BLD-MCNC: 11.2 G/DL (ref 11.7–15.7)
IMM GRANULOCYTES # BLD: 0 10E9/L (ref 0–0.4)
IMM GRANULOCYTES NFR BLD: 0.3 %
LYMPHOCYTES # BLD AUTO: 1 10E9/L (ref 0.8–5.3)
LYMPHOCYTES NFR BLD AUTO: 25.3 %
MCH RBC QN AUTO: 33.3 PG (ref 26.5–33)
MCHC RBC AUTO-ENTMCNC: 32.9 G/DL (ref 31.5–36.5)
MCV RBC AUTO: 101 FL (ref 78–100)
MONOCYTES # BLD AUTO: 0.4 10E9/L (ref 0–1.3)
MONOCYTES NFR BLD AUTO: 8.8 %
NEUTROPHILS # BLD AUTO: 2.6 10E9/L (ref 1.6–8.3)
NEUTROPHILS NFR BLD AUTO: 64 %
NRBC # BLD AUTO: 0 10*3/UL
NRBC BLD AUTO-RTO: 0 /100
PLATELET # BLD AUTO: 184 10E9/L (ref 150–450)
POTASSIUM SERPL-SCNC: 4 MMOL/L (ref 3.4–5.3)
PROT SERPL-MCNC: 6.9 G/DL (ref 6.8–8.8)
RBC # BLD AUTO: 3.36 10E12/L (ref 3.8–5.2)
SODIUM SERPL-SCNC: 141 MMOL/L (ref 133–144)
WBC # BLD AUTO: 4 10E9/L (ref 4–11)

## 2020-08-21 PROCEDURE — 25000128 H RX IP 250 OP 636: Performed by: INTERNAL MEDICINE

## 2020-08-21 PROCEDURE — 85025 COMPLETE CBC W/AUTO DIFF WBC: CPT | Performed by: INTERNAL MEDICINE

## 2020-08-21 PROCEDURE — 80053 COMPREHEN METABOLIC PANEL: CPT | Performed by: INTERNAL MEDICINE

## 2020-08-21 RX ORDER — HEPARIN SODIUM (PORCINE) LOCK FLUSH IV SOLN 100 UNIT/ML 100 UNIT/ML
500 SOLUTION INTRAVENOUS ONCE
Status: COMPLETED | OUTPATIENT
Start: 2020-08-21 | End: 2020-08-21

## 2020-08-21 RX ORDER — HEPARIN SODIUM (PORCINE) LOCK FLUSH IV SOLN 100 UNIT/ML 100 UNIT/ML
5 SOLUTION INTRAVENOUS EVERY 8 HOURS
Status: DISCONTINUED | OUTPATIENT
Start: 2020-08-21 | End: 2020-08-29 | Stop reason: HOSPADM

## 2020-08-21 RX ORDER — IOPAMIDOL 755 MG/ML
73 INJECTION, SOLUTION INTRAVASCULAR ONCE
Status: COMPLETED | OUTPATIENT
Start: 2020-08-21 | End: 2020-08-21

## 2020-08-21 RX ADMIN — IOPAMIDOL 73 ML: 755 INJECTION, SOLUTION INTRAVASCULAR at 09:30

## 2020-08-21 RX ADMIN — HEPARIN SODIUM (PORCINE) LOCK FLUSH IV SOLN 100 UNIT/ML 500 UNITS: 100 SOLUTION at 09:42

## 2020-08-21 RX ADMIN — Medication 5 ML: at 09:20

## 2020-08-24 ENCOUNTER — ONCOLOGY VISIT (OUTPATIENT)
Dept: ONCOLOGY | Facility: CLINIC | Age: 65
End: 2020-08-24
Attending: INTERNAL MEDICINE
Payer: MEDICARE

## 2020-08-24 VITALS
BODY MASS INDEX: 18.64 KG/M2 | DIASTOLIC BLOOD PRESSURE: 74 MMHG | HEART RATE: 74 BPM | TEMPERATURE: 97.9 F | WEIGHT: 115.5 LBS | SYSTOLIC BLOOD PRESSURE: 149 MMHG | OXYGEN SATURATION: 99 %

## 2020-08-24 DIAGNOSIS — C25.0 MALIGNANT NEOPLASM OF HEAD OF PANCREAS (H): Primary | ICD-10-CM

## 2020-08-24 PROCEDURE — G0463 HOSPITAL OUTPT CLINIC VISIT: HCPCS | Mod: ZF

## 2020-08-24 PROCEDURE — 99215 OFFICE O/P EST HI 40 MIN: CPT | Mod: ZP | Performed by: INTERNAL MEDICINE

## 2020-08-24 RX ORDER — CAPECITABINE 500 MG/1
TABLET, FILM COATED ORAL
Qty: 42 TABLET | Refills: 3 | Status: SHIPPED | OUTPATIENT
Start: 2020-08-24 | End: 2020-11-17

## 2020-08-24 RX ORDER — LORAZEPAM 0.5 MG/1
0.5 TABLET ORAL EVERY 4 HOURS PRN
Qty: 30 TABLET | Refills: 2 | Status: SHIPPED | OUTPATIENT
Start: 2020-08-24 | End: 2023-12-20

## 2020-08-24 ASSESSMENT — PAIN SCALES - GENERAL: PAINLEVEL: NO PAIN (0)
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
## 2020-08-24 NOTE — NURSING NOTE
"Oncology Rooming Note    August 24, 2020 7:10 AM   Emelia Weathers is a 64 year old female who presents for:    Chief Complaint   Patient presents with     Oncology Clinic Visit     Malignant neoplasm of head of pancreas (H)     Initial Vitals: BP (!) 149/74   Pulse 74   Temp 97.9  F (36.6  C)   Wt 52.4 kg (115 lb 8 oz)   SpO2 99%   BMI 18.64 kg/m   Estimated body mass index is 18.64 kg/m  as calculated from the following:    Height as of 5/21/18: 1.676 m (5' 6\").    Weight as of this encounter: 52.4 kg (115 lb 8 oz). Body surface area is 1.56 meters squared.  No Pain (0) Comment: Data Unavailable   No LMP recorded. Patient is postmenopausal.  Allergies reviewed: Yes  Medications reviewed: Yes    Medications: Medication refills not needed today.  Pharmacy name entered into [employer]:    [id number] [place] [facility] - [place], [place] - [address] [place] [address] [facility]ARMACY [facility] [place], [place] - [address] [facility]AC[facility] [place], [place] - [phone]  [id number] - [place], [place] - [address] [place] [address] [facility] - [place], [place] - [address] [place] [address]    Clinical concerns: No new concerns today Dr. Wade was notified.      Edis Alejandre MA            "
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
## 2020-09-21 DIAGNOSIS — C25.0 MALIGNANT NEOPLASM OF HEAD OF PANCREAS (H): ICD-10-CM

## 2020-09-21 LAB
ALBUMIN SERPL-MCNC: 3.4 G/DL (ref 3.4–5)
ALP SERPL-CCNC: 102 U/L (ref 40–150)
ALT SERPL W P-5'-P-CCNC: 30 U/L (ref 0–50)
ANION GAP SERPL CALCULATED.3IONS-SCNC: 7 MMOL/L (ref 3–14)
AST SERPL W P-5'-P-CCNC: 28 U/L (ref 0–45)
BASOPHILS # BLD AUTO: 0 10E9/L (ref 0–0.2)
BASOPHILS NFR BLD AUTO: 0.7 %
BILIRUB SERPL-MCNC: 0.4 MG/DL (ref 0.2–1.3)
BUN SERPL-MCNC: 12 MG/DL (ref 7–30)
CALCIUM SERPL-MCNC: 9 MG/DL (ref 8.5–10.1)
CHLORIDE SERPL-SCNC: 108 MMOL/L (ref 94–109)
CO2 SERPL-SCNC: 27 MMOL/L (ref 20–32)
CREAT SERPL-MCNC: 0.8 MG/DL (ref 0.52–1.04)
DIFFERENTIAL METHOD BLD: ABNORMAL
EOSINOPHIL # BLD AUTO: 0.1 10E9/L (ref 0–0.7)
EOSINOPHIL NFR BLD AUTO: 1.4 %
ERYTHROCYTE [DISTWIDTH] IN BLOOD BY AUTOMATED COUNT: 19.7 % (ref 10–15)
GFR SERPL CREATININE-BSD FRML MDRD: 76 ML/MIN/{1.73_M2}
GLUCOSE SERPL-MCNC: 76 MG/DL (ref 70–99)
HCT VFR BLD AUTO: 33.7 % (ref 35–47)
HGB BLD-MCNC: 11 G/DL (ref 11.7–15.7)
IMM GRANULOCYTES # BLD: 0 10E9/L (ref 0–0.4)
IMM GRANULOCYTES NFR BLD: 0.2 %
LYMPHOCYTES # BLD AUTO: 1.1 10E9/L (ref 0.8–5.3)
LYMPHOCYTES NFR BLD AUTO: 25.6 %
MCH RBC QN AUTO: 33.2 PG (ref 26.5–33)
MCHC RBC AUTO-ENTMCNC: 32.6 G/DL (ref 31.5–36.5)
MCV RBC AUTO: 102 FL (ref 78–100)
MONOCYTES # BLD AUTO: 0.5 10E9/L (ref 0–1.3)
MONOCYTES NFR BLD AUTO: 11.8 %
NEUTROPHILS # BLD AUTO: 2.5 10E9/L (ref 1.6–8.3)
NEUTROPHILS NFR BLD AUTO: 60.3 %
NRBC # BLD AUTO: 0 10*3/UL
NRBC BLD AUTO-RTO: 0 /100
PLATELET # BLD AUTO: 211 10E9/L (ref 150–450)
POTASSIUM SERPL-SCNC: 4.2 MMOL/L (ref 3.4–5.3)
PROT SERPL-MCNC: 7 G/DL (ref 6.8–8.8)
RBC # BLD AUTO: 3.31 10E12/L (ref 3.8–5.2)
SODIUM SERPL-SCNC: 141 MMOL/L (ref 133–144)
WBC # BLD AUTO: 4.2 10E9/L (ref 4–11)

## 2020-09-21 PROCEDURE — 25000128 H RX IP 250 OP 636: Performed by: INTERNAL MEDICINE

## 2020-09-21 PROCEDURE — 80053 COMPREHEN METABOLIC PANEL: CPT | Performed by: INTERNAL MEDICINE

## 2020-09-21 PROCEDURE — 85025 COMPLETE CBC W/AUTO DIFF WBC: CPT | Performed by: INTERNAL MEDICINE

## 2020-09-21 RX ORDER — HEPARIN SODIUM (PORCINE) LOCK FLUSH IV SOLN 100 UNIT/ML 100 UNIT/ML
5 SOLUTION INTRAVENOUS EVERY 8 HOURS
Status: DISCONTINUED | OUTPATIENT
Start: 2020-09-21 | End: 2020-09-29 | Stop reason: HOSPADM

## 2020-09-21 RX ADMIN — Medication 5 ML: at 10:40

## 2020-10-19 PROCEDURE — 36591 DRAW BLOOD OFF VENOUS DEVICE: CPT

## 2020-10-19 PROCEDURE — 250N000011 HC RX IP 250 OP 636: Performed by: INTERNAL MEDICINE

## 2020-10-19 RX ORDER — HEPARIN SODIUM (PORCINE) LOCK FLUSH IV SOLN 100 UNIT/ML 100 UNIT/ML
5 SOLUTION INTRAVENOUS EVERY 8 HOURS PRN
Status: DISCONTINUED | OUTPATIENT
Start: 2020-10-19 | End: 2020-10-27 | Stop reason: HOSPADM

## 2020-10-19 RX ADMIN — Medication 5 ML: at 11:00

## 2020-10-19 NOTE — NURSING NOTE
Chief Complaint   Patient presents with     Port Draw     Labs drawn via PORT by RN in lab. Lab appt only.      Tara Mesa RN

## 2020-10-22 DIAGNOSIS — C25.0 MALIGNANT NEOPLASM OF HEAD OF PANCREAS (H): ICD-10-CM

## 2020-10-22 DIAGNOSIS — C25.0 MALIGNANT NEOPLASM OF HEAD OF PANCREAS (H): Primary | ICD-10-CM

## 2020-10-23 DIAGNOSIS — C25.0 MALIGNANT NEOPLASM OF HEAD OF PANCREAS (H): ICD-10-CM

## 2020-10-23 LAB
ALBUMIN SERPL-MCNC: 3.8 G/DL (ref 3.4–5)
ALP SERPL-CCNC: 134 U/L (ref 40–150)
ALT SERPL W P-5'-P-CCNC: 46 U/L (ref 0–50)
ANION GAP SERPL CALCULATED.3IONS-SCNC: 6 MMOL/L (ref 3–14)
AST SERPL W P-5'-P-CCNC: 58 U/L (ref 0–45)
BASOPHILS # BLD AUTO: 0 10E9/L (ref 0–0.2)
BASOPHILS NFR BLD AUTO: 0.4 %
BILIRUB SERPL-MCNC: 0.5 MG/DL (ref 0.2–1.3)
BUN SERPL-MCNC: 10 MG/DL (ref 7–30)
CALCIUM SERPL-MCNC: 8.9 MG/DL (ref 8.5–10.1)
CHLORIDE SERPL-SCNC: 106 MMOL/L (ref 94–109)
CO2 SERPL-SCNC: 27 MMOL/L (ref 20–32)
CREAT SERPL-MCNC: 0.76 MG/DL (ref 0.52–1.04)
DIFFERENTIAL METHOD BLD: ABNORMAL
EOSINOPHIL # BLD AUTO: 0.1 10E9/L (ref 0–0.7)
EOSINOPHIL NFR BLD AUTO: 1.1 %
ERYTHROCYTE [DISTWIDTH] IN BLOOD BY AUTOMATED COUNT: 19 % (ref 10–15)
GFR SERPL CREATININE-BSD FRML MDRD: 81 ML/MIN/{1.73_M2}
GLUCOSE SERPL-MCNC: 79 MG/DL (ref 70–99)
HCT VFR BLD AUTO: 32.9 % (ref 35–47)
HGB BLD-MCNC: 10.9 G/DL (ref 11.7–15.7)
IMM GRANULOCYTES # BLD: 0 10E9/L (ref 0–0.4)
IMM GRANULOCYTES NFR BLD: 0.2 %
LYMPHOCYTES # BLD AUTO: 1.3 10E9/L (ref 0.8–5.3)
LYMPHOCYTES NFR BLD AUTO: 29 %
MCH RBC QN AUTO: 32.9 PG (ref 26.5–33)
MCHC RBC AUTO-ENTMCNC: 33.1 G/DL (ref 31.5–36.5)
MCV RBC AUTO: 99 FL (ref 78–100)
MONOCYTES # BLD AUTO: 0.4 10E9/L (ref 0–1.3)
MONOCYTES NFR BLD AUTO: 8.6 %
NEUTROPHILS # BLD AUTO: 2.7 10E9/L (ref 1.6–8.3)
NEUTROPHILS NFR BLD AUTO: 60.7 %
NRBC # BLD AUTO: 0 10*3/UL
NRBC BLD AUTO-RTO: 0 /100
PLATELET # BLD AUTO: 181 10E9/L (ref 150–450)
POTASSIUM SERPL-SCNC: 3.9 MMOL/L (ref 3.4–5.3)
PROT SERPL-MCNC: 7.1 G/DL (ref 6.8–8.8)
RBC # BLD AUTO: 3.31 10E12/L (ref 3.8–5.2)
SODIUM SERPL-SCNC: 139 MMOL/L (ref 133–144)
WBC # BLD AUTO: 4.5 10E9/L (ref 4–11)

## 2020-10-23 PROCEDURE — 80053 COMPREHEN METABOLIC PANEL: CPT | Performed by: INTERNAL MEDICINE

## 2020-10-23 PROCEDURE — 85025 COMPLETE CBC W/AUTO DIFF WBC: CPT | Performed by: INTERNAL MEDICINE

## 2020-10-23 PROCEDURE — 250N000011 HC RX IP 250 OP 636: Performed by: INTERNAL MEDICINE

## 2020-10-23 RX ORDER — HEPARIN SODIUM (PORCINE) LOCK FLUSH IV SOLN 100 UNIT/ML 100 UNIT/ML
500 SOLUTION INTRAVENOUS ONCE
Status: COMPLETED | OUTPATIENT
Start: 2020-10-23 | End: 2020-10-23

## 2020-10-23 RX ORDER — PANTOPRAZOLE SODIUM 40 MG/1
TABLET, DELAYED RELEASE ORAL
Qty: 90 TABLET | Refills: 0 | Status: SHIPPED | OUTPATIENT
Start: 2020-10-23 | End: 2020-12-24

## 2020-10-23 RX ADMIN — Medication 500 UNITS: at 14:34

## 2020-10-23 NOTE — NURSING NOTE
Chief Complaint   Patient presents with     Port Draw     labs drawn from port in lab by RN     There were no vitals taken for this visit.    Port accessed by RN. Labs drawn and sent. Line flushed with NS and Heparin. Pt tolerated well.    Allie Woodson RN on 10/23/2020 at 2:30 PM

## 2020-11-08 DIAGNOSIS — C25.0 MALIGNANT NEOPLASM OF HEAD OF PANCREAS (H): Primary | ICD-10-CM

## 2020-11-17 DIAGNOSIS — C25.0 MALIGNANT NEOPLASM OF HEAD OF PANCREAS (H): Primary | ICD-10-CM

## 2020-11-17 DIAGNOSIS — C25.0 MALIGNANT NEOPLASM OF HEAD OF PANCREAS (H): ICD-10-CM

## 2020-11-17 LAB
ALBUMIN SERPL-MCNC: 3.4 G/DL (ref 3.4–5)
ALP SERPL-CCNC: 116 U/L (ref 40–150)
ALT SERPL W P-5'-P-CCNC: 28 U/L (ref 0–50)
ANION GAP SERPL CALCULATED.3IONS-SCNC: 4 MMOL/L (ref 3–14)
AST SERPL W P-5'-P-CCNC: 32 U/L (ref 0–45)
BASOPHILS # BLD AUTO: 0 10E9/L (ref 0–0.2)
BASOPHILS NFR BLD AUTO: 0.2 %
BILIRUB SERPL-MCNC: 0.6 MG/DL (ref 0.2–1.3)
BUN SERPL-MCNC: 9 MG/DL (ref 7–30)
CALCIUM SERPL-MCNC: 8.5 MG/DL (ref 8.5–10.1)
CHLORIDE SERPL-SCNC: 110 MMOL/L (ref 94–109)
CO2 SERPL-SCNC: 27 MMOL/L (ref 20–32)
CREAT SERPL-MCNC: 0.7 MG/DL (ref 0.52–1.04)
DIFFERENTIAL METHOD BLD: ABNORMAL
EOSINOPHIL # BLD AUTO: 0.1 10E9/L (ref 0–0.7)
EOSINOPHIL NFR BLD AUTO: 1 %
ERYTHROCYTE [DISTWIDTH] IN BLOOD BY AUTOMATED COUNT: 19.9 % (ref 10–15)
GFR SERPL CREATININE-BSD FRML MDRD: 89 ML/MIN/{1.73_M2}
GLUCOSE SERPL-MCNC: 117 MG/DL (ref 70–99)
HCT VFR BLD AUTO: 31.4 % (ref 35–47)
HGB BLD-MCNC: 10.3 G/DL (ref 11.7–15.7)
IMM GRANULOCYTES # BLD: 0 10E9/L (ref 0–0.4)
IMM GRANULOCYTES NFR BLD: 0.2 %
LYMPHOCYTES # BLD AUTO: 1.2 10E9/L (ref 0.8–5.3)
LYMPHOCYTES NFR BLD AUTO: 23.8 %
MCH RBC QN AUTO: 33.1 PG (ref 26.5–33)
MCHC RBC AUTO-ENTMCNC: 32.8 G/DL (ref 31.5–36.5)
MCV RBC AUTO: 101 FL (ref 78–100)
MONOCYTES # BLD AUTO: 0.4 10E9/L (ref 0–1.3)
MONOCYTES NFR BLD AUTO: 7.2 %
NEUTROPHILS # BLD AUTO: 3.4 10E9/L (ref 1.6–8.3)
NEUTROPHILS NFR BLD AUTO: 67.6 %
NRBC # BLD AUTO: 0 10*3/UL
NRBC BLD AUTO-RTO: 0 /100
PLATELET # BLD AUTO: 192 10E9/L (ref 150–450)
POTASSIUM SERPL-SCNC: 3.7 MMOL/L (ref 3.4–5.3)
PROT SERPL-MCNC: 6.8 G/DL (ref 6.8–8.8)
RBC # BLD AUTO: 3.11 10E12/L (ref 3.8–5.2)
SODIUM SERPL-SCNC: 141 MMOL/L (ref 133–144)
WBC # BLD AUTO: 5 10E9/L (ref 4–11)

## 2020-11-17 PROCEDURE — 85025 COMPLETE CBC W/AUTO DIFF WBC: CPT | Performed by: INTERNAL MEDICINE

## 2020-11-17 PROCEDURE — 250N000011 HC RX IP 250 OP 636: Performed by: INTERNAL MEDICINE

## 2020-11-17 PROCEDURE — 36591 DRAW BLOOD OFF VENOUS DEVICE: CPT

## 2020-11-17 PROCEDURE — 80053 COMPREHEN METABOLIC PANEL: CPT | Performed by: INTERNAL MEDICINE

## 2020-11-17 RX ORDER — CAPECITABINE 500 MG/1
TABLET, FILM COATED ORAL
Qty: 42 TABLET | Refills: 3 | Status: SHIPPED | OUTPATIENT
Start: 2020-11-17 | End: 2021-02-09

## 2020-11-17 RX ORDER — HEPARIN SODIUM (PORCINE) LOCK FLUSH IV SOLN 100 UNIT/ML 100 UNIT/ML
5 SOLUTION INTRAVENOUS DAILY PRN
Status: DISCONTINUED | OUTPATIENT
Start: 2020-11-17 | End: 2020-11-25 | Stop reason: HOSPADM

## 2020-11-17 RX ADMIN — SODIUM CHLORIDE, PRESERVATIVE FREE 5 ML: 5 INJECTION INTRAVENOUS at 13:11

## 2020-12-11 ENCOUNTER — ANCILLARY PROCEDURE (OUTPATIENT)
Dept: CT IMAGING | Facility: CLINIC | Age: 65
End: 2020-12-11
Attending: INTERNAL MEDICINE
Payer: MEDICARE

## 2020-12-11 VITALS
DIASTOLIC BLOOD PRESSURE: 79 MMHG | OXYGEN SATURATION: 99 % | RESPIRATION RATE: 16 BRPM | SYSTOLIC BLOOD PRESSURE: 139 MMHG | TEMPERATURE: 96.8 F | HEART RATE: 72 BPM

## 2020-12-11 DIAGNOSIS — C25.0 MALIGNANT NEOPLASM OF HEAD OF PANCREAS (H): ICD-10-CM

## 2020-12-11 LAB
ALBUMIN SERPL-MCNC: 3.5 G/DL (ref 3.4–5)
ALP SERPL-CCNC: 126 U/L (ref 40–150)
ALT SERPL W P-5'-P-CCNC: 28 U/L (ref 0–50)
ANION GAP SERPL CALCULATED.3IONS-SCNC: 6 MMOL/L (ref 3–14)
AST SERPL W P-5'-P-CCNC: 29 U/L (ref 0–45)
BASOPHILS # BLD AUTO: 0 10E9/L (ref 0–0.2)
BASOPHILS NFR BLD AUTO: 0.7 %
BILIRUB SERPL-MCNC: 0.4 MG/DL (ref 0.2–1.3)
BUN SERPL-MCNC: 8 MG/DL (ref 7–30)
CALCIUM SERPL-MCNC: 8.7 MG/DL (ref 8.5–10.1)
CHLORIDE SERPL-SCNC: 106 MMOL/L (ref 94–109)
CO2 SERPL-SCNC: 26 MMOL/L (ref 20–32)
CREAT SERPL-MCNC: 0.65 MG/DL (ref 0.52–1.04)
DIFFERENTIAL METHOD BLD: ABNORMAL
EOSINOPHIL # BLD AUTO: 0.1 10E9/L (ref 0–0.7)
EOSINOPHIL NFR BLD AUTO: 1.1 %
ERYTHROCYTE [DISTWIDTH] IN BLOOD BY AUTOMATED COUNT: 20.5 % (ref 10–15)
GFR SERPL CREATININE-BSD FRML MDRD: >90 ML/MIN/{1.73_M2}
GLUCOSE SERPL-MCNC: 101 MG/DL (ref 70–99)
HCT VFR BLD AUTO: 33 % (ref 35–47)
HGB BLD-MCNC: 10.8 G/DL (ref 11.7–15.7)
IMM GRANULOCYTES # BLD: 0 10E9/L (ref 0–0.4)
IMM GRANULOCYTES NFR BLD: 0.2 %
LYMPHOCYTES # BLD AUTO: 1.1 10E9/L (ref 0.8–5.3)
LYMPHOCYTES NFR BLD AUTO: 24.5 %
MCH RBC QN AUTO: 32.6 PG (ref 26.5–33)
MCHC RBC AUTO-ENTMCNC: 32.7 G/DL (ref 31.5–36.5)
MCV RBC AUTO: 100 FL (ref 78–100)
MONOCYTES # BLD AUTO: 0.4 10E9/L (ref 0–1.3)
MONOCYTES NFR BLD AUTO: 9.1 %
NEUTROPHILS # BLD AUTO: 2.9 10E9/L (ref 1.6–8.3)
NEUTROPHILS NFR BLD AUTO: 64.4 %
NRBC # BLD AUTO: 0 10*3/UL
NRBC BLD AUTO-RTO: 0 /100
PLATELET # BLD AUTO: 203 10E9/L (ref 150–450)
POTASSIUM SERPL-SCNC: 3.9 MMOL/L (ref 3.4–5.3)
PROT SERPL-MCNC: 7.1 G/DL (ref 6.8–8.8)
RBC # BLD AUTO: 3.31 10E12/L (ref 3.8–5.2)
SODIUM SERPL-SCNC: 139 MMOL/L (ref 133–144)
WBC # BLD AUTO: 4.5 10E9/L (ref 4–11)

## 2020-12-11 PROCEDURE — 85025 COMPLETE CBC W/AUTO DIFF WBC: CPT | Performed by: INTERNAL MEDICINE

## 2020-12-11 PROCEDURE — 74177 CT ABD & PELVIS W/CONTRAST: CPT | Performed by: RADIOLOGY

## 2020-12-11 PROCEDURE — 250N000011 HC RX IP 250 OP 636: Performed by: INTERNAL MEDICINE

## 2020-12-11 PROCEDURE — 80053 COMPREHEN METABOLIC PANEL: CPT | Performed by: INTERNAL MEDICINE

## 2020-12-11 PROCEDURE — 71260 CT THORAX DX C+: CPT | Performed by: RADIOLOGY

## 2020-12-11 RX ORDER — HEPARIN SODIUM (PORCINE) LOCK FLUSH IV SOLN 100 UNIT/ML 100 UNIT/ML
5 SOLUTION INTRAVENOUS ONCE
Status: COMPLETED | OUTPATIENT
Start: 2020-12-11 | End: 2020-12-11

## 2020-12-11 RX ORDER — IOPAMIDOL 755 MG/ML
70 INJECTION, SOLUTION INTRAVASCULAR ONCE
Status: COMPLETED | OUTPATIENT
Start: 2020-12-11 | End: 2020-12-11

## 2020-12-11 RX ORDER — HEPARIN SODIUM (PORCINE) LOCK FLUSH IV SOLN 100 UNIT/ML 100 UNIT/ML
5 SOLUTION INTRAVENOUS DAILY PRN
Status: DISCONTINUED | OUTPATIENT
Start: 2020-12-11 | End: 2020-12-19 | Stop reason: HOSPADM

## 2020-12-11 RX ADMIN — IOPAMIDOL 70 ML: 755 INJECTION, SOLUTION INTRAVASCULAR at 09:30

## 2020-12-11 RX ADMIN — Medication 5 ML: at 09:08

## 2020-12-11 RX ADMIN — HEPARIN SODIUM (PORCINE) LOCK FLUSH IV SOLN 100 UNIT/ML 5 ML: 100 SOLUTION at 10:11

## 2020-12-11 ASSESSMENT — PAIN SCALES - GENERAL: PAINLEVEL: NO PAIN (0)

## 2020-12-11 NOTE — NURSING NOTE
Chief Complaint   Patient presents with     Port Draw     Labs drawn via port by RN in lab. VS taken.    Labs drawn via Port accessed using 20g flat needle. Line flushed and Heparin locked.     Suzanne Vyas RN

## 2020-12-13 NOTE — PROGRESS NOTES
Oncology Note - Video/Phone Visit     Emelia Weathers MRN# [id number]   Age: 65 year old YOB: 1955     Disease: Recurrent pancreatic cancer  Treatment:       Interval History:   Emelia Weathers  who is being evaluated via a telephone/video visit due to COVID-19 pandemic.      She reported feeling good without any complains. Otherwise her appetite is good and gained some weight.No N/V/D. No chest pain or SOB. No urine issues or legs swelling. No pain. Some hand-foot syndrome ( redness, itchy and tide skin[ sclerodermic features]).No tingling or numbness.  No other significant past medical history or family history.     Remainder of her 10 point review of systems is otherwise negative.      Oncology History:   A 65 year old with pancreatic cancer s/p Whipple surgery many years ago in 7 years ago and had a biopsy proved local recurrence in the resection bed encasing her SMA.  She has had complete control of her disease with Xeloda now for more than 5 years.  - She has very mild cytopenias as expected with her chemotherapy.  She is a CA-19-9 nonsecretor.  On her CT scan she has changes of fat stranding in her resection bed and small but excess in number retroperitoneal and mesenteric lymph nodes all of which is stable over her last number of scans.      Medications: reviewed.  Current Outpatient Medications   Medication     acetaminophen (TYLENOL) 325 MG tablet     Ascorbic Acid (VITAMIN C PO)     CALCIUM CITRATE + D PO     capecitabine (XELODA) 500 MG tablet     CREON 27751 units CPEP per EC capsule     lidocaine-prilocaine (EMLA) 2.5-2.5 % external cream     LORazepam (ATIVAN) 0.5 MG tablet     MELATONIN PO     MULTIVITAMIN TABS   OR     pantoprazole (PROTONIX) 40 MG EC tablet     diphenoxylate-atropine (LOMOTIL) 2.5-0.025 MG per tablet     magic mouthwash (ENTER INGREDIENTS IN COMMENTS) suspension     ondansetron (ZOFRAN) 8 MG tablet     No current facility-administered medications for this visit.       Facility-Administered Medications Ordered in Other Visits   Medication     heparin 100 UNIT/ML injection 5 mL     heparin 100 UNIT/ML injection 5 mL     heparin 100 UNIT/ML injection 5 mL     heparin 100 UNIT/ML injection 5 mL     sodium chloride (PF) 0.9% PF flush 10 mL            Physical Exam:   Over the video/phone,   General:  in no acute distress.  Skin: not  jaundice, cyanosis, erythema, or ecchymoses on exposed surfaces.  Respiratory: Breathing comfortably and no audible wheezing.   Neurologic: A&O x 3, speech normal.         Data:   I have personally reviewed the following labs/imaging:  Recent Labs   Lab Test 12/11/20  0902 11/17/20  1300 10/23/20  1424   WBC 4.5 5.0 4.5   RBC 3.31* 3.11* 3.31*   HGB 10.8* 10.3* 10.9*   HCT 33.0* 31.4* 32.9*    101* 99   MCH 32.6 33.1* 32.9   MCHC 32.7 32.8 33.1   RDW 20.5* 19.9* 19.0*    192 181   NEUTROPHIL 64.4 67.6 60.7     Recent Labs   Lab Test 12/11/20  0902 11/17/20  1300 10/23/20  1424    141 139   POTASSIUM 3.9 3.7 3.9   CHLORIDE 106 110* 106   CO2 26 27 27   ANIONGAP 6 4 6   * 117* 79   BUN 8 9 10   CR 0.65 0.70 0.76   LEONOR 8.7 8.5 8.9     Recent Labs   Lab Test 12/11/20  0902 11/17/20  1300 10/23/20  1424   PROTTOTAL 7.1 6.8 7.1   ALBUMIN 3.5 3.4 3.8   BILITOTAL 0.4 0.6 0.5   ALKPHOS 126 116 134   AST 29 32 58*   ALT 28 28 46       Cancer antigen     Images:   CT CAP 12/11/2020   IMPRESSION: In this patient with history of pancreatic adenocarcinoma status post Whipple procedure:  1. No convincing evidence for recurrent or metastatic disease in the chest, abdomen, pelvis and bones.  2. Stable sub-6 mm pulmonary nodules bilaterally. No new or enlarging pulmonary nodules.  3. Stable mesenteric root fat stranding with prominent mesenteric lymph nodes.  4. Hepatic steatosis. No suspicious liver lesions.  5. Additional incidental findings as described above.         Assessment and Plan:   Emelia Weathers is a 65 year old female with  locally recurrent pancreatic cancer without evidence of disease progression on ongoing capecitabine.  She has mild toxicity with hand-foot syndrome, suspicious of scleroderma-like changes in her hands. Recent CT scan showed no convincing evidence for recurrent or metastatic disease with stable small pulmonary nodules bilaterally and mesenteric root fat stranding.     We had our discussion once again that now that she is more than 5 years out with out evidence of active disease whether we needed to continue with the capecitabine or not.    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 Also it increased lifetime risk of colon cancer may be twice as high as the general population risk of colon cancer of 5%.   - Consider having breast and colon cancer screening after the pandemic. Xeloda has efficacy on both colon and breast cancer. Patient denies any symptoms suggestive to these cancers.      Mild macrocytic anemia  - Stable likely related to Xeloda   - Vitamin B9 and B12 normal in 9/2013     Recommendations:  - Continue Xeloda with the same dose and schedule   - Reevaluate her tumor status in another 4 months.     My attending (Dr. Wade) and I have participated in reviewing patient's history, labs/images and treatment options.       Eliezer Almeida M.D.   Heme/Onc Fellow  Pager: [phone]    12/14/2020    Attending note: I saw the patient in conjunction with the fellow and agree with the above. She continues to have remarkable control of her cancer. 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 But finds that a worthwhile tradeoff with the cancer control and wishes tho continue her current therapy. She is doing well enough, it is not unreasonable to consider screening for breast and colon  cancer given her increased risk.

## 2020-12-14 ENCOUNTER — VIRTUAL VISIT (OUTPATIENT)
Dept: ONCOLOGY | Facility: CLINIC | Age: 65
End: 2020-12-14
Attending: INTERNAL MEDICINE
Payer: MEDICARE

## 2020-12-14 DIAGNOSIS — C25.0 MALIGNANT NEOPLASM OF HEAD OF PANCREAS (H): Primary | ICD-10-CM

## 2020-12-14 PROCEDURE — 99214 OFFICE O/P EST MOD 30 MIN: CPT | Mod: 95 | Performed by: INTERNAL MEDICINE

## 2020-12-14 NOTE — PROGRESS NOTES
"Emelia Weathers is a 65 year old female who is being evaluated via a billable video visit.      The patient has been notified of following:     \"This video visit will be conducted via a call between you and your physician/provider. We have found that certain health care needs can be provided without the need for an in-person physical exam.  This service lets us provide the care you need with a video conversation.  If a prescription is necessary we can send it directly to your pharmacy.  If lab work is needed we can place an order for that and you can then stop by our lab to have the test done at a later time.    Video visits are billed at different rates depending on your insurance coverage.  Please reach out to your insurance provider with any questions.    If during the course of the call the physician/provider feels a video visit is not appropriate, you will not be charged for this service.\"    Patient has given verbal consent for Video visit? Yes  How would you like to obtain your AVS? MyChart  If you are dropped from the video visit, the video invite should be resent to: Text to cell phone: [phone]  Will anyone else be joining your video visit? Yes: Gardenia Lockhart . How would they like to receive their invitation? Text to cell phone: [phone]      Patient needs refills for Pantoprazole, Creon, Emla cream.     Melissa BENAVIDES      Video-Visit Details    Type of service:  Video Visit    Video Start Time: 7:15  Video End Time: 7:445    Originating Location (pt. Location): Home    Distant Location (provider location):  [facility]     Platform used for Video Visit: Mamie"

## 2020-12-14 NOTE — LETTER
12/14/2020         RE: Emelia Weathers  [address]  [place] [address]        Dear Colleague,    Thank you for referring your patient, Emelia Weathers, to the [facility]. Please see a copy of my visit note below.    Oncology Note - Video/Phone Visit     Emelia Weathers MRN# [id number]   Age: 65 year old YOB: 1955     Disease: Recurrent pancreatic cancer  Treatment:       Interval History:   Emelia Weathers  who is being evaluated via a telephone/video visit due to COVID-19 pandemic.      She reported feeling good without any complains. Otherwise her appetite is good and gained some weight.No N/V/D. No chest pain or SOB. No urine issues or legs swelling. No pain. Some hand-foot syndrome ( redness, itchy and tide skin[ sclerodermic features]).No tingling or numbness.  No other significant past medical history or family history.     Remainder of her 10 point review of systems is otherwise negative.      Oncology History:   A 65 year old with pancreatic cancer s/p Whipple surgery many years ago in 7 years ago and had a biopsy proved local recurrence in the resection bed encasing her SMA.  She has had complete control of her disease with Xeloda now for more than 5 years.  - She has very mild cytopenias as expected with her chemotherapy.  She is a CA-19-9 nonsecretor.  On her CT scan she has changes of fat stranding in her resection bed and small but excess in number retroperitoneal and mesenteric lymph nodes all of which is stable over her last number of scans.      Medications: reviewed.  Current Outpatient Medications   Medication     acetaminophen (TYLENOL) 325 MG tablet     Ascorbic Acid (VITAMIN C PO)     CALCIUM CITRATE + D PO     capecitabine (XELODA) 500 MG tablet     CREON 98932 units CPEP per EC capsule     lidocaine-prilocaine (EMLA) 2.5-2.5 % external cream     LORazepam (ATIVAN) 0.5 MG tablet     MELATONIN PO     MULTIVITAMIN TABS   OR      pantoprazole (PROTONIX) 40 MG EC tablet     diphenoxylate-atropine (LOMOTIL) 2.5-0.025 MG per tablet     magic mouthwash (ENTER INGREDIENTS IN COMMENTS) suspension     ondansetron (ZOFRAN) 8 MG tablet     No current facility-administered medications for this visit.      Facility-Administered Medications Ordered in Other Visits   Medication     heparin 100 UNIT/ML injection 5 mL     heparin 100 UNIT/ML injection 5 mL     heparin 100 UNIT/ML injection 5 mL     heparin 100 UNIT/ML injection 5 mL     sodium chloride (PF) 0.9% PF flush 10 mL            Physical Exam:   Over the video/phone,   General:  in no acute distress.  Skin: not  jaundice, cyanosis, erythema, or ecchymoses on exposed surfaces.  Respiratory: Breathing comfortably and no audible wheezing.   Neurologic: A&O x 3, speech normal.         Data:   I have personally reviewed the following labs/imaging:  Recent Labs   Lab Test 12/11/20  0902 11/17/20  1300 10/23/20  1424   WBC 4.5 5.0 4.5   RBC 3.31* 3.11* 3.31*   HGB 10.8* 10.3* 10.9*   HCT 33.0* 31.4* 32.9*    101* 99   MCH 32.6 33.1* 32.9   MCHC 32.7 32.8 33.1   RDW 20.5* 19.9* 19.0*    192 181   NEUTROPHIL 64.4 67.6 60.7     Recent Labs   Lab Test 12/11/20  0902 11/17/20  1300 10/23/20  1424    141 139   POTASSIUM 3.9 3.7 3.9   CHLORIDE 106 110* 106   CO2 26 27 27   ANIONGAP 6 4 6   * 117* 79   BUN 8 9 10   CR 0.65 0.70 0.76   LEONOR 8.7 8.5 8.9     Recent Labs   Lab Test 12/11/20  0902 11/17/20  1300 10/23/20  1424   PROTTOTAL 7.1 6.8 7.1   ALBUMIN 3.5 3.4 3.8   BILITOTAL 0.4 0.6 0.5   ALKPHOS 126 116 134   AST 29 32 58*   ALT 28 28 46       Cancer antigen     Images:   CT CAP 12/11/2020   IMPRESSION: In this patient with history of pancreatic adenocarcinoma status post Whipple procedure:  1. No convincing evidence for recurrent or metastatic disease in the chest, abdomen, pelvis and bones.  2. Stable sub-6 mm pulmonary nodules bilaterally. No new or enlarging pulmonary  nodules.  3. Stable mesenteric root fat stranding with prominent mesenteric lymph nodes.  4. Hepatic steatosis. No suspicious liver lesions.  5. Additional incidental findings as described above.         Assessment and Plan:   Emelia Weathers is a 65 year old female with locally recurrent pancreatic cancer without evidence of disease progression on ongoing capecitabine.  She has mild toxicity with hand-foot syndrome, suspicious of scleroderma-like changes in her hands. Recent CT scan showed no convincing evidence for recurrent or metastatic disease with stable small pulmonary nodules bilaterally and mesenteric root fat stranding.     We had our discussion once again that now that she is more than 5 years out with out evidence of active disease whether we needed to continue with the capecitabine or not.    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 Also it increased lifetime risk of colon cancer may be twice as high as the general population risk of colon cancer of 5%.   - Consider having breast and colon cancer screening after the pandemic. Xeloda has efficacy on both colon and breast cancer. Patient denies any symptoms suggestive to these cancers.      Mild macrocytic anemia  - Stable likely related to Xeloda   - Vitamin B9 and B12 normal in 9/2013     Recommendations:  - Continue Xeloda with the same dose and schedule   - Reevaluate her tumor status in another 4 months.     My attending (Dr. Wade) and I have participated in reviewing patient's history, labs/images and treatment options.       Eliezer Almeida M.D.   Heme/Onc Fellow  Pager: [phone]    12/14/2020    Attending note: I saw the patient in conjunction with the fellow and agree with the above. She continues to have remarkable control of  "her cancer. 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 But finds that a worthwhile tradeoff with the cancer control and wishes tho continue her current therapy. She is doing well enough, it is not unreasonable to consider screening for breast and colon cancer given her increased risk.      Emelia Weathers is a 65 year old female who is being evaluated via a billable video visit.      The patient has been notified of following:     \"This video visit will be conducted via a call between you and your physician/provider. We have found that certain health care needs can be provided without the need for an in-person physical exam.  This service lets us provide the care you need with a video conversation.  If a prescription is necessary we can send it directly to your pharmacy.  If lab work is needed we can place an order for that and you can then stop by our lab to have the test done at a later time.    Video visits are billed at different rates depending on your insurance coverage.  Please reach out to your insurance provider with any questions.    If during the course of the call the physician/provider feels a video visit is not appropriate, you will not be charged for this service.\"    Patient has given verbal consent for Video visit? Yes  How would you like to obtain your AVS? MyChart  If you are dropped from the video visit, the video invite should be resent to: Text to cell phone: [phone]  Will anyone else be joining your video visit? Yes: Gardenia Lockhart . How would they like to receive their invitation? Text to cell phone: [phone]      Patient needs refills for Pantoprazole, Creon, Emla cream.     Melissa BENAVIDES      Video-Visit Details    Type of service:  Video Visit    Video Start Time: 7:15  Video End Time: 7:445    Originating Location (pt. Location): Home    Distant Location (provider location):  [facility]     Platform used for Video Visit: " Mamie          Again, thank you for allowing me to participate in the care of your patient.        Sincerely,        Yasmany Wade MD

## 2020-12-22 DIAGNOSIS — C25.0 MALIGNANT NEOPLASM OF HEAD OF PANCREAS (H): ICD-10-CM

## 2020-12-23 DIAGNOSIS — C25.0 MALIGNANT NEOPLASM OF HEAD OF PANCREAS (H): ICD-10-CM

## 2020-12-23 RX ORDER — PANCRELIPASE 60000; 12000; 38000 [USP'U]/1; [USP'U]/1; [USP'U]/1
CAPSULE, DELAYED RELEASE PELLETS ORAL
Qty: 540 CAPSULE | Refills: 0 | Status: SHIPPED | OUTPATIENT
Start: 2020-12-23 | End: 2021-06-22

## 2020-12-24 RX ORDER — PANTOPRAZOLE SODIUM 40 MG/1
TABLET, DELAYED RELEASE ORAL
Qty: 90 TABLET | Refills: 0 | Status: SHIPPED | OUTPATIENT
Start: 2020-12-24 | End: 2021-03-31

## 2021-01-12 DIAGNOSIS — C25.0 MALIGNANT NEOPLASM OF HEAD OF PANCREAS (H): Primary | ICD-10-CM

## 2021-01-12 LAB
ALBUMIN SERPL-MCNC: 3.5 G/DL (ref 3.4–5)
ALP SERPL-CCNC: 148 U/L (ref 40–150)
ALT SERPL W P-5'-P-CCNC: 47 U/L (ref 0–50)
ANION GAP SERPL CALCULATED.3IONS-SCNC: 2 MMOL/L (ref 3–14)
AST SERPL W P-5'-P-CCNC: 43 U/L (ref 0–45)
BASOPHILS # BLD AUTO: 0 10E9/L (ref 0–0.2)
BASOPHILS NFR BLD AUTO: 0.4 %
BILIRUB SERPL-MCNC: 0.5 MG/DL (ref 0.2–1.3)
BUN SERPL-MCNC: 10 MG/DL (ref 7–30)
CALCIUM SERPL-MCNC: 9 MG/DL (ref 8.5–10.1)
CHLORIDE SERPL-SCNC: 110 MMOL/L (ref 94–109)
CO2 SERPL-SCNC: 30 MMOL/L (ref 20–32)
CREAT SERPL-MCNC: 0.78 MG/DL (ref 0.52–1.04)
DIFFERENTIAL METHOD BLD: ABNORMAL
EOSINOPHIL # BLD AUTO: 0 10E9/L (ref 0–0.7)
EOSINOPHIL NFR BLD AUTO: 0.8 %
ERYTHROCYTE [DISTWIDTH] IN BLOOD BY AUTOMATED COUNT: 19.4 % (ref 10–15)
GFR SERPL CREATININE-BSD FRML MDRD: 78 ML/MIN/{1.73_M2}
GLUCOSE SERPL-MCNC: 84 MG/DL (ref 70–99)
HCT VFR BLD AUTO: 33.7 % (ref 35–47)
HGB BLD-MCNC: 11 G/DL (ref 11.7–15.7)
IMM GRANULOCYTES # BLD: 0 10E9/L (ref 0–0.4)
IMM GRANULOCYTES NFR BLD: 0.2 %
LYMPHOCYTES # BLD AUTO: 1.3 10E9/L (ref 0.8–5.3)
LYMPHOCYTES NFR BLD AUTO: 26 %
MCH RBC QN AUTO: 32.2 PG (ref 26.5–33)
MCHC RBC AUTO-ENTMCNC: 32.6 G/DL (ref 31.5–36.5)
MCV RBC AUTO: 99 FL (ref 78–100)
MONOCYTES # BLD AUTO: 0.4 10E9/L (ref 0–1.3)
MONOCYTES NFR BLD AUTO: 8.6 %
NEUTROPHILS # BLD AUTO: 3.3 10E9/L (ref 1.6–8.3)
NEUTROPHILS NFR BLD AUTO: 64 %
NRBC # BLD AUTO: 0 10*3/UL
NRBC BLD AUTO-RTO: 0 /100
PLATELET # BLD AUTO: 173 10E9/L (ref 150–450)
POTASSIUM SERPL-SCNC: 4.2 MMOL/L (ref 3.4–5.3)
PROT SERPL-MCNC: 6.9 G/DL (ref 6.8–8.8)
RBC # BLD AUTO: 3.42 10E12/L (ref 3.8–5.2)
SODIUM SERPL-SCNC: 142 MMOL/L (ref 133–144)
WBC # BLD AUTO: 5.1 10E9/L (ref 4–11)

## 2021-01-12 PROCEDURE — 85025 COMPLETE CBC W/AUTO DIFF WBC: CPT | Performed by: INTERNAL MEDICINE

## 2021-01-12 PROCEDURE — 250N000011 HC RX IP 250 OP 636: Performed by: INTERNAL MEDICINE

## 2021-01-12 PROCEDURE — 80053 COMPREHEN METABOLIC PANEL: CPT | Performed by: INTERNAL MEDICINE

## 2021-01-12 PROCEDURE — 36591 DRAW BLOOD OFF VENOUS DEVICE: CPT

## 2021-01-12 RX ORDER — HEPARIN SODIUM (PORCINE) LOCK FLUSH IV SOLN 100 UNIT/ML 100 UNIT/ML
5 SOLUTION INTRAVENOUS ONCE
Status: COMPLETED | OUTPATIENT
Start: 2021-01-12 | End: 2021-01-12

## 2021-01-12 RX ADMIN — Medication 5 ML: at 12:51

## 2021-01-12 NOTE — NURSING NOTE
Chief Complaint   Patient presents with     Port Draw     Lab only visit, collected via portacath with gripper needle by lab Rn.  Hx pancreatic CA.

## 2021-02-09 DIAGNOSIS — C25.0 MALIGNANT NEOPLASM OF HEAD OF PANCREAS (H): Primary | ICD-10-CM

## 2021-02-09 RX ORDER — CAPECITABINE 500 MG/1
TABLET, FILM COATED ORAL
Qty: 42 TABLET | Refills: 3 | Status: SHIPPED | OUTPATIENT
Start: 2021-02-09 | End: 2021-05-03

## 2021-02-10 DIAGNOSIS — C25.0 MALIGNANT NEOPLASM OF HEAD OF PANCREAS (H): ICD-10-CM

## 2021-02-10 LAB
ALBUMIN SERPL-MCNC: 3.6 G/DL (ref 3.4–5)
ALP SERPL-CCNC: 179 U/L (ref 40–150)
ALT SERPL W P-5'-P-CCNC: 34 U/L (ref 0–50)
ANION GAP SERPL CALCULATED.3IONS-SCNC: 5 MMOL/L (ref 3–14)
AST SERPL W P-5'-P-CCNC: 30 U/L (ref 0–45)
BASOPHILS # BLD AUTO: 0 10E9/L (ref 0–0.2)
BASOPHILS NFR BLD AUTO: 0.4 %
BILIRUB SERPL-MCNC: 0.4 MG/DL (ref 0.2–1.3)
BUN SERPL-MCNC: 9 MG/DL (ref 7–30)
CALCIUM SERPL-MCNC: 8.9 MG/DL (ref 8.5–10.1)
CHLORIDE SERPL-SCNC: 110 MMOL/L (ref 94–109)
CO2 SERPL-SCNC: 27 MMOL/L (ref 20–32)
CREAT SERPL-MCNC: 0.72 MG/DL (ref 0.52–1.04)
DIFFERENTIAL METHOD BLD: ABNORMAL
EOSINOPHIL # BLD AUTO: 0 10E9/L (ref 0–0.7)
EOSINOPHIL NFR BLD AUTO: 0.8 %
ERYTHROCYTE [DISTWIDTH] IN BLOOD BY AUTOMATED COUNT: 19.9 % (ref 10–15)
GFR SERPL CREATININE-BSD FRML MDRD: 86 ML/MIN/{1.73_M2}
GLUCOSE SERPL-MCNC: 86 MG/DL (ref 70–99)
HCT VFR BLD AUTO: 31.4 % (ref 35–47)
HGB BLD-MCNC: 10.3 G/DL (ref 11.7–15.7)
IMM GRANULOCYTES # BLD: 0 10E9/L (ref 0–0.4)
IMM GRANULOCYTES NFR BLD: 0.2 %
LYMPHOCYTES # BLD AUTO: 1.3 10E9/L (ref 0.8–5.3)
LYMPHOCYTES NFR BLD AUTO: 27.9 %
MCH RBC QN AUTO: 32.4 PG (ref 26.5–33)
MCHC RBC AUTO-ENTMCNC: 32.8 G/DL (ref 31.5–36.5)
MCV RBC AUTO: 99 FL (ref 78–100)
MONOCYTES # BLD AUTO: 0.4 10E9/L (ref 0–1.3)
MONOCYTES NFR BLD AUTO: 7.8 %
NEUTROPHILS # BLD AUTO: 3 10E9/L (ref 1.6–8.3)
NEUTROPHILS NFR BLD AUTO: 62.9 %
NRBC # BLD AUTO: 0 10*3/UL
NRBC BLD AUTO-RTO: 0 /100
PLATELET # BLD AUTO: 231 10E9/L (ref 150–450)
POTASSIUM SERPL-SCNC: 4 MMOL/L (ref 3.4–5.3)
PROT SERPL-MCNC: 6.9 G/DL (ref 6.8–8.8)
RBC # BLD AUTO: 3.18 10E12/L (ref 3.8–5.2)
SODIUM SERPL-SCNC: 142 MMOL/L (ref 133–144)
WBC # BLD AUTO: 4.8 10E9/L (ref 4–11)

## 2021-02-10 PROCEDURE — 80053 COMPREHEN METABOLIC PANEL: CPT | Performed by: INTERNAL MEDICINE

## 2021-02-10 PROCEDURE — 85025 COMPLETE CBC W/AUTO DIFF WBC: CPT | Performed by: INTERNAL MEDICINE

## 2021-02-10 PROCEDURE — 250N000011 HC RX IP 250 OP 636: Performed by: INTERNAL MEDICINE

## 2021-02-10 RX ORDER — HEPARIN SODIUM (PORCINE) LOCK FLUSH IV SOLN 100 UNIT/ML 100 UNIT/ML
5 SOLUTION INTRAVENOUS ONCE
Status: COMPLETED | OUTPATIENT
Start: 2021-02-10 | End: 2021-02-10

## 2021-02-10 RX ADMIN — Medication 5 ML: at 13:30

## 2021-02-10 NOTE — NURSING NOTE
"Chief Complaint   Patient presents with     Port Draw     Labs drawn via port by RN.     Port accessed with 20g 3/4\" gripper needle and labs drawn by rn.  Port flushed with NS and heparin.  Pt tolerated well.  De-accessed.    Bird Clifton RN  "

## 2021-03-11 DIAGNOSIS — C25.0 MALIGNANT NEOPLASM OF HEAD OF PANCREAS (H): Primary | ICD-10-CM

## 2021-03-11 LAB
ALBUMIN SERPL-MCNC: 3.6 G/DL (ref 3.4–5)
ALP SERPL-CCNC: 180 U/L (ref 40–150)
ALT SERPL W P-5'-P-CCNC: 30 U/L (ref 0–50)
ANION GAP SERPL CALCULATED.3IONS-SCNC: 4 MMOL/L (ref 3–14)
AST SERPL W P-5'-P-CCNC: 29 U/L (ref 0–45)
BASOPHILS # BLD AUTO: 0 10E9/L (ref 0–0.2)
BASOPHILS NFR BLD AUTO: 0.4 %
BILIRUB SERPL-MCNC: 0.4 MG/DL (ref 0.2–1.3)
BUN SERPL-MCNC: 9 MG/DL (ref 7–30)
CALCIUM SERPL-MCNC: 8.6 MG/DL (ref 8.5–10.1)
CHLORIDE SERPL-SCNC: 109 MMOL/L (ref 94–109)
CO2 SERPL-SCNC: 28 MMOL/L (ref 20–32)
CREAT SERPL-MCNC: 0.74 MG/DL (ref 0.52–1.04)
DIFFERENTIAL METHOD BLD: ABNORMAL
EOSINOPHIL # BLD AUTO: 0 10E9/L (ref 0–0.7)
EOSINOPHIL NFR BLD AUTO: 0.4 %
ERYTHROCYTE [DISTWIDTH] IN BLOOD BY AUTOMATED COUNT: 19.1 % (ref 10–15)
GFR SERPL CREATININE-BSD FRML MDRD: 83 ML/MIN/{1.73_M2}
GLUCOSE SERPL-MCNC: 77 MG/DL (ref 70–99)
HCT VFR BLD AUTO: 34.3 % (ref 35–47)
HGB BLD-MCNC: 11.2 G/DL (ref 11.7–15.7)
IMM GRANULOCYTES # BLD: 0 10E9/L (ref 0–0.4)
IMM GRANULOCYTES NFR BLD: 0.3 %
LYMPHOCYTES # BLD AUTO: 0.9 10E9/L (ref 0.8–5.3)
LYMPHOCYTES NFR BLD AUTO: 12.6 %
MCH RBC QN AUTO: 32.5 PG (ref 26.5–33)
MCHC RBC AUTO-ENTMCNC: 32.7 G/DL (ref 31.5–36.5)
MCV RBC AUTO: 99 FL (ref 78–100)
MONOCYTES # BLD AUTO: 0.5 10E9/L (ref 0–1.3)
MONOCYTES NFR BLD AUTO: 7.1 %
NEUTROPHILS # BLD AUTO: 5.4 10E9/L (ref 1.6–8.3)
NEUTROPHILS NFR BLD AUTO: 79.2 %
NRBC # BLD AUTO: 0 10*3/UL
NRBC BLD AUTO-RTO: 0 /100
PLATELET # BLD AUTO: 217 10E9/L (ref 150–450)
POTASSIUM SERPL-SCNC: 4.1 MMOL/L (ref 3.4–5.3)
PROT SERPL-MCNC: 7.3 G/DL (ref 6.8–8.8)
RBC # BLD AUTO: 3.45 10E12/L (ref 3.8–5.2)
SODIUM SERPL-SCNC: 141 MMOL/L (ref 133–144)
WBC # BLD AUTO: 6.8 10E9/L (ref 4–11)

## 2021-03-11 PROCEDURE — 85025 COMPLETE CBC W/AUTO DIFF WBC: CPT | Performed by: INTERNAL MEDICINE

## 2021-03-11 PROCEDURE — 80053 COMPREHEN METABOLIC PANEL: CPT | Performed by: INTERNAL MEDICINE

## 2021-03-26 DIAGNOSIS — C25.0 MALIGNANT NEOPLASM OF HEAD OF PANCREAS (H): ICD-10-CM

## 2021-03-31 NOTE — TELEPHONE ENCOUNTER
Last OV 12/14/20, next follow-up 4/12/21  Per last OV notes: med not discussed  Routed to provider for approval

## 2021-04-03 ENCOUNTER — HEALTH MAINTENANCE LETTER (OUTPATIENT)
Age: 66
End: 2021-04-03

## 2021-04-04 RX ORDER — PANTOPRAZOLE SODIUM 40 MG/1
TABLET, DELAYED RELEASE ORAL
Qty: 90 TABLET | Refills: 3 | Status: SHIPPED | OUTPATIENT
Start: 2021-04-04 | End: 2022-04-20

## 2021-04-09 ENCOUNTER — ANCILLARY PROCEDURE (OUTPATIENT)
Dept: CT IMAGING | Facility: CLINIC | Age: 66
End: 2021-04-09
Attending: INTERNAL MEDICINE
Payer: MEDICARE

## 2021-04-09 DIAGNOSIS — C25.0 MALIGNANT NEOPLASM OF HEAD OF PANCREAS (H): Primary | ICD-10-CM

## 2021-04-09 DIAGNOSIS — C25.0 MALIGNANT NEOPLASM OF HEAD OF PANCREAS (H): ICD-10-CM

## 2021-04-09 LAB
ALBUMIN SERPL-MCNC: 3.5 G/DL (ref 3.4–5)
ALP SERPL-CCNC: 168 U/L (ref 40–150)
ALT SERPL W P-5'-P-CCNC: 35 U/L (ref 0–50)
ANION GAP SERPL CALCULATED.3IONS-SCNC: 6 MMOL/L (ref 3–14)
AST SERPL W P-5'-P-CCNC: 40 U/L (ref 0–45)
BILIRUB SERPL-MCNC: 0.5 MG/DL (ref 0.2–1.3)
BUN SERPL-MCNC: 13 MG/DL (ref 7–30)
CALCIUM SERPL-MCNC: 9 MG/DL (ref 8.5–10.1)
CHLORIDE SERPL-SCNC: 106 MMOL/L (ref 94–109)
CO2 SERPL-SCNC: 28 MMOL/L (ref 20–32)
CREAT SERPL-MCNC: 0.83 MG/DL (ref 0.52–1.04)
DIFFERENTIAL METHOD BLD: ABNORMAL
EOSINOPHIL # BLD AUTO: 0.1 10E9/L (ref 0–0.7)
EOSINOPHIL NFR BLD AUTO: 1 %
ERYTHROCYTE [DISTWIDTH] IN BLOOD BY AUTOMATED COUNT: 19.3 % (ref 10–15)
GFR SERPL CREATININE-BSD FRML MDRD: 72 ML/MIN/{1.73_M2}
GLUCOSE SERPL-MCNC: 102 MG/DL (ref 70–99)
HCT VFR BLD AUTO: 33.3 % (ref 35–47)
HGB BLD-MCNC: 10.7 G/DL (ref 11.7–15.7)
LYMPHOCYTES # BLD AUTO: 1.1 10E9/L (ref 0.8–5.3)
LYMPHOCYTES NFR BLD AUTO: 23 %
MCH RBC QN AUTO: 32.2 PG (ref 26.5–33)
MCHC RBC AUTO-ENTMCNC: 32.1 G/DL (ref 31.5–36.5)
MCV RBC AUTO: 100 FL (ref 78–100)
MONOCYTES # BLD AUTO: 0.4 10E9/L (ref 0–1.3)
MONOCYTES NFR BLD AUTO: 9 %
NEUTROPHILS # BLD AUTO: 3.3 10E9/L (ref 1.6–8.3)
NEUTROPHILS NFR BLD AUTO: 67 %
PLATELET # BLD AUTO: 178 10E9/L (ref 150–450)
POTASSIUM SERPL-SCNC: 4 MMOL/L (ref 3.4–5.3)
PROT SERPL-MCNC: 7 G/DL (ref 6.8–8.8)
RBC # BLD AUTO: 3.32 10E12/L (ref 3.8–5.2)
SODIUM SERPL-SCNC: 140 MMOL/L (ref 133–144)
WBC # BLD AUTO: 4.9 10E9/L (ref 4–11)

## 2021-04-09 PROCEDURE — 80053 COMPREHEN METABOLIC PANEL: CPT | Performed by: INTERNAL MEDICINE

## 2021-04-09 PROCEDURE — G1004 CDSM NDSC: HCPCS | Performed by: RADIOLOGY

## 2021-04-09 PROCEDURE — 71260 CT THORAX DX C+: CPT | Mod: MG | Performed by: RADIOLOGY

## 2021-04-09 PROCEDURE — 85025 COMPLETE CBC W/AUTO DIFF WBC: CPT | Performed by: INTERNAL MEDICINE

## 2021-04-09 PROCEDURE — 250N000011 HC RX IP 250 OP 636: Performed by: INTERNAL MEDICINE

## 2021-04-09 PROCEDURE — 74177 CT ABD & PELVIS W/CONTRAST: CPT | Mod: MG | Performed by: RADIOLOGY

## 2021-04-09 RX ORDER — IOPAMIDOL 755 MG/ML
70 INJECTION, SOLUTION INTRAVASCULAR ONCE
Status: COMPLETED | OUTPATIENT
Start: 2021-04-09 | End: 2021-04-09

## 2021-04-09 RX ORDER — HEPARIN SODIUM (PORCINE) LOCK FLUSH IV SOLN 100 UNIT/ML 100 UNIT/ML
5 SOLUTION INTRAVENOUS ONCE
Status: COMPLETED | OUTPATIENT
Start: 2021-04-09 | End: 2021-04-09

## 2021-04-09 RX ADMIN — IOPAMIDOL 70 ML: 755 INJECTION, SOLUTION INTRAVASCULAR at 09:50

## 2021-04-09 RX ADMIN — Medication 5 ML: at 09:03

## 2021-04-09 RX ADMIN — HEPARIN SODIUM (PORCINE) LOCK FLUSH IV SOLN 100 UNIT/ML 5 ML: 100 SOLUTION at 10:02

## 2021-04-09 NOTE — NURSING NOTE
Chief Complaint   Patient presents with     Port Draw     Labs drawn via port by rn in lab.     Port accessed with 20g 3/4 inch power needle by RN, labs collected, line flushed with saline and heparin.     Romero Manley RN

## 2021-04-12 ENCOUNTER — ONCOLOGY VISIT (OUTPATIENT)
Dept: ONCOLOGY | Facility: CLINIC | Age: 66
End: 2021-04-12
Attending: INTERNAL MEDICINE
Payer: MEDICARE

## 2021-04-12 VITALS
HEART RATE: 73 BPM | DIASTOLIC BLOOD PRESSURE: 82 MMHG | OXYGEN SATURATION: 99 % | WEIGHT: 116.6 LBS | SYSTOLIC BLOOD PRESSURE: 160 MMHG | BODY MASS INDEX: 18.82 KG/M2

## 2021-04-12 DIAGNOSIS — C25.0 MALIGNANT NEOPLASM OF HEAD OF PANCREAS (H): Primary | ICD-10-CM

## 2021-04-12 PROCEDURE — G0463 HOSPITAL OUTPT CLINIC VISIT: HCPCS

## 2021-04-12 PROCEDURE — 99214 OFFICE O/P EST MOD 30 MIN: CPT | Performed by: INTERNAL MEDICINE

## 2021-04-12 ASSESSMENT — PAIN SCALES - GENERAL: PAINLEVEL: NO PAIN (0)

## 2021-04-12 NOTE — PROGRESS NOTES
I am seeing Tiana Weathers today in follow-up of recurrent pancreatic cancer.    She originally had a Whipple many years ago and then had a biopsy-proven local recurrence more than 5 years ago.  Her recurrence was in the resection bed and encasing her SMA.  She has had complete control of her disease with single agent Xeloda which has been complicated by the development of a scleroderma-like syndrome.  She is here today for ongoing response assessment.  Her appetite and energy continue to be excellent although she notes that she has a little bit of trouble keeping up with her daughter on long bike rides now.  She had 1 more episode with symptoms of a partial small bowel obstruction that resolved spontaneously since we saw her last.  She does not find the contractures and skin changes in her hands interfere with function and they do not seem to be getting any worse.  The remainder of her complete review of systems otherwise negative.    On physical exam she is alert and well-appearing.  She has no scleral icterus.  No adenopathy is palpable in the neck or supraclavicular spaces.  Her lungs are clear to auscultation littlest percussion.  Her heart rate and rhythm are regular without audible murmur gallop.  Her abdomen is soft and nontender without palpable mass organomegaly.  She has no peripheral edema and no tenderness in her calves or thighs.  She has no cyanosis or clubbing of her digits.  She has marked dry desquamation of her hands with modest contractures of her fingers that does not appear much different to me than previous.  Her speech is fluent and her cranial nerves are grossly intact.  Her gait and station are unremarkable.    I personally reviewed her CT scan and showed the images to the patient and her daughter.  She has some tiny lung nodules that are unchanged.  The fat stranding in her prior resection bed is unchanged.  She has in segment 5 of her liver a couple of areas of ill-defined early arterial  enhancement are not apparent on later phases of the scan.  Her electrolytes and renal function are normal.  Her bilirubin and albumin are normal.  Her alkaline phosphatase is mildly elevated as always and her transaminases are normal.  Her blood counts show mild anemia consistent with prior values.    Assessment/plan: Pancreatic cancer with longstanding local recurrence, now with changes in her liver that I do not think represent metastasis but it is uncertain based on her current imaging.  I would like to get an MRI scan to clarify the nature of lesion in her right lobe.  If this is suggestive of metastatic disease we will have her back to discuss next steps, if not we will continue our ongoing therapy with Xeloda and follow-up with another CT scan and evaluation in about 3 months.

## 2021-04-12 NOTE — NURSING NOTE
"Oncology Rooming Note    April 12, 2021 7:11 AM   Emelia Weathers is a 65 year old female who presents for:    Chief Complaint   Patient presents with     Oncology Clinic Visit     pancreatic cancer     Initial Vitals: BP (!) 160/82   Pulse 73   Wt 52.9 kg (116 lb 9.6 oz)   SpO2 99%   BMI 18.82 kg/m   Estimated body mass index is 18.82 kg/m  as calculated from the following:    Height as of 5/21/18: 1.676 m (5' 6\").    Weight as of this encounter: 52.9 kg (116 lb 9.6 oz). Body surface area is 1.57 meters squared.  No Pain (0) Comment: Data Unavailable   No LMP recorded. Patient is postmenopausal.  Allergies reviewed: Yes  Medications reviewed: Yes    Medications: MEDICATION REFILLS NEEDED TODAY. Provider was notified. Needs Creon, Lorazepam and Emla cream refilled  Pharmacy name entered into Android App Review Source:    [id number] [place], [place] - [address] [place] [address] PHARMACY [facility] [place] [facility] [address] [facility] [place], [place] [address] [place] [address] [address]  [id number] [place], [place] - [address] [place] [address] [facility] - [place], [place] - [address] [place] [address] AVE     Clinical concerns: none       Amy Kovacs CMA            "

## 2021-04-12 NOTE — LETTER
4/12/2021         RE: Emelia Weathers  [address]  [place] [address]      I am seeing Tiana Weathers today in follow-up of recurrent pancreatic cancer.    She originally had a Whipple many years ago and then had a biopsy-proven local recurrence more than 5 years ago.  Her recurrence was in the resection bed and encasing her SMA.  She has had complete control of her disease with single agent Xeloda which has been complicated by the development of a scleroderma-like syndrome.  She is here today for ongoing response assessment.  Her appetite and energy continue to be excellent although she notes that she has a little bit of trouble keeping up with her daughter on long bike rides now.  She had 1 more episode with symptoms of a partial small bowel obstruction that resolved spontaneously since we saw her last.  She does not find the contractures and skin changes in her hands interfere with function and they do not seem to be getting any worse.  The remainder of her complete review of systems otherwise negative.    On physical exam she is alert and well-appearing.  She has no scleral icterus.  No adenopathy is palpable in the neck or supraclavicular spaces.  Her lungs are clear to auscultation littlest percussion.  Her heart rate and rhythm are regular without audible murmur gallop.  Her abdomen is soft and nontender without palpable mass organomegaly.  She has no peripheral edema and no tenderness in her calves or thighs.  She has no cyanosis or clubbing of her digits.  She has marked dry desquamation of her hands with modest contractures of her fingers that does not appear much different to me than previous.  Her speech is fluent and her cranial nerves are grossly intact.  Her gait and station are unremarkable.    I personally reviewed her CT scan and showed the images to the patient and her daughter.  She has some tiny lung nodules that are unchanged.  The fat stranding in her prior resection bed is  unchanged.  She has in segment 5 of her liver a couple of areas of ill-defined early arterial enhancement are not apparent on later phases of the scan.  Her electrolytes and renal function are normal.  Her bilirubin and albumin are normal.  Her alkaline phosphatase is mildly elevated as always and her transaminases are normal.  Her blood counts show mild anemia consistent with prior values.    Assessment/plan: Pancreatic cancer with longstanding local recurrence, now with changes in her liver that I do not think represent metastasis but it is uncertain based on her current imaging.  I would like to get an MRI scan to clarify the nature of lesion in her right lobe.  If this is suggestive of metastatic disease we will have her back to discuss next steps, if not we will continue our ongoing therapy with Xeloda and follow-up with another CT scan and evaluation in about 3 months.        Yasmany Wade MD

## 2021-04-13 ENCOUNTER — ANCILLARY PROCEDURE (OUTPATIENT)
Dept: MRI IMAGING | Facility: CLINIC | Age: 66
End: 2021-04-13
Attending: INTERNAL MEDICINE
Payer: MEDICARE

## 2021-04-13 DIAGNOSIS — C25.0 MALIGNANT NEOPLASM OF HEAD OF PANCREAS (H): ICD-10-CM

## 2021-04-13 PROCEDURE — 74183 MRI ABD W/O CNTR FLWD CNTR: CPT | Mod: GC | Performed by: RADIOLOGY

## 2021-04-13 PROCEDURE — A9581 GADOXETATE DISODIUM INJ: HCPCS | Performed by: RADIOLOGY

## 2021-04-13 NOTE — DISCHARGE INSTRUCTIONS
MRI Contrast Discharge Instructions    The IV contrast you received today will pass out of your body in your  urine. This will happen in the next 24 hours. You will not feel this process.  Your urine will not change color.    Drink at least 4 extra glasses of water or juice today (unless your doctor  has restricted your fluids). This reduces the stress on your kidneys.  You may take your regular medicines.    If you are on dialysis: It is best to have dialysis today.    If you have a reaction: Most reactions happen right away. If you have  any new symptoms after leaving the hospital (such as hives or swelling),  call your hospital at the correct number below. Or call your family doctor.  If you have breathing distress or wheezing, call 911.    Special instructions: ***    I have read and understand the above information.    Signature:______________________________________ Date:___________    Staff:__________________________________________ Date:___________     Time:__________    [facility] Departments:    ___[place]: [phone]  ___[facility]: [phone]  ___[place]: [phone] ___[facility]: [phone]  ___[facility]: [phone]  ___[facility]: [phone]  ___[phone]  ___[facility]: [phone]  ___[phone]

## 2021-04-16 ENCOUNTER — PATIENT OUTREACH (OUTPATIENT)
Dept: ONCOLOGY | Facility: CLINIC | Age: 66
End: 2021-04-16

## 2021-04-16 DIAGNOSIS — C25.0 MALIGNANT NEOPLASM OF HEAD OF PANCREAS (H): Primary | ICD-10-CM

## 2021-04-16 NOTE — PROGRESS NOTES
RN Care Coordination Note  Incoming Call:   Received call from patient asking to review recent MRI and continued plan for follow-up.     Outgoing Call:   Placed return call to patient. Discussed RNCC will review with Dr Wade. Patient also questions if she should schedule mammogram and colonoscopy now that she has been fully vaccinated. Will follow-up with patient after reviewing with Dr Wade.     Cinthia Contreras RN, BSN, OCN   RN Care Coordinator   [facility]

## 2021-04-21 ENCOUNTER — PATIENT OUTREACH (OUTPATIENT)
Dept: ONCOLOGY | Facility: CLINIC | Age: 66
End: 2021-04-21

## 2021-04-21 DIAGNOSIS — Z12.31 ENCOUNTER FOR SCREENING MAMMOGRAM FOR MALIGNANT NEOPLASM OF BREAST: ICD-10-CM

## 2021-04-21 DIAGNOSIS — C25.0 MALIGNANT NEOPLASM OF HEAD OF PANCREAS (H): Primary | ICD-10-CM

## 2021-04-21 NOTE — PROGRESS NOTES
RN Care Coordination Note  Reviewed MRI results with Dr Wade. No concerns. Patient should plan to stay on chemo and follow-up in about 3 months. Ok to proceed with mammogram and colonoscopy.     Reviewed with patient. She voiced understanding and had no further questions or concerns at this time.     Cinthia Contreras RN, BSN, OCN   RN Care Coordinator   [facility]

## 2021-04-29 DIAGNOSIS — C25.0 MALIGNANT NEOPLASM OF HEAD OF PANCREAS (H): Primary | ICD-10-CM

## 2021-05-03 DIAGNOSIS — C25.0 MALIGNANT NEOPLASM OF HEAD OF PANCREAS (H): Primary | ICD-10-CM

## 2021-05-03 RX ORDER — CAPECITABINE 500 MG/1
TABLET, FILM COATED ORAL
Qty: 42 TABLET | Refills: 3 | Status: SHIPPED | OUTPATIENT
Start: 2021-05-03 | End: 2021-07-23

## 2021-05-11 ENCOUNTER — ANCILLARY PROCEDURE (OUTPATIENT)
Dept: MAMMOGRAPHY | Facility: CLINIC | Age: 66
End: 2021-05-11
Attending: INTERNAL MEDICINE
Payer: MEDICARE

## 2021-05-11 DIAGNOSIS — Z12.31 ENCOUNTER FOR SCREENING MAMMOGRAM FOR MALIGNANT NEOPLASM OF BREAST: ICD-10-CM

## 2021-05-11 DIAGNOSIS — C25.0 MALIGNANT NEOPLASM OF HEAD OF PANCREAS (H): ICD-10-CM

## 2021-05-11 LAB
ALBUMIN SERPL-MCNC: 3.7 G/DL (ref 3.4–5)
ALP SERPL-CCNC: 173 U/L (ref 40–150)
ALT SERPL W P-5'-P-CCNC: 45 U/L (ref 0–50)
ANION GAP SERPL CALCULATED.3IONS-SCNC: 5 MMOL/L (ref 3–14)
AST SERPL W P-5'-P-CCNC: 47 U/L (ref 0–45)
BASOPHILS # BLD AUTO: 0 10E9/L (ref 0–0.2)
BASOPHILS NFR BLD AUTO: 0.6 %
BILIRUB SERPL-MCNC: 0.5 MG/DL (ref 0.2–1.3)
BUN SERPL-MCNC: 11 MG/DL (ref 7–30)
CALCIUM SERPL-MCNC: 8.8 MG/DL (ref 8.5–10.1)
CHLORIDE SERPL-SCNC: 107 MMOL/L (ref 94–109)
CO2 SERPL-SCNC: 28 MMOL/L (ref 20–32)
CREAT SERPL-MCNC: 0.73 MG/DL (ref 0.52–1.04)
DIFFERENTIAL METHOD BLD: ABNORMAL
EOSINOPHIL # BLD AUTO: 0.1 10E9/L (ref 0–0.7)
EOSINOPHIL NFR BLD AUTO: 1.1 %
ERYTHROCYTE [DISTWIDTH] IN BLOOD BY AUTOMATED COUNT: 20.3 % (ref 10–15)
GFR SERPL CREATININE-BSD FRML MDRD: 85 ML/MIN/{1.73_M2}
GLUCOSE SERPL-MCNC: 94 MG/DL (ref 70–99)
HCT VFR BLD AUTO: 33.4 % (ref 35–47)
HGB BLD-MCNC: 10.7 G/DL (ref 11.7–15.7)
IMM GRANULOCYTES # BLD: 0 10E9/L (ref 0–0.4)
IMM GRANULOCYTES NFR BLD: 0.2 %
LYMPHOCYTES # BLD AUTO: 1.1 10E9/L (ref 0.8–5.3)
LYMPHOCYTES NFR BLD AUTO: 22.6 %
MCH RBC QN AUTO: 32.4 PG (ref 26.5–33)
MCHC RBC AUTO-ENTMCNC: 32 G/DL (ref 31.5–36.5)
MCV RBC AUTO: 101 FL (ref 78–100)
MONOCYTES # BLD AUTO: 0.4 10E9/L (ref 0–1.3)
MONOCYTES NFR BLD AUTO: 9 %
NEUTROPHILS # BLD AUTO: 3.1 10E9/L (ref 1.6–8.3)
NEUTROPHILS NFR BLD AUTO: 66.5 %
NRBC # BLD AUTO: 0 10*3/UL
NRBC BLD AUTO-RTO: 0 /100
PLATELET # BLD AUTO: 202 10E9/L (ref 150–450)
POTASSIUM SERPL-SCNC: 4.3 MMOL/L (ref 3.4–5.3)
PROT SERPL-MCNC: 7 G/DL (ref 6.8–8.8)
RBC # BLD AUTO: 3.3 10E12/L (ref 3.8–5.2)
SODIUM SERPL-SCNC: 140 MMOL/L (ref 133–144)
WBC # BLD AUTO: 4.7 10E9/L (ref 4–11)

## 2021-05-11 PROCEDURE — 77067 SCR MAMMO BI INCL CAD: CPT | Mod: GC | Performed by: STUDENT IN AN ORGANIZED HEALTH CARE EDUCATION/TRAINING PROGRAM

## 2021-05-11 PROCEDURE — 85025 COMPLETE CBC W/AUTO DIFF WBC: CPT | Performed by: INTERNAL MEDICINE

## 2021-05-11 PROCEDURE — 80053 COMPREHEN METABOLIC PANEL: CPT | Performed by: INTERNAL MEDICINE

## 2021-05-11 PROCEDURE — 250N000011 HC RX IP 250 OP 636: Performed by: INTERNAL MEDICINE

## 2021-05-11 PROCEDURE — 77063 BREAST TOMOSYNTHESIS BI: CPT | Mod: GC | Performed by: STUDENT IN AN ORGANIZED HEALTH CARE EDUCATION/TRAINING PROGRAM

## 2021-05-11 RX ORDER — HEPARIN SODIUM (PORCINE) LOCK FLUSH IV SOLN 100 UNIT/ML 100 UNIT/ML
5 SOLUTION INTRAVENOUS ONCE
Status: COMPLETED | OUTPATIENT
Start: 2021-05-11 | End: 2021-05-11

## 2021-05-11 RX ADMIN — Medication 5 ML: at 13:32

## 2021-05-11 NOTE — NURSING NOTE
Chief Complaint   Patient presents with     Port Draw     Labs drawn via port by RN in lab.      Port accessed with 20 gauge 3/4 inch gripper needle and labs drawn by rn.  Port flushed with normal saline and heparin locked. Port then de-accessed.  Pt tolerated well.      Huma Mclaughlin, RN

## 2021-06-08 DIAGNOSIS — C25.0 MALIGNANT NEOPLASM OF HEAD OF PANCREAS (H): ICD-10-CM

## 2021-06-08 LAB
ALBUMIN SERPL-MCNC: 3.4 G/DL (ref 3.4–5)
ALP SERPL-CCNC: 160 U/L (ref 40–150)
ALT SERPL W P-5'-P-CCNC: 39 U/L (ref 0–50)
ANION GAP SERPL CALCULATED.3IONS-SCNC: 5 MMOL/L (ref 3–14)
AST SERPL W P-5'-P-CCNC: 51 U/L (ref 0–45)
BASOPHILS # BLD AUTO: 0 10E9/L (ref 0–0.2)
BASOPHILS NFR BLD AUTO: 0.6 %
BILIRUB SERPL-MCNC: 0.5 MG/DL (ref 0.2–1.3)
BUN SERPL-MCNC: 9 MG/DL (ref 7–30)
CALCIUM SERPL-MCNC: 8.7 MG/DL (ref 8.5–10.1)
CHLORIDE SERPL-SCNC: 109 MMOL/L (ref 94–109)
CO2 SERPL-SCNC: 26 MMOL/L (ref 20–32)
CREAT SERPL-MCNC: 0.79 MG/DL (ref 0.52–1.04)
DIFFERENTIAL METHOD BLD: ABNORMAL
EOSINOPHIL # BLD AUTO: 0 10E9/L (ref 0–0.7)
EOSINOPHIL NFR BLD AUTO: 0.6 %
ERYTHROCYTE [DISTWIDTH] IN BLOOD BY AUTOMATED COUNT: 20.1 % (ref 10–15)
GFR SERPL CREATININE-BSD FRML MDRD: 77 ML/MIN/{1.73_M2}
GLUCOSE SERPL-MCNC: 106 MG/DL (ref 70–99)
HCT VFR BLD AUTO: 31.8 % (ref 35–47)
HGB BLD-MCNC: 10.2 G/DL (ref 11.7–15.7)
IMM GRANULOCYTES # BLD: 0 10E9/L (ref 0–0.4)
IMM GRANULOCYTES NFR BLD: 0.4 %
LYMPHOCYTES # BLD AUTO: 1.2 10E9/L (ref 0.8–5.3)
LYMPHOCYTES NFR BLD AUTO: 21.4 %
MCH RBC QN AUTO: 32 PG (ref 26.5–33)
MCHC RBC AUTO-ENTMCNC: 32.1 G/DL (ref 31.5–36.5)
MCV RBC AUTO: 100 FL (ref 78–100)
MONOCYTES # BLD AUTO: 0.5 10E9/L (ref 0–1.3)
MONOCYTES NFR BLD AUTO: 8.8 %
NEUTROPHILS # BLD AUTO: 3.7 10E9/L (ref 1.6–8.3)
NEUTROPHILS NFR BLD AUTO: 68.2 %
NRBC # BLD AUTO: 0 10*3/UL
NRBC BLD AUTO-RTO: 0 /100
PLATELET # BLD AUTO: 163 10E9/L (ref 150–450)
POTASSIUM SERPL-SCNC: 4 MMOL/L (ref 3.4–5.3)
PROT SERPL-MCNC: 6.9 G/DL (ref 6.8–8.8)
RBC # BLD AUTO: 3.19 10E12/L (ref 3.8–5.2)
SODIUM SERPL-SCNC: 141 MMOL/L (ref 133–144)
WBC # BLD AUTO: 5.4 10E9/L (ref 4–11)

## 2021-06-08 PROCEDURE — 80053 COMPREHEN METABOLIC PANEL: CPT | Performed by: INTERNAL MEDICINE

## 2021-06-08 PROCEDURE — 85025 COMPLETE CBC W/AUTO DIFF WBC: CPT | Performed by: INTERNAL MEDICINE

## 2021-06-18 DIAGNOSIS — C25.0 MALIGNANT NEOPLASM OF HEAD OF PANCREAS (H): ICD-10-CM

## 2021-06-22 RX ORDER — PANCRELIPASE 60000; 12000; 38000 [USP'U]/1; [USP'U]/1; [USP'U]/1
CAPSULE, DELAYED RELEASE PELLETS ORAL
Qty: 540 CAPSULE | Refills: 0 | Status: SHIPPED | OUTPATIENT
Start: 2021-06-22 | End: 2021-09-24

## 2021-07-07 ENCOUNTER — DOCUMENTATION ONLY (OUTPATIENT)
Dept: ONCOLOGY | Facility: CLINIC | Age: 66
End: 2021-07-07

## 2021-07-09 ENCOUNTER — PATIENT OUTREACH (OUTPATIENT)
Dept: ONCOLOGY | Facility: CLINIC | Age: 66
End: 2021-07-09

## 2021-07-09 DIAGNOSIS — Z20.822 ENCOUNTER FOR LABORATORY TESTING FOR COVID-19 VIRUS: Primary | ICD-10-CM

## 2021-07-09 NOTE — PROGRESS NOTES
RN Care Coordination Note  Received call from patient questioning if Dr Wade could order covid 19 antibody test. States she would like to know if she has developed any antibodies from vaccine. RNCC discussed with Dr Wade, ok to order test. Placed return call to patient. Answered all questions to her stated satisfaction.       Cinthia Contreras RN, BSN, OCN   RN Care Coordinator   [facility]

## 2021-07-12 ENCOUNTER — ANCILLARY PROCEDURE (OUTPATIENT)
Dept: CT IMAGING | Facility: CLINIC | Age: 66
End: 2021-07-12
Attending: INTERNAL MEDICINE
Payer: MEDICARE

## 2021-07-12 ENCOUNTER — ONCOLOGY VISIT (OUTPATIENT)
Dept: ONCOLOGY | Facility: CLINIC | Age: 66
End: 2021-07-12
Attending: INTERNAL MEDICINE
Payer: MEDICARE

## 2021-07-12 ENCOUNTER — APPOINTMENT (OUTPATIENT)
Dept: LAB | Facility: CLINIC | Age: 66
End: 2021-07-12
Attending: INTERNAL MEDICINE
Payer: MEDICARE

## 2021-07-12 VITALS
BODY MASS INDEX: 18.24 KG/M2 | SYSTOLIC BLOOD PRESSURE: 145 MMHG | HEART RATE: 73 BPM | DIASTOLIC BLOOD PRESSURE: 80 MMHG | TEMPERATURE: 98.4 F | OXYGEN SATURATION: 98 % | WEIGHT: 113 LBS | RESPIRATION RATE: 16 BRPM

## 2021-07-12 DIAGNOSIS — Z95.828 PORT-A-CATH IN PLACE: Primary | ICD-10-CM

## 2021-07-12 DIAGNOSIS — C25.0 MALIGNANT NEOPLASM OF HEAD OF PANCREAS (H): ICD-10-CM

## 2021-07-12 DIAGNOSIS — Z20.822 ENCOUNTER FOR LABORATORY TESTING FOR COVID-19 VIRUS: ICD-10-CM

## 2021-07-12 DIAGNOSIS — Z13.9 SCREENING FOR CONDITION: Primary | ICD-10-CM

## 2021-07-12 LAB
ALBUMIN SERPL-MCNC: 3.7 G/DL (ref 3.4–5)
ALP SERPL-CCNC: 158 U/L (ref 40–150)
ALT SERPL W P-5'-P-CCNC: 30 U/L (ref 0–50)
ANION GAP SERPL CALCULATED.3IONS-SCNC: 6 MMOL/L (ref 3–14)
AST SERPL W P-5'-P-CCNC: 36 U/L (ref 0–45)
BASOPHILS # BLD AUTO: 0 10E3/UL (ref 0–0.2)
BASOPHILS NFR BLD AUTO: 0 %
BILIRUB SERPL-MCNC: 0.7 MG/DL (ref 0.2–1.3)
BUN SERPL-MCNC: 9 MG/DL (ref 7–30)
CALCIUM SERPL-MCNC: 8.8 MG/DL (ref 8.5–10.1)
CHLORIDE BLD-SCNC: 107 MMOL/L (ref 94–109)
CO2 SERPL-SCNC: 27 MMOL/L (ref 20–32)
CREAT SERPL-MCNC: 0.72 MG/DL (ref 0.52–1.04)
EOSINOPHIL # BLD AUTO: 0 10E3/UL (ref 0–0.7)
EOSINOPHIL NFR BLD AUTO: 1 %
ERYTHROCYTE [DISTWIDTH] IN BLOOD BY AUTOMATED COUNT: 21.2 % (ref 10–15)
GFR SERPL CREATININE-BSD FRML MDRD: 87 ML/MIN/1.73M2
GLUCOSE BLD-MCNC: 97 MG/DL (ref 70–99)
HCT VFR BLD AUTO: 31.9 % (ref 35–47)
HGB BLD-MCNC: 10.2 G/DL (ref 11.7–15.7)
IMM GRANULOCYTES # BLD: 0 10E3/UL
IMM GRANULOCYTES NFR BLD: 0 %
LYMPHOCYTES # BLD AUTO: 1.1 10E3/UL (ref 0.8–5.3)
LYMPHOCYTES NFR BLD AUTO: 24 %
MCH RBC QN AUTO: 32.1 PG (ref 26.5–33)
MCHC RBC AUTO-ENTMCNC: 32 G/DL (ref 31.5–36.5)
MCV RBC AUTO: 100 FL (ref 78–100)
MONOCYTES # BLD AUTO: 0.5 10E3/UL (ref 0–1.3)
MONOCYTES NFR BLD AUTO: 10 %
NEUTROPHILS # BLD AUTO: 3 10E3/UL (ref 1.6–8.3)
NEUTROPHILS NFR BLD AUTO: 65 %
NRBC # BLD AUTO: 0 10E3/UL
NRBC BLD AUTO-RTO: 0 /100
PLATELET # BLD AUTO: 201 10E3/UL (ref 150–450)
POTASSIUM BLD-SCNC: 3.9 MMOL/L (ref 3.4–5.3)
PROT SERPL-MCNC: 7.1 G/DL (ref 6.8–8.8)
RBC # BLD AUTO: 3.18 10E6/UL (ref 3.8–5.2)
SODIUM SERPL-SCNC: 140 MMOL/L (ref 133–144)
WBC # BLD AUTO: 4.6 10E3/UL (ref 4–11)

## 2021-07-12 PROCEDURE — 36415 COLL VENOUS BLD VENIPUNCTURE: CPT | Performed by: INTERNAL MEDICINE

## 2021-07-12 PROCEDURE — 74177 CT ABD & PELVIS W/CONTRAST: CPT | Mod: GC | Performed by: RADIOLOGY

## 2021-07-12 PROCEDURE — 71260 CT THORAX DX C+: CPT | Mod: GC | Performed by: RADIOLOGY

## 2021-07-12 PROCEDURE — 85025 COMPLETE CBC W/AUTO DIFF WBC: CPT | Performed by: INTERNAL MEDICINE

## 2021-07-12 PROCEDURE — 99214 OFFICE O/P EST MOD 30 MIN: CPT | Mod: GC | Performed by: INTERNAL MEDICINE

## 2021-07-12 PROCEDURE — 250N000011 HC RX IP 250 OP 636: Performed by: INTERNAL MEDICINE

## 2021-07-12 PROCEDURE — G0463 HOSPITAL OUTPT CLINIC VISIT: HCPCS

## 2021-07-12 PROCEDURE — 36591 DRAW BLOOD OFF VENOUS DEVICE: CPT

## 2021-07-12 PROCEDURE — 86769 SARS-COV-2 COVID-19 ANTIBODY: CPT | Performed by: INTERNAL MEDICINE

## 2021-07-12 PROCEDURE — 82040 ASSAY OF SERUM ALBUMIN: CPT | Performed by: INTERNAL MEDICINE

## 2021-07-12 RX ORDER — IOPAMIDOL 755 MG/ML
69 INJECTION, SOLUTION INTRAVASCULAR ONCE
Status: COMPLETED | OUTPATIENT
Start: 2021-07-12 | End: 2021-07-12

## 2021-07-12 RX ORDER — HEPARIN SODIUM (PORCINE) LOCK FLUSH IV SOLN 100 UNIT/ML 100 UNIT/ML
500 SOLUTION INTRAVENOUS ONCE
Status: COMPLETED | OUTPATIENT
Start: 2021-07-12 | End: 2021-07-12

## 2021-07-12 RX ORDER — HEPARIN SODIUM (PORCINE) LOCK FLUSH IV SOLN 100 UNIT/ML 100 UNIT/ML
5 SOLUTION INTRAVENOUS EVERY 8 HOURS PRN
Status: DISCONTINUED | OUTPATIENT
Start: 2021-07-12 | End: 2021-07-13 | Stop reason: HOSPADM

## 2021-07-12 RX ADMIN — Medication 5 ML: at 10:46

## 2021-07-12 RX ADMIN — IOPAMIDOL 69 ML: 755 INJECTION, SOLUTION INTRAVASCULAR at 11:26

## 2021-07-12 RX ADMIN — HEPARIN SODIUM (PORCINE) LOCK FLUSH IV SOLN 100 UNIT/ML 500 UNITS: 100 SOLUTION at 11:38

## 2021-07-12 ASSESSMENT — PAIN SCALES - GENERAL: PAINLEVEL: NO PAIN (0)

## 2021-07-12 NOTE — NURSING NOTE
Chief Complaint   Patient presents with     Port Draw     Labs drawn via PORT by RN in lab. VS taken.      Tara Mesa RN

## 2021-07-12 NOTE — LETTER
7/12/2021         RE: Emelia KEYS Undem  [address]  [place] [address]          Reason for Visit: follow-up of recurrent pancreatic cancer.    History of Presenting Illness:   She originally had a Whipple many years ago and then had a biopsy-proven local recurrence more than 5 years ago.  Her recurrence was in the resection bed and encasing her SMA.  She has had complete control of her disease with folfirinox and now single agent Xeloda which has been complicated by the development of a scleroderma-like syndrome.  She is here today for ongoing response assessment.  Patient states she is doing well overall and has no major complaints. She has been riding 10 to 20 miles on bike daily. She states her goal is to get to 20,000 miles. She denies pain and endorses a strong appetite. She has noted weight loss of a few pounds but states she eats well it is just that she cannot keep up with the number of active calories she burns. She states her contractures and skin changes on hands and feet are stable and do not bother her.     ROS: The remainder of her complete review of systems otherwise negative.    Physical Examination:  General: no acute distress, conversant, thin female   HEENT: NC/AT, MMM  Resp: no increased work of breathing,   Abdomen: nontender, nondistended  Extremities: moving all extremities, no swelling   Skin: erythematous skin changes (baseline) from mid dorsum of hand to fingers bilaterally, contractures on hands bilaterally, no open wounds   Neuro: AAAX3, cranial nerves grossly intact     Labs and Imaging: Personally reviewed and confirmed stable changes in the resection bed.    Assessment/plan: Pancreatic cancer with longstanding local recurrence. Most recent CT Abdomen reviewed with patient today and shows stable disease, no new areas of disease. The liver lesion previously noted was not seen today on repeat imaging. We will continue our ongoing therapy with Xeloda and follow-up with  another CT scan and evaluation in about 4 months.    Patient staffed with Dr. Wade.    Fariha Thompson MD PGY-2       Attending note: I saw the patient in conjunction with the resident and agree with the above            Yasmany Wade MD

## 2021-07-12 NOTE — NURSING NOTE
"Oncology Rooming Note    July 12, 2021 4:11 PM   Emelia Weathers is a 65 year old female who presents for:    Chief Complaint   Patient presents with     Port Draw     Labs drawn via PORT by RN in lab. VS taken.      Oncology Clinic Visit     Malignant neoplasm of head of pancreas      Initial Vitals: BP (!) 145/80 (BP Location: Right arm, Patient Position: Sitting, Cuff Size: Adult Regular)   Pulse 73   Temp 98.4  F (36.9  C) (Oral)   Resp 16   Wt 51.3 kg (113 lb)   SpO2 98%   BMI 18.24 kg/m   Estimated body mass index is 18.24 kg/m  as calculated from the following:    Height as of 5/21/18: 1.676 m (5' 6\").    Weight as of this encounter: 51.3 kg (113 lb). Body surface area is 1.55 meters squared.  No Pain (0) Comment: Data Unavailable   No LMP recorded. Patient is postmenopausal.  Allergies reviewed: Yes  Medications reviewed: Yes    Medications: Medication refills not needed today.  Pharmacy name entered into [facility]:    [id number] - [place], [place] - [address] [place] [address] E  [facility] - [place], [place] - [address] [facility] - [place], [place] - [address] [place] [address] [phone]  [facility] - [place], [place] - [address] [place] [address] [facility] [place], [place] - [address] [place] [address]    Clinical concerns: No new concerns.        Beatrice Blount LPN July 12, 2021 4:12 PM                "

## 2021-07-13 NOTE — PROGRESS NOTES
Reason for Visit: follow-up of recurrent pancreatic cancer.    History of Presenting Illness:   She originally had a Whipple many years ago and then had a biopsy-proven local recurrence more than 5 years ago.  Her recurrence was in the resection bed and encasing her SMA.  She has had complete control of her disease with folfirinox and now single agent Xeloda which has been complicated by the development of a scleroderma-like syndrome.  She is here today for ongoing response assessment.  Patient states she is doing well overall and has no major complaints. She has been riding 10 to 20 miles on bike daily. She states her goal is to get to 20,000 miles. She denies pain and endorses a strong appetite. She has noted weight loss of a few pounds but states she eats well it is just that she cannot keep up with the number of active calories she burns. She states her contractures and skin changes on hands and feet are stable and do not bother her.     ROS: The remainder of her complete review of systems otherwise negative.    Physical Examination:  General: no acute distress, conversant, thin female   HEENT: NC/AT, MMM  Resp: no increased work of breathing,   Abdomen: nontender, nondistended  Extremities: moving all extremities, no swelling   Skin: erythematous skin changes (baseline) from mid dorsum of hand to fingers bilaterally, contractures on hands bilaterally, no open wounds   Neuro: AAAX3, cranial nerves grossly intact     Labs and Imaging: Personally reviewed and confirmed stable changes in the resection bed.    Assessment/plan: Pancreatic cancer with longstanding local recurrence. Most recent CT Abdomen reviewed with patient today and shows stable disease, no new areas of disease. The liver lesion previously noted was not seen today on repeat imaging. We will continue our ongoing therapy with Xeloda and follow-up with another CT scan and evaluation in about 4 months.    Patient staffed with Dr. Wade.    Fariha  MD [place] PGY-2       Attending note: I saw the patient in conjunction with the resident and agree with the above

## 2021-07-14 ENCOUNTER — DOCUMENTATION ONLY (OUTPATIENT)
Dept: ONCOLOGY | Facility: CLINIC | Age: 66
End: 2021-07-14

## 2021-07-23 DIAGNOSIS — C25.0 MALIGNANT NEOPLASM OF HEAD OF PANCREAS (H): Primary | ICD-10-CM

## 2021-07-23 RX ORDER — CAPECITABINE 500 MG/1
TABLET, FILM COATED ORAL
Qty: 42 TABLET | Refills: 3 | Status: SHIPPED | OUTPATIENT
Start: 2021-07-23 | End: 2022-04-01

## 2021-07-28 LAB
SARS-COV-2 AB SERPL IA-ACNC: NORMAL [IU]/ML
SARS-COV-2 AB SERPL QL IA: NORMAL

## 2021-07-29 ENCOUNTER — LAB (OUTPATIENT)
Dept: LAB | Facility: CLINIC | Age: 66
End: 2021-07-29
Attending: INTERNAL MEDICINE
Payer: MEDICARE

## 2021-07-29 DIAGNOSIS — C25.0 MALIGNANT NEOPLASM OF HEAD OF PANCREAS (H): ICD-10-CM

## 2021-07-29 DIAGNOSIS — Z20.822 ENCOUNTER FOR LABORATORY TESTING FOR COVID-19 VIRUS: ICD-10-CM

## 2021-07-29 DIAGNOSIS — C25.0 MALIGNANT NEOPLASM OF HEAD OF PANCREAS (H): Primary | ICD-10-CM

## 2021-07-29 PROCEDURE — 86769 SARS-COV-2 COVID-19 ANTIBODY: CPT

## 2021-07-29 PROCEDURE — 36591 DRAW BLOOD OFF VENOUS DEVICE: CPT

## 2021-08-02 LAB
SARS-COV-2 AB SERPL IA-ACNC: >250 U/ML
SARS-COV-2 AB SERPL-IMP: POSITIVE

## 2021-08-11 ENCOUNTER — LAB (OUTPATIENT)
Dept: LAB | Facility: CLINIC | Age: 66
End: 2021-08-11
Attending: INTERNAL MEDICINE
Payer: MEDICARE

## 2021-08-11 DIAGNOSIS — C25.0 MALIGNANT NEOPLASM OF HEAD OF PANCREAS (H): ICD-10-CM

## 2021-08-11 LAB
ALBUMIN SERPL-MCNC: 3.7 G/DL (ref 3.4–5)
ALP SERPL-CCNC: 166 U/L (ref 40–150)
ALT SERPL W P-5'-P-CCNC: 26 U/L (ref 0–50)
ANION GAP SERPL CALCULATED.3IONS-SCNC: 4 MMOL/L (ref 3–14)
AST SERPL W P-5'-P-CCNC: 24 U/L (ref 0–45)
BASOPHILS # BLD AUTO: 0 10E3/UL (ref 0–0.2)
BASOPHILS NFR BLD AUTO: 1 %
BILIRUB SERPL-MCNC: 0.4 MG/DL (ref 0.2–1.3)
BUN SERPL-MCNC: 11 MG/DL (ref 7–30)
CALCIUM SERPL-MCNC: 9.1 MG/DL (ref 8.5–10.1)
CHLORIDE BLD-SCNC: 105 MMOL/L (ref 94–109)
CO2 SERPL-SCNC: 29 MMOL/L (ref 20–32)
CREAT SERPL-MCNC: 0.8 MG/DL (ref 0.52–1.04)
EOSINOPHIL # BLD AUTO: 0.1 10E3/UL (ref 0–0.7)
EOSINOPHIL NFR BLD AUTO: 1 %
ERYTHROCYTE [DISTWIDTH] IN BLOOD BY AUTOMATED COUNT: 20.2 % (ref 10–15)
GFR SERPL CREATININE-BSD FRML MDRD: 77 ML/MIN/1.73M2
GLUCOSE BLD-MCNC: 98 MG/DL (ref 70–99)
HCT VFR BLD AUTO: 32.6 % (ref 35–47)
HGB BLD-MCNC: 10.8 G/DL (ref 11.7–15.7)
IMM GRANULOCYTES # BLD: 0 10E3/UL
IMM GRANULOCYTES NFR BLD: 0 %
LYMPHOCYTES # BLD AUTO: 1.6 10E3/UL (ref 0.8–5.3)
LYMPHOCYTES NFR BLD AUTO: 30 %
MCH RBC QN AUTO: 32.1 PG (ref 26.5–33)
MCHC RBC AUTO-ENTMCNC: 33.1 G/DL (ref 31.5–36.5)
MCV RBC AUTO: 97 FL (ref 78–100)
MONOCYTES # BLD AUTO: 0.6 10E3/UL (ref 0–1.3)
MONOCYTES NFR BLD AUTO: 11 %
NEUTROPHILS # BLD AUTO: 3 10E3/UL (ref 1.6–8.3)
NEUTROPHILS NFR BLD AUTO: 57 %
NRBC # BLD AUTO: 0 10E3/UL
NRBC BLD AUTO-RTO: 0 /100
PLATELET # BLD AUTO: 194 10E3/UL (ref 150–450)
POTASSIUM BLD-SCNC: 4.2 MMOL/L (ref 3.4–5.3)
PROT SERPL-MCNC: 7.3 G/DL (ref 6.8–8.8)
RBC # BLD AUTO: 3.36 10E6/UL (ref 3.8–5.2)
SODIUM SERPL-SCNC: 138 MMOL/L (ref 133–144)
WBC # BLD AUTO: 5.3 10E3/UL (ref 4–11)

## 2021-08-11 PROCEDURE — 250N000011 HC RX IP 250 OP 636: Performed by: INTERNAL MEDICINE

## 2021-08-11 PROCEDURE — 80053 COMPREHEN METABOLIC PANEL: CPT

## 2021-08-11 PROCEDURE — 36591 DRAW BLOOD OFF VENOUS DEVICE: CPT

## 2021-08-11 PROCEDURE — 85025 COMPLETE CBC W/AUTO DIFF WBC: CPT

## 2021-08-11 RX ORDER — HEPARIN SODIUM (PORCINE) LOCK FLUSH IV SOLN 100 UNIT/ML 100 UNIT/ML
5 SOLUTION INTRAVENOUS ONCE
Status: COMPLETED | OUTPATIENT
Start: 2021-08-11 | End: 2021-08-11

## 2021-08-11 RX ADMIN — SODIUM CHLORIDE, PRESERVATIVE FREE 5 ML: 5 INJECTION INTRAVENOUS at 16:14

## 2021-08-11 NOTE — NURSING NOTE
Chief Complaint   Patient presents with     Port Draw     Labs drawn via port by RN in lab.      Port accessed with 20 gauge 3/4 inch gripper needle and labs drawn by rn.  Port flushed with saline and heparin. Port then de-accessed.  Pt tolerated well.     Huma Mclaughlin RN

## 2021-09-08 ENCOUNTER — LAB (OUTPATIENT)
Dept: LAB | Facility: CLINIC | Age: 66
End: 2021-09-08
Attending: INTERNAL MEDICINE
Payer: MEDICARE

## 2021-09-08 DIAGNOSIS — C25.0 MALIGNANT NEOPLASM OF HEAD OF PANCREAS (H): ICD-10-CM

## 2021-09-08 LAB
ALBUMIN SERPL-MCNC: 3.4 G/DL (ref 3.4–5)
ALP SERPL-CCNC: 134 U/L (ref 40–150)
ALT SERPL W P-5'-P-CCNC: 24 U/L (ref 0–50)
ANION GAP SERPL CALCULATED.3IONS-SCNC: 6 MMOL/L (ref 3–14)
AST SERPL W P-5'-P-CCNC: 25 U/L (ref 0–45)
BASOPHILS # BLD AUTO: 0 10E3/UL (ref 0–0.2)
BASOPHILS NFR BLD AUTO: 0 %
BILIRUB SERPL-MCNC: 0.7 MG/DL (ref 0.2–1.3)
BUN SERPL-MCNC: 12 MG/DL (ref 7–30)
CALCIUM SERPL-MCNC: 9.2 MG/DL (ref 8.5–10.1)
CHLORIDE BLD-SCNC: 107 MMOL/L (ref 94–109)
CO2 SERPL-SCNC: 27 MMOL/L (ref 20–32)
CREAT SERPL-MCNC: 0.73 MG/DL (ref 0.52–1.04)
EOSINOPHIL # BLD AUTO: 0 10E3/UL (ref 0–0.7)
EOSINOPHIL NFR BLD AUTO: 1 %
ERYTHROCYTE [DISTWIDTH] IN BLOOD BY AUTOMATED COUNT: 20.9 % (ref 10–15)
GFR SERPL CREATININE-BSD FRML MDRD: 85 ML/MIN/1.73M2
GLUCOSE BLD-MCNC: 144 MG/DL (ref 70–99)
HCT VFR BLD AUTO: 30.2 % (ref 35–47)
HGB BLD-MCNC: 9.9 G/DL (ref 11.7–15.7)
IMM GRANULOCYTES # BLD: 0 10E3/UL
IMM GRANULOCYTES NFR BLD: 0 %
LYMPHOCYTES # BLD AUTO: 1.1 10E3/UL (ref 0.8–5.3)
LYMPHOCYTES NFR BLD AUTO: 20 %
MCH RBC QN AUTO: 32.6 PG (ref 26.5–33)
MCHC RBC AUTO-ENTMCNC: 32.8 G/DL (ref 31.5–36.5)
MCV RBC AUTO: 99 FL (ref 78–100)
MONOCYTES # BLD AUTO: 0.3 10E3/UL (ref 0–1.3)
MONOCYTES NFR BLD AUTO: 6 %
NEUTROPHILS # BLD AUTO: 3.9 10E3/UL (ref 1.6–8.3)
NEUTROPHILS NFR BLD AUTO: 73 %
NRBC # BLD AUTO: 0 10E3/UL
NRBC BLD AUTO-RTO: 0 /100
PLATELET # BLD AUTO: 200 10E3/UL (ref 150–450)
POTASSIUM BLD-SCNC: 3.9 MMOL/L (ref 3.4–5.3)
PROT SERPL-MCNC: 7 G/DL (ref 6.8–8.8)
RBC # BLD AUTO: 3.04 10E6/UL (ref 3.8–5.2)
SODIUM SERPL-SCNC: 140 MMOL/L (ref 133–144)
WBC # BLD AUTO: 5.4 10E3/UL (ref 4–11)

## 2021-09-08 PROCEDURE — 85025 COMPLETE CBC W/AUTO DIFF WBC: CPT

## 2021-09-08 PROCEDURE — 36591 DRAW BLOOD OFF VENOUS DEVICE: CPT

## 2021-09-08 PROCEDURE — 250N000011 HC RX IP 250 OP 636: Performed by: INTERNAL MEDICINE

## 2021-09-08 PROCEDURE — 82040 ASSAY OF SERUM ALBUMIN: CPT

## 2021-09-08 RX ORDER — HEPARIN SODIUM (PORCINE) LOCK FLUSH IV SOLN 100 UNIT/ML 100 UNIT/ML
5 SOLUTION INTRAVENOUS ONCE
Status: COMPLETED | OUTPATIENT
Start: 2021-09-08 | End: 2021-09-08

## 2021-09-08 RX ADMIN — Medication 5 ML: at 11:53

## 2021-09-08 NOTE — NURSING NOTE
Chief Complaint   Patient presents with     Port Draw     Labs drawn via port by rn in lab.      Port accessed with 20g 3/4 inch gripper needle by RN, labs collected, line flushed with saline and heparin.  Vitals taken. Pt checked in for appointment(s).    Romero Manley, RN

## 2021-09-13 ASSESSMENT — ENCOUNTER SYMPTOMS
ABDOMINAL PAIN: 0
MYALGIAS: 0
HEARTBURN: 0
PARESTHESIAS: 0
FREQUENCY: 0
CHILLS: 0
FEVER: 0
WEAKNESS: 0
CONSTIPATION: 0
JOINT SWELLING: 1
HEADACHES: 0
PALPITATIONS: 0
COUGH: 0
NERVOUS/ANXIOUS: 0
HEMATOCHEZIA: 0
NAUSEA: 0
SORE THROAT: 0
SHORTNESS OF BREATH: 0
ARTHRALGIAS: 1
HEMATURIA: 0
DIARRHEA: 0
BREAST MASS: 0
DYSURIA: 0
EYE PAIN: 0
DIZZINESS: 0

## 2021-09-13 ASSESSMENT — ACTIVITIES OF DAILY LIVING (ADL): CURRENT_FUNCTION: NO ASSISTANCE NEEDED

## 2021-09-16 ENCOUNTER — MYC MEDICAL ADVICE (OUTPATIENT)
Dept: FAMILY MEDICINE | Facility: CLINIC | Age: 66
End: 2021-09-16

## 2021-09-16 ENCOUNTER — OFFICE VISIT (OUTPATIENT)
Dept: FAMILY MEDICINE | Facility: CLINIC | Age: 66
End: 2021-09-16
Payer: MEDICARE

## 2021-09-16 VITALS
OXYGEN SATURATION: 98 % | HEIGHT: 65 IN | TEMPERATURE: 97.6 F | SYSTOLIC BLOOD PRESSURE: 120 MMHG | BODY MASS INDEX: 18.28 KG/M2 | HEART RATE: 72 BPM | DIASTOLIC BLOOD PRESSURE: 66 MMHG | WEIGHT: 109.7 LBS

## 2021-09-16 DIAGNOSIS — C25.0 MALIGNANT NEOPLASM OF HEAD OF PANCREAS (H): ICD-10-CM

## 2021-09-16 DIAGNOSIS — Z13.1 SCREENING FOR DIABETES MELLITUS: ICD-10-CM

## 2021-09-16 DIAGNOSIS — M34.9 SCLERODERMA (H): ICD-10-CM

## 2021-09-16 DIAGNOSIS — B97.7 HPV (HUMAN PAPILLOMA VIRUS) INFECTION: ICD-10-CM

## 2021-09-16 DIAGNOSIS — Z78.0 POST-MENOPAUSAL: ICD-10-CM

## 2021-09-16 DIAGNOSIS — Z12.11 SCREENING FOR COLON CANCER: ICD-10-CM

## 2021-09-16 DIAGNOSIS — R73.09 ELEVATED GLUCOSE: ICD-10-CM

## 2021-09-16 DIAGNOSIS — Z23 NEED FOR PROPHYLACTIC VACCINATION AND INOCULATION AGAINST INFLUENZA: ICD-10-CM

## 2021-09-16 DIAGNOSIS — Z13.220 SCREENING FOR LIPID DISORDERS: ICD-10-CM

## 2021-09-16 DIAGNOSIS — E16.9: ICD-10-CM

## 2021-09-16 DIAGNOSIS — Z00.00 ENCOUNTER FOR MEDICARE ANNUAL WELLNESS EXAM: Primary | ICD-10-CM

## 2021-09-16 LAB
CHOLEST SERPL-MCNC: 160 MG/DL
FASTING STATUS PATIENT QL REPORTED: ABNORMAL
FASTING STATUS PATIENT QL REPORTED: NORMAL
GLUCOSE BLD-MCNC: 113 MG/DL (ref 70–99)
HDLC SERPL-MCNC: 72 MG/DL
LDLC SERPL CALC-MCNC: 75 MG/DL
NONHDLC SERPL-MCNC: 88 MG/DL
TRIGL SERPL-MCNC: 67 MG/DL

## 2021-09-16 PROCEDURE — 82947 ASSAY GLUCOSE BLOOD QUANT: CPT | Performed by: FAMILY MEDICINE

## 2021-09-16 PROCEDURE — G0438 PPPS, INITIAL VISIT: HCPCS | Performed by: FAMILY MEDICINE

## 2021-09-16 PROCEDURE — G0008 ADMIN INFLUENZA VIRUS VAC: HCPCS | Performed by: FAMILY MEDICINE

## 2021-09-16 PROCEDURE — 88175 CYTOPATH C/V AUTO FLUID REDO: CPT | Performed by: FAMILY MEDICINE

## 2021-09-16 PROCEDURE — 90662 IIV NO PRSV INCREASED AG IM: CPT | Performed by: FAMILY MEDICINE

## 2021-09-16 PROCEDURE — 87624 HPV HI-RISK TYP POOLED RSLT: CPT | Performed by: FAMILY MEDICINE

## 2021-09-16 PROCEDURE — G0009 ADMIN PNEUMOCOCCAL VACCINE: HCPCS | Performed by: FAMILY MEDICINE

## 2021-09-16 PROCEDURE — 36415 COLL VENOUS BLD VENIPUNCTURE: CPT | Performed by: FAMILY MEDICINE

## 2021-09-16 PROCEDURE — 99213 OFFICE O/P EST LOW 20 MIN: CPT | Mod: 25 | Performed by: FAMILY MEDICINE

## 2021-09-16 PROCEDURE — 90670 PCV13 VACCINE IM: CPT | Performed by: FAMILY MEDICINE

## 2021-09-16 PROCEDURE — 80061 LIPID PANEL: CPT | Performed by: FAMILY MEDICINE

## 2021-09-16 ASSESSMENT — MIFFLIN-ST. JEOR: SCORE: 1024.51

## 2021-09-16 NOTE — PROGRESS NOTES
"  SUBJECTIVE:   Emelia Weathers is a 66 year old female who presents for Preventive Visit.      Patient has been advised of split billing requirements and indicates understanding: Yes  Are you in the first 12 months of your Medicare Part B coverage?  No    Physical Health:  Answers for HPI/ROS submitted by the patient on 9/13/2021    In general, how would you rate your overall physical health?: fair  Frequency of exercise:: 6-7 days/week  Do you usually eat at least 4 servings of fruit and vegetables a day, include whole grains & fiber, and avoid regularly eating high fat or \"junk\" foods? : Yes  Taking medications regularly:: No  Medication side effects:: Other  Activities of Daily Living: no assistance needed  Home safety: no safety concerns identified  Hearing Impairment:: no hearing concerns  In the past 6 months, have you been bothered by leaking of urine?: No  abdominal pain: No  Blood in stool: No  Blood in urine: No  chest pain: No  chills: No  congestion: No  constipation: No  cough: No  diarrhea: No  dizziness: No  ear pain: No  eye pain: No  nervous/anxious: No  fever: No  frequency: No  genital sores: No  headaches: No  hearing loss: No  heartburn: No  arthralgias: Yes  joint swelling: Yes  peripheral edema: No  mood changes: No  myalgias: No  nausea: No  dysuria: No  palpitations: No  Skin sensation changes: No  sore throat: No  urgency: No  rash: No  shortness of breath: No  visual disturbance: No  weakness: No  pelvic pain: No  vaginal bleeding: No  vaginal discharge: No  tenderness: No  breast mass: No  breast discharge: No  In general, how would you rate your overall mental or emotional health?: excellent  Additional concerns today:: No  Duration of exercise:: Greater than 60 minutes          Mental Health:    In general, how would you rate your overall mental or emotional health? good  PHQ-2 Score: (P) 0    Do you feel safe in your environment? Yes    Have you ever done Advance Care Planning? (For " example, a Health Directive, POLST, or a discussion with a medical provider or your loved ones about your wishes): Yes, patient states has an Advance Care Planning document and will bring a copy to the clinic.    Additional concerns to address?  No      2021  oncology  Assessment/plan: Pancreatic cancer with longstanding local recurrence. Most recent CT Abdomen reviewed with patient today and shows stable disease, no new areas of disease. The liver lesion previously noted was not seen today on repeat imaging. We will continue our ongoing therapy with Xeloda and follow-up with another CT scan and evaluation in about 4 months.      Fall risk:0       Cognitive Screenin) Repeat 3 items (Leader, Season, Table)    2) Clock draw: NORMAL  3) 3 item recall: Recalls 2 objects   Results: NORMAL clock, 1-2 items recalled: COGNITIVE IMPAIRMENT LESS LIKELY    Mini-CogTM Copyright S Lizzy. Licensed by the author for use in [facility]; reprinted with permission (soaydee@[facility]). All rights reserved.      Do you have sleep apnea, excessive snoring or daytime drowsiness?: no      Reviewed and updated as needed this visit by clinical staff                 Reviewed and updated as needed this visit by Provider                Social History     Tobacco Use     Smoking status: Former Smoker     Quit date: 1993     Years since quittin.0     Smokeless tobacco: Never Used   Substance Use Topics     Alcohol use: Yes     Comment: rare                           Current providers sharing in care for this patient include:   Patient Care Team:  Benny Garrett MD as PCP - General (Internal Medicine)  Yasmany Wade MD as MD (Oncology)  Ngozi Le MD as MD (Orthopedics)  Cinthia Contreras RN as Nurse Coordinator (Oncology)  Yasmany Wade MD as Assigned Cancer Care Provider    The following health maintenance items are reviewed in Epic and correct as of today:  Health Maintenance   Topic Date  "Due     ANNUAL REVIEW OF HM ORDERS  Never done     ADVANCE CARE PLANNING  Never done     COLORECTAL CANCER SCREENING  07/14/2016     MEDICARE ANNUAL WELLNESS VISIT  09/09/2018     FALL RISK ASSESSMENT  Never done     Pneumococcal Vaccine: 65+ Years (2 of 2 - PCV13) 12/30/2020     LIPID  04/18/2021     INFLUENZA VACCINE (1) 09/01/2021     DEXA  12/14/2022     MAMMO SCREENING  05/11/2023     DTAP/TDAP/TD IMMUNIZATION (4 - Td or Tdap) 07/04/2030     HEPATITIS C SCREENING  Completed     PHQ-2  Completed     ZOSTER IMMUNIZATION  Completed     COVID-19 Vaccine  Completed     IPV IMMUNIZATION  Aged Out     MENINGITIS IMMUNIZATION  Aged Out     HEPATITIS B IMMUNIZATION  Aged Out     ROS:  Constitutional, HEENT, cardiovascular, pulmonary, gi and gu systems are negative, except as otherwise noted.    OBJECTIVE:   /66   Pulse 72   Temp 97.6  F (36.4  C) (Tympanic)   Ht 1.645 m (5' 4.75\")   Wt 49.8 kg (109 lb 11.2 oz)   SpO2 98%   BMI 18.40 kg/m   Estimated body mass index is 18.4 kg/m  as calculated from the following:    Height as of this encounter: 1.645 m (5' 4.75\").    Weight as of this encounter: 49.8 kg (109 lb 11.2 oz).  EXAM:   GENERAL: healthy, alert and no distress  EYES: Eyes grossly normal to inspection, PERRL and conjunctivae and sclerae normal  HENT: ear canals and TM's normal, nose and mouth without ulcers or lesions  NECK: no adenopathy, no asymmetry, masses, or scars and thyroid normal to palpation  RESP: lungs clear to auscultation - no rales, rhonchi or wheezes  BREAST: normal without masses, tenderness or nipple discharge and no palpable axillary masses or adenopathy  CV: regular rate and rhythm, normal S1 S2, no S3 or S4, no murmur, click or rub, no peripheral edema and peripheral pulses strong  ABDOMEN: soft, nontender, no hepatosplenomegaly, no masses and bowel sounds normal   (female): normal female external genitalia, normal urethral meatus, vaginal mucosa pink, moist, well rugated, and " normal cervix/adnexa/uterus without masses or discharge  MS: no gross musculoskeletal defects noted, no edema  SKIN: skin on hands are red, tight appearing, and dry and flaky bilaterally   NEURO: Normal strength and tone, mentation intact and speech normal  PSYCH: mentation appears normal, affect normal/bright        ASSESSMENT / PLAN:   (Z00.00) Encounter for Medicare annual wellness exam  (primary encounter diagnosis)  Comment: We discussed self breast exams, exercise 30mins/day, and calcium with vitamin D at 1200mg/day, preferably from dietary sources.  Continue with daily Exercise   Plan:     (C25.0) Malignant neoplasm of head of pancreas (H)  Comment: stable. On oral chemo. 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 She stopped all screenings with diagnosis of pancreatic cancer and now is wanted to get caught up. 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  Comment: check blood sugar   Plan:     (Z23) Need for prophylactic vaccination and inoculation against influenza  Comment: discussed   Plan:     (Z13.1) Screening for diabetes mellitus  Comment:   Plan: Glucose            (Z13.220) Screening for lipid disorders  Comment:   Plan: Lipid panel reflex to direct LDL Fasting            (Z12.11) Screening for colon cancer  Comment:   Plan: Adult Gastro Ref - Procedure Only            (Z78.0) Post-menopausal  Comment: discussed   Plan: DX Hip/Pelvis/Spine              Patient has been advised of split billing requirements and indicates understanding: Yes    COUNSELING:  Reviewed preventive health counseling, as reflected in patient instructions       Regular exercise       Healthy diet/nutrition    Estimated body mass index is 18.24 kg/m  as calculated from the following:     "Height as of 5/21/18: 1.676 m (5' 6\").    Weight as of 7/12/21: 51.3 kg (113 lb).    Weight management plan noted, stable and monitoring    She reports that she quit smoking about 28 years ago. She has never used smokeless tobacco.    Appropriate preventive services were discussed with this patient, including applicable screening as appropriate for cardiovascular disease, diabetes, osteopenia/osteoporosis, and glaucoma.  As appropriate for age/gender, discussed screening for colorectal cancer, prostate cancer, breast cancer, and cervical cancer. Checklist reviewing preventive services available has been given to the patient.    Reviewed patients plan of care and provided an AVS. The Basic Care Plan (routine screening as documented in Health Maintenance) for Emelia meets the Care Plan requirement. This Care Plan has been established and reviewed with the Patient.    Counseling Resources:  ATP IV Guidelines  Pooled Cohorts Equation Calculator  Breast Cancer Risk Calculator  BRCA-Related Cancer Risk Assessment: FHS-7 Tool  FRAX Risk Assessment  ICSI Preventive Guidelines  Dietary Guidelines for Americans, 2010  USDA's MyPlate  ASA Prophylaxis  Lung CA Screening    Monica Tubbs MD  [facility]  "

## 2021-09-16 NOTE — PATIENT INSTRUCTIONS
Patient Education   Personalized Prevention Plan  You are due for the preventive services outlined below.  Your care team is available to assist you in scheduling these services.  If you have already completed any of these items, please share that information with your care team to update in your medical record.  Health Maintenance Due   Topic Date Due     ANNUAL REVIEW OF HM ORDERS  Never done     Discuss Advance Care Planning  Never done     Colorectal Cancer Screening  07/14/2016     Annual Wellness Visit  09/09/2018     FALL RISK ASSESSMENT  Never done     Pneumococcal Vaccine (2 of 2 - PCV13) 12/30/2020     Cholesterol Lab  04/18/2021     Flu Vaccine (1) 09/01/2021

## 2021-09-20 ENCOUNTER — MYC MEDICAL ADVICE (OUTPATIENT)
Dept: FAMILY MEDICINE | Facility: CLINIC | Age: 66
End: 2021-09-20

## 2021-09-20 DIAGNOSIS — C25.0 MALIGNANT NEOPLASM OF HEAD OF PANCREAS (H): ICD-10-CM

## 2021-09-21 LAB
BKR LAB AP GYN ADEQUACY: NORMAL
BKR LAB AP GYN INTERPRETATION: NORMAL
BKR LAB AP HPV REFLEX: NORMAL
BKR LAB AP PREVIOUS ABNORMAL: NORMAL
PATH REPORT.COMMENTS IMP SPEC: NORMAL
PATH REPORT.RELEVANT HX SPEC: NORMAL

## 2021-09-22 LAB
HUMAN PAPILLOMA VIRUS 16 DNA: NEGATIVE
HUMAN PAPILLOMA VIRUS 18 DNA: NEGATIVE
HUMAN PAPILLOMA VIRUS FINAL DIAGNOSIS: NORMAL
HUMAN PAPILLOMA VIRUS OTHER HR: NEGATIVE

## 2021-09-22 NOTE — TELEPHONE ENCOUNTER
Last prescribing provider: Dr. Wade    Last clinic visit date: 7/12/21    Any missed appointments or no-shows since last clinic visit?: No    Recommendations for requested medication (if none, N/A): N/A    Next clinic visit date: 11/8/21    Any other pertinent information (if none, N/A): N/A    Routed to Dr. Wade.

## 2021-09-24 ENCOUNTER — PATIENT OUTREACH (OUTPATIENT)
Dept: FAMILY MEDICINE | Facility: CLINIC | Age: 66
End: 2021-09-24

## 2021-09-24 ENCOUNTER — MYC MEDICAL ADVICE (OUTPATIENT)
Dept: FAMILY MEDICINE | Facility: CLINIC | Age: 66
End: 2021-09-24

## 2021-09-24 RX ORDER — PANCRELIPASE 60000; 12000; 38000 [USP'U]/1; [USP'U]/1; [USP'U]/1
CAPSULE, DELAYED RELEASE PELLETS ORAL
Qty: 540 CAPSULE | Refills: 0 | Status: SHIPPED | OUTPATIENT
Start: 2021-09-24 | End: 2021-11-08

## 2021-10-05 ENCOUNTER — LAB (OUTPATIENT)
Dept: LAB | Facility: CLINIC | Age: 66
End: 2021-10-05
Attending: INTERNAL MEDICINE
Payer: MEDICARE

## 2021-10-05 ENCOUNTER — HOSPITAL ENCOUNTER (OUTPATIENT)
Dept: BONE DENSITY | Facility: CLINIC | Age: 66
Discharge: HOME | End: 2021-10-05
Attending: FAMILY MEDICINE | Admitting: FAMILY MEDICINE
Payer: MEDICARE

## 2021-10-05 VITALS
HEART RATE: 74 BPM | DIASTOLIC BLOOD PRESSURE: 77 MMHG | BODY MASS INDEX: 18.92 KG/M2 | OXYGEN SATURATION: 97 % | RESPIRATION RATE: 18 BRPM | WEIGHT: 112.8 LBS | SYSTOLIC BLOOD PRESSURE: 135 MMHG | TEMPERATURE: 98.4 F

## 2021-10-05 DIAGNOSIS — R73.09 ELEVATED GLUCOSE: ICD-10-CM

## 2021-10-05 DIAGNOSIS — C25.0 MALIGNANT NEOPLASM OF HEAD OF PANCREAS (H): ICD-10-CM

## 2021-10-05 DIAGNOSIS — Z78.0 POST-MENOPAUSAL: ICD-10-CM

## 2021-10-05 LAB
ALBUMIN SERPL-MCNC: 3.4 G/DL (ref 3.4–5)
ALP SERPL-CCNC: 165 U/L (ref 40–150)
ALT SERPL W P-5'-P-CCNC: 35 U/L (ref 0–50)
ANION GAP SERPL CALCULATED.3IONS-SCNC: 4 MMOL/L (ref 3–14)
AST SERPL W P-5'-P-CCNC: 30 U/L (ref 0–45)
BASOPHILS # BLD AUTO: 0 10E3/UL (ref 0–0.2)
BASOPHILS NFR BLD AUTO: 0 %
BILIRUB SERPL-MCNC: 0.5 MG/DL (ref 0.2–1.3)
BUN SERPL-MCNC: 12 MG/DL (ref 7–30)
CALCIUM SERPL-MCNC: 8.7 MG/DL (ref 8.5–10.1)
CHLORIDE BLD-SCNC: 109 MMOL/L (ref 94–109)
CO2 SERPL-SCNC: 28 MMOL/L (ref 20–32)
CREAT SERPL-MCNC: 0.93 MG/DL (ref 0.52–1.04)
EOSINOPHIL # BLD AUTO: 0.1 10E3/UL (ref 0–0.7)
EOSINOPHIL NFR BLD AUTO: 1 %
ERYTHROCYTE [DISTWIDTH] IN BLOOD BY AUTOMATED COUNT: 21.2 % (ref 10–15)
GFR SERPL CREATININE-BSD FRML MDRD: 63 ML/MIN/1.73M2
GLUCOSE BLD-MCNC: 112 MG/DL (ref 70–99)
HBA1C MFR BLD: 6.6 % (ref 0–5.6)
HCT VFR BLD AUTO: 31.2 % (ref 35–47)
HGB BLD-MCNC: 10.2 G/DL (ref 11.7–15.7)
IMM GRANULOCYTES # BLD: 0 10E3/UL
IMM GRANULOCYTES NFR BLD: 0 %
LYMPHOCYTES # BLD AUTO: 1.3 10E3/UL (ref 0.8–5.3)
LYMPHOCYTES NFR BLD AUTO: 23 %
MCH RBC QN AUTO: 32.5 PG (ref 26.5–33)
MCHC RBC AUTO-ENTMCNC: 32.7 G/DL (ref 31.5–36.5)
MCV RBC AUTO: 99 FL (ref 78–100)
MONOCYTES # BLD AUTO: 0.5 10E3/UL (ref 0–1.3)
MONOCYTES NFR BLD AUTO: 9 %
NEUTROPHILS # BLD AUTO: 3.7 10E3/UL (ref 1.6–8.3)
NEUTROPHILS NFR BLD AUTO: 67 %
NRBC # BLD AUTO: 0 10E3/UL
NRBC BLD AUTO-RTO: 0 /100
PLATELET # BLD AUTO: 209 10E3/UL (ref 150–450)
POTASSIUM BLD-SCNC: 4.2 MMOL/L (ref 3.4–5.3)
PROT SERPL-MCNC: 7 G/DL (ref 6.8–8.8)
RBC # BLD AUTO: 3.14 10E6/UL (ref 3.8–5.2)
SODIUM SERPL-SCNC: 141 MMOL/L (ref 133–144)
WBC # BLD AUTO: 5.5 10E3/UL (ref 4–11)

## 2021-10-05 PROCEDURE — 250N000011 HC RX IP 250 OP 636: Performed by: INTERNAL MEDICINE

## 2021-10-05 PROCEDURE — 36591 DRAW BLOOD OFF VENOUS DEVICE: CPT

## 2021-10-05 PROCEDURE — 80053 COMPREHEN METABOLIC PANEL: CPT

## 2021-10-05 PROCEDURE — 83036 HEMOGLOBIN GLYCOSYLATED A1C: CPT

## 2021-10-05 PROCEDURE — 77080 DXA BONE DENSITY AXIAL: CPT

## 2021-10-05 PROCEDURE — 85004 AUTOMATED DIFF WBC COUNT: CPT

## 2021-10-05 RX ORDER — HEPARIN SODIUM (PORCINE) LOCK FLUSH IV SOLN 100 UNIT/ML 100 UNIT/ML
500 SOLUTION INTRAVENOUS ONCE
Status: COMPLETED | OUTPATIENT
Start: 2021-10-05 | End: 2021-10-05

## 2021-10-05 RX ADMIN — Medication 500 UNITS: at 13:10

## 2021-10-05 ASSESSMENT — PAIN SCALES - GENERAL: PAINLEVEL: NO PAIN (0)

## 2021-10-05 NOTE — NURSING NOTE
Chief Complaint   Patient presents with     Port Draw     Labs drawn from port, heparin locked, vs and weight obtained     Alia Corrigan RN

## 2021-10-06 ENCOUNTER — MYC MEDICAL ADVICE (OUTPATIENT)
Dept: FAMILY MEDICINE | Facility: CLINIC | Age: 66
End: 2021-10-06

## 2021-10-15 ENCOUNTER — PATIENT OUTREACH (OUTPATIENT)
Dept: ONCOLOGY | Facility: CLINIC | Age: 66
End: 2021-10-15

## 2021-10-15 DIAGNOSIS — C25.0 MALIGNANT NEOPLASM OF HEAD OF PANCREAS (H): Primary | ICD-10-CM

## 2021-10-15 RX ORDER — CAPECITABINE 500 MG/1
TABLET, FILM COATED ORAL
Qty: 42 TABLET | Refills: 3 | Status: SHIPPED | OUTPATIENT
Start: 2021-10-15 | End: 2022-04-01

## 2021-10-17 NOTE — PROGRESS NOTES
RN Care Coordination Note  Received call from patient stating she has been having trouble recently with eyes. She has been working with her ophthalmologist. 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 Treated with prednisone taper. Now recommeding Naprxen 500mg BID. Wants to be sure this is ok with Xeloda.     RNCC reviewed with VICENTE Young CNP. Ok to proceed with Naproxen .    Placed return call to patient. Reviewed recommendations. Answered all questions to her stated satisfaction           Cinthia Contreras RN, BSN, OCN   RN Care Coordinator   [facility]

## 2021-10-29 ENCOUNTER — OFFICE VISIT (OUTPATIENT)
Dept: FAMILY MEDICINE | Facility: CLINIC | Age: 66
End: 2021-10-29
Payer: MEDICARE

## 2021-10-29 VITALS
HEART RATE: 80 BPM | SYSTOLIC BLOOD PRESSURE: 146 MMHG | WEIGHT: 112.1 LBS | BODY MASS INDEX: 18.68 KG/M2 | TEMPERATURE: 98.5 F | HEIGHT: 65 IN | DIASTOLIC BLOOD PRESSURE: 81 MMHG

## 2021-10-29 DIAGNOSIS — E11.9 TYPE 2 DIABETES MELLITUS WITHOUT COMPLICATION, WITHOUT LONG-TERM CURRENT USE OF INSULIN (H): Primary | ICD-10-CM

## 2021-10-29 DIAGNOSIS — C25.0 MALIGNANT NEOPLASM OF HEAD OF PANCREAS (H): ICD-10-CM

## 2021-10-29 DIAGNOSIS — M34.9 SCLERODERMA (H): ICD-10-CM

## 2021-10-29 DIAGNOSIS — M81.6 LOCALIZED OSTEOPOROSIS, UNSPECIFIED PATHOLOGICAL FRACTURE PRESENCE: ICD-10-CM

## 2021-10-29 PROCEDURE — 99214 OFFICE O/P EST MOD 30 MIN: CPT | Performed by: FAMILY MEDICINE

## 2021-10-29 RX ORDER — LANCETS
EACH MISCELLANEOUS
Qty: 100 EACH | Refills: 6 | Status: SHIPPED | OUTPATIENT
Start: 2021-10-29 | End: 2023-10-16

## 2021-10-29 RX ORDER — LISINOPRIL 20 MG/1
10 TABLET ORAL DAILY
Qty: 45 TABLET | Refills: 1 | Status: SHIPPED | OUTPATIENT
Start: 2021-10-29 | End: 2022-02-04

## 2021-10-29 RX ORDER — GLUCOSAMINE HCL/CHONDROITIN SU 500-400 MG
CAPSULE ORAL
Qty: 100 EACH | Refills: 3 | Status: SHIPPED | OUTPATIENT
Start: 2021-10-29 | End: 2023-10-16

## 2021-10-29 RX ORDER — ALENDRONATE SODIUM 70 MG/1
70 TABLET ORAL
Qty: 12 TABLET | Refills: 3 | Status: SHIPPED | OUTPATIENT
Start: 2021-10-29 | End: 2022-09-12

## 2021-10-29 ASSESSMENT — MIFFLIN-ST. JEOR: SCORE: 1035.39

## 2021-10-29 NOTE — PROGRESS NOTES
Assessment & Plan     (E11.9) Type 2 diabetes mellitus without complication, without long-term current use of insulin (H)  (primary encounter diagnosis)  Comment: discussed diagnosis. No need for treatment with meds. Refer to diabetic education as she struggles with being underweight since cancer treatment. Start lisinopril. Will consider statin at our next visit. KAYLA for recent eye exam. The patient indicates understanding of these issues and agrees with the plan.   Plan: lisinopril (ZESTRIL) 20 MG tablet, AMB Adult         Diabetes Educator Referral, blood glucose         monitoring (NO BRAND SPECIFIED) meter device         kit, blood glucose (NO BRAND SPECIFIED) test         strip, blood glucose calibration (NO BRAND         SPECIFIED) solution, thin (NO BRAND SPECIFIED)         lancets, alcohol swab prep pads            (M81.6) Localized osteoporosis, unspecified pathological fracture presence  Comment: reviewed her dexa. Discussed with her. Will start fosamax and refer to PT for weight training info session. The patient indicates understanding of these issues and agrees with the plan.   Plan: alendronate (FOSAMAX) 70 MG tablet, [facility] PT and         Hand Referral            (M34.9) Scleroderma (H)  Comment: will be hard to check her blood sugars. Long term, consider CGM.  Plan:     (C25.0) Malignant neoplasm of head of pancreas (H)  Comment: in remission. Likely whipple has lead to diabetes diagnosis.   Plan:        Regular exercise    Return in about 3 months (around 1/29/2022) for Diabetes Visit.    Monica Tubbs MD  [facility] YOSEF Kapoor is a 66 year old who presents for the following health issues     Follow up for elevated A1c   a1c 6.6  No polydipsia or polyuria    History of pancreatic cancer - had whipple   Diagnosed 8 years ago     Review dexa results   Last dexa was done in 2007  This one shows bone loss to osteoporosis                                                                  IMPRESSION:  1. Marked osteoporosis in the lumbar spine, worse than on the previous  exam.  2. Mild osteoporosis in the left proximal femur, worse than on the  previous exam.  3. Moderate-prominent osteopenia in the right proximal femur, worse  than on the previous exam.       Eye doc -  episceritis - has been using steroid eye drops   [facility] [place] [facility] - send KAYLA     Component      Latest Ref Rng & Units 9/8/2021 9/16/2021 9/16/2021 10/5/2021           10:36 AM 10:36 AM    WBC      4.0 - 11.0 10e3/uL 5.4   5.5   RBC Count      3.80 - 5.20 10e6/uL 3.04 (L)   3.14 (L)   Hemoglobin      11.7 - 15.7 g/dL 9.9 (L)   10.2 (L)   Hematocrit      35.0 - 47.0 % 30.2 (L)   31.2 (L)   MCV      78 - 100 fL 99   99   MCH      26.5 - 33.0 pg 32.6   32.5   MCHC      31.5 - 36.5 g/dL 32.8   32.7   RDW      10.0 - 15.0 % 20.9 (H)   21.2 (H)   Platelet Count      150 - 450 10e3/uL 200   209   % Neutrophils      % 73   67   % Lymphocytes      % 20   23   % Monocytes      % 6   9   % Eosinophils      % 1   1   % Basophils      % 0   0   % Immature Granulocytes      % 0   0   NRBCs per 100 WBC      <1 /100 0   0   Absolute Neutrophils      1.6 - 8.3 10e3/uL 3.9   3.7   Absolute Lymphocytes      0.8 - 5.3 10e3/uL 1.1   1.3   Absolute Monocytes      0.0 - 1.3 10e3/uL 0.3   0.5   Absolute Eosinophils      0.0 - 0.7 10e3/uL 0.0   0.1   Absolute Basophils      0.0 - 0.2 10e3/uL 0.0   0.0   Absolute Immature Granulocytes      <=0.0 10e3/uL 0.0   0.0   Absolute NRBCs      10e3/uL 0.0   0.0   Sodium      133 - 144 mmol/L 140   141   Potassium      3.4 - 5.3 mmol/L 3.9   4.2   Chloride      94 - 109 mmol/L 107   109   Carbon Dioxide      20 - 32 mmol/L 27   28   Anion Gap      3 - 14 mmol/L 6   4   Urea Nitrogen      7 - 30 mg/dL 12   12   Creatinine      0.52 - 1.04 mg/dL 0.73   0.93   Calcium      8.5 - 10.1 mg/dL 9.2   8.7   Glucose      70 - 99 mg/dL 144 (H) 113 (H)  112 (H)   Alkaline Phosphatase       "40 - 150 U/L 134   165 (H)   AST      0 - 45 U/L 25   30   ALT      0 - 50 U/L 24   35   Protein Total      6.8 - 8.8 g/dL 7.0   7.0   Albumin      3.4 - 5.0 g/dL 3.4   3.4   Bilirubin Total      0.2 - 1.3 mg/dL 0.7   0.5   GFR Estimate      >60 mL/min/1.73m2 85   63   Cholesterol      <200 mg/dL   160    Triglycerides      <150 mg/dL   67    HDL Cholesterol      >=50 mg/dL   72    LDL Cholesterol Calculated      <=100 mg/dL   75    Non HDL Cholesterol      <130 mg/dL   88    Patient Fasting > 8hrs?        Unknown Unknown    Hemoglobin A1C      0.0 - 5.6 %    6.6 (H)         Review of Systems   Constitutional, HEENT, cardiovascular, pulmonary, gi and gu systems are negative, except as otherwise noted.      Objective    BP (!) 146/81   Pulse 80   Temp 98.5  F (36.9  C) (Tympanic)   Ht 1.645 m (5' 4.75\")   Wt 50.8 kg (112 lb 1.6 oz)   BMI 18.80 kg/m    Body mass index is 18.8 kg/m .  Physical Exam   GENERAL: healthy, alert and no distress  EYES: Eyes grossly normal to inspection, PERRL and conjunctivae and sclerae normal  RESP: lungs clear to auscultation - no rales, rhonchi or wheezes  CV: regular rate and rhythm, normal S1 S2, no S3 or S4, no murmur, click or rub, no peripheral edema and peripheral pulses strong  ABDOMEN: soft, nontender, no hepatosplenomegaly, no masses and bowel sounds normal  MS: no gross musculoskeletal defects noted, no edema  NEURO: Normal strength and tone, mentation intact and speech normal  PSYCH: mentation appears normal, affect normal/bright            "

## 2021-11-01 ENCOUNTER — TELEPHONE (OUTPATIENT)
Dept: FAMILY MEDICINE | Facility: CLINIC | Age: 66
End: 2021-11-01
Payer: MEDICARE

## 2021-11-01 DIAGNOSIS — E11.9 TYPE 2 DIABETES MELLITUS WITHOUT COMPLICATION, WITHOUT LONG-TERM CURRENT USE OF INSULIN (H): Primary | ICD-10-CM

## 2021-11-01 NOTE — TELEPHONE ENCOUNTER
Diabetes Education Scheduling Outreach #1:    Call to patient to schedule. Left message with phone number to call to schedule.    Plan for 2nd outreach attempt within 1 week.    Candi Maddox  [facility]  Diabetes and Nutrition Scheduling

## 2021-11-08 ENCOUNTER — ANCILLARY PROCEDURE (OUTPATIENT)
Dept: CT IMAGING | Facility: CLINIC | Age: 66
End: 2021-11-08
Attending: INTERNAL MEDICINE
Payer: MEDICARE

## 2021-11-08 ENCOUNTER — ONCOLOGY VISIT (OUTPATIENT)
Dept: ONCOLOGY | Facility: CLINIC | Age: 66
End: 2021-11-08
Attending: INTERNAL MEDICINE
Payer: MEDICARE

## 2021-11-08 ENCOUNTER — LAB (OUTPATIENT)
Dept: LAB | Facility: CLINIC | Age: 66
End: 2021-11-08
Attending: INTERNAL MEDICINE
Payer: MEDICARE

## 2021-11-08 VITALS
WEIGHT: 111.9 LBS | TEMPERATURE: 98.4 F | HEART RATE: 74 BPM | SYSTOLIC BLOOD PRESSURE: 146 MMHG | DIASTOLIC BLOOD PRESSURE: 79 MMHG | OXYGEN SATURATION: 99 % | BODY MASS INDEX: 18.64 KG/M2 | RESPIRATION RATE: 16 BRPM | HEIGHT: 65 IN

## 2021-11-08 DIAGNOSIS — C25.0 MALIGNANT NEOPLASM OF HEAD OF PANCREAS (H): ICD-10-CM

## 2021-11-08 DIAGNOSIS — Z95.828 PORT-A-CATH IN PLACE: Primary | ICD-10-CM

## 2021-11-08 DIAGNOSIS — C25.0 MALIGNANT NEOPLASM OF HEAD OF PANCREAS (H): Primary | ICD-10-CM

## 2021-11-08 LAB
ALBUMIN SERPL-MCNC: 3.5 G/DL (ref 3.4–5)
ALP SERPL-CCNC: 156 U/L (ref 40–150)
ALT SERPL W P-5'-P-CCNC: 27 U/L (ref 0–50)
ANION GAP SERPL CALCULATED.3IONS-SCNC: 6 MMOL/L (ref 3–14)
AST SERPL W P-5'-P-CCNC: 27 U/L (ref 0–45)
BASOPHILS # BLD AUTO: 0 10E3/UL (ref 0–0.2)
BASOPHILS NFR BLD AUTO: 0 %
BILIRUB SERPL-MCNC: 0.5 MG/DL (ref 0.2–1.3)
BUN SERPL-MCNC: 10 MG/DL (ref 7–30)
CALCIUM SERPL-MCNC: 8.6 MG/DL (ref 8.5–10.1)
CHLORIDE BLD-SCNC: 111 MMOL/L (ref 94–109)
CO2 SERPL-SCNC: 25 MMOL/L (ref 20–32)
CREAT SERPL-MCNC: 0.57 MG/DL (ref 0.52–1.04)
EOSINOPHIL # BLD AUTO: 0.1 10E3/UL (ref 0–0.7)
EOSINOPHIL NFR BLD AUTO: 1 %
ERYTHROCYTE [DISTWIDTH] IN BLOOD BY AUTOMATED COUNT: 20 % (ref 10–15)
GFR SERPL CREATININE-BSD FRML MDRD: >90 ML/MIN/1.73M2
GLUCOSE BLD-MCNC: 63 MG/DL (ref 70–99)
HCT VFR BLD AUTO: 31 % (ref 35–47)
HGB BLD-MCNC: 10.1 G/DL (ref 11.7–15.7)
IMM GRANULOCYTES # BLD: 0 10E3/UL
IMM GRANULOCYTES NFR BLD: 0 %
LYMPHOCYTES # BLD AUTO: 1.2 10E3/UL (ref 0.8–5.3)
LYMPHOCYTES NFR BLD AUTO: 24 %
MCH RBC QN AUTO: 33 PG (ref 26.5–33)
MCHC RBC AUTO-ENTMCNC: 32.6 G/DL (ref 31.5–36.5)
MCV RBC AUTO: 101 FL (ref 78–100)
MONOCYTES # BLD AUTO: 0.6 10E3/UL (ref 0–1.3)
MONOCYTES NFR BLD AUTO: 12 %
NEUTROPHILS # BLD AUTO: 3.1 10E3/UL (ref 1.6–8.3)
NEUTROPHILS NFR BLD AUTO: 63 %
NRBC # BLD AUTO: 0 10E3/UL
NRBC BLD AUTO-RTO: 0 /100
PLATELET # BLD AUTO: 193 10E3/UL (ref 150–450)
POTASSIUM BLD-SCNC: 4 MMOL/L (ref 3.4–5.3)
PROT SERPL-MCNC: 6.8 G/DL (ref 6.8–8.8)
RBC # BLD AUTO: 3.06 10E6/UL (ref 3.8–5.2)
SODIUM SERPL-SCNC: 142 MMOL/L (ref 133–144)
WBC # BLD AUTO: 5.1 10E3/UL (ref 4–11)

## 2021-11-08 PROCEDURE — 85025 COMPLETE CBC W/AUTO DIFF WBC: CPT

## 2021-11-08 PROCEDURE — 80053 COMPREHEN METABOLIC PANEL: CPT

## 2021-11-08 PROCEDURE — 71260 CT THORAX DX C+: CPT | Mod: MG | Performed by: RADIOLOGY

## 2021-11-08 PROCEDURE — G0463 HOSPITAL OUTPT CLINIC VISIT: HCPCS

## 2021-11-08 PROCEDURE — 99214 OFFICE O/P EST MOD 30 MIN: CPT | Performed by: INTERNAL MEDICINE

## 2021-11-08 PROCEDURE — 74177 CT ABD & PELVIS W/CONTRAST: CPT | Mod: MG | Performed by: RADIOLOGY

## 2021-11-08 PROCEDURE — G1004 CDSM NDSC: HCPCS | Mod: GC | Performed by: RADIOLOGY

## 2021-11-08 PROCEDURE — 250N000011 HC RX IP 250 OP 636: Performed by: INTERNAL MEDICINE

## 2021-11-08 PROCEDURE — 36591 DRAW BLOOD OFF VENOUS DEVICE: CPT

## 2021-11-08 RX ORDER — IOPAMIDOL 755 MG/ML
69 INJECTION, SOLUTION INTRAVASCULAR ONCE
Status: COMPLETED | OUTPATIENT
Start: 2021-11-08 | End: 2021-11-08

## 2021-11-08 RX ORDER — PREDNISOLONE ACETATE 10 MG/ML
SUSPENSION/ DROPS OPHTHALMIC
COMMUNITY
Start: 2021-09-12 | End: 2022-02-04

## 2021-11-08 RX ORDER — HEPARIN SODIUM (PORCINE) LOCK FLUSH IV SOLN 100 UNIT/ML 100 UNIT/ML
5 SOLUTION INTRAVENOUS DAILY PRN
Status: DISCONTINUED | OUTPATIENT
Start: 2021-11-08 | End: 2021-11-14 | Stop reason: HOSPADM

## 2021-11-08 RX ORDER — HEPARIN SODIUM (PORCINE) LOCK FLUSH IV SOLN 100 UNIT/ML 100 UNIT/ML
500 SOLUTION INTRAVENOUS ONCE
Status: COMPLETED | OUTPATIENT
Start: 2021-11-08 | End: 2021-11-08

## 2021-11-08 RX ADMIN — IOPAMIDOL 69 ML: 755 INJECTION, SOLUTION INTRAVASCULAR at 10:12

## 2021-11-08 RX ADMIN — Medication 5 ML: at 09:52

## 2021-11-08 RX ADMIN — HEPARIN SODIUM (PORCINE) LOCK FLUSH IV SOLN 100 UNIT/ML 500 UNITS: 100 SOLUTION at 13:03

## 2021-11-08 ASSESSMENT — MIFFLIN-ST. JEOR: SCORE: 1034.71

## 2021-11-08 ASSESSMENT — PAIN SCALES - GENERAL: PAINLEVEL: NO PAIN (0)

## 2021-11-08 NOTE — PROGRESS NOTES
I am seeing Tiana Weathers today in follow-up of locally recurrent pancreatic cancer.    She is 66 years old and had a Whipple many years ago with a subsequent biopsy-proven local recurrence in the resection bed and encasing her SMA more than 5 years ago.  She had an excellent response to initial therapy with FOLFIRINOX and has now been on single agent Xeloda for many years.  Her therapy has been complicated by the development of a scleroderma-like syndrome, but otherwise she has tolerated treatment well.  She returns today for ongoing response assessment.  She continues to be quite functional and her only disappointment was that she did not make her goal of bicycling 20,000 miles this summer having only made it to about 19,000.  She has lost perhaps a couple of pounds, but continues to eat quite well.  The contractures and skin changes on her hands and feet are stable and not bothering her much.  She has developed in the last few months irritation of her eyes which is new and has been seeing ophthalmology for that.  She had a course of topical steroids which does not seem to make much difference.  She is currently been instructed to start on regular use of nonsteroidal to try and improve her symptoms.  She recently established in care with a new primary care provider and is concerned because she had her hemoglobin A1c checked and it was mildly elevated at 6.6.  She has been more distressed lately and she is looking at changing her home to something that would be closer to her daughter in anticipation of her disease eventually progressing.    On physical exam today she appears well other than being quite slender as always.  She has mild conjunctival injection.  Her contractures and fissuring in her palms appears stable.    I personally reviewed her CT scan went over the results with her continues to show the changes in her resection bed without any evidence of disease progression.  Her electrolytes and renal  function are normal.  Her bilirubin, albumin and liver enzymes are normal.  She has mild macrocytic anemia and otherwise normal blood counts.    Assessment/plan: Locally recurrent pancreatic cancer with long-term stable disease on single agent Xeloda.  We touched briefly on whether she would like to stop treatment, and is always she wants to continue on with this.  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  We will plan on reevaluating her disease status again in another 4 months.

## 2021-11-08 NOTE — LETTER
11/8/2021         RE: Emelia Weathers  [address]  [place] [address]        I am seeing Tiana Weathers today in follow-up of locally recurrent pancreatic cancer.    She is 66 years old and had a Whipple many years ago with a subsequent biopsy-proven local recurrence in the resection bed and encasing her SMA more than 5 years ago.  She had an excellent response to initial therapy with FOLFIRINOX and has now been on single agent Xeloda for many years.  Her therapy has been complicated by the development of a scleroderma-like syndrome, but otherwise she has tolerated treatment well.  She returns today for ongoing response assessment.  She continues to be quite functional and her only disappointment was that she did not make her goal of bicycling 20,000 miles this summer having only made it to about 19,000.  She has lost perhaps a couple of pounds, but continues to eat quite well.  The contractures and skin changes on her hands and feet are stable and not bothering her much.  She has developed in the last few months irritation of her eyes which is new and has been seeing ophthalmology for that.  She had a course of topical steroids which does not seem to make much difference.  She is currently been instructed to start on regular use of nonsteroidal to try and improve her symptoms.  She recently established in care with a new primary care provider and is concerned because she had her hemoglobin A1c checked and it was mildly elevated at 6.6.  She has been more distressed lately and she is looking at changing her home to something that would be closer to her daughter in anticipation of her disease eventually progressing.    On physical exam today she appears well other than being quite slender as always.  She has mild conjunctival injection.  Her contractures and fissuring in her palms appears stable.    I personally reviewed her CT scan went over the results with her continues to show the changes in  her resection bed without any evidence of disease progression.  Her electrolytes and renal function are normal.  Her bilirubin, albumin and liver enzymes are normal.  She has mild macrocytic anemia and otherwise normal blood counts.    Assessment/plan: Locally recurrent pancreatic cancer with long-term stable disease on single agent Xeloda.  We touched briefly on whether she would like to stop treatment, and is always she wants to continue on with this.  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  We will plan on reevaluating her disease status again in another 4 months.        Yasmany Wade MD

## 2021-11-08 NOTE — NURSING NOTE
Chief Complaint   Patient presents with     Port Draw     Labs drawn via port by RN in lab.      Suzanne Vyas RN

## 2021-11-08 NOTE — NURSING NOTE
"Oncology Rooming Note    November 8, 2021 5:03 PM   Emelia Weathers is a 66 year old female who presents for:    Chief Complaint   Patient presents with     Oncology Clinic Visit     PANCREATIC CANCER     Initial Vitals: BP (!) 146/79 (BP Location: Right arm, Patient Position: Sitting, Cuff Size: Adult Regular)   Pulse 74   Temp 98.4  F (36.9  C) (Oral)   Resp 16   Ht 1.645 m (5' 4.76\")   Wt 50.8 kg (111 lb 14.4 oz)   SpO2 99%   BMI 18.76 kg/m   Estimated body mass index is 18.76 kg/m  as calculated from the following:    Height as of this encounter: 1.645 m (5' 4.76\").    Weight as of this encounter: 50.8 kg (111 lb 14.4 oz). Body surface area is 1.52 meters squared.  No Pain (0) Comment: Data Unavailable   No LMP recorded. Patient is postmenopausal.  Allergies reviewed: Yes  Medications reviewed: Yes    Medications: MEDICATION REFILLS NEEDED TODAY. Provider was notified.  Pharmacy name entered into [facility]:    [id number] - [place], [place] - [address] [place] [address] PHARMACY [facility] - [place], [place] - [address] PHARMACY [facility] [place] [phone]  [id number] - [place], [place] - [address] [place] [address] [facility] - [place], [place] - [address] [place] [address]    Clinical concerns: Refill Creon. No other new concerns.        Ailin Rajput CMA              "

## 2021-11-18 ENCOUNTER — ALLIED HEALTH/NURSE VISIT (OUTPATIENT)
Dept: EDUCATION SERVICES | Facility: CLINIC | Age: 66
End: 2021-11-18
Payer: MEDICARE

## 2021-11-18 DIAGNOSIS — E11.9 TYPE 2 DIABETES MELLITUS WITHOUT COMPLICATION, WITHOUT LONG-TERM CURRENT USE OF INSULIN (H): ICD-10-CM

## 2021-11-18 PROCEDURE — 95250 CONT GLUC MNTR PHYS/QHP EQP: CPT

## 2021-11-18 PROCEDURE — G0108 DIAB MANAGE TRN  PER INDIV: HCPCS

## 2021-11-18 NOTE — LETTER
"    11/18/2021         RE: Emelia Weathers  [address]  [place] [address]        Dear Colleague,    Thank you for referring your patient, Emelia Weathers, to the [facility]. Please see a copy of my visit note below.    Diabetes Self-Management Education & Support    Presents for:      SUBJECTIVE/OBJECTIVE:  Diabetes education in the past 24mo: (P) No  Diabetes type: (P) Other  Disease course: (P) Other  Diabetes management related comments/concerns: (P) Difficult to monitor BS due to sclederma in fingers  Cultural Influences/Ethnic Background:  Not  or       Diabetes Symptoms & Complications:  Fatigue: (P) Sometimes  Neuropathy: (P) No  Polydipsia: (P) No  Polyphagia: (P) Sometimes  Polyuria: (P) No  Visual change: (P) Sometimes  Slow healing wounds: (P) No  Autonomic neuropathy: (P) No  CVA: (P) No  Heart disease: (P) No  Nephropathy: (P) No  Peripheral neuropathy: (P) Other  Peripheral Vascular Disease: (P) No  Retinopathy: (P) No  Sexual dysfunction: (P) No    Patient Problem List and Family Medical History reviewed for relevant medical history, current medical status, and diabetes risk factors.    Vitals:  There were no vitals taken for this visit.  Estimated body mass index is 18.76 kg/m  as calculated from the following:    Height as of 11/8/21: 1.645 m (5' 4.76\").    Weight as of 11/8/21: 50.8 kg (111 lb 14.4 oz).   Last 3 BP:   BP Readings from Last 3 Encounters:   11/08/21 (!) 146/79   10/29/21 (!) 146/81   10/05/21 135/77       History   Smoking Status     Former Smoker     Packs/day: 0.00     Years: 0.00     Quit date: 9/18/1993   Smokeless Tobacco     Never Used       Labs:  Lab Results   Component Value Date    A1C 6.6 10/05/2021    A1C 6.0 07/03/2017     Lab Results   Component Value Date    GLC 63 11/08/2021     06/08/2021     Lab Results   Component Value Date    LDL 75 09/16/2021    LDL 70 04/18/2016     HDL Cholesterol   Date Value Ref " Range Status   04/18/2016 50 >49 mg/dL Final     Direct Measure HDL   Date Value Ref Range Status   09/16/2021 72 >=50 mg/dL Final   ]  GFR Estimate   Date Value Ref Range Status   11/08/2021 >90 >60 mL/min/1.73m2 Final     Comment:     As of July 11, 2021, eGFR is calculated by the CKD-EPI creatinine equation, without race adjustment. eGFR can be influenced by muscle mass, exercise, and diet. The reported eGFR is an estimation only and is only applicable if the renal function is stable.   06/08/2021 77 >60 mL/min/[1.73_m2] Final     Comment:     Non  GFR Calc  Starting 12/18/2018, serum creatinine based estimated GFR (eGFR) will be   calculated using the Chronic Kidney Disease Epidemiology Collaboration   (CKD-EPI) equation.       GFR Estimate If Black   Date Value Ref Range Status   06/08/2021 89 >60 mL/min/[1.73_m2] Final     Comment:      GFR Calc  Starting 12/18/2018, serum creatinine based estimated GFR (eGFR) will be   calculated using the Chronic Kidney Disease Epidemiology Collaboration   (CKD-EPI) equation.       Lab Results   Component Value Date    CR 0.57 11/08/2021    CR 0.79 06/08/2021     No results found for: MICROALBUMIN    Healthy Eating:  Cultural/Orthodox diet restrictions?: (P) No  Meal planning/habits: (P) Avoiding sweets,Carb counting,Low salt,Frequent snacking  How many times a week on average do you eat food made away from home (restaurant/take-out)?: (P) 0  Meals include: (P) Breakfast,Lunch,Dinner,Morning Snack,Afternoon Snack,Evening Snack  Beverages: (P) Water,Tea,Coffee    Being Active:  Days per week of moderate to strenuous exercise (like a brisk walk): (P) 7  On average, minutes per day of exercise at this level: (P) 60  How intense was your typical exercise? : (P) Moderate (like brisk walking)  Exercise Minutes per Week: (P) 420  Barrier to exercise: (P) None    Monitoring:  Blood Glucose Meter: (P) One Touch  Times checking blood sugar at home  (number): (P) 1  Times checking blood sugar at home (per): (P) Day  Blood glucose trend: (P) Other      Taking Medications:  Current Treatments: (P) None      Reducing Risks:  CAD Risks: (P) Post-menopausal  Has dilated eye exam at least once a year?: (P) Yes  Sees dentist every 6 months?: (P) No  Feet checked by healthcare provider in the last year?: (P) No    Healthy Coping:  Informal Support system:: (P) Children,Debbie based,Family,Friends  Patient Activation Measure Survey Score:  No flowsheet data found.      Education provided today on:  AADE Self-Care Behaviors:  Diabetes Pathophysiology  Healthy Eating: carbohydrate counting, consistency in amount, composition, and timing of food intake, portion control and label reading  Being Active: relationship to blood glucose  Monitoring: individual blood glucose targets and frequency of monitoring  Problem Solving: high blood glucose - causes, signs/symptoms, treatment and prevention  Reducing Risks: A1C - goals, relating to blood glucose levels, how often to check, lipids levels and goals and blood pressure and goals    Opportunities for ongoing education and support in diabetes-self management were discussed.  Pt verbalized understanding of concepts discussed and recommendations provided today.       Education Materials Provided:  BG Log Sheet, Carbohydrate Counting and label reading, A1c chart, Kamila 2     ASSESSMENT:  Pt was seen today for DM education. She was initially diagnosed 7-8 years ago just prior to finding out she had pancreatic cancer. Pt has been undergoing chemo for the past 7 years. Her A1c has stayed in normal range up until recently at 6.6%. Briefly discussed DM patho. Reviewed BG and A1c goals. Pt has a meter at home that she has been using, reports FBG this morning at 118.   It is going to be very challenging for pt to check BG at home due to her skin condition on her fingers and hands - this is a side effect of the chemo. It is painful but she  has been doing it because she is really wanting to make sure that she is controlling her DM. Pt would really benefit from a CGM so she can learn how her blood sugars are reacting. This will be near impossible with a finger stick. Will do a pro nusrat today so pt can gain insight into BG patterns, especially with what she is eating. Pt notes she typically is a high carb eater. She has a good appetite and low body weight at 111 lbs. Pt notes she was typically around 140 lbs prior to her diagnosis of pancreatic cancer.   We reviewed cho foods and portions sizes today. Reviewed label reading, she is already looking at labels.   Discussed activity and impact on BG. Pt is a very active person. She desires to have good control over her DM.       Sensor was inserted on left arm with no resistance or bleeding at insertion site.  Pt verbalized understanding of concepts discussed and recommendations provided today.      PLAN  Pt will remove CGM in 14 days. F/u on 12/13 as scheduled and review CGM data.   Will try to get an appeal from medicare for personal CGM.       Time spent in DSMT: 30 minutes   Time spent in CGM insertion: 30 minutes, in addition to time spent in DSMT  Time Spent: 60 minutes  Encounter Type: Individual    Any diabetes medication dose changes were made via the CDE Protocol and Collaborative Practice Agreement with the patient's referring provider. A copy of this encounter was shared with the provider.

## 2021-11-18 NOTE — PROGRESS NOTES
"Diabetes Self-Management Education & Support    Presents for:      SUBJECTIVE/OBJECTIVE:  Diabetes education in the past 24mo: (P) No  Diabetes type: (P) Other  Disease course: (P) Other  Diabetes management related comments/concerns: (P) Difficult to monitor BS due to sclederma in fingers  Cultural Influences/Ethnic Background:  Not  or       Diabetes Symptoms & Complications:  Fatigue: (P) Sometimes  Neuropathy: (P) No  Polydipsia: (P) No  Polyphagia: (P) Sometimes  Polyuria: (P) No  Visual change: (P) Sometimes  Slow healing wounds: (P) No  Autonomic neuropathy: (P) No  CVA: (P) No  Heart disease: (P) No  Nephropathy: (P) No  Peripheral neuropathy: (P) Other  Peripheral Vascular Disease: (P) No  Retinopathy: (P) No  Sexual dysfunction: (P) No    Patient Problem List and Family Medical History reviewed for relevant medical history, current medical status, and diabetes risk factors.    Vitals:  There were no vitals taken for this visit.  Estimated body mass index is 18.76 kg/m  as calculated from the following:    Height as of 11/8/21: 1.645 m (5' 4.76\").    Weight as of 11/8/21: 50.8 kg (111 lb 14.4 oz).   Last 3 BP:   BP Readings from Last 3 Encounters:   11/08/21 (!) 146/79   10/29/21 (!) 146/81   10/05/21 135/77       History   Smoking Status     Former Smoker     Packs/day: 0.00     Years: 0.00     Quit date: 9/18/1993   Smokeless Tobacco     Never Used       Labs:  Lab Results   Component Value Date    A1C 6.6 10/05/2021    A1C 6.0 07/03/2017     Lab Results   Component Value Date    GLC 63 11/08/2021     06/08/2021     Lab Results   Component Value Date    LDL 75 09/16/2021    LDL 70 04/18/2016     HDL Cholesterol   Date Value Ref Range Status   04/18/2016 50 >49 mg/dL Final     Direct Measure HDL   Date Value Ref Range Status   09/16/2021 72 >=50 mg/dL Final   ]  GFR Estimate   Date Value Ref Range Status   11/08/2021 >90 >60 mL/min/1.73m2 Final     Comment:     As of July 11, 2021, " eGFR is calculated by the CKD-EPI creatinine equation, without race adjustment. eGFR can be influenced by muscle mass, exercise, and diet. The reported eGFR is an estimation only and is only applicable if the renal function is stable.   06/08/2021 77 >60 mL/min/[1.73_m2] Final     Comment:     Non  GFR Calc  Starting 12/18/2018, serum creatinine based estimated GFR (eGFR) will be   calculated using the Chronic Kidney Disease Epidemiology Collaboration   (CKD-EPI) equation.       GFR Estimate If Black   Date Value Ref Range Status   06/08/2021 89 >60 mL/min/[1.73_m2] Final     Comment:      GFR Calc  Starting 12/18/2018, serum creatinine based estimated GFR (eGFR) will be   calculated using the Chronic Kidney Disease Epidemiology Collaboration   (CKD-EPI) equation.       Lab Results   Component Value Date    CR 0.57 11/08/2021    CR 0.79 06/08/2021     No results found for: MICROALBUMIN    Healthy Eating:  Cultural/Denominational diet restrictions?: (P) No  Meal planning/habits: (P) Avoiding sweets,Carb counting,Low salt,Frequent snacking  How many times a week on average do you eat food made away from home (restaurant/take-out)?: (P) 0  Meals include: (P) Breakfast,Lunch,Dinner,Morning Snack,Afternoon Snack,Evening Snack  Beverages: (P) Water,Tea,Coffee    Being Active:  Days per week of moderate to strenuous exercise (like a brisk walk): (P) 7  On average, minutes per day of exercise at this level: (P) 60  How intense was your typical exercise? : (P) Moderate (like brisk walking)  Exercise Minutes per Week: (P) 420  Barrier to exercise: (P) None    Monitoring:  Blood Glucose Meter: (P) One Touch  Times checking blood sugar at home (number): (P) 1  Times checking blood sugar at home (per): (P) Day  Blood glucose trend: (P) Other      Taking Medications:  Current Treatments: (P) None      Reducing Risks:  CAD Risks: (P) Post-menopausal  Has dilated eye exam at least once a year?: (P)  Yes  Sees dentist every 6 months?: (P) No  Feet checked by healthcare provider in the last year?: (P) No    Healthy Coping:  Informal Support system:: (P) Children,Debbie based,Family,Friends  Patient Activation Measure Survey Score:  No flowsheet data found.      Education provided today on:  AADE Self-Care Behaviors:  Diabetes Pathophysiology  Healthy Eating: carbohydrate counting, consistency in amount, composition, and timing of food intake, portion control and label reading  Being Active: relationship to blood glucose  Monitoring: individual blood glucose targets and frequency of monitoring  Problem Solving: high blood glucose - causes, signs/symptoms, treatment and prevention  Reducing Risks: A1C - goals, relating to blood glucose levels, how often to check, lipids levels and goals and blood pressure and goals    Opportunities for ongoing education and support in diabetes-self management were discussed.  Pt verbalized understanding of concepts discussed and recommendations provided today.       Education Materials Provided:  BG Log Sheet, Carbohydrate Counting and label reading, A1c chart, Kamila 2     ASSESSMENT:  Pt was seen today for DM education. She was initially diagnosed 7-8 years ago just prior to finding out she had pancreatic cancer. Pt has been undergoing chemo for the past 7 years. Her A1c has stayed in normal range up until recently at 6.6%. Briefly discussed DM patho. Reviewed BG and A1c goals. Pt has a meter at home that she has been using, reports FBG this morning at 118.   It is going to be very challenging for pt to check BG at home due to her skin condition on her fingers and hands - this is a side effect of the chemo. It is painful but she has been doing it because she is really wanting to make sure that she is controlling her DM. Pt would really benefit from a CGM so she can learn how her blood sugars are reacting. This will be near impossible with a finger stick. Will do a pro kamila today  so pt can gain insight into BG patterns, especially with what she is eating. Pt notes she typically is a high carb eater. She has a good appetite and low body weight at 111 lbs. Pt notes she was typically around 140 lbs prior to her diagnosis of pancreatic cancer.   We reviewed cho foods and portions sizes today. Reviewed label reading, she is already looking at labels.   Discussed activity and impact on BG. Pt is a very active person. She desires to have good control over her DM.       Sensor was inserted on left arm with no resistance or bleeding at insertion site.  Pt verbalized understanding of concepts discussed and recommendations provided today.      PLAN  Pt will remove CGM in 14 days. F/u on 12/13 as scheduled and review CGM data.   Will try to get an appeal from medicare for personal CGM.       Time spent in DSMT: 30 minutes   Time spent in CGM insertion: 30 minutes, in addition to time spent in DSMT  Time Spent: 60 minutes  Encounter Type: Individual    Any diabetes medication dose changes were made via the CDE Protocol and Collaborative Practice Agreement with the patient's referring provider. A copy of this encounter was shared with the provider.

## 2021-11-18 NOTE — TELEPHONE ENCOUNTER
----- Message from Johnna Cope RN sent at 11/18/2021 12:29 PM CST -----  Hi Dr. Tubbs,     Pt was seen today for DM edu.   Can you please place a referral under DM edu for CGM.     Thanks so much!  Johnna

## 2021-11-19 ENCOUNTER — TELEPHONE (OUTPATIENT)
Dept: EDUCATION SERVICES | Facility: CLINIC | Age: 66
End: 2021-11-19
Payer: MEDICARE

## 2021-11-19 NOTE — TELEPHONE ENCOUNTER
Faxed CMN for nusrat 2 to Dr. Tubbs at fax: [phone].       Please have Dr. Tubbs sign, then fax to the  specialty pharmacy at fax #: [phone]

## 2021-12-02 ENCOUNTER — THERAPY VISIT (OUTPATIENT)
Dept: PHYSICAL THERAPY | Facility: CLINIC | Age: 66
End: 2021-12-02
Attending: FAMILY MEDICINE
Payer: MEDICARE

## 2021-12-02 DIAGNOSIS — M81.6 LOCALIZED OSTEOPOROSIS, UNSPECIFIED PATHOLOGICAL FRACTURE PRESENCE: ICD-10-CM

## 2021-12-02 DIAGNOSIS — M62.81 GENERALIZED MUSCLE WEAKNESS: ICD-10-CM

## 2021-12-02 PROBLEM — M81.0 OSTEOPOROSIS: Status: ACTIVE | Noted: 2021-10-29

## 2021-12-02 PROCEDURE — 97110 THERAPEUTIC EXERCISES: CPT | Mod: GP | Performed by: PHYSICAL THERAPIST

## 2021-12-02 PROCEDURE — 97161 PT EVAL LOW COMPLEX 20 MIN: CPT | Mod: GP | Performed by: PHYSICAL THERAPIST

## 2021-12-02 ASSESSMENT — ACTIVITIES OF DAILY LIVING (ADL)
HOW_WOULD_YOU_RATE_YOUR_CURRENT_LEVEL_OF_FUNCTION_DURING_YOUR_USUAL_ACTIVITIES_OF_DAILY_LIVING_FROM_0_TO_100_WITH_100_BEING_YOUR_LEVEL_OF_FUNCTION_PRIOR_TO_YOUR_HIP_PROBLEM_AND_0_BEING_THE_INABILITY_TO_PERFORM_ANY_OF_YOUR_USUAL_DAILY_ACTIVITIES?: 100
HOS_ADL_ITEM_SCORE_TOTAL: 68
STANDING_FOR_15_MINUTES: NO DIFFICULTY AT ALL
RECREATIONAL_ACTIVITIES: NO DIFFICULTY AT ALL
HOS_ADL_SCORE(%): 100
HOS_ADL_COUNT: 17
DEEP_SQUATTING: NO DIFFICULTY AT ALL
STEPPING_UP_AND_DOWN_CURBS: NO DIFFICULTY AT ALL
WALKING_APPROXIMATELY_10_MINUTES: NO DIFFICULTY AT ALL
WALKING_15_MINUTES_OR_GREATER: NO DIFFICULTY AT ALL
HEAVY_WORK: NO DIFFICULTY AT ALL
GOING_DOWN_1_FLIGHT_OF_STAIRS: NO DIFFICULTY AT ALL
GETTING_INTO_AND_OUT_OF_A_BATHTUB: NO DIFFICULTY AT ALL
ROLLING_OVER_IN_BED: NO DIFFICULTY AT ALL
LIGHT_TO_MODERATE_WORK: NO DIFFICULTY AT ALL
WALKING_DOWN_STEEP_HILLS: NO DIFFICULTY AT ALL
TWISTING/PIVOTING_ON_INVOLVED_LEG: NO DIFFICULTY AT ALL
HOS_ADL_HIGHEST_POTENTIAL_SCORE: 68
GETTING_INTO_AND_OUT_OF_AN_AVERAGE_CAR: NO DIFFICULTY AT ALL
PUTTING_ON_SOCKS_AND_SHOES: NO DIFFICULTY AT ALL
WALKING_INITIALLY: NO DIFFICULTY AT ALL
GOING_UP_1_FLIGHT_OF_STAIRS: NO DIFFICULTY AT ALL
WALKING_UP_STEEP_HILLS: NO DIFFICULTY AT ALL
SITTING_FOR_15_MINUTES: NO DIFFICULTY AT ALL

## 2021-12-02 NOTE — PROGRESS NOTES
[facility]    OUTPATIENT Physical Therapy ORTHOPEDIC EVALUATION  PLAN OF TREATMENT FOR OUTPATIENT REHABILITATION  (COMPLETE FOR INITIAL CLAIMS ONLY)  Patient's Last Name, First Name, M.I.  YOB: 1955  Emelia Weathers    Provider s Name:  [facility]   Medical Record No.  [id number]   Start of Care Date:  12/02/21   Onset Date:   10/29/21   Type:     _X__PT   ___OT Medical Diagnosis:    Encounter Diagnoses   Name Primary?     Localized osteoporosis, unspecified pathological fracture presence      Generalized muscle weakness         Treatment Diagnosis:           Goals:     12/02/21 0500   Body Part   Goals listed below are for osteoporosis   Other Goal   Activity Return to strengthening program to prevent further bony deterioration   Previous Functional Level Pt was regularly participating in [facility] strengthening program   Current Functional Level Pt has not been able to participate in strengthening program at the  due to COVID   STG Target Performance Pt to be able to demonstrate proper form and joint protection with initiating strengthening program.   Rationale To prevent further bone loss and be able to complete all ADL's without difficulty   Due Date 12/22/21   LTG Target Performance Pt to be able to participate in full strengthening program with joint protection without difficulty   Rationale To prevent further bone loss and be able to complete all ADL's without difficulty   Due Date 01/31/22       Therapy Frequency:  1x every 3 weeks  Predicted Duration of Therapy Intervention:  3 months    Sumaya Coleman, PT                 I CERTIFY THE NEED FOR THESE SERVICES FURNISHED UNDER        THIS PLAN OF TREATMENT AND WHILE UNDER MY CARE     (Physician attestation of this document indicates review and certification of the therapy plan).                        Certification Date From:  12/02/21   Certification Date To:  03/01/22    Referring Provider:  Monica Tubbs    Initial Assessment        See Epic Evaluation SOC Date: 12/02/21

## 2021-12-02 NOTE — PROGRESS NOTES
Physical Therapy Initial Evaluation  Subjective:  The history is provided by the patient. No  was used.   Patient Health History  Emelia Weathers being seen for Strenth training exercises due to osteoporosis.          Pain is reported as 1/10 on pain scale.  General health as reported by patient is fair.  Pertinent medical history includes: cancer, diabetes, menopausal, osteoporosis and changes in skin color.   Red flags:  None as reported by patient.  Medical allergies: none.   Surgeries include:  Cancer surgery.    Current medications:  Bone density.    Current occupation is Retired.                     Therapist Generated HPI Evaluation  Problem details: Pt had osteopenia in her early 50s and now has been diagnosed with osteoporosis and was fosamax since Oct. Denies much pain overall. Daughter is a PT and says she has tennis elbow but other than that, doesn't have pain in general. [facility] but since then, has not been there.     Pt has pancreatic cancer and has been on chemo for 7 years. Some of the fatigue is likely related to the chemo. 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 Does always get her 10,000 steps in daily. Bikes in the summer and does mow her yard and shovel in the winter. Does have a dog and walks her dog. Because she is immune compromised, she doesn't do much other than go outside..     Emelia Weathers is a 66 year old female with reduced bone density and deconditioning condition which occurred with osteoporosis.   This is a new condition.        Barriers to activity include unsure how to start.    DXA scores show Osteoporosis (1. Marked osteoporosis in the lumbar spine, worse than on the previous).          Symptoms are exacerbated by nothing  and relieved by nothing.                                  Barriers include:  None as reported by patient.                        Objective:  System    Physical Exam        General  Evaluation:  AROM:        Lumbopelvic:  Normal    Upper Extremity:  Normal    Lower Extremity:  Normal      Gross Strength:            Upper Extremity:  Normal     Lower Extremity:  Normal     Core:  Normal                     Posture:  normal          Gait:  normal                                         ROS    Assessment/Plan:    Patient is a 66 year old female with osteoporosis complaints.    Patient has the following significant findings with corresponding treatment plan.                Diagnosis 1:  Osteoporosis and general conditioning. Overall, pt has excellent ROM and strength and is very active however [facility] and her DEXA scan revealed increasing bone loss. In PT to assist in getting back into weight bearing exercises and making sure she is not creating more problems at this time.  Decreased strength - therapeutic exercise, therapeutic activities and home program  Impaired muscle performance - neuro re-education and home program  Decreased function - therapeutic activities and home program    Therapy Evaluation Codes:   1) History comprised of:   Personal factors that impact the plan of care:      None.    Comorbidity factors that impact the plan of care are:      Cancer, Diabetes, Menopausal and osteoporosis, chemo.     Medications impacting care: Bone denisty.  2) Examination of Body Systems comprised of:   Body structures and functions that impact the plan of care:      Lumbar spine and femur.   Activity limitations that impact the plan of care are:      Pt is able to do all functional activities at this time..  3) Clinical presentation characteristics are:   Stable/Uncomplicated.  4) Decision-Making    Low complexity using standardized patient assessment instrument and/or measureable assessment of functional outcome.  Cumulative Therapy Evaluation is: Low complexity.    Previous and current functional limitations:  (See Goal Flow Sheet for this information)    Short  term and Long term goals: (See Goal Flow Sheet for this information)     Communication ability:  Patient appears to be able to clearly communicate and understand verbal and written communication and follow directions correctly.  Treatment Explanation - The following has been discussed with the patient:   RX ordered/plan of care  Anticipated outcomes  Possible risks and side effects  This patient would benefit from PT intervention to resume normal activities.   Rehab potential is good.    Frequency:  1x every 3 weeks   Duration:  for 3 months for safe progression of strengthening program to increase bone density.  Discharge Plan:  Achieve all LTG.  Independent in home treatment program.  Reach maximal therapeutic benefit.    Please refer to the daily flowsheet for treatment today, total treatment time and time spent performing 1:1 timed codes.

## 2021-12-08 ENCOUNTER — LAB (OUTPATIENT)
Dept: LAB | Facility: CLINIC | Age: 66
End: 2021-12-08
Payer: MEDICARE

## 2021-12-08 DIAGNOSIS — C25.0 MALIGNANT NEOPLASM OF HEAD OF PANCREAS (H): ICD-10-CM

## 2021-12-08 LAB
ALBUMIN SERPL-MCNC: 3.5 G/DL (ref 3.4–5)
ALP SERPL-CCNC: 154 U/L (ref 40–150)
ALT SERPL W P-5'-P-CCNC: 30 U/L (ref 0–50)
ANION GAP SERPL CALCULATED.3IONS-SCNC: 8 MMOL/L (ref 3–14)
AST SERPL W P-5'-P-CCNC: 23 U/L (ref 0–45)
BASOPHILS # BLD AUTO: 0 10E3/UL (ref 0–0.2)
BASOPHILS NFR BLD AUTO: 1 %
BILIRUB SERPL-MCNC: 0.4 MG/DL (ref 0.2–1.3)
BUN SERPL-MCNC: 12 MG/DL (ref 7–30)
CALCIUM SERPL-MCNC: 8.9 MG/DL (ref 8.5–10.1)
CHLORIDE BLD-SCNC: 110 MMOL/L (ref 94–109)
CO2 SERPL-SCNC: 24 MMOL/L (ref 20–32)
CREAT SERPL-MCNC: 0.88 MG/DL (ref 0.52–1.04)
EOSINOPHIL # BLD AUTO: 0.1 10E3/UL (ref 0–0.7)
EOSINOPHIL NFR BLD AUTO: 2 %
ERYTHROCYTE [DISTWIDTH] IN BLOOD BY AUTOMATED COUNT: 19.6 % (ref 10–15)
GFR SERPL CREATININE-BSD FRML MDRD: 68 ML/MIN/1.73M2
GLUCOSE BLD-MCNC: 108 MG/DL (ref 70–99)
HCT VFR BLD AUTO: 32.5 % (ref 35–47)
HGB BLD-MCNC: 10.6 G/DL (ref 11.7–15.7)
IMM GRANULOCYTES # BLD: 0 10E3/UL
IMM GRANULOCYTES NFR BLD: 0 %
LYMPHOCYTES # BLD AUTO: 1.4 10E3/UL (ref 0.8–5.3)
LYMPHOCYTES NFR BLD AUTO: 26 %
MCH RBC QN AUTO: 32.6 PG (ref 26.5–33)
MCHC RBC AUTO-ENTMCNC: 32.6 G/DL (ref 31.5–36.5)
MCV RBC AUTO: 100 FL (ref 78–100)
MONOCYTES # BLD AUTO: 0.6 10E3/UL (ref 0–1.3)
MONOCYTES NFR BLD AUTO: 11 %
NEUTROPHILS # BLD AUTO: 3.2 10E3/UL (ref 1.6–8.3)
NEUTROPHILS NFR BLD AUTO: 60 %
NRBC # BLD AUTO: 0 10E3/UL
NRBC BLD AUTO-RTO: 0 /100
PLATELET # BLD AUTO: 212 10E3/UL (ref 150–450)
POTASSIUM BLD-SCNC: 4.1 MMOL/L (ref 3.4–5.3)
PROT SERPL-MCNC: 7.2 G/DL (ref 6.8–8.8)
RBC # BLD AUTO: 3.25 10E6/UL (ref 3.8–5.2)
SODIUM SERPL-SCNC: 142 MMOL/L (ref 133–144)
WBC # BLD AUTO: 5.3 10E3/UL (ref 4–11)

## 2021-12-08 PROCEDURE — 250N000011 HC RX IP 250 OP 636: Performed by: INTERNAL MEDICINE

## 2021-12-08 PROCEDURE — 80053 COMPREHEN METABOLIC PANEL: CPT

## 2021-12-08 PROCEDURE — 36591 DRAW BLOOD OFF VENOUS DEVICE: CPT

## 2021-12-08 PROCEDURE — 85025 COMPLETE CBC W/AUTO DIFF WBC: CPT

## 2021-12-08 RX ORDER — HEPARIN SODIUM (PORCINE) LOCK FLUSH IV SOLN 100 UNIT/ML 100 UNIT/ML
5 SOLUTION INTRAVENOUS
Status: COMPLETED | OUTPATIENT
Start: 2021-12-08 | End: 2021-12-08

## 2021-12-08 RX ADMIN — HEPARIN 5 ML: 100 SYRINGE at 17:01

## 2021-12-08 NOTE — NURSING NOTE
"Chief Complaint   Patient presents with     Lab Only     labs drawn from port by rn.     Port accessed with 20 gauge 3/4\" gripper needle and labs drawn by rn.  Port flushed with NS and heparin then de-accessed.  Pt tolerated well.    Quin Perdue RN      "

## 2021-12-13 ENCOUNTER — ALLIED HEALTH/NURSE VISIT (OUTPATIENT)
Dept: EDUCATION SERVICES | Facility: CLINIC | Age: 66
End: 2021-12-13
Payer: MEDICARE

## 2021-12-13 DIAGNOSIS — E11.9 TYPE 2 DIABETES MELLITUS WITHOUT COMPLICATION, WITHOUT LONG-TERM CURRENT USE OF INSULIN (H): ICD-10-CM

## 2021-12-13 PROCEDURE — 95250 CONT GLUC MNTR PHYS/QHP EQP: CPT

## 2021-12-13 NOTE — LETTER
"    12/13/2021         RE: Emelia Weathers  [address]  [facility] [address]        Dear Colleague,    Thank you for referring your patient, Emelia Weathers, to the [facility]. Please see a copy of my visit note below.    Diabetes Self-Management Education & Support    Presents for: CGM Review    ASSESSMENT:  Pt seen today for f/u. Kamila was placed at our last visit on 11/18/22. Kamila fell off after about 7 days. Pt notes she shut off the alarms after the 2nd day because it was waking her up. She had several \"low\" alerts but notes she felt fine and when she checked with meter she was in the 80's. Pt reports the kamila was consistently reading about 20-30 mg/dl lower than her meter. She did not have any actual low BG although it appears that way.   Pt notes she found the kamila helpful and would still like the Rx. See plan.     Glucose Patterns & Trends:                    PLAN  Spoke w/ Bong at  Specialty pharmacy. He is going to email the billing dept who specializes in medicare compliance to see if/how we can appeal the CGM as pt is unable to poke fingers due to scleroderma covering fingers and hands. Bong will let writer know what he hears from that.   Pt will have A1c done 1/28/22 with PCP. Reviewed A1c goals.   Will call pt with appeal results.   Pt will follow-up with CDE as needed.       SUBJECTIVE/OBJECTIVE:  Presents for: CGM Review  Accompanied by: Self  Diabetes education in the past 24mo: Yes  Diabetes type: Other  Disease course: Other  Diabetes management related comments/concerns: Difficult to monitor BS due to sclederma in fingers  Cultural Influences/Ethnic Background:  Not  or       Diabetes Symptoms & Complications:  Fatigue: Sometimes  Neuropathy: No  Polydipsia: No  Polyphagia: Sometimes  Polyuria: No  Visual change: Sometimes  Slow healing wounds: No  Autonomic neuropathy: No  CVA: No  Heart disease: No  Nephropathy: No  Peripheral neuropathy: " "Other  Peripheral Vascular Disease: No  Retinopathy: No  Sexual dysfunction: No    Patient Problem List and Family Medical History reviewed for relevant medical history, current medical status, and diabetes risk factors.    Vitals:  There were no vitals taken for this visit.  Estimated body mass index is 18.76 kg/m  as calculated from the following:    Height as of 11/8/21: 1.645 m (5' 4.76\").    Weight as of 11/8/21: 50.8 kg (111 lb 14.4 oz).   Last 3 BP:   BP Readings from Last 3 Encounters:   11/08/21 (!) 146/79   10/29/21 (!) 146/81   10/05/21 135/77       History   Smoking Status     Former Smoker     Packs/day: 0.00     Years: 0.00     Quit date: 9/18/1993   Smokeless Tobacco     Never Used       Labs:  Lab Results   Component Value Date    A1C 6.6 10/05/2021    A1C 6.0 07/03/2017     Lab Results   Component Value Date     12/08/2021     06/08/2021     Lab Results   Component Value Date    LDL 75 09/16/2021    LDL 70 04/18/2016     HDL Cholesterol   Date Value Ref Range Status   04/18/2016 50 >49 mg/dL Final     Direct Measure HDL   Date Value Ref Range Status   09/16/2021 72 >=50 mg/dL Final   ]  GFR Estimate   Date Value Ref Range Status   12/08/2021 68 >60 mL/min/1.73m2 Final     Comment:     As of July 11, 2021, eGFR is calculated by the CKD-EPI creatinine equation, without race adjustment. eGFR can be influenced by muscle mass, exercise, and diet. The reported eGFR is an estimation only and is only applicable if the renal function is stable.   06/08/2021 77 >60 mL/min/[1.73_m2] Final     Comment:     Non  GFR Calc  Starting 12/18/2018, serum creatinine based estimated GFR (eGFR) will be   calculated using the Chronic Kidney Disease Epidemiology Collaboration   (CKD-EPI) equation.       GFR Estimate If Black   Date Value Ref Range Status   06/08/2021 89 >60 mL/min/[1.73_m2] Final     Comment:      GFR Calc  Starting 12/18/2018, serum creatinine based estimated " GFR (eGFR) will be   calculated using the Chronic Kidney Disease Epidemiology Collaboration   (CKD-EPI) equation.       Lab Results   Component Value Date    CR 0.88 12/08/2021    CR 0.79 06/08/2021     No results found for: MICROALBUMIN    Healthy Eating:  Healthy Eating Assessed Today: Yes  Cultural/Advent diet restrictions?: No  Meal planning/habits: Avoiding sweets,Carb counting,Low salt,Frequent snacking  How many times a week on average do you eat food made away from home (restaurant/take-out)?: 0  Meals include: Breakfast,Lunch,Dinner,Morning Snack,Afternoon Snack,Evening Snack  Beverages: Water,Tea,Coffee    Being Active:  Being Active Assessed Today: Yes  Days per week of moderate to strenuous exercise (like a brisk walk): 7  On average, minutes per day of exercise at this level: 60  How intense was your typical exercise? : Moderate (like brisk walking)  Exercise Minutes per Week: 420  Barrier to exercise: None    Monitoring:  Blood Glucose Meter: One Touch  Times checking blood sugar at home (number): 1  Times checking blood sugar at home (per): Day  Blood glucose trend: Other      Taking Medications:    Current Treatments: None      Reducing Risks:  CAD Risks: Post-menopausal  Has dilated eye exam at least once a year?: Yes  Sees dentist every 6 months?: No  Feet checked by healthcare provider in the last year?: No    Healthy Coping:  Emotional response to diabetes: Ready to learn  Informal Support system:: Children,Debbie based,Family,Friends  Stage of change: ACTION (Actively working towards change)  Patient Activation Measure Survey Score:  No flowsheet data found.    Diabetes knowledge and skills assessment:   Patient is knowledgeable in diabetes management concepts related to: Healthy Eating, Being Active, Monitoring, Taking Medication, Problem Solving, Reducing Risks and Healthy Coping    Patient needs further education on the following diabetes management concepts: Healthy Eating, Being Active,  Monitoring, Taking Medication, Problem Solving, Reducing Risks and Healthy Coping      Education provided today on:  AADE Self-Care Behaviors:  Diabetes Pathophysiology  Healthy Eating: consistency in amount, composition, and timing of food intake  Being Active: relationship to blood glucose and describe appropriate activity program  Monitoring: individual blood glucose targets  Problem Solving: when to call health care provider  Reducing Risks: A1C - goals, relating to blood glucose levels, how often to check  Healthy Coping: benefits of making appropriate lifestyle changes and utilize support systems    Pt verbalized understanding of concepts discussed and recommendations provided today.         Time Spent: 30 minutes  Encounter Type: Individual    Any diabetes medication dose changes were made via the CDE Protocol and Collaborative Practice Agreement with the patient's referring provider. A copy of this encounter was shared with the provider.

## 2021-12-13 NOTE — PROGRESS NOTES
"Diabetes Self-Management Education & Support    Presents for: CGM Review    ASSESSMENT:  Pt seen today for f/u. Kamila was placed at our last visit on 11/18/22. Kamila fell off after about 7 days. Pt notes she shut off the alarms after the 2nd day because it was waking her up. She had several \"low\" alerts but notes she felt fine and when she checked with meter she was in the 80's. Pt reports the kamila was consistently reading about 20-30 mg/dl lower than her meter. She did not have any actual low BG although it appears that way.   Pt notes she found the kamila helpful and would still like the Rx. See plan.     Glucose Patterns & Trends:                    PLAN  [facility]/ [facility]. He is going to email the billing dept who specializes in medicare compliance to see if/how we can appeal the CGM as pt is unable to poke fingers due to scleroderma covering fingers and hands. Bong will let writer know what he hears from that.   Pt will have A1c done 1/28/22 with PCP. Reviewed A1c goals.   Will call pt with appeal results.   Pt will follow-up with CDE as needed.       SUBJECTIVE/OBJECTIVE:  Presents for: CGM Review  Accompanied by: Self  Diabetes education in the past 24mo: Yes  Diabetes type: Other  Disease course: Other  Diabetes management related comments/concerns: Difficult to monitor BS due to sclederma in fingers  Cultural Influences/Ethnic Background:  Not  or       Diabetes Symptoms & Complications:  Fatigue: Sometimes  Neuropathy: No  Polydipsia: No  Polyphagia: Sometimes  Polyuria: No  Visual change: Sometimes  Slow healing wounds: No  Autonomic neuropathy: No  CVA: No  Heart disease: No  Nephropathy: No  Peripheral neuropathy: Other  Peripheral Vascular Disease: No  Retinopathy: No  Sexual dysfunction: No    Patient Problem List and Family Medical History reviewed for relevant medical history, current medical status, and diabetes risk factors.    Vitals:  There were no vitals taken " "for this visit.  Estimated body mass index is 18.76 kg/m  as calculated from the following:    Height as of 11/8/21: 1.645 m (5' 4.76\").    Weight as of 11/8/21: 50.8 kg (111 lb 14.4 oz).   Last 3 BP:   BP Readings from Last 3 Encounters:   11/08/21 (!) 146/79   10/29/21 (!) 146/81   10/05/21 135/77       History   Smoking Status     Former Smoker     Packs/day: 0.00     Years: 0.00     Quit date: 9/18/1993   Smokeless Tobacco     Never Used       Labs:  Lab Results   Component Value Date    A1C 6.6 10/05/2021    A1C 6.0 07/03/2017     Lab Results   Component Value Date     12/08/2021     06/08/2021     Lab Results   Component Value Date    LDL 75 09/16/2021    LDL 70 04/18/2016     HDL Cholesterol   Date Value Ref Range Status   04/18/2016 50 >49 mg/dL Final     Direct Measure HDL   Date Value Ref Range Status   09/16/2021 72 >=50 mg/dL Final   ]  GFR Estimate   Date Value Ref Range Status   12/08/2021 68 >60 mL/min/1.73m2 Final     Comment:     As of July 11, 2021, eGFR is calculated by the CKD-EPI creatinine equation, without race adjustment. eGFR can be influenced by muscle mass, exercise, and diet. The reported eGFR is an estimation only and is only applicable if the renal function is stable.   06/08/2021 77 >60 mL/min/[1.73_m2] Final     Comment:     Non  GFR Calc  Starting 12/18/2018, serum creatinine based estimated GFR (eGFR) will be   calculated using the Chronic Kidney Disease Epidemiology Collaboration   (CKD-EPI) equation.       GFR Estimate If Black   Date Value Ref Range Status   06/08/2021 89 >60 mL/min/[1.73_m2] Final     Comment:      GFR Calc  Starting 12/18/2018, serum creatinine based estimated GFR (eGFR) will be   calculated using the Chronic Kidney Disease Epidemiology Collaboration   (CKD-EPI) equation.       Lab Results   Component Value Date    CR 0.88 12/08/2021    CR 0.79 06/08/2021     No results found for: MICROALBUMIN    Healthy " Eating:  Healthy Eating Assessed Today: Yes  Cultural/Spiritism diet restrictions?: No  Meal planning/habits: Avoiding sweets,Carb counting,Low salt,Frequent snacking  How many times a week on average do you eat food made away from home (restaurant/take-out)?: 0  Meals include: Breakfast,Lunch,Dinner,Morning Snack,Afternoon Snack,Evening Snack  Beverages: Water,Tea,Coffee    Being Active:  Being Active Assessed Today: Yes  Days per week of moderate to strenuous exercise (like a brisk walk): 7  On average, minutes per day of exercise at this level: 60  How intense was your typical exercise? : Moderate (like brisk walking)  Exercise Minutes per Week: 420  Barrier to exercise: None    Monitoring:  Blood Glucose Meter: One Touch  Times checking blood sugar at home (number): 1  Times checking blood sugar at home (per): Day  Blood glucose trend: Other      Taking Medications:    Current Treatments: None      Reducing Risks:  CAD Risks: Post-menopausal  Has dilated eye exam at least once a year?: Yes  Sees dentist every 6 months?: No  Feet checked by healthcare provider in the last year?: No    Healthy Coping:  Emotional response to diabetes: Ready to learn  Informal Support system:: Children,Debbie based,Family,Friends  Stage of change: ACTION (Actively working towards change)  Patient Activation Measure Survey Score:  No flowsheet data found.    Diabetes knowledge and skills assessment:   Patient is knowledgeable in diabetes management concepts related to: Healthy Eating, Being Active, Monitoring, Taking Medication, Problem Solving, Reducing Risks and Healthy Coping    Patient needs further education on the following diabetes management concepts: Healthy Eating, Being Active, Monitoring, Taking Medication, Problem Solving, Reducing Risks and Healthy Coping      Education provided today on:  AADE Self-Care Behaviors:  Diabetes Pathophysiology  Healthy Eating: consistency in amount, composition, and timing of food  intake  Being Active: relationship to blood glucose and describe appropriate activity program  Monitoring: individual blood glucose targets  Problem Solving: when to call health care provider  Reducing Risks: A1C - goals, relating to blood glucose levels, how often to check  Healthy Coping: benefits of making appropriate lifestyle changes and utilize support systems    Pt verbalized understanding of concepts discussed and recommendations provided today.         Time Spent: 30 minutes  Encounter Type: Individual    Any diabetes medication dose changes were made via the CDE Protocol and Collaborative Practice Agreement with the patient's referring provider. A copy of this encounter was shared with the provider.

## 2021-12-30 DIAGNOSIS — C25.0 MALIGNANT NEOPLASM OF HEAD OF PANCREAS (H): Primary | ICD-10-CM

## 2021-12-30 RX ORDER — CAPECITABINE 500 MG/1
TABLET, FILM COATED ORAL
Qty: 42 TABLET | Refills: 3 | Status: SHIPPED | OUTPATIENT
Start: 2021-12-30 | End: 2022-04-01

## 2022-01-04 ENCOUNTER — THERAPY VISIT (OUTPATIENT)
Dept: PHYSICAL THERAPY | Facility: CLINIC | Age: 67
End: 2022-01-04
Payer: MEDICARE

## 2022-01-04 ENCOUNTER — LAB (OUTPATIENT)
Dept: LAB | Facility: CLINIC | Age: 67
End: 2022-01-04
Attending: INTERNAL MEDICINE
Payer: MEDICARE

## 2022-01-04 DIAGNOSIS — M62.81 GENERALIZED MUSCLE WEAKNESS: ICD-10-CM

## 2022-01-04 DIAGNOSIS — C25.0 MALIGNANT NEOPLASM OF HEAD OF PANCREAS (H): ICD-10-CM

## 2022-01-04 DIAGNOSIS — M81.0 OSTEOPOROSIS: ICD-10-CM

## 2022-01-04 LAB
ALBUMIN SERPL-MCNC: 3.4 G/DL (ref 3.4–5)
ALP SERPL-CCNC: 135 U/L (ref 40–150)
ALT SERPL W P-5'-P-CCNC: 29 U/L (ref 0–50)
ANION GAP SERPL CALCULATED.3IONS-SCNC: 8 MMOL/L (ref 3–14)
AST SERPL W P-5'-P-CCNC: 24 U/L (ref 0–45)
BASOPHILS # BLD AUTO: 0 10E3/UL (ref 0–0.2)
BASOPHILS NFR BLD AUTO: 1 %
BILIRUB SERPL-MCNC: 0.4 MG/DL (ref 0.2–1.3)
BUN SERPL-MCNC: 10 MG/DL (ref 7–30)
CALCIUM SERPL-MCNC: 8.7 MG/DL (ref 8.5–10.1)
CHLORIDE BLD-SCNC: 106 MMOL/L (ref 94–109)
CO2 SERPL-SCNC: 26 MMOL/L (ref 20–32)
CREAT SERPL-MCNC: 0.62 MG/DL (ref 0.52–1.04)
EOSINOPHIL # BLD AUTO: 0 10E3/UL (ref 0–0.7)
EOSINOPHIL NFR BLD AUTO: 1 %
ERYTHROCYTE [DISTWIDTH] IN BLOOD BY AUTOMATED COUNT: 18.8 % (ref 10–15)
GFR SERPL CREATININE-BSD FRML MDRD: >90 ML/MIN/1.73M2
GLUCOSE BLD-MCNC: 140 MG/DL (ref 70–99)
HCT VFR BLD AUTO: 32.9 % (ref 35–47)
HGB BLD-MCNC: 10.7 G/DL (ref 11.7–15.7)
IMM GRANULOCYTES # BLD: 0 10E3/UL
IMM GRANULOCYTES NFR BLD: 1 %
LYMPHOCYTES # BLD AUTO: 1.1 10E3/UL (ref 0.8–5.3)
LYMPHOCYTES NFR BLD AUTO: 24 %
MCH RBC QN AUTO: 32.3 PG (ref 26.5–33)
MCHC RBC AUTO-ENTMCNC: 32.5 G/DL (ref 31.5–36.5)
MCV RBC AUTO: 99 FL (ref 78–100)
MONOCYTES # BLD AUTO: 0.4 10E3/UL (ref 0–1.3)
MONOCYTES NFR BLD AUTO: 10 %
NEUTROPHILS # BLD AUTO: 2.8 10E3/UL (ref 1.6–8.3)
NEUTROPHILS NFR BLD AUTO: 63 %
NRBC # BLD AUTO: 0 10E3/UL
NRBC BLD AUTO-RTO: 0 /100
PLATELET # BLD AUTO: 179 10E3/UL (ref 150–450)
POTASSIUM BLD-SCNC: 4 MMOL/L (ref 3.4–5.3)
PROT SERPL-MCNC: 6.9 G/DL (ref 6.8–8.8)
RBC # BLD AUTO: 3.31 10E6/UL (ref 3.8–5.2)
SODIUM SERPL-SCNC: 140 MMOL/L (ref 133–144)
WBC # BLD AUTO: 4.4 10E3/UL (ref 4–11)

## 2022-01-04 PROCEDURE — 80053 COMPREHEN METABOLIC PANEL: CPT

## 2022-01-04 PROCEDURE — 36591 DRAW BLOOD OFF VENOUS DEVICE: CPT

## 2022-01-04 PROCEDURE — 82040 ASSAY OF SERUM ALBUMIN: CPT

## 2022-01-04 PROCEDURE — 86301 IMMUNOASSAY TUMOR CA 19-9: CPT

## 2022-01-04 PROCEDURE — 97110 THERAPEUTIC EXERCISES: CPT | Mod: GP | Performed by: PHYSICAL THERAPIST

## 2022-01-04 PROCEDURE — 97112 NEUROMUSCULAR REEDUCATION: CPT | Mod: GP | Performed by: PHYSICAL THERAPIST

## 2022-01-04 PROCEDURE — 85041 AUTOMATED RBC COUNT: CPT

## 2022-01-04 PROCEDURE — 250N000011 HC RX IP 250 OP 636: Performed by: INTERNAL MEDICINE

## 2022-01-04 PROCEDURE — 97530 THERAPEUTIC ACTIVITIES: CPT | Mod: GP | Performed by: PHYSICAL THERAPIST

## 2022-01-04 RX ORDER — HEPARIN SODIUM (PORCINE) LOCK FLUSH IV SOLN 100 UNIT/ML 100 UNIT/ML
5 SOLUTION INTRAVENOUS ONCE
Status: COMPLETED | OUTPATIENT
Start: 2022-01-04 | End: 2022-01-04

## 2022-01-04 RX ADMIN — Medication 5 ML: at 11:16

## 2022-01-04 NOTE — PROGRESS NOTES
Subjective:  HPI  Physical Exam                    Objective:  System    Physical Exam    General     ROS    Assessment/Plan:    PROGRESS  REPORT    Progress reporting period is from 12/2/21 to 1/4/22.       SUBJECTIVE  Subjective changes noted by patient:  Patient reports she's been doing her exercises about 5x/wk. Feels the exercises are pretty easy. Doesn't seem to feel the fatigue that she once did. Is still walking daily and cross country skiing regularly.      Changes in function:  Yes (See Goal flowsheet attached for changes in current functional level)  Adverse reaction to treatment or activity: None    OBJECTIVE  Changes noted in objective findings:  Yes, Patient tolerated progression of HEP today without difficulty. Did have some fatigue with balance activities but overall tolerated withou difficulty.        ASSESSMENT/PLAN  Updated problem list and treatment plan: Diagnosis 1:  Osteoporosis and general conditioning.  Decreased strength - therapeutic exercise, therapeutic activities and home program  Impaired balance - neuro re-education, therapeutic activities and home program  Decreased proprioception - neuro re-education, therapeutic activities and home program  Impaired muscle performance - neuro re-education and home program  Decreased function - therapeutic activities and home program  STG/LTGs have been met or progress has been made towards goals:  Yes (See Goal flow sheet completed today.)  Assessment of Progress: The patient's condition is improving.  The patient's condition has potential to improve.  Self Management Plans:  Patient has been instructed in a home treatment program.  Patient  has been instructed in self management of symptoms.  I have re-evaluated this patient and find that the nature, scope, duration and intensity of the therapy is appropriate for the medical condition of the patient.  Emelia continues to require the following intervention to meet STG and LTG's:   PT    Recommendations:  This patient would benefit from continued therapy.     Frequency:  2 X a month, once daily  Duration:  for 2 months        Please refer to the daily flowsheet for treatment today, total treatment time and time spent performing 1:1 timed codes.

## 2022-01-05 LAB — CANCER AG19-9 SERPL IA-ACNC: 1 U/ML

## 2022-01-25 ENCOUNTER — THERAPY VISIT (OUTPATIENT)
Dept: PHYSICAL THERAPY | Facility: CLINIC | Age: 67
End: 2022-01-25
Payer: MEDICARE

## 2022-01-25 DIAGNOSIS — M81.0 OSTEOPOROSIS: ICD-10-CM

## 2022-01-25 DIAGNOSIS — M62.81 GENERALIZED MUSCLE WEAKNESS: ICD-10-CM

## 2022-01-25 PROCEDURE — 97110 THERAPEUTIC EXERCISES: CPT | Mod: GP | Performed by: PHYSICAL THERAPIST

## 2022-01-25 PROCEDURE — 97530 THERAPEUTIC ACTIVITIES: CPT | Mod: GP | Performed by: PHYSICAL THERAPIST

## 2022-01-25 PROCEDURE — 97112 NEUROMUSCULAR REEDUCATION: CPT | Mod: GP | Performed by: PHYSICAL THERAPIST

## 2022-01-25 NOTE — PROGRESS NOTES
Subjective:  HPI  Physical Exam                    Objective:  System    Physical Exam    General     ROS    Assessment/Plan:    PROGRESS  REPORT    Progress reporting period is from 1/4/22 to 1/25/22.       SUBJECTIVE  Subjective changes noted by patient:  Pt reports she's doing well overall. Went and skiied for 1.5 hours. Lost strength [facility] Feels she's starting to get stronger in general and feels ready to try things on her own at this point.         Changes in function:  Yes (See Goal flowsheet attached for changes in current functional level)  Adverse reaction to treatment or activity: None    OBJECTIVE  Changes noted in objective findings:  Yes, Able to progress all exercises without difficulty. Improvement noted in core strength. Pt tolerated more reps and progression of exercises without difficulty. Does continue to have some difficulty with SLS for 30 sec due to neuropathy. Pt has been able to participate in increased cardio exercises at home including skiing as well and demonstrates proper performance of strengthening exercises in clinic.        ASSESSMENT/PLAN  Updated problem list and treatment plan: Diagnosis 1:  Osteoporosis and general conditioning  Decreased strength - therapeutic exercise, therapeutic activities and home program  Impaired balance - neuro re-education, therapeutic activities and home program  Decreased proprioception - neuro re-education, therapeutic activities and home program  Impaired muscle performance - neuro re-education and home program  Decreased function - therapeutic activities and home program  STG/LTGs have been met or progress has been made towards goals:  Yes (See Goal flow sheet completed today.)  Assessment of Progress: The patient has met all of their long term goals.  Self Management Plans:  Patient is independent in a home treatment program.  Patient is independent in self management of symptoms.    Emelia continues to require the  following intervention to meet STG and LTG's:  PT intervention is no longer required to meet STG/LTG.    Recommendations:  Pt to try things on her own for 3-4 weeks Will call if problems arise or will discontinue at that time if tolerated.    Please refer to the daily flowsheet for treatment today, total treatment time and time spent performing 1:1 timed codes.

## 2022-02-04 ENCOUNTER — OFFICE VISIT (OUTPATIENT)
Dept: FAMILY MEDICINE | Facility: CLINIC | Age: 67
End: 2022-02-04
Payer: MEDICARE

## 2022-02-04 VITALS
DIASTOLIC BLOOD PRESSURE: 80 MMHG | BODY MASS INDEX: 18.63 KG/M2 | WEIGHT: 111.8 LBS | TEMPERATURE: 97.6 F | SYSTOLIC BLOOD PRESSURE: 138 MMHG | HEART RATE: 67 BPM | HEIGHT: 65 IN

## 2022-02-04 DIAGNOSIS — C25.0 MALIGNANT NEOPLASM OF HEAD OF PANCREAS (H): ICD-10-CM

## 2022-02-04 DIAGNOSIS — E11.9 TYPE 2 DIABETES MELLITUS WITHOUT COMPLICATION, WITHOUT LONG-TERM CURRENT USE OF INSULIN (H): Primary | ICD-10-CM

## 2022-02-04 DIAGNOSIS — M34.9 SCLERODERMA (H): ICD-10-CM

## 2022-02-04 PROCEDURE — 99214 OFFICE O/P EST MOD 30 MIN: CPT | Performed by: FAMILY MEDICINE

## 2022-02-04 RX ORDER — LISINOPRIL 10 MG/1
10 TABLET ORAL DAILY
Qty: 90 TABLET | Refills: 3 | Status: SHIPPED | OUTPATIENT
Start: 2022-02-04 | End: 2022-11-18

## 2022-02-04 RX ORDER — FLUOROMETHOLONE 0.1 %
SUSPENSION, DROPS(FINAL DOSAGE FORM)(ML) OPHTHALMIC (EYE)
COMMUNITY
Start: 2022-01-26 | End: 2022-09-08

## 2022-02-04 ASSESSMENT — MIFFLIN-ST. JEOR: SCORE: 1034.25

## 2022-02-04 NOTE — PROGRESS NOTES
Assessment & Plan     Type 2 diabetes mellitus without complication, without long-term current use of insulin (H)  Will check a1c with upcoming labs. Not on diabetes meds. Seeing eye doctor. 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   If a1c looks good, can see me again in 6 months   - Hemoglobin A1c  - lisinopril (ZESTRIL) 10 MG tablet  Dispense: 90 tablet; Refill: 3    Malignant neoplasm of head of pancreas (H)  Recurrent. Sees onc. 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    Monica Tubbs MD  [facility] YOSEF Kapoor is a 66 year old who presents for the following health issues     Diabetes Follow-up      How often are you checking your blood sugar? Sometimes     What concerns do you have today about your diabetes? None     Do you have any of these symptoms? (Select all that apply)  Numbness in feet and Redness, sores, or blisters on feet     Have you had a diabetic eye exam in the last 12 months? No    Started on lisinopril last fall :   Checking home monitor   Home blood pressures running 120/80   BP Readings from Last 6 Encounters:   02/04/22 138/80   11/08/21 (!) 146/79   10/29/21 (!) 146/81   10/05/21 135/77   09/16/21 120/66   07/12/21 (!) 145/80         Last visit 10/29/21 :   (E11.9) Type 2 diabetes mellitus without complication, without long-term current use of insulin (H)  (primary encounter diagnosis)  Comment: discussed diagnosis. No need for treatment with meds. Refer to diabetic education as she struggles with being underweight since cancer treatment. Start lisinopril. Will consider statin at our next visit. KAYLA for recent eye exam. The patient indicates understanding of these issues and agrees with the plan.   Plan: lisinopril (ZESTRIL) 20 MG tablet, AMB Adult         Diabetes Educator Referral, blood glucose         monitoring (NO BRAND SPECIFIED) meter device         kit,  blood glucose (NO BRAND SPECIFIED) test         strip, blood glucose calibration (NO BRAND         SPECIFIED) solution, thin (NO BRAND SPECIFIED)         lancets, alcohol swab prep pads    Not on a statin  Lipids 9/2021 - look good     Eyes - on prednisone drops    moderna - due for 4th shot     Feet - numbness from chemo    Ct scan - in march       Locally recurrent pancreatic cancer  Oncology note 11/2021:  Assessment/plan: Locally recurrent pancreatic cancer with long-term stable disease on single agent Xeloda.  We touched briefly on whether she would like to stop treatment, and is always she wants to continue on with this.  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  We will plan on reevaluating her disease status again in another 4 months.      BP Readings from Last 2 Encounters:   02/04/22 138/80   11/08/21 (!) 146/79     Hemoglobin A1C POCT (%)   Date Value   07/03/2017 6.0   04/18/2016 6.0     Hemoglobin A1C (%)   Date Value   10/05/2021 6.6 (H)     LDL Cholesterol Calculated (mg/dL)   Date Value   09/16/2021 75   04/18/2016 70   11/18/2013 99           How many servings of fruits and vegetables do you eat daily?  2-3    On average, how many sweetened beverages do you drink each day (Examples: soda, juice, sweet tea, etc.  Do NOT count diet or artificially sweetened beverages)?   0    How many days per week do you exercise enough to make your heart beat faster? 3 or less    How many minutes a day do you exercise enough to make your heart beat faster? 30 - 60    How many days per week do you miss taking your medication? 0    Is hoping to move closer to her daughter      Review of Systems   Constitutional, HEENT, cardiovascular, pulmonary, gi and gu systems are negative, except as otherwise noted.      Objective   "  /80   Pulse 67   Temp 97.6  F (36.4  C) (Tympanic)   Ht 1.645 m (5' 4.76\")   Wt 50.7 kg (111 lb 12.8 oz)   BMI 18.74 kg/m    Body mass index is 18.74 kg/m .  Physical Exam   GENERAL - Pt is alert and oriented in no acute distress.  Affect is appropriate. Good eye contact.        "

## 2022-02-08 ENCOUNTER — LAB (OUTPATIENT)
Dept: LAB | Facility: CLINIC | Age: 67
End: 2022-02-08
Attending: INTERNAL MEDICINE
Payer: MEDICARE

## 2022-02-08 DIAGNOSIS — E11.9 TYPE 2 DIABETES MELLITUS WITHOUT COMPLICATION, WITHOUT LONG-TERM CURRENT USE OF INSULIN (H): ICD-10-CM

## 2022-02-08 LAB
ALBUMIN SERPL-MCNC: 3.6 G/DL (ref 3.4–5)
ALP SERPL-CCNC: 146 U/L (ref 40–150)
ALT SERPL W P-5'-P-CCNC: 32 U/L (ref 0–50)
ANION GAP SERPL CALCULATED.3IONS-SCNC: 7 MMOL/L (ref 3–14)
AST SERPL W P-5'-P-CCNC: 28 U/L (ref 0–45)
BASOPHILS # BLD AUTO: 0 10E3/UL (ref 0–0.2)
BASOPHILS NFR BLD AUTO: 1 %
BILIRUB SERPL-MCNC: 0.4 MG/DL (ref 0.2–1.3)
BUN SERPL-MCNC: 12 MG/DL (ref 7–30)
CALCIUM SERPL-MCNC: 8.8 MG/DL (ref 8.5–10.1)
CHLORIDE BLD-SCNC: 106 MMOL/L (ref 94–109)
CO2 SERPL-SCNC: 25 MMOL/L (ref 20–32)
CREAT SERPL-MCNC: 0.81 MG/DL (ref 0.52–1.04)
EOSINOPHIL # BLD AUTO: 0.1 10E3/UL (ref 0–0.7)
EOSINOPHIL NFR BLD AUTO: 1 %
ERYTHROCYTE [DISTWIDTH] IN BLOOD BY AUTOMATED COUNT: 19.7 % (ref 10–15)
GFR SERPL CREATININE-BSD FRML MDRD: 79 ML/MIN/1.73M2
GLUCOSE BLD-MCNC: 103 MG/DL (ref 70–99)
HBA1C MFR BLD: 6 % (ref 0–5.6)
HCT VFR BLD AUTO: 32.9 % (ref 35–47)
HGB BLD-MCNC: 10.7 G/DL (ref 11.7–15.7)
IMM GRANULOCYTES # BLD: 0 10E3/UL
IMM GRANULOCYTES NFR BLD: 0 %
LYMPHOCYTES # BLD AUTO: 1.3 10E3/UL (ref 0.8–5.3)
LYMPHOCYTES NFR BLD AUTO: 21 %
MCH RBC QN AUTO: 32.7 PG (ref 26.5–33)
MCHC RBC AUTO-ENTMCNC: 32.5 G/DL (ref 31.5–36.5)
MCV RBC AUTO: 101 FL (ref 78–100)
MONOCYTES # BLD AUTO: 0.7 10E3/UL (ref 0–1.3)
MONOCYTES NFR BLD AUTO: 12 %
NEUTROPHILS # BLD AUTO: 3.9 10E3/UL (ref 1.6–8.3)
NEUTROPHILS NFR BLD AUTO: 65 %
NRBC # BLD AUTO: 0 10E3/UL
NRBC BLD AUTO-RTO: 0 /100
PLATELET # BLD AUTO: 211 10E3/UL (ref 150–450)
POTASSIUM BLD-SCNC: 4.3 MMOL/L (ref 3.4–5.3)
PROT SERPL-MCNC: 7 G/DL (ref 6.8–8.8)
RBC # BLD AUTO: 3.27 10E6/UL (ref 3.8–5.2)
SODIUM SERPL-SCNC: 138 MMOL/L (ref 133–144)
WBC # BLD AUTO: 6 10E3/UL (ref 4–11)

## 2022-02-08 PROCEDURE — 85025 COMPLETE CBC W/AUTO DIFF WBC: CPT

## 2022-02-08 PROCEDURE — 83036 HEMOGLOBIN GLYCOSYLATED A1C: CPT

## 2022-02-08 PROCEDURE — 80053 COMPREHEN METABOLIC PANEL: CPT

## 2022-02-08 PROCEDURE — 250N000011 HC RX IP 250 OP 636: Performed by: INTERNAL MEDICINE

## 2022-02-08 PROCEDURE — 36591 DRAW BLOOD OFF VENOUS DEVICE: CPT

## 2022-02-08 RX ORDER — HEPARIN SODIUM (PORCINE) LOCK FLUSH IV SOLN 100 UNIT/ML 100 UNIT/ML
500 SOLUTION INTRAVENOUS ONCE
Status: COMPLETED | OUTPATIENT
Start: 2022-02-08 | End: 2022-02-08

## 2022-02-08 RX ADMIN — Medication 500 UNITS: at 13:34

## 2022-02-08 NOTE — NURSING NOTE
"Chief Complaint   Patient presents with     Port Draw     Labs drawn via port by RN in lab.  VS taken       Port accessed with 20 gauge 3/4\" Power needle by RN, labs collected, line flushed with saline and heparin.  Vitals taken. Pt checked in for appointment(s).    Thais Godwin RN    "

## 2022-02-09 ENCOUNTER — MYC MEDICAL ADVICE (OUTPATIENT)
Dept: FAMILY MEDICINE | Facility: CLINIC | Age: 67
End: 2022-02-09
Payer: MEDICARE

## 2022-03-04 ENCOUNTER — LAB (OUTPATIENT)
Dept: LAB | Facility: CLINIC | Age: 67
End: 2022-03-04
Attending: INTERNAL MEDICINE
Payer: MEDICARE

## 2022-03-04 ENCOUNTER — ANCILLARY PROCEDURE (OUTPATIENT)
Dept: CT IMAGING | Facility: CLINIC | Age: 67
End: 2022-03-04
Attending: INTERNAL MEDICINE
Payer: MEDICARE

## 2022-03-04 DIAGNOSIS — C25.0 MALIGNANT NEOPLASM OF HEAD OF PANCREAS (H): ICD-10-CM

## 2022-03-04 LAB
ALBUMIN SERPL-MCNC: 3.5 G/DL (ref 3.4–5)
ALP SERPL-CCNC: 130 U/L (ref 40–150)
ALT SERPL W P-5'-P-CCNC: 24 U/L (ref 0–50)
ANION GAP SERPL CALCULATED.3IONS-SCNC: 7 MMOL/L (ref 3–14)
AST SERPL W P-5'-P-CCNC: 26 U/L (ref 0–45)
BASOPHILS # BLD AUTO: 0 10E3/UL (ref 0–0.2)
BASOPHILS NFR BLD AUTO: 1 %
BILIRUB SERPL-MCNC: 0.5 MG/DL (ref 0.2–1.3)
BUN SERPL-MCNC: 12 MG/DL (ref 7–30)
CALCIUM SERPL-MCNC: 8.5 MG/DL (ref 8.5–10.1)
CHLORIDE BLD-SCNC: 101 MMOL/L (ref 94–109)
CO2 SERPL-SCNC: 26 MMOL/L (ref 20–32)
CREAT SERPL-MCNC: 0.59 MG/DL (ref 0.52–1.04)
EOSINOPHIL # BLD AUTO: 0 10E3/UL (ref 0–0.7)
EOSINOPHIL NFR BLD AUTO: 1 %
ERYTHROCYTE [DISTWIDTH] IN BLOOD BY AUTOMATED COUNT: 18.8 % (ref 10–15)
GFR SERPL CREATININE-BSD FRML MDRD: >90 ML/MIN/1.73M2
GLUCOSE BLD-MCNC: 100 MG/DL (ref 70–99)
HCT VFR BLD AUTO: 33.7 % (ref 35–47)
HGB BLD-MCNC: 10.9 G/DL (ref 11.7–15.7)
IMM GRANULOCYTES # BLD: 0 10E3/UL
IMM GRANULOCYTES NFR BLD: 0 %
LYMPHOCYTES # BLD AUTO: 1.3 10E3/UL (ref 0.8–5.3)
LYMPHOCYTES NFR BLD AUTO: 22 %
MCH RBC QN AUTO: 32.1 PG (ref 26.5–33)
MCHC RBC AUTO-ENTMCNC: 32.3 G/DL (ref 31.5–36.5)
MCV RBC AUTO: 99 FL (ref 78–100)
MONOCYTES # BLD AUTO: 0.6 10E3/UL (ref 0–1.3)
MONOCYTES NFR BLD AUTO: 11 %
NEUTROPHILS # BLD AUTO: 3.6 10E3/UL (ref 1.6–8.3)
NEUTROPHILS NFR BLD AUTO: 65 %
NRBC # BLD AUTO: 0 10E3/UL
NRBC BLD AUTO-RTO: 0 /100
PLATELET # BLD AUTO: 203 10E3/UL (ref 150–450)
POTASSIUM BLD-SCNC: 4.2 MMOL/L (ref 3.4–5.3)
PROT SERPL-MCNC: 7 G/DL (ref 6.8–8.8)
RBC # BLD AUTO: 3.4 10E6/UL (ref 3.8–5.2)
SODIUM SERPL-SCNC: 134 MMOL/L (ref 133–144)
WBC # BLD AUTO: 5.6 10E3/UL (ref 4–11)

## 2022-03-04 PROCEDURE — 85025 COMPLETE CBC W/AUTO DIFF WBC: CPT

## 2022-03-04 PROCEDURE — 36591 DRAW BLOOD OFF VENOUS DEVICE: CPT

## 2022-03-04 PROCEDURE — 74177 CT ABD & PELVIS W/CONTRAST: CPT | Mod: MG | Performed by: RADIOLOGY

## 2022-03-04 PROCEDURE — 82040 ASSAY OF SERUM ALBUMIN: CPT

## 2022-03-04 PROCEDURE — 80053 COMPREHEN METABOLIC PANEL: CPT

## 2022-03-04 PROCEDURE — G1004 CDSM NDSC: HCPCS | Performed by: RADIOLOGY

## 2022-03-04 PROCEDURE — 71260 CT THORAX DX C+: CPT | Mod: MG | Performed by: RADIOLOGY

## 2022-03-04 PROCEDURE — 250N000011 HC RX IP 250 OP 636: Performed by: INTERNAL MEDICINE

## 2022-03-04 RX ORDER — IOPAMIDOL 755 MG/ML
68 INJECTION, SOLUTION INTRAVASCULAR ONCE
Status: COMPLETED | OUTPATIENT
Start: 2022-03-04 | End: 2022-03-04

## 2022-03-04 RX ORDER — HEPARIN SODIUM (PORCINE) LOCK FLUSH IV SOLN 100 UNIT/ML 100 UNIT/ML
500 SOLUTION INTRAVENOUS ONCE
Status: COMPLETED | OUTPATIENT
Start: 2022-03-04 | End: 2022-03-04

## 2022-03-04 RX ADMIN — IOPAMIDOL 68 ML: 755 INJECTION, SOLUTION INTRAVASCULAR at 10:43

## 2022-03-04 RX ADMIN — Medication 500 UNITS: at 10:02

## 2022-03-04 NOTE — NURSING NOTE
"Chief Complaint   Patient presents with     Port Draw     Labs drawn via port by RN in lab       Port accessed with 20 gauge 3/4\" Power needle by RN, labs collected, line flushed with saline and heparin.  Vitals taken. Pt checked in for appointment(s).    Thais Godwin RN    "

## 2022-03-07 ENCOUNTER — VIRTUAL VISIT (OUTPATIENT)
Dept: ONCOLOGY | Facility: CLINIC | Age: 67
End: 2022-03-07
Attending: INTERNAL MEDICINE
Payer: MEDICARE

## 2022-03-07 DIAGNOSIS — C25.0 MALIGNANT NEOPLASM OF HEAD OF PANCREAS (H): ICD-10-CM

## 2022-03-07 PROCEDURE — G0463 HOSPITAL OUTPT CLINIC VISIT: HCPCS | Mod: PN,RTG | Performed by: INTERNAL MEDICINE

## 2022-03-07 PROCEDURE — 99214 OFFICE O/P EST MOD 30 MIN: CPT | Mod: 95 | Performed by: INTERNAL MEDICINE

## 2022-03-07 NOTE — LETTER
3/7/2022         RE: Emelia Weathers  [address]  [place] [address]        Dear Colleague,    Thank you for referring your patient, Emelia Weathers, to the [facility]. Please see a copy of my visit note below.    Emelia is a 66 year old who is being evaluated via a billable video visit.      How would you like to obtain your AVS? MyChart  If the video visit is dropped, the invitation should be resent by: Send to e-mail at: [email or web address]  Will anyone else be joining your video visit? Yes: [place]. How would they like to receive their invitation? Text to cell phone: [phone]       Liz DAI    Video Start Time: 5:00  Video-Visit Details    Type of service:  Video Visit    Video End Time:5:30    Originating Location (pt. Location): Home    Distant Location (provider location):  [facility]     Platform used for Video Visit: Mamie     I am seeing Tiana Weathers today in follow-up of locally recurrent pancreatic cancer.  She is 66 years old and had a Whipple many years ago with a subsequent biopsy-proven recurrence in the resection bed and encasing her SMA more than 5 years ago.  She had an excellent response initial therapy with FOLFIRINOX and now has been on maintenance single agent Xeloda for many years.  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  She continues to have issues with irritation of her eyes as well.  Aside from the symptoms she feels quite well with good appetite and energy.  She continues to get regular exercise.    GENERAL: Healthy, alert and no distress  EYES: Eyes grossly normal to inspection.  No discharge or erythema, or obvious scleral/conjunctival abnormalities.  RESP: No audible wheeze, cough, or visible cyanosis.  No visible retractions or increased work of breathing.    SKIN: Visible skin clear. No significant rash,  abnormal pigmentation or lesions.  NEURO: Cranial nerves grossly intact.  Mentation and speech appropriate for age.  PSYCH: Mentation appears normal, affect normal/bright, judgement and insight intact, normal speech and appearance well-groomed.    I personally reviewed her CT scan which showed no new findings and nothing to suggest progression of her disease.  Her CA 19-9 level is noninformative.  She has normal electrolytes and renal function.  Her bilirubin, albumin and liver enzymes are normal.  Her blood counts are normal except for very mild anemia with a hemoglobin of 10.9.    Assessment/plan: Recurrent pancreatic cancer with many years of control on maintenance Xeloda.  We had a discussion again today about perhaps taking a break from therapy, but for now she would like to continue on without change.  We will plan on reevaluating her disease status again in another several months.      Again, thank you for allowing me to participate in the care of your patient.        Sincerely,        Yasmany Wade MD

## 2022-03-07 NOTE — PROGRESS NOTES
Emelia is a 66 year old who is being evaluated via a billable video visit.      How would you like to obtain your AVS? MyChart  If the video visit is dropped, the invitation should be resent by: Send to e-mail at: [email or web address]  Will anyone else be joining your video visit? Yes: [place]. How would they like to receive their invitation? Text to cell phone: [phone]       Liz Orr VF    Video Start Time: 5:00  Video-Visit Details    Type of service:  Video Visit    Video End Time:5:30    Originating Location (pt. Location): Home    Distant Location (provider location):  [facility]     Platform used for Video Visit: Mamie     I am seeing Tiana Wetahers today in follow-up of locally recurrent pancreatic cancer.  She is 66 years old and had a Whipple many years ago with a subsequent biopsy-proven recurrence in the resection bed and encasing her SMA more than 5 years ago.  She had an excellent response initial therapy with FOLFIRINOX and now has been on maintenance single agent Xeloda for many years.  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  She continues to have issues with irritation of her eyes as well.  Aside from the symptoms she feels quite well with good appetite and energy.  She continues to get regular exercise.    GENERAL: Healthy, alert and no distress  EYES: Eyes grossly normal to inspection.  No discharge or erythema, or obvious scleral/conjunctival abnormalities.  RESP: No audible wheeze, cough, or visible cyanosis.  No visible retractions or increased work of breathing.    SKIN: Visible skin clear. No significant rash, abnormal pigmentation or lesions.  NEURO: Cranial nerves grossly intact.  Mentation and speech appropriate for age.  PSYCH: Mentation appears normal, affect normal/bright, judgement and insight intact, normal speech and appearance well-groomed.    I personally  reviewed her CT scan which showed no new findings and nothing to suggest progression of her disease.  Her CA 19-9 level is noninformative.  She has normal electrolytes and renal function.  Her bilirubin, albumin and liver enzymes are normal.  Her blood counts are normal except for very mild anemia with a hemoglobin of 10.9.    Assessment/plan: Recurrent pancreatic cancer with many years of control on maintenance Xeloda.  We had a discussion again today about perhaps taking a break from therapy, but for now she would like to continue on without change.  We will plan on reevaluating her disease status again in another several months.

## 2022-03-21 DIAGNOSIS — C25.0 MALIGNANT NEOPLASM OF HEAD OF PANCREAS (H): Primary | ICD-10-CM

## 2022-03-23 ENCOUNTER — LAB (OUTPATIENT)
Dept: LAB | Facility: CLINIC | Age: 67
End: 2022-03-23
Attending: INTERNAL MEDICINE
Payer: MEDICARE

## 2022-03-23 DIAGNOSIS — C25.0 MALIGNANT NEOPLASM OF HEAD OF PANCREAS (H): ICD-10-CM

## 2022-03-23 LAB
ALBUMIN SERPL-MCNC: 3.4 G/DL (ref 3.4–5)
ALP SERPL-CCNC: 109 U/L (ref 40–150)
ALT SERPL W P-5'-P-CCNC: 24 U/L (ref 0–50)
ANION GAP SERPL CALCULATED.3IONS-SCNC: 7 MMOL/L (ref 3–14)
AST SERPL W P-5'-P-CCNC: 23 U/L (ref 0–45)
BASOPHILS # BLD AUTO: 0 10E3/UL (ref 0–0.2)
BASOPHILS NFR BLD AUTO: 0 %
BILIRUB SERPL-MCNC: 0.4 MG/DL (ref 0.2–1.3)
BUN SERPL-MCNC: 12 MG/DL (ref 7–30)
CALCIUM SERPL-MCNC: 8.6 MG/DL (ref 8.5–10.1)
CHLORIDE BLD-SCNC: 107 MMOL/L (ref 94–109)
CO2 SERPL-SCNC: 26 MMOL/L (ref 20–32)
CREAT SERPL-MCNC: 0.75 MG/DL (ref 0.52–1.04)
EOSINOPHIL # BLD AUTO: 0 10E3/UL (ref 0–0.7)
EOSINOPHIL NFR BLD AUTO: 1 %
ERYTHROCYTE [DISTWIDTH] IN BLOOD BY AUTOMATED COUNT: 19.1 % (ref 10–15)
GFR SERPL CREATININE-BSD FRML MDRD: 86 ML/MIN/1.73M2
GLUCOSE BLD-MCNC: 104 MG/DL (ref 70–99)
HCT VFR BLD AUTO: 33 % (ref 35–47)
HGB BLD-MCNC: 10.7 G/DL (ref 11.7–15.7)
IMM GRANULOCYTES # BLD: 0 10E3/UL
IMM GRANULOCYTES NFR BLD: 0 %
LYMPHOCYTES # BLD AUTO: 1.1 10E3/UL (ref 0.8–5.3)
LYMPHOCYTES NFR BLD AUTO: 18 %
MCH RBC QN AUTO: 32.1 PG (ref 26.5–33)
MCHC RBC AUTO-ENTMCNC: 32.4 G/DL (ref 31.5–36.5)
MCV RBC AUTO: 99 FL (ref 78–100)
MONOCYTES # BLD AUTO: 0.6 10E3/UL (ref 0–1.3)
MONOCYTES NFR BLD AUTO: 9 %
NEUTROPHILS # BLD AUTO: 4.4 10E3/UL (ref 1.6–8.3)
NEUTROPHILS NFR BLD AUTO: 72 %
NRBC # BLD AUTO: 0 10E3/UL
NRBC BLD AUTO-RTO: 0 /100
PLATELET # BLD AUTO: 200 10E3/UL (ref 150–450)
POTASSIUM BLD-SCNC: 4 MMOL/L (ref 3.4–5.3)
PROT SERPL-MCNC: 7 G/DL (ref 6.8–8.8)
RBC # BLD AUTO: 3.33 10E6/UL (ref 3.8–5.2)
SODIUM SERPL-SCNC: 140 MMOL/L (ref 133–144)
WBC # BLD AUTO: 6.1 10E3/UL (ref 4–11)

## 2022-03-23 PROCEDURE — 82040 ASSAY OF SERUM ALBUMIN: CPT

## 2022-03-23 PROCEDURE — 250N000011 HC RX IP 250 OP 636: Performed by: INTERNAL MEDICINE

## 2022-03-23 PROCEDURE — 80053 COMPREHEN METABOLIC PANEL: CPT

## 2022-03-23 PROCEDURE — 36591 DRAW BLOOD OFF VENOUS DEVICE: CPT

## 2022-03-23 PROCEDURE — 85004 AUTOMATED DIFF WBC COUNT: CPT

## 2022-03-23 RX ORDER — HEPARIN SODIUM (PORCINE) LOCK FLUSH IV SOLN 100 UNIT/ML 100 UNIT/ML
5 SOLUTION INTRAVENOUS ONCE
Status: COMPLETED | OUTPATIENT
Start: 2022-03-23 | End: 2022-03-23

## 2022-03-23 RX ADMIN — Medication 5 ML: at 13:26

## 2022-03-23 NOTE — NURSING NOTE
Chief Complaint   Patient presents with     Port Draw     Labs drawn via port by rn in lab. VS taken.     Port accessed with 20g 3/4 inch gripper needle by RN, labs collected, line flushed with saline and heparin.  Vitals taken. Pt checked in for appointment(s).    Romero Manley, RN

## 2022-04-01 DIAGNOSIS — C25.0 MALIGNANT NEOPLASM OF HEAD OF PANCREAS (H): Primary | ICD-10-CM

## 2022-04-01 RX ORDER — CAPECITABINE 500 MG/1
TABLET, FILM COATED ORAL
Qty: 42 TABLET | Refills: 3 | Status: SHIPPED | OUTPATIENT
Start: 2022-04-01 | End: 2023-01-04

## 2022-04-14 DIAGNOSIS — C25.0 MALIGNANT NEOPLASM OF HEAD OF PANCREAS (H): ICD-10-CM

## 2022-04-14 RX ORDER — PANTOPRAZOLE SODIUM 40 MG/1
TABLET, DELAYED RELEASE ORAL
Qty: 90 TABLET | Refills: 0 | OUTPATIENT
Start: 2022-04-14

## 2022-04-19 DIAGNOSIS — C25.0 MALIGNANT NEOPLASM OF HEAD OF PANCREAS (H): ICD-10-CM

## 2022-04-19 NOTE — TELEPHONE ENCOUNTER
Last prescribing provider: Dr. Wade   Last clinic visit date: 3/7/22 virtual visit with Dr. Wade   Any missed appointments: no  Recommendations for requested medication: JANE   Next clinic visit: 6/27/22 with Dr. Wade   Any other pertinent information: NA

## 2022-04-20 ENCOUNTER — LAB (OUTPATIENT)
Dept: LAB | Facility: CLINIC | Age: 67
End: 2022-04-20
Attending: INTERNAL MEDICINE
Payer: MEDICARE

## 2022-04-20 DIAGNOSIS — C25.0 MALIGNANT NEOPLASM OF HEAD OF PANCREAS (H): ICD-10-CM

## 2022-04-20 LAB
ALBUMIN SERPL-MCNC: 3.8 G/DL (ref 3.4–5)
ALP SERPL-CCNC: 109 U/L (ref 40–150)
ALT SERPL W P-5'-P-CCNC: 33 U/L (ref 0–50)
ANION GAP SERPL CALCULATED.3IONS-SCNC: 7 MMOL/L (ref 3–14)
AST SERPL W P-5'-P-CCNC: 31 U/L (ref 0–45)
BASOPHILS # BLD AUTO: 0 10E3/UL (ref 0–0.2)
BASOPHILS NFR BLD AUTO: 0 %
BILIRUB SERPL-MCNC: 0.5 MG/DL (ref 0.2–1.3)
BUN SERPL-MCNC: 13 MG/DL (ref 7–30)
CALCIUM SERPL-MCNC: 9.3 MG/DL (ref 8.5–10.1)
CHLORIDE BLD-SCNC: 106 MMOL/L (ref 94–109)
CO2 SERPL-SCNC: 27 MMOL/L (ref 20–32)
CREAT SERPL-MCNC: 0.65 MG/DL (ref 0.52–1.04)
EOSINOPHIL # BLD AUTO: 0 10E3/UL (ref 0–0.7)
EOSINOPHIL NFR BLD AUTO: 1 %
ERYTHROCYTE [DISTWIDTH] IN BLOOD BY AUTOMATED COUNT: 18.3 % (ref 10–15)
GFR SERPL CREATININE-BSD FRML MDRD: >90 ML/MIN/1.73M2
GLUCOSE BLD-MCNC: 71 MG/DL (ref 70–99)
HCT VFR BLD AUTO: 33.8 % (ref 35–47)
HGB BLD-MCNC: 11.1 G/DL (ref 11.7–15.7)
IMM GRANULOCYTES # BLD: 0 10E3/UL
IMM GRANULOCYTES NFR BLD: 0 %
LYMPHOCYTES # BLD AUTO: 1.5 10E3/UL (ref 0.8–5.3)
LYMPHOCYTES NFR BLD AUTO: 25 %
MCH RBC QN AUTO: 32.6 PG (ref 26.5–33)
MCHC RBC AUTO-ENTMCNC: 32.8 G/DL (ref 31.5–36.5)
MCV RBC AUTO: 99 FL (ref 78–100)
MONOCYTES # BLD AUTO: 0.6 10E3/UL (ref 0–1.3)
MONOCYTES NFR BLD AUTO: 11 %
NEUTROPHILS # BLD AUTO: 3.6 10E3/UL (ref 1.6–8.3)
NEUTROPHILS NFR BLD AUTO: 63 %
NRBC # BLD AUTO: 0 10E3/UL
NRBC BLD AUTO-RTO: 0 /100
PLATELET # BLD AUTO: 190 10E3/UL (ref 150–450)
POTASSIUM BLD-SCNC: 4.2 MMOL/L (ref 3.4–5.3)
PROT SERPL-MCNC: 7.1 G/DL (ref 6.8–8.8)
RBC # BLD AUTO: 3.4 10E6/UL (ref 3.8–5.2)
SODIUM SERPL-SCNC: 140 MMOL/L (ref 133–144)
WBC # BLD AUTO: 5.8 10E3/UL (ref 4–11)

## 2022-04-20 PROCEDURE — 85025 COMPLETE CBC W/AUTO DIFF WBC: CPT

## 2022-04-20 PROCEDURE — 80053 COMPREHEN METABOLIC PANEL: CPT

## 2022-04-20 PROCEDURE — 250N000011 HC RX IP 250 OP 636: Performed by: INTERNAL MEDICINE

## 2022-04-20 PROCEDURE — 36591 DRAW BLOOD OFF VENOUS DEVICE: CPT

## 2022-04-20 RX ORDER — PANTOPRAZOLE SODIUM 40 MG/1
TABLET, DELAYED RELEASE ORAL
Qty: 90 TABLET | Refills: 0 | Status: SHIPPED | OUTPATIENT
Start: 2022-04-20 | End: 2022-07-11

## 2022-04-20 RX ORDER — HEPARIN SODIUM (PORCINE) LOCK FLUSH IV SOLN 100 UNIT/ML 100 UNIT/ML
5 SOLUTION INTRAVENOUS ONCE
Status: COMPLETED | OUTPATIENT
Start: 2022-04-20 | End: 2022-04-20

## 2022-04-20 RX ADMIN — Medication 5 ML: at 11:38

## 2022-05-25 ENCOUNTER — LAB (OUTPATIENT)
Dept: LAB | Facility: CLINIC | Age: 67
End: 2022-05-25
Attending: INTERNAL MEDICINE
Payer: MEDICARE

## 2022-05-25 DIAGNOSIS — C25.0 MALIGNANT NEOPLASM OF HEAD OF PANCREAS (H): ICD-10-CM

## 2022-05-25 LAB
ALBUMIN SERPL-MCNC: 3.5 G/DL (ref 3.4–5)
ALP SERPL-CCNC: 119 U/L (ref 40–150)
ALT SERPL W P-5'-P-CCNC: 27 U/L (ref 0–50)
ANION GAP SERPL CALCULATED.3IONS-SCNC: 6 MMOL/L (ref 3–14)
AST SERPL W P-5'-P-CCNC: 29 U/L (ref 0–45)
BASOPHILS # BLD AUTO: 0 10E3/UL (ref 0–0.2)
BASOPHILS NFR BLD AUTO: 0 %
BILIRUB SERPL-MCNC: 0.5 MG/DL (ref 0.2–1.3)
BUN SERPL-MCNC: 11 MG/DL (ref 7–30)
CALCIUM SERPL-MCNC: 9 MG/DL (ref 8.5–10.1)
CHLORIDE BLD-SCNC: 109 MMOL/L (ref 94–109)
CO2 SERPL-SCNC: 26 MMOL/L (ref 20–32)
CREAT SERPL-MCNC: 0.66 MG/DL (ref 0.52–1.04)
EOSINOPHIL # BLD AUTO: 0.1 10E3/UL (ref 0–0.7)
EOSINOPHIL NFR BLD AUTO: 1 %
ERYTHROCYTE [DISTWIDTH] IN BLOOD BY AUTOMATED COUNT: 19.1 % (ref 10–15)
GFR SERPL CREATININE-BSD FRML MDRD: >90 ML/MIN/1.73M2
GLUCOSE BLD-MCNC: 84 MG/DL (ref 70–99)
HCT VFR BLD AUTO: 32.9 % (ref 35–47)
HGB BLD-MCNC: 10.7 G/DL (ref 11.7–15.7)
IMM GRANULOCYTES # BLD: 0 10E3/UL
IMM GRANULOCYTES NFR BLD: 0 %
LYMPHOCYTES # BLD AUTO: 1.4 10E3/UL (ref 0.8–5.3)
LYMPHOCYTES NFR BLD AUTO: 22 %
MCH RBC QN AUTO: 32.8 PG (ref 26.5–33)
MCHC RBC AUTO-ENTMCNC: 32.5 G/DL (ref 31.5–36.5)
MCV RBC AUTO: 101 FL (ref 78–100)
MONOCYTES # BLD AUTO: 0.7 10E3/UL (ref 0–1.3)
MONOCYTES NFR BLD AUTO: 11 %
NEUTROPHILS # BLD AUTO: 4.1 10E3/UL (ref 1.6–8.3)
NEUTROPHILS NFR BLD AUTO: 66 %
NRBC # BLD AUTO: 0 10E3/UL
NRBC BLD AUTO-RTO: 0 /100
PLATELET # BLD AUTO: 194 10E3/UL (ref 150–450)
POTASSIUM BLD-SCNC: 4.1 MMOL/L (ref 3.4–5.3)
PROT SERPL-MCNC: 7.1 G/DL (ref 6.8–8.8)
RBC # BLD AUTO: 3.26 10E6/UL (ref 3.8–5.2)
SODIUM SERPL-SCNC: 141 MMOL/L (ref 133–144)
WBC # BLD AUTO: 6.2 10E3/UL (ref 4–11)

## 2022-05-25 PROCEDURE — 250N000011 HC RX IP 250 OP 636: Performed by: INTERNAL MEDICINE

## 2022-05-25 PROCEDURE — 85025 COMPLETE CBC W/AUTO DIFF WBC: CPT

## 2022-05-25 PROCEDURE — 36591 DRAW BLOOD OFF VENOUS DEVICE: CPT

## 2022-05-25 PROCEDURE — 80053 COMPREHEN METABOLIC PANEL: CPT

## 2022-05-25 RX ORDER — HEPARIN SODIUM (PORCINE) LOCK FLUSH IV SOLN 100 UNIT/ML 100 UNIT/ML
5 SOLUTION INTRAVENOUS ONCE
Status: COMPLETED | OUTPATIENT
Start: 2022-05-25 | End: 2022-05-25

## 2022-05-25 RX ADMIN — Medication 5 ML: at 11:53

## 2022-05-25 NOTE — NURSING NOTE
Chief Complaint   Patient presents with     Port Draw     Labs drawn via port by RN in lab.      Port accessed with 20 gauge 3/4 inch flat needle and labs drawn by RN.  Port flushed with saline and heparin. Port then de-accessed.  Pt tolerated well.      Huma Mclaughlin RN

## 2022-06-24 ENCOUNTER — ANCILLARY PROCEDURE (OUTPATIENT)
Dept: CT IMAGING | Facility: CLINIC | Age: 67
End: 2022-06-24
Attending: INTERNAL MEDICINE
Payer: MEDICARE

## 2022-06-24 ENCOUNTER — LAB (OUTPATIENT)
Dept: LAB | Facility: CLINIC | Age: 67
End: 2022-06-24
Attending: INTERNAL MEDICINE
Payer: MEDICARE

## 2022-06-24 DIAGNOSIS — C25.0 MALIGNANT NEOPLASM OF HEAD OF PANCREAS (H): Primary | ICD-10-CM

## 2022-06-24 DIAGNOSIS — C25.0 MALIGNANT NEOPLASM OF HEAD OF PANCREAS (H): ICD-10-CM

## 2022-06-24 DIAGNOSIS — Z95.828 PORT-A-CATH IN PLACE: Primary | ICD-10-CM

## 2022-06-24 LAB
ALBUMIN SERPL-MCNC: 3.7 G/DL (ref 3.4–5)
ALP SERPL-CCNC: 101 U/L (ref 40–150)
ALT SERPL W P-5'-P-CCNC: 37 U/L (ref 0–50)
ANION GAP SERPL CALCULATED.3IONS-SCNC: <1 MMOL/L (ref 3–14)
AST SERPL W P-5'-P-CCNC: 27 U/L (ref 0–45)
BASOPHILS # BLD AUTO: 0 10E3/UL (ref 0–0.2)
BASOPHILS NFR BLD AUTO: 0 %
BILIRUB SERPL-MCNC: 0.6 MG/DL (ref 0.2–1.3)
BUN SERPL-MCNC: 11 MG/DL (ref 7–30)
CALCIUM SERPL-MCNC: 9.2 MG/DL (ref 8.5–10.1)
CHLORIDE BLD-SCNC: 110 MMOL/L (ref 94–109)
CO2 SERPL-SCNC: 26 MMOL/L (ref 20–32)
CREAT SERPL-MCNC: 0.66 MG/DL (ref 0.52–1.04)
EOSINOPHIL # BLD AUTO: 0 10E3/UL (ref 0–0.7)
EOSINOPHIL NFR BLD AUTO: 1 %
ERYTHROCYTE [DISTWIDTH] IN BLOOD BY AUTOMATED COUNT: 18.8 % (ref 10–15)
GFR SERPL CREATININE-BSD FRML MDRD: >90 ML/MIN/1.73M2
GLUCOSE BLD-MCNC: 93 MG/DL (ref 70–99)
HCT VFR BLD AUTO: 31.7 % (ref 35–47)
HGB BLD-MCNC: 10.5 G/DL (ref 11.7–15.7)
IMM GRANULOCYTES # BLD: 0 10E3/UL
IMM GRANULOCYTES NFR BLD: 0 %
LYMPHOCYTES # BLD AUTO: 1.3 10E3/UL (ref 0.8–5.3)
LYMPHOCYTES NFR BLD AUTO: 23 %
MCH RBC QN AUTO: 33.2 PG (ref 26.5–33)
MCHC RBC AUTO-ENTMCNC: 33.1 G/DL (ref 31.5–36.5)
MCV RBC AUTO: 100 FL (ref 78–100)
MONOCYTES # BLD AUTO: 0.4 10E3/UL (ref 0–1.3)
MONOCYTES NFR BLD AUTO: 8 %
NEUTROPHILS # BLD AUTO: 3.7 10E3/UL (ref 1.6–8.3)
NEUTROPHILS NFR BLD AUTO: 68 %
NRBC # BLD AUTO: 0 10E3/UL
NRBC BLD AUTO-RTO: 0 /100
PLATELET # BLD AUTO: 190 10E3/UL (ref 150–450)
POTASSIUM BLD-SCNC: 4 MMOL/L (ref 3.4–5.3)
PROT SERPL-MCNC: 6.9 G/DL (ref 6.8–8.8)
RBC # BLD AUTO: 3.16 10E6/UL (ref 3.8–5.2)
SODIUM SERPL-SCNC: 136 MMOL/L (ref 133–144)
WBC # BLD AUTO: 5.5 10E3/UL (ref 4–11)

## 2022-06-24 PROCEDURE — 71260 CT THORAX DX C+: CPT | Mod: MG | Performed by: RADIOLOGY

## 2022-06-24 PROCEDURE — 74177 CT ABD & PELVIS W/CONTRAST: CPT | Mod: MG | Performed by: RADIOLOGY

## 2022-06-24 PROCEDURE — 36591 DRAW BLOOD OFF VENOUS DEVICE: CPT

## 2022-06-24 PROCEDURE — 85025 COMPLETE CBC W/AUTO DIFF WBC: CPT

## 2022-06-24 PROCEDURE — 250N000011 HC RX IP 250 OP 636: Performed by: INTERNAL MEDICINE

## 2022-06-24 PROCEDURE — 80053 COMPREHEN METABOLIC PANEL: CPT

## 2022-06-24 PROCEDURE — G1004 CDSM NDSC: HCPCS | Performed by: RADIOLOGY

## 2022-06-24 RX ORDER — IOPAMIDOL 755 MG/ML
62 INJECTION, SOLUTION INTRAVASCULAR ONCE
Status: COMPLETED | OUTPATIENT
Start: 2022-06-24 | End: 2022-06-24

## 2022-06-24 RX ORDER — HEPARIN SODIUM (PORCINE) LOCK FLUSH IV SOLN 100 UNIT/ML 100 UNIT/ML
5 SOLUTION INTRAVENOUS EVERY 8 HOURS
Status: DISCONTINUED | OUTPATIENT
Start: 2022-06-24 | End: 2022-06-30 | Stop reason: HOSPADM

## 2022-06-24 RX ORDER — CAPECITABINE 500 MG/1
TABLET, FILM COATED ORAL
Qty: 42 TABLET | Refills: 3 | Status: SHIPPED | OUTPATIENT
Start: 2022-06-24 | End: 2022-12-30

## 2022-06-24 RX ORDER — HEPARIN SODIUM (PORCINE) LOCK FLUSH IV SOLN 100 UNIT/ML 100 UNIT/ML
500 SOLUTION INTRAVENOUS ONCE
Status: COMPLETED | OUTPATIENT
Start: 2022-06-24 | End: 2022-06-24

## 2022-06-24 RX ADMIN — Medication 5 ML: at 09:59

## 2022-06-24 RX ADMIN — HEPARIN SODIUM (PORCINE) LOCK FLUSH IV SOLN 100 UNIT/ML 500 UNITS: 100 SOLUTION at 10:47

## 2022-06-24 RX ADMIN — IOPAMIDOL 62 ML: 755 INJECTION, SOLUTION INTRAVASCULAR at 10:16

## 2022-06-24 NOTE — NURSING NOTE
Chief Complaint   Patient presents with     Port Draw     POWER needle, heparin locked,     Thelma Turcios RN on 6/24/2022 at 9:59 AM

## 2022-06-27 ENCOUNTER — ONCOLOGY VISIT (OUTPATIENT)
Dept: ONCOLOGY | Facility: CLINIC | Age: 67
End: 2022-06-27
Attending: INTERNAL MEDICINE
Payer: MEDICARE

## 2022-06-27 VITALS
HEART RATE: 86 BPM | RESPIRATION RATE: 16 BRPM | DIASTOLIC BLOOD PRESSURE: 70 MMHG | OXYGEN SATURATION: 97 % | SYSTOLIC BLOOD PRESSURE: 138 MMHG | WEIGHT: 110.2 LBS | BODY MASS INDEX: 18.47 KG/M2

## 2022-06-27 DIAGNOSIS — C25.0 MALIGNANT NEOPLASM OF HEAD OF PANCREAS (H): ICD-10-CM

## 2022-06-27 PROCEDURE — 99214 OFFICE O/P EST MOD 30 MIN: CPT | Performed by: INTERNAL MEDICINE

## 2022-06-27 PROCEDURE — G0463 HOSPITAL OUTPT CLINIC VISIT: HCPCS

## 2022-06-27 RX ORDER — CYCLOSPORINE 0.5 MG/ML
1 EMULSION OPHTHALMIC 2 TIMES DAILY
COMMUNITY

## 2022-06-27 ASSESSMENT — PAIN SCALES - GENERAL: PAINLEVEL: NO PAIN (0)

## 2022-06-27 NOTE — LETTER
6/27/2022         RE: Emelia Weathers  [address]  [place] [address]      Tiana Weathers returns today in follow-up of locally recurrent pancreatic cancer.    She is a 66-year-old woman who originally had a Whipple many years ago with a subsequent biopsy-proven recurrence in the resection bed and encasing her SMA almost 6 years ago.  She has a known germline Chek 1 mutation.  She had an excellent response to therapy with FOLFIRINOX and now is on maintenance single agent Xeloda.  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  She feels like the contractures in her hands are about the same.  Her dry eyes appeared of responded well to the cyclosporine drops.  Her appetite and energy are great.  She continues to get significant regular exercise.  She is disappointed because he is probably not going to make her goal of 2000 miles of cycling the summer as she has just bought a new house and has been spending a lot of time on moving.    On exam she is quite slender but otherwise appears well and her vital signs are unremarkable.  She has no icterus or conjunctival injection.  She has mild dry desquamation on her palms and significant contractures of all of her joints without any active inflammation apparent.    I personally reviewed her CT scan and went over the results with the patient and her daughter.  There are no changes there to the locally recurrent disease.  Her lab work shows normal electrolytes and renal function.  Her bilirubin, albumin and liver enzymes are normal.  She has stable mild macrocytic anemia with a hemoglobin of 10.5 and otherwise normal blood counts.    Assessment/plan: Recurrent pancreas cancer with ongoing disease stability, currently on single agent maintenance Xeloda with associated scleroderma-like syndrome.  She continues to find her toxicity level manageable and would  like to continue on with the maintenance therapy.  We will plan on another response assessment in about 4 months.        Yasmany Wade MD

## 2022-06-27 NOTE — NURSING NOTE
"Oncology Rooming Note    June 27, 2022 5:12 PM   Emelia Weathers is a 66 year old female who presents for:    Chief Complaint   Patient presents with     Oncology Clinic Visit     MALIGNANT NEOPLASM OF HEAD OF PANCREAS     Initial Vitals: /70   Pulse 86   Resp 16   Wt 50 kg (110 lb 3.2 oz)   SpO2 97%   BMI 18.47 kg/m   Estimated body mass index is 18.47 kg/m  as calculated from the following:    Height as of 2/4/22: 1.645 m (5' 4.76\").    Weight as of this encounter: 50 kg (110 lb 3.2 oz). Body surface area is 1.51 meters squared.  No Pain (0) Comment: Data Unavailable   No LMP recorded. Patient is postmenopausal.  Allergies reviewed: Yes  Medications reviewed: Yes    Medications: MEDICATION REFILLS NEEDED TODAY. Provider was NOT notified. LOMOTIL   Pharmacy name entered into EPIC:    [id number] [place], [place] - [address] [place] [address] E  [facility] - [place], [place] - [address] [facility] [place], [place] - [address] [place] [address] [id number]  [id number] - [place], [place] - [address] [place], [place] [address]E  [facility] - [place], [place] - [address] [place] [address]    Clinical concerns: Refill needed on Lomotil  [facility]  was NOT notified.      Belem Alicea CMA              "

## 2022-07-03 NOTE — PROGRESS NOTES
Tiana Weathers returns today in follow-up of locally recurrent pancreatic cancer.    She is a 66-year-old woman who originally had a Whipple many years ago with a subsequent biopsy-proven recurrence in the resection bed and encasing her SMA almost 6 years ago.  She has a known germline Chek 1 mutation.  She had an excellent response to therapy with FOLFIRINOX and now is on maintenance single agent Xeloda.  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  She feels like the contractures in her hands are about the same.  Her dry eyes appeared of responded well to the cyclosporine drops.  Her appetite and energy are great.  She continues to get significant regular exercise.  She is disappointed because he is probably not going to make her goal of 2000 miles of cycling the summer as she has just bought a new house and has been spending a lot of time on moving.    On exam she is quite slender but otherwise appears well and her vital signs are unremarkable.  She has no icterus or conjunctival injection.  She has mild dry desquamation on her palms and significant contractures of all of her joints without any active inflammation apparent.    I personally reviewed her CT scan and went over the results with the patient and her daughter.  There are no changes there to the locally recurrent disease.  Her lab work shows normal electrolytes and renal function.  Her bilirubin, albumin and liver enzymes are normal.  She has stable mild macrocytic anemia with a hemoglobin of 10.5 and otherwise normal blood counts.    Assessment/plan: Recurrent pancreas cancer with ongoing disease stability, currently on single agent maintenance Xeloda with associated scleroderma-like syndrome.  She continues to find her toxicity level manageable and would like to continue on with the maintenance therapy.  We will plan on another response assessment  in about 4 months.

## 2022-07-11 DIAGNOSIS — C25.0 MALIGNANT NEOPLASM OF HEAD OF PANCREAS (H): ICD-10-CM

## 2022-07-11 RX ORDER — PANTOPRAZOLE SODIUM 40 MG/1
TABLET, DELAYED RELEASE ORAL
Qty: 90 TABLET | Refills: 0 | Status: SHIPPED | OUTPATIENT
Start: 2022-07-11 | End: 2022-10-02

## 2022-07-11 NOTE — TELEPHONE ENCOUNTER
pantoprazole (PROTONIX) Refill   Last prescribing provider: Dr Wade     Last clinic visit date: 6/27/22 DR Wade     Any missed appointments or no-shows since last clinic visit?: No     Recommendations for requested medication (if none, N/A): N/A    Next clinic visit date: 10/24/22 Dr Wade     Any other pertinent information (if none, N/A): N/A

## 2022-07-27 ENCOUNTER — LAB (OUTPATIENT)
Dept: LAB | Facility: CLINIC | Age: 67
End: 2022-07-27
Attending: INTERNAL MEDICINE
Payer: MEDICARE

## 2022-07-27 DIAGNOSIS — C25.0 MALIGNANT NEOPLASM OF HEAD OF PANCREAS (H): ICD-10-CM

## 2022-07-27 LAB
ALBUMIN SERPL-MCNC: 3.5 G/DL (ref 3.4–5)
ALP SERPL-CCNC: 84 U/L (ref 40–150)
ALT SERPL W P-5'-P-CCNC: 42 U/L (ref 0–50)
ANION GAP SERPL CALCULATED.3IONS-SCNC: 5 MMOL/L (ref 3–14)
AST SERPL W P-5'-P-CCNC: 66 U/L (ref 0–45)
BASOPHILS # BLD AUTO: 0 10E3/UL (ref 0–0.2)
BASOPHILS NFR BLD AUTO: 0 %
BILIRUB SERPL-MCNC: 0.5 MG/DL (ref 0.2–1.3)
BUN SERPL-MCNC: 12 MG/DL (ref 7–30)
CALCIUM SERPL-MCNC: 8.4 MG/DL (ref 8.5–10.1)
CHLORIDE BLD-SCNC: 110 MMOL/L (ref 94–109)
CO2 SERPL-SCNC: 25 MMOL/L (ref 20–32)
CREAT SERPL-MCNC: 0.66 MG/DL (ref 0.52–1.04)
EOSINOPHIL # BLD AUTO: 0.1 10E3/UL (ref 0–0.7)
EOSINOPHIL NFR BLD AUTO: 1 %
ERYTHROCYTE [DISTWIDTH] IN BLOOD BY AUTOMATED COUNT: 20.1 % (ref 10–15)
GFR SERPL CREATININE-BSD FRML MDRD: >90 ML/MIN/1.73M2
GLUCOSE BLD-MCNC: 120 MG/DL (ref 70–99)
HCT VFR BLD AUTO: 29.3 % (ref 35–47)
HGB BLD-MCNC: 9.7 G/DL (ref 11.7–15.7)
IMM GRANULOCYTES # BLD: 0 10E3/UL
IMM GRANULOCYTES NFR BLD: 0 %
LYMPHOCYTES # BLD AUTO: 1 10E3/UL (ref 0.8–5.3)
LYMPHOCYTES NFR BLD AUTO: 21 %
MCH RBC QN AUTO: 33.3 PG (ref 26.5–33)
MCHC RBC AUTO-ENTMCNC: 33.1 G/DL (ref 31.5–36.5)
MCV RBC AUTO: 101 FL (ref 78–100)
MONOCYTES # BLD AUTO: 0.6 10E3/UL (ref 0–1.3)
MONOCYTES NFR BLD AUTO: 11 %
NEUTROPHILS # BLD AUTO: 3.3 10E3/UL (ref 1.6–8.3)
NEUTROPHILS NFR BLD AUTO: 67 %
NRBC # BLD AUTO: 0 10E3/UL
NRBC BLD AUTO-RTO: 0 /100
PLATELET # BLD AUTO: 180 10E3/UL (ref 150–450)
POTASSIUM BLD-SCNC: 4.2 MMOL/L (ref 3.4–5.3)
PROT SERPL-MCNC: 6.5 G/DL (ref 6.8–8.8)
RBC # BLD AUTO: 2.91 10E6/UL (ref 3.8–5.2)
SODIUM SERPL-SCNC: 140 MMOL/L (ref 133–144)
WBC # BLD AUTO: 5 10E3/UL (ref 4–11)

## 2022-07-27 PROCEDURE — 250N000011 HC RX IP 250 OP 636: Performed by: INTERNAL MEDICINE

## 2022-07-27 PROCEDURE — 82310 ASSAY OF CALCIUM: CPT

## 2022-07-27 PROCEDURE — 85025 COMPLETE CBC W/AUTO DIFF WBC: CPT

## 2022-07-27 PROCEDURE — 36591 DRAW BLOOD OFF VENOUS DEVICE: CPT

## 2022-07-27 RX ORDER — HEPARIN SODIUM (PORCINE) LOCK FLUSH IV SOLN 100 UNIT/ML 100 UNIT/ML
5 SOLUTION INTRAVENOUS ONCE
Status: COMPLETED | OUTPATIENT
Start: 2022-07-27 | End: 2022-07-27

## 2022-07-27 RX ADMIN — SODIUM CHLORIDE, PRESERVATIVE FREE 5 ML: 5 INJECTION INTRAVENOUS at 13:05

## 2022-07-27 NOTE — NURSING NOTE
Chief Complaint   Patient presents with     Blood Draw     Labs obtained via noncoring powerport 20 gauge, 3/4 inch needle, heparin flushed

## 2022-08-20 ENCOUNTER — HEALTH MAINTENANCE LETTER (OUTPATIENT)
Age: 67
End: 2022-08-20

## 2022-08-24 ENCOUNTER — LAB (OUTPATIENT)
Dept: LAB | Facility: CLINIC | Age: 67
End: 2022-08-24
Attending: INTERNAL MEDICINE
Payer: MEDICARE

## 2022-08-24 DIAGNOSIS — C25.0 MALIGNANT NEOPLASM OF HEAD OF PANCREAS (H): ICD-10-CM

## 2022-08-24 LAB
ALBUMIN SERPL-MCNC: 3.4 G/DL (ref 3.4–5)
ALP SERPL-CCNC: 98 U/L (ref 40–150)
ALT SERPL W P-5'-P-CCNC: 28 U/L (ref 0–50)
ANION GAP SERPL CALCULATED.3IONS-SCNC: 7 MMOL/L (ref 3–14)
AST SERPL W P-5'-P-CCNC: 29 U/L (ref 0–45)
BASOPHILS # BLD AUTO: 0 10E3/UL (ref 0–0.2)
BASOPHILS NFR BLD AUTO: 1 %
BILIRUB SERPL-MCNC: 0.6 MG/DL (ref 0.2–1.3)
BUN SERPL-MCNC: 10 MG/DL (ref 7–30)
CALCIUM SERPL-MCNC: 8.8 MG/DL (ref 8.5–10.1)
CHLORIDE BLD-SCNC: 106 MMOL/L (ref 94–109)
CO2 SERPL-SCNC: 27 MMOL/L (ref 20–32)
CREAT SERPL-MCNC: 0.62 MG/DL (ref 0.52–1.04)
EOSINOPHIL # BLD AUTO: 0.1 10E3/UL (ref 0–0.7)
EOSINOPHIL NFR BLD AUTO: 1 %
ERYTHROCYTE [DISTWIDTH] IN BLOOD BY AUTOMATED COUNT: 19.2 % (ref 10–15)
GFR SERPL CREATININE-BSD FRML MDRD: >90 ML/MIN/1.73M2
GLUCOSE BLD-MCNC: 108 MG/DL (ref 70–99)
HCT VFR BLD AUTO: 31 % (ref 35–47)
HGB BLD-MCNC: 10.3 G/DL (ref 11.7–15.7)
IMM GRANULOCYTES # BLD: 0 10E3/UL
IMM GRANULOCYTES NFR BLD: 0 %
LYMPHOCYTES # BLD AUTO: 1.4 10E3/UL (ref 0.8–5.3)
LYMPHOCYTES NFR BLD AUTO: 33 %
MCH RBC QN AUTO: 33.4 PG (ref 26.5–33)
MCHC RBC AUTO-ENTMCNC: 33.2 G/DL (ref 31.5–36.5)
MCV RBC AUTO: 101 FL (ref 78–100)
MONOCYTES # BLD AUTO: 0.4 10E3/UL (ref 0–1.3)
MONOCYTES NFR BLD AUTO: 11 %
NEUTROPHILS # BLD AUTO: 2.3 10E3/UL (ref 1.6–8.3)
NEUTROPHILS NFR BLD AUTO: 54 %
NRBC # BLD AUTO: 0 10E3/UL
NRBC BLD AUTO-RTO: 0 /100
PLATELET # BLD AUTO: 172 10E3/UL (ref 150–450)
POTASSIUM BLD-SCNC: 3.9 MMOL/L (ref 3.4–5.3)
PROT SERPL-MCNC: 6.7 G/DL (ref 6.8–8.8)
RBC # BLD AUTO: 3.08 10E6/UL (ref 3.8–5.2)
SODIUM SERPL-SCNC: 140 MMOL/L (ref 133–144)
WBC # BLD AUTO: 4.1 10E3/UL (ref 4–11)

## 2022-08-24 PROCEDURE — 250N000011 HC RX IP 250 OP 636: Performed by: INTERNAL MEDICINE

## 2022-08-24 PROCEDURE — 85025 COMPLETE CBC W/AUTO DIFF WBC: CPT

## 2022-08-24 PROCEDURE — 36591 DRAW BLOOD OFF VENOUS DEVICE: CPT

## 2022-08-24 PROCEDURE — 80053 COMPREHEN METABOLIC PANEL: CPT

## 2022-08-24 RX ORDER — HEPARIN SODIUM (PORCINE) LOCK FLUSH IV SOLN 100 UNIT/ML 100 UNIT/ML
5 SOLUTION INTRAVENOUS
Status: COMPLETED | OUTPATIENT
Start: 2022-08-24 | End: 2022-08-24

## 2022-08-24 RX ADMIN — Medication 5 ML: at 09:58

## 2022-09-08 ENCOUNTER — OFFICE VISIT (OUTPATIENT)
Dept: FAMILY MEDICINE | Facility: CLINIC | Age: 67
End: 2022-09-08
Payer: MEDICARE

## 2022-09-08 ENCOUNTER — TRANSFERRED RECORDS (OUTPATIENT)
Dept: MULTI SPECIALTY CLINIC | Facility: CLINIC | Age: 67
End: 2022-09-08

## 2022-09-08 VITALS
HEART RATE: 67 BPM | SYSTOLIC BLOOD PRESSURE: 128 MMHG | BODY MASS INDEX: 17.4 KG/M2 | DIASTOLIC BLOOD PRESSURE: 81 MMHG | TEMPERATURE: 97.7 F | WEIGHT: 104.4 LBS | HEIGHT: 65 IN

## 2022-09-08 DIAGNOSIS — C25.0 MALIGNANT NEOPLASM OF HEAD OF PANCREAS (H): ICD-10-CM

## 2022-09-08 DIAGNOSIS — R87.612 PAPANICOLAOU SMEAR OF CERVIX WITH LOW GRADE SQUAMOUS INTRAEPITHELIAL LESION (LGSIL): ICD-10-CM

## 2022-09-08 DIAGNOSIS — Z13.220 SCREENING FOR HYPERLIPIDEMIA: ICD-10-CM

## 2022-09-08 DIAGNOSIS — R73.03 PRE-DIABETES: Primary | ICD-10-CM

## 2022-09-08 DIAGNOSIS — Z12.11 SCREEN FOR COLON CANCER: ICD-10-CM

## 2022-09-08 PROBLEM — E11.9 DIABETES MELLITUS, TYPE 2 (H): Status: RESOLVED | Noted: 2021-10-29 | Resolved: 2022-09-08

## 2022-09-08 LAB
CHOLEST SERPL-MCNC: 145 MG/DL
CREAT UR-MCNC: 25.7 MG/DL
HBA1C MFR BLD: 5.7 % (ref 0–5.6)
HDLC SERPL-MCNC: 64 MG/DL
LDLC SERPL CALC-MCNC: 70 MG/DL
MICROALBUMIN UR-MCNC: <12 MG/L
MICROALBUMIN/CREAT UR: NORMAL MG/G{CREAT}
NONHDLC SERPL-MCNC: 81 MG/DL
RETINOPATHY: NORMAL
TRIGL SERPL-MCNC: 53 MG/DL

## 2022-09-08 PROCEDURE — 80061 LIPID PANEL: CPT | Performed by: FAMILY MEDICINE

## 2022-09-08 PROCEDURE — 82043 UR ALBUMIN QUANTITATIVE: CPT | Performed by: FAMILY MEDICINE

## 2022-09-08 PROCEDURE — 36415 COLL VENOUS BLD VENIPUNCTURE: CPT | Performed by: FAMILY MEDICINE

## 2022-09-08 PROCEDURE — 83036 HEMOGLOBIN GLYCOSYLATED A1C: CPT | Performed by: FAMILY MEDICINE

## 2022-09-08 PROCEDURE — 99214 OFFICE O/P EST MOD 30 MIN: CPT | Performed by: FAMILY MEDICINE

## 2022-09-08 NOTE — PROGRESS NOTES
Assessment & Plan     Pre-diabetes  Discussed with her. Not on meds. a1c consistently <6.5, only one data point at 6.6.  Monitor every 6 months. The patient indicates understanding of these issues and agrees with the plan.     Screen for colon cancer  Discussed with her and she will schedule    Screening for hyperlipidemia    - Lipid panel reflex to direct LDL Non-fasting; Future  - Lipid panel reflex to direct LDL Non-fasting    Malignant neoplasm of head of pancreas (H)  9 years out. Taking suppressive chemo med    Papanicolaou smear of cervix with low grade squamous intraepithelial lesion (LGSIL)  Discussed and offered to do this today. She prefers to schedule a well visit and we'll do it then.        Return in about 2 months (around 11/8/2022) for Physical Exam.    Monica Tubbs MD  [facility] YOSEF Kapoor is a 66 year old, presenting for the following health issues:    Diabetes (Follow up)      History of Present Illness       Diabetes:   She presents for follow up of diabetes.  She is checking home blood glucose a few times a month. She checks blood glucose before meals and after meals.  Blood glucose is never over 200 and never under 70. She is aware of hypoglycemia symptoms including weakness. She has no concerns regarding her diabetes at this time.  She is having numbness in feet. The patient has had a diabetic eye exam in the last 12 months. Eye exam performed on 9/8/22. Location of last eye exam [facility].        She eats 2-3 servings of fruits and vegetables daily.She consumes 0 sweetened beverage(s) daily.She exercises with enough effort to increase her heart rate 60 or more minutes per day.  She exercises with enough effort to increase her heart rate 6 days per week.   She is taking medications regularly.     DM2  Type 2 diabetes mellitus without complication, without long-term current use of insulin (H)  Will check a1c with upcoming labs. Not on diabetes  "meds. Seeing eye doctor. 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   If a1c looks good, can see me again in 6 months   - Hemoglobin A1c  - lisinopril (ZESTRIL) 10 MG tablet  Dispense: 90 tablet; Refill: 3    Lab Results   Component Value Date    A1C 5.7 09/08/2022    A1C 6.0 02/08/2022    A1C 6.6 10/05/2021    A1C 6.0 07/03/2017    A1C 6.0 04/18/2016    A1C 5.7 03/04/2016    A1C 6.0 07/07/2014    A1C 6.1 01/28/2014     Wt Readings from Last 4 Encounters:   09/08/22 47.4 kg (104 lb 6.4 oz)   06/27/22 50 kg (110 lb 3.2 oz)   02/04/22 50.7 kg (111 lb 12.8 oz)   11/08/21 50.8 kg (111 lb 14.4 oz)     [place] this [place]  Has been walking/biking a lot  Hasn't been strength training   Hungry. Eating frequently       9 years since she had pancreas cancer  She will schedule colonoscopy       Review of Systems   Constitutional, HEENT, cardiovascular, pulmonary, gi and gu systems are negative, except as otherwise noted.      Objective    /81   Pulse 67   Temp 97.7  F (36.5  C) (Tympanic)   Ht 1.651 m (5' 5\")   Wt 47.4 kg (104 lb 6.4 oz)   BMI 17.37 kg/m    Body mass index is 17.37 kg/m .     Physical Exam   GENERAL - Pt is alert and oriented in no acute distress.  Affect is appropriate. Good eye contact.  PSYCH - Pt makes good eye contact, is clean and well-dressed. Oriented to person,place,and time. Cooperative. No speech abnormalities. No psychomotor agitation or retardation.  Thought process was normal and thought content was free of suicidal/homicidal ideation, obsessions, and delusions. Insight and judgement were good.                  "

## 2022-09-12 DIAGNOSIS — M81.6 LOCALIZED OSTEOPOROSIS, UNSPECIFIED PATHOLOGICAL FRACTURE PRESENCE: ICD-10-CM

## 2022-09-12 RX ORDER — ALENDRONATE SODIUM 70 MG/1
TABLET ORAL
Qty: 12 TABLET | Refills: 0 | Status: SHIPPED | OUTPATIENT
Start: 2022-09-12 | End: 2022-11-18

## 2022-09-15 DIAGNOSIS — C25.0 MALIGNANT NEOPLASM OF HEAD OF PANCREAS (H): Primary | ICD-10-CM

## 2022-09-15 RX ORDER — CAPECITABINE 500 MG/1
TABLET, FILM COATED ORAL
Qty: 42 TABLET | Refills: 3 | Status: SHIPPED | OUTPATIENT
Start: 2022-09-15 | End: 2022-12-30

## 2022-09-23 ENCOUNTER — LAB (OUTPATIENT)
Dept: LAB | Facility: CLINIC | Age: 67
End: 2022-09-23
Attending: OBSTETRICS & GYNECOLOGY
Payer: MEDICARE

## 2022-09-23 DIAGNOSIS — C25.0 MALIGNANT NEOPLASM OF HEAD OF PANCREAS (H): ICD-10-CM

## 2022-09-23 LAB
ALBUMIN SERPL BCG-MCNC: 3.9 G/DL (ref 3.5–5.2)
ALP SERPL-CCNC: 93 U/L (ref 35–104)
ALT SERPL W P-5'-P-CCNC: 15 U/L (ref 10–35)
ANION GAP SERPL CALCULATED.3IONS-SCNC: 10 MMOL/L (ref 7–15)
AST SERPL W P-5'-P-CCNC: 29 U/L (ref 10–35)
BASOPHILS # BLD AUTO: 0 10E3/UL (ref 0–0.2)
BASOPHILS NFR BLD AUTO: 1 %
BILIRUB SERPL-MCNC: 0.3 MG/DL
BUN SERPL-MCNC: 12.5 MG/DL (ref 8–23)
CALCIUM SERPL-MCNC: 9.3 MG/DL (ref 8.8–10.2)
CHLORIDE SERPL-SCNC: 105 MMOL/L (ref 98–107)
CREAT SERPL-MCNC: 0.65 MG/DL (ref 0.51–0.95)
DEPRECATED HCO3 PLAS-SCNC: 25 MMOL/L (ref 22–29)
EOSINOPHIL # BLD AUTO: 0.1 10E3/UL (ref 0–0.7)
EOSINOPHIL NFR BLD AUTO: 1 %
ERYTHROCYTE [DISTWIDTH] IN BLOOD BY AUTOMATED COUNT: 19.6 % (ref 10–15)
GFR SERPL CREATININE-BSD FRML MDRD: >90 ML/MIN/1.73M2
GLUCOSE SERPL-MCNC: 112 MG/DL (ref 70–99)
HCT VFR BLD AUTO: 29.3 % (ref 35–47)
HGB BLD-MCNC: 9.7 G/DL (ref 11.7–15.7)
IMM GRANULOCYTES # BLD: 0 10E3/UL
IMM GRANULOCYTES NFR BLD: 0 %
LYMPHOCYTES # BLD AUTO: 1.4 10E3/UL (ref 0.8–5.3)
LYMPHOCYTES NFR BLD AUTO: 22 %
MCH RBC QN AUTO: 33.7 PG (ref 26.5–33)
MCHC RBC AUTO-ENTMCNC: 33.1 G/DL (ref 31.5–36.5)
MCV RBC AUTO: 102 FL (ref 78–100)
MONOCYTES # BLD AUTO: 0.6 10E3/UL (ref 0–1.3)
MONOCYTES NFR BLD AUTO: 10 %
NEUTROPHILS # BLD AUTO: 4.3 10E3/UL (ref 1.6–8.3)
NEUTROPHILS NFR BLD AUTO: 66 %
NRBC # BLD AUTO: 0 10E3/UL
NRBC BLD AUTO-RTO: 0 /100
PLATELET # BLD AUTO: 183 10E3/UL (ref 150–450)
POTASSIUM SERPL-SCNC: 4.2 MMOL/L (ref 3.4–5.3)
PROT SERPL-MCNC: 6.4 G/DL (ref 6.4–8.3)
RBC # BLD AUTO: 2.88 10E6/UL (ref 3.8–5.2)
SODIUM SERPL-SCNC: 140 MMOL/L (ref 136–145)
WBC # BLD AUTO: 6.3 10E3/UL (ref 4–11)

## 2022-09-23 PROCEDURE — 80053 COMPREHEN METABOLIC PANEL: CPT

## 2022-09-23 PROCEDURE — 36591 DRAW BLOOD OFF VENOUS DEVICE: CPT

## 2022-09-23 PROCEDURE — 250N000011 HC RX IP 250 OP 636: Performed by: INTERNAL MEDICINE

## 2022-09-23 PROCEDURE — 85025 COMPLETE CBC W/AUTO DIFF WBC: CPT

## 2022-09-23 RX ORDER — HEPARIN SODIUM (PORCINE) LOCK FLUSH IV SOLN 100 UNIT/ML 100 UNIT/ML
5 SOLUTION INTRAVENOUS EVERY 8 HOURS
Status: DISCONTINUED | OUTPATIENT
Start: 2022-09-23 | End: 2022-09-29 | Stop reason: HOSPADM

## 2022-09-23 RX ADMIN — Medication 5 ML: at 12:47

## 2022-09-23 NOTE — NURSING NOTE
Chief Complaint   Patient presents with     Port Draw     Gripper needle     Thelma Turcios RN on 9/23/2022 at 12:51 PM

## 2022-09-26 PROBLEM — M62.81 GENERALIZED MUSCLE WEAKNESS: Status: RESOLVED | Noted: 2021-12-02 | Resolved: 2022-09-26

## 2022-09-26 PROBLEM — M81.0 OSTEOPOROSIS: Status: RESOLVED | Noted: 2021-10-29 | Resolved: 2022-09-26

## 2022-09-29 ENCOUNTER — TELEPHONE (OUTPATIENT)
Dept: FAMILY MEDICINE | Facility: CLINIC | Age: 67
End: 2022-09-29

## 2022-09-29 NOTE — TELEPHONE ENCOUNTER
Patient Quality Outreach    Patient is due for the following:   Colon Cancer Screening  Cervical Cancer Screening - PAP Needed  Physical Annual Wellness Visit      Topic Date Due     COVID-19 Vaccine (5 - Booster for Moderna series) 04/13/2022     Flu Vaccine (1) 09/01/2022       Next Steps:   Schedule a Annual Wellness Visit    Type of outreach:    Patient is scheduled on 11/8/22- physical     Questions for provider review:    None     Cara Jaquez LPN  Chart routed to Care Team.

## 2022-09-30 DIAGNOSIS — C25.0 MALIGNANT NEOPLASM OF HEAD OF PANCREAS (H): ICD-10-CM

## 2022-10-02 RX ORDER — PANTOPRAZOLE SODIUM 40 MG/1
TABLET, DELAYED RELEASE ORAL
Qty: 90 TABLET | Refills: 0 | Status: SHIPPED | OUTPATIENT
Start: 2022-10-02 | End: 2022-10-05

## 2022-10-04 DIAGNOSIS — C25.0 MALIGNANT NEOPLASM OF HEAD OF PANCREAS (H): ICD-10-CM

## 2022-10-05 RX ORDER — PANTOPRAZOLE SODIUM 40 MG/1
TABLET, DELAYED RELEASE ORAL
Qty: 90 TABLET | Refills: 0 | Status: SHIPPED | OUTPATIENT
Start: 2022-10-05 | End: 2023-02-20

## 2022-10-21 ENCOUNTER — ANCILLARY PROCEDURE (OUTPATIENT)
Dept: CT IMAGING | Facility: CLINIC | Age: 67
End: 2022-10-21
Attending: INTERNAL MEDICINE
Payer: MEDICARE

## 2022-10-21 ENCOUNTER — LAB (OUTPATIENT)
Dept: LAB | Facility: CLINIC | Age: 67
End: 2022-10-21
Attending: INTERNAL MEDICINE
Payer: MEDICARE

## 2022-10-21 DIAGNOSIS — C25.0 MALIGNANT NEOPLASM OF HEAD OF PANCREAS (H): ICD-10-CM

## 2022-10-21 DIAGNOSIS — C25.0 MALIGNANT NEOPLASM OF HEAD OF PANCREAS (H): Primary | ICD-10-CM

## 2022-10-21 LAB
HOLD SPECIMEN: NORMAL

## 2022-10-21 PROCEDURE — 74177 CT ABD & PELVIS W/CONTRAST: CPT | Mod: MG | Performed by: STUDENT IN AN ORGANIZED HEALTH CARE EDUCATION/TRAINING PROGRAM

## 2022-10-21 PROCEDURE — 250N000011 HC RX IP 250 OP 636: Performed by: INTERNAL MEDICINE

## 2022-10-21 PROCEDURE — G1010 CDSM STANSON: HCPCS | Performed by: STUDENT IN AN ORGANIZED HEALTH CARE EDUCATION/TRAINING PROGRAM

## 2022-10-21 PROCEDURE — 71260 CT THORAX DX C+: CPT | Mod: MG | Performed by: STUDENT IN AN ORGANIZED HEALTH CARE EDUCATION/TRAINING PROGRAM

## 2022-10-21 RX ORDER — HEPARIN SODIUM (PORCINE) LOCK FLUSH IV SOLN 100 UNIT/ML 100 UNIT/ML
500 SOLUTION INTRAVENOUS ONCE
Status: COMPLETED | OUTPATIENT
Start: 2022-10-21 | End: 2022-10-21

## 2022-10-21 RX ORDER — IOPAMIDOL 755 MG/ML
64 INJECTION, SOLUTION INTRAVASCULAR ONCE
Status: COMPLETED | OUTPATIENT
Start: 2022-10-21 | End: 2022-10-21

## 2022-10-21 RX ORDER — HEPARIN SODIUM (PORCINE) LOCK FLUSH IV SOLN 100 UNIT/ML 100 UNIT/ML
5 SOLUTION INTRAVENOUS ONCE
Status: COMPLETED | OUTPATIENT
Start: 2022-10-21 | End: 2022-10-21

## 2022-10-21 RX ADMIN — HEPARIN SODIUM (PORCINE) LOCK FLUSH IV SOLN 100 UNIT/ML 500 UNITS: 100 SOLUTION at 10:14

## 2022-10-21 RX ADMIN — Medication 5 ML: at 09:47

## 2022-10-21 RX ADMIN — IOPAMIDOL 64 ML: 755 INJECTION, SOLUTION INTRAVASCULAR at 10:04

## 2022-10-21 NOTE — NURSING NOTE
Chief Complaint   Patient presents with     Port Draw     Port accessed with 20g flat needle by RN, JIC tubes collected, no orders, line flushed with saline and heparin.         Dr. Wade paged to place lab orders.    Nazia TOMPKINS RN PHN BSN  BMT/Oncology Lab

## 2022-10-24 ENCOUNTER — ONCOLOGY VISIT (OUTPATIENT)
Dept: ONCOLOGY | Facility: CLINIC | Age: 67
End: 2022-10-24
Attending: INTERNAL MEDICINE
Payer: MEDICARE

## 2022-10-24 VITALS
DIASTOLIC BLOOD PRESSURE: 80 MMHG | HEART RATE: 77 BPM | BODY MASS INDEX: 18.32 KG/M2 | WEIGHT: 110.1 LBS | TEMPERATURE: 97.9 F | SYSTOLIC BLOOD PRESSURE: 128 MMHG | OXYGEN SATURATION: 93 %

## 2022-10-24 DIAGNOSIS — E11.9 TYPE 2 DIABETES MELLITUS WITHOUT COMPLICATION, WITHOUT LONG-TERM CURRENT USE OF INSULIN (H): ICD-10-CM

## 2022-10-24 DIAGNOSIS — C25.0 MALIGNANT NEOPLASM OF HEAD OF PANCREAS (H): ICD-10-CM

## 2022-10-24 LAB
ALBUMIN SERPL BCG-MCNC: 4 G/DL (ref 3.5–5.2)
ALP SERPL-CCNC: 105 U/L (ref 35–104)
ALT SERPL W P-5'-P-CCNC: 14 U/L (ref 10–35)
ANION GAP SERPL CALCULATED.3IONS-SCNC: 10 MMOL/L (ref 7–15)
AST SERPL W P-5'-P-CCNC: 25 U/L (ref 10–35)
BASOPHILS # BLD AUTO: 0 10E3/UL (ref 0–0.2)
BASOPHILS NFR BLD AUTO: 1 %
BILIRUB SERPL-MCNC: 0.4 MG/DL
BUN SERPL-MCNC: 9.7 MG/DL (ref 8–23)
CALCIUM SERPL-MCNC: 9.3 MG/DL (ref 8.8–10.2)
CHLORIDE SERPL-SCNC: 105 MMOL/L (ref 98–107)
CREAT SERPL-MCNC: 0.76 MG/DL (ref 0.51–0.95)
DEPRECATED HCO3 PLAS-SCNC: 22 MMOL/L (ref 22–29)
EOSINOPHIL # BLD AUTO: 0 10E3/UL (ref 0–0.7)
EOSINOPHIL NFR BLD AUTO: 1 %
ERYTHROCYTE [DISTWIDTH] IN BLOOD BY AUTOMATED COUNT: 18.1 % (ref 10–15)
GFR SERPL CREATININE-BSD FRML MDRD: 84 ML/MIN/1.73M2
GLUCOSE SERPL-MCNC: 136 MG/DL (ref 70–99)
HCT VFR BLD AUTO: 31.5 % (ref 35–47)
HGB BLD-MCNC: 10.3 G/DL (ref 11.7–15.7)
IMM GRANULOCYTES # BLD: 0 10E3/UL
IMM GRANULOCYTES NFR BLD: 0 %
LYMPHOCYTES # BLD AUTO: 1.3 10E3/UL (ref 0.8–5.3)
LYMPHOCYTES NFR BLD AUTO: 34 %
MCH RBC QN AUTO: 33.4 PG (ref 26.5–33)
MCHC RBC AUTO-ENTMCNC: 32.7 G/DL (ref 31.5–36.5)
MCV RBC AUTO: 102 FL (ref 78–100)
MONOCYTES # BLD AUTO: 0.3 10E3/UL (ref 0–1.3)
MONOCYTES NFR BLD AUTO: 8 %
NEUTROPHILS # BLD AUTO: 2.2 10E3/UL (ref 1.6–8.3)
NEUTROPHILS NFR BLD AUTO: 56 %
NRBC # BLD AUTO: 0 10E3/UL
NRBC BLD AUTO-RTO: 0 /100
PLATELET # BLD AUTO: 178 10E3/UL (ref 150–450)
POTASSIUM SERPL-SCNC: 4 MMOL/L (ref 3.4–5.3)
PROT SERPL-MCNC: 6.7 G/DL (ref 6.4–8.3)
RBC # BLD AUTO: 3.08 10E6/UL (ref 3.8–5.2)
SODIUM SERPL-SCNC: 137 MMOL/L (ref 136–145)
WBC # BLD AUTO: 3.9 10E3/UL (ref 4–11)

## 2022-10-24 PROCEDURE — G0463 HOSPITAL OUTPT CLINIC VISIT: HCPCS

## 2022-10-24 PROCEDURE — 99214 OFFICE O/P EST MOD 30 MIN: CPT | Performed by: INTERNAL MEDICINE

## 2022-10-24 PROCEDURE — 36415 COLL VENOUS BLD VENIPUNCTURE: CPT | Performed by: INTERNAL MEDICINE

## 2022-10-24 PROCEDURE — 80053 COMPREHEN METABOLIC PANEL: CPT | Performed by: INTERNAL MEDICINE

## 2022-10-24 PROCEDURE — 85025 COMPLETE CBC W/AUTO DIFF WBC: CPT | Performed by: INTERNAL MEDICINE

## 2022-10-24 ASSESSMENT — PAIN SCALES - GENERAL: PAINLEVEL: NO PAIN (0)

## 2022-10-24 NOTE — NURSING NOTE
"Oncology Rooming Note    October 24, 2022 5:18 PM   Emelia Weathers is a 67 year old female who presents for:    Chief Complaint   Patient presents with     Oncology Clinic Visit     Malignant neoplasm of head of pancreas     Initial Vitals: /80 (BP Location: Right arm, Patient Position: Sitting, Cuff Size: Adult Regular)   Pulse 77   Temp 97.9  F (36.6  C) (Oral)   Wt 49.9 kg (110 lb 1.6 oz)   SpO2 93%   BMI 18.32 kg/m   Estimated body mass index is 18.32 kg/m  as calculated from the following:    Height as of 9/8/22: 1.651 m (5' 5\").    Weight as of this encounter: 49.9 kg (110 lb 1.6 oz). Body surface area is 1.51 meters squared.  No Pain (0) Comment: Data Unavailable   No LMP recorded. Patient is postmenopausal.  Allergies reviewed: Yes  Medications reviewed: Yes    Medications: Medication refills not needed today.  Pharmacy name entered into [facility]:    [id number] - [place], [place] - [address] [place] [address] E  [facility] [place], [place] - [address] [facility] [place] - [address] [facility] [id number]  [id number] - [place], [place] - [address]  [facility] - [place], [place] - [address]    Clinical concerns: none.       Spenser Ballesteros"

## 2022-10-24 NOTE — NURSING NOTE
Blood specimens collected in clinic today per provider order. Patient tolerated without incident and was discharged after. Please see flow sheet for additional details.       Beatrice Blount LPN October 24, 2022 6:57 PM

## 2022-10-24 NOTE — LETTER
10/24/2022         RE: Emelia Weathers  [address]  [place] [facility] [address]        Dear Colleague,    Thank you for referring your patient, Emelia Weathers, to the [facility]. Please see a copy of my visit note below.        Tiana Weathers is here today in follow-up of locally recurrent pancreatic cancer.    She is 67 years old and originally had a Whipple many years ago with a subsequent biopsy-proven recurrence in the resection bed and encasing her SMA more than 6 years ago.  She has a known germline Chek 1 mutation.  She had an excellent response of her recurrent disease to FOLFIRINOX and now is on maintenance single agent Xeloda for several years.  Her course has been complicated by the development of a scleroderma-like syndrome that were attributing to her Xeloda with contractures in her hands and dry eyes.  She reports today that those symptoms are about the same for her with probably some degree of improvement in her eyes.  She continues to have an excellent appetite but does not gain any weight.  She continues to be quite physically active although she is not bikes nearly as much as she would like this summer because she was preoccupied with moving her household.    On physical exam she is quite slender but otherwise appears well and younger than her stated age.  Her vital signs are unremarkable.  She has mild conjunctival injection.  Is no adenopathy in the neck supraclavicular spaces.  She has marked dry desquamation of her palms with minor contractures of her hands which appears stable compared to my prior exams.    I personally reviewed her CT scan and went over the results with her.  There is minimal evidence of disease in her resection bed which is unchanged nothing to suggest disease progression anywhere else.  Her lab results showed normal electrolytes and renal function.  Her bilirubin, albumin and liver enzymes are normal.  She has modest pancytopenia consistent with  her chemotherapy.    Assessment/plan: Recurrent pancreatic cancer with ongoing stable minimal evidence of disease.  As always we briefly touched on whether she felt comfortable discontinuing her maintenance therapy and see how things go off treatment but she like to continue on.  She does not find the problems with her hands and eyes particular problematic and they do not seem progressive at this point.  We will have her meet with the dietitian to see if there is any strategies to help her try and gain back some weight.  We will plan on reevaluating her disease status again in about 4 months.      Again, thank you for allowing me to participate in the care of your patient.        Sincerely,        Yasmany Wade MD

## 2022-10-24 NOTE — PROGRESS NOTES
Tiana Weathers is here today in follow-up of locally recurrent pancreatic cancer.    She is 67 years old and originally had a Whipple many years ago with a subsequent biopsy-proven recurrence in the resection bed and encasing her SMA more than 6 years ago.  She has a known germline Chek 1 mutation.  She had an excellent response of her recurrent disease to FOLFIRINOX and now is on maintenance single agent Xeloda for several years.  Her course has been complicated by the development of a scleroderma-like syndrome that were attributing to her Xeloda with contractures in her hands and dry eyes.  She reports today that those symptoms are about the same for her with probably some degree of improvement in her eyes.  She continues to have an excellent appetite but does not gain any weight.  She continues to be quite physically active although she is not bikes nearly as much as she would like this summer because she was preoccupied with moving her household.    On physical exam she is quite slender but otherwise appears well and younger than her stated age.  Her vital signs are unremarkable.  She has mild conjunctival injection.  Is no adenopathy in the neck supraclavicular spaces.  She has marked dry desquamation of her palms with minor contractures of her hands which appears stable compared to my prior exams.    I personally reviewed her CT scan and went over the results with her.  There is minimal evidence of disease in her resection bed which is unchanged nothing to suggest disease progression anywhere else.  Her lab results showed normal electrolytes and renal function.  Her bilirubin, albumin and liver enzymes are normal.  She has modest pancytopenia consistent with her chemotherapy.    Assessment/plan: Recurrent pancreatic cancer with ongoing stable minimal evidence of disease.  As always we briefly touched on whether she felt comfortable discontinuing her maintenance therapy and see how things go off treatment but  she like to continue on.  She does not find the problems with her hands and eyes particular problematic and they do not seem progressive at this point.  We will have her meet with the dietitian to see if there is any strategies to help her try and gain back some weight.  We will plan on reevaluating her disease status again in about 4 months.

## 2022-11-17 DIAGNOSIS — M81.6 LOCALIZED OSTEOPOROSIS, UNSPECIFIED PATHOLOGICAL FRACTURE PRESENCE: ICD-10-CM

## 2022-11-17 DIAGNOSIS — E11.9 TYPE 2 DIABETES MELLITUS WITHOUT COMPLICATION, WITHOUT LONG-TERM CURRENT USE OF INSULIN (H): ICD-10-CM

## 2022-11-18 ENCOUNTER — LAB (OUTPATIENT)
Dept: LAB | Facility: CLINIC | Age: 67
End: 2022-11-18
Attending: INTERNAL MEDICINE
Payer: MEDICARE

## 2022-11-18 DIAGNOSIS — C25.0 MALIGNANT NEOPLASM OF HEAD OF PANCREAS (H): ICD-10-CM

## 2022-11-18 LAB
ALBUMIN SERPL BCG-MCNC: 3.7 G/DL (ref 3.5–5.2)
ALP SERPL-CCNC: 138 U/L (ref 35–104)
ALT SERPL W P-5'-P-CCNC: 26 U/L (ref 10–35)
ANION GAP SERPL CALCULATED.3IONS-SCNC: 8 MMOL/L (ref 7–15)
AST SERPL W P-5'-P-CCNC: 33 U/L (ref 10–35)
BASOPHILS # BLD AUTO: 0 10E3/UL (ref 0–0.2)
BASOPHILS NFR BLD AUTO: 1 %
BILIRUB SERPL-MCNC: 0.4 MG/DL
BUN SERPL-MCNC: 11.1 MG/DL (ref 8–23)
CALCIUM SERPL-MCNC: 8.8 MG/DL (ref 8.8–10.2)
CHLORIDE SERPL-SCNC: 105 MMOL/L (ref 98–107)
CREAT SERPL-MCNC: 0.86 MG/DL (ref 0.51–0.95)
DEPRECATED HCO3 PLAS-SCNC: 27 MMOL/L (ref 22–29)
EOSINOPHIL # BLD AUTO: 0 10E3/UL (ref 0–0.7)
EOSINOPHIL NFR BLD AUTO: 1 %
ERYTHROCYTE [DISTWIDTH] IN BLOOD BY AUTOMATED COUNT: 18.2 % (ref 10–15)
GFR SERPL CREATININE-BSD FRML MDRD: 73 ML/MIN/1.73M2
GLUCOSE SERPL-MCNC: 178 MG/DL (ref 70–99)
HCT VFR BLD AUTO: 29.7 % (ref 35–47)
HGB BLD-MCNC: 9.6 G/DL (ref 11.7–15.7)
IMM GRANULOCYTES # BLD: 0 10E3/UL
IMM GRANULOCYTES NFR BLD: 0 %
LYMPHOCYTES # BLD AUTO: 0.9 10E3/UL (ref 0.8–5.3)
LYMPHOCYTES NFR BLD AUTO: 24 %
MCH RBC QN AUTO: 32.5 PG (ref 26.5–33)
MCHC RBC AUTO-ENTMCNC: 32.3 G/DL (ref 31.5–36.5)
MCV RBC AUTO: 101 FL (ref 78–100)
MONOCYTES # BLD AUTO: 0.3 10E3/UL (ref 0–1.3)
MONOCYTES NFR BLD AUTO: 8 %
NEUTROPHILS # BLD AUTO: 2.5 10E3/UL (ref 1.6–8.3)
NEUTROPHILS NFR BLD AUTO: 66 %
NRBC # BLD AUTO: 0 10E3/UL
NRBC BLD AUTO-RTO: 0 /100
PLATELET # BLD AUTO: 158 10E3/UL (ref 150–450)
POTASSIUM SERPL-SCNC: 4.1 MMOL/L (ref 3.4–5.3)
PROT SERPL-MCNC: 6.2 G/DL (ref 6.4–8.3)
RBC # BLD AUTO: 2.95 10E6/UL (ref 3.8–5.2)
SODIUM SERPL-SCNC: 140 MMOL/L (ref 136–145)
WBC # BLD AUTO: 3.8 10E3/UL (ref 4–11)

## 2022-11-18 PROCEDURE — 80053 COMPREHEN METABOLIC PANEL: CPT

## 2022-11-18 PROCEDURE — 36415 COLL VENOUS BLD VENIPUNCTURE: CPT

## 2022-11-18 PROCEDURE — 85025 COMPLETE CBC W/AUTO DIFF WBC: CPT

## 2022-11-18 PROCEDURE — 250N000011 HC RX IP 250 OP 636: Performed by: INTERNAL MEDICINE

## 2022-11-18 RX ORDER — LISINOPRIL 10 MG/1
10 TABLET ORAL DAILY
Qty: 90 TABLET | Refills: 3 | Status: SHIPPED | OUTPATIENT
Start: 2022-11-18 | End: 2022-12-30

## 2022-11-18 RX ORDER — ALENDRONATE SODIUM 70 MG/1
TABLET ORAL
Qty: 12 TABLET | Refills: 0 | Status: SHIPPED | OUTPATIENT
Start: 2022-11-18 | End: 2022-12-30

## 2022-11-18 RX ORDER — HEPARIN SODIUM (PORCINE) LOCK FLUSH IV SOLN 100 UNIT/ML 100 UNIT/ML
5 SOLUTION INTRAVENOUS
Status: DISCONTINUED | OUTPATIENT
Start: 2022-11-18 | End: 2022-11-24 | Stop reason: HOSPADM

## 2022-11-18 RX ADMIN — Medication 5 ML: at 10:44

## 2022-11-18 NOTE — NURSING NOTE
Chief Complaint   Patient presents with     Port Draw     Lab only visit. Port accessed, labs drawn, heparin locked, deaccessed     There were no vitals taken for this visit.  Dixon Mcnally RN on 11/18/2022 at 10:45 AM

## 2022-11-18 NOTE — TELEPHONE ENCOUNTER
"Last Written Prescription Date: 9/12/22  Last Fill Quantity: 12, # refills: 0  Last office visit provider: 9/08/22      Requested Prescriptions   Pending Prescriptions Disp Refills     alendronate (FOSAMAX) 70 MG tablet [Pharmacy Med Name: ALENDRONATE SODIUM 70 MG TAB] 12 tablet 0     Sig: TAKE 1 TABLET BY MOUTH ONE TIME PER WEEK       Bisphosphonates Passed - 11/17/2022  2:27 PM        Passed - Recent (12 mo) or future (30 days) visit within the authorizing provider's specialty     Patient has had an office visit with the authorizing provider or a provider within the authorizing providers department within the previous 12 mos or has a future within next 30 days. See \"Patient Info\" tab in inbasket, or \"Choose Columns\" in Meds & Orders section of the refill encounter.              Passed - Dexa on file within past 2 years     Please review last Dexa result.           Passed - Medication is active on med list        Passed - Patient is age 18 or older        Passed - Normal serum creatinine on file within past 12 months     Recent Labs   Lab Test 10/24/22  1751   CR 0.76       Ok to refill medication if creatinine is low             lisinopril (ZESTRIL) 10 MG tablet [Pharmacy Med Name: LISINOPRIL 10 MG TABLET] 90 tablet 3     Sig: TAKE 1 TABLET (10 MG) BY MOUTH DAILY.       ACE Inhibitors (Including Combos) Protocol Passed - 11/17/2022  2:27 PM        Passed - Blood pressure under 140/90 in past 12 months     BP Readings from Last 3 Encounters:   10/24/22 128/80   09/08/22 128/81   06/27/22 138/70                 Passed - Recent (12 mo) or future (30 days) visit within the authorizing provider's specialty     Patient has had an office visit with the authorizing provider or a provider within the authorizing providers department within the previous 12 mos or has a future within next 30 days. See \"Patient Info\" tab in inbasket, or \"Choose Columns\" in Meds & Orders section of the refill encounter.              Passed - " Medication is active on med list        Passed - Patient is age 18 or older        Passed - No active pregnancy on record        Passed - Normal serum creatinine on file in past 12 months     Recent Labs   Lab Test 10/24/22  1751   CR 0.76       Ok to refill medication if creatinine is low          Passed - Normal serum potassium on file in past 12 months     Recent Labs   Lab Test 10/24/22  1751   POTASSIUM 4.0             Passed - No positive pregnancy test within past 12 months                 Monica Garland RN 11/18/22 10:52 AM

## 2022-11-19 ENCOUNTER — HEALTH MAINTENANCE LETTER (OUTPATIENT)
Age: 67
End: 2022-11-19

## 2022-12-05 DIAGNOSIS — C25.0 MALIGNANT NEOPLASM OF HEAD OF PANCREAS (H): Primary | ICD-10-CM

## 2022-12-05 RX ORDER — CAPECITABINE 500 MG/1
TABLET, FILM COATED ORAL
Qty: 42 TABLET | Refills: 3 | Status: SHIPPED | OUTPATIENT
Start: 2022-12-05 | End: 2022-12-30

## 2022-12-16 ENCOUNTER — LAB (OUTPATIENT)
Dept: LAB | Facility: CLINIC | Age: 67
End: 2022-12-16
Attending: INTERNAL MEDICINE
Payer: MEDICARE

## 2022-12-16 DIAGNOSIS — C25.0 MALIGNANT NEOPLASM OF HEAD OF PANCREAS (H): ICD-10-CM

## 2022-12-16 LAB
ALBUMIN SERPL BCG-MCNC: 3.9 G/DL (ref 3.5–5.2)
ALP SERPL-CCNC: 98 U/L (ref 35–104)
ALT SERPL W P-5'-P-CCNC: 17 U/L (ref 10–35)
ANION GAP SERPL CALCULATED.3IONS-SCNC: 8 MMOL/L (ref 7–15)
AST SERPL W P-5'-P-CCNC: 27 U/L (ref 10–35)
BASOPHILS # BLD AUTO: 0 10E3/UL (ref 0–0.2)
BASOPHILS NFR BLD AUTO: 0 %
BILIRUB SERPL-MCNC: 0.3 MG/DL
BUN SERPL-MCNC: 11 MG/DL (ref 8–23)
CALCIUM SERPL-MCNC: 8.9 MG/DL (ref 8.8–10.2)
CHLORIDE SERPL-SCNC: 106 MMOL/L (ref 98–107)
CREAT SERPL-MCNC: 0.89 MG/DL (ref 0.51–0.95)
DEPRECATED HCO3 PLAS-SCNC: 26 MMOL/L (ref 22–29)
EOSINOPHIL # BLD AUTO: 0.1 10E3/UL (ref 0–0.7)
EOSINOPHIL NFR BLD AUTO: 1 %
ERYTHROCYTE [DISTWIDTH] IN BLOOD BY AUTOMATED COUNT: 19.7 % (ref 10–15)
GFR SERPL CREATININE-BSD FRML MDRD: 70 ML/MIN/1.73M2
GLUCOSE SERPL-MCNC: 142 MG/DL (ref 70–99)
HCT VFR BLD AUTO: 29.6 % (ref 35–47)
HGB BLD-MCNC: 9.6 G/DL (ref 11.7–15.7)
IMM GRANULOCYTES # BLD: 0 10E3/UL
IMM GRANULOCYTES NFR BLD: 0 %
LYMPHOCYTES # BLD AUTO: 1.5 10E3/UL (ref 0.8–5.3)
LYMPHOCYTES NFR BLD AUTO: 23 %
MCH RBC QN AUTO: 32.3 PG (ref 26.5–33)
MCHC RBC AUTO-ENTMCNC: 32.4 G/DL (ref 31.5–36.5)
MCV RBC AUTO: 100 FL (ref 78–100)
MONOCYTES # BLD AUTO: 0.5 10E3/UL (ref 0–1.3)
MONOCYTES NFR BLD AUTO: 8 %
NEUTROPHILS # BLD AUTO: 4.4 10E3/UL (ref 1.6–8.3)
NEUTROPHILS NFR BLD AUTO: 68 %
NRBC # BLD AUTO: 0 10E3/UL
NRBC BLD AUTO-RTO: 0 /100
PLATELET # BLD AUTO: 193 10E3/UL (ref 150–450)
POTASSIUM SERPL-SCNC: 4 MMOL/L (ref 3.4–5.3)
PROT SERPL-MCNC: 6.4 G/DL (ref 6.4–8.3)
RBC # BLD AUTO: 2.97 10E6/UL (ref 3.8–5.2)
SODIUM SERPL-SCNC: 140 MMOL/L (ref 136–145)
WBC # BLD AUTO: 6.4 10E3/UL (ref 4–11)

## 2022-12-16 PROCEDURE — 250N000011 HC RX IP 250 OP 636: Performed by: INTERNAL MEDICINE

## 2022-12-16 PROCEDURE — 80053 COMPREHEN METABOLIC PANEL: CPT

## 2022-12-16 PROCEDURE — 85025 COMPLETE CBC W/AUTO DIFF WBC: CPT

## 2022-12-16 PROCEDURE — 36415 COLL VENOUS BLD VENIPUNCTURE: CPT

## 2022-12-16 RX ORDER — HEPARIN SODIUM (PORCINE) LOCK FLUSH IV SOLN 100 UNIT/ML 100 UNIT/ML
5 SOLUTION INTRAVENOUS ONCE
Status: COMPLETED | OUTPATIENT
Start: 2022-12-16 | End: 2022-12-16

## 2022-12-16 RX ORDER — HEPARIN SODIUM (PORCINE) LOCK FLUSH IV SOLN 100 UNIT/ML 100 UNIT/ML
5 SOLUTION INTRAVENOUS ONCE
Status: DISCONTINUED | OUTPATIENT
Start: 2022-12-16 | End: 2022-12-22 | Stop reason: HOSPADM

## 2022-12-16 RX ADMIN — Medication 5 ML: at 14:00

## 2022-12-16 NOTE — NURSING NOTE
"Chief Complaint   Patient presents with     Port Draw     Labs drawn via port by RN.      Labs drawn via port by RN. Port accessed with 20G 3/4\" power needle. Flushed with NS and heparin. De-accessed. Pt tolerated well.     Liz Barrios RN  "

## 2022-12-30 ENCOUNTER — OFFICE VISIT (OUTPATIENT)
Dept: FAMILY MEDICINE | Facility: CLINIC | Age: 67
End: 2022-12-30
Payer: MEDICARE

## 2022-12-30 VITALS
BODY MASS INDEX: 18.16 KG/M2 | SYSTOLIC BLOOD PRESSURE: 133 MMHG | HEIGHT: 65 IN | DIASTOLIC BLOOD PRESSURE: 74 MMHG | HEART RATE: 68 BPM | WEIGHT: 109 LBS | TEMPERATURE: 98 F

## 2022-12-30 DIAGNOSIS — Z12.11 SCREEN FOR COLON CANCER: ICD-10-CM

## 2022-12-30 DIAGNOSIS — E11.9 TYPE 2 DIABETES MELLITUS WITHOUT COMPLICATION, WITHOUT LONG-TERM CURRENT USE OF INSULIN (H): ICD-10-CM

## 2022-12-30 DIAGNOSIS — R87.612 PAPANICOLAOU SMEAR OF CERVIX WITH LOW GRADE SQUAMOUS INTRAEPITHELIAL LESION (LGSIL): ICD-10-CM

## 2022-12-30 DIAGNOSIS — R73.03 PRE-DIABETES: ICD-10-CM

## 2022-12-30 DIAGNOSIS — M81.6 LOCALIZED OSTEOPOROSIS, UNSPECIFIED PATHOLOGICAL FRACTURE PRESENCE: ICD-10-CM

## 2022-12-30 DIAGNOSIS — Z00.00 MEDICARE ANNUAL WELLNESS VISIT, SUBSEQUENT: Primary | ICD-10-CM

## 2022-12-30 DIAGNOSIS — C25.0 MALIGNANT NEOPLASM OF HEAD OF PANCREAS (H): ICD-10-CM

## 2022-12-30 DIAGNOSIS — Z12.4 CERVICAL CANCER SCREENING: ICD-10-CM

## 2022-12-30 LAB — HBA1C MFR BLD: 5.8 % (ref 0–5.6)

## 2022-12-30 PROCEDURE — 36415 COLL VENOUS BLD VENIPUNCTURE: CPT | Performed by: FAMILY MEDICINE

## 2022-12-30 PROCEDURE — G0145 SCR C/V CYTO,THINLAYER,RESCR: HCPCS | Performed by: FAMILY MEDICINE

## 2022-12-30 PROCEDURE — 83036 HEMOGLOBIN GLYCOSYLATED A1C: CPT | Performed by: FAMILY MEDICINE

## 2022-12-30 PROCEDURE — G0439 PPPS, SUBSEQ VISIT: HCPCS | Performed by: FAMILY MEDICINE

## 2022-12-30 PROCEDURE — 87624 HPV HI-RISK TYP POOLED RSLT: CPT | Performed by: FAMILY MEDICINE

## 2022-12-30 RX ORDER — LISINOPRIL 10 MG/1
10 TABLET ORAL DAILY
Qty: 90 TABLET | Refills: 3 | Status: SHIPPED | OUTPATIENT
Start: 2022-12-30 | End: 2024-01-16

## 2022-12-30 RX ORDER — ALENDRONATE SODIUM 70 MG/1
70 TABLET ORAL
Qty: 12 TABLET | Refills: 3 | Status: SHIPPED | OUTPATIENT
Start: 2022-12-30 | End: 2024-01-16

## 2022-12-30 ASSESSMENT — ENCOUNTER SYMPTOMS
MYALGIAS: 0
HEMATOCHEZIA: 0
PALPITATIONS: 0
CONSTIPATION: 0
HEARTBURN: 0
DIZZINESS: 0
HEADACHES: 0
NAUSEA: 0
NERVOUS/ANXIOUS: 0
PARESTHESIAS: 1
JOINT SWELLING: 0
DYSURIA: 0
FREQUENCY: 0
SHORTNESS OF BREATH: 0
BREAST MASS: 0
HEMATURIA: 0
DIARRHEA: 0
WEAKNESS: 0
FEVER: 0
COUGH: 0
ABDOMINAL PAIN: 0
ARTHRALGIAS: 0
EYE PAIN: 0
SORE THROAT: 0
CHILLS: 0

## 2022-12-30 ASSESSMENT — ACTIVITIES OF DAILY LIVING (ADL): CURRENT_FUNCTION: NO ASSISTANCE NEEDED

## 2022-12-30 NOTE — PROGRESS NOTES
"SUBJECTIVE:   Emelia is a 67 year old who presents for Preventive Visit.  Patient has been advised of split billing requirements and indicates understanding: Yes  Are you in the first 12 months of your Medicare coverage?  No    Healthy Habits:     In general, how would you rate your overall health?  Fair    Frequency of exercise:  4-5 days/week    Duration of exercise:  45-60 minutes    Do you usually eat at least 4 servings of fruit and vegetables a day, include whole grains    & fiber and avoid regularly eating high fat or \"junk\" foods?  Yes    Taking medications regularly:  Yes    Medication side effects:  Other    Ability to successfully perform activities of daily living:  No assistance needed    Home Safety:  No safety concerns identified    Hearing Impairment:  No hearing concerns    In the past 6 months, have you been bothered by leaking of urine?  No    In general, how would you rate your overall mental or emotional health?  Good      PHQ-2 Total Score: 0    On fosamax x one year   No problems or side effects     Here for pap recheck   Distant abnormal pap   No paps x 10+ years , on daily chemo   If this one is normal, will stop doing paps     DM  Not on meds  Lab Results   Component Value Date    A1C 5.8 12/30/2022    A1C 5.7 09/08/2022    A1C 6.0 02/08/2022    A1C 6.6 10/05/2021    A1C 6.0 07/03/2017    A1C 6.0 04/18/2016    A1C 5.7 03/04/2016    A1C 6.0 07/07/2014    A1C 6.1 01/28/2014           Have you ever done Advance Care Planning? (For example, a Health Directive, POLST, or a discussion with a medical provider or your loved ones about your wishes): Yes, advance care planning is on file.      Fall risk  Fallen 2 or more times in the past year?: No  Any fall with injury in the past year?: No    Cognitive Screening   1) Repeat 3 items (Leader, Season, Table)    2) Clock draw: NORMAL  3) 3 item recall: Recalls 3 objects  Results: 3 items recalled: COGNITIVE IMPAIRMENT LESS LIKELY    Mini-CogTM " Copyright JAVON Ball. Licensed by the author for use in [facility]; reprinted with permission (ayan@[employer]). All rights reserved.      Do you have sleep apnea, excessive snoring or daytime drowsiness?: no    Reviewed and updated as needed this visit by clinical staff                  Reviewed and updated as needed this visit by Provider                 Social History     Tobacco Use     Smoking status: Former     Packs/day: 0.00     Years: 0.00     Pack years: 0.00     Types: Cigarettes     Quit date: 1993     Years since quittin.3     Smokeless tobacco: Never   Substance Use Topics     Alcohol use: Not Currently     Comment: rare     If you drink alcohol do you typically have >3 drinks per day or >7 drinks per week? No    Alcohol Use 2022   Prescreen: >3 drinks/day or >7 drinks/week? No   Prescreen: >3 drinks/day or >7 drinks/week? -   No flowsheet data found.      Current providers sharing in care for this patient include:   Patient Care Team:  Benny Garrett MD as PCP - General (Internal Medicine)  Yasmany Wade MD as MD (Oncology)  Ngozi Le MD as MD (Orthopedics)  Cinthia Contreras, RN as Nurse Coordinator (Oncology)  Yasmany Wade MD as Assigned Cancer Care Provider  Monica Tubbs MD as Assigned PCP  Johnna Cope, RN as Registered Nurse (Diabetes Education)    The following health maintenance items are reviewed in Epic and correct as of today:  Health Maintenance   Topic Date Due     DIABETIC FOOT EXAM  2014     COLORECTAL CANCER SCREENING  2016     MEDICARE ANNUAL WELLNESS VISIT  2022     PAP FOLLOW-UP  2022     HPV FOLLOW-UP  2022     ANNUAL REVIEW OF HM ORDERS  2023     A1C  2023     MAMMO SCREENING  2023     LIPID  2023     MICROALBUMIN  2023     EYE EXAM  2023     BMP  2023     FALL RISK ASSESSMENT  2023     ADVANCE CARE PLANNING  2026     DTAP/TDAP/TD  "IMMUNIZATION (4 - Td or Tdap) 07/04/2030     DEXA  10/05/2036     HEPATITIS C SCREENING  Completed     PHQ-2 (once per calendar year)  Completed     INFLUENZA VACCINE  Completed     Pneumococcal Vaccine: 65+ Years  Completed     ZOSTER IMMUNIZATION  Completed     COVID-19 Vaccine  Completed     IPV IMMUNIZATION  Aged Out     MENINGITIS IMMUNIZATION  Aged Out         FHS-7: No flowsheet data found.      Pertinent mammograms are reviewed under the imaging tab.    Review of Systems   Constitutional: Negative for chills and fever.   HENT: Negative for congestion, ear pain, hearing loss and sore throat.    Eyes: Negative for pain and visual disturbance.   Respiratory: Negative for cough and shortness of breath.    Cardiovascular: Negative for chest pain, palpitations and peripheral edema.   Gastrointestinal: Negative for abdominal pain, constipation, diarrhea, heartburn, hematochezia and nausea.   Breasts:  Negative for tenderness, breast mass and discharge.   Genitourinary: Negative for dysuria, frequency, genital sores, hematuria, pelvic pain, urgency, vaginal bleeding and vaginal discharge.   Musculoskeletal: Negative for arthralgias, joint swelling and myalgias.   Skin: Negative for rash.   Neurological: Positive for paresthesias. Negative for dizziness, weakness and headaches.   Psychiatric/Behavioral: Negative for mood changes. The patient is not nervous/anxious.          OBJECTIVE:   /74   Pulse 68   Temp 98  F (36.7  C) (Tympanic)   Ht 1.638 m (5' 4.5\")   Wt 49.4 kg (109 lb)   BMI 18.42 kg/m   Estimated body mass index is 18.32 kg/m  as calculated from the following:    Height as of 9/8/22: 1.651 m (5' 5\").    Weight as of 10/24/22: 49.9 kg (110 lb 1.6 oz).  Physical Exam  GENERAL: healthy, alert and no distress  EYES: Eyes grossly normal to inspection, PERRL and conjunctivae and sclerae normal  HENT: ear canals and TM's normal, nose and mouth without ulcers or lesions  NECK: no adenopathy, no " asymmetry, masses, or scars and thyroid normal to palpation  RESP: lungs clear to auscultation - no rales, rhonchi or wheezes  BREAST: normal without masses, tenderness or nipple discharge and no palpable axillary masses or adenopathy  CV: regular rate and rhythm, normal S1 S2, no S3 or S4, no murmur, click or rub, no peripheral edema and peripheral pulses strong  ABDOMEN: soft, nontender, no hepatosplenomegaly, no masses and bowel sounds normal  MS: no gross musculoskeletal defects noted, no edema  SKIN: no suspicious lesions or rashes  NEURO: Normal strength and tone, mentation intact and speech normal  PSYCH: mentation appears normal, affect normal/bright        ASSESSMENT / PLAN:     (Z00.00) Medicare annual wellness visit, subsequent  (primary encounter diagnosis)  Comment: up to date on screenings. Staying active.   Plan:     (E11.9) Type 2 diabetes mellitus without complication, without long-term current use of insulin (H)  Comment: diet controlled.   Plan: Hemoglobin A1c, lisinopril (ZESTRIL) 10 MG         tablet            (M81.6) Localized osteoporosis, unspecified pathological fracture presence  Comment: x one year on fosamax. Will check dexa next year   Plan: alendronate (FOSAMAX) 70 MG tablet            (Z12.11) Screen for colon cancer  Comment: discussed   Plan: COLOGUARD(EXACT SCIENCES)            (Z12.4) Cervical cancer screening  Comment: discussed   Plan: Pap Screen with HPV - recommended age 30 - 65         years            (R87.612) Papanicolaou smear of cervix with low grade squamous intraepithelial lesion (LGSIL)  Comment: discussed   Plan:     (C25.0) Malignant neoplasm of head of pancreas (H)  Comment: on ongoing meds. stable  Plan:     (R73.03) Pre-diabetes  Comment: stable  Plan:       COUNSELING:  Reviewed preventive health counseling, as reflected in patient instructions       Regular exercise       Healthy diet/nutrition        She reports that she quit smoking about 29 years ago. Her  smoking use included cigarettes. She has never used smokeless tobacco.      Appropriate preventive services were discussed with this patient, including applicable screening as appropriate for cardiovascular disease, diabetes, osteopenia/osteoporosis, and glaucoma.  As appropriate for age/gender, discussed screening for colorectal cancer, prostate cancer, breast cancer, and cervical cancer. Checklist reviewing preventive services available has been given to the patient.    Reviewed patients plan of care and provided an AVS. The Basic Care Plan (routine screening as documented in Health Maintenance) for Emelia meets the Care Plan requirement. This Care Plan has been established and reviewed with the Patient.      Monica Tubbs MD  [facility]    Identified Health Risks:

## 2022-12-31 DIAGNOSIS — Z12.11 SCREEN FOR COLON CANCER: ICD-10-CM

## 2023-01-03 LAB
BKR LAB AP GYN ADEQUACY: NORMAL
BKR LAB AP GYN INTERPRETATION: NORMAL
BKR LAB AP HPV REFLEX: NORMAL
BKR LAB AP PREVIOUS ABNORMAL: NORMAL
PATH REPORT.COMMENTS IMP SPEC: NORMAL
PATH REPORT.COMMENTS IMP SPEC: NORMAL
PATH REPORT.RELEVANT HX SPEC: NORMAL

## 2023-01-04 DIAGNOSIS — C25.0 MALIGNANT NEOPLASM OF HEAD OF PANCREAS (H): ICD-10-CM

## 2023-01-04 RX ORDER — CAPECITABINE 500 MG/1
TABLET, FILM COATED ORAL
Qty: 42 TABLET | Refills: 2 | Status: SHIPPED | OUTPATIENT
Start: 2023-01-04 | End: 2023-11-14

## 2023-01-04 NOTE — PROGRESS NOTES
Oral Chemotherapy Monitoring Program     Capecitabine rx from 12/5 inappropriately discontinued, which discontinued the rx at the pharmacy.  [facility] is requesting a new rx for remaining refills, as pharmacy needs medicare compliant rx.    Re-ordering capecitabine 2 tabs QAM and 1 tab QPM for 14 days on, 7 days off with 2RF, as this accounts for the remainder of the rx that was inappropriately cancelled.    Grace Myhre, PharmD  Hematology/Oncology Clinical Pharmacist  [facility]  [facility]  [phone]

## 2023-01-15 LAB — NONINV COLON CA DNA+OCC BLD SCRN STL QL: NEGATIVE

## 2023-01-18 ENCOUNTER — LAB (OUTPATIENT)
Dept: LAB | Facility: CLINIC | Age: 68
End: 2023-01-18
Attending: INTERNAL MEDICINE
Payer: MEDICARE

## 2023-01-18 DIAGNOSIS — C25.0 MALIGNANT NEOPLASM OF HEAD OF PANCREAS (H): ICD-10-CM

## 2023-01-18 LAB
ALBUMIN SERPL BCG-MCNC: 3.9 G/DL (ref 3.5–5.2)
ALP SERPL-CCNC: 114 U/L (ref 35–104)
ALT SERPL W P-5'-P-CCNC: 18 U/L (ref 10–35)
ANION GAP SERPL CALCULATED.3IONS-SCNC: 8 MMOL/L (ref 7–15)
AST SERPL W P-5'-P-CCNC: 33 U/L (ref 10–35)
BASOPHILS # BLD AUTO: 0 10E3/UL (ref 0–0.2)
BASOPHILS NFR BLD AUTO: 1 %
BILIRUB SERPL-MCNC: 0.4 MG/DL
BUN SERPL-MCNC: 10.9 MG/DL (ref 8–23)
CALCIUM SERPL-MCNC: 9.2 MG/DL (ref 8.8–10.2)
CHLORIDE SERPL-SCNC: 103 MMOL/L (ref 98–107)
CREAT SERPL-MCNC: 0.78 MG/DL (ref 0.51–0.95)
DEPRECATED HCO3 PLAS-SCNC: 26 MMOL/L (ref 22–29)
EOSINOPHIL # BLD AUTO: 0.1 10E3/UL (ref 0–0.7)
EOSINOPHIL NFR BLD AUTO: 1 %
ERYTHROCYTE [DISTWIDTH] IN BLOOD BY AUTOMATED COUNT: 19.9 % (ref 10–15)
GFR SERPL CREATININE-BSD FRML MDRD: 82 ML/MIN/1.73M2
GLUCOSE SERPL-MCNC: 94 MG/DL (ref 70–99)
HCT VFR BLD AUTO: 30.2 % (ref 35–47)
HGB BLD-MCNC: 10 G/DL (ref 11.7–15.7)
IMM GRANULOCYTES # BLD: 0 10E3/UL
IMM GRANULOCYTES NFR BLD: 0 %
LYMPHOCYTES # BLD AUTO: 1.3 10E3/UL (ref 0.8–5.3)
LYMPHOCYTES NFR BLD AUTO: 19 %
MCH RBC QN AUTO: 32.1 PG (ref 26.5–33)
MCHC RBC AUTO-ENTMCNC: 33.1 G/DL (ref 31.5–36.5)
MCV RBC AUTO: 97 FL (ref 78–100)
MONOCYTES # BLD AUTO: 0.6 10E3/UL (ref 0–1.3)
MONOCYTES NFR BLD AUTO: 9 %
NEUTROPHILS # BLD AUTO: 4.7 10E3/UL (ref 1.6–8.3)
NEUTROPHILS NFR BLD AUTO: 70 %
NRBC # BLD AUTO: 0 10E3/UL
NRBC BLD AUTO-RTO: 0 /100
PLATELET # BLD AUTO: 198 10E3/UL (ref 150–450)
POTASSIUM SERPL-SCNC: 4.7 MMOL/L (ref 3.4–5.3)
PROT SERPL-MCNC: 6.5 G/DL (ref 6.4–8.3)
RBC # BLD AUTO: 3.12 10E6/UL (ref 3.8–5.2)
SODIUM SERPL-SCNC: 137 MMOL/L (ref 136–145)
WBC # BLD AUTO: 6.7 10E3/UL (ref 4–11)

## 2023-01-18 PROCEDURE — 250N000011 HC RX IP 250 OP 636: Performed by: INTERNAL MEDICINE

## 2023-01-18 PROCEDURE — 80053 COMPREHEN METABOLIC PANEL: CPT

## 2023-01-18 PROCEDURE — 36591 DRAW BLOOD OFF VENOUS DEVICE: CPT

## 2023-01-18 PROCEDURE — 85025 COMPLETE CBC W/AUTO DIFF WBC: CPT

## 2023-01-18 RX ORDER — HEPARIN SODIUM (PORCINE) LOCK FLUSH IV SOLN 100 UNIT/ML 100 UNIT/ML
5 SOLUTION INTRAVENOUS ONCE
Status: COMPLETED | OUTPATIENT
Start: 2023-01-18 | End: 2023-01-18

## 2023-01-18 RX ADMIN — Medication 5 ML: at 12:49

## 2023-01-18 NOTE — NURSING NOTE
Chief Complaint   Patient presents with     Port Draw     Port accessed with 20g gripper needle by RN, labs collected, line flushed with saline and heparin.       Nazia TOMPKINS RN PHN BSN  BMT/Oncology Lab

## 2023-02-17 ENCOUNTER — ANCILLARY PROCEDURE (OUTPATIENT)
Dept: CT IMAGING | Facility: CLINIC | Age: 68
End: 2023-02-17
Attending: INTERNAL MEDICINE
Payer: MEDICARE

## 2023-02-17 ENCOUNTER — LAB (OUTPATIENT)
Dept: LAB | Facility: CLINIC | Age: 68
End: 2023-02-17
Attending: INTERNAL MEDICINE
Payer: MEDICARE

## 2023-02-17 DIAGNOSIS — C25.0 MALIGNANT NEOPLASM OF HEAD OF PANCREAS (H): ICD-10-CM

## 2023-02-17 LAB
ALBUMIN SERPL BCG-MCNC: 4.2 G/DL (ref 3.5–5.2)
ALP SERPL-CCNC: 122 U/L (ref 35–104)
ALT SERPL W P-5'-P-CCNC: 17 U/L (ref 10–35)
ANION GAP SERPL CALCULATED.3IONS-SCNC: 9 MMOL/L (ref 7–15)
AST SERPL W P-5'-P-CCNC: 28 U/L (ref 10–35)
BASOPHILS # BLD AUTO: 0 10E3/UL (ref 0–0.2)
BASOPHILS NFR BLD AUTO: 1 %
BILIRUB SERPL-MCNC: 0.4 MG/DL
BUN SERPL-MCNC: 8.6 MG/DL (ref 8–23)
CALCIUM SERPL-MCNC: 9.4 MG/DL (ref 8.8–10.2)
CHLORIDE SERPL-SCNC: 103 MMOL/L (ref 98–107)
CREAT SERPL-MCNC: 0.74 MG/DL (ref 0.51–0.95)
DEPRECATED HCO3 PLAS-SCNC: 26 MMOL/L (ref 22–29)
EOSINOPHIL # BLD AUTO: 0.1 10E3/UL (ref 0–0.7)
EOSINOPHIL NFR BLD AUTO: 1 %
ERYTHROCYTE [DISTWIDTH] IN BLOOD BY AUTOMATED COUNT: 21.9 % (ref 10–15)
GFR SERPL CREATININE-BSD FRML MDRD: 87 ML/MIN/1.73M2
GLUCOSE SERPL-MCNC: 87 MG/DL (ref 70–99)
HCT VFR BLD AUTO: 31.1 % (ref 35–47)
HGB BLD-MCNC: 10.3 G/DL (ref 11.7–15.7)
IMM GRANULOCYTES # BLD: 0 10E3/UL
IMM GRANULOCYTES NFR BLD: 1 %
LYMPHOCYTES # BLD AUTO: 1.5 10E3/UL (ref 0.8–5.3)
LYMPHOCYTES NFR BLD AUTO: 23 %
MCH RBC QN AUTO: 32.4 PG (ref 26.5–33)
MCHC RBC AUTO-ENTMCNC: 33.1 G/DL (ref 31.5–36.5)
MCV RBC AUTO: 98 FL (ref 78–100)
MONOCYTES # BLD AUTO: 0.6 10E3/UL (ref 0–1.3)
MONOCYTES NFR BLD AUTO: 9 %
NEUTROPHILS # BLD AUTO: 4.2 10E3/UL (ref 1.6–8.3)
NEUTROPHILS NFR BLD AUTO: 65 %
NRBC # BLD AUTO: 0 10E3/UL
NRBC BLD AUTO-RTO: 0 /100
PLATELET # BLD AUTO: 244 10E3/UL (ref 150–450)
POTASSIUM SERPL-SCNC: 4.6 MMOL/L (ref 3.4–5.3)
PROT SERPL-MCNC: 7.1 G/DL (ref 6.4–8.3)
RBC # BLD AUTO: 3.18 10E6/UL (ref 3.8–5.2)
SODIUM SERPL-SCNC: 138 MMOL/L (ref 136–145)
WBC # BLD AUTO: 6.4 10E3/UL (ref 4–11)

## 2023-02-17 PROCEDURE — 80053 COMPREHEN METABOLIC PANEL: CPT

## 2023-02-17 PROCEDURE — 36591 DRAW BLOOD OFF VENOUS DEVICE: CPT

## 2023-02-17 PROCEDURE — G1010 CDSM STANSON: HCPCS | Performed by: RADIOLOGY

## 2023-02-17 PROCEDURE — 85025 COMPLETE CBC W/AUTO DIFF WBC: CPT

## 2023-02-17 PROCEDURE — 250N000011 HC RX IP 250 OP 636: Performed by: INTERNAL MEDICINE

## 2023-02-17 PROCEDURE — 71260 CT THORAX DX C+: CPT | Mod: MG | Performed by: RADIOLOGY

## 2023-02-17 PROCEDURE — 74177 CT ABD & PELVIS W/CONTRAST: CPT | Mod: MG | Performed by: RADIOLOGY

## 2023-02-17 RX ORDER — IOPAMIDOL 755 MG/ML
60 INJECTION, SOLUTION INTRAVASCULAR ONCE
Status: COMPLETED | OUTPATIENT
Start: 2023-02-17 | End: 2023-02-17

## 2023-02-17 RX ORDER — HEPARIN SODIUM (PORCINE) LOCK FLUSH IV SOLN 100 UNIT/ML 100 UNIT/ML
500 SOLUTION INTRAVENOUS ONCE
Status: COMPLETED | OUTPATIENT
Start: 2023-02-17 | End: 2023-02-17

## 2023-02-17 RX ORDER — HEPARIN SODIUM (PORCINE) LOCK FLUSH IV SOLN 100 UNIT/ML 100 UNIT/ML
5 SOLUTION INTRAVENOUS ONCE
Status: COMPLETED | OUTPATIENT
Start: 2023-02-17 | End: 2023-02-17

## 2023-02-17 RX ADMIN — HEPARIN SODIUM (PORCINE) LOCK FLUSH IV SOLN 100 UNIT/ML 500 UNITS: 100 SOLUTION at 10:15

## 2023-02-17 RX ADMIN — Medication 5 ML: at 09:27

## 2023-02-17 RX ADMIN — IOPAMIDOL 60 ML: 755 INJECTION, SOLUTION INTRAVASCULAR at 09:51

## 2023-02-17 NOTE — NURSING NOTE
Chief Complaint   Patient presents with     Port Draw     Labs drawn via port by RN in lab.     Port accessed and labs drawn by RN. Pt tolerated well.      Huma Mclaughlin RN

## 2023-02-20 ENCOUNTER — ONCOLOGY VISIT (OUTPATIENT)
Dept: ONCOLOGY | Facility: CLINIC | Age: 68
End: 2023-02-20
Attending: INTERNAL MEDICINE
Payer: MEDICARE

## 2023-02-20 VITALS
WEIGHT: 114.2 LBS | TEMPERATURE: 98.2 F | HEIGHT: 64 IN | OXYGEN SATURATION: 98 % | RESPIRATION RATE: 16 BRPM | BODY MASS INDEX: 19.5 KG/M2 | HEART RATE: 79 BPM | SYSTOLIC BLOOD PRESSURE: 136 MMHG | DIASTOLIC BLOOD PRESSURE: 74 MMHG

## 2023-02-20 DIAGNOSIS — C25.0 MALIGNANT NEOPLASM OF HEAD OF PANCREAS (H): Primary | ICD-10-CM

## 2023-02-20 PROCEDURE — G0463 HOSPITAL OUTPT CLINIC VISIT: HCPCS | Mod: 25 | Performed by: INTERNAL MEDICINE

## 2023-02-20 PROCEDURE — 99215 OFFICE O/P EST HI 40 MIN: CPT | Performed by: INTERNAL MEDICINE

## 2023-02-20 PROCEDURE — G0463 HOSPITAL OUTPT CLINIC VISIT: HCPCS | Performed by: INTERNAL MEDICINE

## 2023-02-20 RX ORDER — PANTOPRAZOLE SODIUM 40 MG/1
40 TABLET, DELAYED RELEASE ORAL
Qty: 90 TABLET | Refills: 11 | Status: SHIPPED | OUTPATIENT
Start: 2023-02-20 | End: 2024-01-16

## 2023-02-20 ASSESSMENT — PAIN SCALES - GENERAL: PAINLEVEL: NO PAIN (0)

## 2023-02-20 NOTE — LETTER
2/20/2023         RE: Emelia Weathers  [address]  [facility] [address]        Dear Colleague,    Thank you for referring your patient, Emelia Weathers, to the [facility]. Please see a copy of my visit note below.    Tiana Weathers returns today in follow-up of locally recurrent pancreatic cancer.    She is now 67 years old and originally had a Whipple years ago with a subsequent biopsy-proven recurrence in the resection bed encasing her SMA almost 7 years ago.  She has a known germline Chek mutation.  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  Those symptoms she tells me are about the same though she thinks her joints are perhaps a little bit more swollen.  She continues to be physically quite active with a variety of vigorous activities.  She is actually managed to gain a few pounds now that she has been pursuing a weight lifting program    On exam she is alert and well-appearing and appears younger than her stated age.  Her vital signs are unremarkable.  She has no icterus.  She has very mild erythema of her lids.  She has markedly dry skin on her hands with her usual contractures that do not seem much different none of her joints appear actively inflamed.    I personally reviewed her CT scan went over the results with her and her daughter.  That shows no evidence of disease progression.    Her electrolytes and renal function are normal.  Her bilirubin, albumin and liver enzymes are normal.  Her blood counts are relatively unremarkable with mild stable normocytic anemia.  She does not have an informative tumor marker.    Assessment/plan: Recurrent pancreatic cancer with ongoing long-term disease control on maintenance Xeloda.  As always she  wishes to continue on with this.  We talked today about perhaps visiting with rheumatology to see if they thought there is anything more to do about her joints.  Otherwise we will continue on with the same dose and schedule of Xeloda and plan on another response assessment in approximately 4 months.        Again, thank you for allowing me to participate in the care of your patient.        Sincerely,        Yasmany Wade MD

## 2023-02-20 NOTE — NURSING NOTE
"Oncology Rooming Note    February 20, 2023 4:53 PM   Emelia Weathers is a 67 year old female who presents for:    Chief Complaint   Patient presents with     Oncology Clinic Visit     P RETURN - MALIGNANT NEOPLASM OF HEAD OF PANCREAS     Initial Vitals: /74 (BP Location: Right arm, Patient Position: Chair, Cuff Size: Adult Regular)   Pulse 79   Temp 98.2  F (36.8  C) (Oral)   Resp 16   Ht 1.638 m (5' 4.49\")   Wt 51.8 kg (114 lb 3.2 oz)   SpO2 98%   BMI 19.31 kg/m   Estimated body mass index is 19.31 kg/m  as calculated from the following:    Height as of this encounter: 1.638 m (5' 4.49\").    Weight as of this encounter: 51.8 kg (114 lb 3.2 oz). Body surface area is 1.54 meters squared.  No Pain (0) Comment: Data Unavailable   No LMP recorded. Patient is postmenopausal.  Allergies reviewed: Yes  Medications reviewed: Yes    Medications: Medication refills not needed today.  Pharmacy name entered into EPIC:    [facility] - [place], [place] - [address] [facility] [place], [place] [phone]  [id number] - [place], [place] - [address]  [facility] - [place], [place] - [address]  [id number] - [place], [place] - [address]    Danie Michael Olivas LPN            "

## 2023-02-22 DIAGNOSIS — C25.0 MALIGNANT NEOPLASM OF HEAD OF PANCREAS (H): Primary | ICD-10-CM

## 2023-02-23 NOTE — PROGRESS NOTES
Tiana Weathers returns today in follow-up of locally recurrent pancreatic cancer.    She is now 67 years old and originally had a Whipple years ago with a subsequent biopsy-proven recurrence in the resection bed encasing her SMA almost 7 years ago.  She has a known germline Chek mutation.  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  Those symptoms she tells me are about the same though she thinks her joints are perhaps a little bit more swollen.  She continues to be physically quite active with a variety of vigorous activities.  She is actually managed to gain a few pounds now that she has been pursuing a weight lifting program    On exam she is alert and well-appearing and appears younger than her stated age.  Her vital signs are unremarkable.  She has no icterus.  She has very mild erythema of her lids.  She has markedly dry skin on her hands with her usual contractures that do not seem much different none of her joints appear actively inflamed.    I personally reviewed her CT scan went over the results with her and her daughter.  That shows no evidence of disease progression.    Her electrolytes and renal function are normal.  Her bilirubin, albumin and liver enzymes are normal.  Her blood counts are relatively unremarkable with mild stable normocytic anemia.  She does not have an informative tumor marker.    Assessment/plan: Recurrent pancreatic cancer with ongoing long-term disease control on maintenance Xeloda.  As always she wishes to continue on with this.  We talked today about perhaps visiting with rheumatology to see if they thought there is anything more to do about her joints.  Otherwise we will continue on with the same dose and schedule of Xeloda and plan on another  response assessment in approximately 4 months.

## 2023-02-27 DIAGNOSIS — C25.0 MALIGNANT NEOPLASM OF HEAD OF PANCREAS (H): Primary | ICD-10-CM

## 2023-02-27 RX ORDER — CAPECITABINE 500 MG/1
TABLET, FILM COATED ORAL
Qty: 42 TABLET | Refills: 3 | Status: SHIPPED | OUTPATIENT
Start: 2023-02-27 | End: 2023-11-14

## 2023-02-28 ENCOUNTER — TELEPHONE (OUTPATIENT)
Dept: RHEUMATOLOGY | Facility: CLINIC | Age: 68
End: 2023-02-28

## 2023-02-28 DIAGNOSIS — M34.9 SCLERODERMA (H): Primary | ICD-10-CM

## 2023-02-28 NOTE — TELEPHONE ENCOUNTER
Placed call to patient to complete scleroderma intake assessment. Left message requesting return call.   Elizabeth Hatch RN  Adult Rheumatology Clinic   .

## 2023-02-28 NOTE — TELEPHONE ENCOUNTER
Pt is returning Elizabeth's call; please contact the Pt back. Ok to leave a detailed msg if it goes to a VM.

## 2023-03-01 NOTE — TELEPHONE ENCOUNTER
Scleroderma Patient Intake Form    Referring Provider: Dr. Wade    Primary Care Provider: Dr. Tubbs   Location: [facility]    A. Have you been told you have Scleroderma? No    LABS:  SANDY Negative 6/2013, new results pending    Centromere AB   Ordered, pending   SCL 70 Ordered, pending   RNA Poly III Ordered, pending     B. When were you diagnosed (month/year)? NA  1. Date when Raynaud's began (month/year)? NA  2. Date when skin swelling began (month/year) Thickening on fingers started 6 months after starting chemotherapy in 2014    C. Doctor who diagnosed you: NA     D. Have you seen a Rheumatologist in the past? No     E. Who currently manages your care for this issue now? Noone    F. What primary concern do you want addressed by Dr. Guidry? A clear diagnosis and information about scheroderma        Do you currently have or have you had in the past the following symptoms:    1. Hands cold or purple with cold weather: No    2.  Puffy fingers: No    3.  Non-healing wounds on fingertips or knuckles: No    4.  Current wounds to fingers: No    5.  Skin thickening: (when you pinch your skin does it make a tent) YES  A. Skin thickening of fingers: YES  B. Skin thickening from the wrist to the elbow: No  C. Skin thickening of the face or feet: No  D. Skin thickening of upper arms, trunk, thighs: No    6. Joint swelling and/or stiffness: YES   A. Which joints: Both hands, L hand is worse that right. Very poor ROM due to swelling and tight skin    7. Muscle pain/ weakness: No     8. Rash: No     9.  Diarrhea: YES, hospitalized in 2015 for diarrhea likely related to starting an new chemotherapy agent. No current issues with diarrhea.   A. Previous diagnosis of SBO (Small Bowel Bacterial Overgrowth, SIBO)? No  B. Antibiotics for diarrhea: No     10: Unintentional weight loss: No     11. Bowel obstruction requiring hospitalization : No     12. Nutrients/vitamins through an IV: YES During hospitalization in  "2015    13. Heartburn or food/stomach acid comes up: YES   A. Medications taken for this: Protonix   B. Have a diagnosis of Gave (Gastric Antral Vascular Ectasia)?  No    14. Food or pills get stuck when you swallow: YES -has happened \"for years\". Every 1-2 months has a episode when she feels like something is caught in her throat and has to wait for episode to pass.    A. Have diagnosis of esophageal dysmotility? No    15. Chronic cough: No   A. Have you been told you have ILD or pulmonary fibrosis? No    16. Inflamed lining around lung (pleurisy): No    17. Shortness of breath needing home oxygen: No    18. Shortness of breath or fatigue with normal activity: No    19. Shortness of breath at rest: No    20. Known Heart problems: No     21. Inflamed lining around heart (pericarditis): No    22. Abnormal heart rhythms (arrhythmias): No     23. Other heart problems that were not mentioned: No   A. PAH/PH: No   B. Diastolic dysfunction: No    24. High blood pressure: No, on lisinopril to protect kidneys due to Type 2 diabetes  A. High blood pressure before scleroderma: No    24. History of dialysis: No    25. History of blood clots: No    26. History of miscarriages when more than 2 months pregnant: No    27.  Have you ever been diagnosed with any other autoimmune diseases? (i.e.rheumatoid arthritis, lupus, sjogren's): No     28.  Loss of balance: Yes, occasionally due to numbness in feet    29.  Migraine headaches: No    30. Seizures or strokes: No    31. Nerve problem's in face (Bell's Palsy): No    32. Numbness of hands, or been told you have carpal tunnel syndrome: YES   A. If so when did you start to develop the numbness? When skin thickening started happening in 2014    33. Dry eyes: YES  A. Need to use artificial tears: YES, started Restasis a year ago  B. Gritty or sury feeling in eyes: No  C. Dry mouth: YES      34. Do you smoke? No    35. How much alcohol do you drink per week? never    36. Do you use any " stimulant drugs (i.e. Pseudoephedrine) No    37. Recreational drugs (i.e. Crystal meth, cocaine) No    38. Is there any family history or diagnosis of autoimmune disease? No    39. Have you been in the hospital due to scleroderma? No    Have you ever had any of the following tests? Where and when?    1. Upper endoscopy:  YES   Date/Where: 6/2013 [facility]     2. Chest x-ray: YES  Date/Where: 3/18/15 [facility]    3. High resolution CT scan: No  Date/Where: Multiple CT or chest/abdomen through [facility], most recently   //  4. Pulmonary function test:  No    5. EKG: YES 3/14/15 [facility]    6. Right heart Cath: No    7. ECHO cardiogram:  No    8. Barium swallow: No    Have you ever seen the following specialists: Where and when?    Pulmonologist: No    Gastroenterologist: No     Cardiologist: No    Dermatologist: No     6. Are there any other symptoms or concerns I haven't mentioned that you would like to discuss with the doctor? No     After completing intake, informed patient that information will be reviewed with Dr. Guidry and she will be contacted with information about scheduling, including possible testing to be completed prior to appointment. Patient verbalized understanding and agrees to plan.     Elizabeth Hatch RN  Adult Rheumatology Clinic

## 2023-03-15 ENCOUNTER — LAB (OUTPATIENT)
Dept: LAB | Facility: CLINIC | Age: 68
End: 2023-03-15
Attending: INTERNAL MEDICINE
Payer: MEDICARE

## 2023-03-15 DIAGNOSIS — C25.0 MALIGNANT NEOPLASM OF HEAD OF PANCREAS (H): ICD-10-CM

## 2023-03-15 DIAGNOSIS — M34.9 SCLERODERMA (H): ICD-10-CM

## 2023-03-15 LAB
ALBUMIN SERPL BCG-MCNC: 4 G/DL (ref 3.5–5.2)
ALP SERPL-CCNC: 101 U/L (ref 35–104)
ALT SERPL W P-5'-P-CCNC: 20 U/L (ref 10–35)
ANION GAP SERPL CALCULATED.3IONS-SCNC: 9 MMOL/L (ref 7–15)
AST SERPL W P-5'-P-CCNC: 26 U/L (ref 10–35)
BASOPHILS # BLD AUTO: 0 10E3/UL (ref 0–0.2)
BASOPHILS NFR BLD AUTO: 0 %
BILIRUB SERPL-MCNC: 0.5 MG/DL
BUN SERPL-MCNC: 10.6 MG/DL (ref 8–23)
CALCIUM SERPL-MCNC: 8.9 MG/DL (ref 8.8–10.2)
CHLORIDE SERPL-SCNC: 104 MMOL/L (ref 98–107)
CREAT SERPL-MCNC: 0.67 MG/DL (ref 0.51–0.95)
DEPRECATED HCO3 PLAS-SCNC: 25 MMOL/L (ref 22–29)
EOSINOPHIL # BLD AUTO: 0 10E3/UL (ref 0–0.7)
EOSINOPHIL NFR BLD AUTO: 1 %
ERYTHROCYTE [DISTWIDTH] IN BLOOD BY AUTOMATED COUNT: 22 % (ref 10–15)
GFR SERPL CREATININE-BSD FRML MDRD: >90 ML/MIN/1.73M2
GLUCOSE SERPL-MCNC: 65 MG/DL (ref 70–99)
HCT VFR BLD AUTO: 30.5 % (ref 35–47)
HGB BLD-MCNC: 9.9 G/DL (ref 11.7–15.7)
IMM GRANULOCYTES # BLD: 0 10E3/UL
IMM GRANULOCYTES NFR BLD: 0 %
LYMPHOCYTES # BLD AUTO: 1.1 10E3/UL (ref 0.8–5.3)
LYMPHOCYTES NFR BLD AUTO: 24 %
Lab: NORMAL
MCH RBC QN AUTO: 31.3 PG (ref 26.5–33)
MCHC RBC AUTO-ENTMCNC: 32.5 G/DL (ref 31.5–36.5)
MCV RBC AUTO: 97 FL (ref 78–100)
MONOCYTES # BLD AUTO: 0.5 10E3/UL (ref 0–1.3)
MONOCYTES NFR BLD AUTO: 11 %
NEUTROPHILS # BLD AUTO: 2.9 10E3/UL (ref 1.6–8.3)
NEUTROPHILS NFR BLD AUTO: 64 %
NRBC # BLD AUTO: 0 10E3/UL
NRBC BLD AUTO-RTO: 0 /100
PERFORMING LABORATORY: NORMAL
PLATELET # BLD AUTO: 202 10E3/UL (ref 150–450)
POTASSIUM SERPL-SCNC: 4.3 MMOL/L (ref 3.4–5.3)
PROT SERPL-MCNC: 6.7 G/DL (ref 6.4–8.3)
RBC # BLD AUTO: 3.16 10E6/UL (ref 3.8–5.2)
SODIUM SERPL-SCNC: 138 MMOL/L (ref 136–145)
SPECIMEN STATUS: NORMAL
TEST NAME: NORMAL
WBC # BLD AUTO: 4.5 10E3/UL (ref 4–11)

## 2023-03-15 PROCEDURE — 80053 COMPREHEN METABOLIC PANEL: CPT

## 2023-03-15 PROCEDURE — 85014 HEMATOCRIT: CPT

## 2023-03-15 PROCEDURE — 86038 ANTINUCLEAR ANTIBODIES: CPT

## 2023-03-15 PROCEDURE — 84999 UNLISTED CHEMISTRY PROCEDURE: CPT

## 2023-03-15 PROCEDURE — 86235 NUCLEAR ANTIGEN ANTIBODY: CPT

## 2023-03-15 PROCEDURE — 36591 DRAW BLOOD OFF VENOUS DEVICE: CPT

## 2023-03-15 PROCEDURE — 250N000011 HC RX IP 250 OP 636: Performed by: INTERNAL MEDICINE

## 2023-03-15 RX ORDER — HEPARIN SODIUM (PORCINE) LOCK FLUSH IV SOLN 100 UNIT/ML 100 UNIT/ML
500 SOLUTION INTRAVENOUS ONCE
Status: COMPLETED | OUTPATIENT
Start: 2023-03-15 | End: 2023-03-15

## 2023-03-15 RX ADMIN — Medication 500 UNITS: at 10:48

## 2023-03-15 NOTE — NURSING NOTE
"Chief Complaint   Patient presents with     Port Draw     Labs drawn via port by RN in lab.       Port accessed with 20 gauge 3/4\" gripper needle by RN, labs collected, line flushed with saline and heparin, then de-accessed.      Thais Godwin RN    "

## 2023-03-16 LAB — ANA SER QL IF: NEGATIVE

## 2023-03-28 LAB — LABCORP INTERFACED MISCELLANEOUS TEST RESULT: NORMAL

## 2023-03-31 NOTE — TELEPHONE ENCOUNTER
NOTES Status Details   OFFICE NOTE from referring provider Internal 2.20.23  Mauro  Oncology   OFFICE NOTE from other specialist     DISCHARGE SUMMARY from hospital     DISCHARGE REPORT from the ER     MEDICATION LIST Internal    LABS (Any and all labs)      Internal    Biopsy/pathology (Anything related to diagnoses I.e. fluid aspirations, lip biopsy, muscle biopsy)               Imaging (All imaging related to diagnoses)     Echo     HRCT     CXR     EMG                    Scleroderma/Dermatomyositis diagnoses     Previous Cardiology notes      Previous Pulmonary notes     Previous Dermatology notes     Previous GI notes     Lupus diagnoses     Previous Nephrology notes     Previous Dermatology notes     Previous Cardiology notes

## 2023-04-18 ENCOUNTER — TRANSFERRED RECORDS (OUTPATIENT)
Dept: HEALTH INFORMATION MANAGEMENT | Facility: CLINIC | Age: 68
End: 2023-04-18
Payer: MEDICARE

## 2023-04-19 ENCOUNTER — LAB (OUTPATIENT)
Dept: LAB | Facility: CLINIC | Age: 68
End: 2023-04-19
Attending: INTERNAL MEDICINE
Payer: MEDICARE

## 2023-04-19 DIAGNOSIS — C25.0 MALIGNANT NEOPLASM OF HEAD OF PANCREAS (H): ICD-10-CM

## 2023-04-19 LAB
ALBUMIN SERPL BCG-MCNC: 3.9 G/DL (ref 3.5–5.2)
ALP SERPL-CCNC: 130 U/L (ref 35–104)
ALT SERPL W P-5'-P-CCNC: 22 U/L (ref 10–35)
ANION GAP SERPL CALCULATED.3IONS-SCNC: 7 MMOL/L (ref 7–15)
AST SERPL W P-5'-P-CCNC: 32 U/L (ref 10–35)
BASOPHILS # BLD AUTO: 0 10E3/UL (ref 0–0.2)
BASOPHILS NFR BLD AUTO: 1 %
BILIRUB SERPL-MCNC: 0.4 MG/DL
BUN SERPL-MCNC: 14.6 MG/DL (ref 8–23)
CALCIUM SERPL-MCNC: 8.9 MG/DL (ref 8.8–10.2)
CHLORIDE SERPL-SCNC: 105 MMOL/L (ref 98–107)
CREAT SERPL-MCNC: 0.77 MG/DL (ref 0.51–0.95)
DEPRECATED HCO3 PLAS-SCNC: 26 MMOL/L (ref 22–29)
EOSINOPHIL # BLD AUTO: 0.1 10E3/UL (ref 0–0.7)
EOSINOPHIL NFR BLD AUTO: 1 %
ERYTHROCYTE [DISTWIDTH] IN BLOOD BY AUTOMATED COUNT: 22.7 % (ref 10–15)
GFR SERPL CREATININE-BSD FRML MDRD: 83 ML/MIN/1.73M2
GLUCOSE SERPL-MCNC: 111 MG/DL (ref 70–99)
HCT VFR BLD AUTO: 30.2 % (ref 35–47)
HGB BLD-MCNC: 10 G/DL (ref 11.7–15.7)
IMM GRANULOCYTES # BLD: 0 10E3/UL
IMM GRANULOCYTES NFR BLD: 0 %
LYMPHOCYTES # BLD AUTO: 1.2 10E3/UL (ref 0.8–5.3)
LYMPHOCYTES NFR BLD AUTO: 25 %
MCH RBC QN AUTO: 32.1 PG (ref 26.5–33)
MCHC RBC AUTO-ENTMCNC: 33.1 G/DL (ref 31.5–36.5)
MCV RBC AUTO: 97 FL (ref 78–100)
MONOCYTES # BLD AUTO: 0.6 10E3/UL (ref 0–1.3)
MONOCYTES NFR BLD AUTO: 12 %
NEUTROPHILS # BLD AUTO: 3 10E3/UL (ref 1.6–8.3)
NEUTROPHILS NFR BLD AUTO: 61 %
NRBC # BLD AUTO: 0 10E3/UL
NRBC BLD AUTO-RTO: 0 /100
PLATELET # BLD AUTO: 217 10E3/UL (ref 150–450)
POTASSIUM SERPL-SCNC: 4.2 MMOL/L (ref 3.4–5.3)
PROT SERPL-MCNC: 6.5 G/DL (ref 6.4–8.3)
RBC # BLD AUTO: 3.12 10E6/UL (ref 3.8–5.2)
SODIUM SERPL-SCNC: 138 MMOL/L (ref 136–145)
WBC # BLD AUTO: 4.9 10E3/UL (ref 4–11)

## 2023-04-19 PROCEDURE — 80053 COMPREHEN METABOLIC PANEL: CPT

## 2023-04-19 PROCEDURE — 250N000011 HC RX IP 250 OP 636: Performed by: INTERNAL MEDICINE

## 2023-04-19 PROCEDURE — 36415 COLL VENOUS BLD VENIPUNCTURE: CPT

## 2023-04-19 PROCEDURE — 85014 HEMATOCRIT: CPT

## 2023-04-19 RX ORDER — HEPARIN SODIUM (PORCINE) LOCK FLUSH IV SOLN 100 UNIT/ML 100 UNIT/ML
5 SOLUTION INTRAVENOUS DAILY PRN
Status: DISCONTINUED | OUTPATIENT
Start: 2023-04-19 | End: 2023-04-25 | Stop reason: HOSPADM

## 2023-04-19 RX ADMIN — Medication 5 ML: at 12:48

## 2023-04-22 ENCOUNTER — PRE VISIT (OUTPATIENT)
Dept: RHEUMATOLOGY | Facility: CLINIC | Age: 68
End: 2023-04-22
Payer: MEDICARE

## 2023-04-22 ENCOUNTER — OFFICE VISIT (OUTPATIENT)
Dept: RHEUMATOLOGY | Facility: CLINIC | Age: 68
End: 2023-04-22
Attending: INTERNAL MEDICINE
Payer: MEDICARE

## 2023-04-22 VITALS
SYSTOLIC BLOOD PRESSURE: 132 MMHG | HEART RATE: 77 BPM | TEMPERATURE: 98 F | OXYGEN SATURATION: 99 % | DIASTOLIC BLOOD PRESSURE: 70 MMHG | WEIGHT: 111 LBS | BODY MASS INDEX: 18.77 KG/M2

## 2023-04-22 DIAGNOSIS — R76.8 POSITIVE ANA (ANTINUCLEAR ANTIBODY): ICD-10-CM

## 2023-04-22 DIAGNOSIS — C25.0 MALIGNANT NEOPLASM OF HEAD OF PANCREAS (H): Primary | ICD-10-CM

## 2023-04-22 PROCEDURE — 99205 OFFICE O/P NEW HI 60 MIN: CPT | Mod: GC | Performed by: INTERNAL MEDICINE

## 2023-04-22 PROCEDURE — G0463 HOSPITAL OUTPT CLINIC VISIT: HCPCS | Performed by: INTERNAL MEDICINE

## 2023-04-22 ASSESSMENT — PAIN SCALES - GENERAL: PAINLEVEL: NO PAIN (0)

## 2023-04-22 NOTE — LETTER
4/22/2023       RE: Emelia Weathers  [address]  [facility] [place] [address]     Dear Colleague,    Thank you for referring your patient, Emelia Weathers, to the [facility] [place] [facility]. Please see a copy of my visit note below.      Rheumatology New Clinic Consult Note    Emelia Weathers MRN# [id number]   Age: 67 year old YOB: 1955     Reason for consult: evaluation of possible underlying autoimmune disease vs hand-foot syndrome related to Xeloda    Requesting Physician: Dr. Wade    Assessment & Recommendations:   Emelia Weathers is a 67 year old female with known germline Chek mutation, locally recurrent pancreatic cancer s/p whipple procedure 9/20/2013 with subsequent biopsy-proven resection bed encasing her SMA in 10/2014. Previously on Folfirinox for her recurrent disease but stopped as she did not tolerate the med, later switched to oxaplatin but also stopped for neuropathy. Currently has maintained on single agent Xeloda since 2015. She has had scleroderma-like symptoms with contractures of her hands and feet. Referred to rheumatology for evaluation of possible underlying autoimmune disease vs hand-foot syndrome.     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 At this time, there is no clear diagnosis. Her skin manifestations (heliotrope signs on her eye lids, grotton's sign on her right elbow, telangiectasia on her  cheeks, chins, forehead), though could fit with hand-foot syndrome, are most consistent with dermatomyositis. However, without e/o muscle weakness or ILD from most recent CT CAP raise concerns for dermatomyositis sine myositis. Her sicca symptoms could be from overlapping sjogren. Overall, her presentations are consistent with autoimmune process and the mechanism is thought to be possibly related to type-I interferon exposure which could be a result of chemotherapy-induced or cancer itself and can result autoimmune manifestations including dermatomyositis type pictures according to previous reports. It is still unclear whether all these manifestations are from drug-induced or how much of this is related to her residual tumor. We will need more specific antibodies before making a unifying diagnosis if at all possible, and refer her to the dermatologist, Dr. Stoddard, for further evaluation before developing any specific treatment plan.     RECOMMENDATIONS:  - anti-centromere Ab, RNA polymerase III Ab are pending   - obtain more labs: dermatomyositis panel, CK, aldolase, SSA, SSB ab  - refer to Dr. William Stoddard for evaluation of her skin manifestations    I discussed the findings and recommendations with the patient.    Case seen and discussed with Dr. Guidry.    Dede Bautista MD  Internal medicine resident (PGY-3)    Agree with resident note.    This patient has a clinical picture that would appear to be consistent with what is generally referred to his hand-foot syndrome, but is also developed Keratoconjunctivitis sicca, and slowly progressive contractures in her fingers.    Clinically, her skin involvement is not consistent with systemic sclerosis.  It is also notable that she is SANDY negative which is only rarely the case with systemic sclerosis.  Her contractures would be consistent with the diagnosis although they are qualitatively unusual for the contracture seen in systemic sclerosis, which also  virtually never occur in the absence of very significant associated skin thickening which she does not have.  She lacks virtually all other clinical features of systemic sclerosis fortunately.    Autoantibodies which do not result in a positive SANDY can occur.  They are most common in dermatomyositis.  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  Fortunately she also has no evidence of interstitial lung disease which can complicate that situation as well as certain scleroderma presentations.    Given how well she is done on Xeloda in terms of control of her pancreatic cancer, I would be very reluctant to stop it, even though the temporal association of its use and worsening of her skin systems is very robust per her history.    I would like to get the serologies we have ordered as well as Dr. Stoddard's impressions and potentially even skin biopsy if he thinks appropriate.    I would consider the use of hydroxychloroquine in her, particularly if he concurs that this is a dermatomyositis like picture, and/or if there are appropriate autoantibodies.  I would not expect this to diminish her tumor control.    At this point we will leave her follow-up with us open, and await his evaluation and the additional data.    CLAUDIA Guidry MD, PhD    Rheumatology          HPI   Emelia Weathers is a 67 year old female with known germline Chek mutation, H/ pancreatic cancer s/p whipple procedure c/b recurrent but stable pancreatic cancer on Xeloda monotherapy since 2015.     She had been healthy until 06/2013 when she was diagnosed with pancreatic cancer after presented with painless jaundice and high blood sugar. Had whipple procedure 9/20/2013. Later found to have  biopsy-proven recurrent of the disease encasing her SMA 10/2014. Was on Folfirinox then oxaplatin but did not tolerate both agents d/t side effects. Currently she has been on single chemotherapy with Xeloda since 2015. She follows with oncologist, Dr. Wade, every 4 months and her disease is noted to have been stable. Prior to pancreatic cancer diagnosis she denies any symptoms suggestive of any autoimmune conditions other than rosacea.    She has been taking Xeloda twice/day for two weeks with one week pause since the start. She reports starting noticing her skin issues limited to her hands and feet ~ 1 year after the medication initiation. The skin on her hands and feet becomes red and itchy with overlying scales the first couple days of each cycle and then goes away. She started noticing joint contractures in her finger and toes in the past 3 years and significantly worse in the past year; the joints gradually become more contracted and progressively more swollen espericially her PIPs. Now she has difficulty making hand  and open a jar with her hands d/t limited ROM and weakness. She denies any episodes of red, hot, swollen joints concerning for any flare. Denies involvement of other joints including wrists, elbows, shoulders, ankles, knees, hips, back and neck. Denies any morning stiffness.     In the past 6 years, she has had sensation where food get stucked in the middle of her throat once or twice/month, usually when she eats rice or bread. Sometimes the food will come up and sometimes it will go down after several seconds. She has been on PPI therapy since after whipple procedure for ppx. Denies heart burn symptoms or reflux symptoms. Denies pain with swallowing. Had similar presentations in the 2000s; however, EGD done at that time (2009?) showed unremarkable results. Denies any dry eyes or dry mouth at that time. However, she started having dry eyes and dry mouth 1.-5-2 years ago which has been  "progressively worsening over the past year. For dry eyes, she was previously on prednisone drops and then switched to Restasis which has helped.     Appetite has been great as she says \"I eat like a horse\". Her weight has been stable but she has not been able to again weight. Denies abdominal pain, N/V; has abdominal pain once/2-3 years from partial bowel obstruction 2/2 intraabdominal adhesion from prior surgery which was conservatively managed. Her bowel movements have been regular with 3 soft & non bloody bowel movements in the morning; denies watery diarrhea. Denies urination issues.     Denies fever, chills, night sweats. Denies malar rash, photosensitivity, oral ulcers, raynaud's, nonhealing wounds on finger tips or knuckles. Denies chest pain, legs swelling, orthopnea, PND. Does have shortness of breath on exertion for several years started after chemo; not worsening but always there. Denies cough. Denies muscle pain/weakness or difficulty lifting stuffs above her head or getting up from her chair. Does have significant numbness in her hands and feet over the past 7-8 years which is attributed to chemotherapy; intermittently has balance issues + blisters in her feet d/t numbness. Denies h/o blood clots, miscarriages. Denies recent infections or hospitalization.    - Lives alone.  - Former smoker; 0.5 PPD, 1970s for 3 years, 1990s for 0.5 year. Quit since 1992.  Denies EtOH use. Denies drug use.  - Denies family history of autoimmune diseases in her family; her sister does have arthritis but unclear, ?RA. Mom developed rash with sun exposure.     Review:   (+)  Sicca symptoms: dry eyes, dry mouth, swallowing issues with rice & bread  Hand & feet:   - skin: redness with overlying scales   - joints: swelling & contractures  Shortness of breath on exertion  Numbness in hands and feet    (-)  Constitutional symptoms  Malar rash  Photosensitivity  Raynauds  Heart burn/reflux  Muscle pain/weakness  Chest pain, " abdominal pain, urination or bowel movement symptoms    HISTORY REVIEW:  Past Medical History:   Diagnosis Date    Cervical high risk HPV (human papillomavirus) test positive 2008    + HPV 83    Diabetes mellitus (H) 6/27/2013    type 2    Diffuse cystic mastopathy     Disorder of bone and cartilage, unspecified     moderate osteopenia of spine    Endometrial hyperplasia, unspecified 9/14/2005    Gastro-oesophageal reflux disease     Heart murmur     Herpes simplex without mention of complication     History of blood transfusion 2014    Related to chemo    Pancreatic cancer (H)     Papanicolaou smear of cervix with low grade squamous intraepithelial lesion (LGSIL) 11/2006    Colpo negative    Post-menopausal     Rosacea      Past Surgical History:   Procedure Laterality Date    BREAST BIOPSY, RT/LT  1994    Breat Biopsy B9    BREAST SURGERY      Rt breast biopsy in 1994    CHOLECYSTECTOMY  9/20/13    Part of Whipple    ENDOSCOPIC RETROGRADE CHOLANGIOPANCREATOGRAM  6/27/2013    Procedure: ENDOSCOPIC RETROGRADE CHOLANGIOPANCREATOGRAM;  EUS with FNA, biliary sphincterotomy and biliary stent placement. ;  Surgeon: Timoteo Maki MD;  Location: [facility] OR    ENDOSCOPIC RETROGRADE CHOLANGIOPANCREATOGRAM  9/3/2013    Procedure: ENDOSCOPIC RETROGRADE CHOLANGIOPANCREATOGRAM;  Endoscopic retrograde cholangiopancreatogram with dilation of bile duct and bile duct stent placement.;  Surgeon: Devon Rubalcava MD;  Location: [facility] OR    ENDOSCOPIC ULTRASOUND UPPER GASTROINTESTINAL TRACT (GI)  6/27/2013    Procedure: ENDOSCOPIC ULTRASOUND UPPER GASTROINTESTINAL TRACT (GI);  Upper Endoscopy with Ultrasound, Fine Needle Aspiration, Endoscopic Retrograde Cholangiopancreatogram ;  Surgeon: Devon Rubalcava MD;  Location: [facility] OR    ESOPHAGOSCOPY, GASTROSCOPY, DUODENOSCOPY (EGD), COMBINED N/A 10/8/2014    Procedure: COMBINED ENDOSCOPIC ULTRASOUND, ESOPHAGOSCOPY, GASTROSCOPY, DUODENOSCOPY (EGD), FINE NEEDLE ASPIRATE/BIOPSY;   Surgeon: Carson Paulino MD;  Location:  GI    GYN SURGERY      TONSILLECTOMY & ADENOIDECTOMY      WHIPPLE PROCEDURE  2013    Procedure: WHIPPLE PROCEDURE;  Open Whipple Procedure, Jujunostomy Feeding Tube Placement, Bile Duct Stent and Pancreatic Stent Placement;  Surgeon: Issa John MD;  Location:  OR    [facility] COLONOSCOPY THRU STOMA, DIAGNOSTIC      due 10 years     Family History   Problem Relation Age of Onset    Osteoporosis Mother     Gastrointestinal Disease Mother         gallbladder removal    Hypertension Mother     Diabetes Father     Hypertension Father     Cancer Father         oral    Alcohol/Drug Father     Other Cancer Father         Oral cancer    Substance Abuse Father     Cancer - colorectal Maternal Grandfather     Other Cancer Maternal Grandfather     Alcohol/Drug Brother     Hypertension Brother     Diabetes Brother     Depression Brother     Anxiety Disorder Brother     Mental Illness Brother     Substance Abuse Brother     Diabetes Sister      Social History     Socioeconomic History    Marital status:      Spouse name: Not on file    Number of children: Not on file    Years of education: Not on file    Highest education level: Not on file   Occupational History    Not on file   Tobacco Use    Smoking status: Former     Packs/day: 0.00     Years: 0.00     Pack years: 0.00     Types: Cigarettes     Quit date: 1993     Years since quittin.6    Smokeless tobacco: Never   Vaping Use    Vaping status: Not on file   Substance and Sexual Activity    Alcohol use: Not Currently     Comment: rare    Drug use: No    Sexual activity: Not Currently     Partners: Male     Birth control/protection: Post-menopausal   Other Topics Concern    Parent/sibling w/ CABG, MI or angioplasty before 65F 55M? 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     Social Determinants of Health      Financial Resource Strain: Not on file   Food Insecurity: Not on file   Transportation Needs: Not on file   Physical Activity: Not on file   Stress: Not on file   Social Connections: Not on file   Intimate Partner Violence: Not on file   Housing Stability: Not on file     Patient Active Problem List   Diagnosis    Disorder of bone and cartilage    Rosacea    Herpes simplex virus (HSV) infection    Viral warts    Papanicolaou smear of cervix with low grade squamous intraepithelial lesion (LGSIL)    Esophageal reflux    Undiagnosed cardiac murmurs    Malignant neoplasm of head of pancreas (H)    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    Objective     PHYSICAL EXAM  /70   Pulse 77   Temp 98  F (36.7  C)   Wt 50.3 kg (111 lb)   SpO2 99%   BMI 18.77 kg/m    Wt Readings from Last 4 Encounters:   04/22/23 50.3 kg (111 lb)   02/20/23 51.8 kg (114 lb 3.2 oz)   12/30/22 49.4 kg (109 lb)   10/24/22 49.9 kg (110 lb 1.6 oz)       Constitutional: Alert, NAD.  Eyes: EOMI, PERRL, no conjunctival injection or icterus. Dry eyes. ?heliotrope sign on her eye lids.   ENT: Normal external ears, nose, teeth, gums, throat; no mucous membrane lesions, normal saliva pool. + cold sores on her left lower lip.  Neck: No mass, or thyroid enlargement.  Resp: Lungs clear to auscultation bilaterally.  CV: RRR, no murmurs, rubs or gallops.  GI: Non-tender, non-distended, no appreciable mass or HSM, BS+.  Lymph: No cervical, supraclavicular, or epitrochlear nodes  MS:    Bilateral hands: swollen MCPs, PIPs without clinical synovitis, warmth, erythema, tenderness + flexion contractures in all fingers bilaterally.    Both feet: MTPs, IPs without obvious contracture.    All others including TMJ, neck, shoulder, elbow, wrist,  spine, hip, knee, ankle, were examined and  found normal. No active synovitis or deformity. Full ROM in all joints other than hand joints; +weak  strength.  Skin: Telangiectasia on her foreheads, chins, cheeks. Destructed nails on both hands and feet. Well demarcated erythematous skin with overlying desquamation on palmar side of hands and plantar sides of her feet.  No thickened skin other than on flexors side of her hands and plantar sides of her feet. ?Gottron's sign on her elbows.  Neuro: CN II-XII grossly intact. Upper and lower extremity strength 5/5. Tone grossly normal. Decreased sensation in her hands and feet.  Extr: No edema, PP+2.  Psych: Normal judgement, orientation, memory, affect.    DATA:    CBC:  Recent Labs   Lab Test 04/19/23  1254 03/15/23  1046 02/17/23  0933   WBC 4.9 4.5 6.4   RBC 3.12* 3.16* 3.18*   HGB 10.0* 9.9* 10.3*   HCT 30.2* 30.5* 31.1*   MCV 97 97 98   MCH 32.1 31.3 32.4   MCHC 33.1 32.5 33.1   RDW 22.7* 22.0* 21.9*    202 244       BMP:  Recent Labs   Lab Test 04/19/23  1254 03/15/23  1046 02/17/23  0933    138 138   POTASSIUM 4.2 4.3 4.6   CHLORIDE 105 104 103   CO2 26 25 26   ANIONGAP 7 9 9   * 65* 87   BUN 14.6 10.6 8.6   CR 0.77 0.67 0.74   GFRESTIMATED 83 >90 87   LEONOR 8.9 8.9 9.4       LFT:  Recent Labs   Lab Test 04/19/23  1254 03/15/23  1046 02/17/23  0933   PROTTOTAL 6.5 6.7 7.1   ALBUMIN 3.9 4.0 4.2   BILITOTAL 0.4 0.5 0.4   ALKPHOS 130* 101 122*   AST 32 26 28   ALT 22 20 17       No results found for: CKTOTAL  TSH   Date Value Ref Range Status   03/14/2015 1.36 0.40 - 4.00 mU/L Final     Comment:     Effective 7/30/2014, the reference range for this assay has changed to reflect   new instrumentation/methodology.       No results found for: URIC    Inflammatory markers  Lab Results   Component Value Date    CRP <2.9 04/13/2015     Lab Results   Component Value Date    SED 28 06/25/2013     Ferritin   Date Value Ref Range Status   04/13/2015 462 (H)  8 - 252 ng/mL Final   09/05/2013 502 (H) 10 - 300 ng/mL Final       UA RESULTS:  Recent Labs   Lab Test 03/18/15  1000 03/16/15  1120 03/14/15  1606 02/06/15  1357   COLOR Yellow Unsatisfactory specimen - too old for testing  NOTIFIED [facility] AT [facility] BY [facility]   Yellow Straw   APPEARANCE Clear Unsatisfactory specimen - too old for testing  NOTIFIED [facility] AT [facility] BY [facility]   Slightly Cloudy Clear   URINEGLC 150* Unsatisfactory specimen - too old for testing  NOTIFIED [facility] AT [facility] BY [facility]  * Negative Negative   URINEBILI Negative Unsatisfactory specimen - too old for testing  NOTIFIED [facility] BY [facility]  * Small   This is an unconfirmed screening test result. A positive result may be false.  * Negative   URINEKETONE Negative Unsatisfactory specimen - too old for testing  NOTIFIED [facility] BY [facility]  * 80* Negative   SG 1.033 Unsatisfactory specimen - too old for testing  NOTIFIED [facility] AT [facility] BY [facility]   1.014 1.002*   UBLD Trace* Unsatisfactory specimen - too old for testing  NOTIFIED [facility] BY [facility]  * Negative Negative   URINEPH 6.0 Unsatisfactory specimen - too old for testing  NOTIFIED [facility] BY [facility]   6.0 5.5   PROTEIN 30* Unsatisfactory specimen - too old for testing  NOTIFIED [facility] BY [facility]  * 30* Negative   NITRITE Negative Unsatisfactory specimen - too old for testing  NOTIFIED [facility] BY [facility]  * Negative Negative   LEUKEST Negative Unsatisfactory specimen - too old for testing  NOTIFIED [facility] BY [facility]  * Small* Negative   RBCU 2  --  <1 0   WBCU 4*  --  11* <1       Autoimmunity labs:     No results found for: RHF  No results found for: CCPIGG  No results found for: ANCA  No results found for: Z8NGQSU  No results found for: V9CVUMM  Lab Results   Component Value Date    EDITH <1.0  Interpretation:  Negative 06/25/2013     No results found for: DNA  No results found for: RNPIGG, SMIGG, SSAIGG, SSBIGG,  SCLIGG    IMAGING:    CT CAP 2/2023  FINDINGS:   LUNGS AND PLEURA: Biapical scarring. Mild scattered mucus plugging in the right middle lobe, similar to the previous exam. No masses or consolidations. A 0.3 cm nodule along the right minor fissure is unchanged. No pleural effusions.     MEDIASTINUM/AXILLAE: Right Port-A-Cath, with tip in the low SVC. No pericardial effusion. No enlarged lymph nodes in the chest.     CORONARY ARTERY CALCIFICATION: Mild.     HEPATOBILIARY: Hepatic steatosis. Pneumobilia in the left hepatic lobe is likely related to prior sphincterotomy. No hepatic masses are identified. The gallbladder is absent.     PANCREAS: Postoperative changes of Whipple procedure are again noted. The residual pancreatic tail is atrophic.     SPLEEN: Normal.     ADRENAL GLANDS: Normal.     KIDNEYS/BLADDER: No significant mass, stone, or hydronephrosis.     BOWEL: No obstruction or inflammatory change.     LYMPH NODES: No lymphadenopathy. Scattered small mesenteric lymph nodes are not significantly changed.     VASCULATURE: Unremarkable.     PELVIC ORGANS: Normal.     MUSCULOSKELETAL: No destructive bony lesions.                                                                      IMPRESSION:  1.  Postoperative changes of Whipple procedure. No convincing evidence for recurrent or metastatic malignancy in the chest, abdomen, or pelvis.  2.  Hepatic steatosis.       Again, thank you for allowing me to participate in the care of your patient.      Sincerely,    Joe uGidry MD

## 2023-04-22 NOTE — PROGRESS NOTES
Rheumatology New Clinic Consult Note    Emelia Weathers MRN# [id number]   Age: 67 year old YOB: 1955     Reason for consult: evaluation of possible underlying autoimmune disease vs hand-foot syndrome related to Xeloda    Requesting Physician: Dr. Wade    Assessment & Recommendations:   Emelia Weathers is a 67 year old female with known germline Chek mutation, locally recurrent pancreatic cancer s/p whipple procedure 9/20/2013 with subsequent biopsy-proven resection bed encasing her SMA in 10/2014. Previously on Folfirinox for her recurrent disease but stopped as she did not tolerate the med, later switched to oxaplatin but also stopped for neuropathy. Currently has maintained on single agent Xeloda since 2015. She has had scleroderma-like symptoms with contractures of her hands and feet. Referred to rheumatology for evaluation of possible underlying autoimmune disease vs hand-foot syndrome.     PROBLEM LIST:  1. 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 Keratoconjunctivitis  3. Recurrent pancreatic cancer with stable disease on prolonged single therapy with Xeloda since 2015  4. Germline Chek mutation    Though her finger contractures could be a manifestation of scleroderma, but less likely as there have been no other signs or symptoms suggestive of the disease especially with the lack of skin thickening, raynaud's, patulous esophagous from CT; we will need to follow up on anti-centromere & RNA polymerase III ab which are still pending. At this time, there is no clear diagnosis. Her skin manifestations (heliotrope signs on her eye lids, grotton's sign on her right elbow, telangiectasia on her cheeks, chins, forehead), though could fit with hand-foot syndrome, are most consistent with dermatomyositis. However, without e/o muscle weakness or ILD from most recent CT CAP raise concerns for dermatomyositis sine myositis. Her sicca symptoms could be from overlapping sjogren. Overall, her  presentations are consistent with autoimmune process and the mechanism is thought to be possibly related to type-I interferon exposure which could be a result of chemotherapy-induced or cancer itself and can result autoimmune manifestations including dermatomyositis type pictures according to previous reports. It is still unclear whether all these manifestations are from drug-induced or how much of this is related to her residual tumor. We will need more specific antibodies before making a unifying diagnosis if at all possible, and refer her to the dermatologist, Dr. Stoddard, for further evaluation before developing any specific treatment plan.     RECOMMENDATIONS:  - anti-centromere Ab, RNA polymerase III Ab are pending   - obtain more labs: dermatomyositis panel, CK, aldolase, SSA, SSB ab  - refer to Dr. William Stoddard for evaluation of her skin manifestations    I discussed the findings and recommendations with the patient.    Case seen and discussed with Dr. Guidry.    Dede Bautista MD  Internal medicine resident (PGY-3)    Agree with resident note.    This patient has a clinical picture that would appear to be consistent with what is generally referred to his hand-foot syndrome, but is also developed Keratoconjunctivitis sicca, and slowly progressive contractures in her fingers.    Clinically, her skin involvement is not consistent with systemic sclerosis.  It is also notable that she is SANDY negative which is only rarely the case with systemic sclerosis.  Her contractures would be consistent with the diagnosis although they are qualitatively unusual for the contracture seen in systemic sclerosis, which also virtually never occur in the absence of very significant associated skin thickening which she does not have.  She lacks virtually all other clinical features of systemic sclerosis fortunately.    Autoantibodies which do not result in a positive SANDY can occur.  They are most common in  dermatomyositis.  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  Fortunately she also has no evidence of interstitial lung disease which can complicate that situation as well as certain scleroderma presentations.    Given how well she is done on Xeloda in terms of control of her pancreatic cancer, I would be very reluctant to stop it, even though the temporal association of its use and worsening of her skin systems is very robust per her history.    I would like to get the serologies we have ordered as well as Dr. Stoddard's impressions and potentially even skin biopsy if he thinks appropriate.    I would consider the use of hydroxychloroquine in her, particularly if he concurs that this is a dermatomyositis like picture, and/or if there are appropriate autoantibodies.  I would not expect this to diminish her tumor control.    At this point we will leave her follow-up with us open, and await his evaluation and the additional data.    CLAUDIA Guidry MD, PhD    Rheumatology          HPI   Emelia Weathers is a 67 year old female with known germline Chek mutation, H/ pancreatic cancer s/p whipple procedure c/b recurrent but stable pancreatic cancer on Xeloda monotherapy since 2015.     She had been healthy until 06/2013 when she was diagnosed with pancreatic cancer after presented with painless jaundice and high blood sugar. Had whipple procedure 9/20/2013. Later found to have biopsy-proven recurrent of the disease encasing her SMA 10/2014. Was on Folfirinox then oxaplatin but did not tolerate both agents d/t side effects. Currently she has been on single chemotherapy with Xeloda since 2015. She follows with oncologist, Dr. Wade, every 4 months and her disease is  "noted to have been stable. Prior to pancreatic cancer diagnosis she denies any symptoms suggestive of any autoimmune conditions other than rosacea.    She has been taking Xeloda twice/day for two weeks with one week pause since the start. She reports starting noticing her skin issues limited to her hands and feet ~ 1 year after the medication initiation. The skin on her hands and feet becomes red and itchy with overlying scales the first couple days of each cycle and then goes away. She started noticing joint contractures in her finger and toes in the past 3 years and significantly worse in the past year; the joints gradually become more contracted and progressively more swollen espericially her PIPs. Now she has difficulty making hand  and open a jar with her hands d/t limited ROM and weakness. She denies any episodes of red, hot, swollen joints concerning for any flare. Denies involvement of other joints including wrists, elbows, shoulders, ankles, knees, hips, back and neck. Denies any morning stiffness.     In the past 6 years, she has had sensation where food get stucked in the middle of her throat once or twice/month, usually when she eats rice or bread. Sometimes the food will come up and sometimes it will go down after several seconds. She has been on PPI therapy since after whipple procedure for ppx. Denies heart burn symptoms or reflux symptoms. Denies pain with swallowing. Had similar presentations in the 2000s; however, EGD done at that time (2009?) showed unremarkable results. Denies any dry eyes or dry mouth at that time. However, she started having dry eyes and dry mouth 1.-5-2 years ago which has been progressively worsening over the past year. For dry eyes, she was previously on prednisone drops and then switched to Restasis which has helped.     Appetite has been great as she says \"I eat like a horse\". Her weight has been stable but she has not been able to again weight. Denies abdominal pain, " N/V; has abdominal pain once/2-3 years from partial bowel obstruction 2/2 intraabdominal adhesion from prior surgery which was conservatively managed. Her bowel movements have been regular with 3 soft & non bloody bowel movements in the morning; denies watery diarrhea. Denies urination issues.     Denies fever, chills, night sweats. Denies malar rash, photosensitivity, oral ulcers, raynaud's, nonhealing wounds on finger tips or knuckles. Denies chest pain, legs swelling, orthopnea, PND. Does have shortness of breath on exertion for several years started after chemo; not worsening but always there. Denies cough. Denies muscle pain/weakness or difficulty lifting stuffs above her head or getting up from her chair. Does have significant numbness in her hands and feet over the past 7-8 years which is attributed to chemotherapy; intermittently has balance issues + blisters in her feet d/t numbness. Denies h/o blood clots, miscarriages. Denies recent infections or hospitalization.    - Lives alone.  - Former smoker; 0.5 PPD, 1970s for 3 years, 1990s for 0.5 year. Quit since 1992.  Denies EtOH use. Denies drug use.  - Denies family history of autoimmune diseases in her family; her sister does have arthritis but unclear, ?RA. Mom developed rash with sun exposure.     Review:   (+)  Sicca symptoms: dry eyes, dry mouth, swallowing issues with rice & bread  Hand & feet:   - skin: redness with overlying scales   - joints: swelling & contractures  Shortness of breath on exertion  Numbness in hands and feet    (-)  Constitutional symptoms  Malar rash  Photosensitivity  Raynauds  Heart burn/reflux  Muscle pain/weakness  Chest pain, abdominal pain, urination or bowel movement symptoms    HISTORY REVIEW:  Past Medical History:   Diagnosis Date     Cervical high risk HPV (human papillomavirus) test positive 2008    + HPV 83     Diabetes mellitus (H) 6/27/2013    type 2     Diffuse cystic mastopathy      Disorder of bone and cartilage,  unspecified     moderate osteopenia of spine     Endometrial hyperplasia, unspecified 9/14/2005     Gastro-oesophageal reflux disease      Heart murmur      Herpes simplex without mention of complication      History of blood transfusion 2014    Related to chemo     Pancreatic cancer (H)      Papanicolaou smear of cervix with low grade squamous intraepithelial lesion (LGSIL) 11/2006    Colpo negative     Post-menopausal      Rosacea      Past Surgical History:   Procedure Laterality Date     BREAST BIOPSY, RT/LT  1994    Breat Biopsy B9     BREAST SURGERY      Rt breast biopsy in 1994     CHOLECYSTECTOMY  9/20/13    Part of Whipple     ENDOSCOPIC RETROGRADE CHOLANGIOPANCREATOGRAM  6/27/2013    Procedure: ENDOSCOPIC RETROGRADE CHOLANGIOPANCREATOGRAM;  EUS with FNA, biliary sphincterotomy and biliary stent placement. ;  Surgeon: Timoteo Maki MD;  Location: [facility] OR     ENDOSCOPIC RETROGRADE CHOLANGIOPANCREATOGRAM  9/3/2013    Procedure: ENDOSCOPIC RETROGRADE CHOLANGIOPANCREATOGRAM;  Endoscopic retrograde cholangiopancreatogram with dilation of bile duct and bile duct stent placement.;  Surgeon: Devon Rubalcava MD;  Location: [facility] OR     ENDOSCOPIC ULTRASOUND UPPER GASTROINTESTINAL TRACT (GI)  6/27/2013    Procedure: ENDOSCOPIC ULTRASOUND UPPER GASTROINTESTINAL TRACT (GI);  Upper Endoscopy with Ultrasound, Fine Needle Aspiration, Endoscopic Retrograde Cholangiopancreatogram ;  Surgeon: Devon Rubalcava MD;  Location:  OR     ESOPHAGOSCOPY, GASTROSCOPY, DUODENOSCOPY (EGD), COMBINED N/A 10/8/2014    Procedure: COMBINED ENDOSCOPIC ULTRASOUND, ESOPHAGOSCOPY, GASTROSCOPY, DUODENOSCOPY (EGD), FINE NEEDLE ASPIRATE/BIOPSY;  Surgeon: Carson Paulino MD;  Location:  GI     GYN SURGERY       TONSILLECTOMY & ADENOIDECTOMY       WHIPPLE PROCEDURE  9/20/2013    Procedure: WHIPPLE PROCEDURE;  Open Whipple Procedure, Jujunostomy Feeding Tube Placement, Bile Duct Stent and Pancreatic Stent Placement;  Surgeon:  Issa John MD;  Location:  OR     [facility] COLONOSCOPY THRU STOMA, DIAGNOSTIC      due 10 years     Family History   Problem Relation Age of Onset     Osteoporosis Mother      Gastrointestinal Disease Mother         gallbladder removal     Hypertension Mother      Diabetes Father      Hypertension Father      Cancer Father         oral     Alcohol/Drug Father      Other Cancer Father         Oral cancer     Substance Abuse Father      Cancer - colorectal Maternal Grandfather      Other Cancer Maternal Grandfather      Alcohol/Drug Brother      Hypertension Brother      Diabetes Brother      Depression Brother      Anxiety Disorder Brother      Mental Illness Brother      Substance Abuse Brother      Diabetes Sister      Social History     Socioeconomic History     Marital status:      Spouse name: Not on file     Number of children: Not on file     Years of education: Not on file     Highest education level: Not on file   Occupational History     Not on file   Tobacco Use     Smoking status: Former     Packs/day: 0.00     Years: 0.00     Pack years: 0.00     Types: Cigarettes     Quit date: 1993     Years since quittin.6     Smokeless tobacco: Never   Vaping Use     Vaping status: Not on file   Substance and Sexual Activity     Alcohol use: Not Currently     Comment: rare     Drug use: No     Sexual activity: Not Currently     Partners: Male     Birth control/protection: Post-menopausal   Other Topics Concern     Parent/sibling w/ CABG, MI or angioplasty before 65F 55M? 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     Social Determinants of Health     Financial Resource Strain: Not on file   Food Insecurity: Not on file   Transportation Needs: Not on file   Physical Activity: Not on file   Stress: Not on file   Social Connections: Not on file   Intimate Partner Violence: Not on file   Housing Stability: Not  on file     Patient Active Problem List   Diagnosis     Disorder of bone and cartilage     Rosacea     Herpes simplex virus (HSV) infection     Viral warts     Papanicolaou smear of cervix with low grade squamous intraepithelial lesion (LGSIL)     Esophageal reflux     Undiagnosed cardiac murmurs     Malignant neoplasm of head of pancreas (H)     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    Objective     PHYSICAL EXAM  /70   Pulse 77   Temp 98  F (36.7  C)   Wt 50.3 kg (111 lb)   SpO2 99%   BMI 18.77 kg/m    Wt Readings from Last 4 Encounters:   04/22/23 50.3 kg (111 lb)   02/20/23 51.8 kg (114 lb 3.2 oz)   12/30/22 49.4 kg (109 lb)   10/24/22 49.9 kg (110 lb 1.6 oz)       Constitutional: Alert, NAD.  Eyes: EOMI, PERRL, no conjunctival injection or icterus. Dry eyes. ?heliotrope sign on her eye lids.   ENT: Normal external ears, nose, teeth, gums, throat; no mucous membrane lesions, normal saliva pool. + cold sores on her left lower lip.  Neck: No mass, or thyroid enlargement.  Resp: Lungs clear to auscultation bilaterally.  CV: RRR, no murmurs, rubs or gallops.  GI: Non-tender, non-distended, no appreciable mass or HSM, BS+.  Lymph: No cervical, supraclavicular, or epitrochlear nodes  MS:    Bilateral hands: swollen MCPs, PIPs without clinical synovitis, warmth, erythema, tenderness + flexion contractures in all fingers bilaterally.    Both feet: MTPs, IPs without obvious contracture.    All others including TMJ, neck, shoulder, elbow, wrist, spine, hip, knee, ankle, were examined and  found normal. No active synovitis or deformity. Full ROM in all joints other than hand joints; +weak  strength.  Skin: Telangiectasia on her foreheads, chins, cheeks. Destructed nails on both hands and  feet. Well demarcated erythematous skin with overlying desquamation on palmar side of hands and plantar sides of her feet.  No thickened skin other than on flexors side of her hands and plantar sides of her feet. ?Gottron's sign on her elbows.  Neuro: CN II-XII grossly intact. Upper and lower extremity strength 5/5. Tone grossly normal. Decreased sensation in her hands and feet.  Extr: No edema, PP+2.  Psych: Normal judgement, orientation, memory, affect.    DATA:    CBC:  Recent Labs   Lab Test 04/19/23  1254 03/15/23  1046 02/17/23  0933   WBC 4.9 4.5 6.4   RBC 3.12* 3.16* 3.18*   HGB 10.0* 9.9* 10.3*   HCT 30.2* 30.5* 31.1*   MCV 97 97 98   MCH 32.1 31.3 32.4   MCHC 33.1 32.5 33.1   RDW 22.7* 22.0* 21.9*    202 244       BMP:  Recent Labs   Lab Test 04/19/23  1254 03/15/23  1046 02/17/23  0933    138 138   POTASSIUM 4.2 4.3 4.6   CHLORIDE 105 104 103   CO2 26 25 26   ANIONGAP 7 9 9   * 65* 87   BUN 14.6 10.6 8.6   CR 0.77 0.67 0.74   GFRESTIMATED 83 >90 87   LEONOR 8.9 8.9 9.4       LFT:  Recent Labs   Lab Test 04/19/23  1254 03/15/23  1046 02/17/23  0933   PROTTOTAL 6.5 6.7 7.1   ALBUMIN 3.9 4.0 4.2   BILITOTAL 0.4 0.5 0.4   ALKPHOS 130* 101 122*   AST 32 26 28   ALT 22 20 17       No results found for: CKTOTAL  TSH   Date Value Ref Range Status   03/14/2015 1.36 0.40 - 4.00 mU/L Final     Comment:     Effective 7/30/2014, the reference range for this assay has changed to reflect   new instrumentation/methodology.       No results found for: URIC    Inflammatory markers  Lab Results   Component Value Date    CRP <2.9 04/13/2015     Lab Results   Component Value Date    SED 28 06/25/2013     Ferritin   Date Value Ref Range Status   04/13/2015 462 (H) 8 - 252 ng/mL Final   09/05/2013 502 (H) 10 - 300 ng/mL Final       UA RESULTS:  Recent Labs   Lab Test 03/18/15  1000 03/16/15  1120 03/14/15  1606 02/06/15  1357   COLOR Yellow Unsatisfactory specimen - too old for testing  NOTIFIED [facility] AT  1335 BY ARJ   Yellow Straw   APPEARANCE Clear Unsatisfactory specimen - too old for testing  NOTIFIED [facility] RN AT 1335 BY ARJ   Slightly Cloudy Clear   URINEGLC 150* Unsatisfactory specimen - too old for testing  NOTIFIED [facility] RN AT 1335 BY ARJ  * Negative Negative   URINEBILI Negative Unsatisfactory specimen - too old for testing  NOTIFIED [facility] AT [facility] BY ARJ  * Small   This is an unconfirmed screening test result. A positive result may be false.  * Negative   URINEKETONE Negative Unsatisfactory specimen - too old for testing  NOTIFIED [facility] RN AT 1335 BY ARJ  * 80* Negative   SG 1.033 Unsatisfactory specimen - too old for testing  NOTIFIED [facility] BY ARJ   1.014 1.002*   UBLD Trace* Unsatisfactory specimen - too old for testing  NOTIFIED [facility] BY ARJ  * Negative Negative   URINEPH 6.0 Unsatisfactory specimen - too old for testing  NOTIFIED [facility] BY ARJ   6.0 5.5   PROTEIN 30* Unsatisfactory specimen - too old for testing  NOTIFIED [facility] BY ARJ  * 30* Negative   NITRITE Negative Unsatisfactory specimen - too old for testing  NOTIFIED [facility] BY ARJ  * Negative Negative   LEUKEST Negative Unsatisfactory specimen - too old for testing  NOTIFIED [facility] AT 1335 BY ARJ  * Small* Negative   RBCU 2  --  <1 0   WBCU 4*  --  11* <1       Autoimmunity labs:     No results found for: RHF  No results found for: CCPIGG  No results found for: ANCA  No results found for: R3MWFOW  No results found for: M1ICEZQ  Lab Results   Component Value Date    EDITH <1.0  Interpretation:  Negative 06/25/2013     No results found for: DNA  No results found for: RNPIGG, SMIGG, SSAIGG, SSBIGG, SCLIGG    IMAGING:    CT CAP 2/2023  FINDINGS:   LUNGS AND PLEURA: Biapical scarring. Mild scattered mucus plugging in the right middle lobe, similar to the previous exam. No masses or consolidations. A 0.3 cm nodule along the right minor fissure is unchanged.  No pleural effusions.     MEDIASTINUM/AXILLAE: Right Port-A-Cath, with tip in the low SVC. No pericardial effusion. No enlarged lymph nodes in the chest.     CORONARY ARTERY CALCIFICATION: Mild.     HEPATOBILIARY: Hepatic steatosis. Pneumobilia in the left hepatic lobe is likely related to prior sphincterotomy. No hepatic masses are identified. The gallbladder is absent.     PANCREAS: Postoperative changes of Whipple procedure are again noted. The residual pancreatic tail is atrophic.     SPLEEN: Normal.     ADRENAL GLANDS: Normal.     KIDNEYS/BLADDER: No significant mass, stone, or hydronephrosis.     BOWEL: No obstruction or inflammatory change.     LYMPH NODES: No lymphadenopathy. Scattered small mesenteric lymph nodes are not significantly changed.     VASCULATURE: Unremarkable.     PELVIC ORGANS: Normal.     MUSCULOSKELETAL: No destructive bony lesions.                                                                      IMPRESSION:  1.  Postoperative changes of Whipple procedure. No convincing evidence for recurrent or metastatic malignancy in the chest, abdomen, or pelvis.  2.  Hepatic steatosis.

## 2023-04-22 NOTE — NURSING NOTE
Chief Complaint   Patient presents with     Consult     New pt consult.     Blood pressure 132/70, pulse 77, temperature 98  F (36.7  C), weight 50.3 kg (111 lb), SpO2 99 %, not currently breastfeeding.    ARLEEN AVILA

## 2023-04-25 ENCOUNTER — LAB (OUTPATIENT)
Dept: LAB | Facility: CLINIC | Age: 68
End: 2023-04-25
Attending: INTERNAL MEDICINE
Payer: MEDICARE

## 2023-04-25 DIAGNOSIS — R76.8 POSITIVE ANA (ANTINUCLEAR ANTIBODY): ICD-10-CM

## 2023-04-25 DIAGNOSIS — C25.0 MALIGNANT NEOPLASM OF HEAD OF PANCREAS (H): ICD-10-CM

## 2023-04-25 LAB
CK SERPL-CCNC: 106 U/L (ref 26–192)
Lab: NORMAL
PERFORMING LABORATORY: NORMAL
SPECIMEN STATUS: NORMAL
TEST NAME: NORMAL

## 2023-04-25 PROCEDURE — 84999 UNLISTED CHEMISTRY PROCEDURE: CPT

## 2023-04-25 PROCEDURE — 36591 DRAW BLOOD OFF VENOUS DEVICE: CPT

## 2023-04-25 PROCEDURE — 86235 NUCLEAR ANTIGEN ANTIBODY: CPT

## 2023-04-25 PROCEDURE — 82550 ASSAY OF CK (CPK): CPT

## 2023-04-25 PROCEDURE — 86256 FLUORESCENT ANTIBODY TITER: CPT

## 2023-04-25 PROCEDURE — 82085 ASSAY OF ALDOLASE: CPT

## 2023-04-25 PROCEDURE — 86038 ANTINUCLEAR ANTIBODIES: CPT

## 2023-04-25 PROCEDURE — 250N000011 HC RX IP 250 OP 636: Performed by: INTERNAL MEDICINE

## 2023-04-25 PROCEDURE — 86160 COMPLEMENT ANTIGEN: CPT

## 2023-04-25 PROCEDURE — 83516 IMMUNOASSAY NONANTIBODY: CPT

## 2023-04-25 RX ORDER — HEPARIN SODIUM (PORCINE) LOCK FLUSH IV SOLN 100 UNIT/ML 100 UNIT/ML
5 SOLUTION INTRAVENOUS ONCE
Status: COMPLETED | OUTPATIENT
Start: 2023-04-25 | End: 2023-04-25

## 2023-04-25 RX ADMIN — Medication 5 ML: at 13:40

## 2023-04-26 LAB
ALDOLASE SERPL-CCNC: 4.5 U/L
C3 SERPL-MCNC: 100 MG/DL (ref 81–157)
ENA SM IGG SER IA-ACNC: <0.7 U/ML
ENA SM IGG SER IA-ACNC: NEGATIVE
ENA SS-A AB SER IA-ACNC: <0.5 U/ML
ENA SS-A AB SER IA-ACNC: NEGATIVE
ENA SS-B IGG SER IA-ACNC: <0.6 U/ML
ENA SS-B IGG SER IA-ACNC: NEGATIVE
U1 SNRNP IGG SER IA-ACNC: 1.7 U/ML
U1 SNRNP IGG SER IA-ACNC: NEGATIVE

## 2023-05-01 LAB
ANA SER QL: NEGATIVE
ANNOTATION COMMENT IMP: NORMAL
EJ AB SER QL: NEGATIVE
ENA JO1 IGG SER-ACNC: 1 AU/ML
MDA5 AB SER QL LINE BLOT: NEGATIVE
MI2 AB SER QL: NEGATIVE
MJ AB SER QL LINE BLOT: NEGATIVE
OJ AB SER QL: NEGATIVE
PL12 AB SER QL: NEGATIVE
PL7 AB SER QL: NEGATIVE
SAE1 AB SER QL LINE BLOT: NEGATIVE
SRP AB SERPL QL: NEGATIVE
TIF1-GAMMA AB SER QL LINE BLOT: NEGATIVE

## 2023-05-12 LAB — LABCORP INTERFACED MISCELLANEOUS TEST RESULT: NORMAL

## 2023-05-12 NOTE — TELEPHONE ENCOUNTER
FUTURE VISIT INFORMATION      FUTURE VISIT INFORMATION:    Date: 5.18.23    Time: 1:50    Location: [facility]  REFERRAL INFORMATION:    Referring provider:  Chao    Referring providers clinic:  Rheum    Reason for visit/diagnosis  referral from Chao, Joe ODONNELL MD for the diagnoses of Malignant neoplasm of head of pancreas (H) [C25.0], Positive SANDY (antinuclear antibody) [R76.8], Evaluate for possible myositis and has requested that this patient be seen within 1- 2 weeks with Dr. Stoddard (Okay to book per [place])    RECORDS REQUESTED FROM:       Clinic name Comments Records Status Imaging Status   Rheum 4.22.23  [facility]    Oncology 2.20.23  [facility]    FP 12.30.22  [place] [facility]

## 2023-05-14 DIAGNOSIS — C25.0 MALIGNANT NEOPLASM OF HEAD OF PANCREAS (H): Primary | ICD-10-CM

## 2023-05-17 ENCOUNTER — LAB (OUTPATIENT)
Dept: LAB | Facility: CLINIC | Age: 68
End: 2023-05-17
Attending: INTERNAL MEDICINE
Payer: MEDICARE

## 2023-05-17 DIAGNOSIS — E50.7 XEROPHTHALMIA: ICD-10-CM

## 2023-05-17 DIAGNOSIS — C25.0 MALIGNANT NEOPLASM OF HEAD OF PANCREAS (H): ICD-10-CM

## 2023-05-17 LAB
ALBUMIN SERPL BCG-MCNC: 4 G/DL (ref 3.5–5.2)
ALP SERPL-CCNC: 120 U/L (ref 35–104)
ALT SERPL W P-5'-P-CCNC: 19 U/L (ref 10–35)
ANION GAP SERPL CALCULATED.3IONS-SCNC: 7 MMOL/L (ref 7–15)
AST SERPL W P-5'-P-CCNC: 26 U/L (ref 10–35)
BASOPHILS # BLD AUTO: 0 10E3/UL (ref 0–0.2)
BASOPHILS NFR BLD AUTO: 1 %
BILIRUB SERPL-MCNC: 0.4 MG/DL
BUN SERPL-MCNC: 12.3 MG/DL (ref 8–23)
CALCIUM SERPL-MCNC: 9.2 MG/DL (ref 8.8–10.2)
CHLORIDE SERPL-SCNC: 105 MMOL/L (ref 98–107)
CREAT SERPL-MCNC: 1.21 MG/DL (ref 0.51–0.95)
DEPRECATED HCO3 PLAS-SCNC: 26 MMOL/L (ref 22–29)
EOSINOPHIL # BLD AUTO: 0.1 10E3/UL (ref 0–0.7)
EOSINOPHIL NFR BLD AUTO: 1 %
ERYTHROCYTE [DISTWIDTH] IN BLOOD BY AUTOMATED COUNT: 22.1 % (ref 10–15)
GFR SERPL CREATININE-BSD FRML MDRD: 48 ML/MIN/1.73M2
GLUCOSE SERPL-MCNC: 113 MG/DL (ref 70–99)
HCT VFR BLD AUTO: 29.6 % (ref 35–47)
HGB BLD-MCNC: 9.6 G/DL (ref 11.7–15.7)
IMM GRANULOCYTES # BLD: 0 10E3/UL
IMM GRANULOCYTES NFR BLD: 0 %
LYMPHOCYTES # BLD AUTO: 1.4 10E3/UL (ref 0.8–5.3)
LYMPHOCYTES NFR BLD AUTO: 27 %
MCH RBC QN AUTO: 31.7 PG (ref 26.5–33)
MCHC RBC AUTO-ENTMCNC: 32.4 G/DL (ref 31.5–36.5)
MCV RBC AUTO: 98 FL (ref 78–100)
MONOCYTES # BLD AUTO: 0.5 10E3/UL (ref 0–1.3)
MONOCYTES NFR BLD AUTO: 9 %
NEUTROPHILS # BLD AUTO: 3.2 10E3/UL (ref 1.6–8.3)
NEUTROPHILS NFR BLD AUTO: 62 %
NRBC # BLD AUTO: 0 10E3/UL
NRBC BLD AUTO-RTO: 0 /100
PLATELET # BLD AUTO: 243 10E3/UL (ref 150–450)
POTASSIUM SERPL-SCNC: 4.7 MMOL/L (ref 3.4–5.3)
PROT SERPL-MCNC: 6.7 G/DL (ref 6.4–8.3)
RBC # BLD AUTO: 3.03 10E6/UL (ref 3.8–5.2)
SODIUM SERPL-SCNC: 138 MMOL/L (ref 136–145)
WBC # BLD AUTO: 5.1 10E3/UL (ref 4–11)

## 2023-05-17 PROCEDURE — 86235 NUCLEAR ANTIGEN ANTIBODY: CPT

## 2023-05-17 PROCEDURE — 36591 DRAW BLOOD OFF VENOUS DEVICE: CPT

## 2023-05-17 PROCEDURE — 85025 COMPLETE CBC W/AUTO DIFF WBC: CPT

## 2023-05-17 PROCEDURE — 80053 COMPREHEN METABOLIC PANEL: CPT

## 2023-05-18 ENCOUNTER — OFFICE VISIT (OUTPATIENT)
Dept: DERMATOLOGY | Facility: CLINIC | Age: 68
End: 2023-05-18
Payer: MEDICARE

## 2023-05-18 ENCOUNTER — PRE VISIT (OUTPATIENT)
Dept: DERMATOLOGY | Facility: CLINIC | Age: 68
End: 2023-05-18

## 2023-05-18 DIAGNOSIS — E50.7 XEROPHTHALMIA: Primary | ICD-10-CM

## 2023-05-18 DIAGNOSIS — L57.0 ACTINIC KERATOSIS: ICD-10-CM

## 2023-05-18 DIAGNOSIS — L94.3 SCLERODACTYLY: ICD-10-CM

## 2023-05-18 DIAGNOSIS — L27.1 HAND FOOT SYNDROME: ICD-10-CM

## 2023-05-18 PROCEDURE — 99203 OFFICE O/P NEW LOW 30 MIN: CPT | Mod: 25 | Performed by: DERMATOLOGY

## 2023-05-18 PROCEDURE — 17000 DESTRUCT PREMALG LESION: CPT | Performed by: DERMATOLOGY

## 2023-05-18 PROCEDURE — 17003 DESTRUCT PREMALG LES 2-14: CPT | Performed by: DERMATOLOGY

## 2023-05-18 RX ORDER — BETAMETHASONE DIPROPIONATE 0.5 MG/G
OINTMENT, AUGMENTED TOPICAL 2 TIMES DAILY
Qty: 50 G | Refills: 3 | Status: SHIPPED | OUTPATIENT
Start: 2023-05-18

## 2023-05-18 RX ORDER — HYDROXYCHLOROQUINE SULFATE 200 MG/1
200 TABLET, FILM COATED ORAL DAILY
Qty: 90 TABLET | Refills: 3 | Status: SHIPPED | OUTPATIENT
Start: 2023-05-18 | End: 2024-04-18

## 2023-05-18 ASSESSMENT — PAIN SCALES - GENERAL: PAINLEVEL: NO PAIN (0)

## 2023-05-18 NOTE — LETTER
5/18/2023       RE: Emelia Weathers  [address]  [facility] [address]     Dear Colleague,    Thank you for referring your patient, Emelia Weathers, to the [facility]MAT[facility] [place] at [facility]. Please see a copy of my visit note below.    [facility] Dermatology Note    Encounter Date: May 18, 2023    Dermatology Problem List:  1. 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 Pancreatic cancer: on capecitabine ~10 years  - recurrent hand-foot syndrome  - augmented betamethasone ointment    3. Actinic keratoses: s/p cryotherapy  ______________________________________    Impression/Plan:  1. Cutaneous sclerosis: with constellation of symptoms, favor systemic sclerosis over an overlap syndrome. No specific scleroderma antibodies, but does have +SANDY and sclerodactyly which is quite distal. 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 Diagnosis is complicated by concomitant hand-foot syndrome from capecitabine. We discussed risks/benefits of next steps in treatment and will proceed with more conservative, non-immunosuppressive management with hydroxychloroquine and topicals.  - hydroxychloroquine 200 mg daily  - augmented betamethasone ointment for hand-foot syndrome  - check SSA/B for Sjogren's symptoms    2. Actinic keratoses: x3 today, cryotherapy  - Cryotherapy procedure note: After verbal consent and  discussion of risks and benefits including, but not limited to, dyspigmentation/scar, blister, and pain, 3 was(were) treated with 1-2 mm freeze border for 1-2 cycles with liquid nitrogen. Post cryotherapy instructions were provided.         Follow-up in 3-4 months.       Staff Involved:  Staff Only    William Stoddard MD   of Dermatology  Department of Dermatology  [facility]      CC:   Chief Complaint   Patient presents with    Derm Problem     Emelia is here for a possible myositis evaluation.       History of Present Illness:  Ms. Emelia Weathers is a 67 year old female who presents as a new patient.    Diagnosed with pancreatic cancer 10 years ago  - on capecitabine for 8-9 years  - attributed symptoms to chemotherapy  - knuckles more swollen - referred to Dr. Guidry  - more contractures  - feet also affected  - no toenails, soles rough and tight  - difficulty opening bottle of water - uses bottle openers  - knitting to improve flexibility    Outline of hand gets red depending on where in chemotherapy cycle  - sometimes red and itchy  - applies lotion BID on hands and feet, sometimes more often - not very helpful  - used special cream for hand-foot syndrome - not helpful    Not having many muscle symptoms - strength training at gym [facility] - soreness after weight lifting but otherwise okay  - not having much weakness - walk 10k-20k steps per day  - no proximal weakness    Does get short of breath - has low hemoglobin - no pain  - no dry cough  - on lisinopril for kidneys - had been borderline diabetic but now A1c in prediabetes range   - borderline hypertension  - not on statin - cholesterol was good    Dry eyes    Labs:  Extensive lab review notable for SANDY +1:160 (homogeneous) and +1:80 (speckled); scleroderma and myositis panels and other antibodies negative    Physical exam:  Vitals: There were no vitals taken for this visit.  GEN: This is a well developed,  well-nourished female in no acute distress, in a pleasant mood.    SKIN: Pinzon phototype II  - Full skin, which includes the head/face, both arms, chest, back, abdomen, both legs, buttocks, digits and/or nails, was examined.  - telangiectasias on the face  - sclerodactyly to just proximal to MCPs and sclerodermoid facies  - erythema and hyperkeratosis of the palms and soles  - onychodystrophy with anonychia affecting multiple digits  - 3 erythematous scaly macules on the face  - No other lesions of concern on areas examined.     Past Medical History:   Past Medical History:   Diagnosis Date    Cervical high risk HPV (human papillomavirus) test positive 2008    + HPV 83    Diabetes mellitus (H) 6/27/2013    type 2    Diffuse cystic mastopathy     Disorder of bone and cartilage, unspecified     moderate osteopenia of spine    Endometrial hyperplasia, unspecified 9/14/2005    Gastro-oesophageal reflux disease     Heart murmur     Herpes simplex without mention of complication     History of blood transfusion 2014    Related to chemo    Pancreatic cancer (H)     Papanicolaou smear of cervix with low grade squamous intraepithelial lesion (LGSIL) 11/2006    Colpo negative    Post-menopausal     Rosacea      Past Surgical History:   Procedure Laterality Date    BREAST BIOPSY, RT/LT  1994    Breat Biopsy B9    BREAST SURGERY      Rt breast biopsy in 1994    CHOLECYSTECTOMY  9/20/13    Part of Whipple    ENDOSCOPIC RETROGRADE CHOLANGIOPANCREATOGRAM  6/27/2013    Procedure: ENDOSCOPIC RETROGRADE CHOLANGIOPANCREATOGRAM;  EUS with FNA, biliary sphincterotomy and biliary stent placement. ;  Surgeon: Timoteo Maki MD;  Location:  OR    ENDOSCOPIC RETROGRADE CHOLANGIOPANCREATOGRAM  9/3/2013    Procedure: ENDOSCOPIC RETROGRADE CHOLANGIOPANCREATOGRAM;  Endoscopic retrograde cholangiopancreatogram with dilation of bile duct and bile duct stent placement.;  Surgeon: Devon Rubalcava MD;  Location:  OR     ENDOSCOPIC ULTRASOUND UPPER GASTROINTESTINAL TRACT (GI)  6/27/2013    Procedure: ENDOSCOPIC ULTRASOUND UPPER GASTROINTESTINAL TRACT (GI);  Upper Endoscopy with Ultrasound, Fine Needle Aspiration, Endoscopic Retrograde Cholangiopancreatogram ;  Surgeon: Devon Rubalcava MD;  Location:  OR    ESOPHAGOSCOPY, GASTROSCOPY, DUODENOSCOPY (EGD), COMBINED N/A 10/8/2014    Procedure: COMBINED ENDOSCOPIC ULTRASOUND, ESOPHAGOSCOPY, GASTROSCOPY, DUODENOSCOPY (EGD), FINE NEEDLE ASPIRATE/BIOPSY;  Surgeon: Carson Paulino MD;  Location:  GI    GYN SURGERY      TONSILLECTOMY & ADENOIDECTOMY      WHIPPLE PROCEDURE  9/20/2013    Procedure: WHIPPLE PROCEDURE;  Open Whipple Procedure, Jujunostomy Feeding Tube Placement, Bile Duct Stent and Pancreatic Stent Placement;  Surgeon: Issa John MD;  Location:  OR    [facility] COLONOSCOPY THRU STOMA, DIAGNOSTIC  07/06    due 10 years       Social History:   reports that she quit smoking about 29 years ago. Her smoking use included cigarettes. She has never used smokeless tobacco. She reports that she does not currently use alcohol. She reports that she does not use drugs.    Family History:  Family History   Problem Relation Age of Onset    Osteoporosis Mother     Gastrointestinal Disease Mother         gallbladder removal    Hypertension Mother     Diabetes Father     Hypertension Father     Cancer Father         oral    Alcohol/Drug Father     Other Cancer Father         Oral cancer    Substance Abuse Father     Diabetes Sister     Alcohol/Drug Brother     Hypertension Brother     Diabetes Brother     Depression Brother     Anxiety Disorder Brother     Mental Illness Brother     Substance Abuse Brother     Cancer - colorectal Maternal Grandfather     Other Cancer Maternal Grandfather     Skin Cancer No family hx of     Melanoma No family hx of        Medications:  Current Outpatient Medications   Medication Sig Dispense Refill    acetaminophen (TYLENOL) 325 MG tablet Take  500 mg by mouth every 6 hours as needed      alcohol swab prep pads Use to swab area of injection/skyla as directed. 100 each 3    alendronate (FOSAMAX) 70 MG tablet Take 1 tablet (70 mg) by mouth every 7 days 12 tablet 3    amylase-lipase-protease (CREON) 72136-91161-38239 units CPEP TAKE THREE CAPSULES BY MOUTH THREE TIMES DAILY WITH MEALS 540 capsule 11    Ascorbic Acid (VITAMIN C PO) Take 500 mg by mouth daily      blood glucose (NO BRAND SPECIFIED) test strip Use to test blood sugar 1 times daily or as directed. To accompany: Blood Glucose Monitor Brands: per insurance. 100 strip 6    blood glucose calibration (NO BRAND SPECIFIED) solution To accompany: Blood Glucose Monitor Brands: per insurance. 100 each 1    blood glucose monitoring (NO BRAND SPECIFIED) meter device kit Use to test blood sugar 1 times daily or as directed. Preferred blood glucose meter OR supplies to accompany: Blood Glucose Monitor Brands: per insurance. 1 kit 0    CALCIUM CITRATE + D PO 2 tablets daily      capecitabine (XELODA) 500 MG tablet Take 2 tabs in the AM and 1 tab in the PM, Days 1 through 14, then off for 7 days. Take within 30 mins after meal. 42 tablet 3    capecitabine (XELODA) 500 MG tablet Take 2 tabs in the AM and 1 tab in the PM, Days 1 through 14, then off for 7 days. Take within 30 mins after meal. 42 tablet 2    cycloSPORINE (RESTASIS) 0.05 % ophthalmic emulsion 1 drop 2 times daily      IBUPROFEN PO       lidocaine-prilocaine (EMLA) 2.5-2.5 % external cream Apply topically as needed for moderate pain 30 g 3    lisinopril (ZESTRIL) 10 MG tablet Take 1 tablet (10 mg) by mouth daily 90 tablet 3    LORazepam (ATIVAN) 0.5 MG tablet Take 1 tablet (0.5 mg) by mouth every 4 hours as needed (Anxiety, Nausea/Vomiting or Sleep) 30 tablet 2    MELATONIN PO Take 3 mg by mouth as needed      MULTIVITAMIN TABS   OR 1 TABLET DAILY      pantoprazole (PROTONIX) 40 MG EC tablet Take 1 tablet (40 mg) by mouth daily before breakfast 90  tablet 11    STATIN NOT PRESCRIBED (INTENTIONAL) Please choose reason not prescribed from choices below.      thin (NO BRAND SPECIFIED) lancets Use with lanceting device. To accompany: Blood Glucose Monitor Brands: per insurance. 100 each 6     No Known Allergies

## 2023-05-18 NOTE — NURSING NOTE
Dermatology Rooming Note    Emelia Weathers's goals for this visit include:   Chief Complaint   Patient presents with     Derm Problem     Emelia is here for a possible myositis evaluation.     Fahad Strong, EMT

## 2023-05-18 NOTE — PROGRESS NOTES
[facility] Dermatology Note    Encounter Date: May 18, 2023    Dermatology Problem List:  1. 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 Pancreatic cancer: on capecitabine ~10 years  - recurrent hand-foot syndrome  - augmented betamethasone ointment    3. Actinic keratoses: s/p cryotherapy  ______________________________________    Impression/Plan:  1. Cutaneous sclerosis: with constellation of symptoms, favor systemic sclerosis over an overlap syndrome. No specific scleroderma antibodies, but does have +SANDY and sclerodactyly which is quite distal. 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 Diagnosis is complicated by concomitant hand-foot syndrome from capecitabine. We discussed risks/benefits of next steps in treatment and will proceed with more conservative, non-immunosuppressive management with hydroxychloroquine and topicals.  - hydroxychloroquine 200 mg daily  - augmented betamethasone ointment for hand-foot syndrome  - check SSA/B for Sjogren's symptoms    2. Actinic keratoses: x3 today, cryotherapy  - Cryotherapy procedure note: After verbal consent and discussion of risks and benefits including, but not limited to, dyspigmentation/scar, blister, and pain, 3 was(were) treated with 1-2 mm freeze border for 1-2 cycles with liquid nitrogen. Post cryotherapy instructions were provided.         Follow-up in 3-4 months.       Staff Involved:  Staff Only    William BARRIGA  MD [place]   of Dermatology  Department of Dermatology  [facility] [employer] [facility]      CC:   Chief Complaint   Patient presents with     Derm Problem     Emelia is here for a possible myositis evaluation.       History of Present Illness:  Ms. Emelia Weathers is a 67 year old female who presents as a new patient.    Diagnosed with pancreatic cancer 10 years ago  - on capecitabine for 8-9 years  - attributed symptoms to chemotherapy  - knuckles more swollen - referred to Dr. Guidry  - more contractures  - feet also affected  - no toenails, soles rough and tight  - difficulty opening bottle of water - uses bottle openers  - knitting to improve flexibility    Outline of hand gets red depending on where in chemotherapy cycle  - sometimes red and itchy  - applies lotion BID on hands and feet, sometimes more often - not very helpful  - used special cream for hand-foot syndrome - not helpful    Not having many muscle symptoms - strength training at gym [facility] - soreness after weight lifting but otherwise okay  - not having much weakness - walk 10k-20k steps per day  - no proximal weakness    Does get short of breath - has low hemoglobin - no pain  - no dry cough  - on lisinopril for kidneys - had been borderline diabetic but now A1c in prediabetes range   - borderline hypertension  - not on statin - cholesterol was good    Dry eyes    Labs:  Extensive lab review notable for SANDY +1:160 (homogeneous) and +1:80 (speckled); scleroderma and myositis panels and other antibodies negative    Physical exam:  Vitals: There were no vitals taken for this visit.  GEN: This is a well developed, well-nourished female in no acute distress, in a pleasant mood.    SKIN: Pinzon phototype II  - Full skin, which includes the head/face, both arms, chest, back, abdomen, both legs, buttocks, digits and/or nails, was examined.  - telangiectasias on the face  - sclerodactyly to just proximal to MCPs and  sclerodermoid facies  - erythema and hyperkeratosis of the palms and soles  - onychodystrophy with anonychia affecting multiple digits  - 3 erythematous scaly macules on the face  - No other lesions of concern on areas examined.     Past Medical History:   Past Medical History:   Diagnosis Date     Cervical high risk HPV (human papillomavirus) test positive 2008    + HPV 83     Diabetes mellitus (H) 6/27/2013    type 2     Diffuse cystic mastopathy      Disorder of bone and cartilage, unspecified     moderate osteopenia of spine     Endometrial hyperplasia, unspecified 9/14/2005     Gastro-oesophageal reflux disease      Heart murmur      Herpes simplex without mention of complication      History of blood transfusion 2014    Related to chemo     Pancreatic cancer (H)      Papanicolaou smear of cervix with low grade squamous intraepithelial lesion (LGSIL) 11/2006    Colpo negative     Post-menopausal      Rosacea      Past Surgical History:   Procedure Laterality Date     BREAST BIOPSY, RT/LT  1994    Breat Biopsy B9     BREAST SURGERY      Rt breast biopsy in 1994     CHOLECYSTECTOMY  9/20/13    Part of [place]ipple     ENDOSCOPIC RETROGRADE CHOLANGIOPANCREATOGRAM  6/27/2013    Procedure: ENDOSCOPIC RETROGRADE CHOLANGIOPANCREATOGRAM;  EUS with FNA, biliary sphincterotomy and biliary stent placement. ;  Surgeon: Timoteo Maki MD;  Location: [facility] OR     ENDOSCOPIC RETROGRADE CHOLANGIOPANCREATOGRAM  9/3/2013    Procedure: ENDOSCOPIC RETROGRADE CHOLANGIOPANCREATOGRAM;  Endoscopic retrograde cholangiopancreatogram with dilation of bile duct and bile duct stent placement.;  Surgeon: Devon Rubalcava MD;  Location:  OR     ENDOSCOPIC ULTRASOUND UPPER GASTROINTESTINAL TRACT (GI)  6/27/2013    Procedure: ENDOSCOPIC ULTRASOUND UPPER GASTROINTESTINAL TRACT (GI);  Upper Endoscopy with Ultrasound, Fine Needle Aspiration, Endoscopic Retrograde Cholangiopancreatogram ;  Surgeon: Devon Rubalcava MD;  Location:  OR      ESOPHAGOSCOPY, GASTROSCOPY, DUODENOSCOPY (EGD), COMBINED N/A 10/8/2014    Procedure: COMBINED ENDOSCOPIC ULTRASOUND, ESOPHAGOSCOPY, GASTROSCOPY, DUODENOSCOPY (EGD), FINE NEEDLE ASPIRATE/BIOPSY;  Surgeon: Carson Paulino MD;  Location:  GI     GYN SURGERY       TONSILLECTOMY & ADENOIDECTOMY       WHIPPLE PROCEDURE  9/20/2013    Procedure: WHIPPLE PROCEDURE;  Open Whipple Procedure, Jujunostomy Feeding Tube Placement, Bile Duct Stent and Pancreatic Stent Placement;  Surgeon: Issa John MD;  Location:  OR     [facility] COLONOSCOPY THRU STOMA, DIAGNOSTIC  07/06    due 10 years       Social History:   reports that she quit smoking about 29 years ago. Her smoking use included cigarettes. She has never used smokeless tobacco. She reports that she does not currently use alcohol. She reports that she does not use drugs.    Family History:  Family History   Problem Relation Age of Onset     Osteoporosis Mother      Gastrointestinal Disease Mother         gallbladder removal     Hypertension Mother      Diabetes Father      Hypertension Father      Cancer Father         oral     Alcohol/Drug Father      Other Cancer Father         Oral cancer     Substance Abuse Father      Diabetes Sister      Alcohol/Drug Brother      Hypertension Brother      Diabetes Brother      Depression Brother      Anxiety Disorder Brother      Mental Illness Brother      Substance Abuse Brother      Cancer - colorectal Maternal Grandfather      Other Cancer Maternal Grandfather      Skin Cancer No family hx of      Melanoma No family hx of        Medications:  Current Outpatient Medications   Medication Sig Dispense Refill     acetaminophen (TYLENOL) 325 MG tablet Take 500 mg by mouth every 6 hours as needed       alcohol swab prep pads Use to swab area of injection/skyla as directed. 100 each 3     alendronate (FOSAMAX) 70 MG tablet Take 1 tablet (70 mg) by mouth every 7 days 12 tablet 3     amylase-lipase-protease (CREON)  10695-43127-74216 units CPEP TAKE THREE CAPSULES BY MOUTH THREE TIMES DAILY WITH MEALS 540 capsule 11     Ascorbic Acid (VITAMIN C PO) Take 500 mg by mouth daily       blood glucose (NO BRAND SPECIFIED) test strip Use to test blood sugar 1 times daily or as directed. To accompany: Blood Glucose Monitor Brands: per insurance. 100 strip 6     blood glucose calibration (NO BRAND SPECIFIED) solution To accompany: Blood Glucose Monitor Brands: per insurance. 100 each 1     blood glucose monitoring (NO BRAND SPECIFIED) meter device kit Use to test blood sugar 1 times daily or as directed. Preferred blood glucose meter OR supplies to accompany: Blood Glucose Monitor Brands: per insurance. 1 kit 0     CALCIUM CITRATE + D PO 2 tablets daily       capecitabine (XELODA) 500 MG tablet Take 2 tabs in the AM and 1 tab in the PM, Days 1 through 14, then off for 7 days. Take within 30 mins after meal. 42 tablet 3     capecitabine (XELODA) 500 MG tablet Take 2 tabs in the AM and 1 tab in the PM, Days 1 through 14, then off for 7 days. Take within 30 mins after meal. 42 tablet 2     cycloSPORINE (RESTASIS) 0.05 % ophthalmic emulsion 1 drop 2 times daily       IBUPROFEN PO        lidocaine-prilocaine (EMLA) 2.5-2.5 % external cream Apply topically as needed for moderate pain 30 g 3     lisinopril (ZESTRIL) 10 MG tablet Take 1 tablet (10 mg) by mouth daily 90 tablet 3     LORazepam (ATIVAN) 0.5 MG tablet Take 1 tablet (0.5 mg) by mouth every 4 hours as needed (Anxiety, Nausea/Vomiting or Sleep) 30 tablet 2     MELATONIN PO Take 3 mg by mouth as needed       MULTIVITAMIN TABS   OR 1 TABLET DAILY       pantoprazole (PROTONIX) 40 MG EC tablet Take 1 tablet (40 mg) by mouth daily before breakfast 90 tablet 11     STATIN NOT PRESCRIBED (INTENTIONAL) Please choose reason not prescribed from choices below.       thin (NO BRAND SPECIFIED) lancets Use with lanceting device. To accompany: Blood Glucose Monitor Brands: per insurance. 100 each 6      No Known Allergies

## 2023-05-19 LAB
ENA SS-A AB SER IA-ACNC: 0.5 U/ML
ENA SS-A AB SER IA-ACNC: NEGATIVE
ENA SS-B IGG SER IA-ACNC: <0.6 U/ML
ENA SS-B IGG SER IA-ACNC: NEGATIVE

## 2023-05-23 ENCOUNTER — PATIENT OUTREACH (OUTPATIENT)
Dept: CARE COORDINATION | Facility: CLINIC | Age: 68
End: 2023-05-23
Payer: MEDICARE

## 2023-05-24 ENCOUNTER — THERAPY VISIT (OUTPATIENT)
Dept: OCCUPATIONAL THERAPY | Facility: CLINIC | Age: 68
End: 2023-05-24
Attending: DERMATOLOGY
Payer: MEDICARE

## 2023-05-24 ENCOUNTER — LAB (OUTPATIENT)
Dept: LAB | Facility: CLINIC | Age: 68
End: 2023-05-24
Attending: INTERNAL MEDICINE
Payer: MEDICARE

## 2023-05-24 DIAGNOSIS — C25.0 MALIGNANT NEOPLASM OF HEAD OF PANCREAS (H): ICD-10-CM

## 2023-05-24 DIAGNOSIS — L27.1 HAND FOOT SYNDROME: ICD-10-CM

## 2023-05-24 DIAGNOSIS — C25.0 MALIGNANT NEOPLASM OF HEAD OF PANCREAS (H): Primary | ICD-10-CM

## 2023-05-24 DIAGNOSIS — L94.3 SCLERODACTYLY: ICD-10-CM

## 2023-05-24 LAB
ALBUMIN SERPL BCG-MCNC: 3.8 G/DL (ref 3.5–5.2)
ALP SERPL-CCNC: 105 U/L (ref 35–104)
ALT SERPL W P-5'-P-CCNC: 19 U/L (ref 10–35)
ANION GAP SERPL CALCULATED.3IONS-SCNC: 6 MMOL/L (ref 7–15)
AST SERPL W P-5'-P-CCNC: 36 U/L (ref 10–35)
BASOPHILS # BLD AUTO: 0 10E3/UL (ref 0–0.2)
BASOPHILS NFR BLD AUTO: 0 %
BILIRUB SERPL-MCNC: 0.5 MG/DL
BUN SERPL-MCNC: 8.5 MG/DL (ref 8–23)
CALCIUM SERPL-MCNC: 8.8 MG/DL (ref 8.8–10.2)
CHLORIDE SERPL-SCNC: 104 MMOL/L (ref 98–107)
CREAT SERPL-MCNC: 0.62 MG/DL (ref 0.51–0.95)
DEPRECATED HCO3 PLAS-SCNC: 25 MMOL/L (ref 22–29)
EOSINOPHIL # BLD AUTO: 0.1 10E3/UL (ref 0–0.7)
EOSINOPHIL NFR BLD AUTO: 1 %
ERYTHROCYTE [DISTWIDTH] IN BLOOD BY AUTOMATED COUNT: 21.2 % (ref 10–15)
GFR SERPL CREATININE-BSD FRML MDRD: >90 ML/MIN/1.73M2
GLUCOSE SERPL-MCNC: 88 MG/DL (ref 70–99)
HCT VFR BLD AUTO: 28.8 % (ref 35–47)
HGB BLD-MCNC: 9.5 G/DL (ref 11.7–15.7)
IMM GRANULOCYTES # BLD: 0 10E3/UL
IMM GRANULOCYTES NFR BLD: 0 %
LYMPHOCYTES # BLD AUTO: 1.1 10E3/UL (ref 0.8–5.3)
LYMPHOCYTES NFR BLD AUTO: 29 %
MCH RBC QN AUTO: 31.8 PG (ref 26.5–33)
MCHC RBC AUTO-ENTMCNC: 33 G/DL (ref 31.5–36.5)
MCV RBC AUTO: 96 FL (ref 78–100)
MONOCYTES # BLD AUTO: 0.4 10E3/UL (ref 0–1.3)
MONOCYTES NFR BLD AUTO: 9 %
NEUTROPHILS # BLD AUTO: 2.4 10E3/UL (ref 1.6–8.3)
NEUTROPHILS NFR BLD AUTO: 61 %
NRBC # BLD AUTO: 0 10E3/UL
NRBC BLD AUTO-RTO: 0 /100
PLATELET # BLD AUTO: 157 10E3/UL (ref 150–450)
POTASSIUM SERPL-SCNC: 3.9 MMOL/L (ref 3.4–5.3)
PROT SERPL-MCNC: 6.4 G/DL (ref 6.4–8.3)
RBC # BLD AUTO: 2.99 10E6/UL (ref 3.8–5.2)
SODIUM SERPL-SCNC: 135 MMOL/L (ref 136–145)
WBC # BLD AUTO: 4 10E3/UL (ref 4–11)

## 2023-05-24 PROCEDURE — 250N000011 HC RX IP 250 OP 636: Performed by: INTERNAL MEDICINE

## 2023-05-24 PROCEDURE — 97165 OT EVAL LOW COMPLEX 30 MIN: CPT | Mod: GO | Performed by: OCCUPATIONAL THERAPIST

## 2023-05-24 PROCEDURE — 97530 THERAPEUTIC ACTIVITIES: CPT | Mod: GO | Performed by: OCCUPATIONAL THERAPIST

## 2023-05-24 PROCEDURE — 36415 COLL VENOUS BLD VENIPUNCTURE: CPT

## 2023-05-24 PROCEDURE — 85004 AUTOMATED DIFF WBC COUNT: CPT

## 2023-05-24 PROCEDURE — 97110 THERAPEUTIC EXERCISES: CPT | Mod: GO | Performed by: OCCUPATIONAL THERAPIST

## 2023-05-24 PROCEDURE — 80053 COMPREHEN METABOLIC PANEL: CPT

## 2023-05-24 RX ORDER — CAPECITABINE 500 MG/1
TABLET, FILM COATED ORAL
Qty: 42 TABLET | Refills: 3 | Status: SHIPPED | OUTPATIENT
Start: 2023-05-24 | End: 2023-11-14

## 2023-05-24 RX ORDER — HEPARIN SODIUM (PORCINE) LOCK FLUSH IV SOLN 100 UNIT/ML 100 UNIT/ML
5 SOLUTION INTRAVENOUS ONCE
Status: COMPLETED | OUTPATIENT
Start: 2023-05-24 | End: 2023-05-24

## 2023-05-24 RX ADMIN — Medication 5 ML: at 08:43

## 2023-05-24 NOTE — PROGRESS NOTES
OCCUPATIONAL THERAPY EVALUATION  Type of Visit: Evaluation    See electronic medical record for Abuse and Falls Screening details.    Subjective      Presenting condition or subjective complaint:  Hand stiffness, numbness   Date of onset: 05/18/23 (MD order date)       Past Medical History:   Diagnosis Date     Cervical high risk HPV (human papillomavirus) test positive 2008    + HPV 83     Diabetes mellitus (H) 6/27/2013    type 2     Diffuse cystic mastopathy      Disorder of bone and cartilage, unspecified     moderate osteopenia of spine     Endometrial hyperplasia, unspecified 9/14/2005     Gastro-oesophageal reflux disease      Heart murmur      Herpes simplex without mention of complication      History of blood transfusion 2014    Related to chemo     Pancreatic cancer (H)      Papanicolaou smear of cervix with low grade squamous intraepithelial lesion (LGSIL) 11/2006    Colpo negative     Post-menopausal      Rosacea      Past Surgical History:   Procedure Laterality Date     BREAST BIOPSY, RT/LT  1994    Breat Biopsy B9     BREAST SURGERY      Rt breast biopsy in 1994     CHOLECYSTECTOMY  9/20/13    Part of Whipple     ENDOSCOPIC RETROGRADE CHOLANGIOPANCREATOGRAM  6/27/2013    Procedure: ENDOSCOPIC RETROGRADE CHOLANGIOPANCREATOGRAM;  EUS with FNA, biliary sphincterotomy and biliary stent placement. ;  Surgeon: Timoteo Maki MD;  [facility]     ENDOSCOPIC RETROGRADE CHOLANGIOPANCREATOGRAM  9/3/2013    Procedure: ENDOSCOPIC RETROGRADE CHOLANGIOPANCREATOGRAM;  Endoscopic retrograde cholangiopancreatogram with dilation of bile duct and bile duct stent placement.;  Surgeon: Devon Rubalcava MD;  Location: [facility]     ENDOSCOPIC ULTRASOUND UPPER GASTROINTESTINAL TRACT (GI)  6/27/2013    Procedure: ENDOSCOPIC ULTRASOUND UPPER GASTROINTESTINAL TRACT (GI);  Upper Endoscopy with Ultrasound, Fine Needle Aspiration, Endoscopic Retrograde Cholangiopancreatogram ;  Surgeon: Devon Rubalcava MD;   [facility]     ESOPHAGOSCOPY, GASTROSCOPY, DUODENOSCOPY (EGD), COMBINED N/A 10/8/2014    Procedure: COMBINED ENDOSCOPIC ULTRASOUND, ESOPHAGOSCOPY, GASTROSCOPY, DUODENOSCOPY (EGD), FINE NEEDLE ASPIRATE/BIOPSY;  Surgeon: Carson Paulino MD;  Location:  GI     GYN SURGERY       TONSILLECTOMY & ADENOIDECTOMY       WHIPPLE PROCEDURE  9/20/2013    Procedure: WHIPPLE PROCEDURE;  Open Whipple Procedure, Jujunostomy Feeding Tube Placement, Bile Duct Stent and Pancreatic Stent Placement;  Surgeon: Issa John MD;  Location:  OR     [facility] COLONOSCOPY THRU STOMA, DIAGNOSTIC  07/06    due 10 years     Relevant medical history:   Cancer, noticed hands began becoming stiff ~8 years ago    Prior diagnostic imaging/testing results:     Has seen Rheumatology, Dermatology  Prior therapy history for the same diagnosis, illness or injury:    none    Prior Level of Function   Transfers: Independent  Ambulation: Independent  ADL: Independent  IADL: Housekeeping    Living Environment  Type of home:   House, lives alone  Help at home:  Daughter and NATHANIEL live close to her, will come over to help occasionally, but otherwise is independent with ADL/IADLs    Employment:     not currently working  Hobbies/Interests:  knitting, riding bikes, walking with the dog, reading, gardening/trying to take care of the lawn, goes to a gym doing weight lifting and cardio    Patient goals for therapy:  Knitting to keep fingers flexible, bottle opener, being able to  a bar at the gym     Objective   Right hand dominant  Patient reports symptoms of stiffness/loss of motion and numbness    Pain Level (Scale 0-10)   5/24/2023   At Rest 0/10   With Use 7/10, has adapted     Pain Description  Date 5/24/2023   Location hand, thumb, index finger, long finger, ring finger and small finger   Pain Quality Stiff   Frequency intermittent     Pain is worst  no change, depends on seasons   Exacerbated by  wintertime, opening bottles   Relieved by  Knitting, new cream prescribed she is trying, not taking pain medication   Progression Gradually worsened   wintertime hands feel more contracted, improvement in the summertime. No difference between morning and evening. Noticed around chemo hands will get red.    Edema  Moderate - PIP joints    Sensation   No feeling in fingertips from volar PIP down d/t chemo per pt report    Sensation  Marshall-Misael Monofilament Sensory Testing:  Right hand 5/24/2023   Thumb Absent   Index Absent   Long Absent   Ring RDN Absent   Ring UDN Absent   Small Absent   1-65-2.83:  Normal  3.22-3.61:  Diminished light touch  3.84-4.31:  Diminished protective sensation  4.56-6.45:  Loss of protective sensation  6.65:  Deep pressure sensation  Absent:  Tested with no response    Sensation  Marshall-Misael Monofilament Sensory Testing:  Left hand 5/24/2023   Thumb Absent   Index Absent   Long Absent   Ring RDN Absent   Ring UDN Absent   Small Absent   1-65-2.83:  Normal  3.22-3.61:  Diminished light touch  3.84-4.31:  Diminished protective sensation  4.56-6.45:  Loss of protective sensation  6.65:  Deep pressure sensation  Absent:  Tested with no response    ROM   Able to make a loose fist with all digits. Thumb opposes to small finger, able to reach MCP 5th volar joint. Thumbs rest in extension, slight shoulder deformity present bilaterally.    Flexion Lag  Tips of fingernails to distal dominguez crease as measured by Digit o Meter   Right   Left   Date 5/24/2023 5/24/2023   DPC to index (cm) 1 1   DPC to long (cm) 0 1   DPC to ring (cm) 0 1   DPC to small (cm) 2 2   DPC to thumb (cm) 0 0       Strength   (Measured in pounds)  Pain Report: - none  + mild    ++ moderate    +++ severe    5/24/2023 5/24/2023   Trials L R   1   36 41     Lat Pinch 5/24/2023 5/24/2023   Trials L R   1   13 14     3 Pt Pinch 5/24/2023 5/24/2023   Trials L R   1   12 14       Assessment & Plan   CLINICAL IMPRESSIONS   Medical Diagnosis: Sclerodactyly     Treatment Diagnosis: Digit stiffness    Impression/Assessment: Pt is a 67 year old female presenting to Occupational Therapy due to gradual onset, pt reports d/t chemo.  The following significant findings have been identified: Impaired ROM, Impaired sensation and Impaired strength.  These identified deficits interfere with their ability to perform self care tasks, recreational activities, household chores, driving ,  yard work and meal planning and preparation as compared to previous level of function.   Patient's limitations or Problem List includes: Pain, Decreased ROM/motion, Increased edema, Weakness and Sensory disturbance of the bilateral hand, thumb, index finger, long finger and ring finger which interferes with the patient's ability to perform Self Care Tasks (dressing, eating, bathing, hygiene/toileting), Sleep Patterns, Recreational Activities, Household Chores and Driving  as compared to previous level of function.    Clinical Decision Making (Complexity):   Assessment of Occupational Performance: 5 or more Performance Deficits  Occupational Performance Limitations: bathing/showering, toileting, dressing, feeding, functional mobility, hygiene and grooming, driving and community mobility, health management and maintenance, home establishment and management, meal preparation and cleanup, shopping and leisure activities  Clinical Decision Making (Complexity): Moderate complexity    PLAN OF CARE  Treatment Interventions:   Modalities:  Paraffin  Therapeutic Exercise:  AROM, AAROM, PROM, Tendon Gliding, Blocking, Reverse Blocking, Place and Hold, Contract Relax, Extensor Tracking, Isotonics, Isometrics and Stabilization  Neuromuscular re-education:  Nerve Gliding, Coordination/Dexterity, Sensory re-education, Desensitization, Kinesthetic Training, Proprioceptive Training, Kinesiotaping, Strain Counter Strain, Isometrics, Stabilization   Manual Techniques:  Coordination/Dexterity, Joint mobilization, Scar  mobilization, Friction massage, Myofascial release, and Manual edema mobilization  Orthotic Fabrication:  Static  Self Care:  Self Care Tasks and Ergonomic Considerations    Long Term Goals   OT Goal 1  Goal Identifier: Household IADLs - gripping  Goal Description: Open a tight or new jar with mild difficulty using AE  Rationale: In order to maximize safety and independence with performance of self-care activities  Goal Progress: Moderate difficulty  Target Date: 06/24/23      Frequency of Treatment: 2x/month  Duration of Treatment: 2 months     Risks and benefits of evaluation/treatment have been explained.   Patient/Family/caregiver agrees with Plan of Care.     Evaluation Time: OT Eval, Low Complexity Minutes (96230): 30    Signing Clinician: MAGUI [facility]                                                                                   OUTPATIENT OCCUPATIONAL THERAPY      PLAN OF TREATMENT FOR OUTPATIENT REHABILITATION   Patient's Last Name, First Name, YUVALLaminDMITRIYLamin YeboahbriceEmelia  L YOB: 1955   Provider's Name   [facility]   Medical Record No.  [id number]     Onset Date: 05/18/23 (MD order date) Start of Care Date: 05/24/23     Medical Diagnosis:  Sclerodactyly      OT Treatment Diagnosis:  Digit stiffness Plan of Treatment  Frequency/Duration:2x/month/2 months    Certification date from 05/24/23   To 07/24/23        See note for plan of treatment details and functional goals     Pushpa Kim OT                         I CERTIFY THE NEED FOR THESE SERVICES FURNISHED UNDER        THIS PLAN OF TREATMENT AND WHILE UNDER MY CARE     (Physician attestation of this document indicates review and certification of the therapy plan).                  Referring Provider:  William Stoddard      Initial Assessment  See Epic Evaluation- 05/24/23

## 2023-05-24 NOTE — NURSING NOTE
"Chief Complaint   Patient presents with     Port Draw     Labs drawn via port by RN.      Labs drawn via port by RN. Port accessed with 20G 3/4\" power needle. Flushed with NS and heparin. Pt tolerated well.      Liz Barrios RN  "

## 2023-06-07 ENCOUNTER — THERAPY VISIT (OUTPATIENT)
Dept: OCCUPATIONAL THERAPY | Facility: CLINIC | Age: 68
End: 2023-06-07
Attending: DERMATOLOGY
Payer: MEDICARE

## 2023-06-07 DIAGNOSIS — L94.3 SCLERODACTYLY: Primary | ICD-10-CM

## 2023-06-07 PROCEDURE — 97110 THERAPEUTIC EXERCISES: CPT | Mod: GO | Performed by: OCCUPATIONAL THERAPIST

## 2023-06-14 ENCOUNTER — LAB (OUTPATIENT)
Dept: LAB | Facility: CLINIC | Age: 68
End: 2023-06-14
Payer: MEDICARE

## 2023-06-14 ENCOUNTER — ANCILLARY PROCEDURE (OUTPATIENT)
Dept: CT IMAGING | Facility: CLINIC | Age: 68
End: 2023-06-14
Attending: INTERNAL MEDICINE
Payer: MEDICARE

## 2023-06-14 DIAGNOSIS — C25.0 MALIGNANT NEOPLASM OF HEAD OF PANCREAS (H): ICD-10-CM

## 2023-06-14 LAB
ALBUMIN SERPL BCG-MCNC: 4 G/DL (ref 3.5–5.2)
ALP SERPL-CCNC: 104 U/L (ref 35–104)
ALT SERPL W P-5'-P-CCNC: 20 U/L (ref 0–50)
ANION GAP SERPL CALCULATED.3IONS-SCNC: 9 MMOL/L (ref 7–15)
AST SERPL W P-5'-P-CCNC: 38 U/L (ref 0–45)
BASOPHILS # BLD AUTO: 0 10E3/UL (ref 0–0.2)
BASOPHILS NFR BLD AUTO: 1 %
BILIRUB SERPL-MCNC: 0.5 MG/DL
BUN SERPL-MCNC: 9.1 MG/DL (ref 8–23)
CALCIUM SERPL-MCNC: 9 MG/DL (ref 8.8–10.2)
CHLORIDE SERPL-SCNC: 99 MMOL/L (ref 98–107)
CREAT SERPL-MCNC: 0.63 MG/DL (ref 0.51–0.95)
DEPRECATED HCO3 PLAS-SCNC: 24 MMOL/L (ref 22–29)
EOSINOPHIL # BLD AUTO: 0 10E3/UL (ref 0–0.7)
EOSINOPHIL NFR BLD AUTO: 1 %
ERYTHROCYTE [DISTWIDTH] IN BLOOD BY AUTOMATED COUNT: 21.3 % (ref 10–15)
GFR SERPL CREATININE-BSD FRML MDRD: >90 ML/MIN/1.73M2
GLUCOSE SERPL-MCNC: 105 MG/DL (ref 70–99)
HCT VFR BLD AUTO: 29.8 % (ref 35–47)
HGB BLD-MCNC: 9.9 G/DL (ref 11.7–15.7)
IMM GRANULOCYTES # BLD: 0 10E3/UL
IMM GRANULOCYTES NFR BLD: 0 %
LYMPHOCYTES # BLD AUTO: 1.1 10E3/UL (ref 0.8–5.3)
LYMPHOCYTES NFR BLD AUTO: 27 %
MCH RBC QN AUTO: 31.9 PG (ref 26.5–33)
MCHC RBC AUTO-ENTMCNC: 33.2 G/DL (ref 31.5–36.5)
MCV RBC AUTO: 96 FL (ref 78–100)
MONOCYTES # BLD AUTO: 0.4 10E3/UL (ref 0–1.3)
MONOCYTES NFR BLD AUTO: 11 %
NEUTROPHILS # BLD AUTO: 2.4 10E3/UL (ref 1.6–8.3)
NEUTROPHILS NFR BLD AUTO: 60 %
NRBC # BLD AUTO: 0 10E3/UL
NRBC BLD AUTO-RTO: 0 /100
PLATELET # BLD AUTO: 175 10E3/UL (ref 150–450)
POTASSIUM SERPL-SCNC: 4.6 MMOL/L (ref 3.4–5.3)
PROT SERPL-MCNC: 6.7 G/DL (ref 6.4–8.3)
RBC # BLD AUTO: 3.1 10E6/UL (ref 3.8–5.2)
SODIUM SERPL-SCNC: 132 MMOL/L (ref 136–145)
WBC # BLD AUTO: 4 10E3/UL (ref 4–11)

## 2023-06-14 PROCEDURE — 74177 CT ABD & PELVIS W/CONTRAST: CPT | Mod: MG | Performed by: STUDENT IN AN ORGANIZED HEALTH CARE EDUCATION/TRAINING PROGRAM

## 2023-06-14 PROCEDURE — G1010 CDSM STANSON: HCPCS | Performed by: STUDENT IN AN ORGANIZED HEALTH CARE EDUCATION/TRAINING PROGRAM

## 2023-06-14 PROCEDURE — 85025 COMPLETE CBC W/AUTO DIFF WBC: CPT

## 2023-06-14 PROCEDURE — 80053 COMPREHEN METABOLIC PANEL: CPT

## 2023-06-14 PROCEDURE — 71260 CT THORAX DX C+: CPT | Mod: MG | Performed by: STUDENT IN AN ORGANIZED HEALTH CARE EDUCATION/TRAINING PROGRAM

## 2023-06-14 PROCEDURE — 250N000011 HC RX IP 250 OP 636: Performed by: INTERNAL MEDICINE

## 2023-06-14 PROCEDURE — 36591 DRAW BLOOD OFF VENOUS DEVICE: CPT

## 2023-06-14 RX ORDER — IOPAMIDOL 755 MG/ML
61 INJECTION, SOLUTION INTRAVASCULAR ONCE
Status: COMPLETED | OUTPATIENT
Start: 2023-06-14 | End: 2023-06-14

## 2023-06-14 RX ORDER — HEPARIN SODIUM (PORCINE) LOCK FLUSH IV SOLN 100 UNIT/ML 100 UNIT/ML
5 SOLUTION INTRAVENOUS ONCE
Status: COMPLETED | OUTPATIENT
Start: 2023-06-14 | End: 2023-06-14

## 2023-06-14 RX ORDER — HEPARIN SODIUM (PORCINE) LOCK FLUSH IV SOLN 100 UNIT/ML 100 UNIT/ML
500 SOLUTION INTRAVENOUS ONCE
Status: COMPLETED | OUTPATIENT
Start: 2023-06-14 | End: 2023-06-14

## 2023-06-14 RX ADMIN — Medication 5 ML: at 08:56

## 2023-06-14 RX ADMIN — HEPARIN SODIUM (PORCINE) LOCK FLUSH IV SOLN 100 UNIT/ML 500 UNITS: 100 SOLUTION at 09:38

## 2023-06-14 RX ADMIN — IOPAMIDOL 61 ML: 755 INJECTION, SOLUTION INTRAVASCULAR at 09:31

## 2023-06-19 ENCOUNTER — ONCOLOGY VISIT (OUTPATIENT)
Dept: ONCOLOGY | Facility: CLINIC | Age: 68
End: 2023-06-19
Attending: INTERNAL MEDICINE
Payer: MEDICARE

## 2023-06-19 VITALS
SYSTOLIC BLOOD PRESSURE: 134 MMHG | WEIGHT: 112.5 LBS | OXYGEN SATURATION: 98 % | RESPIRATION RATE: 16 BRPM | BODY MASS INDEX: 19.02 KG/M2 | DIASTOLIC BLOOD PRESSURE: 79 MMHG | HEART RATE: 86 BPM | TEMPERATURE: 98.4 F

## 2023-06-19 DIAGNOSIS — C25.0 MALIGNANT NEOPLASM OF HEAD OF PANCREAS (H): Primary | ICD-10-CM

## 2023-06-19 PROCEDURE — G0463 HOSPITAL OUTPT CLINIC VISIT: HCPCS | Performed by: INTERNAL MEDICINE

## 2023-06-19 PROCEDURE — 99215 OFFICE O/P EST HI 40 MIN: CPT | Performed by: INTERNAL MEDICINE

## 2023-06-19 ASSESSMENT — PAIN SCALES - GENERAL: PAINLEVEL: NO PAIN (0)

## 2023-06-19 NOTE — LETTER
6/19/2023         RE: Emelia Weathers  [address]  [facility] [address]        Dear Colleague,    Thank you for referring your patient, Emelia Weathers, to the [facility]. Please see a copy of my visit note below.        Tiana Weathers returns today for ongoing follow-up of locally recurrent pancreatic cancer.    She is 67 years old and had a Whipple many years ago with a subsequent biopsy-proven recurrence in the resection bed encasing her SMA almost 7 years ago.  She has a known germline Chek mutation.  She had an excellent response of her recurrent disease to FOLFIRINOX and now is on maintenance single agent Xeloda for several years.  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  She returns today for her next planned response assessment.    Since we saw her last she tells me things overall are about the same and she continues on with vigorous physical activity.  She is being evaluated by both rheumatology and dermatology because of her contractures, skin changes and dry eyes.  I do not have a precise diagnosis the consensus was that she does have an autoimmune process and has been started on hydroxychloroquine.  She is not sure if she is seeing any changes with that yet.    On exam today she is her usual cheerful and energetic self.  She is quite slender as always.  She has minimal conjunctival injection.  Her significant contractures in her hand do not appear much different to me and she has her usual peeling and cracking.    I personally reviewed her CT scan which has no clear-cut changes but there is a small subtle lesion in the liver of uncertain nature.  I do not see any clearly represents metastatic disease and nothing to suggest progression of her local recurrence.    Her lab work shows unremarkable electrolytes and renal function.  Her albumin, bilirubin and liver enzymes  are normal.  She has stable normocytic anemia and otherwise unremarkable blood counts.  She does not have an informative tumor marker.    Assessment/plan: Recurrent pancreatic cancer with stable disease on maintenance Xeloda for many years now.  The lesion in her liver is of some concern and will be getting an MR scan to clarify its nature.  If that does not clearly represent metastasis on the MR scan we will plan on continuing with her current Xeloda and following up again in a couple of months.  She will continue on with her current management for her possible autoimmune disease as recommended by rheumatology and dermatology.    Addendum: Her MR scan identifies a few indeterminant enhancing liver lesions of uncertain significance 1 of which corresponds to what we saw on the CT scan.  We will plan on a follow-up visit with imaging and labs in about 2 months.          Again, thank you for allowing me to participate in the care of your patient.        Sincerely,        Yasmany Wade MD

## 2023-06-19 NOTE — PROGRESS NOTES
Tiana Weathers returns today for ongoing follow-up of locally recurrent pancreatic cancer.    She is 67 years old and had a Whipple many years ago with a subsequent biopsy-proven recurrence in the resection bed encasing her SMA almost 7 years ago.  She has a known germline Chek mutation.  She had an excellent response of her recurrent disease to FOLFIRINOX and now is on maintenance single agent Xeloda for several years.  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  She returns today for her next planned response assessment.    Since we saw her last she tells me things overall are about the same and she continues on with vigorous physical activity.  She is being evaluated by both rheumatology and dermatology because of her contractures, skin changes and dry eyes.  I do not have a precise diagnosis the consensus was that she does have an autoimmune process and has been started on hydroxychloroquine.  She is not sure if she is seeing any changes with that yet.    On exam today she is her usual cheerful and energetic self.  She is quite slender as always.  She has minimal conjunctival injection.  Her significant contractures in her hand do not appear much different to me and she has her usual peeling and cracking.    I personally reviewed her CT scan which has no clear-cut changes but there is a small subtle lesion in the liver of uncertain nature.  I do not see any clearly represents metastatic disease and nothing to suggest progression of her local recurrence.    Her lab work shows unremarkable electrolytes and renal function.  Her albumin, bilirubin and liver enzymes are normal.  She has stable normocytic anemia and otherwise unremarkable blood counts.  She does not have an informative tumor marker.    Assessment/plan: Recurrent pancreatic cancer with stable disease on maintenance Xeloda for many years now.  The  lesion in her liver is of some concern and will be getting an MR scan to clarify its nature.  If that does not clearly represent metastasis on the MR scan we will plan on continuing with her current Xeloda and following up again in a couple of months.  She will continue on with her current management for her possible autoimmune disease as recommended by rheumatology and dermatology.    Addendum: Her MR scan identifies a few indeterminant enhancing liver lesions of uncertain significance 1 of which corresponds to what we saw on the CT scan.  We will plan on a follow-up visit with imaging and labs in about 2 months.

## 2023-06-19 NOTE — NURSING NOTE
"Oncology Rooming Note    June 19, 2023 5:00 PM   Emelia Weathers is a 67 year old female who presents for:    Chief Complaint   Patient presents with     Oncology Clinic Visit     RTN for Malignant of Neoplasm of Head     Initial Vitals: Blood Pressure 134/79   Pulse 86   Temperature 98.4  F (36.9  C) (Oral)   Respiration 16   Weight 51 kg (112 lb 8 oz)   Oxygen Saturation 98%   Body Mass Index 19.02 kg/m   Estimated body mass index is 19.02 kg/m  as calculated from the following:    Height as of 2/20/23: 1.638 m (5' 4.49\").    Weight as of this encounter: 51 kg (112 lb 8 oz). Body surface area is 1.52 meters squared.  No Pain (0) Comment: Data Unavailable   No LMP recorded. Patient is postmenopausal.  Allergies reviewed: Yes  Medications reviewed: Yes    Medications: Medication refills not needed today.  Pharmacy name entered into EPIC:    [facility] - [place], [place] - [address] [facility] - [place] [phone]  [facility] - [place], [place] - [address] [place] [id number] [place] [facility] - [place], [place] - [address]    Clinical concerns: none       Tierney Guadalupe MA            "

## 2023-06-19 NOTE — LETTER
6/19/2023         RE: Emelia Weathers  [address]  [facility] [address]          Tiana Weathers returns today for ongoing follow-up of locally recurrent pancreatic cancer.    She is 67 years old and had a Whipple many years ago with a subsequent biopsy-proven recurrence in the resection bed encasing her SMA almost 7 years ago.  She has a known germline Chek mutation.  She had an excellent response of her recurrent disease to FOLFIRINOX and now is on maintenance single agent Xeloda for several years.  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  She returns today for her next planned response assessment.    Since we saw her last she tells me things overall are about the same and she continues on with vigorous physical activity.  She is being evaluated by both rheumatology and dermatology because of her contractures, skin changes and dry eyes.  I do not have a precise diagnosis the consensus was that she does have an autoimmune process and has been started on hydroxychloroquine.  She is not sure if she is seeing any changes with that yet.    On exam today she is her usual cheerful and energetic self.  She is quite slender as always.  She has minimal conjunctival injection.  Her significant contractures in her hand do not appear much different to me and she has her usual peeling and cracking.    I personally reviewed her CT scan which has no clear-cut changes but there is a small subtle lesion in the liver of uncertain nature.  I do not see any clearly represents metastatic disease and nothing to suggest progression of her local recurrence.    Her lab work shows unremarkable electrolytes and renal function.  Her albumin, bilirubin and liver enzymes are normal.  She has stable normocytic anemia and otherwise unremarkable blood counts.  She does not have an informative tumor marker.    Assessment/plan: Recurrent  pancreatic cancer with stable disease on maintenance Xeloda for many years now.  The lesion in her liver is of some concern and will be getting an MR scan to clarify its nature.  If that does not clearly represent metastasis on the MR scan we will plan on continuing with her current Xeloda and following up again in a couple of months.  She will continue on with her current management for her possible autoimmune disease as recommended by rheumatology and dermatology.    Addendum: Her MR scan identifies a few indeterminant enhancing liver lesions of uncertain significance 1 of which corresponds to what we saw on the CT scan.  We will plan on a follow-up visit with imaging and labs in about 2 months.            Yasmany Wade MD

## 2023-06-21 ENCOUNTER — HOSPITAL ENCOUNTER (OUTPATIENT)
Dept: MRI IMAGING | Facility: CLINIC | Age: 68
Discharge: HOME | End: 2023-06-21
Attending: INTERNAL MEDICINE | Admitting: INTERNAL MEDICINE
Payer: MEDICARE

## 2023-06-21 DIAGNOSIS — C25.0 MALIGNANT NEOPLASM OF HEAD OF PANCREAS (H): ICD-10-CM

## 2023-06-21 PROCEDURE — A9585 GADOBUTROL INJECTION: HCPCS | Performed by: INTERNAL MEDICINE

## 2023-06-21 PROCEDURE — 255N000002 HC RX 255 OP 636: Performed by: INTERNAL MEDICINE

## 2023-06-21 PROCEDURE — 258N000003 HC RX IP 258 OP 636: Performed by: INTERNAL MEDICINE

## 2023-06-21 PROCEDURE — 74183 MRI ABD W/O CNTR FLWD CNTR: CPT | Mod: MG

## 2023-06-21 RX ORDER — GADOBUTROL 604.72 MG/ML
5 INJECTION INTRAVENOUS ONCE
Status: COMPLETED | OUTPATIENT
Start: 2023-06-21 | End: 2023-06-21

## 2023-06-21 RX ADMIN — GADOBUTROL 5 ML: 604.72 INJECTION INTRAVENOUS at 07:12

## 2023-06-21 RX ADMIN — SODIUM CHLORIDE 50 ML: 9 INJECTION, SOLUTION INTRAVENOUS at 07:20

## 2023-06-22 ENCOUNTER — PATIENT OUTREACH (OUTPATIENT)
Dept: ONCOLOGY | Facility: CLINIC | Age: 68
End: 2023-06-22
Payer: MEDICARE

## 2023-06-22 NOTE — PROGRESS NOTES
[facility]: Cancer Care                                                                                          Received call from patient with question about recent MRI. RNCC reviewed with Dr Wade who reviewed MRI. Does not feel new liver modules are big enough to biopsy at this time. Would like to continue to watch and reimage with MRI abdomen and chest CT in 2 months. Patient to continue with Xeloda. Placed return call to patient. Reviewed above. Patient voiced understanding. Answered all questions to her stated satisfaction.          Cinthia Contreras RN, BSN, OCN   RN Care Coordinator   [facility]

## 2023-06-28 DIAGNOSIS — C25.0 MALIGNANT NEOPLASM OF HEAD OF PANCREAS (H): Primary | ICD-10-CM

## 2023-07-02 ENCOUNTER — HEALTH MAINTENANCE LETTER (OUTPATIENT)
Age: 68
End: 2023-07-02

## 2023-07-03 ENCOUNTER — MYC MEDICAL ADVICE (OUTPATIENT)
Dept: FAMILY MEDICINE | Facility: CLINIC | Age: 68
End: 2023-07-03
Payer: MEDICARE

## 2023-07-03 DIAGNOSIS — Z13.6 SCREENING FOR CARDIOVASCULAR CONDITION: ICD-10-CM

## 2023-07-03 DIAGNOSIS — E16.9: Primary | ICD-10-CM

## 2023-07-03 DIAGNOSIS — R73.03 PRE-DIABETES: ICD-10-CM

## 2023-07-05 DIAGNOSIS — C25.0 MALIGNANT NEOPLASM OF HEAD OF PANCREAS (H): ICD-10-CM

## 2023-07-05 NOTE — TELEPHONE ENCOUNTER
JLUIS  Refill   Last prescribing provider:  Dr Wade     Last clinic visit date: 6/19/23 Dr Wade     Recommendations for requested medication (if none, N/A): N/A    Any other pertinent information (if none, N/A): N/A    Refilled: Y/N, if NO, why?

## 2023-07-14 ENCOUNTER — LAB (OUTPATIENT)
Dept: LAB | Facility: CLINIC | Age: 68
End: 2023-07-14
Attending: INTERNAL MEDICINE
Payer: MEDICARE

## 2023-07-14 DIAGNOSIS — Z13.6 SCREENING FOR CARDIOVASCULAR CONDITION: ICD-10-CM

## 2023-07-14 DIAGNOSIS — E16.9: ICD-10-CM

## 2023-07-14 DIAGNOSIS — R73.03 PRE-DIABETES: ICD-10-CM

## 2023-07-14 DIAGNOSIS — C25.0 MALIGNANT NEOPLASM OF HEAD OF PANCREAS (H): ICD-10-CM

## 2023-07-14 LAB
ALBUMIN SERPL BCG-MCNC: 4.1 G/DL (ref 3.5–5.2)
ALP SERPL-CCNC: 99 U/L (ref 35–104)
ALT SERPL W P-5'-P-CCNC: 17 U/L (ref 0–50)
ANION GAP SERPL CALCULATED.3IONS-SCNC: 9 MMOL/L (ref 7–15)
AST SERPL W P-5'-P-CCNC: 27 U/L (ref 0–45)
BASOPHILS # BLD AUTO: 0 10E3/UL (ref 0–0.2)
BASOPHILS NFR BLD AUTO: 0 %
BILIRUB SERPL-MCNC: 0.4 MG/DL
BUN SERPL-MCNC: 11 MG/DL (ref 8–23)
CALCIUM SERPL-MCNC: 8.8 MG/DL (ref 8.8–10.2)
CHLORIDE SERPL-SCNC: 107 MMOL/L (ref 98–107)
CHOLEST SERPL-MCNC: 123 MG/DL
CREAT SERPL-MCNC: 0.72 MG/DL (ref 0.51–0.95)
CREAT UR-MCNC: 152 MG/DL
DEPRECATED HCO3 PLAS-SCNC: 25 MMOL/L (ref 22–29)
EOSINOPHIL # BLD AUTO: 0.1 10E3/UL (ref 0–0.7)
EOSINOPHIL NFR BLD AUTO: 2 %
ERYTHROCYTE [DISTWIDTH] IN BLOOD BY AUTOMATED COUNT: 21.8 % (ref 10–15)
GFR SERPL CREATININE-BSD FRML MDRD: 90 ML/MIN/1.73M2
GLUCOSE SERPL-MCNC: 109 MG/DL (ref 70–99)
HBA1C MFR BLD: 6.2 %
HCT VFR BLD AUTO: 30 % (ref 35–47)
HDLC SERPL-MCNC: 56 MG/DL
HGB BLD-MCNC: 9.8 G/DL (ref 11.7–15.7)
IMM GRANULOCYTES # BLD: 0 10E3/UL
IMM GRANULOCYTES NFR BLD: 0 %
LDLC SERPL CALC-MCNC: 53 MG/DL
LYMPHOCYTES # BLD AUTO: 1.1 10E3/UL (ref 0.8–5.3)
LYMPHOCYTES NFR BLD AUTO: 26 %
MCH RBC QN AUTO: 32.1 PG (ref 26.5–33)
MCHC RBC AUTO-ENTMCNC: 32.7 G/DL (ref 31.5–36.5)
MCV RBC AUTO: 98 FL (ref 78–100)
MICROALBUMIN UR-MCNC: 26.1 MG/L
MICROALBUMIN/CREAT UR: 17.17 MG/G CR (ref 0–25)
MONOCYTES # BLD AUTO: 0.5 10E3/UL (ref 0–1.3)
MONOCYTES NFR BLD AUTO: 13 %
NEUTROPHILS # BLD AUTO: 2.3 10E3/UL (ref 1.6–8.3)
NEUTROPHILS NFR BLD AUTO: 59 %
NONHDLC SERPL-MCNC: 67 MG/DL
NRBC # BLD AUTO: 0 10E3/UL
NRBC BLD AUTO-RTO: 0 /100
PLATELET # BLD AUTO: 212 10E3/UL (ref 150–450)
POTASSIUM SERPL-SCNC: 4.1 MMOL/L (ref 3.4–5.3)
PROT SERPL-MCNC: 6.9 G/DL (ref 6.4–8.3)
RBC # BLD AUTO: 3.05 10E6/UL (ref 3.8–5.2)
SODIUM SERPL-SCNC: 141 MMOL/L (ref 136–145)
TRIGL SERPL-MCNC: 72 MG/DL
WBC # BLD AUTO: 4 10E3/UL (ref 4–11)

## 2023-07-14 PROCEDURE — 36415 COLL VENOUS BLD VENIPUNCTURE: CPT

## 2023-07-14 PROCEDURE — 80061 LIPID PANEL: CPT

## 2023-07-14 PROCEDURE — 36591 DRAW BLOOD OFF VENOUS DEVICE: CPT

## 2023-07-14 PROCEDURE — 82570 ASSAY OF URINE CREATININE: CPT

## 2023-07-14 PROCEDURE — 85025 COMPLETE CBC W/AUTO DIFF WBC: CPT

## 2023-07-14 PROCEDURE — 80053 COMPREHEN METABOLIC PANEL: CPT

## 2023-07-14 PROCEDURE — 83036 HEMOGLOBIN GLYCOSYLATED A1C: CPT

## 2023-07-17 ENCOUNTER — MYC MEDICAL ADVICE (OUTPATIENT)
Dept: ONCOLOGY | Facility: CLINIC | Age: 68
End: 2023-07-17
Payer: MEDICARE

## 2023-07-17 NOTE — TELEPHONE ENCOUNTER
Oral Chemotherapy Monitoring Program  Lab Follow Up    Reviewed lab results from 7/14/23.        4/1/2022     9:00 AM 6/24/2022    11:00 AM 9/15/2022    10:00 AM 12/5/2022     8:00 AM 2/27/2023     3:00 PM 5/24/2023     3:00 PM 7/17/2023    11:00 AM   ORAL CHEMOTHERAPY   Assessment Type Refill Refill Refill Refill Refill Refill Lab Monitoring   Diagnosis Code Pancreatic Cancer Pancreatic Cancer Pancreatic Cancer Pancreatic Cancer Pancreatic Cancer Pancreatic Cancer Pancreatic Cancer   Providers Dr. Mauro Wade   [facility]   Is this patient followed by the [facility] OC team?      Yes    Drug Name Xeloda (capecitabine) Xeloda (capecitabine) Xeloda (capecitabine) Xeloda (capecitabine) Xeloda (capecitabine) Xeloda (capecitabine) Xeloda (capecitabine)   Dose Other: Other: Other: Other: Other: Other: Other:   Current Schedule BID BID BID BID BID BID BID   Cycle Details 2 weeks on, 1 week off 2 weeks on, 1 week off 2 weeks on, 1 week off 2 weeks on, 1 week off 2 weeks on, 1 week off 2 weeks on, 1 week off 2 weeks on, 1 week off       Labs:  _  Result Component Current Result Ref Range   Sodium 141 (7/14/2023) 136 - 145 mmol/L     _  Result Component Current Result Ref Range   Potassium 4.1 (7/14/2023) 3.4 - 5.3 mmol/L     _  Result Component Current Result Ref Range   Calcium 8.8 (7/14/2023) 8.8 - 10.2 mg/dL     No results found for Mag within last 30 days.     No results found for Phos within last 30 days.     _  Result Component Current Result Ref Range   Albumin 4.1 (7/14/2023) 3.5 - 5.2 g/dL     _  Result Component Current Result Ref Range   Urea Nitrogen 11.0 (7/14/2023) 8.0 - 23.0 mg/dL     _  Result Component Current Result Ref Range   Creatinine 0.72 (7/14/2023) 0.51 - 0.95 mg/dL     _  Result Component Current Result Ref Range   AST 27 (7/14/2023) 0 - 45 U/L     _  Result Component Current  Result Ref Range   ALT 17 (7/14/2023) 0 - 50 U/L     _  Result Component Current Result Ref Range   Bilirubin Total 0.4 (7/14/2023) <=1.2 mg/dL     _  Result Component Current Result Ref Range   WBC Count 4.0 (7/14/2023) 4.0 - 11.0 10e3/uL     _  Result Component Current Result Ref Range   Hemoglobin 9.8 (L) (7/14/2023) 11.7 - 15.7 g/dL     _  Result Component Current Result Ref Range   Platelet Count 212 (7/14/2023) 150 - 450 10e3/uL     No results found for ANC within last 30 days.     _  Result Component Current Result Ref Range   Absolute Neutrophils 2.3 (7/14/2023) 1.6 - 8.3 10e3/uL        Assessment & Plan:  Results of the CBC and CMP show no concerning abnormalities.  Continue Xeloda therapy as planned.  [employer] message sent to patient.     Follow-Up:  8/12: Labs  8/21: Appt with Dr. Mauro Bran, PharmD  Hematology/Oncology Clinical Pharmacist  [facility]  [phone]

## 2023-08-12 ENCOUNTER — ANCILLARY PROCEDURE (OUTPATIENT)
Dept: MRI IMAGING | Facility: CLINIC | Age: 68
End: 2023-08-12
Attending: INTERNAL MEDICINE
Payer: MEDICARE

## 2023-08-12 ENCOUNTER — LAB (OUTPATIENT)
Dept: LAB | Facility: CLINIC | Age: 68
End: 2023-08-12
Attending: INTERNAL MEDICINE
Payer: MEDICARE

## 2023-08-12 ENCOUNTER — ANCILLARY PROCEDURE (OUTPATIENT)
Dept: CT IMAGING | Facility: CLINIC | Age: 68
End: 2023-08-12
Attending: INTERNAL MEDICINE
Payer: MEDICARE

## 2023-08-12 DIAGNOSIS — C25.0 MALIGNANT NEOPLASM OF HEAD OF PANCREAS (H): ICD-10-CM

## 2023-08-12 LAB
ALBUMIN SERPL BCG-MCNC: 4.1 G/DL (ref 3.5–5.2)
ALP SERPL-CCNC: 87 U/L (ref 35–104)
ALT SERPL W P-5'-P-CCNC: 16 U/L (ref 0–50)
ANION GAP SERPL CALCULATED.3IONS-SCNC: 9 MMOL/L (ref 7–15)
AST SERPL W P-5'-P-CCNC: 30 U/L (ref 0–45)
BASOPHILS # BLD AUTO: 0 10E3/UL (ref 0–0.2)
BASOPHILS NFR BLD AUTO: 1 %
BILIRUB SERPL-MCNC: 0.5 MG/DL
BUN SERPL-MCNC: 9.3 MG/DL (ref 8–23)
CALCIUM SERPL-MCNC: 8.9 MG/DL (ref 8.8–10.2)
CHLORIDE SERPL-SCNC: 96 MMOL/L (ref 98–107)
CREAT SERPL-MCNC: 0.69 MG/DL (ref 0.51–0.95)
DEPRECATED HCO3 PLAS-SCNC: 25 MMOL/L (ref 22–29)
EOSINOPHIL # BLD AUTO: 0 10E3/UL (ref 0–0.7)
EOSINOPHIL NFR BLD AUTO: 1 %
ERYTHROCYTE [DISTWIDTH] IN BLOOD BY AUTOMATED COUNT: 21.4 % (ref 10–15)
GFR SERPL CREATININE-BSD FRML MDRD: >90 ML/MIN/1.73M2
GLUCOSE SERPL-MCNC: 102 MG/DL (ref 70–99)
HCT VFR BLD AUTO: 28.9 % (ref 35–47)
HGB BLD-MCNC: 9.5 G/DL (ref 11.7–15.7)
IMM GRANULOCYTES # BLD: 0 10E3/UL
IMM GRANULOCYTES NFR BLD: 0 %
LYMPHOCYTES # BLD AUTO: 0.8 10E3/UL (ref 0.8–5.3)
LYMPHOCYTES NFR BLD AUTO: 18 %
MCH RBC QN AUTO: 31.4 PG (ref 26.5–33)
MCHC RBC AUTO-ENTMCNC: 32.9 G/DL (ref 31.5–36.5)
MCV RBC AUTO: 95 FL (ref 78–100)
MONOCYTES # BLD AUTO: 0.5 10E3/UL (ref 0–1.3)
MONOCYTES NFR BLD AUTO: 11 %
NEUTROPHILS # BLD AUTO: 3.1 10E3/UL (ref 1.6–8.3)
NEUTROPHILS NFR BLD AUTO: 69 %
NRBC # BLD AUTO: 0 10E3/UL
NRBC BLD AUTO-RTO: 0 /100
PLATELET # BLD AUTO: 176 10E3/UL (ref 150–450)
POTASSIUM SERPL-SCNC: 4.5 MMOL/L (ref 3.4–5.3)
PROT SERPL-MCNC: 6.5 G/DL (ref 6.4–8.3)
RBC # BLD AUTO: 3.03 10E6/UL (ref 3.8–5.2)
SODIUM SERPL-SCNC: 130 MMOL/L (ref 136–145)
WBC # BLD AUTO: 4.5 10E3/UL (ref 4–11)

## 2023-08-12 PROCEDURE — G1010 CDSM STANSON: HCPCS | Mod: GC | Performed by: RADIOLOGY

## 2023-08-12 PROCEDURE — 80053 COMPREHEN METABOLIC PANEL: CPT

## 2023-08-12 PROCEDURE — G1010 CDSM STANSON: HCPCS | Performed by: RADIOLOGY

## 2023-08-12 PROCEDURE — 86301 IMMUNOASSAY TUMOR CA 19-9: CPT

## 2023-08-12 PROCEDURE — 250N000011 HC RX IP 250 OP 636: Mod: JZ | Performed by: INTERNAL MEDICINE

## 2023-08-12 PROCEDURE — A9585 GADOBUTROL INJECTION: HCPCS | Mod: JZ | Performed by: RADIOLOGY

## 2023-08-12 PROCEDURE — 71260 CT THORAX DX C+: CPT | Mod: MG | Performed by: RADIOLOGY

## 2023-08-12 PROCEDURE — 36591 DRAW BLOOD OFF VENOUS DEVICE: CPT

## 2023-08-12 PROCEDURE — 74183 MRI ABD W/O CNTR FLWD CNTR: CPT | Mod: MG | Performed by: RADIOLOGY

## 2023-08-12 PROCEDURE — 85025 COMPLETE CBC W/AUTO DIFF WBC: CPT

## 2023-08-12 RX ORDER — GADOBUTROL 604.72 MG/ML
7.5 INJECTION INTRAVENOUS ONCE
Status: COMPLETED | OUTPATIENT
Start: 2023-08-12 | End: 2023-08-12

## 2023-08-12 RX ORDER — HEPARIN SODIUM (PORCINE) LOCK FLUSH IV SOLN 100 UNIT/ML 100 UNIT/ML
500 SOLUTION INTRAVENOUS ONCE
Status: COMPLETED | OUTPATIENT
Start: 2023-08-12 | End: 2023-08-12

## 2023-08-12 RX ORDER — IOPAMIDOL 755 MG/ML
55 INJECTION, SOLUTION INTRAVASCULAR ONCE
Status: COMPLETED | OUTPATIENT
Start: 2023-08-12 | End: 2023-08-12

## 2023-08-12 RX ADMIN — Medication 500 UNITS: at 09:21

## 2023-08-12 RX ADMIN — IOPAMIDOL 55 ML: 755 INJECTION, SOLUTION INTRAVASCULAR at 09:53

## 2023-08-12 RX ADMIN — GADOBUTROL 7.5 ML: 604.72 INJECTION INTRAVENOUS at 10:11

## 2023-08-12 NOTE — DISCHARGE INSTRUCTIONS

## 2023-08-12 NOTE — DISCHARGE INSTRUCTIONS

## 2023-08-12 NOTE — NURSING NOTE
"Chief Complaint   Patient presents with    Port Draw     Labs drawn via port by RN in lab       Port accessed with 20 gauge 3/4\" Power needle by RN, labs collected, line flushed with saline and heparin.      Thais Godwin RN    "

## 2023-08-14 DIAGNOSIS — C25.0 MALIGNANT NEOPLASM OF HEAD OF PANCREAS (H): Primary | ICD-10-CM

## 2023-08-14 RX ORDER — CAPECITABINE 500 MG/1
TABLET, FILM COATED ORAL
Qty: 42 TABLET | Refills: 3 | Status: SHIPPED | OUTPATIENT
Start: 2023-08-14 | End: 2023-11-14

## 2023-08-15 LAB — CANCER AG19-9 SERPL IA-ACNC: <2 U/ML

## 2023-08-18 ENCOUNTER — OFFICE VISIT (OUTPATIENT)
Dept: DERMATOLOGY | Facility: CLINIC | Age: 68
End: 2023-08-18
Payer: MEDICARE

## 2023-08-18 DIAGNOSIS — L57.0 ACTINIC KERATOSIS: ICD-10-CM

## 2023-08-18 DIAGNOSIS — M34.9 SYSTEMIC SCLEROSIS (H): Primary | ICD-10-CM

## 2023-08-18 DIAGNOSIS — M34.9 PULMONARY HYPERTENSION SECONDARY TO SCLERODERMA (H): ICD-10-CM

## 2023-08-18 DIAGNOSIS — D48.5 NEOPLASM OF UNCERTAIN BEHAVIOR OF SKIN: ICD-10-CM

## 2023-08-18 DIAGNOSIS — I27.29 PULMONARY HYPERTENSION SECONDARY TO SCLERODERMA (H): ICD-10-CM

## 2023-08-18 PROCEDURE — 88305 TISSUE EXAM BY PATHOLOGIST: CPT | Mod: 26 | Performed by: DERMATOLOGY

## 2023-08-18 PROCEDURE — 17003 DESTRUCT PREMALG LES 2-14: CPT | Performed by: DERMATOLOGY

## 2023-08-18 PROCEDURE — 17000 DESTRUCT PREMALG LESION: CPT | Mod: XS | Performed by: DERMATOLOGY

## 2023-08-18 PROCEDURE — 88342 IMHCHEM/IMCYTCHM 1ST ANTB: CPT | Mod: TC | Performed by: DERMATOLOGY

## 2023-08-18 PROCEDURE — 88342 IMHCHEM/IMCYTCHM 1ST ANTB: CPT | Mod: 26 | Performed by: DERMATOLOGY

## 2023-08-18 PROCEDURE — 11102 TANGNTL BX SKIN SINGLE LES: CPT | Performed by: DERMATOLOGY

## 2023-08-18 PROCEDURE — 99214 OFFICE O/P EST MOD 30 MIN: CPT | Mod: 25 | Performed by: DERMATOLOGY

## 2023-08-18 PROCEDURE — 11103 TANGNTL BX SKIN EA SEP/ADDL: CPT | Performed by: DERMATOLOGY

## 2023-08-18 RX ORDER — LIDOCAINE HYDROCHLORIDE AND EPINEPHRINE 10; 10 MG/ML; UG/ML
3 INJECTION, SOLUTION INFILTRATION; PERINEURAL ONCE
Status: ACTIVE | OUTPATIENT
Start: 2023-08-18

## 2023-08-18 ASSESSMENT — PAIN SCALES - GENERAL: PAINLEVEL: NO PAIN (0)

## 2023-08-18 NOTE — LETTER
8/18/2023       RE: Emelia Weathers  [address]  [facility] [address]     Dear Colleague,    Thank you for referring your patient, Emelia Weathers, to the [facility] [place] at [facility]. Please see a copy of my visit note below.    [facility] Dermatology Note    Encounter Date: Aug 18, 2023    Dermatology Problem List:  1. 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 Pancreatic cancer: on capecitabine ~10 years  - recurrent hand-foot syndrome  - augmented betamethasone ointment     3. Actinic keratoses: s/p cryotherapy    ______________________________________    Impression/Plan:  1. Lesions on the left nasal bridge and right nasal tip: concern for cutaneous malignancy; shave biopsy x2 today.  - After discussion of benefits and risks including but not limited to bleeding, infection, scar, incomplete removal, recurrence, and non-diagnostic biopsy, written consent and photographs were obtained. The area was cleaned with isopropyl alcohol. 3.0mL of 1% lidocaine with epinephrine was injected to obtain adequate anesthesia of the lesions on the left nasal bridge and right nasal tip. 2 shave biopsies were performed. Hemostasis was achieved with aluminium chloride. Petrolatum ointment and a sterile dressing were applied. The patient tolerated the procedure and no complications were noted. The patient was provided with verbal and written post care instructions.      2. Actinic keratoses: x7 on the face, cryotherapy.  - Cryotherapy procedure note: After verbal consent and discussion of risks and benefits including, but not limited to, dyspigmentation/scar, blister, and pain, 7 was(were) treated with 1-2 mm freeze border for 1-2 cycles with liquid nitrogen. Post cryotherapy instructions were provided.     3. Systemic sclerosis: suspect may be related to chemotherapy regimen in absence of antibodies; prior SANDY positive but recent repeat negative. Has concomitant hand-foot reaction from chemotherapy. Given these atypical features favor chemotherapy-induced sclerodermoid reaction however could also consider atypical presentation of idiopathic systemic sclerosis. We discussed uptitration of regimen in light of ongoing sclerodactyly but patient is understandably hesitant in light of metastatic pancreatic cancer with newly observed (though stable) lesion on liver. Would consider methotrexate or mycophenolate as next therapeutic step. In the interim, will continue hydroxychloroquine, hand therapy, topicals.  - get PFTs, TTE  - hydroxychloroquine, topicals  - hand therapy      Follow-up in 4 months.       Staff Involved:  Staff Only    William Stoddard MD   of Dermatology  Department of Dermatology  [facility]      CC:   Chief Complaint   Patient presents with    Derm Problem     Emelia is here for systematic sclerosis follow up. She states she is not seeing changes.        History of Present Illness:  Ms. Emelia Weathers is a 67 year old female who presents as a return patient.    Systemic sclerosis - hands a bit less red - using topicals BID  - on hydroxychloroquine - tolerating well  - flexibility not much changed - not always able to get to hand therapy    Spot on liver - following this - stable on repeat imaging after 2 months  - has follow up on Monday    Labs:  5/2023 SSA/B negative    Physical exam:  Vitals: There were no  vitals taken for this visit.  GEN: This is a well developed, well-nourished female in no acute distress, in a pleasant mood.    SKIN: Pinzon phototype II  - Focused examination of the face, hands was performed.  - sclerodactyly, stable, without digital ulcerations  - erythema and scaling on the palms and digits; interval slight improvement  - 7 erythematous scaly macules on the face  - 2 crusted papules on the left nasal bridge and right nasal tip  - No other lesions of concern on areas examined.     Past Medical History:   Past Medical History:   Diagnosis Date    Cervical high risk HPV (human papillomavirus) test positive 2008    + HPV 83    Diabetes mellitus (H) 6/27/2013    type 2    Diffuse cystic mastopathy     Disorder of bone and cartilage, unspecified     moderate osteopenia of spine    Endometrial hyperplasia, unspecified 9/14/2005    Gastro-oesophageal reflux disease     Heart murmur     Herpes simplex without mention of complication     History of blood transfusion 2014    Related to chemo    Pancreatic cancer (H)     Papanicolaou smear of cervix with low grade squamous intraepithelial lesion (LGSIL) 11/2006    Colpo negative    Post-menopausal     Rosacea      Past Surgical History:   Procedure Laterality Date    BREAST BIOPSY, RT/LT  1994    Breat Biopsy B9    BREAST SURGERY      Rt breast biopsy in 1994    CHOLECYSTECTOMY  9/20/13    Part of Whipple    ENDOSCOPIC RETROGRADE CHOLANGIOPANCREATOGRAM  6/27/2013    Procedure: ENDOSCOPIC RETROGRADE CHOLANGIOPANCREATOGRAM;  EUS with FNA, biliary sphincterotomy and biliary stent placement. ;  Surgeon: Timoteo Maki MD;  Location: [facility] OR    ENDOSCOPIC RETROGRADE CHOLANGIOPANCREATOGRAM  9/3/2013    Procedure: ENDOSCOPIC RETROGRADE CHOLANGIOPANCREATOGRAM;  Endoscopic retrograde cholangiopancreatogram with dilation of bile duct and bile duct stent placement.;  Surgeon: Devon Rubalcava MD;  Location: [facility] OR    ENDOSCOPIC ULTRASOUND UPPER  GASTROINTESTINAL TRACT (GI)  6/27/2013    Procedure: ENDOSCOPIC ULTRASOUND UPPER GASTROINTESTINAL TRACT (GI);  Upper Endoscopy with Ultrasound, Fine Needle Aspiration, Endoscopic Retrograde Cholangiopancreatogram ;  Surgeon: Devon Rubalcava MD;  Location:  OR    ESOPHAGOSCOPY, GASTROSCOPY, DUODENOSCOPY (EGD), COMBINED N/A 10/8/2014    Procedure: COMBINED ENDOSCOPIC ULTRASOUND, ESOPHAGOSCOPY, GASTROSCOPY, DUODENOSCOPY (EGD), FINE NEEDLE ASPIRATE/BIOPSY;  Surgeon: Carson Paulino MD;  Location:  GI    GYN SURGERY      TONSILLECTOMY & ADENOIDECTOMY      WHIPPLE PROCEDURE  9/20/2013    Procedure: WHIPPLE PROCEDURE;  Open Whipple Procedure, Jujunostomy Feeding Tube Placement, Bile Duct Stent and Pancreatic Stent Placement;  Surgeon: Issa John MD;  Location:  OR    [facility] COLONOSCOPY THRU STOMA, DIAGNOSTIC  07/06    due 10 years       Social History:   reports that she quit smoking about 29 years ago. Her smoking use included cigarettes. She has never used smokeless tobacco. She reports that she does not currently use alcohol. She reports that she does not use drugs.    Family History:  Family History   Problem Relation Age of Onset    Osteoporosis Mother     Gastrointestinal Disease Mother         gallbladder removal    Hypertension Mother     Diabetes Father     Hypertension Father     Cancer Father         oral    Alcohol/Drug Father     Other Cancer Father         Oral cancer    Substance Abuse Father     Diabetes Sister     Alcohol/Drug Brother     Hypertension Brother     Diabetes Brother     Depression Brother     Anxiety Disorder Brother     Mental Illness Brother     Substance Abuse Brother     Cancer - colorectal Maternal Grandfather     Other Cancer Maternal Grandfather     Skin Cancer No family hx of     Melanoma No family hx of        Medications:  Current Outpatient Medications   Medication Sig Dispense Refill    acetaminophen (TYLENOL) 325 MG tablet Take 500 mg by mouth every 6 hours  as needed      alcohol swab prep pads Use to swab area of injection/skyla as directed. 100 each 3    alendronate (FOSAMAX) 70 MG tablet Take 1 tablet (70 mg) by mouth every 7 days 12 tablet 3    augmented betamethasone dipropionate (DIPROLENE-AF) 0.05 % external ointment Apply topically 2 times daily 50 g 3    blood glucose (NO BRAND SPECIFIED) test strip Use to test blood sugar 1 times daily or as directed. To accompany: Blood Glucose Monitor Brands: per insurance. 100 strip 6    blood glucose calibration (NO BRAND SPECIFIED) solution To accompany: Blood Glucose Monitor Brands: per insurance. 100 each 1    blood glucose monitoring (NO BRAND SPECIFIED) meter device kit Use to test blood sugar 1 times daily or as directed. Preferred blood glucose meter OR supplies to accompany: Blood Glucose Monitor Brands: per insurance. 1 kit 0    CALCIUM CITRATE + D PO 2 tablets daily      capecitabine (XELODA) 500 MG tablet Take 2 tabs in the AM and 1 tab in the PM, Days 1 through 14, then off for 7 days. Take within 30 mins after meal. 42 tablet 3    capecitabine (XELODA) 500 MG tablet Take 2 tabs in the AM and 1 tab in the PM, Days 1 through 14, then off for 7 days. Take within 30 mins after meal. 42 tablet 3    capecitabine (XELODA) 500 MG tablet Take 2 tabs in the AM and 1 tab in the PM, Days 1 through 14, then off for 7 days. Take within 30 mins after meal. 42 tablet 3    capecitabine (XELODA) 500 MG tablet Take 2 tabs in the AM and 1 tab in the PM, Days 1 through 14, then off for 7 days. Take within 30 mins after meal. 42 tablet 2    cycloSPORINE (RESTASIS) 0.05 % ophthalmic emulsion 1 drop 2 times daily      hydroxychloroquine (PLAQUENIL) 200 MG tablet Take 1 tablet (200 mg) by mouth daily 90 tablet 3    IBUPROFEN PO       lidocaine-prilocaine (EMLA) 2.5-2.5 % external cream Apply topically as needed for moderate pain 30 g 3    lipase-protease-amylase (CREON) 59384-33540-56563 units CPEP TAKE THREE CAPSULES BY MOUTH THREE  TIMES DAILY WITH MEALS 540 capsule 0    lisinopril (ZESTRIL) 10 MG tablet Take 1 tablet (10 mg) by mouth daily 90 tablet 3    LORazepam (ATIVAN) 0.5 MG tablet Take 1 tablet (0.5 mg) by mouth every 4 hours as needed (Anxiety, Nausea/Vomiting or Sleep) 30 tablet 2    MELATONIN PO Take 3 mg by mouth as needed      MULTIVITAMIN TABS   OR 1 TABLET DAILY      pantoprazole (PROTONIX) 40 MG EC tablet Take 1 tablet (40 mg) by mouth daily before breakfast 90 tablet 11    STATIN NOT PRESCRIBED (INTENTIONAL) Please choose reason not prescribed from choices below.      thin (NO BRAND SPECIFIED) lancets Use with lanceting device. To accompany: Blood Glucose Monitor Brands: per insurance. 100 each 6    Ascorbic Acid (VITAMIN C PO) Take 500 mg by mouth daily       No Known Allergies

## 2023-08-18 NOTE — NURSING NOTE
Lidocaine-epinephrine 1-1:245402 % injection   0.5mL once for one use, starting 8/18/2023 ending 8/18/2023,  2mL disp, R-0, injection  Injected by Cortney TOMPKINS CMA

## 2023-08-18 NOTE — NURSING NOTE
Dermatology Rooming Note    Emelia Weathers's goals for this visit include:   Chief Complaint   Patient presents with    Derm Problem     Emelia is here for systematic sclerosis follow up. She states she is not seeing changes.      Rose Hand, visit facilitator

## 2023-08-18 NOTE — PROGRESS NOTES
[facility] Dermatology Note    Encounter Date: Aug 18, 2023    Dermatology Problem List:  1. 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 Pancreatic cancer: on capecitabine ~10 years  - recurrent hand-foot syndrome  - augmented betamethasone ointment     3. Actinic keratoses: s/p cryotherapy    ______________________________________    Impression/Plan:  1. Lesions on the left nasal bridge and right nasal tip: concern for cutaneous malignancy; shave biopsy x2 today.  - After discussion of benefits and risks including but not limited to bleeding, infection, scar, incomplete removal, recurrence, and non-diagnostic biopsy, written consent and photographs were obtained. The area was cleaned with isopropyl alcohol. 3.0mL of 1% lidocaine with epinephrine was injected to obtain adequate anesthesia of the lesions on the left nasal bridge and right nasal tip. 2 shave biopsies were performed. Hemostasis was achieved with aluminium chloride. Petrolatum ointment and a sterile dressing were applied. The patient tolerated the procedure and no complications were noted. The patient was provided with verbal and written post care instructions.     2. Actinic keratoses: x7 on the face, cryotherapy.  - Cryotherapy procedure note: After verbal consent and discussion of risks and benefits including, but not limited to, dyspigmentation/scar, blister, and pain, 7 was(were) treated with 1-2 mm freeze border for 1-2 cycles with liquid nitrogen. Post cryotherapy  instructions were provided.     3. Systemic sclerosis: suspect may be related to chemotherapy regimen in absence of antibodies; prior SANDY positive but recent repeat negative. Has concomitant hand-foot reaction from chemotherapy. Given these atypical features favor chemotherapy-induced sclerodermoid reaction however could also consider atypical presentation of idiopathic systemic sclerosis. We discussed uptitration of regimen in light of ongoing sclerodactyly but patient is understandably hesitant in light of metastatic pancreatic cancer with newly observed (though stable) lesion on liver. Would consider methotrexate or mycophenolate as next therapeutic step. In the interim, will continue hydroxychloroquine, hand therapy, topicals.  - get PFTs, TTE  - hydroxychloroquine, topicals  - hand therapy      Follow-up in 4 months.       Staff Involved:  Staff Only    William Stoddard MD   of Dermatology  Department of Dermatology  [facility]      CC:   Chief Complaint   Patient presents with    Derm Problem     Emelia is here for systematic sclerosis follow up. She states she is not seeing changes.        History of Present Illness:  Ms. Emelia Weathers is a 67 year old female who presents as a return patient.    Systemic sclerosis - hands a bit less red - using topicals BID  - on hydroxychloroquine - tolerating well  - flexibility not much changed - not always able to get to hand therapy    Spot on liver - following this - stable on repeat imaging after 2 months  - has follow up on Monday    Labs:  5/2023 SSA/B negative    Physical exam:  Vitals: There were no vitals taken for this visit.  GEN: This is a well developed, well-nourished female in no acute distress, in a pleasant mood.    SKIN: Pinzon phototype II  - Focused examination of the face, hands was performed.  - sclerodactyly, stable, without digital ulcerations  - erythema and scaling on the palms and digits;  interval slight improvement  - 7 erythematous scaly macules on the face  - 2 crusted papules on the left nasal bridge and right nasal tip  - No other lesions of concern on areas examined.     Past Medical History:   Past Medical History:   Diagnosis Date    Cervical high risk HPV (human papillomavirus) test positive 2008    + HPV 83    Diabetes mellitus (H) 6/27/2013    type 2    Diffuse cystic mastopathy     Disorder of bone and cartilage, unspecified     moderate osteopenia of spine    Endometrial hyperplasia, unspecified 9/14/2005    Gastro-oesophageal reflux disease     Heart murmur     Herpes simplex without mention of complication     History of blood transfusion 2014    Related to chemo    Pancreatic cancer (H)     Papanicolaou smear of cervix with low grade squamous intraepithelial lesion (LGSIL) 11/2006    Colpo negative    Post-menopausal     Rosacea      Past Surgical History:   Procedure Laterality Date    BREAST BIOPSY, RT/LT  1994    Breat Biopsy B9    BREAST SURGERY      Rt breast biopsy in 1994    CHOLECYSTECTOMY  9/20/13    Part of Whipple    ENDOSCOPIC RETROGRADE CHOLANGIOPANCREATOGRAM  6/27/2013    Procedure: ENDOSCOPIC RETROGRADE CHOLANGIOPANCREATOGRAM;  EUS with FNA, biliary sphincterotomy and biliary stent placement. ;  Surgeon: Timoteo Maki MD;  Location: [facility] OR    ENDOSCOPIC RETROGRADE CHOLANGIOPANCREATOGRAM  9/3/2013    Procedure: ENDOSCOPIC RETROGRADE CHOLANGIOPANCREATOGRAM;  Endoscopic retrograde cholangiopancreatogram with dilation of bile duct and bile duct stent placement.;  Surgeon: Devon Rubalcava MD;  Location: [facility] OR    ENDOSCOPIC ULTRASOUND UPPER GASTROINTESTINAL TRACT (GI)  6/27/2013    Procedure: ENDOSCOPIC ULTRASOUND UPPER GASTROINTESTINAL TRACT (GI);  Upper Endoscopy with Ultrasound, Fine Needle Aspiration, Endoscopic Retrograde Cholangiopancreatogram ;  Surgeon: Devon Rubalcava MD;  Location: [facility] OR    ESOPHAGOSCOPY, GASTROSCOPY, DUODENOSCOPY (EGD), COMBINED  N/A 10/8/2014    Procedure: COMBINED ENDOSCOPIC ULTRASOUND, ESOPHAGOSCOPY, GASTROSCOPY, DUODENOSCOPY (EGD), FINE NEEDLE ASPIRATE/BIOPSY;  Surgeon: Carson Paulino MD;  Location:  GI    GYN SURGERY      TONSILLECTOMY & ADENOIDECTOMY      WHIPPLE PROCEDURE  9/20/2013    Procedure: WHIPPLE PROCEDURE;  Open Whipple Procedure, Jujunostomy Feeding Tube Placement, Bile Duct Stent and Pancreatic Stent Placement;  Surgeon: Issa John MD;  Location:  OR    [facility] COLONOSCOPY THRU STOMA, DIAGNOSTIC  07/06    due 10 years       Social History:   reports that she quit smoking about 29 years ago. Her smoking use included cigarettes. She has never used smokeless tobacco. She reports that she does not currently use alcohol. She reports that she does not use drugs.    Family History:  Family History   Problem Relation Age of Onset    Osteoporosis Mother     Gastrointestinal Disease Mother         gallbladder removal    Hypertension Mother     Diabetes Father     Hypertension Father     Cancer Father         oral    Alcohol/Drug Father     Other Cancer Father         Oral cancer    Substance Abuse Father     Diabetes Sister     Alcohol/Drug Brother     Hypertension Brother     Diabetes Brother     Depression Brother     Anxiety Disorder Brother     Mental Illness Brother     Substance Abuse Brother     Cancer - colorectal Maternal Grandfather     Other Cancer Maternal Grandfather     Skin Cancer No family hx of     Melanoma No family hx of        Medications:  Current Outpatient Medications   Medication Sig Dispense Refill    acetaminophen (TYLENOL) 325 MG tablet Take 500 mg by mouth every 6 hours as needed      alcohol swab prep pads Use to swab area of injection/skyla as directed. 100 each 3    alendronate (FOSAMAX) 70 MG tablet Take 1 tablet (70 mg) by mouth every 7 days 12 tablet 3    augmented betamethasone dipropionate (DIPROLENE-AF) 0.05 % external ointment Apply topically 2 times daily 50 g 3    blood glucose  (NO BRAND SPECIFIED) test strip Use to test blood sugar 1 times daily or as directed. To accompany: Blood Glucose Monitor Brands: per insurance. 100 strip 6    blood glucose calibration (NO BRAND SPECIFIED) solution To accompany: Blood Glucose Monitor Brands: per insurance. 100 each 1    blood glucose monitoring (NO BRAND SPECIFIED) meter device kit Use to test blood sugar 1 times daily or as directed. Preferred blood glucose meter OR supplies to accompany: Blood Glucose Monitor Brands: per insurance. 1 kit 0    CALCIUM CITRATE + D PO 2 tablets daily      capecitabine (XELODA) 500 MG tablet Take 2 tabs in the AM and 1 tab in the PM, Days 1 through 14, then off for 7 days. Take within 30 mins after meal. 42 tablet 3    capecitabine (XELODA) 500 MG tablet Take 2 tabs in the AM and 1 tab in the PM, Days 1 through 14, then off for 7 days. Take within 30 mins after meal. 42 tablet 3    capecitabine (XELODA) 500 MG tablet Take 2 tabs in the AM and 1 tab in the PM, Days 1 through 14, then off for 7 days. Take within 30 mins after meal. 42 tablet 3    capecitabine (XELODA) 500 MG tablet Take 2 tabs in the AM and 1 tab in the PM, Days 1 through 14, then off for 7 days. Take within 30 mins after meal. 42 tablet 2    cycloSPORINE (RESTASIS) 0.05 % ophthalmic emulsion 1 drop 2 times daily      hydroxychloroquine (PLAQUENIL) 200 MG tablet Take 1 tablet (200 mg) by mouth daily 90 tablet 3    IBUPROFEN PO       lidocaine-prilocaine (EMLA) 2.5-2.5 % external cream Apply topically as needed for moderate pain 30 g 3    lipase-protease-amylase (CREON) 33254-75414-88463 units CPEP TAKE THREE CAPSULES BY MOUTH THREE TIMES DAILY WITH MEALS 540 capsule 0    lisinopril (ZESTRIL) 10 MG tablet Take 1 tablet (10 mg) by mouth daily 90 tablet 3    LORazepam (ATIVAN) 0.5 MG tablet Take 1 tablet (0.5 mg) by mouth every 4 hours as needed (Anxiety, Nausea/Vomiting or Sleep) 30 tablet 2    MELATONIN PO Take 3 mg by mouth as needed      MULTIVITAMIN  TABS   OR 1 TABLET DAILY      pantoprazole (PROTONIX) 40 MG EC tablet Take 1 tablet (40 mg) by mouth daily before breakfast 90 tablet 11    STATIN NOT PRESCRIBED (INTENTIONAL) Please choose reason not prescribed from choices below.      thin (NO BRAND SPECIFIED) lancets Use with lanceting device. To accompany: Blood Glucose Monitor Brands: per insurance. 100 each 6    Ascorbic Acid (VITAMIN C PO) Take 500 mg by mouth daily       No Known Allergies

## 2023-08-21 ENCOUNTER — ONCOLOGY VISIT (OUTPATIENT)
Dept: ONCOLOGY | Facility: CLINIC | Age: 68
End: 2023-08-21
Attending: INTERNAL MEDICINE
Payer: MEDICARE

## 2023-08-21 VITALS
WEIGHT: 110.4 LBS | SYSTOLIC BLOOD PRESSURE: 143 MMHG | TEMPERATURE: 97.8 F | BODY MASS INDEX: 18.66 KG/M2 | OXYGEN SATURATION: 99 % | HEART RATE: 78 BPM | RESPIRATION RATE: 16 BRPM | DIASTOLIC BLOOD PRESSURE: 68 MMHG

## 2023-08-21 DIAGNOSIS — M34.9 SCLERODERMA (H): ICD-10-CM

## 2023-08-21 DIAGNOSIS — C25.0 MALIGNANT NEOPLASM OF HEAD OF PANCREAS (H): Primary | ICD-10-CM

## 2023-08-21 PROCEDURE — G0463 HOSPITAL OUTPT CLINIC VISIT: HCPCS | Performed by: INTERNAL MEDICINE

## 2023-08-21 PROCEDURE — 99215 OFFICE O/P EST HI 40 MIN: CPT | Performed by: INTERNAL MEDICINE

## 2023-08-21 RX ORDER — LIDOCAINE/PRILOCAINE 2.5 %-2.5%
CREAM (GRAM) TOPICAL PRN
Qty: 30 G | Refills: 3 | Status: SHIPPED | OUTPATIENT
Start: 2023-08-21

## 2023-08-21 ASSESSMENT — PAIN SCALES - GENERAL: PAINLEVEL: NO PAIN (0)

## 2023-08-21 NOTE — PROGRESS NOTES
No change  RTC 2mo    I am seeing Tiana Weathers today in follow-up of locally recurrent pancreatic cancer.    She is 67 years old and had a Whipple more than a decade ago with a subsequent biopsy proved recurrence in her resection bed encasing her SMA in October 2014.  She had excellent response of her recurrent disease to FOLFIRINOX and eventually was reduced to maintenance single agent Xeloda now for many years.  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  2 months ago on her planned response assessment she had a vague potentially new lesion in her liver and returns today for follow-up evaluation.    She tells me she continues to feel well and able to carry on with her usual vigorous activity she has ongoing evaluation with rheumatology and dermatology for her connective tissue disease and recently had some biopsies which are pending.  She is currently on hydroxychloroquine and tolerating that without difficulty.    On exam today she is quite slender as always and otherwise appears robustly healthy.  Her eyes have less injection than I seen in the past  The contractures in her hands appear about the same.    I personally reviewed her MR scan of the liver on CT of the chest.  There is nothing new in her chest.  The small lesion in the liver appears unchanged and still is of uncertain etiology.      She has a mildly depressed sodium and otherwise normal electrolytes and renal function.  Her albumin, bilirubin and liver enzymes are normal.  She has stable normocytic anemia with a hemoglobin of 9.5 and otherwise unremarkable blood counts.    Assessment/plan:  1.  Locally recurrent pancreatic cancer with ongoing stable disease on single agent capecitabine.  He remains uncertain if this lesion in her liver is real and if it represents metastasis.  It is reassuring that its not grown over the past 2 months.  We discussed  whether we wanted to try harder to get the biopsy of this which I think might be difficult and given its lack of progression for now we opted continued observation.  She will continue on with the capecitabine .  2.  Systemic sclerosis or similar syndrome.  She has ongoing evaluation with dermatology.  We discussed again whether this is related to her capecitabine, which well rarely described toxicity is certainly a possibility and I think it is fairly likely.  She feels like even if were certain of that association for now she is more comfortable continuing on with the capecitabine to keep her cancer controlled and she would be with discontinuing therapy.

## 2023-08-21 NOTE — LETTER
8/21/2023         RE: Emelia Weathers  [address]  [facility] [address]        Dear Colleague,    Thank you for referring your patient, Emelia Weathers, to the [facility]. Please see a copy of my visit note below.    No change  RTC 2mo    I am seeing Tiana Weathers today in follow-up of locally recurrent pancreatic cancer.    She is 67 years old and had a Whipple more than a decade ago with a subsequent biopsy proved recurrence in her resection bed encasing her SMA in October 2014.  She had excellent response of her recurrent disease to FOLFIRINOX and eventually was reduced to maintenance single agent Xeloda now for many years.  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  2 months ago on her planned response assessment she had a vague potentially new lesion in her liver and returns today for follow-up evaluation.    She tells me she continues to feel well and able to carry on with her usual vigorous activity she has ongoing evaluation with rheumatology and dermatology for her connective tissue disease and recently had some biopsies which are pending.  She is currently on hydroxychloroquine and tolerating that without difficulty.    On exam today she is quite slender as always and otherwise appears robustly healthy.  Her eyes have less injection than I seen in the past  The contractures in her hands appear about the same.    I personally reviewed her MR scan of the liver on CT of the chest.  There is nothing new in her chest.  The small lesion in the liver appears unchanged and still is of uncertain etiology.      She has a mildly depressed sodium and otherwise normal electrolytes and renal function.  Her albumin, bilirubin and liver enzymes are normal.  She has stable normocytic anemia with a hemoglobin of 9.5 and otherwise unremarkable blood counts.    Assessment/plan:  1.   Locally recurrent pancreatic cancer with ongoing stable disease on single agent capecitabine.  He remains uncertain if this lesion in her liver is real and if it represents metastasis.  It is reassuring that its not grown over the past 2 months.  We discussed whether we wanted to try harder to get the biopsy of this which I think might be difficult and given its lack of progression for now we opted continued observation.  She will continue on with the capecitabine .  2.  Systemic sclerosis or similar syndrome.  She has ongoing evaluation with dermatology.  We discussed again whether this is related to her capecitabine, which well rarely described toxicity is certainly a possibility and I think it is fairly likely.  She feels like even if were certain of that association for now she is more comfortable continuing on with the capecitabine to keep her cancer controlled and she would be with discontinuing therapy.      Again, thank you for allowing me to participate in the care of your patient.        Sincerely,        Yasmany Wade MD

## 2023-08-21 NOTE — NURSING NOTE
"Oncology Rooming Note    August 21, 2023 5:06 PM   Emelia Weathers is a 67 year old female who presents for:    Chief Complaint   Patient presents with    Oncology Clinic Visit     Malignant Neoplasm of Head of Pancreas     Initial Vitals: BP (!) 143/68 (BP Location: Right arm, Patient Position: Sitting, Cuff Size: Adult Regular)   Pulse 78   Temp 97.8  F (36.6  C) (Oral)   Resp 16   Wt 50.1 kg (110 lb 6.4 oz)   SpO2 99%   BMI 18.66 kg/m   Estimated body mass index is 18.66 kg/m  as calculated from the following:    Height as of 2/20/23: 1.638 m (5' 4.49\").    Weight as of this encounter: 50.1 kg (110 lb 6.4 oz). Body surface area is 1.51 meters squared.  No Pain (0) Comment: Data Unavailable   No LMP recorded. Patient is postmenopausal.  Allergies reviewed: Yes  Medications reviewed: Yes    Medications: MEDICATION REFILLS NEEDED TODAY. Provider was notified.  Pharmacy name entered into [facility]:    [facility] - [place], [place] - [address] [facility] [place], [place] - [phone]  [facility] - [place], [place] - [address]  [facility] [id number] [place] [facility] [place] - [address] [id number] - [place], [place] - [address]    Clinical concerns: Pt presents today for follow up.       Michelle Colbert LPN  8/21/2023              "

## 2023-08-23 ENCOUNTER — OFFICE VISIT (OUTPATIENT)
Dept: PULMONOLOGY | Facility: CLINIC | Age: 68
End: 2023-08-23
Payer: MEDICARE

## 2023-08-23 DIAGNOSIS — M34.9 SYSTEMIC SCLEROSIS (H): ICD-10-CM

## 2023-08-23 LAB
DLCOCOR-%PRED-PRE: 109 %
DLCOCOR-PRE: 21.76 ML/MIN/MMHG
DLCOUNC-%PRED-PRE: 94 %
DLCOUNC-PRE: 18.61 ML/MIN/MMHG
DLCOUNC-PRED: 19.79 ML/MIN/MMHG
ERV-%PRED-PRE: 153 %
ERV-PRE: 1.2 L
ERV-PRED: 0.78 L
EXPTIME-PRE: 6.86 SEC
FEF2575-%PRED-PRE: 116 %
FEF2575-PRE: 2.19 L/SEC
FEF2575-PRED: 1.87 L/SEC
FEFMAX-%PRED-PRE: 108 %
FEFMAX-PRE: 6.44 L/SEC
FEFMAX-PRED: 5.95 L/SEC
FEV1-%PRED-PRE: 124 %
FEV1-PRE: 2.72 L
FEV1FEV6-PRE: 76 %
FEV1FEV6-PRED: 79 %
FEV1FVC-PRE: 75 %
FEV1FVC-PRED: 79 %
FEV1SVC-PRE: 79 %
FEV1SVC-PRED: 68 %
FIFMAX-PRE: 3.61 L/SEC
FRCPLETH-%PRED-PRE: 122 %
FRCPLETH-PRE: 3.62 L
FRCPLETH-PRED: 2.95 L
FVC-%PRED-PRE: 129 %
FVC-PRE: 3.61 L
FVC-PRED: 2.79 L
IC-%PRED-PRE: 94 %
IC-PRE: 2.23 L
IC-PRED: 2.36 L
RVPLETH-%PRED-PRE: 119 %
RVPLETH-PRE: 2.41 L
RVPLETH-PRED: 2.03 L
TLCPLETH-%PRED-PRE: 111 %
TLCPLETH-PRE: 5.85 L
TLCPLETH-PRED: 5.26 L
VA-%PRED-PRE: 106 %
VA-PRE: 5.14 L
VC-%PRED-PRE: 107 %
VC-PRE: 3.44 L
VC-PRED: 3.2 L

## 2023-08-23 PROCEDURE — 94375 RESPIRATORY FLOW VOLUME LOOP: CPT | Performed by: INTERNAL MEDICINE

## 2023-08-23 PROCEDURE — 94729 DIFFUSING CAPACITY: CPT | Performed by: INTERNAL MEDICINE

## 2023-08-23 PROCEDURE — 94726 PLETHYSMOGRAPHY LUNG VOLUMES: CPT | Performed by: INTERNAL MEDICINE

## 2023-08-27 LAB
PATH REPORT.COMMENTS IMP SPEC: ABNORMAL
PATH REPORT.COMMENTS IMP SPEC: YES
PATH REPORT.FINAL DX SPEC: ABNORMAL
PATH REPORT.GROSS SPEC: ABNORMAL
PATH REPORT.MICROSCOPIC SPEC OTHER STN: ABNORMAL
PATH REPORT.RELEVANT HX SPEC: ABNORMAL

## 2023-08-29 DIAGNOSIS — C44.311 BASAL CELL CARCINOMA (BCC) OF SKIN OF NOSE: Primary | ICD-10-CM

## 2023-08-31 ENCOUNTER — TELEPHONE (OUTPATIENT)
Dept: DERMATOLOGY | Facility: CLINIC | Age: 68
End: 2023-08-31
Payer: MEDICARE

## 2023-08-31 NOTE — TELEPHONE ENCOUNTER
Left patient a voicemail to schedule a consult & mohs for  R nasal tip:  - Basal cell carcinoma with Dr. Francis or Dr. Mckeon. Provided my direct phone number.    Maura Marvin on 8/31/2023 at 9:57 AM

## 2023-09-01 ENCOUNTER — ANCILLARY PROCEDURE (OUTPATIENT)
Dept: CARDIOLOGY | Facility: CLINIC | Age: 68
End: 2023-09-01
Attending: DERMATOLOGY
Payer: MEDICARE

## 2023-09-01 DIAGNOSIS — I27.29 PULMONARY HYPERTENSION SECONDARY TO SCLERODERMA (H): ICD-10-CM

## 2023-09-01 DIAGNOSIS — M34.9 SYSTEMIC SCLEROSIS (H): ICD-10-CM

## 2023-09-01 DIAGNOSIS — M34.9 PULMONARY HYPERTENSION SECONDARY TO SCLERODERMA (H): ICD-10-CM

## 2023-09-01 LAB — LVEF ECHO: NORMAL

## 2023-09-01 PROCEDURE — 93306 TTE W/DOPPLER COMPLETE: CPT | Performed by: INTERNAL MEDICINE

## 2023-09-07 NOTE — TELEPHONE ENCOUNTER
FUTURE VISIT INFORMATION      FUTURE VISIT INFORMATION:  Date: 10.23.23  Time: 2:15  Location: [facility]  REFERRAL INFORMATION:  Referring provider:  Arlyn  Referring providers clinic:  Derm  Reason for visit/diagnosis  R nasal tip:  - Basal cell carcinoma      RECORDS REQUESTED FROM:       Clinic name Comments Records Status Imaging Status   Derm 8.18.23  Path # [id number] Epic Epic

## 2023-09-10 ENCOUNTER — HEALTH MAINTENANCE LETTER (OUTPATIENT)
Age: 68
End: 2023-09-10

## 2023-09-15 ENCOUNTER — LAB (OUTPATIENT)
Dept: LAB | Facility: CLINIC | Age: 68
End: 2023-09-15
Attending: INTERNAL MEDICINE
Payer: MEDICARE

## 2023-09-15 DIAGNOSIS — C25.0 MALIGNANT NEOPLASM OF HEAD OF PANCREAS (H): ICD-10-CM

## 2023-09-15 LAB
ALBUMIN SERPL BCG-MCNC: 3.9 G/DL (ref 3.5–5.2)
ALP SERPL-CCNC: 80 U/L (ref 35–104)
ALT SERPL W P-5'-P-CCNC: 14 U/L (ref 0–50)
ANION GAP SERPL CALCULATED.3IONS-SCNC: 9 MMOL/L (ref 7–15)
AST SERPL W P-5'-P-CCNC: 22 U/L (ref 0–45)
BASOPHILS # BLD AUTO: 0 10E3/UL (ref 0–0.2)
BASOPHILS NFR BLD AUTO: 0 %
BILIRUB SERPL-MCNC: 0.4 MG/DL
BUN SERPL-MCNC: 9.1 MG/DL (ref 8–23)
CALCIUM SERPL-MCNC: 8.8 MG/DL (ref 8.8–10.2)
CHLORIDE SERPL-SCNC: 105 MMOL/L (ref 98–107)
CREAT SERPL-MCNC: 0.8 MG/DL (ref 0.51–0.95)
DEPRECATED HCO3 PLAS-SCNC: 26 MMOL/L (ref 22–29)
EGFRCR SERPLBLD CKD-EPI 2021: 79 ML/MIN/1.73M2
EOSINOPHIL # BLD AUTO: 0.1 10E3/UL (ref 0–0.7)
EOSINOPHIL NFR BLD AUTO: 1 %
ERYTHROCYTE [DISTWIDTH] IN BLOOD BY AUTOMATED COUNT: 23.1 % (ref 10–15)
GLUCOSE SERPL-MCNC: 165 MG/DL (ref 70–99)
HCT VFR BLD AUTO: 27.1 % (ref 35–47)
HGB BLD-MCNC: 9 G/DL (ref 11.7–15.7)
IMM GRANULOCYTES # BLD: 0 10E3/UL
IMM GRANULOCYTES NFR BLD: 0 %
LYMPHOCYTES # BLD AUTO: 1.2 10E3/UL (ref 0.8–5.3)
LYMPHOCYTES NFR BLD AUTO: 13 %
MCH RBC QN AUTO: 32.5 PG (ref 26.5–33)
MCHC RBC AUTO-ENTMCNC: 33.2 G/DL (ref 31.5–36.5)
MCV RBC AUTO: 98 FL (ref 78–100)
MONOCYTES # BLD AUTO: 0.5 10E3/UL (ref 0–1.3)
MONOCYTES NFR BLD AUTO: 6 %
NEUTROPHILS # BLD AUTO: 7.1 10E3/UL (ref 1.6–8.3)
NEUTROPHILS NFR BLD AUTO: 80 %
NRBC # BLD AUTO: 0 10E3/UL
NRBC BLD AUTO-RTO: 0 /100
PLATELET # BLD AUTO: 184 10E3/UL (ref 150–450)
POTASSIUM SERPL-SCNC: 3.6 MMOL/L (ref 3.4–5.3)
PROT SERPL-MCNC: 6.2 G/DL (ref 6.4–8.3)
RBC # BLD AUTO: 2.77 10E6/UL (ref 3.8–5.2)
SODIUM SERPL-SCNC: 140 MMOL/L (ref 136–145)
WBC # BLD AUTO: 8.9 10E3/UL (ref 4–11)

## 2023-09-15 PROCEDURE — 250N000011 HC RX IP 250 OP 636: Mod: JZ | Performed by: INTERNAL MEDICINE

## 2023-09-15 PROCEDURE — 80053 COMPREHEN METABOLIC PANEL: CPT

## 2023-09-15 PROCEDURE — 85025 COMPLETE CBC W/AUTO DIFF WBC: CPT

## 2023-09-15 PROCEDURE — 36415 COLL VENOUS BLD VENIPUNCTURE: CPT

## 2023-09-15 RX ORDER — HEPARIN SODIUM (PORCINE) LOCK FLUSH IV SOLN 100 UNIT/ML 100 UNIT/ML
5 SOLUTION INTRAVENOUS ONCE
Status: COMPLETED | OUTPATIENT
Start: 2023-09-15 | End: 2023-09-15

## 2023-09-15 RX ADMIN — Medication 5 ML: at 13:38

## 2023-09-18 ENCOUNTER — MYC MEDICAL ADVICE (OUTPATIENT)
Dept: ONCOLOGY | Facility: CLINIC | Age: 68
End: 2023-09-18
Payer: MEDICARE

## 2023-09-18 NOTE — TELEPHONE ENCOUNTER
Oral Chemotherapy Monitoring Program  Lab Follow Up    Reviewed CBC and CMP lab results from 9/15.        9/15/2022    10:00 AM 12/5/2022     8:00 AM 2/27/2023     3:00 PM 5/24/2023     3:00 PM 7/17/2023    11:00 AM 8/14/2023     2:00 PM 9/18/2023     9:00 AM   ORAL CHEMOTHERAPY   Assessment Type Refill Refill Refill Refill Lab Monitoring Refill;Chart Review Lab Monitoring   Diagnosis Code Pancreatic Cancer Pancreatic Cancer Pancreatic Cancer Pancreatic Cancer Pancreatic Cancer Pancreatic Cancer Pancreatic Cancer   Providers Dr. Mauro Wade   [facility]   Is this patient followed by the [facility] OC team?    Yes Yes Yes Yes   Drug Name Xeloda (capecitabine) Xeloda (capecitabine) Xeloda (capecitabine) Xeloda (capecitabine) Xeloda (capecitabine) Xeloda (capecitabine) Xeloda (capecitabine)   Dose Other: Other: Other: Other: Other: Other: Other:   Current Schedule BID BID BID BID BID BID BID   Cycle Details 2 weeks on, 1 week off 2 weeks on, 1 week off 2 weeks on, 1 week off 2 weeks on, 1 week off 2 weeks on, 1 week off 2 weeks on, 1 week off 2 weeks on, 1 week off   Adverse Effects       Anemia   Anemia       Grade 2   Pharmacist Intervention(anemia)       Yes   Intervention(s)       Referral to oncology provider       Labs:  _  Result Component Current Result Ref Range   Sodium 140 (9/15/2023) 136 - 145 mmol/L     _  Result Component Current Result Ref Range   Potassium 3.6 (9/15/2023) 3.4 - 5.3 mmol/L     _  Result Component Current Result Ref Range   Calcium 8.8 (9/15/2023) 8.8 - 10.2 mg/dL     No results found for Mag within last 30 days.     No results found for Phos within last 30 days.     _  Result Component Current Result Ref Range   Albumin 3.9 (9/15/2023) 3.5 - 5.2 g/dL     _  Result Component Current Result Ref Range   Urea Nitrogen 9.1 (9/15/2023) 8.0 - 23.0 mg/dL     _  Result  Component Current Result Ref Range   Creatinine 0.80 (9/15/2023) 0.51 - 0.95 mg/dL     _  Result Component Current Result Ref Range   AST 22 (9/15/2023) 0 - 45 U/L     _  Result Component Current Result Ref Range   ALT 14 (9/15/2023) 0 - 50 U/L     _  Result Component Current Result Ref Range   Bilirubin Total 0.4 (9/15/2023) <=1.2 mg/dL     _  Result Component Current Result Ref Range   WBC Count 8.9 (9/15/2023) 4.0 - 11.0 10e3/uL     _  Result Component Current Result Ref Range   Hemoglobin 9.0 (L) (9/15/2023) 11.7 - 15.7 g/dL     _  Result Component Current Result Ref Range   Platelet Count 184 (9/15/2023) 150 - 450 10e3/uL     No results found for ANC within last 30 days.     _  Result Component Current Result Ref Range   Absolute Neutrophils 7.1 (9/15/2023) 1.6 - 8.3 10e3/uL        Assessment & Plan:  Results are concerning for grade 2 anemia, hemoglobin=9.0.  This is consistent with labs the past few months, but is a little lower than pt's normal. Message sent to Mauro as MARISELAI.    MyChart sent to patient with results.    Follow-Up:  10/17: Review appt with Dr. Wade and labs    Grace Myhre, PharmD  Hematology/Oncology Pharmacist  [facility]  [place]  [phone]

## 2023-09-19 NOTE — NURSING NOTE
Chief Complaint   Patient presents with     Port Draw     Labs drawn via port by RN in lab.      Port accessed with 20g gripper needle by RN, labs collected, line flushed with saline and heparin.    Nazia TOMPKINS RN PHN BSN  BMT/Oncology Lab     Provider Procedure Text (A): After obtaining clear surgical margins the defect was repaired by another provider.

## 2023-09-21 DIAGNOSIS — C25.0 MALIGNANT NEOPLASM OF HEAD OF PANCREAS (H): ICD-10-CM

## 2023-09-21 NOTE — TELEPHONE ENCOUNTER
Pending Prescriptions:                       Disp   Refills    lipase-protease-amylase (CREON) [id number]*540 ca*0            Sig: TAKE THREE CAPSULES BY MOUTH THREE TIMES DAILY           WITH MEALS    Last prescribing provider:     Last clinic visit date: 08/21/23 w/    Recommendations for requested medication (if none, N/A): N/A    Any other pertinent information (if none, N/A): N/A    Refilled: Y/N, if NO, why?

## 2023-10-11 ENCOUNTER — LAB (OUTPATIENT)
Dept: LAB | Facility: CLINIC | Age: 68
End: 2023-10-11
Attending: INTERNAL MEDICINE
Payer: MEDICARE

## 2023-10-11 ENCOUNTER — ANCILLARY PROCEDURE (OUTPATIENT)
Dept: CT IMAGING | Facility: CLINIC | Age: 68
End: 2023-10-11
Attending: INTERNAL MEDICINE
Payer: MEDICARE

## 2023-10-11 ENCOUNTER — ANCILLARY PROCEDURE (OUTPATIENT)
Dept: MRI IMAGING | Facility: CLINIC | Age: 68
End: 2023-10-11
Attending: INTERNAL MEDICINE
Payer: MEDICARE

## 2023-10-11 DIAGNOSIS — M34.9 SCLERODERMA (H): ICD-10-CM

## 2023-10-11 DIAGNOSIS — C25.0 MALIGNANT NEOPLASM OF HEAD OF PANCREAS (H): ICD-10-CM

## 2023-10-11 LAB
ALBUMIN SERPL BCG-MCNC: 4 G/DL (ref 3.5–5.2)
ALP SERPL-CCNC: 88 U/L (ref 35–104)
ALT SERPL W P-5'-P-CCNC: 13 U/L (ref 0–50)
ANION GAP SERPL CALCULATED.3IONS-SCNC: 7 MMOL/L (ref 7–15)
AST SERPL W P-5'-P-CCNC: 27 U/L (ref 0–45)
BASO+EOS+MONOS # BLD AUTO: ABNORMAL 10*3/UL
BASO+EOS+MONOS NFR BLD AUTO: ABNORMAL %
BASOPHILS # BLD AUTO: 0 10E3/UL (ref 0–0.2)
BASOPHILS NFR BLD AUTO: 1 %
BILIRUB SERPL-MCNC: 0.4 MG/DL
BUN SERPL-MCNC: 9.5 MG/DL (ref 8–23)
CALCIUM SERPL-MCNC: 8.9 MG/DL (ref 8.8–10.2)
CHLORIDE SERPL-SCNC: 108 MMOL/L (ref 98–107)
CREAT SERPL-MCNC: 0.67 MG/DL (ref 0.51–0.95)
DEPRECATED HCO3 PLAS-SCNC: 26 MMOL/L (ref 22–29)
EGFRCR SERPLBLD CKD-EPI 2021: >90 ML/MIN/1.73M2
EOSINOPHIL # BLD AUTO: 0.1 10E3/UL (ref 0–0.7)
EOSINOPHIL NFR BLD AUTO: 2 %
ERYTHROCYTE [DISTWIDTH] IN BLOOD BY AUTOMATED COUNT: 21.9 % (ref 10–15)
GLUCOSE SERPL-MCNC: 82 MG/DL (ref 70–99)
HCT VFR BLD AUTO: 28.2 % (ref 35–47)
HGB BLD-MCNC: 9 G/DL (ref 11.7–15.7)
IMM GRANULOCYTES # BLD: 0 10E3/UL
IMM GRANULOCYTES NFR BLD: 0 %
LYMPHOCYTES # BLD AUTO: 0.8 10E3/UL (ref 0.8–5.3)
LYMPHOCYTES NFR BLD AUTO: 23 %
MCH RBC QN AUTO: 31 PG (ref 26.5–33)
MCHC RBC AUTO-ENTMCNC: 31.9 G/DL (ref 31.5–36.5)
MCV RBC AUTO: 97 FL (ref 78–100)
MONOCYTES # BLD AUTO: 0.5 10E3/UL (ref 0–1.3)
MONOCYTES NFR BLD AUTO: 15 %
NEUTROPHILS # BLD AUTO: 2 10E3/UL (ref 1.6–8.3)
NEUTROPHILS NFR BLD AUTO: 59 %
NRBC # BLD AUTO: 0 10E3/UL
NRBC BLD AUTO-RTO: 0 /100
PLATELET # BLD AUTO: 174 10E3/UL (ref 150–450)
POTASSIUM SERPL-SCNC: 4.2 MMOL/L (ref 3.4–5.3)
PROT SERPL-MCNC: 6.4 G/DL (ref 6.4–8.3)
RBC # BLD AUTO: 2.9 10E6/UL (ref 3.8–5.2)
SODIUM SERPL-SCNC: 141 MMOL/L (ref 135–145)
WBC # BLD AUTO: 3.3 10E3/UL (ref 4–11)

## 2023-10-11 PROCEDURE — 85004 AUTOMATED DIFF WBC COUNT: CPT

## 2023-10-11 PROCEDURE — G1010 CDSM STANSON: HCPCS | Performed by: RADIOLOGY

## 2023-10-11 PROCEDURE — A9585 GADOBUTROL INJECTION: HCPCS | Mod: JZ | Performed by: RADIOLOGY

## 2023-10-11 PROCEDURE — 74183 MRI ABD W/O CNTR FLWD CNTR: CPT | Mod: MG | Performed by: RADIOLOGY

## 2023-10-11 PROCEDURE — 36415 COLL VENOUS BLD VENIPUNCTURE: CPT

## 2023-10-11 PROCEDURE — 71250 CT THORAX DX C-: CPT | Mod: MG | Performed by: RADIOLOGY

## 2023-10-11 PROCEDURE — 80053 COMPREHEN METABOLIC PANEL: CPT

## 2023-10-11 RX ORDER — GADOBUTROL 604.72 MG/ML
7.5 INJECTION INTRAVENOUS ONCE
Status: COMPLETED | OUTPATIENT
Start: 2023-10-11 | End: 2023-10-11

## 2023-10-11 RX ORDER — HEPARIN SODIUM (PORCINE) LOCK FLUSH IV SOLN 100 UNIT/ML 100 UNIT/ML
500 SOLUTION INTRAVENOUS ONCE
Status: COMPLETED | OUTPATIENT
Start: 2023-10-11 | End: 2023-10-11

## 2023-10-11 RX ADMIN — HEPARIN SODIUM (PORCINE) LOCK FLUSH IV SOLN 100 UNIT/ML 500 UNITS: 100 SOLUTION at 09:21

## 2023-10-11 RX ADMIN — GADOBUTROL 5 ML: 604.72 INJECTION INTRAVENOUS at 09:13

## 2023-10-11 NOTE — DISCHARGE INSTRUCTIONS
MRI Contrast Discharge Instructions    The IV contrast you received today will pass out of your body in your  urine. This will happen in the next 24 hours. You will not feel this process.  Your urine will not change color.    Drink at least 4 extra glasses of water or juice today (unless your doctor  has restricted your fluids). This reduces the stress on your kidneys.  You may take your regular medicines.    If you are on dialysis: It is best to have dialysis today.    If you have a reaction: Most reactions happen right away. If you have  any new symptoms after leaving the hospital (such as hives or swelling),  call your hospital at the correct number below. Or call your family doctor.  If you have breathing distress or wheezing, call 911.    Special instructions: ***    I have read and understand the above information.    Signature:______________________________________ Date:___________    Staff:__________________________________________ Date:___________     Time:__________    [facility] Departments:    ___[place]: [phone]  ___[facility]: [phone]  ___[place]: [phone] ___[facility]: [phone]  ___[facility]: [phone]  ___[facility]: [phone]  ___[phone]  ___[facility]: [phone]  ___[phone]

## 2023-10-11 NOTE — NURSING NOTE
Chief Complaint   Patient presents with    Port Draw     Pt had labs drawn via port accessed by RN.      Port accessed with 20 g 3/4 inch power needle by RN, labs collected, line flushed with saline and heparin.Pt remained accessed for scans today.    Andrea Cobian RN

## 2023-10-16 ENCOUNTER — ONCOLOGY VISIT (OUTPATIENT)
Dept: ONCOLOGY | Facility: CLINIC | Age: 68
End: 2023-10-16
Attending: INTERNAL MEDICINE
Payer: MEDICARE

## 2023-10-16 VITALS
TEMPERATURE: 97.8 F | WEIGHT: 114.2 LBS | SYSTOLIC BLOOD PRESSURE: 128 MMHG | DIASTOLIC BLOOD PRESSURE: 70 MMHG | BODY MASS INDEX: 19.31 KG/M2 | HEART RATE: 82 BPM | OXYGEN SATURATION: 97 %

## 2023-10-16 DIAGNOSIS — C25.0 MALIGNANT NEOPLASM OF HEAD OF PANCREAS (H): ICD-10-CM

## 2023-10-16 DIAGNOSIS — M34.9 SCLERODERMA (H): ICD-10-CM

## 2023-10-16 PROCEDURE — 99215 OFFICE O/P EST HI 40 MIN: CPT | Performed by: INTERNAL MEDICINE

## 2023-10-16 PROCEDURE — G0463 HOSPITAL OUTPT CLINIC VISIT: HCPCS | Performed by: INTERNAL MEDICINE

## 2023-10-16 ASSESSMENT — PAIN SCALES - GENERAL: PAINLEVEL: NO PAIN (0)

## 2023-10-16 NOTE — NURSING NOTE
"Oncology Rooming Note    October 16, 2023 5:28 PM   Emelia Weathers is a 68 year old female who presents for:    Chief Complaint   Patient presents with    Oncology Clinic Visit     Malignant neoplasm of head of pancreas     Initial Vitals: /70 (BP Location: Right arm, Patient Position: Sitting, Cuff Size: Adult Regular)   Pulse 82   Temp 97.8  F (36.6  C) (Oral)   Wt 51.8 kg (114 lb 3.2 oz)   SpO2 97%   BMI 19.31 kg/m   Estimated body mass index is 19.31 kg/m  as calculated from the following:    Height as of 2/20/23: 1.638 m (5' 4.49\").    Weight as of this encounter: 51.8 kg (114 lb 3.2 oz). Body surface area is 1.54 meters squared.  No Pain (0) Comment: Data Unavailable   No LMP recorded. Patient is postmenopausal.  Allergies reviewed: Yes  Medications reviewed: Yes    Medications: Medication refills not needed today.  Pharmacy name entered into [facility]:    [facility] - [place], [place] - [address] [facility] [place], [place] [phone]  [facility] - [place], [place] - [address]  [facility] [id number] [place] [facility] [place], [place] - [address] [facility] [address] - [place], [place] - [address]    Clinical concerns: none.      Spenser Ballesteros"

## 2023-10-16 NOTE — LETTER
10/16/2023         RE: Emelia Weathers  [address]  [facility] [address]        Dear Colleague,    Thank you for referring your patient, Emelia Weathers, to the [facility]. Please see a copy of my visit note below.    Tiana Weathers returns today in follow-up of locally recurrent pancreatic cancer.    She is 68 years old and had a Whipple more than a decade ago with a subsequent biopsy-proven recurrence in the resection bed encasing her SMA in October 2014.  She had an excellent response to FOLFIRINOX which was eventually reduced to maintenance single agent Xeloda with ongoing control for many years.  Her course has been complicated by a scleroderma-like syndrome which may be attributable to her Xeloda with contractures in her hands and dry eyes and for which she is now receiving hydroxychloroquine and has ongoing follow-up with dermatology and other specialist.    4 months ago she had a new vague lesion in her liver of uncertain etiology and 2 months ago that was stable and still of uncertain etiology.  She returns today for her next planned assessment of her disease status.  Overall she feels like things are continuing to go well for her.  She does note that she is having intermittent problems with swallowing.  This is not consistent and it usually is with dry solid foods and may occur only once every month or 2 but can be quite significant when it happens.  This is perhaps a little bit worse over the last year or 2.    I personally reviewed her liver MRI and chest CT.  I do not see any new sites of disease.  The lesion of uncertain nature in her liver actually looks better today with an appearance more consistent with an area of focal fibrosis.    Her electrolytes and renal function remain normal.  Her bilirubin, albumin and liver enzymes are normal.  She has stable pancytopenia consistent with her chemotherapy.    Assessment/plan:  1.  Recurrent pancreatic cancer with  longstanding stable disease on single agent capecitabine currently.  I am much less concerned about the lesion in her liver now and we can go back to every 4-month evaluations of her disease status.  We will continue on same dose and schedule of capecitabine.  2.  Ill-defined connective tissue disease perhaps related to her capecitabine.  Her complaints of swallowing difficulties perhaps related to this and I have set her up for a swallowing study.  She has ongoing follow-up through dermatology for management of this disease.      Again, thank you for allowing me to participate in the care of your patient.        Sincerely,        Yasmany Wade MD

## 2023-10-16 NOTE — LETTER
10/16/2023         RE: Emelia Weathers  [address]  [facility] [address]      Tiana Weathers returns today in follow-up of locally recurrent pancreatic cancer.    She is 68 years old and had a Whipple more than a decade ago with a subsequent biopsy-proven recurrence in the resection bed encasing her SMA in October 2014.  She had an excellent response to FOLFIRINOX which was eventually reduced to maintenance single agent Xeloda with ongoing control for many years.  Her course has been complicated by a scleroderma-like syndrome which may be attributable to her Xeloda with contractures in her hands and dry eyes and for which she is now receiving hydroxychloroquine and has ongoing follow-up with dermatology and other specialist.    4 months ago she had a new vague lesion in her liver of uncertain etiology and 2 months ago that was stable and still of uncertain etiology.  She returns today for her next planned assessment of her disease status.  Overall she feels like things are continuing to go well for her.  She does note that she is having intermittent problems with swallowing.  This is not consistent and it usually is with dry solid foods and may occur only once every month or 2 but can be quite significant when it happens.  This is perhaps a little bit worse over the last year or 2.    I personally reviewed her liver MRI and chest CT.  I do not see any new sites of disease.  The lesion of uncertain nature in her liver actually looks better today with an appearance more consistent with an area of focal fibrosis.    Her electrolytes and renal function remain normal.  Her bilirubin, albumin and liver enzymes are normal.  She has stable pancytopenia consistent with her chemotherapy.    Assessment/plan:  1.  Recurrent pancreatic cancer with longstanding stable disease on single agent capecitabine currently.  I am much less concerned about the lesion in her liver now and we can go back to every 4-month evaluations of  her disease status.  We will continue on same dose and schedule of capecitabine.  2.  Ill-defined connective tissue disease perhaps related to her capecitabine.  Her complaints of swallowing difficulties perhaps related to this and I have set her up for a swallowing study.  She has ongoing follow-up through dermatology for management of this disease.        Yasmany Wade MD

## 2023-10-18 NOTE — PROGRESS NOTES
Tiana eWathers returns today in follow-up of locally recurrent pancreatic cancer.    She is 68 years old and had a Whipple more than a decade ago with a subsequent biopsy-proven recurrence in the resection bed encasing her SMA in October 2014.  She had an excellent response to FOLFIRINOX which was eventually reduced to maintenance single agent Xeloda with ongoing control for many years.  Her course has been complicated by a scleroderma-like syndrome which may be attributable to her Xeloda with contractures in her hands and dry eyes and for which she is now receiving hydroxychloroquine and has ongoing follow-up with dermatology and other specialist.    4 months ago she had a new vague lesion in her liver of uncertain etiology and 2 months ago that was stable and still of uncertain etiology.  She returns today for her next planned assessment of her disease status.  Overall she feels like things are continuing to go well for her.  She does note that she is having intermittent problems with swallowing.  This is not consistent and it usually is with dry solid foods and may occur only once every month or 2 but can be quite significant when it happens.  This is perhaps a little bit worse over the last year or 2.    I personally reviewed her liver MRI and chest CT.  I do not see any new sites of disease.  The lesion of uncertain nature in her liver actually looks better today with an appearance more consistent with an area of focal fibrosis.    Her electrolytes and renal function remain normal.  Her bilirubin, albumin and liver enzymes are normal.  She has stable pancytopenia consistent with her chemotherapy.    Assessment/plan:  1.  Recurrent pancreatic cancer with longstanding stable disease on single agent capecitabine currently.  I am much less concerned about the lesion in her liver now and we can go back to every 4-month evaluations of her disease status.  We will continue on same dose and schedule of capecitabine.  2.   Ill-defined connective tissue disease perhaps related to her capecitabine.  Her complaints of swallowing difficulties perhaps related to this and I have set her up for a swallowing study.  She has ongoing follow-up through dermatology for management of this disease.

## 2023-10-23 ENCOUNTER — OFFICE VISIT (OUTPATIENT)
Dept: DERMATOLOGY | Facility: CLINIC | Age: 68
End: 2023-10-23
Payer: MEDICARE

## 2023-10-23 ENCOUNTER — PRE VISIT (OUTPATIENT)
Dept: DERMATOLOGY | Facility: CLINIC | Age: 68
End: 2023-10-23

## 2023-10-23 DIAGNOSIS — C44.311 BASAL CELL CARCINOMA OF RIGHT SIDE OF NOSE: Primary | ICD-10-CM

## 2023-10-23 PROCEDURE — 99214 OFFICE O/P EST MOD 30 MIN: CPT | Performed by: DERMATOLOGY

## 2023-10-23 ASSESSMENT — PAIN SCALES - GENERAL: PAINLEVEL: NO PAIN (0)

## 2023-10-23 NOTE — PROGRESS NOTES
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       - Raynaud's (no DUs), facial telangiectasias,        sclerodactyly, onychodystrophy, sclerodermoid facies, xeroopthalmia, inflammatory arthritis       - no weakness/myalgias, no ILD (CT with stable biapical scarring, no PFTs on record), no known PAH (no TTE on record), no GI dysmotility       - on lisinopril for HTN       - SANDY +1:160 (homogeneous) and +1:80 (speckled); scleroderma and myositis panels and other antibodies negative       - hydroxychloroquine 200 mg daily, hand therapy     # Pancreatic cancer: on capecitabine ~10 years       - recurrent hand-foot syndrome       - augmented betamethasone ointment     # Actinic keratoses: s/p cryotherapy    CC: mohs consult (Patient is here today for mohs consult right nasal tip. )      Subjective: Emelia Weathers is a 68 year old female who presents today for Mohs micrographic surgery consultation for a recent diagnosis of skin cancer.  - Skin cancer(s): BCC  - Location(s): R nasal tip  -   - no other concerns today    Hx of Skin Cancer: No  Hx of Mohs Surgery: No  Transplant: No  Immunocompromised: Yes  Current Anticoagulant(s): None  Bleeding Disorder(s): No  Stent: No  Pacemaker: No  Defibrillator: No  Brain/Nerve Stimulator: No  Endocarditis/Rheumatic Fever Hx: No  Vascular graft: No  Congenital heart defect: No  Prosthetic Heart Valve: No  Lesion on Leg/Groin: No  Prosthetic Joint : No  Diabetic: No  HIV/AIDS: No  Hepatitis: No  Prior Problem with Local Anesthesia: No  Patient wears CPAP mask: No  Currently Hypertensive: No  Photoprotection: occasionally  Sunburns: frequently  Tanning Bed Use: never  Current Tobacco Use: No  Current Alcohol Use: No  Extended Care Facility: No  Occupation: retired  Do you have mobility  issues?: No  Do you use any assistive devices/DME?: No  Do you have any issues with lying for long periods of time?: No      Objective: An exam of the face, head and neck was performed today.   - Ulcerated 1 cm papule on the right anterior ala/tip junction  -Head and neck exam normal      Path report:     Accession Number: [id number]    Final Diagnosis   A(1). L nasal bridge:  - Actinic keratosis, ulcerated  B(2). R nasal tip:  - Basal cell carcinoma, at least superficial type        Assessment and Plan:     1. Plan for Mohs micrographic surgery for skin cancer(s) above:  *Review lab result(s): Dermpath report   - We discussed the nature of the diagnosis/condition above. We discussed the treatment options, including the risks benefits and expectations of these options. We recommend micrographic surgery as the most effective and most tissue sparing option for treatment, and the patient agrees to proceed with this.  The patient is aware of the risks, benefits and expectations of this procedure. The patient will be scheduled for this procedure, if not already done so.  - We anticipate the following closure type: Sliding or lifting flap    Patient was discussed with and evaluated by attending physician Dr. Linda MD.    Scribe Disclosure:   I, YEIMI ALCARAZ, am serving as a scribe; to document services personally performed by Rios Mckeon MD -based on data collection and the provider's statements to me.     Attending Attestation  I attest that the Scribe recorded the interview and exam that I personally performed.  I have reviewed the note and edited it as necessary.    Rios Mckeon M.D.  Professor  Director of Dermatologic Surgery  Department of Dermatology  [facility]

## 2023-10-23 NOTE — NURSING NOTE
Chief Complaint   Patient presents with    mohs consult     Patient is here today for mohs consult right nasal tip.      Johnna HUERTA CMA

## 2023-10-23 NOTE — LETTER
10/23/2023       RE: Emelia Weathers  [address]  [facility] [address]     Dear Colleague,    Thank you for referring your patient, Emelia Weathers, to the [facility] DERMATOLOGIC SURGERY CLINIC [place] at [facility]. Please see a copy of my visit note below.    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       - Raynaud's (no DUs), facial telangiectasias,        sclerodactyly, onychodystrophy, sclerodermoid facies, xeroopthalmia, inflammatory arthritis       - no weakness/myalgias, no ILD (CT with stable biapical scarring, no PFTs on record), no known PAH (no TTE on record), no GI dysmotility       - on lisinopril for HTN       - SANDY +1:160 (homogeneous) and +1:80 (speckled); scleroderma and myositis panels and other antibodies negative       - hydroxychloroquine 200 mg daily, hand therapy     # Pancreatic cancer: on capecitabine ~10 years       - recurrent hand-foot syndrome       - augmented betamethasone ointment     # Actinic keratoses: s/p cryotherapy    CC: mohs consult (Patient is here today for mohs consult right nasal tip. )      Subjective: Emelia Weathers is a 68 year old female who presents today for Mohs micrographic surgery consultation for a recent diagnosis of skin cancer.  - Skin cancer(s): BCC  - Location(s): R nasal tip  -   - no other concerns today    Hx of Skin Cancer: No  Hx of Mohs Surgery: No  Transplant: No  Immunocompromised: Yes  Current Anticoagulant(s): None  Bleeding Disorder(s): No  Stent: No  Pacemaker: No  Defibrillator: No  Brain/Nerve Stimulator: No  Endocarditis/Rheumatic Fever Hx: No  Vascular graft: No  Congenital heart defect: No  Prosthetic Heart Valve: No  Lesion on Leg/Groin: No  Prosthetic Joint : No  Diabetic:  No  HIV/AIDS: No  Hepatitis: No  Prior Problem with Local Anesthesia: No  Patient wears CPAP mask: No  Currently Hypertensive: No  Photoprotection: occasionally  Sunburns: frequently  Tanning Bed Use: never  Current Tobacco Use: No  Current Alcohol Use: No  Extended Care Facility: No  Occupation: retired  Do you have mobility issues?: No  Do you use any assistive devices/DME?: No  Do you have any issues with lying for long periods of time?: No      Objective: An exam of the face, head and neck was performed today.   - Ulcerated 1 cm papule on the right anterior ala/tip junction  -Head and neck exam normal      Path report:     Accession Number: [id number]    Final Diagnosis   A(1). L nasal bridge:  - Actinic keratosis, ulcerated  B(2). R nasal tip:  - Basal cell carcinoma, at least superficial type        Assessment and Plan:     1. Plan for Mohs micrographic surgery for skin cancer(s) above:  *Review lab result(s): Dermpath report   - We discussed the nature of the diagnosis/condition above. We discussed the treatment options, including the risks benefits and expectations of these options. We recommend micrographic surgery as the most effective and most tissue sparing option for treatment, and the patient agrees to proceed with this.  The patient is aware of the risks, benefits and expectations of this procedure. The patient will be scheduled for this procedure, if not already done so.  - We anticipate the following closure type: Sliding or lifting flap    Patient was discussed with and evaluated by attending physician Dr. Linda MD.    Scribe Disclosure:   I, YEIMI ALCARAZ, am serving as a scribe; to document services personally performed by Rios Mckeon MD -based on data collection and the provider's statements to me.     Attending Attestation  I attest that the Scribe recorded the interview and exam that I personally performed.  I have reviewed the note and edited it as necessary.    Rios Mckeon  M.D.  Professor  Director of Dermatologic Surgery  Department of Dermatology  [facility] [place]

## 2023-10-26 ENCOUNTER — OFFICE VISIT (OUTPATIENT)
Dept: PULMONOLOGY | Facility: CLINIC | Age: 68
End: 2023-10-26
Attending: INTERNAL MEDICINE
Payer: MEDICARE

## 2023-10-26 VITALS — OXYGEN SATURATION: 98 % | SYSTOLIC BLOOD PRESSURE: 134 MMHG | HEART RATE: 80 BPM | DIASTOLIC BLOOD PRESSURE: 71 MMHG

## 2023-10-26 DIAGNOSIS — K21.9 GASTROESOPHAGEAL REFLUX DISEASE, UNSPECIFIED WHETHER ESOPHAGITIS PRESENT: ICD-10-CM

## 2023-10-26 DIAGNOSIS — E87.6 HYPOKALEMIA: ICD-10-CM

## 2023-10-26 DIAGNOSIS — C25.0 MALIGNANT NEOPLASM OF HEAD OF PANCREAS (H): ICD-10-CM

## 2023-10-26 DIAGNOSIS — L94.3 SCLERODACTYLY: ICD-10-CM

## 2023-10-26 DIAGNOSIS — M34.9 SCLERODERMA (H): Primary | ICD-10-CM

## 2023-10-26 PROCEDURE — G0463 HOSPITAL OUTPT CLINIC VISIT: HCPCS | Performed by: INTERNAL MEDICINE

## 2023-10-26 PROCEDURE — 99213 OFFICE O/P EST LOW 20 MIN: CPT | Performed by: INTERNAL MEDICINE

## 2023-10-26 ASSESSMENT — PAIN SCALES - GENERAL: PAINLEVEL: NO PAIN (0)

## 2023-10-26 NOTE — NURSING NOTE
Chief Complaint   Patient presents with    Follow Up     Return appt      Vitals were taken and medications were reconciled.     Mila Bonner RMA  3:46 PM

## 2023-10-26 NOTE — PROGRESS NOTES
Rheumatology New Clinic Consult Note    Emelia Weathers MRN# [id number]   Age: 67 year old YOB: 1955     Reason for consult: evaluation of possible underlying autoimmune disease vs hand-foot syndrome related to Xeloda    Requesting Physician: Dr. Wade    Assessment & Recommendations:   Emelia Weathers is a 67 year old female with known germline Chek mutation, locally recurrent pancreatic cancer s/p whipple procedure 9/20/2013 with subsequent biopsy-proven resection bed encasing her SMA in 10/2014. Previously on Folfirinox for her recurrent disease but stopped as she did not tolerate the med, later switched to oxaplatin but also stopped for neuropathy. Currently has maintained on single agent Xeloda since 2015. She has had scleroderma-like symptoms with contractures of her hands and feet. Referred to rheumatology for evaluation of possible underlying autoimmune disease vs hand-foot syndrome.     PROBLEM LIST:  1. Concerning for dermatomyositis sine myositis, but all serologies negative and no other clinical evolution of disease.   2. Keratoconjunctivitis  3. Recurrent pancreatic cancer with stable disease on prolonged single therapy with Xeloda since 2015  4. Germline Chek mutation        RECOMMENDATIONS:     I think her lack of any progression, lack of any specific autoantibodies, and lack of specific features to make a specific autoimmune diagnosis are all reassuring.  Unfortunately she continues to suffer with the degree of skin involvement she has but is getting some response to topical steroids.    Given that she is not having good response to hydroxychloroquine I think that can be stopped.  I urged her to continue to follow carefully with Dr. Stoddard in dermatology.    Should she develop increasing symptoms we can certainly see her back and can plan on seeing her back in 12 to 18 months to continue to do surveillance for any development of more clear-cut autoimmune disease but at this point I  think that is unlikely.        CLAUDIA Guidry MD, PhD    Rheumatology          HPI   Emelia Weathers is a 67 year old female with known germline Chek mutation, H/ pancreatic cancer s/p whipple procedure c/b recurrent but stable pancreatic cancer on Xeloda monotherapy since 2015.     She had been healthy until 06/2013 when she was diagnosed with pancreatic cancer after presented with painless jaundice and high blood sugar. Had whipple procedure 9/20/2013. Later found to have biopsy-proven recurrent of the disease encasing her SMA 10/2014. Was on Folfirinox then oxaplatin but did not tolerate both agents d/t side effects. Currently she has been on single chemotherapy with Xeloda since 2015. She follows with oncologist, Dr. Wade, every 4 months and her disease is noted to have been stable. Prior to pancreatic cancer diagnosis she denies any symptoms suggestive of any autoimmune conditions other than rosacea.    She has been taking Xeloda twice/day for two weeks with one week pause since the start. She reports starting noticing her skin issues limited to her hands and feet ~ 1 year after the medication initiation. The skin on her hands and feet becomes red and itchy with overlying scales the first couple days of each cycle and then goes away. She started noticing joint contractures in her finger and toes in the past 3 years and significantly worse in the past year; the joints gradually become more contracted and progressively more swollen espericially her PIPs. Now she has difficulty making hand  and open a jar with her hands d/t limited ROM and weakness. She denies any episodes of red, hot, swollen joints concerning for any flare. Denies involvement of other joints including wrists, elbows, shoulders, ankles, knees, hips, back and neck. Denies any morning stiffness.     In the past 6 years, she has had sensation where food get stucked in the middle of her throat once or twice/month, usually when  "she eats rice or bread. Sometimes the food will come up and sometimes it will go down after several seconds. She has been on PPI therapy since after whipple procedure for ppx. Denies heart burn symptoms or reflux symptoms. Denies pain with swallowing. Had similar presentations in the 2000s; however, EGD done at that time (2009?) showed unremarkable results. Denies any dry eyes or dry mouth at that time. However, she started having dry eyes and dry mouth 1.-5-2 years ago which has been progressively worsening over the past year. For dry eyes, she was previously on prednisone drops and then switched to Restasis which has helped.     Appetite has been great as she says \"I eat like a horse\". Her weight has been stable but she has not been able to again weight. Denies abdominal pain, N/V; has abdominal pain once/2-3 years from partial bowel obstruction 2/2 intraabdominal adhesion from prior surgery which was conservatively managed. Her bowel movements have been regular with 3 soft & non bloody bowel movements in the morning; denies watery diarrhea. Denies urination issues.     Denies fever, chills, night sweats. Denies malar rash, photosensitivity, oral ulcers, raynaud's, nonhealing wounds on finger tips or knuckles. Denies chest pain, legs swelling, orthopnea, PND. Does have shortness of breath on exertion for several years started after chemo; not worsening but always there. Denies cough. Denies muscle pain/weakness or difficulty lifting stuffs above her head or getting up from her chair. Does have significant numbness in her hands and feet over the past 7-8 years which is attributed to chemotherapy; intermittently has balance issues + blisters in her feet d/t numbness. Denies h/o blood clots, miscarriages. Denies recent infections or hospitalization.    - Lives alone.  - Former smoker; 0.5 PPD, 1970s for 3 years, 1990s for 0.5 year. Quit since 1992.  Denies EtOH use. Denies drug use.  - Denies family history of " autoimmune diseases in her family; her sister does have arthritis but unclear, ?RA. Mom developed rash with sun exposure.     Review:   (+)  Sicca symptoms: dry eyes, dry mouth, swallowing issues with rice & bread  Hand & feet:   - skin: redness with overlying scales   - joints: swelling & contractures  Shortness of breath on exertion  Numbness in hands and feet    (-)  Constitutional symptoms  Malar rash  Photosensitivity  Raynauds  Heart burn/reflux  Muscle pain/weakness  Chest pain, abdominal pain, urination or bowel movement symptoms    October 26, 2023 interval history:    Since we have last seen the patient she remains largely unchanged.  If anything her hands are little bit better.  The only joints that really bother her are her finger joints.  She continues to however have pretty significant red and itchy skin throughout that area.  She is at best minimally better with the hydroxychloroquine.  As noted however the joints do not bother her as much and she denies any significant morning stiffness.  She is also not developed Raynauds phenomenon.    She is using Diprolene provided by dermatology and does think it helps although it does make her skin dry.    She is noticing some dysphagia.  A swallow study is planned.  CT however of her chest did not show evidence of a patulous esophagus.        HISTORY REVIEW:  Past Medical History:   Diagnosis Date     Cervical high risk HPV (human papillomavirus) test positive 2008    + HPV 83     Diabetes mellitus (H) 6/27/2013    type 2     Diffuse cystic mastopathy      Disorder of bone and cartilage, unspecified     moderate osteopenia of spine     Endometrial hyperplasia, unspecified 9/14/2005     Gastro-oesophageal reflux disease      Heart murmur      Herpes simplex without mention of complication      History of blood transfusion 2014    Related to chemo     Pancreatic cancer (H)      Papanicolaou smear of cervix with low grade squamous intraepithelial lesion (LGSIL)  11/2006    Colpo negative     Post-menopausal      Rosacea      Past Surgical History:   Procedure Laterality Date     BREAST BIOPSY, RT/LT  1994    Breat Biopsy B9     BREAST SURGERY      Rt breast biopsy in 1994     CHOLECYSTECTOMY  9/20/13    Part of Whipple     ENDOSCOPIC RETROGRADE CHOLANGIOPANCREATOGRAM  6/27/2013    Procedure: ENDOSCOPIC RETROGRADE CHOLANGIOPANCREATOGRAM;  EUS with FNA, biliary sphincterotomy and biliary stent placement. ;  Surgeon: Timoteo Maki MD;  Location: [facility] OR     ENDOSCOPIC RETROGRADE CHOLANGIOPANCREATOGRAM  9/3/2013    Procedure: ENDOSCOPIC RETROGRADE CHOLANGIOPANCREATOGRAM;  Endoscopic retrograde cholangiopancreatogram with dilation of bile duct and bile duct stent placement.;  Surgeon: Devon Rubalcava MD;  Location: [facility] OR     ENDOSCOPIC ULTRASOUND UPPER GASTROINTESTINAL TRACT (GI)  6/27/2013    Procedure: ENDOSCOPIC ULTRASOUND UPPER GASTROINTESTINAL TRACT (GI);  Upper Endoscopy with Ultrasound, Fine Needle Aspiration, Endoscopic Retrograde Cholangiopancreatogram ;  Surgeon: Devon Rubalcava MD;  Location:  OR     ESOPHAGOSCOPY, GASTROSCOPY, DUODENOSCOPY (EGD), COMBINED N/A 10/8/2014    Procedure: COMBINED ENDOSCOPIC ULTRASOUND, ESOPHAGOSCOPY, GASTROSCOPY, DUODENOSCOPY (EGD), FINE NEEDLE ASPIRATE/BIOPSY;  Surgeon: Carson Paulino MD;  Location:  GI     GYN SURGERY       TONSILLECTOMY & ADENOIDECTOMY       WHIPPLE PROCEDURE  9/20/2013    Procedure: WHIPPLE PROCEDURE;  Open Whipple Procedure, Jujunostomy Feeding Tube Placement, Bile Duct Stent and Pancreatic Stent Placement;  Surgeon: Issa John MD;  Location:  OR     [facility] COLONOSCOPY THRU STOMA, DIAGNOSTIC  07/06    due 10 years     Family History   Problem Relation Age of Onset     Osteoporosis Mother      Gastrointestinal Disease Mother         gallbladder removal     Hypertension Mother      Diabetes Father      Hypertension Father      Cancer Father         oral     Alcohol/Drug Father       Other Cancer Father         Oral cancer     Substance Abuse Father      Diabetes Sister      Alcohol/Drug Brother      Hypertension Brother      Diabetes Brother      Depression Brother      Anxiety Disorder Brother      Mental Illness Brother      Substance Abuse Brother      Cancer - colorectal Maternal Grandfather      Other Cancer Maternal Grandfather      Skin Cancer No family hx of      Melanoma No family hx of      Social History     Socioeconomic History     Marital status:      Spouse name: Not on file     Number of children: Not on file     Years of education: Not on file     Highest education level: Not on file   Occupational History     Not on file   Tobacco Use     Smoking status: Former     Packs/day: 0.00     Years: 0.00     Additional pack years: 0.00     Total pack years: 0.00     Types: Cigarettes     Quit date: 1993     Years since quittin.1     Smokeless tobacco: Never   Substance and Sexual Activity     Alcohol use: Not Currently     Comment: rare     Drug use: No     Sexual activity: Not Currently     Partners: Male     Birth control/protection: Post-menopausal   Other Topics Concern     Parent/sibling w/ CABG, MI or angioplasty before 65F 55M? 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     Social Determinants of Health     Financial Resource Strain: Not on file   Food Insecurity: Not on file   Transportation Needs: Not on file   Physical Activity: Not on file   Stress: Not on file   Social Connections: Not on file   Interpersonal Safety: Not on file   Housing Stability: Not on file     Patient Active Problem List   Diagnosis     Disorder of bone and cartilage     Rosacea     Herpes simplex virus (HSV) infection     Viral warts     Papanicolaou smear of cervix with low grade squamous intraepithelial lesion (LGSIL)     Esophageal reflux     Undiagnosed cardiac murmurs     Malignant neoplasm of  head of pancreas (H)     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    Objective     PHYSICAL EXAM  /71 (BP Location: Right arm, Patient Position: Sitting, Cuff Size: Adult Regular)   Pulse 80   SpO2 98%   Wt Readings from Last 4 Encounters:   10/16/23 51.8 kg (114 lb 3.2 oz)   08/21/23 50.1 kg (110 lb 6.4 oz)   06/19/23 51 kg (112 lb 8 oz)   04/22/23 50.3 kg (111 lb)       Constitutional: Alert, NAD.  Eyes: EOMI, PERRL, no conjunctival injection or icterus. Dry eyes. ?heliotrope sign on her eye lids.   ENT: Normal external ears, nose, teeth, gums, throat; no mucous membrane lesions, normal saliva pool. + cold sores on her left lower lip.  Neck: No mass, or thyroid enlargement.  Resp: Lungs clear to auscultation bilaterally.  CV: RRR, no murmurs, rubs or gallops.  GI: Non-tender, non-distended, no appreciable mass or HSM, BS+.  Lymph: No cervical, supraclavicular, or epitrochlear nodes  MS:    Bilateral hands: swollen MCPs, PIPs without clinical synovitis, warmth, erythema, tenderness + flexion contractures in all fingers bilaterally.    Both feet: MTPs, IPs without obvious contracture.    All others including TMJ, neck, shoulder, elbow, wrist, spine, hip, knee, ankle, were examined and  found normal. No active synovitis or deformity. Full ROM in all joints other than hand joints; +weak  strength.  Skin: Telangiectasia on her foreheads, chins, cheeks. Destructed nails on both hands and feet. Well demarcated erythematous skin with overlying desquamation on palmar side of hands and plantar sides of her feet.  No thickened skin other than on flexors side of her hands and plantar sides of her feet.   Neuro: CN II-XII grossly intact. Upper and lower extremity strength 5/5. Tone  "grossly normal. Decreased sensation in her hands and feet.  Extr: No edema, PP+2.  Psych: Normal judgement, orientation, memory, affect.    DATA:    CBC:  Recent Labs   Lab Test 04/19/23  1254 03/15/23  1046 02/17/23  0933   WBC 4.9 4.5 6.4   RBC 3.12* 3.16* 3.18*   HGB 10.0* 9.9* 10.3*   HCT 30.2* 30.5* 31.1*   MCV 97 97 98   MCH 32.1 31.3 32.4   MCHC 33.1 32.5 33.1   RDW 22.7* 22.0* 21.9*    202 244       BMP:  Recent Labs   Lab Test 04/19/23  1254 03/15/23  1046 02/17/23  0933    138 138   POTASSIUM 4.2 4.3 4.6   CHLORIDE 105 104 103   CO2 26 25 26   ANIONGAP 7 9 9   * 65* 87   BUN 14.6 10.6 8.6   CR 0.77 0.67 0.74   GFRESTIMATED 83 >90 87   LEONOR 8.9 8.9 9.4       LFT:  Recent Labs   Lab Test 04/19/23  1254 03/15/23  1046 02/17/23  0933   PROTTOTAL 6.5 6.7 7.1   ALBUMIN 3.9 4.0 4.2   BILITOTAL 0.4 0.5 0.4   ALKPHOS 130* 101 122*   AST 32 26 28   ALT 22 20 17       No results found for: \"CKTOTAL\"  TSH   Date Value Ref Range Status   03/14/2015 1.36 0.40 - 4.00 mU/L Final     Comment:     Effective 7/30/2014, the reference range for this assay has changed to reflect   new instrumentation/methodology.       No results found for: URIC    Inflammatory markers  Lab Results   Component Value Date    CRP <2.9 04/13/2015     Lab Results   Component Value Date    SED 28 06/25/2013     Ferritin   Date Value Ref Range Status   04/13/2015 462 (H) 8 - 252 ng/mL Final   09/05/2013 502 (H) 10 - 300 ng/mL Final       UA RESULTS:  Recent Labs   Lab Test 03/18/15  1000 03/16/15  1120 03/14/15  1606 02/06/15  1357   COLOR Yellow Unsatisfactory specimen - too old for testing  NOTIFIED [facility] RN AT 1335 BY ARON   Yellow Straw   APPEARANCE Clear Unsatisfactory specimen - too old for testing  NOTIFIED [facility] RN AT 1335 BY ARON   Slightly Cloudy Clear   URINEGLC 150* Unsatisfactory specimen - too old for testing  NOTIFIED [facility] RN AT 1335 BY ARON  * Negative Negative   URINEBILI Negative Unsatisfactory specimen " "- too old for testing  [facility]  * Small   This is an unconfirmed screening test result. A positive result may be false.  * Negative   URINEKETONE Negative Unsatisfactory specimen - too old for testing  [facility] BY [facility]  * 80* Negative   SG 1.033 Unsatisfactory specimen - too old for testing  NOTIFIED [facility] RN AT [facility] BY ARJ   1.014 1.002*   UBLD Trace* Unsatisfactory specimen - too old for testing  NOTIFIED [facility] AT [facility] BY ARJ  * Negative Negative   URINEPH 6.0 Unsatisfactory specimen - too old for testing  NOTIFIED [facility] AT [facility] BY ARJ   6.0 5.5   PROTEIN 30* Unsatisfactory specimen - too old for testing  NOTIFIED [facility] AT 1335 BY ARJ  * 30* Negative   NITRITE Negative Unsatisfactory specimen - too old for testing  NOTIFIED [facility] AT 1335 BY ARJ  * Negative Negative   LEUKEST Negative Unsatisfactory specimen - too old for testing  NOTIFIED [facility] AT [facility] BY ARJ  * Small* Negative   RBCU 2  --  <1 0   WBCU 4*  --  11* <1       Autoimmunity labs:     No results found for: \"RHF\"  No results found for: \"CCPIGG\"  No results found for: \"ANCA\"  Lab Results   Component Value Date    S2FOWKP 100 04/25/2023     No results found for: \"R9UGPKY\"  Lab Results   Component Value Date    EDITH <1.0  Interpretation:  Negative 06/25/2013     No results found for: \"DNA\"  Lab Results   Component Value Date    RNPIGG Negative 04/25/2023    SMIGG Negative 04/25/2023    SSAIGG Negative 05/17/2023    SSBIGG Negative 05/17/2023       IMAGING:    CT CAP 2/2023  FINDINGS:   LUNGS AND PLEURA: Biapical scarring. Mild scattered mucus plugging in the right middle lobe, similar to the previous exam. No masses or consolidations. A 0.3 cm nodule along the right minor fissure is unchanged. No pleural effusions.     MEDIASTINUM/AXILLAE: Right Port-A-Cath, with tip in the low SVC. No pericardial effusion. No enlarged lymph nodes in the chest.     CORONARY ARTERY " CALCIFICATION: Mild.     HEPATOBILIARY: Hepatic steatosis. Pneumobilia in the left hepatic lobe is likely related to prior sphincterotomy. No hepatic masses are identified. The gallbladder is absent.     PANCREAS: Postoperative changes of Whipple procedure are again noted. The residual pancreatic tail is atrophic.     SPLEEN: Normal.     ADRENAL GLANDS: Normal.     KIDNEYS/BLADDER: No significant mass, stone, or hydronephrosis.     BOWEL: No obstruction or inflammatory change.     LYMPH NODES: No lymphadenopathy. Scattered small mesenteric lymph nodes are not significantly changed.     VASCULATURE: Unremarkable.     PELVIC ORGANS: Normal.     MUSCULOSKELETAL: No destructive bony lesions.                                                                      IMPRESSION:  1.  Postoperative changes of Whipple procedure. No convincing evidence for recurrent or metastatic malignancy in the chest, abdomen, or pelvis.  2.  Hepatic steatosis.

## 2023-10-26 NOTE — LETTER
10/26/2023         RE: Emelia Weathers  [address]  [facility] [address]        Dear Colleague,    Thank you for referring your patient, Emelia Weathers, to the [facility] [place]. Please see a copy of my visit note below.      Rheumatology New Clinic Consult Note    Emelia Weathers MRN# [id number]   Age: 67 year old YOB: 1955     Reason for consult: evaluation of possible underlying autoimmune disease vs hand-foot syndrome related to Xeloda    Requesting Physician: Dr. Wade    Assessment & Recommendations:   Emelia Weathers is a 67 year old female with known germline Chek mutation, locally recurrent pancreatic cancer s/p whipple procedure 9/20/2013 with subsequent biopsy-proven resection bed encasing her SMA in 10/2014. Previously on Folfirinox for her recurrent disease but stopped as she did not tolerate the med, later switched to oxaplatin but also stopped for neuropathy. Currently has maintained on single agent Xeloda since 2015. She has had scleroderma-like symptoms with contractures of her hands and feet. Referred to rheumatology for evaluation of possible underlying autoimmune disease vs hand-foot syndrome.     PROBLEM LIST:  Concerning for dermatomyositis sine myositis, but all serologies negative and no other clinical evolution of disease.   Keratoconjunctivitis  Recurrent pancreatic cancer with stable disease on prolonged single therapy with Xeloda since 2015  Germline Chek mutation        RECOMMENDATIONS:     I think her lack of any progression, lack of any specific autoantibodies, and lack of specific features to make a specific autoimmune diagnosis are all reassuring.  Unfortunately she continues to suffer with the degree of skin involvement she has but is getting some response to topical steroids.    Given that she is not having good response to hydroxychloroquine I think that can be stopped.  I urged her to continue to follow  carefully with Dr. Stoddard in dermatology.    Should she develop increasing symptoms we can certainly see her back and can plan on seeing her back in 12 to 18 months to continue to do surveillance for any development of more clear-cut autoimmune disease but at this point I think that is unlikely.        CLAUDIA Guidry MD, PhD    Rheumatology          HPI   Emelia Weathers is a 67 year old female with known germline Chek mutation, H/ pancreatic cancer s/p whipple procedure c/b recurrent but stable pancreatic cancer on Xeloda monotherapy since 2015.     She had been healthy until 06/2013 when she was diagnosed with pancreatic cancer after presented with painless jaundice and high blood sugar. Had whipple procedure 9/20/2013. Later found to have biopsy-proven recurrent of the disease encasing her SMA 10/2014. Was on Folfirinox then oxaplatin but did not tolerate both agents d/t side effects. Currently she has been on single chemotherapy with Xeloda since 2015. She follows with oncologist, Dr. Wade, every 4 months and her disease is noted to have been stable. Prior to pancreatic cancer diagnosis she denies any symptoms suggestive of any autoimmune conditions other than rosacea.    She has been taking Xeloda twice/day for two weeks with one week pause since the start. She reports starting noticing her skin issues limited to her hands and feet ~ 1 year after the medication initiation. The skin on her hands and feet becomes red and itchy with overlying scales the first couple days of each cycle and then goes away. She started noticing joint contractures in her finger and toes in the past 3 years and significantly worse in the past year; the joints gradually become more contracted and progressively more swollen espericially her PIPs. Now she has difficulty making hand  and open a jar with her hands d/t limited ROM and weakness. She denies any episodes of red, hot, swollen joints concerning for any  "flare. Denies involvement of other joints including wrists, elbows, shoulders, ankles, knees, hips, back and neck. Denies any morning stiffness.     In the past 6 years, she has had sensation where food get stucked in the middle of her throat once or twice/month, usually when she eats rice or bread. Sometimes the food will come up and sometimes it will go down after several seconds. She has been on PPI therapy since after whipple procedure for ppx. Denies heart burn symptoms or reflux symptoms. Denies pain with swallowing. Had similar presentations in the 2000s; however, EGD done at that time (2009?) showed unremarkable results. Denies any dry eyes or dry mouth at that time. However, she started having dry eyes and dry mouth 1.-5-2 years ago which has been progressively worsening over the past year. For dry eyes, she was previously on prednisone drops and then switched to Restasis which has helped.     Appetite has been great as she says \"I eat like a horse\". Her weight has been stable but she has not been able to again weight. Denies abdominal pain, N/V; has abdominal pain once/2-3 years from partial bowel obstruction 2/2 intraabdominal adhesion from prior surgery which was conservatively managed. Her bowel movements have been regular with 3 soft & non bloody bowel movements in the morning; denies watery diarrhea. Denies urination issues.     Denies fever, chills, night sweats. Denies malar rash, photosensitivity, oral ulcers, raynaud's, nonhealing wounds on finger tips or knuckles. Denies chest pain, legs swelling, orthopnea, PND. Does have shortness of breath on exertion for several years started after chemo; not worsening but always there. Denies cough. Denies muscle pain/weakness or difficulty lifting stuffs above her head or getting up from her chair. Does have significant numbness in her hands and feet over the past 7-8 years which is attributed to chemotherapy; intermittently has balance issues + blisters in " her feet d/t numbness. Denies h/o blood clots, miscarriages. Denies recent infections or hospitalization.    - Lives alone.  - Former smoker; 0.5 PPD, 1970s for 3 years, 1990s for 0.5 year. Quit since 1992.  Denies EtOH use. Denies drug use.  - Denies family history of autoimmune diseases in her family; her sister does have arthritis but unclear, ?RA. Mom developed rash with sun exposure.     Review:   (+)  Sicca symptoms: dry eyes, dry mouth, swallowing issues with rice & bread  Hand & feet:   - skin: redness with overlying scales   - joints: swelling & contractures  Shortness of breath on exertion  Numbness in hands and feet    (-)  Constitutional symptoms  Malar rash  Photosensitivity  Raynauds  Heart burn/reflux  Muscle pain/weakness  Chest pain, abdominal pain, urination or bowel movement symptoms    October 26, 2023 interval history:    Since we have last seen the patient she remains largely unchanged.  If anything her hands are little bit better.  The only joints that really bother her are her finger joints.  She continues to however have pretty significant red and itchy skin throughout that area.  She is at best minimally better with the hydroxychloroquine.  As noted however the joints do not bother her as much and she denies any significant morning stiffness.  She is also not developed Raynauds phenomenon.    She is using Diprolene provided by dermatology and does think it helps although it does make her skin dry.    She is noticing some dysphagia.  A swallow study is planned.  CT however of her chest did not show evidence of a patulous esophagus.        HISTORY REVIEW:  Past Medical History:   Diagnosis Date    Cervical high risk HPV (human papillomavirus) test positive 2008    + HPV 83    Diabetes mellitus (H) 6/27/2013    type 2    Diffuse cystic mastopathy     Disorder of bone and cartilage, unspecified     moderate osteopenia of spine    Endometrial hyperplasia, unspecified 9/14/2005     Gastro-oesophageal reflux disease     Heart murmur     Herpes simplex without mention of complication     History of blood transfusion 2014    Related to chemo    Pancreatic cancer (H)     Papanicolaou smear of cervix with low grade squamous intraepithelial lesion (LGSIL) 11/2006    Colpo negative    Post-menopausal     Rosacea      Past Surgical History:   Procedure Laterality Date    BREAST BIOPSY, RT/LT  1994    Breat Biopsy B9    BREAST SURGERY      Rt breast biopsy in 1994    CHOLECYSTECTOMY  9/20/13    Part of Whipple    ENDOSCOPIC RETROGRADE CHOLANGIOPANCREATOGRAM  6/27/2013    Procedure: ENDOSCOPIC RETROGRADE CHOLANGIOPANCREATOGRAM;  EUS with FNA, biliary sphincterotomy and biliary stent placement. ;  Surgeon: Timoteo Maki MD;  Location: [facility] OR    ENDOSCOPIC RETROGRADE CHOLANGIOPANCREATOGRAM  9/3/2013    Procedure: ENDOSCOPIC RETROGRADE CHOLANGIOPANCREATOGRAM;  Endoscopic retrograde cholangiopancreatogram with dilation of bile duct and bile duct stent placement.;  Surgeon: Devon Rubalcava MD;  Location: [facility] OR    ENDOSCOPIC ULTRASOUND UPPER GASTROINTESTINAL TRACT (GI)  6/27/2013    Procedure: ENDOSCOPIC ULTRASOUND UPPER GASTROINTESTINAL TRACT (GI);  Upper Endoscopy with Ultrasound, Fine Needle Aspiration, Endoscopic Retrograde Cholangiopancreatogram ;  Surgeon: Devon Rubalcava MD;  Location: [facility] OR    ESOPHAGOSCOPY, GASTROSCOPY, DUODENOSCOPY (EGD), COMBINED N/A 10/8/2014    Procedure: COMBINED ENDOSCOPIC ULTRASOUND, ESOPHAGOSCOPY, GASTROSCOPY, DUODENOSCOPY (EGD), FINE NEEDLE ASPIRATE/BIOPSY;  Surgeon: Carson Paulino MD;  Location:  GI    GYN SURGERY      TONSILLECTOMY & ADENOIDECTOMY      WHIPPLE PROCEDURE  9/20/2013    Procedure: WHIPPLE PROCEDURE;  Open Whipple Procedure, Jujunostomy Feeding Tube Placement, Bile Duct Stent and Pancreatic Stent Placement;  Surgeon: Issa John MD;  Location:  OR    [facility] COLONOSCOPY THRU STOMA, DIAGNOSTIC  07/06    due 10 years     Family  History   Problem Relation Age of Onset    Osteoporosis Mother     Gastrointestinal Disease Mother         gallbladder removal    Hypertension Mother     Diabetes Father     Hypertension Father     Cancer Father         oral    Alcohol/Drug Father     Other Cancer Father         Oral cancer    Substance Abuse Father     Diabetes Sister     Alcohol/Drug Brother     Hypertension Brother     Diabetes Brother     Depression Brother     Anxiety Disorder Brother     Mental Illness Brother     Substance Abuse Brother     Cancer - colorectal Maternal Grandfather     Other Cancer Maternal Grandfather     Skin Cancer No family hx of     Melanoma No family hx of      Social History     Socioeconomic History    Marital status:      Spouse name: Not on file    Number of children: Not on file    Years of education: Not on file    Highest education level: Not on file   Occupational History    Not on file   Tobacco Use    Smoking status: Former     Packs/day: 0.00     Years: 0.00     Additional pack years: 0.00     Total pack years: 0.00     Types: Cigarettes     Quit date: 1993     Years since quittin.1    Smokeless tobacco: Never   Substance and Sexual Activity    Alcohol use: Not Currently     Comment: rare    Drug use: No    Sexual activity: Not Currently     Partners: Male     Birth control/protection: Post-menopausal   Other Topics Concern    Parent/sibling w/ CABG, MI or angioplasty before 65F 55M? 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     Social Determinants of Health     Financial Resource Strain: Not on file   Food Insecurity: Not on file   Transportation Needs: Not on file   Physical Activity: Not on file   Stress: Not on file   Social Connections: Not on file   Interpersonal Safety: Not on file   Housing Stability: Not on file     Patient Active Problem List   Diagnosis    Disorder of bone and cartilage    Rosacea     Herpes simplex virus (HSV) infection    Viral warts    Papanicolaou smear of cervix with low grade squamous intraepithelial lesion (LGSIL)    Esophageal reflux    Undiagnosed cardiac murmurs    Malignant neoplasm of head of pancreas (H)    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    Objective     PHYSICAL EXAM  /71 (BP Location: Right arm, Patient Position: Sitting, Cuff Size: Adult Regular)   Pulse 80   SpO2 98%   Wt Readings from Last 4 Encounters:   10/16/23 51.8 kg (114 lb 3.2 oz)   08/21/23 50.1 kg (110 lb 6.4 oz)   06/19/23 51 kg (112 lb 8 oz)   04/22/23 50.3 kg (111 lb)       Constitutional: Alert, NAD.  Eyes: EOMI, PERRL, no conjunctival injection or icterus. Dry eyes. ?heliotrope sign on her eye lids.   ENT: Normal external ears, nose, teeth, gums, throat; no mucous membrane lesions, normal saliva pool. + cold sores on her left lower lip.  Neck: No mass, or thyroid enlargement.  Resp: Lungs clear to auscultation bilaterally.  CV: RRR, no murmurs, rubs or gallops.  GI: Non-tender, non-distended, no appreciable mass or HSM, BS+.  Lymph: No cervical, supraclavicular, or epitrochlear nodes  MS:    Bilateral hands: swollen MCPs, PIPs without clinical synovitis, warmth, erythema, tenderness + flexion contractures in all fingers bilaterally.    Both feet: MTPs, IPs without obvious contracture.    All others including TMJ, neck, shoulder, elbow, wrist, spine, hip, knee, ankle, were examined and  found normal. No active synovitis or deformity. Full ROM in all joints other than hand joints; +weak  strength.  Skin: Telangiectasia on her foreheads, chins, cheeks. Destructed nails on both hands and feet. Well demarcated erythematous skin with overlying desquamation on palmar side  "of hands and plantar sides of her feet.  No thickened skin other than on flexors side of her hands and plantar sides of her feet.   Neuro: CN II-XII grossly intact. Upper and lower extremity strength 5/5. Tone grossly normal. Decreased sensation in her hands and feet.  Extr: No edema, PP+2.  Psych: Normal judgement, orientation, memory, affect.    DATA:    CBC:  Recent Labs   Lab Test 04/19/23  1254 03/15/23  1046 02/17/23  0933   WBC 4.9 4.5 6.4   RBC 3.12* 3.16* 3.18*   HGB 10.0* 9.9* 10.3*   HCT 30.2* 30.5* 31.1*   MCV 97 97 98   MCH 32.1 31.3 32.4   MCHC 33.1 32.5 33.1   RDW 22.7* 22.0* 21.9*    202 244       BMP:  Recent Labs   Lab Test 04/19/23  1254 03/15/23  1046 02/17/23  0933    138 138   POTASSIUM 4.2 4.3 4.6   CHLORIDE 105 104 103   CO2 26 25 26   ANIONGAP 7 9 9   * 65* 87   BUN 14.6 10.6 8.6   CR 0.77 0.67 0.74   GFRESTIMATED 83 >90 87   LEONOR 8.9 8.9 9.4       LFT:  Recent Labs   Lab Test 04/19/23  1254 03/15/23  1046 02/17/23  0933   PROTTOTAL 6.5 6.7 7.1   ALBUMIN 3.9 4.0 4.2   BILITOTAL 0.4 0.5 0.4   ALKPHOS 130* 101 122*   AST 32 26 28   ALT 22 20 17       No results found for: \"CKTOTAL\"  TSH   Date Value Ref Range Status   03/14/2015 1.36 0.40 - 4.00 mU/L Final     Comment:     Effective 7/30/2014, the reference range for this assay has changed to reflect   new instrumentation/methodology.       No results found for: URIC    Inflammatory markers  Lab Results   Component Value Date    CRP <2.9 04/13/2015     Lab Results   Component Value Date    SED 28 06/25/2013     Ferritin   Date Value Ref Range Status   04/13/2015 462 (H) 8 - 252 ng/mL Final   09/05/2013 502 (H) 10 - 300 ng/mL Final       UA RESULTS:  Recent Labs   Lab Test 03/18/15  1000 03/16/15  1120 03/14/15  1606 02/06/15  1357   COLOR Yellow Unsatisfactory specimen - too old for testing  NOTIFIED [facility] AT 1335 BY ARON   Yellow Straw   APPEARANCE Clear Unsatisfactory specimen - too old for testing  [facility]   Slightly Cloudy Clear   URINEGLC 150* Unsatisfactory specimen - too old for testing  [facility] BY ARJ  * Negative Negative   URINEBILI Negative Unsatisfactory specimen - too old for testing  [facility] BY ARJ  * Small   This is an unconfirmed screening test result. A positive result may be false.  * Negative   URINEKETONE Negative Unsatisfactory specimen - too old for testing  [facility] BY ARJ  * 80* Negative   SG 1.033 Unsatisfactory specimen - too old for testing  NOTIFIED [facility] RN AT 1335 BY ARJ   1.014 1.002*   UBLD Trace* Unsatisfactory specimen - too old for testing  [facility] BY ARJ  * Negative Negative   URINEPH 6.0 Unsatisfactory specimen - too old for testing  NOTIFIED [facility] RN AT 1335 BY ARJ   6.0 5.5   PROTEIN 30* Unsatisfactory specimen - too old for testing  [facility] BY ARJ  * 30* Negative   NITRITE Negative Unsatisfactory specimen - too old for testing  NOTIFIED [facility] RN AT 1335 BY ARJ  * Negative Negative   LEUKEST Negative Unsatisfactory specimen - too old for testing  NOTIFIED [facility] RN AT 1335 BY ARJ  * Small* Negative   RBCU 2  --  <1 0   WBCU 4*  --  11* <1       Autoimmunity labs:     No results found for: \"RHF\"  No results found for: \"CCPIGG\"  No results found for: \"ANCA\"  Lab Results   Component Value Date    A2YALSP 100 04/25/2023     No results found for: \"Q7VKREY\"  Lab Results   Component Value Date    EDITH <1.0  Interpretation:  Negative 06/25/2013     No results found for: \"DNA\"  Lab Results   Component Value Date    RNPIGG Negative 04/25/2023    SMIGG Negative 04/25/2023    SSAIGG Negative 05/17/2023    SSBIGG Negative 05/17/2023       IMAGING:    CT CAP 2/2023  FINDINGS:   LUNGS AND PLEURA: Biapical scarring. Mild scattered mucus plugging in the right middle lobe, similar to the previous exam. No masses or consolidations. A 0.3 cm nodule along " the right minor fissure is unchanged. No pleural effusions.     MEDIASTINUM/AXILLAE: Right Port-A-Cath, with tip in the low SVC. No pericardial effusion. No enlarged lymph nodes in the chest.     CORONARY ARTERY CALCIFICATION: Mild.     HEPATOBILIARY: Hepatic steatosis. Pneumobilia in the left hepatic lobe is likely related to prior sphincterotomy. No hepatic masses are identified. The gallbladder is absent.     PANCREAS: Postoperative changes of Whipple procedure are again noted. The residual pancreatic tail is atrophic.     SPLEEN: Normal.     ADRENAL GLANDS: Normal.     KIDNEYS/BLADDER: No significant mass, stone, or hydronephrosis.     BOWEL: No obstruction or inflammatory change.     LYMPH NODES: No lymphadenopathy. Scattered small mesenteric lymph nodes are not significantly changed.     VASCULATURE: Unremarkable.     PELVIC ORGANS: Normal.     MUSCULOSKELETAL: No destructive bony lesions.                                                                      IMPRESSION:  1.  Postoperative changes of Whipple procedure. No convincing evidence for recurrent or metastatic malignancy in the chest, abdomen, or pelvis.  2.  Hepatic steatosis.       Again, thank you for allowing me to participate in the care of your patient.        Sincerely,        Joe Guidry MD

## 2023-10-30 ENCOUNTER — OFFICE VISIT (OUTPATIENT)
Dept: DERMATOLOGY | Facility: CLINIC | Age: 68
End: 2023-10-30
Payer: MEDICARE

## 2023-10-30 VITALS — SYSTOLIC BLOOD PRESSURE: 119 MMHG | DIASTOLIC BLOOD PRESSURE: 80 MMHG | HEART RATE: 76 BPM

## 2023-10-30 DIAGNOSIS — C44.311 BASAL CELL CARCINOMA (BCC) OF SKIN OF NOSE: ICD-10-CM

## 2023-10-30 PROCEDURE — 17312 MOHS ADDL STAGE: CPT | Performed by: DERMATOLOGY

## 2023-10-30 PROCEDURE — 21235 EAR CARTILAGE GRAFT: CPT | Performed by: DERMATOLOGY

## 2023-10-30 PROCEDURE — 17311 MOHS 1 STAGE H/N/HF/G: CPT | Performed by: DERMATOLOGY

## 2023-10-30 PROCEDURE — 15576 PEDICLE E/N/E/L/NTRORAL: CPT | Performed by: DERMATOLOGY

## 2023-10-30 ASSESSMENT — PAIN SCALES - GENERAL: PAINLEVEL: NO PAIN (0)

## 2023-10-30 NOTE — PATIENT INSTRUCTIONS

## 2023-10-30 NOTE — NURSING NOTE
Chief Complaint   Patient presents with    Derm Problem     Patient is here today for Mohs regarding right nasal tip BCC.     Maite GUTIERREZ RN

## 2023-10-30 NOTE — PROGRESS NOTES
[facility] Dermatologic Surgery Clinic [place] Procedure Note    Date of Service:  Oct 30, 2023  Surgery: Mohs micrographic surgery    Case 1  Repair Type: Folded over Melolabial Interpolation Flap with cartilage graft.  Repair Size: 9.0 cm x 5.0 cm  Suture Material: 4-0 Vicryl / 4-0 Monocryl / 5-0 Fast Absorbing Gut  Tumor Type: Basal Cell Carcinoma   Location: R Nasal Tip  Derm-Path Accession #: [id number]: [id number]   PreOp Size: 0.9 cm x 0.5 cm  PostOp Size: 1.5 cm x 1.5 cm  Mohs Accession #:   Level of Defect: Cartilage      Procedure:  We discussed the principles of treatment and most likely complications including scarring, bleeding, infection, swelling, pain, crusting, nerve damage, large wound,  incomplete excision, wound dehiscence,  nerve damage, recurrence, and a second procedure may be recommended to obtain the best cosmetic or functional result.    Informed consent was obtained and the patient underwent the procedure as follows:  The patient was placed supine on the operating table.  The cancer was identified, outlined with a marker, and verified by the patient.  The entire surgical field was prepped with Hibiclens.  The surgical site was anesthetized using Lidocaine 1% with epi 1:100,000.      The area of clinically apparent tumor was not debulked. The layer of tissue was then surgically excised using a #15 blade and was then transferred onto a specimen sheet maintaining the orientation of the specimen. Hemostasis was obtained using electrofulguration. The wound site was then covered with a dressing while the tissue samples were processed for examination.    The excised tissue was transported to the Mohs histology laboratory maintaining the tissue orientation.  The tissue specimen was relaxed so that the entire surgical margin was in a a single horizontal plane for sectioning and inked for precise mapping.  A precise reference map was drawn to reflect the sectioning of the specimen,  colored inking of the margins, and orientation on the patient.  The tissue was processed using horizontal sectioning of the base and continuous peripheral margins.  The histopathologic sections were reviewed in conjunction with the reference map.    Total blocks: 1    Total slides:  3    Residual tumor was identified and indicated in red on the reference map, identifying the location where further tissue excision was necessary. Therefore, an additional stage of Mohs Micrographic surgery was deemed necessary.     Stage II   The patient was returned to the operating room, and the area prepped in the usual manner. The residual tumor was excised using the reference map as a guide. The specimen was transfered to a labeled specimen sheet maintaining the orientation of the specimen. Hemostasis was obtained and the wound site was covered with a dressing while the tissue was processed for examination.     The excised tissue was transported to the Mohs histology laboratory maintaining orientation. The specimen margins were inked for precise mapping and a reference map was prepared for the is additional stage to maintain precise orientation as described above. The tissue was processed using horizontal sectioning of the base and continuous peripheral margins. The histopathologic sections were reviewed in conjunction with the reference map.     Total blocks: 1    Total slides:  2    Residual tumor was identified and indicated in red on the reference map, identifying the location where further tissue excision was necessary. Therefore, an additional stage of Mohs Micrographic surgery was deemed necessary.     Stage III   The patient was returned to the operating room, and the area prepped in the usual manner. The residual tumor was excised using the reference map as a guide. The specimen was transfered to a labeled specimen sheet maintaining the orientation of the specimen. Hemostasis was obtained and the wound site was covered  with a dressing while the tissue was processed for examination.     The excised tissue was transported to the [facility] histology laboratory maintaining orientation. The specimen margins were inked for precise mapping and a reference map was prepared for the is additional stage to maintain precise orientation as described above. The tissue was processed using horizontal sectioning of the base and continuous peripheral margins. The histopathologic sections were reviewed in conjunction with the reference map.     Total blocks: 1    Total slides:  2    There were no cancer cells visualized on examination, therefore Mohs surgery was complete.    RECONSTRUCTION:   Melolabial/Nasolabial Interpolation Flap (MLIF)    The patient was taken to the operative suite and placed in a supine position on the operating room table. The wound was measured and identified and the planned reconstruction was outlined.  A template was used to design the size of the flap. The area was then infiltrated with 1% lidocaine with epinephrine. The area was then prepped in a sterile fashion using Hibiclens and sterile drapes were placed.  The flap was incised with a #15 scalpel blade, down to the level of fat.  The flap was then freed by undermining laterally and posteriorly to the flap.   The pedicle was tailored until enough mobility allowed total coverage of the defect.  Hemostasis was obtained with electrocoagulation. Wound edges were undermined broadly in all directions.  Hemostasis was again obtained with electrocoagulation. The secondary defect created with the MLIF donor site was closed with 4-0 vicryl and 4-0 Monocryl buried dermal sutures and 5-0 fast absorbing gut epidermal sutures.    The cartilaginous defect was identified.  The R antihelical donor site was chosen, as this had the closest color and texture match to the surrounding defect skin and included the needed cartilage.  A design was drawn.   The graft recipient site and donor site  were then infiltrated with 1% lidocaine with epinephrine and both areas were prepped with Technicare and draped in a sterile fashion.    The 2x0 x 0.5 cm graft tissue was harvested using a #15 blade by excising to the level of cartilage.  A 2 x 0.5 cm piece of cartilage was then removed with a #15 blade.  The graft skin and cartilage graft were placed on saline-soaked gauze.  The donor defects were closed after undermining bluntly in all directions and hemostasis hyfercator.  The wound edges were approximated using buried dermal sutures and closed using 5.0 FAG    epidermal sutures.  Skin graft and cartilage graft were trimmed of fat.  The cartilage graft was secured to into the defect to provide support and achive anatomical structure.  The cartilage was secured with 4.0 Vicryl to the lower lateral cartilage and lateral alar remnant.    Movement of the pedicle flap to the original Mohs surgery defect was then addressed. The flap was first secured into the defect with 4-0 Vicryl and 4-0 Monocryl buried dermal sutures at the alar rim. The lining portion was then folded over to reline the nasal passage and cover the cartilage graft. It was further secured to the defect with 5-0 fast absorbing gut epidermal sutures.  Trimming and thinning to fit the defect and provide normal contour tailored the flap.  As possible, the edges of the flap were secured into the defect using additional simple interrupted epidermal sutures.  After the flap was fully secured, the flap color remained pink.  The direct pedicle was then dressed by wrapping with vaseline gauze. The remaining wound edges were cleansed with saline, then ointment and a non-adherent pressure dressing was applied.  The patient left the operative suite in good condition. Wound care instructions were reviewed verbally and in writing with the patient.  The patient will be scheduled for a division and inset procedure in 3-weeks. Dermabrasion could be used if needed as  the third stage of this reconstruction.     Rios Mckeon MD was immediately available for the entire surgery and was physicially present for the entire  procedure.     Scribe Disclosure:   I, YEIMI ALCARAZ, am serving as a scribe; to document services personally performed by Rios Mckeon MD -based on data collection and the provider's statements to me.     Attending attestation:  I personally performed the entire procedure.  I have reviewed the note and edited it as necessary, and agree with its contents.    Rios Mckeon M.D.  Professor  Director of Dermatologic Surgery  Department of Dermatology  [facility]    Dermatology Surgery Clinic  [facility]  [address] [place], [place] [id number]

## 2023-10-30 NOTE — LETTER
10/30/2023       RE: Emelia Weathers  [address]  [facility] [address]     Dear Colleague,    Thank you for referring your patient, Emelia Weathers, to the [facility] DERMATOLOGIC SURGERY CLINIC [place] at [facility]. Please see a copy of my visit note below.    [facility] Dermatologic Surgery Clinic [place] Procedure Note    Date of Service:  Oct 30, 2023  Surgery: Mohs micrographic surgery    Case 1  Repair Type: Folded over Melolabial Interpolation Flap with cartilage graft.  Repair Size: 9.0 cm x 5.0 cm  Suture Material: 4-0 Vicryl / 4-0 Monocryl / 5-0 Fast Absorbing Gut  Tumor Type: Basal Cell Carcinoma   Location: R Nasal Tip  Derm-Path Accession #: [id number]: [id number]   PreOp Size: 0.9 cm x 0.5 cm  PostOp Size: 1.5 cm x 1.5 cm  Mohs Accession #:   Level of Defect: Cartilage      Procedure:  We discussed the principles of treatment and most likely complications including scarring, bleeding, infection, swelling, pain, crusting, nerve damage, large wound,  incomplete excision, wound dehiscence,  nerve damage, recurrence, and a second procedure may be recommended to obtain the best cosmetic or functional result.    Informed consent was obtained and the patient underwent the procedure as follows:  The patient was placed supine on the operating table.  The cancer was identified, outlined with a marker, and verified by the patient.  The entire surgical field was prepped with Hibiclens.  The surgical site was anesthetized using Lidocaine 1% with epi 1:100,000.      The area of clinically apparent tumor was not debulked. The layer of tissue was then surgically excised using a #15 blade and was then transferred onto a specimen sheet maintaining the orientation of the specimen. Hemostasis was obtained using electrofulguration. The wound site was then covered with a dressing while the tissue samples were processed for examination.    The excised  tissue was transported to the Mohs histology laboratory maintaining the tissue orientation.  The tissue specimen was relaxed so that the entire surgical margin was in a a single horizontal plane for sectioning and inked for precise mapping.  A precise reference map was drawn to reflect the sectioning of the specimen, colored inking of the margins, and orientation on the patient.  The tissue was processed using horizontal sectioning of the base and continuous peripheral margins.  The histopathologic sections were reviewed in conjunction with the reference map.    Total blocks: 1    Total slides:  3    Residual tumor was identified and indicated in red on the reference map, identifying the location where further tissue excision was necessary. Therefore, an additional stage of Mohs Micrographic surgery was deemed necessary.     Stage II   The patient was returned to the operating room, and the area prepped in the usual manner. The residual tumor was excised using the reference map as a guide. The specimen was transfered to a labeled specimen sheet maintaining the orientation of the specimen. Hemostasis was obtained and the wound site was covered with a dressing while the tissue was processed for examination.     The excised tissue was transported to the [facility] histology laboratory maintaining orientation. The specimen margins were inked for precise mapping and a reference map was prepared for the is additional stage to maintain precise orientation as described above. The tissue was processed using horizontal sectioning of the base and continuous peripheral margins. The histopathologic sections were reviewed in conjunction with the reference map.     Total blocks: 1    Total slides:  2    Residual tumor was identified and indicated in red on the reference map, identifying the location where further tissue excision was necessary. Therefore, an additional stage of Mohs Micrographic surgery was deemed necessary.     Stage  III   The patient was returned to the operating room, and the area prepped in the usual manner. The residual tumor was excised using the reference map as a guide. The specimen was transfered to a labeled specimen sheet maintaining the orientation of the specimen. Hemostasis was obtained and the wound site was covered with a dressing while the tissue was processed for examination.     The excised tissue was transported to the Mohs histology laboratory maintaining orientation. The specimen margins were inked for precise mapping and a reference map was prepared for the is additional stage to maintain precise orientation as described above. The tissue was processed using horizontal sectioning of the base and continuous peripheral margins. The histopathologic sections were reviewed in conjunction with the reference map.     Total blocks: 1    Total slides:  2    There were no cancer cells visualized on examination, therefore Mohs surgery was complete.    RECONSTRUCTION:   Melolabial/Nasolabial Interpolation Flap (MLIF)    The patient was taken to the operative suite and placed in a supine position on the operating room table. The wound was measured and identified and the planned reconstruction was outlined.  A template was used to design the size of the flap. The area was then infiltrated with 1% lidocaine with epinephrine. The area was then prepped in a sterile fashion using Hibiclens and sterile drapes were placed.  The flap was incised with a #15 scalpel blade, down to the level of fat.  The flap was then freed by undermining laterally and posteriorly to the flap.   The pedicle was tailored until enough mobility allowed total coverage of the defect.  Hemostasis was obtained with electrocoagulation. Wound edges were undermined broadly in all directions.  Hemostasis was again obtained with electrocoagulation. The secondary defect created with the MLIF donor site was closed with 4-0 vicryl and 4-0 Monocryl buried dermal  sutures and 5-0 fast absorbing gut epidermal sutures.    The cartilaginous defect was identified.  The R antihelical donor site was chosen, as this had the closest color and texture match to the surrounding defect skin and included the needed cartilage.  A design was drawn.   The graft recipient site and donor site were then infiltrated with 1% lidocaine with epinephrine and both areas were prepped with Technicare and draped in a sterile fashion.    The 2x0 x 0.5 cm graft tissue was harvested using a #15 blade by excising to the level of cartilage.  A 2 x 0.5 cm piece of cartilage was then removed with a #15 blade.  The graft skin and cartilage graft were placed on saline-soaked gauze.  The donor defects were closed after undermining bluntly in all directions and hemostasis hyfercator.  The wound edges were approximated using buried dermal sutures and closed using 5.0 FAG    epidermal sutures.  Skin graft and cartilage graft were trimmed of fat.  The cartilage graft was secured to into the defect to provide support and achive anatomical structure.  The cartilage was secured with 4.0 Vicryl to the lower lateral cartilage and lateral alar remnant.    Movement of the pedicle flap to the original Mohs surgery defect was then addressed. The flap was first secured into the defect with 4-0 Vicryl and 4-0 Monocryl buried dermal sutures at the alar rim. The lining portion was then folded over to reline the nasal passage and cover the cartilage graft. It was further secured to the defect with 5-0 fast absorbing gut epidermal sutures.  Trimming and thinning to fit the defect and provide normal contour tailored the flap.  As possible, the edges of the flap were secured into the defect using additional simple interrupted epidermal sutures.  After the flap was fully secured, the flap color remained pink.  The direct pedicle was then dressed by wrapping with vaseline gauze. The remaining wound edges were cleansed with saline, then  ointment and a non-adherent pressure dressing was applied.  The patient left the operative suite in good condition. Wound care instructions were reviewed verbally and in writing with the patient.  The patient will be scheduled for a division and inset procedure in 3-weeks. Dermabrasion could be used if needed as the third stage of this reconstruction.     Rios Mckeon MD was immediately available for the entire surgery and was physicially present for the entire  procedure.     Scribe Disclosure:   I, YEIMI ALCARAZ, am serving as a scribe; to document services personally performed by Rios Mckeon MD -based on data collection and the provider's statements to me.     Attending attestation:  I personally performed the entire procedure.  I have reviewed the note and edited it as necessary, and agree with its contents.    Rios Mckeon M.D.  Professor  Director of Dermatologic Surgery  Department of Dermatology  [facility]    Dermatology Surgery Clinic  [facility] [place] [id number]

## 2023-11-01 ENCOUNTER — TELEPHONE (OUTPATIENT)
Dept: DERMATOLOGY | Facility: CLINIC | Age: 68
End: 2023-11-01
Payer: MEDICARE

## 2023-11-01 NOTE — TELEPHONE ENCOUNTER
Follow up call completed following Mohs procedure with Dr. Mckeon. Writer left voicemail encouraging patient to please call [phone] option [phone] if she has any additional questions.      Maite GUTIERREZ RN

## 2023-11-02 ENCOUNTER — TELEPHONE (OUTPATIENT)
Dept: DERMATOLOGY | Facility: CLINIC | Age: 68
End: 2023-11-02
Payer: MEDICARE

## 2023-11-02 NOTE — TELEPHONE ENCOUNTER
Writer called patient back, she had questions regarding the Surgicel, I told her to leave it there it is absorbing blood and it is skin-glued in so we will remove it at her follow up. Patient acknowledged and has no other questions.    Maite GUTIERREZ RN

## 2023-11-02 NOTE — TELEPHONE ENCOUNTER
[facility] Call Center    Phone Message    May a detailed message be left on voicemail: yes     Reason for Call: Other: Pt states she had MOHS on Monday, 10/30/23 and is returning a follow up call from Maite.     Pt states she does have some questions.     Please call Pt back to discuss. Thank you.     Action Taken: Message routed to:  [facility] ([facility] Derm    Travel Screening: Not Applicable

## 2023-11-06 ENCOUNTER — OFFICE VISIT (OUTPATIENT)
Dept: DERMATOLOGY | Facility: CLINIC | Age: 68
End: 2023-11-06
Payer: MEDICARE

## 2023-11-06 DIAGNOSIS — Z48.89 POSTOPERATIVE VISIT: Primary | ICD-10-CM

## 2023-11-06 PROCEDURE — 99024 POSTOP FOLLOW-UP VISIT: CPT | Mod: GC | Performed by: DERMATOLOGY

## 2023-11-06 ASSESSMENT — PAIN SCALES - GENERAL: PAINLEVEL: NO PAIN (0)

## 2023-11-06 NOTE — LETTER
11/6/2023       RE: Emelia Weathers  [address]  [facility] [address]     Dear Colleague,    Thank you for referring your patient, Emelia Weathers, to the [facility] DERMATOLOGIC SURGERY CLINIC [place] at [facility]. Please see a copy of my visit note below.    Dermatologic Surgery Post-Op Wound Check     CC: Derm Problem (Patient is here for a one week wound check of R nasal tip. Things are going well after a rough first few days. She reports headaches. 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   - Raynaud's (no DUs), facial telangiectasias, sclerodactyly, onychodystrophy, sclerodermoid facies, xeroopthalmia, inflammatory arthritis  - no weakness/myalgias, no ILD (CT with stable biapical scarring, no PFTs on record), no known PAH (no TTE on record), no GI dysmotility   - on lisinopril for HTN   - SANDY +1:160 (homogeneous) and +1:80 (speckled); scleroderma and myositis panels and other antibodies negative   - hydroxychloroquine 200 mg daily, hand therapy  # Pancreatic cancer: on capecitabine ~10 years  - recurrent hand-foot syndrome  - augmented betamethasone ointment    Subjective: Emelia Weathers is a 68 year old female who presents today for wound check after MMS on R nasal tip for BCC on 10/30/23.   - She reports that the wound site is sore. She has been taking Tylenol and Advil. The pain has been manageable. She reports she is unable to sleep on the ear the graft was taken from. She is compliant with daily wound cleaning and Vaseline.  - no other concerns today    Objective: An exam of the nose was performed today.   - Well perfused flap on the nose.    - Well healing graft donor site on R ear.   - The surgical sites noted above is clean, dry, and intact. There is no  surrounding erythema, purulence, or significant tenderness to palpation. No clinical evidence of infection noted today.    Assessment and Plan:     1. # BCC, R nasal tip s/p MMS 10/30/23 with cheek interpolation flap   Flap and cartilage donor site healing very well.   - Surgicel dressing removed today.   - The patient's surgery site(s) is/are healing very well. No evidence of infection on examination today.  - The patient was told to continue with wound cares daily with Vaseline    RTC 11/20/23 for flap takedown.     Patient was discussed with and evaluated by attending physician Dr. Mckeon.    Scribe Disclosure:   I, YEIMI ALCARAZ, am serving as a scribe; to document services personally performed by Rios Mckeon MD -based on data collection and the provider's statements to me.     Attending Attestation  I attest that the Fellow recorded the interview and exam that I personally performed.  I have reviewed the note and edited it as necessary.    Rios Mckeon M.D.  Professor  Director of Dermatologic Surgery  Department of Dermatology  [facility]

## 2023-11-06 NOTE — PROGRESS NOTES
Dermatologic Surgery Post-Op Wound Check     CC: Derm Problem (Patient is here for a one week wound check of R nasal tip. Things are going well after a rough first few days. She reports headaches. 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   - Raynaud's (no DUs), facial telangiectasias, sclerodactyly, onychodystrophy, sclerodermoid facies, xeroopthalmia, inflammatory arthritis  - no weakness/myalgias, no ILD (CT with stable biapical scarring, no PFTs on record), no known PAH (no TTE on record), no GI dysmotility   - on lisinopril for HTN   - SANDY +1:160 (homogeneous) and +1:80 (speckled); scleroderma and myositis panels and other antibodies negative   - hydroxychloroquine 200 mg daily, hand therapy  # Pancreatic cancer: on capecitabine ~10 years  - recurrent hand-foot syndrome  - augmented betamethasone ointment    Subjective: Emelia Weathers is a 68 year old female who presents today for wound check after MMS on R nasal tip for BCC on 10/30/23.   - She reports that the wound site is sore. She has been taking Tylenol and Advil. The pain has been manageable. She reports she is unable to sleep on the ear the graft was taken from. She is compliant with daily wound cleaning and Vaseline.  - no other concerns today    Objective: An exam of the nose was performed today.   - Well perfused flap on the nose.    - Well healing graft donor site on R ear.   - The surgical sites noted above is clean, dry, and intact. There is no surrounding erythema, purulence, or significant tenderness to palpation. No clinical evidence of infection noted today.    Assessment and Plan:     1. # BCC, R nasal tip s/p MMS 10/30/23 with cheek interpolation flap   Flap and cartilage donor site healing very well.   - Surgicel dressing removed today.   - The patient's surgery  site(s) is/are healing very well. No evidence of infection on examination today.  - The patient was told to continue with wound cares daily with Vaseline    RTC 11/20/23 for flap takedown.     Patient was discussed with and evaluated by attending physician Dr. Mckeon.    Scribe Disclosure:   I, YEIMI KIERAN, am serving as a scribe; to document services personally performed by Rios Mckeon MD -based on data collection and the provider's statements to me.     Attending Attestation  I attest that the Fellow recorded the interview and exam that I personally performed.  I have reviewed the note and edited it as necessary.    Rios Mckeon M.D.  Professor  Director of Dermatologic Surgery  Department of Dermatology  [facility]

## 2023-11-06 NOTE — NURSING NOTE
Dermatology Rooming Note    Emelia Weathers's goals for this visit include:   Chief Complaint   Patient presents with    Derm Problem     Patient is here for a one week wound check of R nasal tip. Things are going well after a rough first few days. She reports headaches.      Rose Hand, visit facilitator

## 2023-11-14 DIAGNOSIS — C25.0 MALIGNANT NEOPLASM OF HEAD OF PANCREAS (H): Primary | ICD-10-CM

## 2023-11-14 RX ORDER — CAPECITABINE 500 MG/1
TABLET, FILM COATED ORAL
Qty: 42 TABLET | Refills: 3 | Status: SHIPPED | OUTPATIENT
Start: 2023-11-14 | End: 2024-02-07

## 2023-11-15 NOTE — PROGRESS NOTES
SPEECH LANGUAGE PATHOLOGY EVALUATION    Subjective      Presenting condition or subjective complaint: Pt presents today for video swallow study evaluation referred by her Oncologist.   Date of onset:  (Pt reports onset a few years ago)    Relevant medical history:  pancreatic cancer, scleroderma    Prior diagnostic imaging/testing results: Pt reports she had an EGD many years ago that was unremarkable    Employment: Retired    Patient goals for therapy: to figure out why food feels like it sticks intermittently     Pain assessment: Pain denied     Objective     SWALLOW EVALUTION  Dysphagia history: Pt reports difficulty 1-2x/month where food will get stuck pointing near her collarbone. This has happened with rice, pasta and bread. When episodes happen she has to either wait it out for the item to pass or cough/gag it back up. Episodes are very unpredictable. She denies feeling of items going 'down the wrong pipe.' Denies difficulty swallowing pills. Denies recent PNAs or unintentional weightloss. She has been on a PPI for many years.    Current Diet/Method of Nutritional Intake: thin liquids (level 0), regular diet        CLINICAL SWALLOW EVALUATION  Oral Motor Function: Dentition: adequate dentition  Secretion management: WFL  Mucosal quality: dry  Mandibular function: intact  Oral labial function: WFL  Lingual function: WFL  Velar function: intact   Buccal function: intact  Laryngeal function: cough, throat clear, volitional swallow, voicing WFL     Level of assist required for feeding: no assistance needed   Textures Trialed:   Clinical Swallow Eval: Thin Liquids  Mode of presentation: cup, self-fed   Volume presented: 3oz  Preparatory Phase: WFL  Oral Phase: WFL  Pharyngeal phase of swallow: intact   Diagnostic statement: No clinical s/sx of penetration/aspiration.      ADDITIONAL EVAL COMPLETED TODAY : yes; rationale: to further assess pharyngeal phase    VIDEOFLUOROSCOPIC SWALLOW STUDY  Radiologist:  Resident  Views Taken: left lateral, A/P   Physical location of procedure: [facility]  Patient sitting upright on chair/stool     VFSS textures trialed:   VFSS Eval: Thin Liquids  Mode of Presentation: cup, self-fed   Order of Presentation: Series 1, 2, 6, 9, 14AP  Preparatory Phase: WFL  Oral Phase: WFL  Bolus Location When Swallow Initiated: posterior angle of ramus  Pharyngeal Phase: impaired tongue base retraction, pharyngeal wall coating, residue in valleculae, residue in pyriform sinus  Rosenbeck's Penetration Aspiration Scale: 1 - no aspiration, contrast does not enter airway  Diagnostic Statement: No penetration/aspiration. Mild vallecular residual with coating of residuals in piriforms and on posterior pharyngeal wall. Residuals reduced with secondary, dry swallow. AP reveals symmetric bolus flow through oropharynx.     VFSS Eval: Mildly Thick Liquids  Mode of Presentation: cup, self-fed   Order of Presentation: Series 3  Preparatory Phase: WFL  Oral Phase: WFL  Bolus Location When Swallow Initiated: posterior angle of ramus  Pharyngeal Phase: impaired tongue base retraction, pharyngeal wall coating, residue in valleculae, residue in pyriform sinus  Rosenbeck's Penetration Aspiration Scale: 1 - no aspiration, contrast does not enter airway  Diagnostic Statement: No penetration/aspiration. Mild vallecular residuals with coating of residuals in piriforms and on posterior pharyngeal wall. Residuals reduced with secondary, dry swallow.     VFSS Eval: Purees  Mode of Presentation: spoon, self-fed   Order of Presentation: Series 4, 5  Preparatory Phase: WFL  Oral Phase: WFL  Bolus Location When Swallow Initiated: posterior angle of ramus  Pharyngeal Phase: impaired tongue base retraction, residue in valleculae  Rosenbeck s Penetration Aspiration Scale: 1 - no aspiration, contrast does not enter airway  Diagnostic Statement: No penetration/aspiration. Mild to moderate vallecular residuals reduced significantly  with a liquid wash.     VFSS Eval: Solids  Mode of Presentation: self-fed   Order of Presentation: Series 7, 8  Preparatory Phase: WFL  Oral Phase: WFL  Bolus Location When Swallow Initiated: posterior angle of ramus  Pharyngeal Phase: impaired tongue base retraction, residue in valleculae  Rosenbeck s Penetration Aspiration Scale: 1 - no aspiration, contrast does not enter airway  Diagnostic Statement: No penetration/aspiration. Mild to moderate vallecular residuals reduced significantly with a liquid wash.     VFSS Eval: Barium Tablet  Mode of Presentation:  whole tablet taken with thin water by cup    Order of Presentation: Series 10  Preparatory Phase: WFL  Oral Phase: WFL  Bolus Location When Swallow Initiated: posterior angle of ramus  Pharyngeal Phase: WFL  Rosenbeck s Penetration Aspiration Scale: 1 - no aspiration, contrast does not enter airway  Diagnostic Statement: Barium tablet passed through oropharynx without difficulty. However, it got stuck just below the UES in the esophagus and stayed there despite multiple sips of liquids.      ESOPHAGEAL PHASE OF SWALLOW  patient presents with symptoms of esophageal dysphagia  please refer to radiologist's report for details     SWALLOW ASSESSMENT CLINICAL IMPRESSIONS AND RATIONALE  Diet Consistency Recommendations: thin liquids (level 0), regular diet    Recommended Feeding/Eating Techniques:  small sips/bites, alternate consistencies    Medication Administration Recommendations: as tolerated      Assessment & Plan   CLINICAL IMPRESSIONS   Medical Diagnosis: Malignant neoplasm of head of pancreas (H)  Scleroderma    Treatment Diagnosis: Mild oropharyngeal dysphagia; esophageal dysphagia   Impression/Assessment: Pt is a 68 year old female with swallow complaints. The following significant findings have been identified: impaired swallowing, characterized by mildly reduced base of tongue retraction and a narrowing in the esophagus.     Discussed in the context of  scleroderma, pharyngeal strengthening exercises are not indicated. Rather, recommend patient take small bites/sips and alternate consistencies.    Recommend GI referral given narrowing in the esophagus which barium tablet was unable to traverse.     PLAN OF CARE  Evaluation only    Recommended Referrals to Other Professionals:  GI for consideration of EGD with dilation    Education Assessment:   Learner/Method: Patient;Listening;Pictures/Video;No Barriers to Learning    Risks and benefits of evaluation/treatment have been explained.   Patient/Family/caregiver agrees with Plan of Care.     Evaluation Time:    SLP Eval: oral/pharyngeal swallow function, clinical minutes (41032): 15  SLP Eval: VideoFluoroscopic Swallow function Minutes (51546): 22    Signing Clinician: COSME Leiva

## 2023-11-16 ENCOUNTER — ANCILLARY PROCEDURE (OUTPATIENT)
Dept: GENERAL RADIOLOGY | Facility: CLINIC | Age: 68
End: 2023-11-16
Attending: INTERNAL MEDICINE
Payer: MEDICARE

## 2023-11-16 ENCOUNTER — THERAPY VISIT (OUTPATIENT)
Dept: SPEECH THERAPY | Facility: CLINIC | Age: 68
End: 2023-11-16
Attending: INTERNAL MEDICINE
Payer: MEDICARE

## 2023-11-16 DIAGNOSIS — C25.0 MALIGNANT NEOPLASM OF HEAD OF PANCREAS (H): ICD-10-CM

## 2023-11-16 DIAGNOSIS — R13.12 OROPHARYNGEAL DYSPHAGIA: Primary | ICD-10-CM

## 2023-11-16 DIAGNOSIS — M34.9 SCLERODERMA (H): ICD-10-CM

## 2023-11-16 PROCEDURE — 92611 MOTION FLUOROSCOPY/SWALLOW: CPT | Mod: GN | Performed by: SPEECH-LANGUAGE PATHOLOGIST

## 2023-11-16 PROCEDURE — 74230 X-RAY XM SWLNG FUNCJ C+: CPT | Mod: GC | Performed by: RADIOLOGY

## 2023-11-16 PROCEDURE — 92610 EVALUATE SWALLOWING FUNCTION: CPT | Mod: GN | Performed by: SPEECH-LANGUAGE PATHOLOGIST

## 2023-11-16 RX ORDER — BARIUM SULFATE 400 MG/ML
SUSPENSION ORAL ONCE
Status: COMPLETED | OUTPATIENT
Start: 2023-11-16 | End: 2023-11-16

## 2023-11-16 RX ADMIN — BARIUM SULFATE 20 ML: 400 SUSPENSION ORAL at 14:00

## 2023-11-16 NOTE — Clinical Note
Davida Wade-patient was found to have an esophageal web just below the UES that the pill got stuck in . Recommend GI referral for EGD and consideration of dilation. Thanks! Carley Maynard, SLP

## 2023-11-20 ENCOUNTER — TELEPHONE (OUTPATIENT)
Dept: DERMATOLOGY | Facility: CLINIC | Age: 68
End: 2023-11-20

## 2023-11-20 ENCOUNTER — OFFICE VISIT (OUTPATIENT)
Dept: DERMATOLOGY | Facility: CLINIC | Age: 68
End: 2023-11-20
Payer: MEDICARE

## 2023-11-20 VITALS — DIASTOLIC BLOOD PRESSURE: 65 MMHG | HEART RATE: 80 BPM | SYSTOLIC BLOOD PRESSURE: 123 MMHG

## 2023-11-20 DIAGNOSIS — C44.311 BASAL CELL CARCINOMA (BCC) OF SKIN OF NOSE: Primary | ICD-10-CM

## 2023-11-20 PROCEDURE — 15630 DELAY FLAP EYE/NOS/EAR/LIP: CPT | Mod: 58 | Performed by: DERMATOLOGY

## 2023-11-20 ASSESSMENT — PAIN SCALES - GENERAL: PAINLEVEL: NO PAIN (0)

## 2023-11-20 NOTE — LETTER
02/26/19 1400   Group 3   Start Time 1300   Stop Time 1345   Length (min) 45 min   Group Name Coping skills   Focus of Group Pet therapy   Attendance Not present   Grecia Rodriguez LCSW, SAC   11/20/2023       RE: Emelia Weathers  [address]  [facility] [address]     Dear Colleague,    Thank you for referring your patient, Emelia Weathers, to the [facility]MAT[facility] [place] [facility]. Please see a copy of my visit note below.    RECONSTRUCTION: Cheek Interpolation Flap Division and Inset    Primary Surgeon: Rios Mckeon MD   Fellow Surgeon: Suzanne Null MD    Case(s) Specific Information:      PROCEDURE:  The patient was taken to the operative suite and placed in a supine position on the operating room table. The flap was measured and identified and the planned division and inset was outlined with a skin marker.  The area was then infiltrated with  1% lidocaine with epinephrine. The area was then prepped in a sterile fashion using Hibiclens and sterile drapes were placed.  The pedicle was divided with a #15 scalpel blade, down to the level of fat.  The base of the pedicle was then excised to the level of deep fat in an elliptical manner.  The flap on the nose was left attached medially and incised to the level of subnasalis.  Excess fibrofatty tissue was excised and the area widely undermined in the subnasalis plane.  The flap was first secured into the defect with buried dermal 5-0 Monocryl sutures.  It was further secured to the defect with 5-0 fast gut epidermal sutures.  Trimming and thinning to fit the defect and provide normal contour tailored the flap.  As possible, the edges of the flap were secured into the defect using 5-0 fast gut epidermal sutures.  After the flap was fully secured, the flap color remained pink. The wound edges were cleansed with saline, then ointment and a non-adherent pressure dressing was applied.  The patient left the operative suite in good condition.  Wound care instructions were reviewed verbally and in writing with the patient.      Repair Size: 3.0 x 2.5 cm  Sutures Used:  Deep: 5-0  Monocryl Superficial: 5-0 fast gut   Anesthesia Visit Total (ml): 12 mL    RTC 2 weeks for wound check.       Dr. Suzanne Null (Mohs micrographic surgery fellow) performed a portion of the reconstruction under the direct supervision of Rios Mckeon MD, who was present for the key portions of the reconstruction, and always immediately available.    Suzanne Null MD  Micrographic Surgery and Dermatologic Oncology (MSDO) Fellow, PGY-5        Attestation signed by Rios Mckeon MD at 11/20/2023  3:45 PM:    Attending attestation:  I was present for key elements of the procedure and immediately available for all other portions of the procedure.  I have reviewed the note and edited it as necessary.    Rios Mckeon M.D.  Professor  Director of Dermatologic Surgery  Department of Dermatology  [facility]    Dermatology Surgery Clinic  [facility]  [address] [place] [id number]

## 2023-11-20 NOTE — PATIENT INSTRUCTIONS
Wound care    I will experience scar, bleeding, swelling, pain, crusting and redness. I may experience incomplete removal or recurrence. Risks are bleeding, bruising, swelling, infection, nerve damage, & a large wound. A second procedure may be recommended to obtain the best cosmetic or functional result.       A three month office visit with your Surgeon is recommended for scar evaluation. Please reach out sooner if you have concerns about you surgical site/wound.    Caring for your skin after surgery    After your surgery, a pressure bandage will be placed over the area that has stitches. This is important to prevent bleeding. Please follow these instructions over the next 1 to 2 weeks. Following this regimen will help to prevent complications as your wound heals.     For the first 48 hours after your surgery:    Leave the pressure dressing on and keep it dry. If it should come loose, you may re-tape it, but do not take it off.  Relax and take it easy. Do not do any vigorous exercise or heavy lifting. This could cause the wound to bleed.  Post-Operative pain is usually mild. If you are able to take tylenol, You may take plain or extra-strength Tylenol (acetaminophen) As directed on the bottle (do not take more than 4,000mg in one day). If you are able to take ibuprofen, you can alternate the tylenol and ibuprofen.   Avoid alcohol as this may increase your tendency to bleed.   You may put an ice pack around the bandaged area for 20 minutes at a time as needed. This may help reduce swelling, bruising, and pain. Make sure the ice pack is waterproof so that the pressure bandage doesn t get wet.  If the wound is on the face try to sleep with your head elevated. Either in a recliner or propped up in bed, this will decrease swelling around the eyes.   You may see a small amount of drainage or blood on your pressure bandage. This is normal. However:  If drainage or bleeding continues or saturates the bandage, you will  "need to apply firm pressure over the bandage with a piece of gauze for 15 minutes.  If bleeding continues after applying pressure for 15 minutes, apply an ice pack to the bandaged area for 15 minutes.  If bleeding still continues, call our office or go to the nearest emergency room.    Remove pressure dressing 48 hours after surgery:    Carefully remove the pressure bandage. If it seems sticky or too difficult to get off, you may need to soak it off in the shower.  After the pressure dressing is removed, you may shower and get the wound wet. However, Do Not let the forceful stream of the shower hit the wound directly.  Follow these wound care and dressing change instructions:   In the shower wash the surgical site last with its own separate wash cloth.  You may allow water to run over the site. Take a clean wash cloth wet with soapy warm water and gently pat the suture site to help remove any crust or drainage.   Do Not rub or scrub the site    After site is clean pat dry and apply a thin layer of Vaseline ointment  over the suture site with a cotton swab or clean finger.   Cover the suture site with Telfa (non-stick) dressing. You may tape a piece of gauze over the Telfa for extra protection if you wish.  Continue wound care at least once a day, twice if you are active or around a contaminated environment.  Continue daily wound care until your surgical site is completely healed.   Dissolving stitches, if you have been told your stitches are dissolving they should dissolve in one to one and a half week.      If you are able to take acetaminophen (\"Tylenol,\" etc.) and ibuprofen (\"Advil\" or \"Motrin,\" etc.), then you may STAGGER these medications by taking 400 mg of ibuprofen (usually two tabs) every 8 hours and 1,000 mg of acetaminophen (e.g., two tabs of extra-strength Tylenol) every 8 hours.    This means, for example, that you could take the followin,000 mg of Tylenol, followed 4 hours later by 400 mg " Ibuprofen, followed 4 hours later with 1,000 mg of Tylenol, followed 4 hours later by 400 mg Ibuprofen, followed 4 hours later with 1,000 mg of Tylenol, and so forth.     Essentially, you can take either 1,000 mg of Tylenol or 400 mg of ibuprofen in alternating fashion EVERY FOUR HOURS.    Do NOT exceed more than 4,000 mg of Tylenol or 3,200 mg of ibuprofen per 24 hours. If you are not able to take Tylenol or ibuprofen as above due to other health issues (or a physician has told you directly that you are not allowed to take one of them, say due to pre-existing severe liver or kidney issues), then disregard the above directions.    Scientific evidence supports that this combination/schedule of pain medications is just as effective, if not more effective, than taking a narcotic pain medicine.       Follow up will be a 3 month scar evaluation either in person or via a telephone visit with you sending in a photo via [employer]. Unless you have been told to follow up sooner or if you have concerns and would like to be see sooner. Please call or send us in a [employer]u message if possible and attach a photo.        What to expect:    The first couple of days your wound may be tender and may bleed slightly when doing wound care.  There may be swelling and bruising around the wound, especially if it is near the eyes. For your comfort, you may apply ice or cold compresses to the bruises after your have removed the pressure bandage.  The area around your wound may be numb for several weeks or even months.  You may experience periodic sharp pain or mild itching around the wound as it heals.   The suture line will look dark for a while but will lighten over time.       When to call us:    You have bleeding that will not stop after applying pressure and ice.  You have pain that is not controlled with Tylenol (acetaminophen.)  You have signs or symptoms of an infection such as:  Fever over 100 degrees Fahrenheit  Redness, warmth or  foul-smelling drainage from the wound  If you have any questions, or are not sure how to take care of the wound.    Phone numbers:    During business hours (M-F 8:00-4:30 p.m.)  [facility]-  [phone] Option 1 appt. desk  [phone]  Option 3 nurse triage line  ---------------------------------------------------------  Evenings/Weekends/Holidays  Hospital - [phone]   TTY for hearing [phone]  *Ask  to page dermatologist on-call  Emergency Mvdq-[phone]  TTY for hearing impaired- [phone]

## 2023-11-20 NOTE — TELEPHONE ENCOUNTER
"Follow up call completed following takedown procedure with Dr. Mckeon.      Are you having pain? More tenderness  Are you taking pain medication? Tylenol and Ibuprofen  Are you applying ice? No  Have you had any noticeable bleeding through the bandage? A little yesterday after a sneeze, but it has stopped immediately  Do you have any other concerns? Little bit of \"stuffiness\" but states it is most likely from swelling      Please call [phone] option 3 if you have any questions or concerns.    Maite GUTIERREZ RN  "

## 2023-11-20 NOTE — PROGRESS NOTES
RECONSTRUCTION: Cheek Interpolation Flap Division and Inset    Primary Surgeon: Rios Mckeon MD   Fellow Surgeon: Suzanne Null MD    Case(s) Specific Information:      PROCEDURE:  The patient was taken to the operative suite and placed in a supine position on the operating room table. The flap was measured and identified and the planned division and inset was outlined with a skin marker.  The area was then infiltrated with  1% lidocaine with epinephrine. The area was then prepped in a sterile fashion using Hibiclens and sterile drapes were placed.  The pedicle was divided with a #15 scalpel blade, down to the level of fat.  The base of the pedicle was then excised to the level of deep fat in an elliptical manner.  The flap on the nose was left attached medially and incised to the level of subnasalis.  Excess fibrofatty tissue was excised and the area widely undermined in the subnasalis plane.  The flap was first secured into the defect with buried dermal 5-0 Monocryl sutures.  It was further secured to the defect with 5-0 fast gut epidermal sutures.  Trimming and thinning to fit the defect and provide normal contour tailored the flap.  As possible, the edges of the flap were secured into the defect using 5-0 fast gut epidermal sutures.  After the flap was fully secured, the flap color remained pink. The wound edges were cleansed with saline, then ointment and a non-adherent pressure dressing was applied.  The patient left the operative suite in good condition.  Wound care instructions were reviewed verbally and in writing with the patient.      Repair Size: 3.0 x 2.5 cm  Sutures Used:  Deep: 5-0 Monocryl Superficial: 5-0 fast gut   Anesthesia Visit Total (ml): 12 mL    RTC 2 weeks for wound check.       Dr. Suzanne Null (Mohs micrographic surgery fellow) performed a portion of the reconstruction under the direct supervision of Rios Mckeon MD, who was present for the key portions of the reconstruction, and always  immediately available.    Suzanne Null MD  Micrographic Surgery and Dermatologic Oncology (MSDO) Fellow, PGY-5

## 2023-11-21 ENCOUNTER — LAB (OUTPATIENT)
Dept: LAB | Facility: CLINIC | Age: 68
End: 2023-11-21
Attending: INTERNAL MEDICINE
Payer: MEDICARE

## 2023-11-21 DIAGNOSIS — C25.0 MALIGNANT NEOPLASM OF HEAD OF PANCREAS (H): ICD-10-CM

## 2023-11-21 DIAGNOSIS — M34.9 SCLERODERMA (H): ICD-10-CM

## 2023-11-21 LAB
ALBUMIN SERPL BCG-MCNC: 4 G/DL (ref 3.5–5.2)
ALP SERPL-CCNC: 94 U/L (ref 40–150)
ALT SERPL W P-5'-P-CCNC: 17 U/L (ref 0–50)
ANION GAP SERPL CALCULATED.3IONS-SCNC: 8 MMOL/L (ref 7–15)
AST SERPL W P-5'-P-CCNC: 28 U/L (ref 0–45)
BASOPHILS # BLD AUTO: 0 10E3/UL (ref 0–0.2)
BASOPHILS NFR BLD AUTO: 1 %
BILIRUB SERPL-MCNC: 0.3 MG/DL
BUN SERPL-MCNC: 13.1 MG/DL (ref 8–23)
CALCIUM SERPL-MCNC: 8.6 MG/DL (ref 8.8–10.2)
CHLORIDE SERPL-SCNC: 103 MMOL/L (ref 98–107)
CREAT SERPL-MCNC: 0.76 MG/DL (ref 0.51–0.95)
DEPRECATED HCO3 PLAS-SCNC: 24 MMOL/L (ref 22–29)
EGFRCR SERPLBLD CKD-EPI 2021: 84 ML/MIN/1.73M2
EOSINOPHIL # BLD AUTO: 0 10E3/UL (ref 0–0.7)
EOSINOPHIL NFR BLD AUTO: 1 %
ERYTHROCYTE [DISTWIDTH] IN BLOOD BY AUTOMATED COUNT: 22.5 % (ref 10–15)
GLUCOSE SERPL-MCNC: 126 MG/DL (ref 70–99)
HCT VFR BLD AUTO: 28.3 % (ref 35–47)
HGB BLD-MCNC: 9.3 G/DL (ref 11.7–15.7)
IMM GRANULOCYTES # BLD: 0 10E3/UL
IMM GRANULOCYTES NFR BLD: 0 %
LYMPHOCYTES # BLD AUTO: 0.8 10E3/UL (ref 0.8–5.3)
LYMPHOCYTES NFR BLD AUTO: 18 %
MCH RBC QN AUTO: 31.7 PG (ref 26.5–33)
MCHC RBC AUTO-ENTMCNC: 32.9 G/DL (ref 31.5–36.5)
MCV RBC AUTO: 97 FL (ref 78–100)
MONOCYTES # BLD AUTO: 0.5 10E3/UL (ref 0–1.3)
MONOCYTES NFR BLD AUTO: 13 %
NEUTROPHILS # BLD AUTO: 2.8 10E3/UL (ref 1.6–8.3)
NEUTROPHILS NFR BLD AUTO: 67 %
NRBC # BLD AUTO: 0 10E3/UL
NRBC BLD AUTO-RTO: 0 /100
PLATELET # BLD AUTO: 190 10E3/UL (ref 150–450)
POTASSIUM SERPL-SCNC: 4.3 MMOL/L (ref 3.4–5.3)
PROT SERPL-MCNC: 6.6 G/DL (ref 6.4–8.3)
RBC # BLD AUTO: 2.93 10E6/UL (ref 3.8–5.2)
SODIUM SERPL-SCNC: 135 MMOL/L (ref 135–145)
WBC # BLD AUTO: 4.2 10E3/UL (ref 4–11)

## 2023-11-21 PROCEDURE — 36591 DRAW BLOOD OFF VENOUS DEVICE: CPT

## 2023-11-21 PROCEDURE — 85025 COMPLETE CBC W/AUTO DIFF WBC: CPT

## 2023-11-21 PROCEDURE — 80053 COMPREHEN METABOLIC PANEL: CPT

## 2023-11-21 PROCEDURE — 250N000011 HC RX IP 250 OP 636: Mod: JZ | Performed by: INTERNAL MEDICINE

## 2023-11-21 RX ORDER — HEPARIN SODIUM (PORCINE) LOCK FLUSH IV SOLN 100 UNIT/ML 100 UNIT/ML
5 SOLUTION INTRAVENOUS ONCE
Status: COMPLETED | OUTPATIENT
Start: 2023-11-21 | End: 2023-11-21

## 2023-11-21 RX ADMIN — Medication 5 ML: at 15:28

## 2023-11-21 NOTE — NURSING NOTE
Chief Complaint   Patient presents with    Port Draw     Port accessed with 20 gauge 3/4 inch flat needle by RN, labs collected, line flushed with saline and heparin.  Vitals taken. Pt checked in for appointment(s).       Waleska Araujo RN

## 2023-11-22 ENCOUNTER — MYC MEDICAL ADVICE (OUTPATIENT)
Dept: ONCOLOGY | Facility: CLINIC | Age: 68
End: 2023-11-22
Payer: MEDICARE

## 2023-11-22 DIAGNOSIS — C25.0 MALIGNANT NEOPLASM OF HEAD OF PANCREAS (H): Primary | ICD-10-CM

## 2023-11-22 NOTE — TELEPHONE ENCOUNTER
Oral Chemotherapy Monitoring Program  Lab Follow Up    Reviewed CBC and CMP lab results from 11/21/23.        2/27/2023     3:00 PM 5/24/2023     3:00 PM 7/17/2023    11:00 AM 8/14/2023     2:00 PM 9/18/2023     9:00 AM 11/14/2023    10:00 AM 11/22/2023    10:00 AM   ORAL CHEMOTHERAPY   Assessment Type Refill Refill Lab Monitoring Refill;Chart Review Lab Monitoring Refill Lab Monitoring   Diagnosis Code Pancreatic Cancer Pancreatic Cancer Pancreatic Cancer Pancreatic Cancer Pancreatic Cancer Pancreatic Cancer Pancreatic Cancer   Providers Dr. Mauro Wade   [facility]   Is this patient followed by the [facility] OC team?  Yes Yes Yes Yes Yes Yes   Drug Name Xeloda (capecitabine) Xeloda (capecitabine) Xeloda (capecitabine) Xeloda (capecitabine) Xeloda (capecitabine) Xeloda (capecitabine) Xeloda (capecitabine)   Dose Other: Other: Other: Other: Other: Other: Other:   Current Schedule BID BID BID BID BID BID BID   Cycle Details 2 weeks on, 1 week off 2 weeks on, 1 week off 2 weeks on, 1 week off 2 weeks on, 1 week off 2 weeks on, 1 week off 2 weeks on, 1 week off 2 weeks on, 1 week off   Adverse Effects     Anemia     Anemia     Grade 2     Pharmacist Intervention(anemia)     Yes     Intervention(s)     Referral to oncology provider       Labs:  _  Result Component Current Result Ref Range   Sodium 135 (11/21/2023) 135 - 145 mmol/L     _  Result Component Current Result Ref Range   Potassium 4.3 (11/21/2023) 3.4 - 5.3 mmol/L     _  Result Component Current Result Ref Range   Calcium 8.6 (L) (11/21/2023) 8.8 - 10.2 mg/dL     No results found for Mag within last 30 days.     No results found for Phos within last 30 days.     _  Result Component Current Result Ref Range   Albumin 4.0 (11/21/2023) 3.5 - 5.2 g/dL     _  Result Component Current Result Ref Range   Urea Nitrogen 13.1 (11/21/2023) 8.0 - 23.0  mg/dL     _  Result Component Current Result Ref Range   Creatinine 0.76 (11/21/2023) 0.51 - 0.95 mg/dL     _  Result Component Current Result Ref Range   AST 28 (11/21/2023) 0 - 45 U/L     _  Result Component Current Result Ref Range   ALT 17 (11/21/2023) 0 - 50 U/L     _  Result Component Current Result Ref Range   Bilirubin Total 0.3 (11/21/2023) <=1.2 mg/dL     _  Result Component Current Result Ref Range   WBC Count 4.2 (11/21/2023) 4.0 - 11.0 10e3/uL     _  Result Component Current Result Ref Range   Hemoglobin 9.3 (L) (11/21/2023) 11.7 - 15.7 g/dL     _  Result Component Current Result Ref Range   Platelet Count 190 (11/21/2023) 150 - 450 10e3/uL     No results found for ANC within last 30 days.     _  Result Component Current Result Ref Range   Absolute Neutrophils 2.8 (11/21/2023) 1.6 - 8.3 10e3/uL        Assessment & Plan:  No concerning abnormalities. Calcium slightly low at 8.6 but similar to previous values. Patient taking calcium/vitamin D supplement while on alendronate. Continue capecitabine as planned.    [employer] Private message sent to patient with results.    Follow-Up:  12/8: review labs from 12/7      Octavio Ramos, PharmD  PGY-2 Oncology Pharmacy Resident  [facility]  [phone]

## 2023-11-30 ENCOUNTER — PATIENT OUTREACH (OUTPATIENT)
Dept: CARE COORDINATION | Facility: CLINIC | Age: 68
End: 2023-11-30
Payer: MEDICARE

## 2023-12-04 ENCOUNTER — OFFICE VISIT (OUTPATIENT)
Dept: DERMATOLOGY | Facility: CLINIC | Age: 68
End: 2023-12-04
Payer: MEDICARE

## 2023-12-04 DIAGNOSIS — Z48.89 ENCOUNTER FOR POST SURGICAL WOUND CHECK: Primary | ICD-10-CM

## 2023-12-04 PROCEDURE — 99024 POSTOP FOLLOW-UP VISIT: CPT | Performed by: DERMATOLOGY

## 2023-12-04 ASSESSMENT — PAIN SCALES - GENERAL: PAINLEVEL: NO PAIN (0)

## 2023-12-04 NOTE — PROGRESS NOTES
Dermatologic Surgery Post-Op Wound Check     CC: Derm Problem (Patient reports that the site has been healing well since their take down procedure. The patient denies concerns with healing at this time. 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   - Raynaud's (no DUs), facial telangiectasias, sclerodactyly, onychodystrophy, sclerodermoid facies, xeroopthalmia, inflammatory arthritis  - no weakness/myalgias, no ILD (CT with stable biapical scarring, no PFTs on record), no known PAH (no TTE on record), no GI dysmotility   - on lisinopril for HTN   - SANDY +1:160 (homogeneous) and +1:80 (speckled); scleroderma and myositis panels and other antibodies negative   - hydroxychloroquine 200 mg daily, hand therapy  # Pancreatic cancer: on capecitabine ~10 years  - recurrent hand-foot syndrome  - augmented betamethasone ointment    Subjective: Emelia Weathers is a 68 year old female who presents today for wound check after takedown procedure post MMS for BCC on nasal tip.   - no other concerns today    Objective: An exam of the nasal tip was performed today   - The surgical site noted above is clean, dry, and intact. There is no surrounding erythema, purulence, or significant tenderness to palpation. No clinical evidence of infection noted today.    Assessment and Plan:     1. # BCC, R nasal tip  - s/p shave bx 8/18/23, s/p MMS 10/30/23, Takedown 11/20/23  - The patient's surgery site(s) is/are healing very well. No evidence of infection on examination today.  - The patient was told to continue with wound cares until the area(s) is/are no longer crusted.   - The patient should follow up with dermatologic surgery PRN, as well as continue with regular skin exams in general dermatology clinic.    Patient was discussed with and evaluated by  attending physician Dr. Mckeon.    Scribe Disclosure:   I, YEIMI KIERAN, am serving as a scribe; to document services personally performed by Rios Mckeon MD -based on data collection and the provider's statements to me.     Attending Attestation  I attest that the Scribe recorded the interview and exam that I personally performed.  I have reviewed the note and edited it as necessary.    Rios Mckeon M.D.  Professor  Director of Dermatologic Surgery  Department of Dermatology  [facility]

## 2023-12-04 NOTE — NURSING NOTE
Chief Complaint   Patient presents with    Derm Problem     Patient reports that the site has been healing well since their take down procedure. The patient denies concerns with healing at this time.      Amy Vences LPN

## 2023-12-04 NOTE — LETTER
12/4/2023       RE: Emelia Weathers  [address]  [place] [address]     Dear Colleague,    Thank you for referring your patient, Emelia Weathers, to the [facility] D[facility]MATOLOG[facility] [place] [facility]. Please see a copy of my visit note below.    Dermatologic Surgery Post-Op Wound Check     CC: Derm Problem (Patient reports that the site has been healing well since their take down procedure. The patient denies concerns with healing at this time. 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   - Raynaud's (no DUs), facial telangiectasias, sclerodactyly, onychodystrophy, sclerodermoid facies, xeroopthalmia, inflammatory arthritis  - no weakness/myalgias, no ILD (CT with stable biapical scarring, no PFTs on record), no known PAH (no TTE on record), no GI dysmotility   - on lisinopril for HTN   - SANDY +1:160 (homogeneous) and +1:80 (speckled); scleroderma and myositis panels and other antibodies negative   - hydroxychloroquine 200 mg daily, hand therapy  # Pancreatic cancer: on capecitabine ~10 years  - recurrent hand-foot syndrome  - augmented betamethasone ointment    Subjective: Emelia Weathers is a 68 year old female who presents today for wound check after takedown procedure post MMS for BCC on nasal tip.   - no other concerns today    Objective: An exam of the nasal tip was performed today   - The surgical site noted above is clean, dry, and intact. There is no surrounding erythema, purulence, or significant tenderness to palpation. No clinical evidence of infection noted today.    Assessment and Plan:     1. # BCC, R nasal tip  - s/p shave bx 8/18/23, s/p MMS 10/30/23, Takedown 11/20/23  - The patient's surgery site(s) is/are healing very well. No  evidence of infection on examination today.  - The patient was told to continue with wound cares until the area(s) is/are no longer crusted.   - The patient should follow up with dermatologic surgery PRN, as well as continue with regular skin exams in general dermatology clinic.    Patient was discussed with and evaluated by attending physician Dr. Mckeon.    Scribe Disclosure:   I, YEIMI KIERAN, am serving as a scribe; to document services personally performed by Rios Mckeon MD -based on data collection and the provider's statements to me.     Attending Attestation  I attest that the Scribe recorded the interview and exam that I personally performed.  I have reviewed the note and edited it as necessary.    Rios Mckeon M.D.  Professor  Director of Dermatologic Surgery  Department of Dermatology  [facility]

## 2023-12-07 ENCOUNTER — PATIENT OUTREACH (OUTPATIENT)
Dept: ONCOLOGY | Facility: CLINIC | Age: 68
End: 2023-12-07
Payer: MEDICARE

## 2023-12-07 DIAGNOSIS — C25.0 MALIGNANT NEOPLASM OF HEAD OF PANCREAS (H): Primary | ICD-10-CM

## 2023-12-07 DIAGNOSIS — M34.9 SCLERODERMA (H): ICD-10-CM

## 2023-12-07 NOTE — PROGRESS NOTES
[facility]: Cancer Care                                                                                          Reviewed swallow study with Dr Wade. Agrees with referral to GI for EGD and possible esophageal dilation. Referral placed.    Placed call to patient to review referral. Patient voiced understanding and will await call for scheduling.        Cinthia Contreras RN, BSN, OCN   RN Care Coordinator   [facility]

## 2023-12-08 ENCOUNTER — DOCUMENTATION ONLY (OUTPATIENT)
Dept: ONCOLOGY | Facility: CLINIC | Age: 68
End: 2023-12-08
Payer: MEDICARE

## 2023-12-08 NOTE — PROGRESS NOTES
[facility] performed every 6 month chart review assessment. Stable on capecitabine since 2015. Per Epic, no changes to plan and continue current therapy and last labs 11/21/23, no concerns. Doesn't want assessment calls, will continue [id number] chart review. PDC 0.99, no concerns for adherence.    Yisel Zambrano PharmD  Hematology/Oncology Clinical Pharmacist  [facility]   [phone]

## 2023-12-13 ENCOUNTER — LAB (OUTPATIENT)
Dept: LAB | Facility: CLINIC | Age: 68
End: 2023-12-13
Attending: INTERNAL MEDICINE
Payer: MEDICARE

## 2023-12-13 DIAGNOSIS — C25.0 MALIGNANT NEOPLASM OF HEAD OF PANCREAS (H): Primary | ICD-10-CM

## 2023-12-13 DIAGNOSIS — C25.0 MALIGNANT NEOPLASM OF HEAD OF PANCREAS (H): ICD-10-CM

## 2023-12-13 LAB
ALBUMIN SERPL BCG-MCNC: 3.9 G/DL (ref 3.5–5.2)
ALP SERPL-CCNC: 73 U/L (ref 40–150)
ALT SERPL W P-5'-P-CCNC: 11 U/L (ref 0–50)
ANION GAP SERPL CALCULATED.3IONS-SCNC: 8 MMOL/L (ref 7–15)
AST SERPL W P-5'-P-CCNC: 34 U/L (ref 0–45)
BASOPHILS # BLD AUTO: 0 10E3/UL (ref 0–0.2)
BASOPHILS NFR BLD AUTO: 1 %
BILIRUB SERPL-MCNC: 0.4 MG/DL
BUN SERPL-MCNC: 10.4 MG/DL (ref 8–23)
CALCIUM SERPL-MCNC: 8.5 MG/DL (ref 8.8–10.2)
CHLORIDE SERPL-SCNC: 107 MMOL/L (ref 98–107)
CREAT SERPL-MCNC: 0.73 MG/DL (ref 0.51–0.95)
DEPRECATED HCO3 PLAS-SCNC: 25 MMOL/L (ref 22–29)
EGFRCR SERPLBLD CKD-EPI 2021: 88 ML/MIN/1.73M2
EOSINOPHIL # BLD AUTO: 0 10E3/UL (ref 0–0.7)
EOSINOPHIL NFR BLD AUTO: 1 %
ERYTHROCYTE [DISTWIDTH] IN BLOOD BY AUTOMATED COUNT: 22.6 % (ref 10–15)
GLUCOSE SERPL-MCNC: 126 MG/DL (ref 70–99)
HCT VFR BLD AUTO: 28.1 % (ref 35–47)
HGB BLD-MCNC: 8.9 G/DL (ref 11.7–15.7)
IMM GRANULOCYTES # BLD: 0 10E3/UL
IMM GRANULOCYTES NFR BLD: 0 %
LYMPHOCYTES # BLD AUTO: 0.7 10E3/UL (ref 0.8–5.3)
LYMPHOCYTES NFR BLD AUTO: 19 %
MCH RBC QN AUTO: 30.7 PG (ref 26.5–33)
MCHC RBC AUTO-ENTMCNC: 31.7 G/DL (ref 31.5–36.5)
MCV RBC AUTO: 97 FL (ref 78–100)
MONOCYTES # BLD AUTO: 0.5 10E3/UL (ref 0–1.3)
MONOCYTES NFR BLD AUTO: 13 %
NEUTROPHILS # BLD AUTO: 2.4 10E3/UL (ref 1.6–8.3)
NEUTROPHILS NFR BLD AUTO: 66 %
NRBC # BLD AUTO: 0 10E3/UL
NRBC BLD AUTO-RTO: 0 /100
PLATELET # BLD AUTO: 174 10E3/UL (ref 150–450)
POTASSIUM SERPL-SCNC: 4.3 MMOL/L (ref 3.4–5.3)
PROT SERPL-MCNC: 6.4 G/DL (ref 6.4–8.3)
RBC # BLD AUTO: 2.9 10E6/UL (ref 3.8–5.2)
SODIUM SERPL-SCNC: 140 MMOL/L (ref 135–145)
WBC # BLD AUTO: 3.6 10E3/UL (ref 4–11)

## 2023-12-13 PROCEDURE — 85025 COMPLETE CBC W/AUTO DIFF WBC: CPT

## 2023-12-13 PROCEDURE — 250N000011 HC RX IP 250 OP 636: Mod: JZ | Performed by: INTERNAL MEDICINE

## 2023-12-13 PROCEDURE — 36415 COLL VENOUS BLD VENIPUNCTURE: CPT

## 2023-12-13 PROCEDURE — 84155 ASSAY OF PROTEIN SERUM: CPT

## 2023-12-13 RX ORDER — HEPARIN SODIUM (PORCINE) LOCK FLUSH IV SOLN 100 UNIT/ML 100 UNIT/ML
5 SOLUTION INTRAVENOUS ONCE
Status: COMPLETED | OUTPATIENT
Start: 2023-12-13 | End: 2023-12-13

## 2023-12-13 RX ADMIN — Medication 5 ML: at 13:55

## 2023-12-14 ENCOUNTER — TELEPHONE (OUTPATIENT)
Dept: DERMATOLOGY | Facility: CLINIC | Age: 68
End: 2023-12-14

## 2023-12-14 ENCOUNTER — OFFICE VISIT (OUTPATIENT)
Dept: DERMATOLOGY | Facility: CLINIC | Age: 68
End: 2023-12-14
Payer: MEDICARE

## 2023-12-14 DIAGNOSIS — M34.9 SYSTEMIC SCLEROSIS (H): ICD-10-CM

## 2023-12-14 DIAGNOSIS — D84.9 IMMUNOSUPPRESSION (H): ICD-10-CM

## 2023-12-14 DIAGNOSIS — Q80.9 XERODERMA: ICD-10-CM

## 2023-12-14 DIAGNOSIS — L57.0 ACTINIC KERATOSIS: ICD-10-CM

## 2023-12-14 DIAGNOSIS — C44.311 BASAL CELL CARCINOMA (BCC) OF SKIN OF NOSE: Primary | ICD-10-CM

## 2023-12-14 DIAGNOSIS — L40.3 PALMOPLANTAR PUSTULAR PSORIASIS: ICD-10-CM

## 2023-12-14 PROCEDURE — 17000 DESTRUCT PREMALG LESION: CPT | Performed by: DERMATOLOGY

## 2023-12-14 PROCEDURE — 17003 DESTRUCT PREMALG LES 2-14: CPT | Performed by: DERMATOLOGY

## 2023-12-14 PROCEDURE — 99214 OFFICE O/P EST MOD 30 MIN: CPT | Mod: 25 | Performed by: DERMATOLOGY

## 2023-12-14 RX ORDER — UREA 200 MG/G
CREAM TOPICAL 2 TIMES DAILY
Qty: 480 G | Refills: 11 | Status: SHIPPED | OUTPATIENT
Start: 2023-12-14

## 2023-12-14 NOTE — LETTER
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    I recently prescribed this patient Otezla, which required a prior authorization. The prior authorization was denied and the patient was unable to fill their prescription. I have reviewed the patient s diagnosis, care plan and clinical guidelines for treatment and request a formal appeal of your denial for Otezla.    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 It can become a serious safety issue for the patient if I am not able to prescribe a wide variety of treatments for this condition.  My patient has an extreme degree of hyperkeratosis and fissuring of the hands and feet, and this has had a profound impact on the patient's functional abilities and quality of life.     Emelia has also exhausted the use of many topical treatments without adequate effect including:   - Urea 40% cream  - Augmented Betamethasone diproprionate 0.05% ointment  - Hydroxychloroquine    I strongly believe Emelia needs access to Otezla.  As you know, Otezla is FDA approved and effective for psoriasis and psoriatic arthritis. My patient also carries diagnosis of recurrent pancreatic cancer and has been on oral chemotherapy, capecitabine (Xeloda), for several years. Otezla (apremilast) offers the ability to decrease  inflammation caused by her palmoplantar pustular psoriasis without causing systemic immunosuppression.  My patient is not a good candidate for your suggested alternatives for the following reasons:    Cyclosporine: Cyclosporine is associated with an increase in infections and malignancies and is contraindicated in patients with abnormal renal function, hypertension, or malignancies. It has numerous drug interactions which may prevent its use and it is associated with permanent nephrotoxicity in patients treated for over one year. It is therefore not a preferred drug for chronic conditions like psoriasis.   Methotrexate: Methotrexate is associated with life-threatening bone marrow suppression, liver toxicity, and numerous additional adverse effects. Relative contraindications include alcohol consumption, obesity, diabetes, any liver disease, abnormalities in renal function, concomitant use of hepatotoxic drugs or drugs that interact with methotrexate, ongoing infectious diseases, anemia, leukopenia or thrombocytopenia.  Methotrexate can enhance the  nephrotoxicity of capecitabine (Xeloda),  Humira (adalimumab): Humira comes with a prominent black box warning due to its heightened potential for infection, which can escalate to severe cases necessitating hospitalization or even leading to fatalities. Furthermore, there is an elevated risk of various malignancies, including lymphoma and leukemia, as well as breast, colon, prostate, lung, and skin cancers. Additionally, it presents a potential hazard for the onset or exacerbation of congestive heart failure and hematological irregularities. Notably, an established correlation exists between its use and intermediate uveitis and central demyelinating disorders. Thus, these concerning factors underscore why Humira should be excluded as a treatment option for this patient.      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 Apremilast, an oral phosphodiesterase 4 (PDE4) inhibitor, in patients with moderate to severe plaque psoriasis: Results of a phase III, randomized, controlled trial (Efficacy and Safety Trial Evaluating the Effects of Apremilast in Psoriasis [ESTEEM]) J Am Acad Dermatol. 2015;73:37-49.  Neeraj A, Luis Alberto PJ, Lu TAYLORJ, Arielle AO, Salvador J, Enedina DD, Salinas M, Jeancarlos LL, Yeison G, Lorenzo E, Tteo S. Longterm (52-week) results of a phase III randomized, controlled trial of apremilast in patients with psoriatic arthritis. J Rheumatol. 2015;42:479-88.  Vineet RK, Gabriella KG, Anuel JQ, Eric MS, Layla M, La T, Clint W. Apremilast in Palmoplantar   Psoriasis and Palmoplantar Pustulosis: A Systematic Review and [place]-analysis. Dermatol Ther ([place]). 2023 Feb;13(2):437-451.   Terui T, Okubo Y, Tyrel S, Martha S, Morita A, Imafuku S, Tada Y, Oscar M, Yaguchi M, Uehara N, Car T, Jennifer M, Swann W, Shirley M, Carmel M. Efficacy and Safety of Apremilast for the Treatment of [place] Patients with Palmoplantar Pustulosis: Results from a Phase 2, Randomized, Placebo-Controlled Study. Am J Clin Dermatol. 2023 Sep;24(5):837-847.   Siobhan IZQUIERDO, Mitul M, Ninfa S, Jonny PEREZ, Parker YOUNG, Clint SANTO, La HUERTA. Generalized pustular psoriasis treated with apremilast in a patient with multiple medical comorbidities. JAAD Case Rep. 2017 Oct 6;3(6):495-497.  Kyle A, Mel E, Jennifer N, Jun SAMUELS, Fred IZAGUIRRE, Loretta ODONNELL, Monalisa PEREZ, Dylan CHOW. Apremilast in Psoriasis Patients With Serious Comorbidities: a Case Series and Systematic Review of  Literature. Dermatol Pract Concept. 2022 Oct 1;12(4):k6526120. doi: 10.5826/[facility].4390s245. PMID: 18527442; PMCID: [id number].    On behalf of Emelia, I would appreciate your prompt reconsideration of this denial. Please feel free to contact me at  for any additional information you may require. I look forward to receiving your response and approval of coverage for this medication.     Sincerely,      William Stoddard MD    Office contact:   Katelin Cortez   Pharmacy Liaison Dermatology   P: [phone]   F: [phone]   [email or web address]

## 2023-12-14 NOTE — TELEPHONE ENCOUNTER
PA Initiation    Medication: OTEZLA 10 & 20 & 30 MG PO TBPK  Insurance Company: [employer] - Phone [phone] Fax [phone]  Pharmacy Filling the Rx: [facility] - [place], [place] - [facility] [address]  Filling Pharmacy Phone:    Filling Pharmacy Fax:    Start Date: 12/14/2023      Key: [id number]

## 2023-12-14 NOTE — LETTER
12/14/2023       RE: Emelia Weathers  [address]  [facility] [address]     Dear Colleague,    Thank you for referring your patient, Emelia Weathers, to the [facility] DERMATOLOGY CLINIC [place] at [facility]. Please see a copy of my visit note below.    [facility] Dermatology Note    Encounter Date: Dec 14, 2023    Dermatology Problem List:  1. BCC, R nasal tip  - s/p shave bx 8/18/23, s/p MMS 10/30/23, Takedown 11/20/23  Final Diagnosis   A(1). L nasal bridge:  - Actinic keratosis, ulcerated - (see comment and description)      B(2). R nasal tip:  - Basal cell carcinoma, at least superficial type - (see description)       2.  Actinic Keratosis, ulcerated, L nasal bridge  - s/p shave bx 8/18/23,    - s/p cryotherapy 12/14/2023 x9-face  3. 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 Pancreatic cancer: on capecitabine ~10 years  - recurrent hand-foot syndrome  - augmented betamethasone ointment  5. Xeroderma  - current tx: Augmented Betamethasone 0.05% ointment BID  - consider for future: Apremilast 30 mg  6. Palmoplantar Pustular Psoriasis    ______________________________________    Impression/Plan:  1. Actinic keratoses: x9 on the face, cryotherapy.    2. Palmoplantar Pustular Psoriasis  Ddx includes hand/foot syndrome, however degree of hyperkeratosis and fissuring is more compatible with a psoriasiform eruption in the context of chemotherapy. This has had a profound  impact on the patient's functional abilities and quality of life. She has tried and failed numerous topical therapies, most recently augmented betamethasone. Given her ongoing metastatic cancer, there is strong desire to avoid immunosuppressive agents. Discussed risks/benefits of next steps in treatment, and will plan to start Apremilast.  - Continue augmented betamethasone, restart urea  - start apremilast, 30 mg BID after loading series.    2. Systemic sclerosis: unclear if chemotherapy-induced sclerodermoid reaction vs idiopathic systemic sclerosis with atypical presentation. No evidence of pulmonary involvement, although patient does have some narrowing of her esophagus for which she has a dilation scheduled. At this time, will continue current regimen and assess if addition of apremilast will be beneficial.  - hydroxychloroquine 200 mg  - hand therapy  - continue topicals      Follow-up in 4 months.     Procedures Performed:  Cryotherapy procedure note: After verbal consent and discussion of risks and benefits including but no limited to dyspigmentation/scar, blister, and pain, 9 AK's was(were) treated with 1-2mm freeze border for 2 cycles with liquid nitrogen. Post cryotherapy instructions were provided.     Staff Involved:  Scribe Disclosure:   By signing my name below, I, Katelin Cochrannathalie, attest that this documentation has been prepared under the direction and in the presence of William Stoddard MD.  - Electronically Signed: Katelin Thorpe 12/14/23       Provider Disclosure:   The documentation recorded by the scribe accurately reflects the services I personally performed and the decisions made by me.    William Stoddard MD   of Dermatology  Department of Dermatology  [facility]          CC:   Chief Complaint   Patient presents with    Derm Problem     Systemic sclerosis - Emelia states there has been no change since starting the new medications        History  of Present Illness:  Ms. Emelia Weathers is a 68 year old female who presents as a return patient.    Patient has started to use her topicals daily instead of BID, but notes her feet are chronically and consistently dry.    Labs:  08/08/2023 Dermatopathology   Final Diagnosis   A(1). L nasal bridge:  - Actinic keratosis, ulcerated - (see comment and description)      B(2). R nasal tip:  - Basal cell carcinoma, at least superficial type - (see description)         Physical exam:  Vitals: There were no vitals taken for this visit.  GEN: This is a well developed, well-nourished female in no acute distress, in a pleasant mood.    SKIN: Pinzon phototype 2  - Focused examination of the face, hands, feet was performed.  - There are 9 erythematous macules with overyling adherent scale on the face.   - erythematous hyperkeratotic and fissured plaques on palms and soles with extension of erythema onto the dorsal surfaces  - mild joint contractures of all 10 fingers with cutaneous sclerosis affecting the face, hands, and feet  - No other lesions of concern on areas examined.       Past Medical History:   Past Medical History:   Diagnosis Date    Cervical high risk HPV (human papillomavirus) test positive 2008    + HPV 83    Diabetes mellitus (H) 6/27/2013    type 2    Diffuse cystic mastopathy     Disorder of bone and cartilage, unspecified     moderate osteopenia of spine    Endometrial hyperplasia, unspecified 9/14/2005    Gastro-oesophageal reflux disease     Heart murmur     Herpes simplex without mention of complication     History of blood transfusion 2014    Related to chemo    Pancreatic cancer (H)     Papanicolaou smear of cervix with low grade squamous intraepithelial lesion (LGSIL) 11/2006    Colpo negative    Post-menopausal     Rosacea      Past Surgical History:   Procedure Laterality Date    BREAST BIOPSY, RT/LT  1994    Breat Biopsy B9    BREAST SURGERY      Rt breast biopsy in 1994    CHOLECYSTECTOMY   9/20/13    Part of Whipple    ENDOSCOPIC RETROGRADE CHOLANGIOPANCREATOGRAM  6/27/2013    Procedure: ENDOSCOPIC RETROGRADE CHOLANGIOPANCREATOGRAM;  EUS with FNA, biliary sphincterotomy and biliary stent placement. ;  Surgeon: Timoteo Maki MD;  Location:  OR    ENDOSCOPIC RETROGRADE CHOLANGIOPANCREATOGRAM  9/3/2013    Procedure: ENDOSCOPIC RETROGRADE CHOLANGIOPANCREATOGRAM;  Endoscopic retrograde cholangiopancreatogram with dilation of bile duct and bile duct stent placement.;  Surgeon: Devon Rubalcaav MD;  Location:  OR    ENDOSCOPIC ULTRASOUND UPPER GASTROINTESTINAL TRACT (GI)  6/27/2013    Procedure: ENDOSCOPIC ULTRASOUND UPPER GASTROINTESTINAL TRACT (GI);  Upper Endoscopy with Ultrasound, Fine Needle Aspiration, Endoscopic Retrograde Cholangiopancreatogram ;  Surgeon: Devon Rubalcava MD;  Location:  OR    ESOPHAGOSCOPY, GASTROSCOPY, DUODENOSCOPY (EGD), COMBINED N/A 10/8/2014    Procedure: COMBINED ENDOSCOPIC ULTRASOUND, ESOPHAGOSCOPY, GASTROSCOPY, DUODENOSCOPY (EGD), FINE NEEDLE ASPIRATE/BIOPSY;  Surgeon: Carson Paulino MD;  Location:  GI    GYN SURGERY      TONSILLECTOMY & ADENOIDECTOMY      WHIPPLE PROCEDURE  9/20/2013    Procedure: WHIPPLE PROCEDURE;  Open Whipple Procedure, Jujunostomy Feeding Tube Placement, Bile Duct Stent and Pancreatic Stent Placement;  Surgeon: Issa John MD;  Location:  OR    [facility] COLONOSCOPY THRU STOMA, DIAGNOSTIC  07/06    due 10 years       Social History:   reports that she quit smoking about 30 years ago. Her smoking use included cigarettes. She has never used smokeless tobacco. She reports that she does not currently use alcohol. She reports that she does not use drugs.    Family History:  Family History   Problem Relation Age of Onset    Osteoporosis Mother     Gastrointestinal Disease Mother         gallbladder removal    Hypertension Mother     Diabetes Father     Hypertension Father     Cancer Father         oral    Alcohol/Drug  Father     Other Cancer Father         Oral cancer    Substance Abuse Father     Diabetes Sister     Alcohol/Drug Brother     Hypertension Brother     Diabetes Brother     Depression Brother     Anxiety Disorder Brother     Mental Illness Brother     Substance Abuse Brother     Cancer - colorectal Maternal Grandfather     Other Cancer Maternal Grandfather     Skin Cancer No family hx of     Melanoma No family hx of        Medications:  Current Outpatient Medications   Medication Sig Dispense Refill    acetaminophen (TYLENOL) 325 MG tablet Take 500 mg by mouth every 6 hours as needed      alendronate (FOSAMAX) 70 MG tablet Take 1 tablet (70 mg) by mouth every 7 days 12 tablet 3    augmented betamethasone dipropionate (DIPROLENE-AF) 0.05 % external ointment Apply topically 2 times daily 50 g 3    CALCIUM CITRATE + D PO 2 tablets daily      capecitabine (XELODA) 500 MG tablet Take 2 tabs in the AM and 1 tab in the PM, Days 1 through 14, then off for 7 days. Take within 30 mins after meal. 42 tablet 3    cycloSPORINE (RESTASIS) 0.05 % ophthalmic emulsion 1 drop 2 times daily      hydroxychloroquine (PLAQUENIL) 200 MG tablet Take 1 tablet (200 mg) by mouth daily 90 tablet 3    IBUPROFEN PO       lidocaine-prilocaine (EMLA) 2.5-2.5 % external cream Apply topically as needed for moderate pain 30 g 3    lipase-protease-amylase (CREON) 89058-84061-65701 units CPEP TAKE THREE CAPSULES BY MOUTH THREE TIMES DAILY WITH MEALS 540 capsule 0    lisinopril (ZESTRIL) 10 MG tablet Take 1 tablet (10 mg) by mouth daily 90 tablet 3    LORazepam (ATIVAN) 0.5 MG tablet Take 1 tablet (0.5 mg) by mouth every 4 hours as needed (Anxiety, Nausea/Vomiting or Sleep) 30 tablet 2    MELATONIN PO Take 3 mg by mouth as needed      MULTIVITAMIN TABS   OR 1 TABLET DAILY      pantoprazole (PROTONIX) 40 MG EC tablet Take 1 tablet (40 mg) by mouth daily before breakfast 90 tablet 11    STATIN NOT PRESCRIBED (INTENTIONAL) Please choose reason not  prescribed from choices below.       No Known Allergies

## 2023-12-14 NOTE — PROGRESS NOTES
[facility] Dermatology Note    Encounter Date: Dec 14, 2023    Dermatology Problem List:  1. BCC, R nasal tip  - s/p shave bx 8/18/23, s/p MMS 10/30/23, Takedown 11/20/23  Final Diagnosis   A(1). L nasal bridge:  - Actinic keratosis, ulcerated - (see comment and description)      B(2). R nasal tip:  - Basal cell carcinoma, at least superficial type - (see description)       2.  Actinic Keratosis, ulcerated, L nasal bridge  - s/p shave bx 8/18/23,    - s/p cryotherapy 12/14/2023 x9-face  3. 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 Pancreatic cancer: on capecitabine ~10 years  - recurrent hand-foot syndrome  - augmented betamethasone ointment  5. Xeroderma  - current tx: Augmented Betamethasone 0.05% ointment BID  - consider for future: Apremilast 30 mg  6. Palmoplantar Pustular Psoriasis    ______________________________________    Impression/Plan:  1. Actinic keratoses: x9 on the face, cryotherapy.    2. Palmoplantar Pustular Psoriasis  Ddx includes hand/foot syndrome, however degree of hyperkeratosis and fissuring is more compatible with a psoriasiform eruption in the context of chemotherapy. This has had a profound impact on the patient's functional abilities and quality of life. She has tried and failed numerous topical therapies, most recently augmented betamethasone. Given her ongoing metastatic cancer, there is strong desire to avoid immunosuppressive agents. Discussed risks/benefits of next steps in treatment, and will  plan to start Apremilast.  - Continue augmented betamethasone, restart urea  - start apremilast, 30 mg BID after loading series.    2. Systemic sclerosis: unclear if chemotherapy-induced sclerodermoid reaction vs idiopathic systemic sclerosis with atypical presentation. No evidence of pulmonary involvement, although patient does have some narrowing of her esophagus for which she has a dilation scheduled. At this time, will continue current regimen and assess if addition of apremilast will be beneficial.  - hydroxychloroquine 200 mg  - hand therapy  - continue topicals      Follow-up in 4 months.     Procedures Performed:  Cryotherapy procedure note: After verbal consent and discussion of risks and benefits including but no limited to dyspigmentation/scar, blister, and pain, 9 AK's was(were) treated with 1-2mm freeze border for 2 cycles with liquid nitrogen. Post cryotherapy instructions were provided.     Staff Involved:  Scribe Disclosure:   By signing my name below, I, Katelin Cochranrakanfantasma, attest that this documentation has been prepared under the direction and in the presence of William Stoddard MD.  - Electronically Signed: Katelin Thorpe 12/14/23       Provider Disclosure:   The documentation recorded by the scribe accurately reflects the services I personally performed and the decisions made by me.    William Stoddard MD   of Dermatology  Department of Dermatology  [facility]          CC:   Chief Complaint   Patient presents with    Derm Problem     Systemic sclerosis Josefina Kapoor states there has been no change since starting the new medications        History of Present Illness:  Ms. Emelia Weathers is a 68 year old female who presents as a return patient.    Patient has started to use her topicals daily instead of BID, but notes her feet are chronically and consistently dry.    Labs:  08/08/2023 Dermatopathology   Final Diagnosis   A(1). L nasal bridge:  - Actinic  keratosis, ulcerated - (see comment and description)      B(2). R nasal tip:  - Basal cell carcinoma, at least superficial type - (see description)         Physical exam:  Vitals: There were no vitals taken for this visit.  GEN: This is a well developed, well-nourished female in no acute distress, in a pleasant mood.    SKIN: Pinzon phototype 2  - Focused examination of the face, hands, feet was performed.  - There are 9 erythematous macules with overyling adherent scale on the face.   - erythematous hyperkeratotic and fissured plaques on palms and soles with extension of erythema onto the dorsal surfaces  - mild joint contractures of all 10 fingers with cutaneous sclerosis affecting the face, hands, and feet  - No other lesions of concern on areas examined.       Past Medical History:   Past Medical History:   Diagnosis Date    Cervical high risk HPV (human papillomavirus) test positive 2008    + HPV 83    Diabetes mellitus (H) 6/27/2013    type 2    Diffuse cystic mastopathy     Disorder of bone and cartilage, unspecified     moderate osteopenia of spine    Endometrial hyperplasia, unspecified 9/14/2005    Gastro-oesophageal reflux disease     Heart murmur     Herpes simplex without mention of complication     History of blood transfusion 2014    Related to chemo    Pancreatic cancer (H)     Papanicolaou smear of cervix with low grade squamous intraepithelial lesion (LGSIL) 11/2006    Colpo negative    Post-menopausal     Rosacea      Past Surgical History:   Procedure Laterality Date    BREAST BIOPSY, RT/LT  1994    Breat Biopsy B9    BREAST SURGERY      Rt breast biopsy in 1994    CHOLECYSTECTOMY  9/20/13    Part of Whipple    ENDOSCOPIC RETROGRADE CHOLANGIOPANCREATOGRAM  6/27/2013    Procedure: ENDOSCOPIC RETROGRADE CHOLANGIOPANCREATOGRAM;  EUS with FNA, biliary sphincterotomy and biliary stent placement. ;  Surgeon: Timoteo Maki MD;  [facility]    ENDOSCOPIC RETROGRADE  CHOLANGIOPANCREATOGRAM  9/3/2013    Procedure: ENDOSCOPIC RETROGRADE CHOLANGIOPANCREATOGRAM;  Endoscopic retrograde cholangiopancreatogram with dilation of bile duct and bile duct stent placement.;  Surgeon: Devon Rubalcava MD;  Location: [facility] OR    ENDOSCOPIC ULTRASOUND UPPER GASTROINTESTINAL TRACT (GI)  6/27/2013    Procedure: ENDOSCOPIC ULTRASOUND UPPER GASTROINTESTINAL TRACT (GI);  Upper Endoscopy with Ultrasound, Fine Needle Aspiration, Endoscopic Retrograde Cholangiopancreatogram ;  Surgeon: Devon Rubalcava MD;  [facility]    ESOPHAGOSCOPY, GASTROSCOPY, DUODENOSCOPY (EGD), COMBINED N/A 10/8/2014    Procedure: COMBINED ENDOSCOPIC ULTRASOUND, ESOPHAGOSCOPY, GASTROSCOPY, DUODENOSCOPY (EGD), FINE NEEDLE ASPIRATE/BIOPSY;  Surgeon: Carson Paulino MD;  Location:  GI    GYN SURGERY      TONSILLECTOMY & ADENOIDECTOMY      WHIPPLE PROCEDURE  9/20/2013    Procedure: WHIPPLE PROCEDURE;  Open Whipple Procedure, Jujunostomy Feeding Tube Placement, Bile Duct Stent and Pancreatic Stent Placement;  Surgeon: Issa John MD;  Location:  OR    [facility] COLONOSCOPY THRU STOMA, DIAGNOSTIC  07/06    due 10 years       Social History:   reports that she quit smoking about 30 years ago. Her smoking use included cigarettes. She has never used smokeless tobacco. She reports that she does not currently use alcohol. She reports that she does not use drugs.    Family History:  Family History   Problem Relation Age of Onset    Osteoporosis Mother     Gastrointestinal Disease Mother         gallbladder removal    Hypertension Mother     Diabetes Father     Hypertension Father     Cancer Father         oral    Alcohol/Drug Father     Other Cancer Father         Oral cancer    Substance Abuse Father     Diabetes Sister     Alcohol/Drug Brother     Hypertension Brother     Diabetes Brother     Depression Brother     Anxiety Disorder Brother     Mental Illness Brother     Substance Abuse Brother     Cancer - colorectal  Maternal Grandfather     Other Cancer Maternal Grandfather     Skin Cancer No family hx of     Melanoma No family hx of        Medications:  Current Outpatient Medications   Medication Sig Dispense Refill    acetaminophen (TYLENOL) 325 MG tablet Take 500 mg by mouth every 6 hours as needed      alendronate (FOSAMAX) 70 MG tablet Take 1 tablet (70 mg) by mouth every 7 days 12 tablet 3    augmented betamethasone dipropionate (DIPROLENE-AF) 0.05 % external ointment Apply topically 2 times daily 50 g 3    CALCIUM CITRATE + D PO 2 tablets daily      capecitabine (XELODA) 500 MG tablet Take 2 tabs in the AM and 1 tab in the PM, Days 1 through 14, then off for 7 days. Take within 30 mins after meal. 42 tablet 3    cycloSPORINE (RESTASIS) 0.05 % ophthalmic emulsion 1 drop 2 times daily      hydroxychloroquine (PLAQUENIL) 200 MG tablet Take 1 tablet (200 mg) by mouth daily 90 tablet 3    IBUPROFEN PO       lidocaine-prilocaine (EMLA) 2.5-2.5 % external cream Apply topically as needed for moderate pain 30 g 3    lipase-protease-amylase (CREON) 92928-35321-60341 units CPEP TAKE THREE CAPSULES BY MOUTH THREE TIMES DAILY WITH MEALS 540 capsule 0    lisinopril (ZESTRIL) 10 MG tablet Take 1 tablet (10 mg) by mouth daily 90 tablet 3    LORazepam (ATIVAN) 0.5 MG tablet Take 1 tablet (0.5 mg) by mouth every 4 hours as needed (Anxiety, Nausea/Vomiting or Sleep) 30 tablet 2    MELATONIN PO Take 3 mg by mouth as needed      MULTIVITAMIN TABS   OR 1 TABLET DAILY      pantoprazole (PROTONIX) 40 MG EC tablet Take 1 tablet (40 mg) by mouth daily before breakfast 90 tablet 11    STATIN NOT PRESCRIBED (INTENTIONAL) Please choose reason not prescribed from choices below.       No Known Allergies

## 2023-12-14 NOTE — NURSING NOTE
Dermatology Rooming Note    Emelia Weathers's goals for this visit include:   Chief Complaint   Patient presents with    Derm Problem     Systemic sclerosis - Emelia states there has been no change since starting the new medications      Cortney TOMPKINS CMA

## 2023-12-18 ENCOUNTER — DOCUMENTATION ONLY (OUTPATIENT)
Dept: ONCOLOGY | Facility: CLINIC | Age: 68
End: 2023-12-18
Payer: MEDICARE

## 2023-12-18 NOTE — TELEPHONE ENCOUNTER
PRIOR AUTHORIZATION DENIED    Medication: OTEZLA 10 & 20 & 30 MG PO TBPK  Insurance Company: [employer] - Phone [phone] Fax [phone]  Denial Date: 12/15/2023  Denial Reason(s):   Appeal Information: [phone]  Patient Notified:

## 2023-12-18 NOTE — PROGRESS NOTES
Oral Chemotherapy Monitoring Program  Lab Follow Up    Reviewed lab results from 12/13/23.        7/17/2023    11:00 AM 8/14/2023     2:00 PM 9/18/2023     9:00 AM 11/14/2023    10:00 AM 11/22/2023    10:00 AM 12/8/2023     4:00 PM 12/18/2023     3:00 PM   ORAL CHEMOTHERAPY   Assessment Type Lab Monitoring Refill;Chart Review Lab Monitoring Refill Lab Monitoring Chart Review Lab Monitoring   Diagnosis Code Pancreatic Cancer Pancreatic Cancer Pancreatic Cancer Pancreatic Cancer Pancreatic Cancer Pancreatic Cancer Pancreatic Cancer   Providers Dr. Mauro Wade   [facility]   Is this patient followed by the [facility] OC team? Yes Yes Yes Yes Yes Yes Yes   Drug Name Xeloda (capecitabine) Xeloda (capecitabine) Xeloda (capecitabine) Xeloda (capecitabine) Xeloda (capecitabine) Xeloda (capecitabine) Xeloda (capecitabine)   Dose Other: Other: Other: Other: Other: Other: Other:   Current Schedule BID BID BID BID BID BID BID   Cycle Details 2 weeks on, 1 week off 2 weeks on, 1 week off 2 weeks on, 1 week off 2 weeks on, 1 week off 2 weeks on, 1 week off 2 weeks on, 1 week off 2 weeks on, 1 week off   Adverse Effects   Anemia       Anemia   Grade 2       Pharmacist Intervention(anemia)   Yes       Intervention(s)   Referral to oncology provider           Labs:  _  Result Component Current Result Ref Range   Sodium 140 (12/13/2023) 135 - 145 mmol/L     _  Result Component Current Result Ref Range   Potassium 4.3 (12/13/2023) 3.4 - 5.3 mmol/L     _  Result Component Current Result Ref Range   Calcium 8.5 (L) (12/13/2023) 8.8 - 10.2 mg/dL     No results found for Mag within last 30 days.     No results found for Phos within last 30 days.     _  Result Component Current Result Ref Range   Albumin 3.9 (12/13/2023) 3.5 - 5.2 g/dL     _  Result Component Current Result Ref Range   Urea Nitrogen 10.4 (12/13/2023) 8.0  - 23.0 mg/dL     _  Result Component Current Result Ref Range   Creatinine 0.73 (12/13/2023) 0.51 - 0.95 mg/dL     _  Result Component Current Result Ref Range   AST 34 (12/13/2023) 0 - 45 U/L     _  Result Component Current Result Ref Range   ALT 11 (12/13/2023) 0 - 50 U/L     _  Result Component Current Result Ref Range   Bilirubin Total 0.4 (12/13/2023) <=1.2 mg/dL     _  Result Component Current Result Ref Range   WBC Count 3.6 (L) (12/13/2023) 4.0 - 11.0 10e3/uL     _  Result Component Current Result Ref Range   Hemoglobin 8.9 (L) (12/13/2023) 11.7 - 15.7 g/dL     _  Result Component Current Result Ref Range   Platelet Count 174 (12/13/2023) 150 - 450 10e3/uL     No results found for ANC within last 30 days.     _  Result Component Current Result Ref Range   Absolute Neutrophils 2.4 (12/13/2023) 1.6 - 8.3 10e3/uL        Assessment & Plan:  No concerning abnormalities.  Did not reach out to patient.  She doess not want calls.     Follow-Up:  1/12: Labs  2/12: Appt + labs    Huma Bran, PharmD  Hematology/Oncology Clinical Pharmacist  [facility]  [phone]

## 2023-12-19 NOTE — PROGRESS NOTES
Medication Therapy Management (MTM) Encounter    ASSESSMENT:                            Medication Adherence/Access: See below for considerations       Palmoplantar Pustular Psoriasis/Systemic scleroderma:  currently not well controlled on current regimen of hydroxychloroquine and topical betamethasone.  Given patient's immunocompromised state with chronic oral chemotherapy, agree with trial of Otezla (apremilast) for Palmoplantar pustular psoriasis (PP).  Reviewed the mechanism of action, dosing, administration, potential side effects (GI symptoms and mood changes), along with expectations for acquisition of the medication through a specialty pharmacy.  Patient's insurance denied prior authorization and given desire to avoid immunosuppressive therapy and primary literature (see below) that has shown efficacy in PP, will draft an appeal letter to insurance company.  Discussed with patient the timeline of appeal process and that we can explore other avenues if appeal is denied, along with co-pay assistance cards.    Vineet RK, Gabriella KG, Anuel JQ, Eric MS, Layla M, La T, Clint W. Apremilast in Palmoplantar Psoriasis and Palmoplantar Pustulosis: A Systematic Review and [place]-analysis. Dermatol Ther ([place]). 2023 Feb;13(2):437-451. doi: 10.1007/[id number]. Epub 2023 Jan 6.     Terui T, Okubo Y, Tyrel S, Martha S, Morita A, Imafuku S, Tada Y, Oscar M, Eloisauchi M, Uehara N, Car T, Jennifer M, Shree W, Shirley M, Carmel M. Efficacy and Safety of Apremilast for the Treatment of [place] Patients with Palmoplantar Pustulosis: Results from a Phase 2, Randomized, Placebo-Controlled Study. Am J Clin Dermatol. 2023 Sep;24(5):837-847. doi: 10.1007/[id number]. Epub 2023 May 26. Erratum in: Am J Clin Dermatol. 2023 Dec 7;    2. Recurrent Pancreatic Cancer: stable on current regimen, continue to follow closely with Oncology provider    3. Hypertension: stable, last BP was within goal <140/90, continue current  "regimen    4. Osteoporosis: stable, continue current regimen, and consider DEXA scan next year as directed/indicated by PCP    PLAN:                            MTM to write appeal letter and route to pharmacy liaison team for submission to insurance, further steps will be dictated by result of the appeal for Otezla    Follow-up: [facility] once insurance approval is obtained, and within 1 month over the phone after Otezla initiation.    SUBJECTIVE/OBJECTIVE:                          Emelia Weathers is a 68 year old female called for an initial visit. She was referred to me from Dr. William Stoddard MD.      Reason for visit: Otezla (apremilast) initiation.    Allergies/ADRs: None  Past Medical History: Reviewed in chart  Tobacco: She reports that she quit smoking about 30 years ago. Her smoking use included cigarettes. She has never used smokeless tobacco.  Alcohol: none    Medication Adherence/Access: no issues reported     Palmoplantar Pustular Psoriasis/Systemic scleroderma:  Current Treatment:  - Hydroxychloroquine 200 mg every day   - Augmented Betamethasone dipropionate 0.05% ointment daily     Efficacy: has helped some since starting hydroxychloroquine but not a \"miracle\", but the topical steroid has helped with some of the redness  Symptoms: Swollen knuckle joints, in winter gets cracks in heel that are large enough where it becomes difficult to walk and effects daily living  Side effects: no side effects noted, patient sees eye doctor yearly already for hydroxychloroquine    Specialist:  Sees Dr. William Stoddard MD for [facility] and was last seen on 12/14/2023, where the following was recommended:  -degree of hyperkeratosis and fissuring is more compatible with a psoriasiform eruption in the context of chemotherapy. This has had a profound impact on the patient's functional abilities and quality of life. She has tried and failed numerous topical therapies, most recently augmented betamethasone. " "Given her ongoing metastatic cancer, there is strong desire to avoid immunosuppressive agents. Discussed risks/benefits of next steps in treatment, and will plan to start Apremilast.  - Continue augmented betamethasone once daily  - start apremilast, 30 mg BID after loading series. (Initial prior authorization was denied by patient's insurance company, patient stated she also will carry same Medicare Part D plan for 2024, she is currently in the \"donut hole\")      2. Recurrent Pancreatic Cancer:  She had an excellent response to FOLFIRINOX in 2014, which was eventually reduced to maintenance single agent Xeloda with ongoing control for many years. S/p Whipple procedure in 2013  - Capecitabine 500mg, 2 tablets in the morning, and 1 tablet at night on days 1-14 (started second week last night), then off for one week  - Creon [id number] 1 capsule with meals (post Whipple procedure 10 years ago)  - Pantoprazole 40mg every day (post Whipple procedure)  Follows with Dr. Wade for Oncology every 4 months    3. Hypertension  - Lisinopril 10mg every day   Started for kidney protection for a slight elevation in Hgb A1c by PCP but also has borderline \"higher Bps\" she reports no s/s of low blood pressure with dizziness  Last BP documented on 10/16/23: 134/71      4. Osteoporosis  - Alendronate 70mg once a week  - Calcium + Vitamin D gummy: patient unsure of dose and often forgets to take  No recent fractures or DEXA scan (per chart review PCP plans for DEXA next year)    5.  Dry Eyes  Substitute for Restasis from [facility]:  Klarity-C drops (Cyclosporine 0.1%)  Had to switch to compounding pharmacy as Restasis was no longer affordable, has not noticed any worsening of symptoms since switching    Today's Vitals: There were no vitals taken for this visit.  ----------------      I spent 17 minutes with this patient today. All changes were made via collaborative practice agreement with Dr. William Stoddard MD. A copy of the visit " note was provided to the patient's provider(s).    A summary of these recommendations was sent via [employer].    Mimi Caceres PharmD, BCPPS  Medication Therapy Management Pharmacist  [facility]      Telemedicine Visit Details  Type of service:  Telephone visit  Start Time:  1:00 PM  End Time:  1:17 PM     Medication Therapy Recommendations  No medication therapy recommendations to display

## 2023-12-20 ENCOUNTER — HOSPITAL ENCOUNTER (OUTPATIENT)
Facility: AMBULATORY SURGERY CENTER | Age: 68
End: 2023-12-20
Attending: INTERNAL MEDICINE | Admitting: INTERNAL MEDICINE
Payer: MEDICARE

## 2023-12-20 ENCOUNTER — VIRTUAL VISIT (OUTPATIENT)
Dept: RHEUMATOLOGY | Facility: CLINIC | Age: 68
End: 2023-12-20
Attending: DERMATOLOGY
Payer: MEDICARE

## 2023-12-20 ENCOUNTER — TELEPHONE (OUTPATIENT)
Dept: GASTROENTEROLOGY | Facility: CLINIC | Age: 68
End: 2023-12-20
Payer: MEDICARE

## 2023-12-20 DIAGNOSIS — L40.3 CHRONIC PALMOPLANTAR PUSTULAR PSORIASIS: Primary | ICD-10-CM

## 2023-12-20 DIAGNOSIS — M81.0 OP (OSTEOPOROSIS): ICD-10-CM

## 2023-12-20 DIAGNOSIS — M34.9 SCLERODERMA (H): ICD-10-CM

## 2023-12-20 DIAGNOSIS — C25.0 MALIGNANT NEOPLASM OF HEAD OF PANCREAS (H): ICD-10-CM

## 2023-12-20 DIAGNOSIS — I10 HYPERTENSION: ICD-10-CM

## 2023-12-20 NOTE — Clinical Note
12/20/2023       RE: Emelia Weathers  [address]  [place] [place] [address]     Dear Colleague,    Thank you for referring your patient, Emelia Weathers, to the [facility] [place] at [facility]. Please see a copy of my visit note below.    Medication Therapy Management (MTM) Encounter    ASSESSMENT:                            Medication Adherence/Access: See below for considerations       Palmoplantar Pustular Psoriasis/Systemic scleroderma:  currently not well controlled on current regimen of hydroxychloroquine and topical betamethasone.  Given patient's immunocompromised state with chronic oral chemotherapy, agree with trial of Otezla (apremilast) for Palmoplantar pustular psoriasis (PP).  Reviewed the mechanism of action, dosing, administration, potential side effects (GI symptoms and mood changes), along with expectations for acquisition of the medication through a specialty pharmacy.  Patient's insurance denied prior authorization and given desire to avoid immunosuppressive therapy and primary literature (see below) that has shown efficacy in PP, will draft an appeal letter to insurance company.  Discussed with patient the timeline of appeal process and that we can explore other avenues if appeal is denied, along with co-pay assistance cards.    Vineet RK, Gabriella KG, Anuel JQ, Eric MS, Layla M, La T, Clint W. Apremilast in Palmoplantar Psoriasis and Palmoplantar Pustulosis: A Systematic Review and [place]-analysis. Dermatol Ther ([place]b). 2023 Feb;13(2):437-451. doi: 10.1007/[id number]. Epub 2023 Jan 6.     Jacekui T, Oknancyo Y, Tyrel S, Martha S, Morita A, Imafu S, Tada Y, Oscar M, Gabrielle M, Uehara N, Car T, Jennifer M, Shree W, Shirley M, Carmel M. Efficacy and Safety of Apremilast for the Treatment of [place] Patients with Palmoplantar Pustulosis: Results from a Phase 2, Randomized, Placebo-Controlled Study. Am J Clin  "Dermatol. 2023 Sep;245):406-84. doi: 10.1007/[id number]. Epub 2023 May 26. Erratum in: Am J Clin Dermatol. 2023 Dec 7;    2. Recurrent Pancreatic Cancer: stable on current regimen, continue to follow closely with Oncology provider    3. Hypertension: stable, last BP was within goal <140/90, continue current regimen    4. Osteoporosis: stable, continue current regimen, and consider DEXA scan next year as directed/indicated by PCP    PLAN:                            MTM to write appeal letter and route to pharmacy liaison team for submission to insurance, further steps will be dictated by result of the appeal for Otezla    Follow-up: {[email or web address]:[id number]}    SUBJECTIVE/OBJECTIVE:                          Emelia Weathers is a 68 year old female called for an initial visit. She was referred to me from Dr. William Stoddard MD.      Reason for visit: Otezla (apremilast) initiation.    Allergies/ADRs: None  Past Medical History: Reviewed in chart  Tobacco: She reports that she quit smoking about 30 years ago. Her smoking use included cigarettes. She has never used smokeless tobacco.  Alcohol: none    Medication Adherence/Access: no issues reported     Palmoplantar Pustular Psoriasis/Systemic scleroderma:  Current Treatment:  - Hydroxychloroquine 200 mg every day   - Augmented Betamethasone dipropionate 0.05% ointment daily     Efficacy: has helped some since starting hydroxychloroquine but not a \"miracle\", but the topical steroid has helped with some of the redness  Symptoms: Swollen knuckle joints, in winter gets cracks in heel that are large enough where it becomes difficult to walk and effects daily living  Side effects: no side effects noted, patient sees eye doctor yearly already for hydroxychloroquine    Specialist:  Sees Dr. William Stoddard MD for [facility] and was last seen on 12/14/2023, where the following was recommended:  -degree of hyperkeratosis and fissuring is more compatible with a " "psoriasiform eruption in the context of chemotherapy. This has had a profound impact on the patient's functional abilities and quality of life. She has tried and failed numerous topical therapies, most recently augmented betamethasone. Given her ongoing metastatic cancer, there is strong desire to avoid immunosuppressive agents. Discussed risks/benefits of next steps in treatment, and will plan to start Apremilast.  - Continue augmented betamethasone once daily  - start apremilast, 30 mg BID after loading series. (Initial prior authorization was denied by patient's insurance company, patient stated she also will carry same Medicare Part D plan for 2024, she is currently in the \"donut hole\")      2. Recurrent Pancreatic Cancer:  She had an excellent response to FOLFIRINOX in 2014, which was eventually reduced to maintenance single agent Xeloda with ongoing control for many years. S/p Whipple procedure in 2013  - Capecitabine 500mg, 2 tablets in the morning, and 1 tablet at night on days 1-14 (started second week last night), then off for one week  - Creon [id number] 1 capsule with meals (post Whipple procedure 10 years ago)  - Pantoprazole 40mg every day (post Whipple procedure)  Follows with Dr. Wade for Oncology every 4 months    3. Hypertension  - Lisinopril 10mg every day   Started for kidney protection for a slight elevation in Hgb A1c by PCP but also has borderline \"higher Bps\" she reports no s/s of low blood pressure with dizziness  Last BP documented on 10/16/23: 134/71      4. Osteoporosis  - Alendronate 70mg once a week  - Calcium + Vitamin D gummy: patient unsure of dose and often forgets to take  No recent fractures or DEXA scan (per chart review PCP plans for DEXA next year)    5.  Dry Eyes  Substitute for Restasis from [facility]:  Klarity-C drops (Cyclosporine 0.1%)  Had to switch to compounding pharmacy as Restasis was no longer affordable, has not noticed any worsening of symptoms since " switching    Today's Vitals: There were no vitals taken for this visit.  ----------------      I spent 17 minutes with this patient today. All changes were made via collaborative practice agreement with Dr. William Stoddard MD. A copy of the visit note was provided to the patient's provider(s).    A summary of these recommendations was sent via [employer].    Abran AlexandreD, [facility]  Medication Therapy Management Pharmacist  [facility]      Telemedicine Visit Details  Type of service:  Telephone visit  Start Time:  1:00 PM  End Time:  1:17 PM     Medication Therapy Recommendations  No medication therapy recommendations to display           Again, thank you for allowing me to participate in the care of your patient.      Sincerely,    Mimi Caceres [facility]

## 2023-12-20 NOTE — TELEPHONE ENCOUNTER
"Endoscopy Scheduling Screen    Have you had a positive Covid test in the last 14 days?  No    Are you active on MyChart?   Yes    What insurance is in the chart?  Other:  MEDICARE    Ordering/Referring Provider: NIDIA NERI   (If ordering provider performs procedure, schedule with ordering provider unless otherwise instructed. )    BMI: Estimated body mass index is 19.31 kg/m  as calculated from the following:    Height as of 2/20/23: 1.638 m (5' 4.49\").    Weight as of 10/16/23: 51.8 kg (114 lb 3.2 oz).     Sedation Ordered  moderate sedation.   If patient BMI > 50 do not schedule in [facility].    If patient BMI > 45 do not schedule at [facility].    Are you taking methadone or Suboxone?  No    Are you taking any prescription medications for pain 3 or more times per week?   NO - No RN review required.    Do you have a history of malignant hyperthermia or adverse reaction to anesthesia?  No    (Females) Are you currently pregnant?   No     Have you been diagnosed or told you have pulmonary hypertension?   No    Do you have an LVAD?  No    Have you been told you have moderate to severe sleep apnea?  No    Have you been told you have COPD, asthma, or any other lung disease?  No    Do you have any heart conditions?  No     Have you ever had an organ transplant?   No    Have you ever had or are you awaiting a heart or lung transplant?   No    Have you had a stroke or transient ischemic attack (TIA aka \"mini stroke\" in the last 6 months?   No    Have you been diagnosed with or been told you have cirrhosis of the liver?   No    Are you currently on dialysis?   No    Do you need assistance transferring?   No    BMI: Estimated body mass index is 19.31 kg/m  as calculated from the following:    Height as of 2/20/23: 1.638 m (5' 4.49\").    Weight as of 10/16/23: 51.8 kg (114 lb 3.2 oz).     Is patients BMI > 40 and scheduling location [facility]?  No    Do you take an injectable medication for weight loss or diabetes (excluding " insulin)?  No    Do you take the medication Naltrexone?  No    Do you take blood thinners?  No       Prep   Are you currently on dialysis or do you have chronic kidney disease?  No    Do you have a diagnosis of diabetes?  No    Do you have a diagnosis of cystic fibrosis (CF)?  No    On a regular basis do you go 3 -5 days between bowel movements?  No    BMI > 40?  No    Preferred Pharmacy:    [facility] - [place], [place] - [address]  [address] [facility] [address]  Phone: [phone] Fax: [phone] Alternate Fax: [phone], [phone]    [facility] - [place], [place] - [address]  [address]  [facility] [address]  Phone: [phone] Fax: [phone] Alternate Fax: [phone], [phone]    [facility] - [place], [place] - [address]  [address] [facility] [address]  Phone: [phone] Fax: [phone]    [facility] - [place], [place] - [address]  [address] [facility] [address]  Phone: [phone] Fax: [phone]    [id number] - [place], [place] - [address]  [address]  [facility] [address]  Phone: [phone] Fax: [phone]      Final Scheduling Details   Colonoscopy prep sent?  N/A    Procedure scheduled  Upper endoscopy (EGD)    Surgeon:  LEVENTHAL     Date of procedure:  4/3/24     Pre-OP / PAC:   No - Not required for this site.    Location  [facility] - [facility] - Per order.    Sedation   Moderate Sedation - Per order.      Patient Reminders:   You will receive a call from a Nurse to review instructions and health history.  This assessment must be completed prior to your procedure.  Failure to complete the Nurse assessment may result in the procedure being cancelled.      On the day of your procedure, please designate an adult(s) who can drive you home stay with you for the next 24 hours. The medicines used in the exam will  make you sleepy. You will not be able to drive.      You cannot take public transportation, ride share services, or non-medical taxi service without a responsible caregiver.  Medical transport services are allowed with the requirement that a responsible caregiver will receive you at your destination.  We require that drivers and caregivers are confirmed prior to your procedure.

## 2023-12-27 NOTE — TELEPHONE ENCOUNTER
Medication Appeal Initiation    Medication: OTEZLA 10 & 20 & 30 MG PO TBPK  Appeal Start Date:  12/27/2023  Insurance Company: [employer]  Insurance Phone: [phone]  Insurance Fax: [phone]  Comments:   faxed appeal

## 2024-01-09 NOTE — TELEPHONE ENCOUNTER
MEDICATION APPEAL APPROVED    Medication: OTEZLA 10 & 20 & 30 MG PO TBPK  Authorization Effective Date: 1/9/2024  Authorization Expiration Date: 12/31/2039  Approved Dose/Quantity: 60 for 30 days  Reference #: Key: [id number]   Appeal Insurance Company: [employer]  Expected CoPay: $       CoPay Card Available: No  Financial Assistance Needed: yes  Filling Pharmacy: [facility] - [facility] [address]  Patient Notified: yes  Comments:

## 2024-01-12 ENCOUNTER — LAB (OUTPATIENT)
Dept: LAB | Facility: CLINIC | Age: 69
End: 2024-01-12
Attending: INTERNAL MEDICINE
Payer: MEDICARE

## 2024-01-12 DIAGNOSIS — C25.0 MALIGNANT NEOPLASM OF HEAD OF PANCREAS (H): Primary | ICD-10-CM

## 2024-01-12 DIAGNOSIS — R73.03 PRE-DIABETES: ICD-10-CM

## 2024-01-12 LAB
ALBUMIN SERPL BCG-MCNC: 4.1 G/DL (ref 3.5–5.2)
ALP SERPL-CCNC: 88 U/L (ref 40–150)
ALT SERPL W P-5'-P-CCNC: 14 U/L (ref 0–50)
ANION GAP SERPL CALCULATED.3IONS-SCNC: 9 MMOL/L (ref 7–15)
AST SERPL W P-5'-P-CCNC: 25 U/L (ref 0–45)
BASOPHILS # BLD AUTO: 0 10E3/UL (ref 0–0.2)
BASOPHILS NFR BLD AUTO: 0 %
BILIRUB SERPL-MCNC: 0.4 MG/DL
BUN SERPL-MCNC: 10.6 MG/DL (ref 8–23)
CALCIUM SERPL-MCNC: 8.9 MG/DL (ref 8.8–10.2)
CHLORIDE SERPL-SCNC: 102 MMOL/L (ref 98–107)
CREAT SERPL-MCNC: 0.77 MG/DL (ref 0.51–0.95)
DEPRECATED HCO3 PLAS-SCNC: 25 MMOL/L (ref 22–29)
EGFRCR SERPLBLD CKD-EPI 2021: 83 ML/MIN/1.73M2
EOSINOPHIL # BLD AUTO: 0.1 10E3/UL (ref 0–0.7)
EOSINOPHIL NFR BLD AUTO: 1 %
ERYTHROCYTE [DISTWIDTH] IN BLOOD BY AUTOMATED COUNT: 22.7 % (ref 10–15)
GLUCOSE SERPL-MCNC: 161 MG/DL (ref 70–99)
HCT VFR BLD AUTO: 29.7 % (ref 35–47)
HGB BLD-MCNC: 9.9 G/DL (ref 11.7–15.7)
HOLD SPECIMEN: NORMAL
HOLD SPECIMEN: NORMAL
IMM GRANULOCYTES # BLD: 0 10E3/UL
IMM GRANULOCYTES NFR BLD: 0 %
LYMPHOCYTES # BLD AUTO: 0.9 10E3/UL (ref 0.8–5.3)
LYMPHOCYTES NFR BLD AUTO: 16 %
MCH RBC QN AUTO: 31.6 PG (ref 26.5–33)
MCHC RBC AUTO-ENTMCNC: 33.3 G/DL (ref 31.5–36.5)
MCV RBC AUTO: 95 FL (ref 78–100)
MONOCYTES # BLD AUTO: 0.5 10E3/UL (ref 0–1.3)
MONOCYTES NFR BLD AUTO: 8 %
NEUTROPHILS # BLD AUTO: 4.1 10E3/UL (ref 1.6–8.3)
NEUTROPHILS NFR BLD AUTO: 75 %
NRBC # BLD AUTO: 0 10E3/UL
NRBC BLD AUTO-RTO: 0 /100
PLATELET # BLD AUTO: 188 10E3/UL (ref 150–450)
POTASSIUM SERPL-SCNC: 4.2 MMOL/L (ref 3.4–5.3)
PROT SERPL-MCNC: 6.6 G/DL (ref 6.4–8.3)
RBC # BLD AUTO: 3.13 10E6/UL (ref 3.8–5.2)
SODIUM SERPL-SCNC: 136 MMOL/L (ref 135–145)
WBC # BLD AUTO: 5.5 10E3/UL (ref 4–11)

## 2024-01-12 PROCEDURE — 250N000011 HC RX IP 250 OP 636: Performed by: INTERNAL MEDICINE

## 2024-01-12 PROCEDURE — 80053 COMPREHEN METABOLIC PANEL: CPT

## 2024-01-12 PROCEDURE — 36591 DRAW BLOOD OFF VENOUS DEVICE: CPT | Performed by: INTERNAL MEDICINE

## 2024-01-12 PROCEDURE — 85004 AUTOMATED DIFF WBC COUNT: CPT

## 2024-01-12 PROCEDURE — 83036 HEMOGLOBIN GLYCOSYLATED A1C: CPT

## 2024-01-12 RX ORDER — HEPARIN SODIUM (PORCINE) LOCK FLUSH IV SOLN 100 UNIT/ML 100 UNIT/ML
500 SOLUTION INTRAVENOUS ONCE
Status: COMPLETED | OUTPATIENT
Start: 2024-01-12 | End: 2024-01-12

## 2024-01-12 RX ADMIN — Medication 500 UNITS: at 13:38

## 2024-01-15 ENCOUNTER — MYC MEDICAL ADVICE (OUTPATIENT)
Dept: ONCOLOGY | Facility: CLINIC | Age: 69
End: 2024-01-15
Payer: MEDICARE

## 2024-01-15 ASSESSMENT — ENCOUNTER SYMPTOMS
NERVOUS/ANXIOUS: 0
SORE THROAT: 0
HEMATOCHEZIA: 0
ARTHRALGIAS: 0
SHORTNESS OF BREATH: 0
HEADACHES: 0
ABDOMINAL PAIN: 0
CONSTIPATION: 0
DIARRHEA: 0
HEARTBURN: 0
DIZZINESS: 0
FEVER: 0
WEAKNESS: 0
JOINT SWELLING: 1
PARESTHESIAS: 1
FREQUENCY: 0
HEMATURIA: 0
COUGH: 0
EYE PAIN: 0
PALPITATIONS: 0
CHILLS: 0
NAUSEA: 0
MYALGIAS: 0
DYSURIA: 0
BREAST MASS: 0

## 2024-01-15 ASSESSMENT — ACTIVITIES OF DAILY LIVING (ADL): CURRENT_FUNCTION: NO ASSISTANCE NEEDED

## 2024-01-15 NOTE — ORAL ONC MGMT
Oral Chemotherapy Monitoring Program  Lab Follow Up    Reviewed lab results from 1/12/24.        8/14/2023     2:00 PM 9/18/2023     9:00 AM 11/14/2023    10:00 AM 11/22/2023    10:00 AM 12/8/2023     4:00 PM 12/18/2023     3:00 PM 1/15/2024    11:00 AM   ORAL CHEMOTHERAPY   Assessment Type Refill;Chart Review Lab Monitoring Refill Lab Monitoring Chart Review Lab Monitoring Lab Monitoring   Diagnosis Code Pancreatic Cancer Pancreatic Cancer Pancreatic Cancer Pancreatic Cancer Pancreatic Cancer Pancreatic Cancer Pancreatic Cancer   Providers Dr. Mauro Wade   [facility]   Is this patient followed by the [facility] OC team? Yes Yes Yes Yes Yes Yes Yes   Drug Name Xeloda (capecitabine) Xeloda (capecitabine) Xeloda (capecitabine) Xeloda (capecitabine) Xeloda (capecitabine) Xeloda (capecitabine) Xeloda (capecitabine)   Dose Other: Other: Other: Other: Other: Other: Other:   Current Schedule BID BID BID BID BID BID BID   Cycle Details 2 weeks on, 1 week off 2 weeks on, 1 week off 2 weeks on, 1 week off 2 weeks on, 1 week off 2 weeks on, 1 week off 2 weeks on, 1 week off 2 weeks on, 1 week off   Adverse Effects  Anemia        Anemia  Grade 2        Pharmacist Intervention(anemia)  Yes        Intervention(s)  Referral to oncology provider            Labs:  _  Result Component Current Result Ref Range   Sodium 136 (1/12/2024) 135 - 145 mmol/L     _  Result Component Current Result Ref Range   Potassium 4.2 (1/12/2024) 3.4 - 5.3 mmol/L     _  Result Component Current Result Ref Range   Calcium 8.9 (1/12/2024) 8.8 - 10.2 mg/dL     No results found for Mag within last 30 days.     No results found for Phos within last 30 days.     _  Result Component Current Result Ref Range   Albumin 4.1 (1/12/2024) 3.5 - 5.2 g/dL     _  Result Component Current Result Ref Range   Urea Nitrogen 10.6 (1/12/2024) 8.0 - 23.0  mg/dL     _  Result Component Current Result Ref Range   Creatinine 0.77 (1/12/2024) 0.51 - 0.95 mg/dL     _  Result Component Current Result Ref Range   AST 25 (1/12/2024) 0 - 45 U/L     _  Result Component Current Result Ref Range   ALT 14 (1/12/2024) 0 - 50 U/L     _  Result Component Current Result Ref Range   Bilirubin Total 0.4 (1/12/2024) <=1.2 mg/dL     _  Result Component Current Result Ref Range   WBC Count 5.5 (1/12/2024) 4.0 - 11.0 10e3/uL     _  Result Component Current Result Ref Range   Hemoglobin 9.9 (L) (1/12/2024) 11.7 - 15.7 g/dL     _  Result Component Current Result Ref Range   Platelet Count 188 (1/12/2024) 150 - 450 10e3/uL     No results found for ANC within last 30 days.     _  Result Component Current Result Ref Range   Absolute Neutrophils 4.1 (1/12/2024) 1.6 - 8.3 10e3/uL        Assessment & Plan:  Results are concerning for grade 2 anemia, but today's result is actually improved from previous labs and are consistent with previous results. Labs are stable with no concerning abnormalities. Please continue Xeloda as planned.  [employer] message sent to patient.     Follow-Up:  2/7: Labs and scans  2/12:  Dr. Wade appt    Huma Bran, PharmD  Hematology/Oncology Clinical Pharmacist  [facility]  [phone]

## 2024-01-16 ENCOUNTER — OFFICE VISIT (OUTPATIENT)
Dept: FAMILY MEDICINE | Facility: CLINIC | Age: 69
End: 2024-01-16
Payer: MEDICARE

## 2024-01-16 VITALS
TEMPERATURE: 97 F | BODY MASS INDEX: 18.69 KG/M2 | SYSTOLIC BLOOD PRESSURE: 130 MMHG | WEIGHT: 112.2 LBS | HEART RATE: 83 BPM | OXYGEN SATURATION: 98 % | HEIGHT: 65 IN | RESPIRATION RATE: 14 BRPM | DIASTOLIC BLOOD PRESSURE: 72 MMHG

## 2024-01-16 DIAGNOSIS — K21.9 GASTROESOPHAGEAL REFLUX DISEASE, UNSPECIFIED WHETHER ESOPHAGITIS PRESENT: ICD-10-CM

## 2024-01-16 DIAGNOSIS — R73.03 PRE-DIABETES: ICD-10-CM

## 2024-01-16 DIAGNOSIS — M94.9 DISORDER OF BONE AND CARTILAGE: ICD-10-CM

## 2024-01-16 DIAGNOSIS — Z12.31 ENCOUNTER FOR SCREENING MAMMOGRAM FOR MALIGNANT NEOPLASM OF BREAST: ICD-10-CM

## 2024-01-16 DIAGNOSIS — Z00.00 ENCOUNTER FOR MEDICARE ANNUAL WELLNESS EXAM: Primary | ICD-10-CM

## 2024-01-16 DIAGNOSIS — M81.6 LOCALIZED OSTEOPOROSIS, UNSPECIFIED PATHOLOGICAL FRACTURE PRESENCE: ICD-10-CM

## 2024-01-16 DIAGNOSIS — M34.9 SCLERODERMA (H): ICD-10-CM

## 2024-01-16 DIAGNOSIS — E16.9: ICD-10-CM

## 2024-01-16 DIAGNOSIS — M89.9 DISORDER OF BONE AND CARTILAGE: ICD-10-CM

## 2024-01-16 DIAGNOSIS — Z12.31 VISIT FOR SCREENING MAMMOGRAM: ICD-10-CM

## 2024-01-16 DIAGNOSIS — L94.3 SCLERODACTYLY: ICD-10-CM

## 2024-01-16 DIAGNOSIS — E11.9 TYPE 2 DIABETES MELLITUS WITHOUT COMPLICATION, WITHOUT LONG-TERM CURRENT USE OF INSULIN (H): ICD-10-CM

## 2024-01-16 DIAGNOSIS — C25.0 MALIGNANT NEOPLASM OF HEAD OF PANCREAS (H): ICD-10-CM

## 2024-01-16 DIAGNOSIS — Z78.0 POST-MENOPAUSAL: ICD-10-CM

## 2024-01-16 DIAGNOSIS — D84.9 IMMUNOSUPPRESSION (H): ICD-10-CM

## 2024-01-16 LAB — HBA1C MFR BLD: 6.3 %

## 2024-01-16 PROCEDURE — G0439 PPPS, SUBSEQ VISIT: HCPCS | Performed by: FAMILY MEDICINE

## 2024-01-16 PROCEDURE — 99214 OFFICE O/P EST MOD 30 MIN: CPT | Mod: 24 | Performed by: FAMILY MEDICINE

## 2024-01-16 RX ORDER — ALENDRONATE SODIUM 70 MG/1
70 TABLET ORAL
Qty: 12 TABLET | Refills: 4 | Status: SHIPPED | OUTPATIENT
Start: 2024-01-16

## 2024-01-16 RX ORDER — PANTOPRAZOLE SODIUM 40 MG/1
40 TABLET, DELAYED RELEASE ORAL
Qty: 90 TABLET | Refills: 11 | Status: SHIPPED | OUTPATIENT
Start: 2024-01-16

## 2024-01-16 RX ORDER — LISINOPRIL 10 MG/1
10 TABLET ORAL DAILY
Qty: 90 TABLET | Refills: 4 | Status: SHIPPED | OUTPATIENT
Start: 2024-01-16

## 2024-01-16 RX ORDER — ALENDRONATE SODIUM 70 MG/1
70 TABLET ORAL
Qty: 12 TABLET | Refills: 3 | OUTPATIENT
Start: 2024-01-16

## 2024-01-16 ASSESSMENT — ACTIVITIES OF DAILY LIVING (ADL): CURRENT_FUNCTION: NO ASSISTANCE NEEDED

## 2024-01-16 ASSESSMENT — ENCOUNTER SYMPTOMS
WEAKNESS: 0
DIARRHEA: 0
PARESTHESIAS: 1
JOINT SWELLING: 1
NAUSEA: 0
HEMATURIA: 0
COUGH: 0
EYE PAIN: 0
ABDOMINAL PAIN: 0
DYSURIA: 0
SORE THROAT: 0
CHILLS: 0
CONSTIPATION: 0
NERVOUS/ANXIOUS: 0
FEVER: 0
FREQUENCY: 0
DIZZINESS: 0
SHORTNESS OF BREATH: 0
HEADACHES: 0
HEMATOCHEZIA: 0
HEARTBURN: 0
PALPITATIONS: 0
BREAST MASS: 0
ARTHRALGIAS: 0
MYALGIAS: 0

## 2024-01-16 NOTE — PROGRESS NOTES
"SUBJECTIVE:   Emelia is a 68 year old, presenting for the following:  Physical        1/16/2024     9:40 AM   Additional Questions   Roomed by DARLIN Viera   Accompanied by self       Are you in the first 12 months of your Medicare coverage?  No    Healthy Habits:     In general, how would you rate your overall health?  Good    Frequency of exercise:  4-5 days/week    Duration of exercise:  45-60 minutes    Do you usually eat at least 4 servings of fruit and vegetables a day, include whole grains    & fiber and avoid regularly eating high fat or \"junk\" foods?  Yes    Taking medications regularly:  Yes    Medication side effects:  Not applicable    Ability to successfully perform activities of daily living:  No assistance needed    Home Safety:  No safety concerns identified    Hearing Impairment:  No hearing concerns    In the past 6 months, have you been bothered by leaking of urine?  No    In general, how would you rate your overall mental or emotional health?  Good      Thinks she may be due for dexa scan     She had covid (moderna) and flu vaccine done October 2023- will send [employer] message with date     Mohs procedure for BCC on nasal tip    Had pfts and echo to r/o scleroderma  No pulm htn     Palmoplantar Pustular Psoriasis/Systemic scleroderma:  currently not well controlled on current regimen of hydroxychloroquine and topical betamethasone.  Given patient's immunocompromised state with chronic oral chemotherapy, agree with trial of Otezla (apremilast) for Palmoplantar pustular psoriasis (PP).  Reviewed the mechanism of action, dosing, administration, potential side effects (GI symptoms and mood changes), along with expectations for acquisition of the medication through a specialty pharmacy.  Patient's insurance denied prior authorization and given desire to avoid immunosuppressive therapy and primary literature (see below) that has shown efficacy in PP, will draft an appeal letter to insurance company.  " Discussed with patient the timeline of appeal process and that we can explore other avenues if appeal is denied, along with co-pay assistance cards.      Lab Results   Component Value Date    A1C 6.2 2023    A1C 5.8 2022    A1C 5.7 2022    A1C 6.0 2022    A1C 6.6 10/05/2021    A1C 6.0 2017    A1C 6.0 2016    A1C 5.7 2016    A1C 6.0 2014    A1C 6.1 2014       Today's PHQ-2 Score:       1/15/2024    11:00 AM   PHQ-2 (  [employer])   Q1: Little interest or pleasure in doing things 0   Q2: Feeling down, depressed or hopeless 0   PHQ-2 Score 0   Q1: Little interest or pleasure in doing things Not at all   Q2: Feeling down, depressed or hopeless Not at all   PHQ-2 Score 0       Have you ever done Advance Care Planning? (For example, a Health Directive, POLST, or a discussion with a medical provider or your loved ones about your wishes): Yes, advance care planning is on file.       Fall risk  Fallen 2 or more times in the past year?: No  Any fall with injury in the past year?: No    Cognitive Screening   1) Repeat 3 items (Leader, Season, Table)    2) Clock draw: NORMAL  3) 3 item recall: Recalls 3 objects  Results: 3 items recalled: COGNITIVE IMPAIRMENT LESS LIKELY    Mini-CogTM Copyright JAVON Ball. Licensed by the author for use in [facility]; reprinted with permission (ayan@.[facility]). All rights reserved.      Do you have sleep apnea, excessive snoring or daytime drowsiness? : no    Reviewed and updated as needed this visit by clinical staff                  Reviewed and updated as needed this visit by Provider                 Social History     Tobacco Use    Smoking status: Former     Packs/day: 0.00     Years: 0.00     Additional pack years: 0.00     Total pack years: 0.00     Types: Cigarettes     Quit date: 1993     Years since quittin.3    Smokeless tobacco: Never   Substance Use Topics    Alcohol use: Not Currently     Comment: rare              1/15/2024    11:00 AM   Alcohol Use   Prescreen: >3 drinks/day or >7 drinks/week? Not Applicable          No data to display              Do you have a current opioid prescription? No  Do you use any other controlled substances or medications that are not prescribed by a provider? None        Current providers sharing in care for this patient include:   Patient Care Team:  Monica Tubbs MD as PCP - General (Family Medicine)  Yasmany Wade MD as MD (Oncology)  Ngozi Le MD as MD (Orthopedics)  Cinthia Contreras, DENZEL as Specialty Care Coordinator (Oncology)  Yasmany Wade MD as Assigned Cancer Care Provider  Monica Tubbs MD as Assigned PCP  Johnna Cope RN as Registered Nurse (Diabetes Education)  Joe Guidry MD as Assigned Rheumatology Provider  Myhre, Grace C, [facility] as Pharmacist  Rios Mckeon MD as Assigned Surgical Provider  Mimi Caceres RPH as Pharmacist (Pharmacist)  Mimi Caceres RPH as Assigned MTM Pharmacist    The following health maintenance items are reviewed in Epic and correct as of today:  Health Maintenance   Topic Date Due    RSV VACCINE (Pregnancy & 60+) (1 - 1-dose 60+ series) Never done    DIABETIC FOOT EXAM  11/11/2014    ANNUAL REVIEW OF HM ORDERS  02/04/2023    MAMMO SCREENING  05/11/2023    INFLUENZA VACCINE (1) 09/01/2023    COVID-19 Vaccine (7 - 2023-24 season) 09/01/2023    A1C  01/14/2024    MEDICARE ANNUAL WELLNESS VISIT  12/30/2023    LIPID  07/14/2024    MICROALBUMIN  07/14/2024    EYE EXAM  09/14/2024    BMP  01/12/2025    FALL RISK ASSESSMENT  01/16/2025    COLORECTAL CANCER SCREENING  01/10/2026    ADVANCE CARE PLANNING  12/30/2027    DTAP/TDAP/TD IMMUNIZATION (3 - Td or Tdap) 07/04/2030    DEXA  10/05/2036    HEPATITIS C SCREENING  Completed    PHQ-2 (once per calendar year)  Completed    PAP FOLLOW-UP  Completed    HPV FOLLOW-UP  Completed    Pneumococcal Vaccine: 65+ Years  Completed    ZOSTER IMMUNIZATION   "Completed    IPV IMMUNIZATION  Aged Out    HPV IMMUNIZATION  Aged Out    MENINGITIS IMMUNIZATION  Aged Out    RSV MONOCLONAL ANTIBODY  Aged Out       Review of Systems   Constitutional:  Negative for chills and fever.   HENT:  Negative for congestion, ear pain, hearing loss and sore throat.    Eyes:  Negative for pain and visual disturbance.   Respiratory:  Negative for cough and shortness of breath.    Cardiovascular:  Negative for chest pain, palpitations and peripheral edema.   Gastrointestinal:  Negative for abdominal pain, constipation, diarrhea, heartburn, hematochezia and nausea.   Breasts:  Negative for tenderness, breast mass and discharge.   Genitourinary:  Negative for dysuria, frequency, genital sores, hematuria, pelvic pain, urgency, vaginal bleeding and vaginal discharge.   Musculoskeletal:  Positive for joint swelling. Negative for arthralgias and myalgias.   Skin:  Negative for rash.   Neurological:  Positive for paresthesias. Negative for dizziness, weakness and headaches.   Psychiatric/Behavioral:  Negative for mood changes. The patient is not nervous/anxious.        OBJECTIVE:   /72   Pulse 83   Temp 97  F (36.1  C) (Tympanic)   Resp 14   Ht 1.645 m (5' 4.76\")   Wt 50.9 kg (112 lb 3.2 oz)   SpO2 98%   BMI 18.81 kg/m   Estimated body mass index is 19.31 kg/m  as calculated from the following:    Height as of 2/20/23: 1.638 m (5' 4.49\").    Weight as of 10/16/23: 51.8 kg (114 lb 3.2 oz).  Physical Exam  GENERAL: healthy, alert and no distress  EYES: Eyes grossly normal to inspection, PERRL and conjunctivae and sclerae normal  HENT: ear canals and TM's normal, nose and mouth without ulcers or lesions  NECK: no adenopathy, no asymmetry, masses, or scars and thyroid normal to palpation  RESP: lungs clear to auscultation - no rales, rhonchi or wheezes  BREAST: normal without masses, tenderness or nipple discharge and no palpable axillary masses or adenopathy  CV: regular rate and rhythm, " normal S1 S2, no S3 or S4, no murmur, click or rub, no peripheral edema and peripheral pulses strong  ABDOMEN: soft, nontender, no hepatosplenomegaly, no masses and bowel sounds normal  MS: no gross musculoskeletal defects noted, no edema  SKIN: no suspicious lesions or rashes  NEURO: Normal strength and tone, mentation intact and speech normal  PSYCH: mentation appears normal, affect normal/bright        ASSESSMENT / PLAN:   (Z00.00) Encounter for Medicare annual wellness exam  (primary encounter diagnosis)  Comment: We discussed self breast exams, exercise 30mins/day, and calcium with vitamin D at 1200mg/day, preferably from dietary sources.  Diet, Weight loss, and Exercise were discussed as well.       (Z12.31) Visit for screening mammogram  Comment: discussed   Plan:     (R73.03) Pre-diabetes  Comment: check A1c and it looks good.   Plan: HEMOGLOBIN A1C            (C25.0) Malignant neoplasm of head of pancreas (H)  Comment: stable and not progressing   Plan: pantoprazole (PROTONIX) 40 MG EC tablet            (M81.6) Localized osteoporosis, unspecified pathological fracture presence  Comment: due for dexa. On fosamax x 2 years now   Plan: alendronate (FOSAMAX) 70 MG tablet            (E11.9) Type 2 diabetes mellitus without complication, without long-term current use of insulin (H)  Comment: diet controlled   Plan: lisinopril (ZESTRIL) 10 MG tablet            (D84.9) Immunosuppression (H24)  Comment: per oncology   Plan:     (M34.9) Scleroderma (H)  Comment: evaluated by derm and onc and rheum. 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  Comment: stable A1c   Plan:     (Z78.0) Post-menopausal  Comment: ordered   Plan: DX Hip/Pelvis/Spine            (Z12.31) Encounter for screening mammogram for malignant neoplasm of breast  Comment: discussed   Plan: MA  Screen Bilateral w/Mekhi            (M89.9,  M94.9) Disorder of bone and cartilage  Comment: on fosamax   Plan:     (L94.3) Sclerodactyly  Comment: per derm/rheum   Plan:     Patient has been advised of split billing requirements and indicates understanding: Yes      COUNSELING:  Reviewed preventive health counseling, as reflected in patient instructions       Regular exercise       Healthy diet/nutrition        She reports that she quit smoking about 30 years ago. Her smoking use included cigarettes. She has never used smokeless tobacco.      Appropriate preventive services were discussed with this patient, including applicable screening as appropriate for fall prevention, nutrition, physical activity, Tobacco-use cessation, weight loss and cognition.  Checklist reviewing preventive services available has been given to the patient.    Reviewed patients plan of care and provided an AVS. The Basic Care Plan (routine screening as documented in Health Maintenance) for Emelia meets the Care Plan requirement. This Care Plan has been established and reviewed with the Patient.          Monica Tubbs MD  [facility]    Identified Health Risks:  I have reviewed Opioid Use Disorder and Substance Use Disorder risk factors and made any needed referrals.

## 2024-01-16 NOTE — PATIENT INSTRUCTIONS
Patient Education   Personalized Prevention Plan  You are due for the preventive services outlined below.  Your care team is available to assist you in scheduling these services.  If you have already completed any of these items, please share that information with your care team to update in your medical record.  Health Maintenance Due   Topic Date Due     RSV VACCINE (Pregnancy & 60+) (1 - 1-dose 60+ series) Never done     Diabetic Foot Exam  11/11/2014     ANNUAL REVIEW OF HM ORDERS  02/04/2023     Mammogram  05/11/2023     Flu Vaccine (1) 09/01/2023     COVID-19 Vaccine (7 - 2023-24 season) 09/01/2023     A1C Lab  01/14/2024     Annual Wellness Visit  12/30/2023

## 2024-01-18 ENCOUNTER — MYC MEDICAL ADVICE (OUTPATIENT)
Dept: FAMILY MEDICINE | Facility: CLINIC | Age: 69
End: 2024-01-18
Payer: MEDICARE

## 2024-01-29 ENCOUNTER — TRANSFERRED RECORDS (OUTPATIENT)
Dept: HEALTH INFORMATION MANAGEMENT | Facility: CLINIC | Age: 69
End: 2024-01-29
Payer: MEDICARE

## 2024-02-02 NOTE — TELEPHONE ENCOUNTER
FREE DRUG APPLICATION INITIATED    Medication: OTEZLA 10 & 20 & 30 MG PO TBPK  Free Drug Program Name:  [facility]  Date Submitted: 2/2/2024  8:22 AM  Phone #: [phone]  Fax #: [phone]  Additional Information: faxed patient portion

## 2024-02-04 DIAGNOSIS — C25.0 MALIGNANT NEOPLASM OF HEAD OF PANCREAS (H): Primary | ICD-10-CM

## 2024-02-05 ENCOUNTER — OFFICE VISIT (OUTPATIENT)
Dept: DERMATOLOGY | Facility: CLINIC | Age: 69
End: 2024-02-05
Payer: MEDICARE

## 2024-02-05 DIAGNOSIS — L90.5 SCAR: Primary | ICD-10-CM

## 2024-02-05 PROCEDURE — 99024 POSTOP FOLLOW-UP VISIT: CPT | Performed by: DERMATOLOGY

## 2024-02-05 NOTE — NURSING NOTE
Chief Complaint   Patient presents with    Scar Management     2 month follow up scar     Carin LOJA, RN-BSN  Dermatology Surgery  [phone]

## 2024-02-05 NOTE — LETTER
"2/5/2024       RE: Emelia Weathers  [address]  [place] [address]     Dear Colleague,    Thank you for referring your patient, Emelia Weathers, to the [facility] DERMATOLOGIC SURGERY CLINIC [place] at [facility]. Please see a copy of my visit note below.    Dermatologic Surgery Post-Op Scar Check     CC: Scar Management (2 month follow up scar)      Dermatology Problem List:  1.  BCC, R nasal tip,  s/p shave bx 8/18/23, s/p MMS 10/30/23, Takedown 11/20/23   2.  Actinic Keratosis, ulcerated, L nasal bridge  - s/p shave bx 8/18/23,    - s/p cryotherapy 12/14/2023 x 9-face  3. Systemic sclerosis: suspect limited cutaneous   - Raynaud's (no DU's), facial telangiectasias, sclerodactyly, onychodystrophy, sclerodermoid facies, xerophthalmia, inflammatory arthritis  - no weakness/myalgias, no ILD (CT with stable biapical scarring, no PFT's on record), no known PAH (no TTE on record), no GI dysmotility   - on lisinopril for HTN   - SANDY +1:160 (homogeneous) and +1:80 (speckled); scleroderma and myositis panels and other antibodies negative   - hydroxychloroquine 200 mg daily, hand therapy  4. Pancreatic cancer: on capecitabine ~10 years  - recurrent hand-foot syndrome  - augmented betamethasone ointment  5. Xeroderma  - current tx: Augmented Betamethasone 0.05% ointment BID  - consider for future: Apremailast 30 mg  6. Palmoplantar Pustular Psoriasis    Subjective: Emelia Weathers is a 68 year old female who presents today for scar evaluation after MMS and takedown procedure for BCC of her right nasal tip.     - She was recently seen by Dr. Stoddard for a follow up on palmoplantar pustular psoriasis and systemic sclerosis. Cryotherapy was also preformed for nine AKs on her face.     - She has a history of Pancreatic cancer and Raynaud's.     - Today, she expresses some concern about sensation along the scar line. She notes that there is \"narrowing\" in the nasal " canal, but does not report any difficulty breathing.     - no other concerns today    Objective: An exam of the nose was performed today   - well-healed scar at the cheek to nose interpolation flap on the right ala         Assessment and Plan:     1. BCC, R nasal tip,  s/p shave bx 8/18/23, s/p MMS 10/30/23, Takedown 11/20/23   - Surgical site healed well.  - Recommend continuing scar massage to assist in decreasing the bulk of the scar in the nostril.    - The patient should follow up with dermatologic surgery PRN, as well as continue with regular skin exams in general dermatology clinic.    Patient was discussed with and evaluated by attending physician Dr. Rios Mckeon.    Staff Involved:   Scribe/Staff    Scribe Disclosure:   I, YEIMI ALCARAZ, am serving as a scribe to document services personally performed by Rios Mckeon MD -based on data collection and the provider's statements to me.     Attending Attestation  I attest that the Scribe recorded the interview and exam that I personally performed.  I have reviewed the note and edited it as necessary.    Rios Mckeon M.D.  Professor  Director of Dermatologic Surgery  Department of Dermatology  [facility]

## 2024-02-07 ENCOUNTER — LAB (OUTPATIENT)
Dept: LAB | Facility: CLINIC | Age: 69
End: 2024-02-07
Payer: MEDICARE

## 2024-02-07 ENCOUNTER — MYC MEDICAL ADVICE (OUTPATIENT)
Dept: ONCOLOGY | Facility: CLINIC | Age: 69
End: 2024-02-07

## 2024-02-07 ENCOUNTER — ANCILLARY PROCEDURE (OUTPATIENT)
Dept: CT IMAGING | Facility: CLINIC | Age: 69
End: 2024-02-07
Attending: INTERNAL MEDICINE
Payer: MEDICARE

## 2024-02-07 DIAGNOSIS — M34.9 SCLERODERMA (H): ICD-10-CM

## 2024-02-07 DIAGNOSIS — C25.0 MALIGNANT NEOPLASM OF HEAD OF PANCREAS (H): ICD-10-CM

## 2024-02-07 DIAGNOSIS — C25.0 MALIGNANT NEOPLASM OF HEAD OF PANCREAS (H): Primary | ICD-10-CM

## 2024-02-07 LAB
ALBUMIN SERPL BCG-MCNC: 4.1 G/DL (ref 3.5–5.2)
ALP SERPL-CCNC: 73 U/L (ref 40–150)
ALT SERPL W P-5'-P-CCNC: 15 U/L (ref 0–50)
ANION GAP SERPL CALCULATED.3IONS-SCNC: 8 MMOL/L (ref 7–15)
AST SERPL W P-5'-P-CCNC: 31 U/L (ref 0–45)
BASOPHILS # BLD AUTO: 0 10E3/UL (ref 0–0.2)
BASOPHILS NFR BLD AUTO: 1 %
BILIRUB SERPL-MCNC: 0.5 MG/DL
BUN SERPL-MCNC: 7.9 MG/DL (ref 8–23)
CALCIUM SERPL-MCNC: 9.2 MG/DL (ref 8.8–10.2)
CHLORIDE SERPL-SCNC: 104 MMOL/L (ref 98–107)
CREAT SERPL-MCNC: 0.71 MG/DL (ref 0.51–0.95)
DEPRECATED HCO3 PLAS-SCNC: 26 MMOL/L (ref 22–29)
EGFRCR SERPLBLD CKD-EPI 2021: >90 ML/MIN/1.73M2
EOSINOPHIL # BLD AUTO: 0 10E3/UL (ref 0–0.7)
EOSINOPHIL NFR BLD AUTO: 1 %
ERYTHROCYTE [DISTWIDTH] IN BLOOD BY AUTOMATED COUNT: 23.3 % (ref 10–15)
GLUCOSE SERPL-MCNC: 100 MG/DL (ref 70–99)
HCT VFR BLD AUTO: 29.1 % (ref 35–47)
HGB BLD-MCNC: 9.7 G/DL (ref 11.7–15.7)
IMM GRANULOCYTES # BLD: 0 10E3/UL
IMM GRANULOCYTES NFR BLD: 0 %
LYMPHOCYTES # BLD AUTO: 0.7 10E3/UL (ref 0.8–5.3)
LYMPHOCYTES NFR BLD AUTO: 21 %
MCH RBC QN AUTO: 31.7 PG (ref 26.5–33)
MCHC RBC AUTO-ENTMCNC: 33.3 G/DL (ref 31.5–36.5)
MCV RBC AUTO: 95 FL (ref 78–100)
MONOCYTES # BLD AUTO: 0.5 10E3/UL (ref 0–1.3)
MONOCYTES NFR BLD AUTO: 15 %
NEUTROPHILS # BLD AUTO: 2 10E3/UL (ref 1.6–8.3)
NEUTROPHILS NFR BLD AUTO: 62 %
NRBC # BLD AUTO: 0 10E3/UL
NRBC BLD AUTO-RTO: 0 /100
PLATELET # BLD AUTO: 176 10E3/UL (ref 150–450)
POTASSIUM SERPL-SCNC: 4.3 MMOL/L (ref 3.4–5.3)
PROT SERPL-MCNC: 6.7 G/DL (ref 6.4–8.3)
RBC # BLD AUTO: 3.06 10E6/UL (ref 3.8–5.2)
SODIUM SERPL-SCNC: 138 MMOL/L (ref 135–145)
WBC # BLD AUTO: 3.2 10E3/UL (ref 4–11)

## 2024-02-07 PROCEDURE — G1010 CDSM STANSON: HCPCS | Mod: GC | Performed by: RADIOLOGY

## 2024-02-07 PROCEDURE — 74177 CT ABD & PELVIS W/CONTRAST: CPT | Mod: MG | Performed by: RADIOLOGY

## 2024-02-07 PROCEDURE — 36591 DRAW BLOOD OFF VENOUS DEVICE: CPT

## 2024-02-07 PROCEDURE — 84075 ASSAY ALKALINE PHOSPHATASE: CPT

## 2024-02-07 PROCEDURE — 71260 CT THORAX DX C+: CPT | Mod: MG | Performed by: RADIOLOGY

## 2024-02-07 PROCEDURE — 85004 AUTOMATED DIFF WBC COUNT: CPT

## 2024-02-07 PROCEDURE — 250N000011 HC RX IP 250 OP 636: Performed by: INTERNAL MEDICINE

## 2024-02-07 PROCEDURE — 84155 ASSAY OF PROTEIN SERUM: CPT

## 2024-02-07 RX ORDER — CAPECITABINE 500 MG/1
TABLET, FILM COATED ORAL
Qty: 42 TABLET | Refills: 3 | Status: SHIPPED | OUTPATIENT
Start: 2024-02-07 | End: 2024-04-30

## 2024-02-07 RX ORDER — HEPARIN SODIUM (PORCINE) LOCK FLUSH IV SOLN 100 UNIT/ML 100 UNIT/ML
500 SOLUTION INTRAVENOUS ONCE
Status: COMPLETED | OUTPATIENT
Start: 2024-02-07 | End: 2024-02-07

## 2024-02-07 RX ORDER — IOPAMIDOL 755 MG/ML
68 INJECTION, SOLUTION INTRAVASCULAR ONCE
Status: COMPLETED | OUTPATIENT
Start: 2024-02-07 | End: 2024-02-07

## 2024-02-07 RX ORDER — HEPARIN SODIUM (PORCINE) LOCK FLUSH IV SOLN 100 UNIT/ML 100 UNIT/ML
5 SOLUTION INTRAVENOUS ONCE
Status: COMPLETED | OUTPATIENT
Start: 2024-02-07 | End: 2024-02-07

## 2024-02-07 RX ADMIN — IOPAMIDOL 68 ML: 755 INJECTION, SOLUTION INTRAVASCULAR at 09:36

## 2024-02-07 RX ADMIN — HEPARIN SODIUM (PORCINE) LOCK FLUSH IV SOLN 100 UNIT/ML 500 UNITS: 100 SOLUTION at 10:18

## 2024-02-07 RX ADMIN — Medication 5 ML: at 09:21

## 2024-02-07 NOTE — NURSING NOTE
"Chief Complaint   Patient presents with    Port Draw     Labs drawn via port by RN.     Port accessed with 20 gauge, 3/4\" power needle by RN, labs collected, line flushed with saline and heparin. Port left accessed for CT.    Tessa Gan RN    "

## 2024-02-07 NOTE — DISCHARGE INSTRUCTIONS

## 2024-02-07 NOTE — TELEPHONE ENCOUNTER
Oral Chemotherapy Monitoring Program  Lab Follow Up    Reviewed CBC and CMP lab results from today.        9/18/2023     9:00 AM 11/14/2023    10:00 AM 11/22/2023    10:00 AM 12/8/2023     4:00 PM 12/18/2023     3:00 PM 1/15/2024    11:00 AM 2/7/2024    10:00 AM   ORAL CHEMOTHERAPY   Assessment Type Lab Monitoring Refill Lab Monitoring Chart Review Lab Monitoring Lab Monitoring Refill;Lab Monitoring   Diagnosis Code Pancreatic Cancer Pancreatic Cancer Pancreatic Cancer Pancreatic Cancer Pancreatic Cancer Pancreatic Cancer Pancreatic Cancer   Providers Dr. Mauro Wade   [facility]   Is this patient followed by the [facility] OC team? Yes Yes Yes Yes Yes Yes Yes   Drug Name Xeloda (capecitabine) Xeloda (capecitabine) Xeloda (capecitabine) Xeloda (capecitabine) Xeloda (capecitabine) Xeloda (capecitabine) Xeloda (capecitabine)   Dose Other: Other: Other: Other: Other: Other: Other:   Current Schedule BID BID BID BID BID BID BID   Cycle Details 2 weeks on, 1 week off 2 weeks on, 1 week off 2 weeks on, 1 week off 2 weeks on, 1 week off 2 weeks on, 1 week off 2 weeks on, 1 week off 2 weeks on, 1 week off   Adverse Effects Anemia         Anemia Grade 2         Pharmacist Intervention(anemia) Yes         Intervention(s) Referral to oncology provider             Labs:  _  Result Component Current Result Ref Range   Sodium 138 (2/7/2024) 135 - 145 mmol/L     _  Result Component Current Result Ref Range   Potassium 4.3 (2/7/2024) 3.4 - 5.3 mmol/L     _  Result Component Current Result Ref Range   Calcium 9.2 (2/7/2024) 8.8 - 10.2 mg/dL     No results found for Mag within last 30 days.     No results found for Phos within last 30 days.     _  Result Component Current Result Ref Range   Albumin 4.1 (2/7/2024) 3.5 - 5.2 g/dL     _  Result Component Current Result Ref Range   Urea Nitrogen 7.9 (L) (2/7/2024) 8.0  - 23.0 mg/dL     _  Result Component Current Result Ref Range   Creatinine 0.71 (2/7/2024) 0.51 - 0.95 mg/dL     _  Result Component Current Result Ref Range   AST 31 (2/7/2024) 0 - 45 U/L     _  Result Component Current Result Ref Range   ALT 15 (2/7/2024) 0 - 50 U/L     _  Result Component Current Result Ref Range   Bilirubin Total 0.5 (2/7/2024) <=1.2 mg/dL     _  Result Component Current Result Ref Range   WBC Count 3.2 (L) (2/7/2024) 4.0 - 11.0 10e3/uL     _  Result Component Current Result Ref Range   Hemoglobin 9.7 (L) (2/7/2024) 11.7 - 15.7 g/dL     _  Result Component Current Result Ref Range   Platelet Count 176 (2/7/2024) 150 - 450 10e3/uL     No results found for ANC within last 30 days.     _  Result Component Current Result Ref Range   Absolute Neutrophils 2.0 (2/7/2024) 1.6 - 8.3 10e3/uL        Assessment & Plan:  Results are concerning for grade 2 anemia, consistent with previous results and pt's baseline. No concerning abnormalities. Continue Xeloda as planned.    [employer] sent to patient with results    Follow-Up:  2/19: appt with Dr. Greeno Grace Myhre, PharmD  Hematology/Oncology Pharmacist  [facility]  [facility]  [phone]

## 2024-02-09 NOTE — TELEPHONE ENCOUNTER
Received provider portion of pap from [facility] and faxed over to [employer] at [phone]. Will check next week for update

## 2024-02-15 NOTE — TELEPHONE ENCOUNTER
Called [employer], they are missing valid RX and 2 patient authorization forms. Confirmed issues with previous fax of provider and patient form. Provider form missing Tax ID and provider state license number. Completed and faxed back. Sending message to patient to have them complete authorization forms.

## 2024-02-16 ENCOUNTER — TELEPHONE (OUTPATIENT)
Dept: GASTROENTEROLOGY | Facility: CLINIC | Age: 69
End: 2024-02-16
Payer: MEDICARE

## 2024-02-16 NOTE — TELEPHONE ENCOUNTER
Caller: Emelia KEYS [employer]    Reason for Reschedule/Cancellation (please be detailed, any staff messages or encounters to note?): Provider unavailable, left  for call back, Carly[employer] message sent, case moved into depo      Prior to reschedule please review:  Ordering Provider: Yasmany Wade MD  Sedation Determined: MODERATE  Does patient have any ASC Exclusions, please identify?: NO      Notes on Cancelled Procedure:  Procedure: Upper Endoscopy [EGD]   Date: 4/3  Location: [facility]; [address] [place] [address], [place] [address]  Surgeon: LEVENTHAL      Rescheduled: No       Send In - basket message to Panc - Adama Pool if EUS  procedure is canceled or rescheduled: [ N/A, YES or NO] N/A

## 2024-02-16 NOTE — TELEPHONE ENCOUNTER
Rescheduled: Yes    Procedure: Lower Endoscopy [Colonoscopy]   Date: 02/22/2024  Location: [facility]; [facility] [address] [place] [address]   Surgeon: Barbi  Sedation Level Scheduled  Moderate,  Reason for Sedation Level Per order   Prep/Instructions updated and sent:Y       Did you cancel or rescheduled an EUS procedure? No.

## 2024-02-16 NOTE — TELEPHONE ENCOUNTER
"Attempted to contact patient in order to complete pre assessment questions.     No answer. Left message to return call to [phone] option 4    Missed call communication sent via [employer]      Procedure details:    Patient scheduled for Upper endoscopy (EGD) on 2/22/24.     Arrival time: 0645. Procedure time 0730    Pre op exam needed? N/A    Facility location: [facility]; [address] [place] [address] [place] [address]    Sedation type: Conscious sedation     Indication for procedure: \"EGD with esophagus dilation\" per order.  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 The barium tablet became lodged at this level.\"      Chart review:     Electronic implanted devices? No    Diabetic? Prediabetic    Diabetic medication HOLDING recommendations: (if applicable)  Oral diabetic medications: No  Diabetic injectables: No  Insulin: No      Medication review:    Anticoagulants? No    NSAIDS? Yes.  Ibuprofen (Advil, Motrin).  Holding interval of 1 day before procedure.    Other medication HOLDING recommendations:  EGD: PPIs/Acid reducing medication: HOLD 2 weeks before procedure. Pantoprazole/Protonix per med list      Prep for procedure:     Prep instructions sent via [employer]       Johnna Rodríguez RN  Endoscopy Procedure Pre Assessment RN         "

## 2024-02-19 ENCOUNTER — ONCOLOGY VISIT (OUTPATIENT)
Dept: ONCOLOGY | Facility: CLINIC | Age: 69
End: 2024-02-19
Attending: INTERNAL MEDICINE
Payer: MEDICARE

## 2024-02-19 VITALS
SYSTOLIC BLOOD PRESSURE: 137 MMHG | RESPIRATION RATE: 16 BRPM | BODY MASS INDEX: 18.44 KG/M2 | DIASTOLIC BLOOD PRESSURE: 87 MMHG | OXYGEN SATURATION: 98 % | WEIGHT: 110 LBS | HEART RATE: 86 BPM | TEMPERATURE: 98 F

## 2024-02-19 DIAGNOSIS — M34.9 SCLERODERMA (H): ICD-10-CM

## 2024-02-19 DIAGNOSIS — C25.0 MALIGNANT NEOPLASM OF HEAD OF PANCREAS (H): Primary | ICD-10-CM

## 2024-02-19 PROCEDURE — G0463 HOSPITAL OUTPT CLINIC VISIT: HCPCS | Performed by: INTERNAL MEDICINE

## 2024-02-19 PROCEDURE — 99214 OFFICE O/P EST MOD 30 MIN: CPT | Performed by: INTERNAL MEDICINE

## 2024-02-19 ASSESSMENT — PAIN SCALES - GENERAL: PAINLEVEL: NO PAIN (0)

## 2024-02-19 NOTE — TELEPHONE ENCOUNTER
Pre assessment completed for upcoming procedure.   (Please see previous telephone encounter notes for complete details)    Patient  returned call.       Procedure details:    Arrival time and facility location reviewed.    Pre op exam needed? N/A    Designated  policy reviewed. Instructed to have someone stay 6  hours post procedure.     COVID policy reviewed.      Medication review:    Medications reviewed. Please see supporting documentation below. Holding recommendations discussed (if applicable).   N/A  Patient was just added on to schedule so does not have 2 weeks to hold PPI.  Also unsure if she will be able to do for even a couple days.     Prep for procedure:     Procedure prep instructions reviewed.        Any additional information needed:  N/A      Patient  verbalized understanding and had no questions or concerns at this time.      Cecelia Ulloa RN  Endoscopy Procedure Pre Assessment RN  [phone] option 4

## 2024-02-19 NOTE — NURSING NOTE
"Oncology Rooming Note    February 19, 2024 11:39 AM   Emelia Weathers is a 68 year old female who presents for:    Chief Complaint   Patient presents with    Oncology Clinic Visit     Malignant neoplasm of head of pancreas     Initial Vitals: /87   Pulse 86   Temp 98  F (36.7  C)   Resp 16   Wt 49.9 kg (110 lb)   SpO2 98%   BMI 18.44 kg/m   Estimated body mass index is 18.44 kg/m  as calculated from the following:    Height as of 1/16/24: 1.645 m (5' 4.76\").    Weight as of this encounter: 49.9 kg (110 lb). Body surface area is 1.51 meters squared.  No Pain (0) Comment: Data Unavailable   No LMP recorded. Patient is postmenopausal.  Allergies reviewed: Yes  Medications reviewed: Yes    Medications: Medication refills not needed today.  Pharmacy name entered into EPIC:    [facility] [place] - [place], [place] - [address] [facility] [place], [place] - [phone]  [facility] - [place], [place] - [address]  [facility] [id number] [place] [facility] - [place], [place] - [address] [id number] - [place], [place] - [address]    Frailty Screening:   Is the patient here for a new oncology consult visit in cancer care? 2. No      Clinical concerns:        Aylin Smith              "

## 2024-02-19 NOTE — LETTER
2/19/2024         RE: Emelia Weathers  [address]  [facility] [address]        Dear Colleague,    Thank you for referring your patient, Emelia Weathers, to the [facility]. Please see a copy of my visit note below.    I am seeing Tiana Weathers back today in follow-up of locally recurrent pancreatic cancer.    She is 68 years old and had a Whipple more than a decade ago with subsequent biopsy-proven recurrence in the resection bed which encases her SMA in October 2014.  She had an excellent response to FOLFIRINOX which was eventually reduced to maintenance single agent Xeloda with ongoing control for many years.  Her course has been complicated by systemic sclerosis, perhaps attributable to her Xeloda, with contractures in her hands, dry eyes and an esophageal stricture.  She is currently receiving hydroxychloroquine for that and is awaiting insurance coverage for additional immune-suppressive therapy.  She was scheduled for dilation of her esophageal stricture, but that was deferred because of recent URI.    She tells me currently she is generally feeling well as she gets over her URI, for which she tested negative for COVID.  Her swallowing problems are intermittently significant and she is looking forward to getting her esophagus dilated later this week.  She brings with her today her 5-week-old new grandchild about him she has been quite excited.    On exam she appears younger than her stated age.  As always she is quite slender.  The contractures in her hands are stable.  She has fairly marked erythema of the left lower eyelid.    I personally reviewed her CT scan which shows some stable tiny lung nodules of uncertain significance.  She otherwise has nothing to suggest any evidence of disease.    Her electrolytes and renal function are normal.  Her bilirubin, albumin and liver enzymes are normal.  She has a white count of 3.2 with an unremarkable differential, hemoglobin of 9.7  and platelets of 176.    Assessment/plan: Recurrent pancreatic cancer with long-term disease control on maintenance Xeloda.  She has systemic sclerosis perhaps related to her Xeloda.  She continues to express that she does not want to go off the Xeloda even if it is the cause of her other systemic symptoms, she finds those manageable and does not want to risk her cancer regrowing.  Will continue on with the same dose and schedule and plan on reevaluating her disease status again in another 4 months.      Again, thank you for allowing me to participate in the care of your patient.        Sincerely,        Yasmany Wade MD

## 2024-02-19 NOTE — PROGRESS NOTES
I am seeing Tiana Weathers back today in follow-up of locally recurrent pancreatic cancer.    She is 68 years old and had a Whipple more than a decade ago with subsequent biopsy-proven recurrence in the resection bed which encases her SMA in October 2014.  She had an excellent response to FOLFIRINOX which was eventually reduced to maintenance single agent Xeloda with ongoing control for many years.  Her course has been complicated by systemic sclerosis, perhaps attributable to her Xeloda, with contractures in her hands, dry eyes and an esophageal stricture.  She is currently receiving hydroxychloroquine for that and is awaiting insurance coverage for additional immune-suppressive therapy.  She was scheduled for dilation of her esophageal stricture, but that was deferred because of recent URI.    She tells me currently she is generally feeling well as she gets over her URI, for which she tested negative for COVID.  Her swallowing problems are intermittently significant and she is looking forward to getting her esophagus dilated later this week.  She brings with her today her 5-week-old new grandchild about him she has been quite excited.    On exam she appears younger than her stated age.  As always she is quite slender.  The contractures in her hands are stable.  She has fairly marked erythema of the left lower eyelid.    I personally reviewed her CT scan which shows some stable tiny lung nodules of uncertain significance.  She otherwise has nothing to suggest any evidence of disease.    Her electrolytes and renal function are normal.  Her bilirubin, albumin and liver enzymes are normal.  She has a white count of 3.2 with an unremarkable differential, hemoglobin of 9.7 and platelets of 176.    Assessment/plan: Recurrent pancreatic cancer with long-term disease control on maintenance Xeloda.  She has systemic sclerosis perhaps related to her Xeloda.  She continues to express that she does not want to go off the Xeloda  even if it is the cause of her other systemic symptoms, she finds those manageable and does not want to risk her cancer regrowing.  Will continue on with the same dose and schedule and plan on reevaluating her disease status again in another 4 months.

## 2024-02-21 NOTE — TELEPHONE ENCOUNTER
Received forms for [employer] patient dropped of. Faxed over to [employer]  and will check back in a few days for an update.

## 2024-02-22 ENCOUNTER — HOSPITAL ENCOUNTER (OUTPATIENT)
Facility: CLINIC | Age: 69
Discharge: HOME | End: 2024-02-22
Attending: INTERNAL MEDICINE | Admitting: INTERNAL MEDICINE
Payer: MEDICARE

## 2024-02-22 VITALS
OXYGEN SATURATION: 98 % | BODY MASS INDEX: 18.44 KG/M2 | SYSTOLIC BLOOD PRESSURE: 96 MMHG | WEIGHT: 110 LBS | RESPIRATION RATE: 13 BRPM | DIASTOLIC BLOOD PRESSURE: 60 MMHG | HEART RATE: 71 BPM

## 2024-02-22 LAB — UPPER GI ENDOSCOPY: NORMAL

## 2024-02-22 PROCEDURE — G0500 MOD SEDAT ENDO SERVICE >5YRS: HCPCS | Performed by: INTERNAL MEDICINE

## 2024-02-22 PROCEDURE — 43239 EGD BIOPSY SINGLE/MULTIPLE: CPT | Performed by: INTERNAL MEDICINE

## 2024-02-22 PROCEDURE — 250N000009 HC RX 250: Performed by: INTERNAL MEDICINE

## 2024-02-22 PROCEDURE — 88305 TISSUE EXAM BY PATHOLOGIST: CPT | Mod: TC | Performed by: INTERNAL MEDICINE

## 2024-02-22 PROCEDURE — 250N000011 HC RX IP 250 OP 636: Performed by: INTERNAL MEDICINE

## 2024-02-22 PROCEDURE — 88305 TISSUE EXAM BY PATHOLOGIST: CPT | Mod: 26 | Performed by: PATHOLOGY

## 2024-02-22 PROCEDURE — 43249 ESOPH EGD DILATION <30 MM: CPT | Performed by: INTERNAL MEDICINE

## 2024-02-22 RX ORDER — FENTANYL CITRATE 50 UG/ML
INJECTION, SOLUTION INTRAMUSCULAR; INTRAVENOUS PRN
Status: DISCONTINUED | OUTPATIENT
Start: 2024-02-22 | End: 2024-02-22 | Stop reason: HOSPADM

## 2024-02-22 ASSESSMENT — ACTIVITIES OF DAILY LIVING (ADL)
ADLS_ACUITY_SCORE: 35
ADLS_ACUITY_SCORE: 37

## 2024-02-23 NOTE — TELEPHONE ENCOUNTER
Spoke with [employer] and they are requesting insurance to send over approval of appeal. Working on trying to obtain but they are also saying patient is over income guidelines and need proof of income showing patient is under at at the guidelines. Sent message to patient to verify if she can provide this.

## 2024-02-26 LAB
PATH REPORT.COMMENTS IMP SPEC: NORMAL
PATH REPORT.COMMENTS IMP SPEC: NORMAL
PATH REPORT.FINAL DX SPEC: NORMAL
PATH REPORT.GROSS SPEC: NORMAL
PATH REPORT.MICROSCOPIC SPEC OTHER STN: NORMAL
PATH REPORT.RELEVANT HX SPEC: NORMAL
PHOTO IMAGE: NORMAL

## 2024-02-29 ENCOUNTER — ANCILLARY PROCEDURE (OUTPATIENT)
Dept: MAMMOGRAPHY | Facility: CLINIC | Age: 69
End: 2024-02-29
Attending: FAMILY MEDICINE
Payer: MEDICARE

## 2024-02-29 ENCOUNTER — ANCILLARY PROCEDURE (OUTPATIENT)
Dept: BONE DENSITY | Facility: CLINIC | Age: 69
End: 2024-02-29
Attending: FAMILY MEDICINE
Payer: MEDICARE

## 2024-02-29 DIAGNOSIS — Z12.31 ENCOUNTER FOR SCREENING MAMMOGRAM FOR MALIGNANT NEOPLASM OF BREAST: ICD-10-CM

## 2024-02-29 DIAGNOSIS — Z78.0 POST-MENOPAUSAL: ICD-10-CM

## 2024-02-29 PROCEDURE — 77080 DXA BONE DENSITY AXIAL: CPT | Performed by: INTERNAL MEDICINE

## 2024-02-29 PROCEDURE — 77063 BREAST TOMOSYNTHESIS BI: CPT | Performed by: RADIOLOGY

## 2024-02-29 PROCEDURE — 77067 SCR MAMMO BI INCL CAD: CPT | Performed by: RADIOLOGY

## 2024-03-07 ENCOUNTER — LAB (OUTPATIENT)
Dept: LAB | Facility: CLINIC | Age: 69
End: 2024-03-07
Payer: MEDICARE

## 2024-03-07 DIAGNOSIS — C25.0 MALIGNANT NEOPLASM OF HEAD OF PANCREAS (H): ICD-10-CM

## 2024-03-07 LAB
ALBUMIN SERPL BCG-MCNC: 3.9 G/DL (ref 3.5–5.2)
ALP SERPL-CCNC: 87 U/L (ref 40–150)
ALT SERPL W P-5'-P-CCNC: 11 U/L (ref 0–50)
ANION GAP SERPL CALCULATED.3IONS-SCNC: 8 MMOL/L (ref 7–15)
AST SERPL W P-5'-P-CCNC: 26 U/L (ref 0–45)
BASOPHILS # BLD AUTO: 0 10E3/UL (ref 0–0.2)
BASOPHILS NFR BLD AUTO: 1 %
BILIRUB SERPL-MCNC: 0.4 MG/DL
BUN SERPL-MCNC: 8.3 MG/DL (ref 8–23)
CALCIUM SERPL-MCNC: 8.5 MG/DL (ref 8.8–10.2)
CHLORIDE SERPL-SCNC: 106 MMOL/L (ref 98–107)
CREAT SERPL-MCNC: 0.81 MG/DL (ref 0.51–0.95)
DEPRECATED HCO3 PLAS-SCNC: 24 MMOL/L (ref 22–29)
EGFRCR SERPLBLD CKD-EPI 2021: 78 ML/MIN/1.73M2
EOSINOPHIL # BLD AUTO: 0 10E3/UL (ref 0–0.7)
EOSINOPHIL NFR BLD AUTO: 1 %
ERYTHROCYTE [DISTWIDTH] IN BLOOD BY AUTOMATED COUNT: 22.6 % (ref 10–15)
GLUCOSE SERPL-MCNC: 114 MG/DL (ref 70–99)
HCT VFR BLD AUTO: 28.6 % (ref 35–47)
HGB BLD-MCNC: 9.5 G/DL (ref 11.7–15.7)
IMM GRANULOCYTES # BLD: 0 10E3/UL
IMM GRANULOCYTES NFR BLD: 0 %
LYMPHOCYTES # BLD AUTO: 0.6 10E3/UL (ref 0.8–5.3)
LYMPHOCYTES NFR BLD AUTO: 18 %
MCH RBC QN AUTO: 31.6 PG (ref 26.5–33)
MCHC RBC AUTO-ENTMCNC: 33.2 G/DL (ref 31.5–36.5)
MCV RBC AUTO: 95 FL (ref 78–100)
MONOCYTES # BLD AUTO: 0.4 10E3/UL (ref 0–1.3)
MONOCYTES NFR BLD AUTO: 13 %
NEUTROPHILS # BLD AUTO: 2.3 10E3/UL (ref 1.6–8.3)
NEUTROPHILS NFR BLD AUTO: 67 %
NRBC # BLD AUTO: 0 10E3/UL
NRBC BLD AUTO-RTO: 0 /100
PLATELET # BLD AUTO: 183 10E3/UL (ref 150–450)
POTASSIUM SERPL-SCNC: 4.2 MMOL/L (ref 3.4–5.3)
PROT SERPL-MCNC: 6.4 G/DL (ref 6.4–8.3)
RBC # BLD AUTO: 3.01 10E6/UL (ref 3.8–5.2)
SODIUM SERPL-SCNC: 138 MMOL/L (ref 135–145)
WBC # BLD AUTO: 3.4 10E3/UL (ref 4–11)

## 2024-03-07 PROCEDURE — 250N000011 HC RX IP 250 OP 636: Performed by: INTERNAL MEDICINE

## 2024-03-07 PROCEDURE — 36415 COLL VENOUS BLD VENIPUNCTURE: CPT

## 2024-03-07 PROCEDURE — 80053 COMPREHEN METABOLIC PANEL: CPT

## 2024-03-07 PROCEDURE — 85025 COMPLETE CBC W/AUTO DIFF WBC: CPT

## 2024-03-07 RX ORDER — HEPARIN SODIUM (PORCINE) LOCK FLUSH IV SOLN 100 UNIT/ML 100 UNIT/ML
5 SOLUTION INTRAVENOUS
Status: DISCONTINUED | OUTPATIENT
Start: 2024-03-07 | End: 2024-03-13 | Stop reason: HOSPADM

## 2024-03-07 RX ADMIN — Medication 5 ML: at 14:15

## 2024-03-07 NOTE — NURSING NOTE
Chief Complaint   Patient presents with    Port Draw     Lab only visit, port accessed, labs drawn, heparin locked, checked into next appt     There were no vitals taken for this visit.  Dixon Mcnally RN on 3/7/2024 at 2:19 PM

## 2024-03-08 ENCOUNTER — DOCUMENTATION ONLY (OUTPATIENT)
Dept: ONCOLOGY | Facility: CLINIC | Age: 69
End: 2024-03-08
Payer: MEDICARE

## 2024-03-08 NOTE — PROGRESS NOTES
Oral Chemotherapy Monitoring Program  Lab Follow Up    Reviewed CBC and CMP lab results from 3/8.        11/14/2023    10:00 AM 11/22/2023    10:00 AM 12/8/2023     4:00 PM 12/18/2023     3:00 PM 1/15/2024    11:00 AM 2/7/2024    10:00 AM 3/8/2024    10:00 AM   ORAL CHEMOTHERAPY   Assessment Type Refill Lab Monitoring Chart Review Lab Monitoring Lab Monitoring Refill;Lab Monitoring Lab Monitoring   Diagnosis Code Pancreatic Cancer Pancreatic Cancer Pancreatic Cancer Pancreatic Cancer Pancreatic Cancer Pancreatic Cancer Pancreatic Cancer   Providers Dr. Mauro Wade   [facility]   Is this patient followed by the [facility] OC team? Yes Yes Yes Yes Yes Yes Yes   Drug Name Xeloda (capecitabine) Xeloda (capecitabine) Xeloda (capecitabine) Xeloda (capecitabine) Xeloda (capecitabine) Xeloda (capecitabine) Xeloda (capecitabine)   Dose Other: Other: Other: Other: Other: Other: Other:   Current Schedule BID BID BID BID BID BID BID   Cycle Details 2 weeks on, 1 week off 2 weeks on, 1 week off 2 weeks on, 1 week off 2 weeks on, 1 week off 2 weeks on, 1 week off 2 weeks on, 1 week off 2 weeks on, 1 week off       Labs:  _  Result Component Current Result Ref Range   Sodium 138 (3/7/2024) 135 - 145 mmol/L     _  Result Component Current Result Ref Range   Potassium 4.2 (3/7/2024) 3.4 - 5.3 mmol/L     _  Result Component Current Result Ref Range   Calcium 8.5 (L) (3/7/2024) 8.8 - 10.2 mg/dL     No results found for Mag within last 30 days.     No results found for Phos within last 30 days.     _  Result Component Current Result Ref Range   Albumin 3.9 (3/7/2024) 3.5 - 5.2 g/dL     _  Result Component Current Result Ref Range   Urea Nitrogen 8.3 (3/7/2024) 8.0 - 23.0 mg/dL     _  Result Component Current Result Ref Range   Creatinine 0.81 (3/7/2024) 0.51 - 0.95 mg/dL     _  Result Component Current Result Ref  Range   AST 26 (3/7/2024) 0 - 45 U/L     _  Result Component Current Result Ref Range   ALT 11 (3/7/2024) 0 - 50 U/L     _  Result Component Current Result Ref Range   Bilirubin Total 0.4 (3/7/2024) <=1.2 mg/dL     _  Result Component Current Result Ref Range   WBC Count 3.4 (L) (3/7/2024) 4.0 - 11.0 10e3/uL     _  Result Component Current Result Ref Range   Hemoglobin 9.5 (L) (3/7/2024) 11.7 - 15.7 g/dL     _  Result Component Current Result Ref Range   Platelet Count 183 (3/7/2024) 150 - 450 10e3/uL     No results found for ANC within last 30 days.     _  Result Component Current Result Ref Range   Absolute Neutrophils 2.3 (3/7/2024) 1.6 - 8.3 10e3/uL        Assessment & Plan:  No concerning abnormalities. Calcium a little low, consistent with previous values. Pt takes calcium/vitamin D supplement while on Alendronate. Continue capecitabine as planned.    Conductivt sent to patient with results.    Follow-Up:  4/8: monthly labs    Grace Myhre, PharmD  Hematology/Oncology Pharmacist  [facility]  [facility]  [phone]

## 2024-03-13 NOTE — TELEPHONE ENCOUNTER
Sent my chart message to patient to check on proof of income for Otezla program. Will check back in a few days

## 2024-03-15 NOTE — TELEPHONE ENCOUNTER
Faxed patients proof of income over to [employer] for free drug program Will check back in a few days for another update

## 2024-03-21 NOTE — TELEPHONE ENCOUNTER
Spoke with [employer] and they need a copy of appeal approval. Spoke with [employer] and they are going to refax it over to us. Will fax to [employer] when received

## 2024-03-28 NOTE — TELEPHONE ENCOUNTER
Spoke with [employer] and they confirmed they have all documents on file and application is being reviewed. Will check back in a few days for another update

## 2024-04-01 NOTE — TELEPHONE ENCOUNTER
FREE DRUG APPLICATION APPROVED    Medication: OTEZLA 10 & 20 & 30 MG PO TBPK  Program Name:  [employer]  Effective Date: 3/29/2024  Expiration Date: 12/31/2024  [facility] [place], [place] - [address]  Patient Notified: yes  Additional Information:

## 2024-04-05 ENCOUNTER — LAB (OUTPATIENT)
Dept: LAB | Facility: CLINIC | Age: 69
End: 2024-04-05
Attending: INTERNAL MEDICINE
Payer: MEDICARE

## 2024-04-05 DIAGNOSIS — C25.0 MALIGNANT NEOPLASM OF HEAD OF PANCREAS (H): ICD-10-CM

## 2024-04-05 LAB
ALBUMIN SERPL BCG-MCNC: 3.7 G/DL (ref 3.5–5.2)
ALP SERPL-CCNC: 83 U/L (ref 40–150)
ALT SERPL W P-5'-P-CCNC: 9 U/L (ref 0–50)
ANION GAP SERPL CALCULATED.3IONS-SCNC: 9 MMOL/L (ref 7–15)
AST SERPL W P-5'-P-CCNC: 27 U/L (ref 0–45)
BASOPHILS # BLD AUTO: 0 10E3/UL (ref 0–0.2)
BASOPHILS NFR BLD AUTO: 0 %
BILIRUB SERPL-MCNC: 0.4 MG/DL
BUN SERPL-MCNC: 10.9 MG/DL (ref 8–23)
CALCIUM SERPL-MCNC: 8 MG/DL (ref 8.8–10.2)
CHLORIDE SERPL-SCNC: 103 MMOL/L (ref 98–107)
CREAT SERPL-MCNC: 0.87 MG/DL (ref 0.51–0.95)
DEPRECATED HCO3 PLAS-SCNC: 23 MMOL/L (ref 22–29)
EGFRCR SERPLBLD CKD-EPI 2021: 71 ML/MIN/1.73M2
EOSINOPHIL # BLD AUTO: 0.1 10E3/UL (ref 0–0.7)
EOSINOPHIL NFR BLD AUTO: 2 %
ERYTHROCYTE [DISTWIDTH] IN BLOOD BY AUTOMATED COUNT: 22.8 % (ref 10–15)
GLUCOSE SERPL-MCNC: 106 MG/DL (ref 70–99)
HCT VFR BLD AUTO: 27.5 % (ref 35–47)
HGB BLD-MCNC: 9.1 G/DL (ref 11.7–15.7)
IMM GRANULOCYTES # BLD: 0 10E3/UL
IMM GRANULOCYTES NFR BLD: 0 %
LYMPHOCYTES # BLD AUTO: 0.7 10E3/UL (ref 0.8–5.3)
LYMPHOCYTES NFR BLD AUTO: 21 %
MCH RBC QN AUTO: 31.8 PG (ref 26.5–33)
MCHC RBC AUTO-ENTMCNC: 33.1 G/DL (ref 31.5–36.5)
MCV RBC AUTO: 96 FL (ref 78–100)
MONOCYTES # BLD AUTO: 0.5 10E3/UL (ref 0–1.3)
MONOCYTES NFR BLD AUTO: 16 %
NEUTROPHILS # BLD AUTO: 2 10E3/UL (ref 1.6–8.3)
NEUTROPHILS NFR BLD AUTO: 61 %
NRBC # BLD AUTO: 0 10E3/UL
NRBC BLD AUTO-RTO: 0 /100
PLATELET # BLD AUTO: 146 10E3/UL (ref 150–450)
POTASSIUM SERPL-SCNC: 4.1 MMOL/L (ref 3.4–5.3)
PROT SERPL-MCNC: 6.3 G/DL (ref 6.4–8.3)
RBC # BLD AUTO: 2.86 10E6/UL (ref 3.8–5.2)
SODIUM SERPL-SCNC: 135 MMOL/L (ref 135–145)
WBC # BLD AUTO: 3.2 10E3/UL (ref 4–11)

## 2024-04-05 PROCEDURE — 36591 DRAW BLOOD OFF VENOUS DEVICE: CPT

## 2024-04-05 PROCEDURE — 250N000011 HC RX IP 250 OP 636: Performed by: INTERNAL MEDICINE

## 2024-04-05 PROCEDURE — 80053 COMPREHEN METABOLIC PANEL: CPT

## 2024-04-05 PROCEDURE — 85025 COMPLETE CBC W/AUTO DIFF WBC: CPT

## 2024-04-05 RX ORDER — HEPARIN SODIUM (PORCINE) LOCK FLUSH IV SOLN 100 UNIT/ML 100 UNIT/ML
5 SOLUTION INTRAVENOUS EVERY 8 HOURS
Status: DISCONTINUED | OUTPATIENT
Start: 2024-04-05 | End: 2024-04-11 | Stop reason: HOSPADM

## 2024-04-05 RX ADMIN — Medication 5 ML: at 15:31

## 2024-04-05 NOTE — NURSING NOTE
Chief Complaint   Patient presents with    Port Draw     Gripper needle. Heparin locked     Thelma Turcios RN on 4/5/2024 at 3:37 PM

## 2024-04-09 ENCOUNTER — MYC MEDICAL ADVICE (OUTPATIENT)
Dept: ONCOLOGY | Facility: CLINIC | Age: 69
End: 2024-04-09
Payer: MEDICARE

## 2024-04-09 NOTE — TELEPHONE ENCOUNTER
Oral Chemotherapy Monitoring Program  Lab Follow Up    Reviewed CBC and CMP lab results from 4/5.        11/22/2023    10:00 AM 12/8/2023     4:00 PM 12/18/2023     3:00 PM 1/15/2024    11:00 AM 2/7/2024    10:00 AM 3/8/2024    10:00 AM 4/9/2024    12:00 PM   ORAL CHEMOTHERAPY   Assessment Type Lab Monitoring Chart Review Lab Monitoring Lab Monitoring Refill;Lab Monitoring Lab Monitoring Lab Monitoring   Diagnosis Code Pancreatic Cancer Pancreatic Cancer Pancreatic Cancer Pancreatic Cancer Pancreatic Cancer Pancreatic Cancer Pancreatic Cancer   Providers Dr. Mauro Wade   [facility]   Is this patient followed by the [facility] OC team? Yes Yes Yes Yes Yes Yes Yes   Drug Name Xeloda (capecitabine) Xeloda (capecitabine) Xeloda (capecitabine) Xeloda (capecitabine) Xeloda (capecitabine) Xeloda (capecitabine) Xeloda (capecitabine)   Dose Other: Other: Other: Other: Other: Other: Other:   Current Schedule BID BID BID BID BID BID Other   Cycle Details 2 weeks on, 1 week off 2 weeks on, 1 week off 2 weeks on, 1 week off 2 weeks on, 1 week off 2 weeks on, 1 week off 2 weeks on, 1 week off 2 weeks on, 1 week off       Labs:  _  Result Component Current Result Ref Range   Sodium 135 (4/5/2024) 135 - 145 mmol/L     _  Result Component Current Result Ref Range   Potassium 4.1 (4/5/2024) 3.4 - 5.3 mmol/L     _  Result Component Current Result Ref Range   Calcium 8.0 (L) (4/5/2024) 8.8 - 10.2 mg/dL     No results found for Mag within last 30 days.     No results found for Phos within last 30 days.     _  Result Component Current Result Ref Range   Albumin 3.7 (4/5/2024) 3.5 - 5.2 g/dL     _  Result Component Current Result Ref Range   Urea Nitrogen 10.9 (4/5/2024) 8.0 - 23.0 mg/dL     _  Result Component Current Result Ref Range   Creatinine 0.87 (4/5/2024) 0.51 - 0.95 mg/dL     _  Result Component Current  Result Ref Range   AST 27 (4/5/2024) 0 - 45 U/L     _  Result Component Current Result Ref Range   ALT 9 (4/5/2024) 0 - 50 U/L     _  Result Component Current Result Ref Range   Bilirubin Total 0.4 (4/5/2024) <=1.2 mg/dL     _  Result Component Current Result Ref Range   WBC Count 3.2 (L) (4/5/2024) 4.0 - 11.0 10e3/uL     _  Result Component Current Result Ref Range   Hemoglobin 9.1 (L) (4/5/2024) 11.7 - 15.7 g/dL     _  Result Component Current Result Ref Range   Platelet Count 146 (L) (4/5/2024) 150 - 450 10e3/uL     No results found for ANC within last 30 days.     _  Result Component Current Result Ref Range   Absolute Neutrophils 2.0 (4/5/2024) 1.6 - 8.3 10e3/uL        Assessment & Plan:  No concerning abnormalities. Continue Xeloda therapy as planned.    [employer] message sent to Emelia.    Follow-Up:  5/8 labs    Huma Bran, PharmD  Hematology/Oncology Clinical Pharmacist  [facility]  [phone]

## 2024-04-18 ENCOUNTER — OFFICE VISIT (OUTPATIENT)
Dept: DERMATOLOGY | Facility: CLINIC | Age: 69
End: 2024-04-18
Attending: DERMATOLOGY
Payer: MEDICARE

## 2024-04-18 DIAGNOSIS — L57.0 ACTINIC KERATOSIS: ICD-10-CM

## 2024-04-18 DIAGNOSIS — M34.9 SYSTEMIC SCLEROSIS (H): ICD-10-CM

## 2024-04-18 DIAGNOSIS — L94.3 SCLERODACTYLY: ICD-10-CM

## 2024-04-18 DIAGNOSIS — D18.01 CHERRY ANGIOMA: ICD-10-CM

## 2024-04-18 DIAGNOSIS — L81.4 SOLAR LENTIGO: ICD-10-CM

## 2024-04-18 DIAGNOSIS — D22.9 MULTIPLE BENIGN NEVI: ICD-10-CM

## 2024-04-18 DIAGNOSIS — L40.3 PALMOPLANTAR PUSTULAR PSORIASIS: Primary | ICD-10-CM

## 2024-04-18 DIAGNOSIS — L82.1 SEBORRHEIC KERATOSES: ICD-10-CM

## 2024-04-18 PROCEDURE — 17000 DESTRUCT PREMALG LESION: CPT | Performed by: DERMATOLOGY

## 2024-04-18 PROCEDURE — 17003 DESTRUCT PREMALG LES 2-14: CPT | Performed by: DERMATOLOGY

## 2024-04-18 PROCEDURE — 99214 OFFICE O/P EST MOD 30 MIN: CPT | Mod: 25 | Performed by: DERMATOLOGY

## 2024-04-18 RX ORDER — CLOBETASOL PROPIONATE 0.5 MG/G
OINTMENT TOPICAL 2 TIMES DAILY
Qty: 60 G | Refills: 5 | Status: SHIPPED | OUTPATIENT
Start: 2024-04-18

## 2024-04-18 RX ORDER — FLUOROURACIL 50 MG/G
CREAM TOPICAL 2 TIMES DAILY
Qty: 40 G | Refills: 3 | Status: SHIPPED | OUTPATIENT
Start: 2024-04-18

## 2024-04-18 RX ORDER — HYDROXYCHLOROQUINE SULFATE 200 MG/1
200 TABLET, FILM COATED ORAL DAILY
Qty: 90 TABLET | Refills: 3 | Status: SHIPPED | OUTPATIENT
Start: 2024-04-18

## 2024-04-18 ASSESSMENT — PAIN SCALES - GENERAL: PAINLEVEL: NO PAIN (0)

## 2024-04-18 NOTE — PROGRESS NOTES
[facility] Dermatology Note  Encounter Date: Apr 18, 2024  Office Visit     Dermatology Problem List:  1.  BCC, R nasal tip,  s/p shave bx 8/18/23, s/p MMS 10/30/23, Takedown 11/20/23   2.  Actinic Keratosis, ulcerated, L nasal bridge  - s/p shave bx 8/18/23,    - s/p cryotherapy 12/14/2023 x 9-face  3. 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 Pancreatic cancer: on capecitabine ~10 years  - recurrent hand-foot syndrome  - augmented betamethasone ointment  5. Xeroderma  - current tx: Augmented Betamethasone 0.05% ointment BID  - consider for future: Apremailast 30 mg  6. Palmoplantar Pustular Psoriasis    ____________________________________________    Assessment & Plan:    1. Systemic Sclerosis. Unclear if chemotherapy-induced sclerodermoid reaction vs idiopathic systemic sclerosis with atypical presentation.   - continue Hydroxychloroquine 200 mg daily    2. Palmoplantar pustular psoriasis, ddx hand/foot syndrome. Just started apremilast, so too early to assess benefit. Will switch to clobetasol ointment as this is cheaper going through GoodRx than betamethasone.  - start Clobetasol 0.05% ointment  - continue Apremilast 30 mg BID    3. AK-diffuse actinic damage. Discussed risks/benefits of field therapy.  - Cryotherapy performed today (see procedure note(s) below).  - Start Fluorouracil 5% cream BID for 2 weeks or until the onset of irritation. Anticipated reaction discussed.  Recommend washing hands after use and/or using gloves to apply, keeping medication away from pets, and avoiding sun exposure to treated area.   - if significant irritation develops, will consider lower strength topical steroids.     4. Reassurance provided for benign lesions not treated today including cherry angiomata, solar lentigines, seborrheic keratoses, and banal-appearing melanocytic nevi.        Procedures Performed:   - Cryotherapy procedure note, location(s): face. After verbal consent and discussion of risks and benefits including, but not limited to, dyspigmentation/scar, blister, and pain, 5 AK lesion(s) was(were) treated with 1-2 mm freeze border for 1-2 cycles with liquid nitrogen. Post cryotherapy instructions were provided.    Follow-up: 4 month(s) in-person, or earlier for new or changing lesions    Staff and Scribe:   Scribe Disclosure:   By signing my name below, I, Katelin Ila, attest that this documentation has been prepared under the direction and in the presence of Dr. William Stoddard MD.  - Electronically Signed: Katelin Thorpe 04/18/24     Provider Disclosure:   The documentation recorded by the scribe accurately reflects the services I personally performed and the decisions made by me.    William Stoddard MD   of Dermatology  Department of Dermatology  [facility] [employer]      ____________________________________________    CC: Derm Problem (Systemic sclerosis - )    HPI:  Ms. Emelia Weathers is a(n) 68 year old female who presents today as a return patient for Systemic Sclerosis. She was last seen by Dr. Mckeon on 02/05/2024.    Patient reports she was unable to get the Otezla initially due to insurance and pricing, but just started it finally 3 weeks ago. She is compliant with the Hydroxychloroquine as well. Patient notes her sclerosis is still not seeming to improve or change. She is uncertain as to what of her symptoms are related to it, and  her quality of life has not been impacted. She is not sure what symptoms are related to age vs chemotherapy.    Patient is otherwise feeling well, without additional skin concerns.    Labs Reviewed:  None reviewed.    Physical exam:  Vitals: There were no vitals taken for this visit.  GEN: This is a well developed, well-nourished female in no acute distress, in a pleasant mood.    SKIN: Pinzon phototype II  - Full skin, which includes the head/face, both arms, chest, back, abdomen,both legs, genitalia and/or groin buttocks, digits and/or nails, was examined.  - There are many erythematous macules with overyling adherent scale on the face.   - There are dome shaped bright red papules on the head/neck, trunk, extremities.   - Multiple regular brown pigmented macules and papules are identified on the head/neck, trunk, extremities.   - Scattered brown macules on sun exposed areas.  - There are waxy stuck on tan to brown papules on the head/neck, trunk, extremities.  - erythematous hyperkeratotic and fissured plaques on palms and soles with extension of erythema onto the dorsal surfaces  - mild joint contractures of all 10 fingers with cutaneous sclerosis affecting the face, hands, and feet  - No other lesions of concern on areas examined.     Medications:  Current Outpatient Medications   Medication Sig Dispense Refill    acetaminophen (TYLENOL) 325 MG tablet Take 500 mg by mouth every 6 hours as needed      alendronate (FOSAMAX) 70 MG tablet Take 1 tablet (70 mg) by mouth every 7 days 12 tablet 4    Apremilast (OTEZLA) 10 & 20 & 30 MG TBPK Take 1 tablet in the morning on day 1, then take 1 tablet every 12 hours on day 2 through 14, according to the instructions on the packet. (Patient not taking: Reported on 1/16/2024) 55 each 0    apremilast (OTEZLA) 30 MG tablet Take 1 tablet (30 mg) by mouth 2 times daily (Patient not taking: Reported on 1/16/2024) 60 tablet 11    augmented betamethasone dipropionate  (DIPROLENE-AF) 0.05 % external ointment Apply topically 2 times daily 50 g 3    CALCIUM CITRATE + D PO 2 tablets daily      capecitabine (XELODA) 500 MG tablet Take 2 tabs in the AM and 1 tab in the PM, Days 1 through 14, then off for 7 days. Take within 30 mins after meal. 42 tablet 3    cycloSPORINE (RESTASIS) 0.05 % ophthalmic emulsion 1 drop 2 times daily      hydroxychloroquine (PLAQUENIL) 200 MG tablet Take 1 tablet (200 mg) by mouth daily 90 tablet 3    IBUPROFEN PO       lidocaine-prilocaine (EMLA) 2.5-2.5 % external cream Apply topically as needed for moderate pain 30 g 3    lipase-protease-amylase (CREON) 62695-79760-50100 units CPEP TAKE THREE CAPSULES BY MOUTH THREE TIMES DAILY WITH MEALS 540 capsule 0    lisinopril (ZESTRIL) 10 MG tablet Take 1 tablet (10 mg) by mouth daily 90 tablet 4    pantoprazole (PROTONIX) 40 MG EC tablet Take 1 tablet (40 mg) by mouth daily before breakfast 90 tablet 11    STATIN NOT PRESCRIBED (INTENTIONAL) Please choose reason not prescribed from choices below.      urea (GORMEL) 20 % external cream Apply topically 2 times daily 480 g 11     Current Facility-Administered Medications   Medication Dose Route Frequency Provider Last Rate Last Admin    lidocaine 1% with EPINEPHrine 1:100,000 injection 3 mL  3 mL Intradermal Once William Stoddard MD         Facility-Administered Medications Ordered in Other Visits   Medication Dose Route Frequency Provider Last Rate Last Admin    heparin 100 UNIT/ML injection 5 mL  5 mL Intracatheter Once Yasmany Wade MD        heparin 100 UNIT/ML injection 5 mL  5 mL Intracatheter Q8H Yasmany Wade MD   5 mL at 03/04/16 0915    heparin 100 UNIT/ML injection 5 mL  5 mL Intracatheter Q8H Yasmany Wade MD   5 mL at 09/01/15 1355    sodium chloride (PF) 0.9% PF flush 10 mL  10 mL Intracatheter Q8H Yasmany Wade MD   10 mL at 03/04/16 0915      Past Medical History:   Patient Active Problem List   Diagnosis     Disorder of bone and cartilage    Rosacea    Herpes simplex virus (HSV) infection    Viral warts    Papanicolaou smear of cervix with low grade squamous intraepithelial lesion (LGSIL)    Esophageal reflux    Undiagnosed cardiac murmurs    Malignant neoplasm of head of pancreas (H)    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    Other specified prophylactic or treatment measure    Hypokalemia    Scleroderma (H)    Pre-diabetes    Sclerodactyly    Immunosuppression (H24)     Past Medical History:   Diagnosis Date    Arthritis     Cervical high risk HPV (human papillomavirus) test positive 2008    + HPV 83    Diabetes mellitus (H) 06/27/2013    type 2    Diffuse cystic mastopathy     Disorder of bone and cartilage, unspecified     moderate osteopenia of spine    Endometrial hyperplasia, unspecified 09/14/2005    Gastro-oesophageal reflux disease     Heart murmur     Herpes simplex without mention of complication     History of blood transfusion 2014    Related to chemo    Pancreatic cancer (H)     Papanicolaou smear of cervix with low grade squamous intraepithelial lesion (LGSIL) 11/2006    Colpo negative    Post-menopausal     Rosacea         CC William Stoddard MD  [address] [place] [address] on close of this encounter.

## 2024-04-18 NOTE — NURSING NOTE
Dermatology Rooming Note    Emelia Weathers's goals for this visit include:   Chief Complaint   Patient presents with    Derm Problem     Emelia started OTEZLA 2 weeks ago. She is also here today for a skin check and does not have any new areas of concern.     Cortney TOMPKINS CMA

## 2024-04-18 NOTE — LETTER
4/18/2024       RE: Emleia Weathers  [address]  [facility] [address]     Dear Colleague,    Thank you for referring your patient, Emelia Weathers, to the [facility] [place] [facility]. Please see a copy of my visit note below.    [facility] D[facility]atology Note  Encounter Date: Apr 18, 2024  Office Visit     Dermatology Problem List:  1.  BCC, R nasal tip,  s/p shave bx 8/18/23, s/p MMS 10/30/23, Takedown 11/20/23   2.  Actinic Keratosis, ulcerated, L nasal bridge  - s/p shave bx 8/18/23,    - s/p cryotherapy 12/14/2023 x 9-face  3. 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 Pancreatic cancer: on capecitabine ~10 years  - recurrent hand-foot syndrome  - augmented betamethasone ointment  5. Xeroderma  - current tx: Augmented Betamethasone 0.05% ointment BID  - consider for future: Apremailast 30 mg  6. Palmoplantar Pustular Psoriasis    ____________________________________________    Assessment & Plan:    1. Systemic Sclerosis. Unclear if chemotherapy-induced sclerodermoid reaction vs idiopathic systemic sclerosis with atypical presentation.   - continue Hydroxychloroquine 200 mg daily    2. Palmoplantar pustular psoriasis, ddx hand/foot syndrome. Just started apremilast, so too early to assess benefit. Will switch to clobetasol ointment as this is cheaper going through [facility] than betamethasone.  -  start Clobetasol 0.05% ointment  - continue Apremilast 30 mg BID    3. AK-diffuse actinic damage. Discussed risks/benefits of field therapy.  - Cryotherapy performed today (see procedure note(s) below).  - Start Fluorouracil 5% cream BID for 2 weeks or until the onset of irritation. Anticipated reaction discussed. Recommend washing hands after use and/or using gloves to apply, keeping medication away from pets, and avoiding sun exposure to treated area.   - if significant irritation develops, will consider lower strength topical steroids.     4. Reassurance provided for benign lesions not treated today including cherry angiomata, solar lentigines, seborrheic keratoses, and banal-appearing melanocytic nevi.        Procedures Performed:   - Cryotherapy procedure note, location(s): face. After verbal consent and discussion of risks and benefits including, but not limited to, dyspigmentation/scar, blister, and pain, 5 AK lesion(s) was(were) treated with 1-2 mm freeze border for 1-2 cycles with liquid nitrogen. Post cryotherapy instructions were provided.    Follow-up: 4 month(s) in-person, or earlier for new or changing lesions    Staff and Scribe:   Scribe Disclosure:   By signing my name below, I, Katelin Thorpe, attest that this documentation has been prepared under the direction and in the presence of Dr. William Stoddard MD.  - Electronically Signed: Katelin Thorpe 04/18/24     Provider Disclosure:   The documentation recorded by the scribe accurately reflects the services I personally performed and the decisions made by me.    William Stoddard MD   of Dermatology  Department of Dermatology  [facility] [employer]      ____________________________________________    CC: Derm Problem (Systemic sclerosis - )    HPI:  Ms. Emelia Weathers is a(n) 68 year old female who presents today as a return patient for Systemic Sclerosis. She was last seen by Dr. Mckeon on 02/05/2024.    Patient  reports she was unable to get the Otezla initially due to insurance and pricing, but just started it finally 3 weeks ago. She is compliant with the Hydroxychloroquine as well. Patient notes her sclerosis is still not seeming to improve or change. She is uncertain as to what of her symptoms are related to it, and her quality of life has not been impacted. She is not sure what symptoms are related to age vs chemotherapy.    Patient is otherwise feeling well, without additional skin concerns.    Labs Reviewed:  None reviewed.    Physical exam:  Vitals: There were no vitals taken for this visit.  GEN: This is a well developed, well-nourished female in no acute distress, in a pleasant mood.    SKIN: Pinzon phototype II  - Full skin, which includes the head/face, both arms, chest, back, abdomen,both legs, genitalia and/or groin buttocks, digits and/or nails, was examined.  - There are many erythematous macules with overyling adherent scale on the face.   - There are dome shaped bright red papules on the head/neck, trunk, extremities.   - Multiple regular brown pigmented macules and papules are identified on the head/neck, trunk, extremities.   - Scattered brown macules on sun exposed areas.  - There are waxy stuck on tan to brown papules on the head/neck, trunk, extremities.  - erythematous hyperkeratotic and fissured plaques on palms and soles with extension of erythema onto the dorsal surfaces  - mild joint contractures of all 10 fingers with cutaneous sclerosis affecting the face, hands, and feet  - No other lesions of concern on areas examined.     Medications:  Current Outpatient Medications   Medication Sig Dispense Refill    acetaminophen (TYLENOL) 325 MG tablet Take 500 mg by mouth every 6 hours as needed      alendronate (FOSAMAX) 70 MG tablet Take 1 tablet (70 mg) by mouth every 7 days 12 tablet 4    Apremilast (OTEZLA) 10 & 20 & 30 MG TBPK Take 1 tablet in the morning on day 1, then take 1 tablet every 12  hours on day 2 through 14, according to the instructions on the packet. (Patient not taking: Reported on 1/16/2024) 55 each 0    apremilast (OTEZLA) 30 MG tablet Take 1 tablet (30 mg) by mouth 2 times daily (Patient not taking: Reported on 1/16/2024) 60 tablet 11    augmented betamethasone dipropionate (DIPROLENE-AF) 0.05 % external ointment Apply topically 2 times daily 50 g 3    CALCIUM CITRATE + D PO 2 tablets daily      capecitabine (XELODA) 500 MG tablet Take 2 tabs in the AM and 1 tab in the PM, Days 1 through 14, then off for 7 days. Take within 30 mins after meal. 42 tablet 3    cycloSPORINE (RESTASIS) 0.05 % ophthalmic emulsion 1 drop 2 times daily      hydroxychloroquine (PLAQUENIL) 200 MG tablet Take 1 tablet (200 mg) by mouth daily 90 tablet 3    IBUPROFEN PO       lidocaine-prilocaine (EMLA) 2.5-2.5 % external cream Apply topically as needed for moderate pain 30 g 3    lipase-protease-amylase (CREON) 66297-20575-62979 units CPEP TAKE THREE CAPSULES BY MOUTH THREE TIMES DAILY WITH MEALS 540 capsule 0    lisinopril (ZESTRIL) 10 MG tablet Take 1 tablet (10 mg) by mouth daily 90 tablet 4    pantoprazole (PROTONIX) 40 MG EC tablet Take 1 tablet (40 mg) by mouth daily before breakfast 90 tablet 11    STATIN NOT PRESCRIBED (INTENTIONAL) Please choose reason not prescribed from choices below.      urea (GORMEL) 20 % external cream Apply topically 2 times daily 480 g 11     Current Facility-Administered Medications   Medication Dose Route Frequency Provider Last Rate Last Admin    lidocaine 1% with EPINEPHrine 1:100,000 injection 3 mL  3 mL Intradermal Once William Stoddard MD         Facility-Administered Medications Ordered in Other Visits   Medication Dose Route Frequency Provider Last Rate Last Admin    heparin 100 UNIT/ML injection 5 mL  5 mL Intracatheter Once Yasmany Wade MD        heparin 100 UNIT/ML injection 5 mL  5 mL Intracatheter Q8H Yasmany Wade MD   5 mL at 03/04/16 6686     heparin 100 UNIT/ML injection 5 mL  5 mL Intracatheter Q8H Yasmany Wade MD   5 mL at 09/01/15 1355    sodium chloride (PF) 0.9% PF flush 10 mL  10 mL Intracatheter Q8H Yasmany Wade MD   10 mL at 03/04/16 0915      Past Medical History:   Patient Active Problem List   Diagnosis    Disorder of bone and cartilage    Rosacea    Herpes simplex virus (HSV) infection    Viral warts    Papanicolaou smear of cervix with low grade squamous intraepithelial lesion (LGSIL)    Esophageal reflux    Undiagnosed cardiac murmurs    Malignant neoplasm of head of pancreas (H)    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    Other specified prophylactic or treatment measure    Hypokalemia    Scleroderma (H)    Pre-diabetes    Sclerodactyly    Immunosuppression (H24)     Past Medical History:   Diagnosis Date    Arthritis     Cervical high risk HPV (human papillomavirus) test positive 2008    + HPV 83    Diabetes mellitus (H) 06/27/2013    type 2    Diffuse cystic mastopathy     Disorder of bone and cartilage, unspecified     moderate osteopenia of spine    Endometrial hyperplasia, unspecified 09/14/2005    Gastro-oesophageal reflux disease     Heart murmur     Herpes simplex without mention of complication     History of blood transfusion 2014    Related to chemo    Pancreatic cancer (H)     Papanicolaou smear of cervix with low grade squamous intraepithelial lesion (LGSIL) 11/2006    Colpo negative    Post-menopausal     Rosacea         CC William Stoddard MD  [address] [place] [id number] on close of this encounter.

## 2024-04-18 NOTE — PATIENT INSTRUCTIONS
Efudex Treatment  Today, you are being prescribed Fluorouracil (Efudex) a topical cream used for the treatment of Actinic Keratosis (AKs).  The medication is working to eliminate the unhealthy cells.  This treatment may be unattractive and somewhat uncomfortable.  Your treatment will last twice daily for 2 weeks or until the onset of irritation.  You may experience some mild discomfort while being treated.  You will want to stop any other creams such as glycolic acid products, retin A, Tazorac, etc. to the area. You may use bland makeup/cover-up as long as it doesn't sting or cause you discomfort.  Apply the cream at night as your physician recommends. Use a cotton-tipped applicator, or use gloves if applying it with your fingertips. If applied with unprotected fingertips, it is important to wash your hands well after you apply this medicine.   Keep this medication away from pets.  We recommend avoiding excessive sun exposure to the treated area  You may use moisturizing creams over bothersome areas such as Vanicream or Cetaphil cream if the reaction becomes too bothersome. Please, call the clinic if this occurs.   Potential Side Effects  Your treated areas may be unsightly during therapy.  This will improve slowly following the discontinuation of therapy.   During the first week of application, mild inflammation may occur.   During the following weeks, redness, and swelling may occur with some crusting and burning.   Lesions resolve as the skin exfoliates.   Over 1 to 2 weeks, new skin grows into the treatment area.  Keep this medication away from pets  Specific side effects that usually do not require medical attention (report to your doctor or health care professional if they continue or are bothersome) include:  Red or dark-colored skin   Mild erosion (loss of upper layer of skin)   Mild eye irritation including burning, itching, sensitivity, stinging, or watering   Increased sensitivity of the skin to sun and  ultraviolet light   Pain and burning of the affected area   Dryness, scaling or swelling of the affected area   Skin rash, itching of the affected area   Tenderness   If you have severe pain, please, call the clinic immediately and indicate that you have pain.  Ask for a call from the RN.     Who should I call with questions?  [facility]: [phone]  [facility]: [phone]  For urgent needs outside of business hours call the [facility] at [phone] and ask for the dermatology resident on call      Checking for Skin Cancer  You can help find cancer early by checking your skin each month. There are 3 main kinds of skin cancer: melanoma, basal cell carcinoma, and squamous cell carcinoma. Doing monthly skin checks is the best way to find new marks, sores, or skin changes. Follow these instructions for checking your skin.   The ABCDEs of checking moles for melanoma   Check your moles or growths for signs of melanoma using ABCDE:   Asymmetry: The sides of the mole or growth don t match.  Border: The edges are ragged, notched, or blurred.  Color: The color within the mole or growth varies. It could be black, brown, tan, white, or shades of red, gray, or blue.  Diameter: The mole or growth is larger than   inch or 6 mm (size of a pencil eraser).  Evolving: The size, shape, texture, or color of the mole or growth is changing.     ABCDE's of moles on light skin.        ABCDE's of moles on dark skin may be harder to identify.     Checking for other types of skin cancer  Basal cell carcinoma or squamous cell carcinoma cause symptoms like:     A spot or mole that looks different from all other marks on your skin  Changes in how an area feels, such as itching, tenderness, or pain  Changes in the skin's surface, such as oozing, bleeding, or scaliness  A sore that doesn't heal  New swelling, redness, or spread of color beyond the border of a mole    Who s  at risk?  Anyone of any skin color can get skin cancer. But you're at greater risk if you have:   Fair skin that freckles easily and burns instead of tanning  Light-colored or red hair  Light-colored eyes  Many moles or abnormal moles on your skin  A long history of unprotected exposure to sunlight or tanning beds  A history of many blistering sunburns as a child or teen  A family history of skin cancer  Been exposed to radiation or chemicals  A weakened immune system  Been exposed to arsenic  If you've had skin cancer in the past, you're at high risk of having it again.   How to check your skin  Do your monthly skin checkups in front of a full-length mirror. Use a room with good lighting so it's easier to see. Use a hand mirror to look at hard-to-see places like your buttocks and back. You can also have a trusted friend or family member help you with these checks. Check every part of your body, including your:   Head (ears, face, neck, and scalp)  Torso (front, back, sides, and under breasts)  Arms (tops, undersides, and armpits)  Hands (palms, backs, and fingers, including under the nails)  Lower back, buttocks, and genitals  Legs (front, back, and sides)  Feet (tops, soles, toes, including under the nails, and between toes)  Watch for new spots on your skin or a spot that's changing in color, shape, size.   If you have a lot of moles, take digital photos of them each month. Make sure to take photos both up close and from a distance. These can help you see if any moles change over time.   Know your skin  Most skin changes aren't cancer. But if you see any changes in your skin, call your healthcare provider right away. Only they can tell you if a change is a problem. If you have skin cancer, seeing your provider can be the first step to getting the treatment that could save your life.   Russ last reviewed this educational content on 10/1/2021    0949-5318 [employer]. All rights reserved. This  information is not intended as a substitute for professional medical care. Always follow your healthcare professional's instructions.

## 2024-04-19 ENCOUNTER — TELEPHONE (OUTPATIENT)
Dept: DERMATOLOGY | Facility: CLINIC | Age: 69
End: 2024-04-19
Payer: MEDICARE

## 2024-04-19 NOTE — TELEPHONE ENCOUNTER
Left Voicemail (1st Attempt) and Sent Mychart (1st Attempt) for the patient to call back and schedule the following:    Appointment type: follow up  Provider: Arlyn  Return date: 04/2025  Specialty phone number: [phone]  Additional appointment(s) needed: na  Additonal Notes: na

## 2024-04-23 ENCOUNTER — TELEPHONE (OUTPATIENT)
Dept: DERMATOLOGY | Facility: CLINIC | Age: 69
End: 2024-04-23
Payer: MEDICARE

## 2024-04-23 NOTE — TELEPHONE ENCOUNTER
Left Voicemail (2nd Attempt) for the patient to call back and schedule the following:    Appointment type: follow up  Provider: Arlyn  Return date: [phone]  Specialty phone number: [phone]  Additional appointment(s) needed: na  Additonal Notes: na

## 2024-04-30 DIAGNOSIS — C25.0 MALIGNANT NEOPLASM OF HEAD OF PANCREAS (H): Primary | ICD-10-CM

## 2024-04-30 RX ORDER — CAPECITABINE 500 MG/1
TABLET, FILM COATED ORAL
Qty: 42 TABLET | Refills: 3 | Status: SHIPPED | OUTPATIENT
Start: 2024-04-30

## 2024-05-08 ENCOUNTER — LAB (OUTPATIENT)
Dept: LAB | Facility: CLINIC | Age: 69
End: 2024-05-08
Attending: INTERNAL MEDICINE
Payer: MEDICARE

## 2024-05-08 DIAGNOSIS — M34.9 SCLERODERMA (H): ICD-10-CM

## 2024-05-08 DIAGNOSIS — C25.0 MALIGNANT NEOPLASM OF HEAD OF PANCREAS (H): ICD-10-CM

## 2024-05-08 LAB
ALBUMIN SERPL BCG-MCNC: 4 G/DL (ref 3.5–5.2)
ALP SERPL-CCNC: 82 U/L (ref 40–150)
ALT SERPL W P-5'-P-CCNC: 9 U/L (ref 0–50)
ANION GAP SERPL CALCULATED.3IONS-SCNC: 10 MMOL/L (ref 7–15)
AST SERPL W P-5'-P-CCNC: 25 U/L (ref 0–45)
BASOPHILS # BLD AUTO: 0 10E3/UL (ref 0–0.2)
BASOPHILS NFR BLD AUTO: 1 %
BILIRUB SERPL-MCNC: 0.5 MG/DL
BUN SERPL-MCNC: 11.2 MG/DL (ref 8–23)
CALCIUM SERPL-MCNC: 8.7 MG/DL (ref 8.8–10.2)
CHLORIDE SERPL-SCNC: 103 MMOL/L (ref 98–107)
CREAT SERPL-MCNC: 0.77 MG/DL (ref 0.51–0.95)
DEPRECATED HCO3 PLAS-SCNC: 23 MMOL/L (ref 22–29)
EGFRCR SERPLBLD CKD-EPI 2021: 83 ML/MIN/1.73M2
EOSINOPHIL # BLD AUTO: 0 10E3/UL (ref 0–0.7)
EOSINOPHIL NFR BLD AUTO: 1 %
ERYTHROCYTE [DISTWIDTH] IN BLOOD BY AUTOMATED COUNT: 22.6 % (ref 10–15)
GLUCOSE SERPL-MCNC: 135 MG/DL (ref 70–99)
HCT VFR BLD AUTO: 28.7 % (ref 35–47)
HGB BLD-MCNC: 9.3 G/DL (ref 11.7–15.7)
IMM GRANULOCYTES # BLD: 0 10E3/UL
IMM GRANULOCYTES NFR BLD: 0 %
LYMPHOCYTES # BLD AUTO: 0.7 10E3/UL (ref 0.8–5.3)
LYMPHOCYTES NFR BLD AUTO: 20 %
MCH RBC QN AUTO: 31.2 PG (ref 26.5–33)
MCHC RBC AUTO-ENTMCNC: 32.4 G/DL (ref 31.5–36.5)
MCV RBC AUTO: 96 FL (ref 78–100)
MONOCYTES # BLD AUTO: 0.4 10E3/UL (ref 0–1.3)
MONOCYTES NFR BLD AUTO: 13 %
NEUTROPHILS # BLD AUTO: 2.1 10E3/UL (ref 1.6–8.3)
NEUTROPHILS NFR BLD AUTO: 65 %
NRBC # BLD AUTO: 0 10E3/UL
NRBC BLD AUTO-RTO: 0 /100
PLATELET # BLD AUTO: 179 10E3/UL (ref 150–450)
POTASSIUM SERPL-SCNC: 4.6 MMOL/L (ref 3.4–5.3)
PROT SERPL-MCNC: 6.5 G/DL (ref 6.4–8.3)
RBC # BLD AUTO: 2.98 10E6/UL (ref 3.8–5.2)
SODIUM SERPL-SCNC: 136 MMOL/L (ref 135–145)
WBC # BLD AUTO: 3.3 10E3/UL (ref 4–11)

## 2024-05-08 PROCEDURE — 85048 AUTOMATED LEUKOCYTE COUNT: CPT

## 2024-05-08 PROCEDURE — 36591 DRAW BLOOD OFF VENOUS DEVICE: CPT

## 2024-05-08 PROCEDURE — 250N000011 HC RX IP 250 OP 636: Performed by: INTERNAL MEDICINE

## 2024-05-08 PROCEDURE — 82040 ASSAY OF SERUM ALBUMIN: CPT

## 2024-05-08 RX ORDER — HEPARIN SODIUM (PORCINE) LOCK FLUSH IV SOLN 100 UNIT/ML 100 UNIT/ML
5 SOLUTION INTRAVENOUS
Status: COMPLETED | OUTPATIENT
Start: 2024-05-08 | End: 2024-05-08

## 2024-05-08 RX ADMIN — Medication 5 ML: at 13:58

## 2024-05-08 NOTE — NURSING NOTE
"Chief Complaint   Patient presents with    Lab Only     Labs drawn from port by rn.  VS taken.     Port accessed with 20 gauge 3/4\" gripper needle and labs drawn by rn.  Port flushed with NS and heparin.  Pt tolerated well.      Quin Perdue RN      "

## 2024-05-09 ENCOUNTER — MYC MEDICAL ADVICE (OUTPATIENT)
Dept: ONCOLOGY | Facility: CLINIC | Age: 69
End: 2024-05-09
Payer: MEDICARE

## 2024-05-09 NOTE — ORAL ONC MGMT
Oral Chemotherapy Monitoring Program  Lab Follow Up    Reviewed lab results from 5/8/24.        12/8/2023     4:00 PM 12/18/2023     3:00 PM 1/15/2024    11:00 AM 2/7/2024    10:00 AM 3/8/2024    10:00 AM 4/9/2024    12:00 PM 4/30/2024    10:00 AM   ORAL CHEMOTHERAPY   Assessment Type Chart Review Lab Monitoring Lab Monitoring Refill;Lab Monitoring Lab Monitoring Lab Monitoring Refill   Diagnosis Code Pancreatic Cancer Pancreatic Cancer Pancreatic Cancer Pancreatic Cancer Pancreatic Cancer Pancreatic Cancer Pancreatic Cancer   Providers Dr. Mauro Wade   [facility]   Is this patient followed by the [facility] OC team? Yes Yes Yes Yes Yes Yes Yes   Drug Name Xeloda (capecitabine) Xeloda (capecitabine) Xeloda (capecitabine) Xeloda (capecitabine) Xeloda (capecitabine) Xeloda (capecitabine) Xeloda (capecitabine)   Dose Other: Other: Other: Other: Other: Other: Other:   Current Schedule BID BID BID BID BID Other Other   Cycle Details 2 weeks on, 1 week off 2 weeks on, 1 week off 2 weeks on, 1 week off 2 weeks on, 1 week off 2 weeks on, 1 week off 2 weeks on, 1 week off 2 weeks on, 1 week off       Labs:  _  Result Component Current Result Ref Range   Sodium 136 (5/8/2024) 135 - 145 mmol/L     _  Result Component Current Result Ref Range   Potassium 4.6 (5/8/2024) 3.4 - 5.3 mmol/L     _  Result Component Current Result Ref Range   Calcium 8.7 (L) (5/8/2024) 8.8 - 10.2 mg/dL     No results found for Mag within last 30 days.     No results found for Phos within last 30 days.     _  Result Component Current Result Ref Range   Albumin 4.0 (5/8/2024) 3.5 - 5.2 g/dL     _  Result Component Current Result Ref Range   Urea Nitrogen 11.2 (5/8/2024) 8.0 - 23.0 mg/dL     _  Result Component Current Result Ref Range   Creatinine 0.77 (5/8/2024) 0.51 - 0.95 mg/dL     _  Result Component Current Result Ref Range    AST 25 (5/8/2024) 0 - 45 U/L     _  Result Component Current Result Ref Range   ALT 9 (5/8/2024) 0 - 50 U/L     _  Result Component Current Result Ref Range   Bilirubin Total 0.5 (5/8/2024) <=1.2 mg/dL     _  Result Component Current Result Ref Range   WBC Count 3.3 (L) (5/8/2024) 4.0 - 11.0 10e3/uL     _  Result Component Current Result Ref Range   Hemoglobin 9.3 (L) (5/8/2024) 11.7 - 15.7 g/dL     _  Result Component Current Result Ref Range   Platelet Count 179 (5/8/2024) 150 - 450 10e3/uL     No results found for ANC within last 30 days.     _  Result Component Current Result Ref Range   Absolute Neutrophils 2.1 (5/8/2024) 1.6 - 8.3 10e3/uL        Assessment & Plan:  No concerning abnormalities.    Sent [facility] message to patient with results.     Follow-Up:  6/10: Dr Mauro Villalba, PharmD, BCOP  Hematology/Oncology Clinical Pharmacist  [facility]  [facility]  [phone]

## 2024-06-07 ENCOUNTER — LAB (OUTPATIENT)
Dept: LAB | Facility: CLINIC | Age: 69
End: 2024-06-07
Attending: INTERNAL MEDICINE
Payer: MEDICARE

## 2024-06-07 ENCOUNTER — ANCILLARY PROCEDURE (OUTPATIENT)
Dept: CT IMAGING | Facility: CLINIC | Age: 69
End: 2024-06-07
Attending: INTERNAL MEDICINE
Payer: MEDICARE

## 2024-06-07 DIAGNOSIS — C25.0 MALIGNANT NEOPLASM OF HEAD OF PANCREAS (H): Primary | ICD-10-CM

## 2024-06-07 DIAGNOSIS — C25.0 MALIGNANT NEOPLASM OF HEAD OF PANCREAS (H): ICD-10-CM

## 2024-06-07 DIAGNOSIS — M34.9 SCLERODERMA (H): ICD-10-CM

## 2024-06-07 LAB
ALBUMIN SERPL BCG-MCNC: 4.2 G/DL (ref 3.5–5.2)
ALP SERPL-CCNC: 80 U/L (ref 40–150)
ALT SERPL W P-5'-P-CCNC: 13 U/L (ref 0–50)
ANION GAP SERPL CALCULATED.3IONS-SCNC: 11 MMOL/L (ref 7–15)
AST SERPL W P-5'-P-CCNC: 29 U/L (ref 0–45)
BASOPHILS # BLD AUTO: 0 10E3/UL (ref 0–0.2)
BASOPHILS NFR BLD AUTO: 1 %
BILIRUB SERPL-MCNC: 0.5 MG/DL
BUN SERPL-MCNC: 10.9 MG/DL (ref 8–23)
CALCIUM SERPL-MCNC: 9 MG/DL (ref 8.8–10.2)
CHLORIDE SERPL-SCNC: 102 MMOL/L (ref 98–107)
CREAT SERPL-MCNC: 0.65 MG/DL (ref 0.51–0.95)
DEPRECATED HCO3 PLAS-SCNC: 22 MMOL/L (ref 22–29)
EGFRCR SERPLBLD CKD-EPI 2021: >90 ML/MIN/1.73M2
EOSINOPHIL # BLD AUTO: 0 10E3/UL (ref 0–0.7)
EOSINOPHIL NFR BLD AUTO: 1 %
ERYTHROCYTE [DISTWIDTH] IN BLOOD BY AUTOMATED COUNT: 22 % (ref 10–15)
GLUCOSE SERPL-MCNC: 87 MG/DL (ref 70–99)
HCT VFR BLD AUTO: 29 % (ref 35–47)
HGB BLD-MCNC: 9.4 G/DL (ref 11.7–15.7)
IMM GRANULOCYTES # BLD: 0 10E3/UL
IMM GRANULOCYTES NFR BLD: 0 %
LYMPHOCYTES # BLD AUTO: 0.6 10E3/UL (ref 0.8–5.3)
LYMPHOCYTES NFR BLD AUTO: 21 %
MCH RBC QN AUTO: 30.9 PG (ref 26.5–33)
MCHC RBC AUTO-ENTMCNC: 32.4 G/DL (ref 31.5–36.5)
MCV RBC AUTO: 95 FL (ref 78–100)
MONOCYTES # BLD AUTO: 0.5 10E3/UL (ref 0–1.3)
MONOCYTES NFR BLD AUTO: 15 %
NEUTROPHILS # BLD AUTO: 2 10E3/UL (ref 1.6–8.3)
NEUTROPHILS NFR BLD AUTO: 62 %
NRBC # BLD AUTO: 0 10E3/UL
NRBC BLD AUTO-RTO: 1 /100
PLATELET # BLD AUTO: 162 10E3/UL (ref 150–450)
POTASSIUM SERPL-SCNC: 4.3 MMOL/L (ref 3.4–5.3)
PROT SERPL-MCNC: 6.7 G/DL (ref 6.4–8.3)
RBC # BLD AUTO: 3.04 10E6/UL (ref 3.8–5.2)
SODIUM SERPL-SCNC: 135 MMOL/L (ref 135–145)
WBC # BLD AUTO: 3.1 10E3/UL (ref 4–11)

## 2024-06-07 PROCEDURE — 80053 COMPREHEN METABOLIC PANEL: CPT

## 2024-06-07 PROCEDURE — G1010 CDSM STANSON: HCPCS | Performed by: STUDENT IN AN ORGANIZED HEALTH CARE EDUCATION/TRAINING PROGRAM

## 2024-06-07 PROCEDURE — 74177 CT ABD & PELVIS W/CONTRAST: CPT | Mod: MG | Performed by: STUDENT IN AN ORGANIZED HEALTH CARE EDUCATION/TRAINING PROGRAM

## 2024-06-07 PROCEDURE — 250N000011 HC RX IP 250 OP 636: Mod: JZ | Performed by: INTERNAL MEDICINE

## 2024-06-07 PROCEDURE — 71260 CT THORAX DX C+: CPT | Mod: MG | Performed by: STUDENT IN AN ORGANIZED HEALTH CARE EDUCATION/TRAINING PROGRAM

## 2024-06-07 PROCEDURE — 85004 AUTOMATED DIFF WBC COUNT: CPT

## 2024-06-07 PROCEDURE — 36591 DRAW BLOOD OFF VENOUS DEVICE: CPT

## 2024-06-07 RX ORDER — HEPARIN SODIUM (PORCINE) LOCK FLUSH IV SOLN 100 UNIT/ML 100 UNIT/ML
5 SOLUTION INTRAVENOUS DAILY PRN
Status: DISCONTINUED | OUTPATIENT
Start: 2024-06-07 | End: 2024-06-13 | Stop reason: HOSPADM

## 2024-06-07 RX ORDER — IOPAMIDOL 755 MG/ML
67 INJECTION, SOLUTION INTRAVASCULAR ONCE
Status: COMPLETED | OUTPATIENT
Start: 2024-06-07 | End: 2024-06-07

## 2024-06-07 RX ORDER — HEPARIN SODIUM (PORCINE) LOCK FLUSH IV SOLN 100 UNIT/ML 100 UNIT/ML
500 SOLUTION INTRAVENOUS ONCE
Status: COMPLETED | OUTPATIENT
Start: 2024-06-07 | End: 2024-06-07

## 2024-06-07 RX ADMIN — Medication 5 ML: at 08:36

## 2024-06-07 RX ADMIN — HEPARIN SODIUM (PORCINE) LOCK FLUSH IV SOLN 100 UNIT/ML 500 UNITS: 100 SOLUTION at 09:04

## 2024-06-07 RX ADMIN — IOPAMIDOL 67 ML: 755 INJECTION, SOLUTION INTRAVASCULAR at 08:57

## 2024-06-07 NOTE — DISCHARGE INSTRUCTIONS

## 2024-06-10 ENCOUNTER — ONCOLOGY VISIT (OUTPATIENT)
Dept: ONCOLOGY | Facility: CLINIC | Age: 69
End: 2024-06-10
Attending: INTERNAL MEDICINE
Payer: MEDICARE

## 2024-06-10 VITALS
DIASTOLIC BLOOD PRESSURE: 73 MMHG | HEART RATE: 94 BPM | BODY MASS INDEX: 18.54 KG/M2 | OXYGEN SATURATION: 100 % | SYSTOLIC BLOOD PRESSURE: 116 MMHG | WEIGHT: 110.6 LBS | RESPIRATION RATE: 16 BRPM | TEMPERATURE: 98 F

## 2024-06-10 DIAGNOSIS — C25.0 MALIGNANT NEOPLASM OF HEAD OF PANCREAS (H): Primary | ICD-10-CM

## 2024-06-10 DIAGNOSIS — M34.9 SCLERODERMA (H): ICD-10-CM

## 2024-06-10 PROCEDURE — G0463 HOSPITAL OUTPT CLINIC VISIT: HCPCS | Performed by: INTERNAL MEDICINE

## 2024-06-10 PROCEDURE — G2211 COMPLEX E/M VISIT ADD ON: HCPCS | Performed by: INTERNAL MEDICINE

## 2024-06-10 PROCEDURE — 99214 OFFICE O/P EST MOD 30 MIN: CPT | Performed by: INTERNAL MEDICINE

## 2024-06-10 ASSESSMENT — PAIN SCALES - GENERAL: PAINLEVEL: NO PAIN (0)

## 2024-06-10 NOTE — NURSING NOTE
"Oncology Rooming Note    Sabrina 10, 2024 5:17 PM   Emelia Weathers is a 68 year old female who presents for:    Chief Complaint   Patient presents with    Oncology Clinic Visit     Malignant neoplasm of head of pancreas; Sclerodema     Initial Vitals: /73 (BP Location: Right arm, Patient Position: Sitting, Cuff Size: Adult Regular)   Pulse 94   Temp 98  F (36.7  C) (Oral)   Resp 16   Wt 50.2 kg (110 lb 9.6 oz)   SpO2 100%   BMI 18.54 kg/m   Estimated body mass index is 18.54 kg/m  as calculated from the following:    Height as of 1/16/24: 1.645 m (5' 4.76\").    Weight as of this encounter: 50.2 kg (110 lb 9.6 oz). Body surface area is 1.51 meters squared.  No Pain (0) Comment: Data Unavailable   No LMP recorded. Patient is postmenopausal.  Allergies reviewed: Yes  Medications reviewed: Yes    Medications: Medication refills not needed today.  Pharmacy name entered into [email or web address]:    [facility] - [place], [place] - [address] [facility] - [place] [phone]  [facility] - [place], [place] - [address]  [facility] [id number] [place] [facility] - [place], [place] - [address] [id number] - [place], [place] - [address]    Frailty Screening:   Is the patient here for a new oncology consult visit in cancer care? 2. No      Clinical concerns: Pt reports no concerns for this visit.       Bertha Pritchett, EMT   "

## 2024-06-10 NOTE — PROGRESS NOTES
Tiana Weathers returns today in follow-up of locally recurrent pancreatic cancer.    She is 68 years old and originally had a Whipple more than a decade ago with subsequent biopsy-proven recurrence 10/14 in the resection bed which encases her SMA.  She had an excellent response to FOLFIRINOX which is eventually been reduced to maintenance single agent Xeloda with ongoing control ever since.  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  She follows closely with dermatology and is been on several different agents to try and provide better control of that.  She tells me she just had her esophagus dilated recently and is swallowing quite well right now as always she eats robustly but never gains any weight.  She thinks her weight today is pretty much the same as it has been.  She continues to exercise quite regularly.  She spends a lot of time he stays taking her new grandson for walks around the [facility].    On exam today she appears a little bit more frail than prior but her weight is about the same.  She has fair bit of dry desquamation on her hands with enlarged joints and contractures    I personally reviewed her CT scan and went over the results with her that shows no evidence of her disease other than some vague changes in her prior resection bed.    Her electrolytes and renal function are normal.  Her bilirubin, liver enzymes and albumin are normal.  She has stable pancytopenia consistent with her chemotherapy with a total white count of 3.1, hemoglobin 9.4 and platelets of 162.    Assessment/plan: Recurrent pancreatic cancer with now a decade of disease control currently on maintenance capecitabine.  We talked today again about whether she was willing to stop active treatment trying to balance the uncertainty of whether it is keeping her cancer at bay and how much it is contributing to her systemic  sclerosis.  She ultimately decided she wanted to not make any changes she finds her currently good quality of life something she does not want to risk disrupting by stopping treatment and possibly have her cancer started to grow again.  We will plan on seeing her back again for another assessment of her disease status in about 4 months.

## 2024-06-10 NOTE — LETTER
6/10/2024      Emelia Weathers  [address]  [facility] [address]      Dear Colleague,    Thank you for referring your patient, Emelia Weathers, to the [facility]. Please see a copy of my visit note below.    Tiana Weathers returns today in follow-up of locally recurrent pancreatic cancer.    She is 68 years old and originally had a Whipple more than a decade ago with subsequent biopsy-proven recurrence 10/14 in the resection bed which encases her SMA.  She had an excellent response to FOLFIRINOX which is eventually been reduced to maintenance single agent Xeloda with ongoing control ever since.  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  She follows closely with dermatology and is been on several different agents to try and provide better control of that.  She tells me she just had her esophagus dilated recently and is swallowing quite well right now as always she eats robustly but never gains any weight.  She thinks her weight today is pretty much the same as it has been.  She continues to exercise quite regularly.  She spends a lot of time he stays taking her new grandson for walks around the [facility].    On exam today she appears a little bit more frail than prior but her weight is about the same.  She has fair bit of dry desquamation on her hands with enlarged joints and contractures    I personally reviewed her CT scan and went over the results with her that shows no evidence of her disease other than some vague changes in her prior resection bed.    Her electrolytes and renal function are normal.  Her bilirubin, liver enzymes and albumin are normal.  She has stable pancytopenia consistent with her chemotherapy with a total white count of 3.1, hemoglobin 9.4 and platelets of 162.    Assessment/plan: Recurrent pancreatic cancer with now a decade of disease control  currently on maintenance capecitabine.  We talked today again about whether she was willing to stop active treatment trying to balance the uncertainty of whether it is keeping her cancer at bay and how much it is contributing to her systemic sclerosis.  She ultimately decided she wanted to not make any changes she finds her currently good quality of life something she does not want to risk disrupting by stopping treatment and possibly have her cancer started to grow again.  We will plan on seeing her back again for another assessment of her disease status in about 4 months.      Again, thank you for allowing me to participate in the care of your patient.        Sincerely,        Yasmany Wade MD

## 2024-06-10 NOTE — LETTER
6/10/2024         RE: Emelia eWathers  [address]  [facility] [address]      Tiana Weathers returns today in follow-up of locally recurrent pancreatic cancer.    She is 68 years old and originally had a Whipple more than a decade ago with subsequent biopsy-proven recurrence 10/14 in the resection bed which encases her SMA.  She had an excellent response to FOLFIRINOX which is eventually been reduced to maintenance single agent Xeloda with ongoing control ever since.  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  She follows closely with dermatology and is been on several different agents to try and provide better control of that.  She tells me she just had her esophagus dilated recently and is swallowing quite well right now as always she eats robustly but never gains any weight.  She thinks her weight today is pretty much the same as it has been.  She continues to exercise quite regularly.  She spends a lot of time he stays taking her new grandson for walks around the [facility].    On exam today she appears a little bit more frail than prior but her weight is about the same.  She has fair bit of dry desquamation on her hands with enlarged joints and contractures    I personally reviewed her CT scan and went over the results with her that shows no evidence of her disease other than some vague changes in her prior resection bed.    Her electrolytes and renal function are normal.  Her bilirubin, liver enzymes and albumin are normal.  She has stable pancytopenia consistent with her chemotherapy with a total white count of 3.1, hemoglobin 9.4 and platelets of 162.    Assessment/plan: Recurrent pancreatic cancer with now a decade of disease control currently on maintenance capecitabine.  We talked today again about whether she was willing to stop active treatment trying to balance the uncertainty of  whether it is keeping her cancer at bay and how much it is contributing to her systemic sclerosis.  She ultimately decided she wanted to not make any changes she finds her currently good quality of life something she does not want to risk disrupting by stopping treatment and possibly have her cancer started to grow again.  We will plan on seeing her back again for another assessment of her disease status in about 4 months.        Yasmany Wade MD

## 2024-07-05 ENCOUNTER — LAB (OUTPATIENT)
Dept: LAB | Facility: CLINIC | Age: 69
End: 2024-07-05
Payer: MEDICARE

## 2024-07-05 DIAGNOSIS — C25.0 MALIGNANT NEOPLASM OF HEAD OF PANCREAS (H): ICD-10-CM

## 2024-07-05 LAB
ALBUMIN SERPL BCG-MCNC: 4.2 G/DL (ref 3.5–5.2)
ALP SERPL-CCNC: 80 U/L (ref 40–150)
ALT SERPL W P-5'-P-CCNC: 13 U/L (ref 0–50)
ANION GAP SERPL CALCULATED.3IONS-SCNC: 10 MMOL/L (ref 7–15)
AST SERPL W P-5'-P-CCNC: 27 U/L (ref 0–45)
BASOPHILS # BLD AUTO: 0 10E3/UL (ref 0–0.2)
BASOPHILS NFR BLD AUTO: 0 %
BILIRUB SERPL-MCNC: 0.5 MG/DL
BUN SERPL-MCNC: 10.2 MG/DL (ref 8–23)
CALCIUM SERPL-MCNC: 8.9 MG/DL (ref 8.8–10.2)
CHLORIDE SERPL-SCNC: 100 MMOL/L (ref 98–107)
CREAT SERPL-MCNC: 0.71 MG/DL (ref 0.51–0.95)
DEPRECATED HCO3 PLAS-SCNC: 24 MMOL/L (ref 22–29)
EGFRCR SERPLBLD CKD-EPI 2021: >90 ML/MIN/1.73M2
EOSINOPHIL # BLD AUTO: 0.1 10E3/UL (ref 0–0.7)
EOSINOPHIL NFR BLD AUTO: 1 %
ERYTHROCYTE [DISTWIDTH] IN BLOOD BY AUTOMATED COUNT: 23.3 % (ref 10–15)
GLUCOSE SERPL-MCNC: 92 MG/DL (ref 70–99)
HCT VFR BLD AUTO: 28.6 % (ref 35–47)
HGB BLD-MCNC: 9.4 G/DL (ref 11.7–15.7)
IMM GRANULOCYTES # BLD: 0 10E3/UL
IMM GRANULOCYTES NFR BLD: 1 %
LYMPHOCYTES # BLD AUTO: 0.9 10E3/UL (ref 0.8–5.3)
LYMPHOCYTES NFR BLD AUTO: 14 %
MCH RBC QN AUTO: 31.3 PG (ref 26.5–33)
MCHC RBC AUTO-ENTMCNC: 32.9 G/DL (ref 31.5–36.5)
MCV RBC AUTO: 95 FL (ref 78–100)
MONOCYTES # BLD AUTO: 0.6 10E3/UL (ref 0–1.3)
MONOCYTES NFR BLD AUTO: 9 %
NEUTROPHILS # BLD AUTO: 4.7 10E3/UL (ref 1.6–8.3)
NEUTROPHILS NFR BLD AUTO: 75 %
NRBC # BLD AUTO: 0 10E3/UL
NRBC BLD AUTO-RTO: 0 /100
PLATELET # BLD AUTO: 228 10E3/UL (ref 150–450)
POTASSIUM SERPL-SCNC: 4.5 MMOL/L (ref 3.4–5.3)
PROT SERPL-MCNC: 6.7 G/DL (ref 6.4–8.3)
RBC # BLD AUTO: 3 10E6/UL (ref 3.8–5.2)
SODIUM SERPL-SCNC: 134 MMOL/L (ref 135–145)
WBC # BLD AUTO: 6.2 10E3/UL (ref 4–11)

## 2024-07-05 PROCEDURE — 250N000011 HC RX IP 250 OP 636: Performed by: INTERNAL MEDICINE

## 2024-07-05 PROCEDURE — 36591 DRAW BLOOD OFF VENOUS DEVICE: CPT

## 2024-07-05 PROCEDURE — 82040 ASSAY OF SERUM ALBUMIN: CPT

## 2024-07-05 PROCEDURE — 85041 AUTOMATED RBC COUNT: CPT

## 2024-07-05 RX ORDER — HEPARIN SODIUM (PORCINE) LOCK FLUSH IV SOLN 100 UNIT/ML 100 UNIT/ML
500 SOLUTION INTRAVENOUS ONCE
Status: COMPLETED | OUTPATIENT
Start: 2024-07-05 | End: 2024-07-05

## 2024-07-05 RX ADMIN — Medication 500 UNITS: at 14:17

## 2024-07-05 NOTE — NURSING NOTE
Chief Complaint   Patient presents with    Port Draw     Labs drawn via port by RN in lab     Port accessed with 20 gauge, 3/4 inch, flat needle by RN, labs collected, line flushed with saline and heparin. Port de-accessed.    Johnna Jones RN

## 2024-07-09 NOTE — ORAL ONC MGMT
Oral Chemotherapy Monitoring Program  Lab Follow Up    Reviewed CBC and CMP lab results from 7/5/24.        2/7/2024    10:00 AM 3/8/2024    10:00 AM 4/9/2024    12:00 PM 4/30/2024    10:00 AM 5/9/2024     9:00 AM 6/11/2024    12:00 PM 7/9/2024     9:00 AM   ORAL CHEMOTHERAPY   Assessment Type Refill;Lab Monitoring Lab Monitoring Lab Monitoring Refill Lab Monitoring Chart Review Lab Monitoring   Diagnosis Code Pancreatic Cancer Pancreatic Cancer Pancreatic Cancer Pancreatic Cancer Pancreatic Cancer Pancreatic Cancer Pancreatic Cancer   Providers Dr. Mauro Wade   [facility]   Is this patient followed by the [facility] OC team? Yes Yes Yes Yes Yes Yes Yes   Drug Name Xeloda (capecitabine) Xeloda (capecitabine) Xeloda (capecitabine) Xeloda (capecitabine) Xeloda (capecitabine) Xeloda (capecitabine) Xeloda (capecitabine)   Dose Other: Other: Other: Other: Other: Other: Other:   Current Schedule BID BID Other Other Other BID BID   Cycle Details 2 weeks on, 1 week off 2 weeks on, 1 week off 2 weeks on, 1 week off 2 weeks on, 1 week off 2 weeks on, 1 week off 2 weeks on, 1 week off 2 weeks on, 1 week off       Labs:  _  Result Component Current Result Ref Range   Sodium 134 (L) (7/5/2024) 135 - 145 mmol/L     _  Result Component Current Result Ref Range   Potassium 4.5 (7/5/2024) 3.4 - 5.3 mmol/L     _  Result Component Current Result Ref Range   Calcium 8.9 (7/5/2024) 8.8 - 10.2 mg/dL   _  Result Component Current Result Ref Range   Albumin 4.2 (7/5/2024) 3.5 - 5.2 g/dL     _  Result Component Current Result Ref Range   Urea Nitrogen 10.2 (7/5/2024) 8.0 - 23.0 mg/dL     _  Result Component Current Result Ref Range   Creatinine 0.71 (7/5/2024) 0.51 - 0.95 mg/dL     _  Result Component Current Result Ref Range   AST 27 (7/5/2024) 0 - 45 U/L     _  Result Component Current Result Ref Range   ALT 13  (7/5/2024) 0 - 50 U/L     _  Result Component Current Result Ref Range   Bilirubin Total 0.5 (7/5/2024) <=1.2 mg/dL     _  Result Component Current Result Ref Range   WBC Count 6.2 (7/5/2024) 4.0 - 11.0 10e3/uL     _  Result Component Current Result Ref Range   Hemoglobin 9.4 (L) (7/5/2024) 11.7 - 15.7 g/dL     _  Result Component Current Result Ref Range   Platelet Count 228 (7/5/2024) 150 - 450 10e3/uL     No results found for ANC within last 30 days.     _  Result Component Current Result Ref Range   Absolute Neutrophils 4.7 (7/5/2024) 1.6 - 8.3 10e3/uL        Assessment & Plan:  No new concerning lab abnormalities. No changes with capecitabine needed at this time.    Follow-Up:  8/5: labs     Didier Buckner, PharmD  Hematology/Oncology Clinical Pharmacist  [facility]  [facility]  [phone]

## 2024-07-23 DIAGNOSIS — C25.0 MALIGNANT NEOPLASM OF HEAD OF PANCREAS (H): Primary | ICD-10-CM

## 2024-07-23 RX ORDER — CAPECITABINE 500 MG/1
TABLET, FILM COATED ORAL
Qty: 42 TABLET | Refills: 3 | Status: SHIPPED | OUTPATIENT
Start: 2024-07-23

## 2024-08-06 ENCOUNTER — LAB (OUTPATIENT)
Dept: LAB | Facility: CLINIC | Age: 69
End: 2024-08-06
Attending: INTERNAL MEDICINE
Payer: MEDICARE

## 2024-08-06 DIAGNOSIS — C25.0 MALIGNANT NEOPLASM OF HEAD OF PANCREAS (H): ICD-10-CM

## 2024-08-06 LAB
ALBUMIN SERPL BCG-MCNC: 3.8 G/DL (ref 3.5–5.2)
ALP SERPL-CCNC: 83 U/L (ref 40–150)
ALT SERPL W P-5'-P-CCNC: 17 U/L (ref 0–50)
ANION GAP SERPL CALCULATED.3IONS-SCNC: 10 MMOL/L (ref 7–15)
AST SERPL W P-5'-P-CCNC: 30 U/L (ref 0–45)
BASOPHILS # BLD AUTO: 0 10E3/UL (ref 0–0.2)
BASOPHILS NFR BLD AUTO: 1 %
BILIRUB SERPL-MCNC: 0.4 MG/DL
BUN SERPL-MCNC: 9.7 MG/DL (ref 8–23)
CALCIUM SERPL-MCNC: 8.8 MG/DL (ref 8.8–10.4)
CHLORIDE SERPL-SCNC: 106 MMOL/L (ref 98–107)
CREAT SERPL-MCNC: 0.67 MG/DL (ref 0.51–0.95)
EGFRCR SERPLBLD CKD-EPI 2021: >90 ML/MIN/1.73M2
EOSINOPHIL # BLD AUTO: 0.1 10E3/UL (ref 0–0.7)
EOSINOPHIL NFR BLD AUTO: 2 %
ERYTHROCYTE [DISTWIDTH] IN BLOOD BY AUTOMATED COUNT: 21.9 % (ref 10–15)
GLUCOSE SERPL-MCNC: 85 MG/DL (ref 70–99)
HCO3 SERPL-SCNC: 24 MMOL/L (ref 22–29)
HCT VFR BLD AUTO: 27.1 % (ref 35–47)
HGB BLD-MCNC: 8.7 G/DL (ref 11.7–15.7)
IMM GRANULOCYTES # BLD: 0 10E3/UL
IMM GRANULOCYTES NFR BLD: 0 %
LYMPHOCYTES # BLD AUTO: 0.8 10E3/UL (ref 0.8–5.3)
LYMPHOCYTES NFR BLD AUTO: 20 %
MCH RBC QN AUTO: 30.5 PG (ref 26.5–33)
MCHC RBC AUTO-ENTMCNC: 32.1 G/DL (ref 31.5–36.5)
MCV RBC AUTO: 95 FL (ref 78–100)
MONOCYTES # BLD AUTO: 0.5 10E3/UL (ref 0–1.3)
MONOCYTES NFR BLD AUTO: 12 %
NEUTROPHILS # BLD AUTO: 2.6 10E3/UL (ref 1.6–8.3)
NEUTROPHILS NFR BLD AUTO: 65 %
NRBC # BLD AUTO: 0 10E3/UL
NRBC BLD AUTO-RTO: 0 /100
PLATELET # BLD AUTO: 162 10E3/UL (ref 150–450)
POTASSIUM SERPL-SCNC: 4 MMOL/L (ref 3.4–5.3)
PROT SERPL-MCNC: 6.2 G/DL (ref 6.4–8.3)
RBC # BLD AUTO: 2.85 10E6/UL (ref 3.8–5.2)
SODIUM SERPL-SCNC: 140 MMOL/L (ref 135–145)
WBC # BLD AUTO: 3.9 10E3/UL (ref 4–11)

## 2024-08-06 PROCEDURE — 250N000011 HC RX IP 250 OP 636: Performed by: INTERNAL MEDICINE

## 2024-08-06 PROCEDURE — 82040 ASSAY OF SERUM ALBUMIN: CPT

## 2024-08-06 PROCEDURE — 85025 COMPLETE CBC W/AUTO DIFF WBC: CPT

## 2024-08-06 PROCEDURE — 36591 DRAW BLOOD OFF VENOUS DEVICE: CPT

## 2024-08-06 RX ORDER — HEPARIN SODIUM (PORCINE) LOCK FLUSH IV SOLN 100 UNIT/ML 100 UNIT/ML
500 SOLUTION INTRAVENOUS ONCE
Status: COMPLETED | OUTPATIENT
Start: 2024-08-06 | End: 2024-08-06

## 2024-08-06 RX ADMIN — Medication 500 UNITS: at 14:20

## 2024-08-06 NOTE — NURSING NOTE
"Chief Complaint   Patient presents with    Port Draw     Labs drawn via port by RN in lab.       Port accessed with 20 gauge 3/4\" non power needle by RN, labs collected, line flushed with saline and heparin, then de-accessed.     Thais Godwin RN    "

## 2024-08-07 ENCOUNTER — DOCUMENTATION ONLY (OUTPATIENT)
Dept: ONCOLOGY | Facility: CLINIC | Age: 69
End: 2024-08-07
Payer: MEDICARE

## 2024-08-07 NOTE — PROGRESS NOTES
Oral Chemotherapy Monitoring Program  Lab Follow Up    Reviewed CBC and CMP lab results from 8/6.        4/9/2024    12:00 PM 4/30/2024    10:00 AM 5/9/2024     9:00 AM 6/11/2024    12:00 PM 7/9/2024     9:00 AM 7/23/2024    10:00 AM 8/7/2024    12:00 PM   ORAL CHEMOTHERAPY   Assessment Type Lab Monitoring Refill Lab Monitoring Chart Review Lab Monitoring Refill Lab Monitoring   Diagnosis Code Pancreatic Cancer Pancreatic Cancer Pancreatic Cancer Pancreatic Cancer Pancreatic Cancer Pancreatic Cancer Pancreatic Cancer   Providers Dr. Mauro Wade   [facility]   Is this patient followed by the [facility] OC team? Yes Yes Yes Yes Yes Yes Yes   Drug Name Xeloda (capecitabine) Xeloda (capecitabine) Xeloda (capecitabine) Xeloda (capecitabine) Xeloda (capecitabine) Xeloda (capecitabine) Xeloda (capecitabine)   Dose Other: Other: Other: Other: Other: Other: Other:   Current Schedule Other Other Other BID BID BID BID   Cycle Details 2 weeks on, 1 week off 2 weeks on, 1 week off 2 weeks on, 1 week off 2 weeks on, 1 week off 2 weeks on, 1 week off 2 weeks on, 1 week off 2 weeks on, 1 week off       Labs:  _  Result Component Current Result Ref Range   Sodium 140 (8/6/2024) 135 - 145 mmol/L     _  Result Component Current Result Ref Range   Potassium 4.0 (8/6/2024) 3.4 - 5.3 mmol/L     _  Result Component Current Result Ref Range   Calcium 8.8 (8/6/2024) 8.8 - 10.4 mg/dL     No results found for Mag within last 30 days.     No results found for Phos within last 30 days.     _  Result Component Current Result Ref Range   Albumin 3.8 (8/6/2024) 3.5 - 5.2 g/dL     _  Result Component Current Result Ref Range   Urea Nitrogen 9.7 (8/6/2024) 8.0 - 23.0 mg/dL     _  Result Component Current Result Ref Range   Creatinine 0.67 (8/6/2024) 0.51 - 0.95 mg/dL     _  Result Component Current Result Ref Range   AST 30  (8/6/2024) 0 - 45 U/L     _  Result Component Current Result Ref Range   ALT 17 (8/6/2024) 0 - 50 U/L     _  Result Component Current Result Ref Range   Bilirubin Total 0.4 (8/6/2024) <=1.2 mg/dL     _  Result Component Current Result Ref Range   WBC Count 3.9 (L) (8/6/2024) 4.0 - 11.0 10e3/uL     _  Result Component Current Result Ref Range   Hemoglobin 8.7 (L) (8/6/2024) 11.7 - 15.7 g/dL     _  Result Component Current Result Ref Range   Platelet Count 162 (8/6/2024) 150 - 450 10e3/uL     No results found for ANC within last 30 days.     _  Result Component Current Result Ref Range   Absolute Neutrophils 2.6 (8/6/2024) 1.6 - 8.3 10e3/uL        Assessment & Plan:  No concerning abnormalities. Continue capecitabine as planned. No outreach made to patient as labs are stable.    Follow-Up:  9/4: monthly labs    Grace Myhre, PharmD  Hematology/Oncology Pharmacist  [facility]  [facility]  [phone]

## 2024-08-19 DIAGNOSIS — C25.0 MALIGNANT NEOPLASM OF HEAD OF PANCREAS (H): ICD-10-CM

## 2024-08-19 NOTE — TELEPHONE ENCOUNTER
Samira Refill   Last prescribing provider: Dr Wade     Last clinic visit date: 6/10/24 DR Wade     Recommendations for requested medication (if none, N/A): N/A    Any other pertinent information (if none, N/A): N/A    Refilled: Y/N, if NO, why?

## 2024-08-22 ENCOUNTER — OFFICE VISIT (OUTPATIENT)
Dept: DERMATOLOGY | Facility: CLINIC | Age: 69
End: 2024-08-22
Attending: DERMATOLOGY
Payer: MEDICARE

## 2024-08-22 DIAGNOSIS — M34.9 SYSTEMIC SCLEROSIS (H): Primary | ICD-10-CM

## 2024-08-22 DIAGNOSIS — D49.0 PANCREATIC NEOPLASM: ICD-10-CM

## 2024-08-22 DIAGNOSIS — L40.3 PALMOPLANTAR PUSTULAR PSORIASIS: ICD-10-CM

## 2024-08-22 DIAGNOSIS — L57.0 ACTINIC KERATOSIS: ICD-10-CM

## 2024-08-22 PROCEDURE — 17000 DESTRUCT PREMALG LESION: CPT | Performed by: DERMATOLOGY

## 2024-08-22 PROCEDURE — 99213 OFFICE O/P EST LOW 20 MIN: CPT | Mod: 25 | Performed by: DERMATOLOGY

## 2024-08-22 PROCEDURE — 17003 DESTRUCT PREMALG LES 2-14: CPT | Performed by: DERMATOLOGY

## 2024-08-22 ASSESSMENT — PAIN SCALES - GENERAL: PAINLEVEL: NO PAIN (0)

## 2024-08-22 NOTE — PROGRESS NOTES
[facility] Dermatology Note    Encounter Date: Aug 22, 2024    Dermatology Problem List:  1.  BCC, R nasal tip,  s/p shave bx 8/18/23, s/p MMS 10/30/23, Takedown 11/20/23   2.  Actinic Keratosis, ulcerated, L nasal bridge  - s/p shave bx 8/18/23,    - s/p cryotherapy 12/14/2023 x 9-face  3. 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 Pancreatic cancer: on capecitabine ~10 years  - recurrent hand-foot syndrome  - augmented betamethasone ointment  5. Xeroderma  - current tx: Augmented Betamethasone 0.05% ointment BID  - consider for future: Apremailast 30 mg  6. Palmoplantar Pustular Psoriasis    ______________________________________    Impression/Plan:  1.  Systemic sclerosis vs sclerodermoid reaction   - Discussed tapering hydroxychloroquine given uncertain benefit   - Continue hydroxychloroquine; reevaluate at next visit    - Need to avoid immunosuppressive medications in the setting of pancreatic neoplasm    2. 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  - No clear improvement after starting apremilast 5 months ago.   - Discussed risks/benefits of continuing apremilast vs stopping  - Patient will continue apremilast for 1 more month, then stop  - If symptoms recur, will restart apremilast  - Need to avoid immunosuppressive medications in the setting of pancreatic neoplasm    2. AK x8   - Hypertrophic AK on the right nasal ala; if not responsive  to cryotherapy, will start fluorouracil   - Cryotherapy procedure note: After verbal consent and discussion of risks and benefits including, but not limited to, dyspigmentation/scar, blister, and pain, 8 was(were) treated with 1-2 mm freeze border for 1-2 cycles with liquid nitrogen. Post cryotherapy instructions were provided.       Follow-up in 4-5 months.       Staff Involved:  Staff and Scribe    I, Kezia Segal, am serving as a scribe; to document services personally performed by William Stoddard MD -based on data collection and the provider's statements to me.    Provider Disclosure:   The documentation recorded by the scribe accurately reflects the services I personally performed and the decisions made by me.    William Stoddard MD   of Dermatology  Department of Dermatology  [facility]      CC:   Chief Complaint   Patient presents with    Derm Problem     Systemic Sclerosis - Emelia states there has been no change with OTEZLA. She is concerned about a new lesion on the nose.        History of Present Illness:  Ms. Emelia Weathers is a 68 year old female who presents as a return patient here for a systemic sclerosis follow up. Patient reports that things have been going well, stable. She has lost a few pounds, think this may be attributed to apremilast. Patient reports she has a pretty active appetite. She is still experiencing swelling in the joints, not sure if apremilast is helping a lot. Patient also notes a crusty lesion on the nose that has been present for a month and a half.     Patient is otherwise feeling well, with no additional skin concerns.     Labs:  N/A    Physical exam:  Vitals: There were no vitals taken for this visit.  GEN: This is a well developed, well-nourished female in no acute distress, in a pleasant mood.    SKIN: Pinzon phototype II  - Focused examination of the face and hands was performed.  - 8 erythematous scaly macules  on the face.  - Erythema and hyperkeratotic scaling on the fingers and palms with swelling of the BIPs and DIPs.   - Diffuse ridging of the nails.  - No other lesions of concern on areas examined.     Past Medical History:   Past Medical History:   Diagnosis Date    Arthritis     Cervical high risk HPV (human papillomavirus) test positive 2008    + HPV 83    Diabetes mellitus (H) 06/27/2013    type 2    Diffuse cystic mastopathy     Disorder of bone and cartilage, unspecified     moderate osteopenia of spine    Endometrial hyperplasia, unspecified 09/14/2005    Gastro-oesophageal reflux disease     Heart murmur     Herpes simplex without mention of complication     History of blood transfusion 2014    Related to chemo    Pancreatic cancer (H)     Papanicolaou smear of cervix with low grade squamous intraepithelial lesion (LGSIL) 11/2006    Colpo negative    Post-menopausal     Rosacea      Past Surgical History:   Procedure Laterality Date    BREAST BIOPSY, RT/LT  1994    Breat Biopsy B9    BREAST SURGERY      Rt breast biopsy in 1994    CHOLECYSTECTOMY  9/20/13    Part of Whipple    ENDOSCOPIC RETROGRADE CHOLANGIOPANCREATOGRAM  6/27/2013    Procedure: ENDOSCOPIC RETROGRADE CHOLANGIOPANCREATOGRAM;  EUS with FNA, biliary sphincterotomy and biliary stent placement. ;  Surgeon: Timoteo Maki MD;  Location: [facility] OR    ENDOSCOPIC RETROGRADE CHOLANGIOPANCREATOGRAM  9/3/2013    Procedure: ENDOSCOPIC RETROGRADE CHOLANGIOPANCREATOGRAM;  Endoscopic retrograde cholangiopancreatogram with dilation of bile duct and bile duct stent placement.;  Surgeon: Devon Rubalcava MD;  Location: [facility] OR    ENDOSCOPIC ULTRASOUND UPPER GASTROINTESTINAL TRACT (GI)  6/27/2013    Procedure: ENDOSCOPIC ULTRASOUND UPPER GASTROINTESTINAL TRACT (GI);  Upper Endoscopy with Ultrasound, Fine Needle Aspiration, Endoscopic Retrograde Cholangiopancreatogram ;  Surgeon: Devon Rubalcava MD;  Location: [facility] OR    ESOPHAGOSCOPY, GASTROSCOPY,  DUODENOSCOPY (EGD), COMBINED N/A 10/8/2014    Procedure: COMBINED ENDOSCOPIC ULTRASOUND, ESOPHAGOSCOPY, GASTROSCOPY, DUODENOSCOPY (EGD), FINE NEEDLE ASPIRATE/BIOPSY;  Surgeon: Carson Paulino MD;  Location: [facility]    ESOPHAGOSCOPY, GASTROSCOPY, DUODENOSCOPY (EGD), COMBINED N/A 2/22/2024    Procedure: ESOPHAGOGASTRODUODENOSCOPY, WITH BIOPSY;  Surgeon: Ron Landon MD;  Location:  GI    GYN SURGERY      TONSILLECTOMY & ADENOIDECTOMY      WHIPPLE PROCEDURE  9/20/2013    Procedure: WHIPPLE PROCEDURE;  Open Whipple Procedure, Jujunostomy Feeding Tube Placement, Bile Duct Stent and Pancreatic Stent Placement;  Surgeon: Issa John MD;  Location:  OR    [facility] [place] COLONOSCOPY THRU STOMA, DIAGNOSTIC  07/06    due 10 years       Social History:   reports that she quit smoking about 30 years ago. Her smoking use included cigarettes. She has never used smokeless tobacco. She reports that she does not currently use alcohol. She reports that she does not use drugs.    Family History:  Family History   Problem Relation Age of Onset    Osteoporosis Mother     Gastrointestinal Disease Mother         gallbladder removal    Hypertension Mother     Diabetes Father     Hypertension Father     Cancer Father         oral    Alcohol/Drug Father     Other Cancer Father         Oral cancer    Substance Abuse Father     Diabetes Sister     Alcohol/Drug Brother     Hypertension Brother     Diabetes Brother     Depression Brother     Anxiety Disorder Brother     Mental Illness Brother     Substance Abuse Brother     Cancer - colorectal Maternal Grandfather     Other Cancer Maternal Grandfather     Skin Cancer No family hx of     Melanoma No family hx of        Medications:  Current Outpatient Medications   Medication Sig Dispense Refill    alendronate (FOSAMAX) 70 MG tablet Take 1 tablet (70 mg) by mouth every 7 days 12 tablet 4    Apremilast (OTEZLA) 10 & 20 & 30 MG TBPK Take 1 tablet in the morning on day 1, then take 1  tablet every 12 hours on day 2 through 14, according to the instructions on the packet. 55 each 0    apremilast (OTEZLA) 30 MG tablet Take 1 tablet (30 mg) by mouth 2 times daily 60 tablet 11    capecitabine (XELODA) 500 MG tablet Take 2 tabs in the AM and 1 tab in the PM, Days 1 through 14, then off for 7 days. Take within 30 mins after meal. 42 tablet 3    capecitabine (XELODA) 500 MG tablet Take 2 tabs in the AM and 1 tab in the PM, Days 1 through 14, then off for 7 days. Take within 30 mins after meal. 42 tablet 3    clobetasol (TEMOVATE) 0.05 % external ointment Apply topically 2 times daily 60 g 5    cycloSPORINE (RESTASIS) 0.05 % ophthalmic emulsion 1 drop 2 times daily      fluorouracil (EFUDEX) 5 % external cream Apply topically 2 times daily For 2 weeks 40 g 3    hydroxychloroquine (PLAQUENIL) 200 MG tablet Take 1 tablet (200 mg) by mouth daily 90 tablet 3    lidocaine-prilocaine (EMLA) 2.5-2.5 % external cream Apply topically as needed for moderate pain 30 g 3    lipase-protease-amylase (CREON) 17115-69179-89088 units CPEP TAKE THREE CAPSULES BY MOUTH THREE TIMES DAILY WITH MEALS 540 capsule 0    lisinopril (ZESTRIL) 10 MG tablet Take 1 tablet (10 mg) by mouth daily 90 tablet 4    pantoprazole (PROTONIX) 40 MG EC tablet Take 1 tablet (40 mg) by mouth daily before breakfast 90 tablet 11    acetaminophen (TYLENOL) 325 MG tablet Take 500 mg by mouth every 6 hours as needed (Patient not taking: Reported on 6/10/2024)      augmented betamethasone dipropionate (DIPROLENE-AF) 0.05 % external ointment Apply topically 2 times daily (Patient not taking: Reported on 6/10/2024) 50 g 3    CALCIUM CITRATE + D PO 2 tablets daily (Patient not taking: Reported on 6/10/2024)      IBUPROFEN PO  (Patient not taking: Reported on 6/10/2024)      STATIN NOT PRESCRIBED (INTENTIONAL) Please choose reason not prescribed from choices below. (Patient not taking: Reported on 6/10/2024)      urea (GORMEL) 20 % external cream Apply  topically 2 times daily (Patient not taking: Reported on 6/10/2024) 480 g 11     No Known Allergies

## 2024-08-22 NOTE — LETTER
8/22/2024       RE: Emelia Weathers  [address]  [facility] [address]     Dear Colleague,    Thank you for referring your patient, Emelia Weathers, to the [facility] DERMATOLOGY CLINIC [place] [facility]. Please see a copy of my visit note below.    [facility] Dermatology Note    Encounter Date: Aug 22, 2024    Dermatology Problem List:  1.  BCC, R nasal tip,  s/p shave bx 8/18/23, s/p MMS 10/30/23, Takedown 11/20/23   2.  Actinic Keratosis, ulcerated, L nasal bridge  - s/p shave bx 8/18/23,    - s/p cryotherapy 12/14/2023 x 9-face  3. 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 Pancreatic cancer: on capecitabine ~10 years  - recurrent hand-foot syndrome  - augmented betamethasone ointment  5. Xeroderma  - current tx: Augmented Betamethasone 0.05% ointment BID  - consider for future: Apremailast 30 mg  6. Palmoplantar Pustular Psoriasis    ______________________________________    Impression/Plan:  1.  Systemic sclerosis vs sclerodermoid reaction   - Discussed tapering hydroxychloroquine given uncertain benefit   - Continue hydroxychloroquine; reevaluate at next visit    - Need to avoid immunosuppressive medications in the setting of pancreatic neoplasm    2. 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  - No clear improvement after starting apremilast 5 months ago.   - Discussed  risks/benefits of continuing apremilast vs stopping  - Patient will continue apremilast for 1 more month, then stop  - If symptoms recur, will restart apremilast  - Need to avoid immunosuppressive medications in the setting of pancreatic neoplasm    2. AK x8   - Hypertrophic AK on the right nasal ala; if not responsive to cryotherapy, will start fluorouracil   - Cryotherapy procedure note: After verbal consent and discussion of risks and benefits including, but not limited to, dyspigmentation/scar, blister, and pain, 8 was(were) treated with 1-2 mm freeze border for 1-2 cycles with liquid nitrogen. Post cryotherapy instructions were provided.       Follow-up in 4-5 months.       Staff Involved:  Staff and Scribe    I, Kezia Segal, am serving as a scribe; to document services personally performed by William Stoddard MD -based on data collection and the provider's statements to me.    Provider Disclosure:   The documentation recorded by the scribe accurately reflects the services I personally performed and the decisions made by me.    William Stoddard MD   of Dermatology  Department of Dermatology  [facility]      CC:   Chief Complaint   Patient presents with     Derm Problem     Systemic Sclerosis - Emelia states there has been no change with OTEZLA. She is concerned about a new lesion on the nose.        History of Present Illness:  Ms. Emelia Weathers is a 68 year old female who presents as a return patient here for a systemic sclerosis follow up. Patient reports that things have been going well, stable. She has lost a few pounds, think this may be attributed to apremilast. Patient reports she has a pretty active appetite. She is still experiencing swelling in the joints, not sure if apremilast is helping a lot. Patient also notes a crusty lesion on the nose that has been present for a month and a half.     Patient is otherwise feeling well, with no additional  skin concerns.     Labs:  N/A    Physical exam:  Vitals: There were no vitals taken for this visit.  GEN: This is a well developed, well-nourished female in no acute distress, in a pleasant mood.    SKIN: Pinzon phototype II  - Focused examination of the face and hands was performed.  - 8 erythematous scaly macules on the face.  - Erythema and hyperkeratotic scaling on the fingers and palms with swelling of the BIPs and DIPs.   - Diffuse ridging of the nails.  - No other lesions of concern on areas examined.     Past Medical History:   Past Medical History:   Diagnosis Date     Arthritis      Cervical high risk HPV (human papillomavirus) test positive 2008    + HPV 83     Diabetes mellitus (H) 06/27/2013    type 2     Diffuse cystic mastopathy      Disorder of bone and cartilage, unspecified     moderate osteopenia of spine     Endometrial hyperplasia, unspecified 09/14/2005     Gastro-oesophageal reflux disease      Heart murmur      Herpes simplex without mention of complication      History of blood transfusion 2014    Related to chemo     Pancreatic cancer (H)      Papanicolaou smear of cervix with low grade squamous intraepithelial lesion (LGSIL) 11/2006    Colpo negative     Post-menopausal      Rosacea      Past Surgical History:   Procedure Laterality Date     BREAST BIOPSY, RT/LT  1994    Breat Biopsy B9     BREAST SURGERY      Rt breast biopsy in 1994     CHOLECYSTECTOMY  9/20/13    Part of Whipple     ENDOSCOPIC RETROGRADE CHOLANGIOPANCREATOGRAM  6/27/2013    Procedure: ENDOSCOPIC RETROGRADE CHOLANGIOPANCREATOGRAM;  EUS with FNA, biliary sphincterotomy and biliary stent placement. ;  Surgeon: Timoteo Maki MD;  [facility]     ENDOSCOPIC RETROGRADE CHOLANGIOPANCREATOGRAM  9/3/2013    Procedure: ENDOSCOPIC RETROGRADE CHOLANGIOPANCREATOGRAM;  Endoscopic retrograde cholangiopancreatogram with dilation of bile duct and bile duct stent placement.;  Surgeon: Devon Rubalcava MD;  Location:  [facility] OR     ENDOSCOPIC ULTRASOUND UPPER GASTROINTESTINAL TRACT (GI)  6/27/2013    Procedure: ENDOSCOPIC ULTRASOUND UPPER GASTROINTESTINAL TRACT (GI);  Upper Endoscopy with Ultrasound, Fine Needle Aspiration, Endoscopic Retrograde Cholangiopancreatogram ;  Surgeon: Devon Rubalcava MD;  [facility] OR     ESOPHAGOSCOPY, GASTROSCOPY, DUODENOSCOPY (EGD), COMBINED N/A 10/8/2014    Procedure: COMBINED ENDOSCOPIC ULTRASOUND, ESOPHAGOSCOPY, GASTROSCOPY, DUODENOSCOPY (EGD), FINE NEEDLE ASPIRATE/BIOPSY;  Surgeon: Carson Paulino MD;  [facility] GI     ESOPHAGOSCOPY, GASTROSCOPY, DUODENOSCOPY (EGD), COMBINED N/A 2/22/2024    Procedure: ESOPHAGOGASTRODUODENOSCOPY, WITH BIOPSY;  Surgeon: Ron Landon MD;  Location:  GI     GYN SURGERY       TONSILLECTOMY & ADENOIDECTOMY       WHIPPLE PROCEDURE  9/20/2013    Procedure: WHIPPLE PROCEDURE;  Open Whipple Procedure, Jujunostomy Feeding Tube Placement, Bile Duct Stent and Pancreatic Stent Placement;  Surgeon: Issa John MD;  Location[facility] OR     ZZHC COLONOSCOPY THRU STOMA, DIAGNOSTIC  07/06    due 10 years       Social History:   reports that she quit smoking about 30 years ago. Her smoking use included cigarettes. She has never used smokeless tobacco. She reports that she does not currently use alcohol. She reports that she does not use drugs.    Family History:  Family History   Problem Relation Age of Onset     Osteoporosis Mother      Gastrointestinal Disease Mother         gallbladder removal     Hypertension Mother      Diabetes Father      Hypertension Father      Cancer Father         oral     Alcohol/Drug Father      Other Cancer Father         Oral cancer     Substance Abuse Father      Diabetes Sister      Alcohol/Drug Brother      Hypertension Brother      Diabetes Brother      Depression Brother      Anxiety Disorder Brother      Mental Illness Brother      Substance Abuse Brother      Cancer - colorectal Maternal Grandfather      Other  Cancer Maternal Grandfather      Skin Cancer No family hx of      Melanoma No family hx of        Medications:  Current Outpatient Medications   Medication Sig Dispense Refill     alendronate (FOSAMAX) 70 MG tablet Take 1 tablet (70 mg) by mouth every 7 days 12 tablet 4     Apremilast (OTEZLA) 10 & 20 & 30 MG TBPK Take 1 tablet in the morning on day 1, then take 1 tablet every 12 hours on day 2 through 14, according to the instructions on the packet. 55 each 0     apremilast (OTEZLA) 30 MG tablet Take 1 tablet (30 mg) by mouth 2 times daily 60 tablet 11     capecitabine (XELODA) 500 MG tablet Take 2 tabs in the AM and 1 tab in the PM, Days 1 through 14, then off for 7 days. Take within 30 mins after meal. 42 tablet 3     capecitabine (XELODA) 500 MG tablet Take 2 tabs in the AM and 1 tab in the PM, Days 1 through 14, then off for 7 days. Take within 30 mins after meal. 42 tablet 3     clobetasol (TEMOVATE) 0.05 % external ointment Apply topically 2 times daily 60 g 5     cycloSPORINE (RESTASIS) 0.05 % ophthalmic emulsion 1 drop 2 times daily       fluorouracil (EFUDEX) 5 % external cream Apply topically 2 times daily For 2 weeks 40 g 3     hydroxychloroquine (PLAQUENIL) 200 MG tablet Take 1 tablet (200 mg) by mouth daily 90 tablet 3     lidocaine-prilocaine (EMLA) 2.5-2.5 % external cream Apply topically as needed for moderate pain 30 g 3     lipase-protease-amylase (CREON) 99902-65343-22692 units CPEP TAKE THREE CAPSULES BY MOUTH THREE TIMES DAILY WITH MEALS 540 capsule 0     lisinopril (ZESTRIL) 10 MG tablet Take 1 tablet (10 mg) by mouth daily 90 tablet 4     pantoprazole (PROTONIX) 40 MG EC tablet Take 1 tablet (40 mg) by mouth daily before breakfast 90 tablet 11     acetaminophen (TYLENOL) 325 MG tablet Take 500 mg by mouth every 6 hours as needed (Patient not taking: Reported on 6/10/2024)       augmented betamethasone dipropionate (DIPROLENE-AF) 0.05 % external ointment Apply topically 2 times daily (Patient  not taking: Reported on 6/10/2024) 50 g 3     CALCIUM CITRATE + D PO 2 tablets daily (Patient not taking: Reported on 6/10/2024)       IBUPROFEN PO  (Patient not taking: Reported on 6/10/2024)       STATIN NOT PRESCRIBED (INTENTIONAL) Please choose reason not prescribed from choices below. (Patient not taking: Reported on 6/10/2024)       urea (GORMEL) 20 % external cream Apply topically 2 times daily (Patient not taking: Reported on 6/10/2024) 480 g 11     No Known Allergies                Again, thank you for allowing me to participate in the care of your patient.      Sincerely,    William Stoddard MD

## 2024-08-22 NOTE — NURSING NOTE
Dermatology Rooming Note    Emelia Weathers's goals for this visit include:   Chief Complaint   Patient presents with    Derm Problem     Systemic Sclerosis - Emelia states there has been no change with OTEZLA. She is concerned about a new lesion on the nose.      Cortney TOMPKINS CMA

## 2024-08-25 ENCOUNTER — HEALTH MAINTENANCE LETTER (OUTPATIENT)
Age: 69
End: 2024-08-25

## 2024-09-02 ENCOUNTER — MYC MEDICAL ADVICE (OUTPATIENT)
Dept: FAMILY MEDICINE | Facility: CLINIC | Age: 69
End: 2024-09-02
Payer: MEDICARE

## 2024-09-02 DIAGNOSIS — E16.9: Primary | ICD-10-CM

## 2024-09-06 ENCOUNTER — LAB (OUTPATIENT)
Dept: LAB | Facility: CLINIC | Age: 69
End: 2024-09-06
Attending: INTERNAL MEDICINE
Payer: MEDICARE

## 2024-09-06 DIAGNOSIS — E16.9: ICD-10-CM

## 2024-09-06 DIAGNOSIS — C25.0 MALIGNANT NEOPLASM OF HEAD OF PANCREAS (H): ICD-10-CM

## 2024-09-06 DIAGNOSIS — C25.0 MALIGNANT NEOPLASM OF HEAD OF PANCREAS (H): Primary | ICD-10-CM

## 2024-09-06 LAB
ALBUMIN SERPL BCG-MCNC: 3.9 G/DL (ref 3.5–5.2)
ALP SERPL-CCNC: 71 U/L (ref 40–150)
ALT SERPL W P-5'-P-CCNC: 14 U/L (ref 0–50)
ANION GAP SERPL CALCULATED.3IONS-SCNC: 9 MMOL/L (ref 7–15)
AST SERPL W P-5'-P-CCNC: 24 U/L (ref 0–45)
BASOPHILS # BLD AUTO: 0 10E3/UL (ref 0–0.2)
BASOPHILS NFR BLD AUTO: 0 %
BILIRUB SERPL-MCNC: 0.4 MG/DL
BUN SERPL-MCNC: 10.1 MG/DL (ref 8–23)
CALCIUM SERPL-MCNC: 9.1 MG/DL (ref 8.8–10.4)
CHLORIDE SERPL-SCNC: 104 MMOL/L (ref 98–107)
CREAT SERPL-MCNC: 0.7 MG/DL (ref 0.51–0.95)
EGFRCR SERPLBLD CKD-EPI 2021: >90 ML/MIN/1.73M2
EOSINOPHIL # BLD AUTO: 0.1 10E3/UL (ref 0–0.7)
EOSINOPHIL NFR BLD AUTO: 1 %
ERYTHROCYTE [DISTWIDTH] IN BLOOD BY AUTOMATED COUNT: 24.1 % (ref 10–15)
GLUCOSE SERPL-MCNC: 88 MG/DL (ref 70–99)
HBA1C MFR BLD: 6.6 %
HCO3 SERPL-SCNC: 26 MMOL/L (ref 22–29)
HCT VFR BLD AUTO: 27.3 % (ref 35–47)
HGB BLD-MCNC: 9 G/DL (ref 11.7–15.7)
IMM GRANULOCYTES # BLD: 0 10E3/UL
IMM GRANULOCYTES NFR BLD: 0 %
LYMPHOCYTES # BLD AUTO: 0.8 10E3/UL (ref 0.8–5.3)
LYMPHOCYTES NFR BLD AUTO: 17 %
MCH RBC QN AUTO: 31.8 PG (ref 26.5–33)
MCHC RBC AUTO-ENTMCNC: 33 G/DL (ref 31.5–36.5)
MCV RBC AUTO: 97 FL (ref 78–100)
MONOCYTES # BLD AUTO: 0.5 10E3/UL (ref 0–1.3)
MONOCYTES NFR BLD AUTO: 10 %
NEUTROPHILS # BLD AUTO: 3.3 10E3/UL (ref 1.6–8.3)
NEUTROPHILS NFR BLD AUTO: 72 %
NRBC # BLD AUTO: 0 10E3/UL
NRBC BLD AUTO-RTO: 0 /100
PLATELET # BLD AUTO: 179 10E3/UL (ref 150–450)
POTASSIUM SERPL-SCNC: 4.1 MMOL/L (ref 3.4–5.3)
PROT SERPL-MCNC: 6.3 G/DL (ref 6.4–8.3)
RBC # BLD AUTO: 2.83 10E6/UL (ref 3.8–5.2)
SODIUM SERPL-SCNC: 139 MMOL/L (ref 135–145)
WBC # BLD AUTO: 4.7 10E3/UL (ref 4–11)

## 2024-09-06 PROCEDURE — 83036 HEMOGLOBIN GLYCOSYLATED A1C: CPT

## 2024-09-06 PROCEDURE — 36591 DRAW BLOOD OFF VENOUS DEVICE: CPT

## 2024-09-06 PROCEDURE — 85025 COMPLETE CBC W/AUTO DIFF WBC: CPT

## 2024-09-06 PROCEDURE — 82040 ASSAY OF SERUM ALBUMIN: CPT

## 2024-09-06 PROCEDURE — 250N000011 HC RX IP 250 OP 636: Performed by: INTERNAL MEDICINE

## 2024-09-06 RX ORDER — HEPARIN SODIUM (PORCINE) LOCK FLUSH IV SOLN 100 UNIT/ML 100 UNIT/ML
5 SOLUTION INTRAVENOUS ONCE
Status: COMPLETED | OUTPATIENT
Start: 2024-09-06 | End: 2024-09-06

## 2024-09-06 RX ADMIN — Medication 5 ML: at 13:33

## 2024-09-06 NOTE — NURSING NOTE
"Chief Complaint   Patient presents with    Port Draw     Labs drawn via port by RN.    Labs Only     Port accessed with 20 gauge, 3/4\" power needle by RN, labs collected, line flushed with saline and heparin, then de-accessed.     Tessa Gan RN    "

## 2024-09-06 NOTE — ORAL ONC MGMT
Oral Chemotherapy Monitoring Program  Lab Follow Up    Reviewed CBC and CMP lab results from 9/6/2024.        4/30/2024    10:00 AM 5/9/2024     9:00 AM 6/11/2024    12:00 PM 7/9/2024     9:00 AM 7/23/2024    10:00 AM 8/7/2024    12:00 PM 9/6/2024     2:00 PM   ORAL CHEMOTHERAPY   Assessment Type Refill Lab Monitoring Chart Review Lab Monitoring Refill Lab Monitoring Lab Monitoring   Diagnosis Code Pancreatic Cancer Pancreatic Cancer Pancreatic Cancer Pancreatic Cancer Pancreatic Cancer Pancreatic Cancer Pancreatic Cancer   Providers Dr. Mauro Wade   [facility]   Is this patient followed by the [facility] OC team? Yes Yes Yes Yes Yes Yes Yes   Drug Name Xeloda (capecitabine) Xeloda (capecitabine) Xeloda (capecitabine) Xeloda (capecitabine) Xeloda (capecitabine) Xeloda (capecitabine) Xeloda (capecitabine)   Dose Other: Other: Other: Other: Other: Other: Other:   Current Schedule Other Other BID BID BID BID BID   Cycle Details 2 weeks on, 1 week off 2 weeks on, 1 week off 2 weeks on, 1 week off 2 weeks on, 1 week off 2 weeks on, 1 week off 2 weeks on, 1 week off 2 weeks on, 1 week off       Labs:  _  Result Component Current Result Ref Range   Sodium 139 (9/6/2024) 135 - 145 mmol/L     _  Result Component Current Result Ref Range   Potassium 4.1 (9/6/2024) 3.4 - 5.3 mmol/L     _  Result Component Current Result Ref Range   Calcium 9.1 (9/6/2024) 8.8 - 10.4 mg/dL     _  Result Component Current Result Ref Range   Albumin 3.9 (9/6/2024) 3.5 - 5.2 g/dL     _  Result Component Current Result Ref Range   Urea Nitrogen 10.1 (9/6/2024) 8.0 - 23.0 mg/dL     _  Result Component Current Result Ref Range   Creatinine 0.70 (9/6/2024) 0.51 - 0.95 mg/dL     _  Result Component Current Result Ref Range   AST 24 (9/6/2024) 0 - 45 U/L     _  Result Component Current Result Ref Range   ALT 14 (9/6/2024) 0 - 50 U/L      _  Result Component Current Result Ref Range   Bilirubin Total 0.4 (9/6/2024) <=1.2 mg/dL     _  Result Component Current Result Ref Range   WBC Count 4.7 (9/6/2024) 4.0 - 11.0 10e3/uL     _  Result Component Current Result Ref Range   Hemoglobin 9.0 (L) (9/6/2024) 11.7 - 15.7 g/dL     _  Result Component Current Result Ref Range   Platelet Count 179 (9/6/2024) 150 - 450 10e3/uL     No results found for ANC within last 30 days.     _  Result Component Current Result Ref Range   Absolute Neutrophils 3.3 (9/6/2024) 1.6 - 8.3 10e3/uL        Assessment & Plan:  No new concerning lab abnormalities. No changes with capecitabine needed at this time.    Follow-Up:  10/2: labs  10/7: Dr. Wade visit     Didier Buckner, PharmD  Hematology/Oncology Clinical Pharmacist  [facility]  [facility]  [phone]

## 2024-10-02 ENCOUNTER — LAB (OUTPATIENT)
Dept: LAB | Facility: CLINIC | Age: 69
End: 2024-10-02
Attending: INTERNAL MEDICINE
Payer: MEDICARE

## 2024-10-02 ENCOUNTER — ANCILLARY PROCEDURE (OUTPATIENT)
Dept: CT IMAGING | Facility: CLINIC | Age: 69
End: 2024-10-02
Attending: INTERNAL MEDICINE
Payer: MEDICARE

## 2024-10-02 DIAGNOSIS — C25.0 MALIGNANT NEOPLASM OF HEAD OF PANCREAS (H): ICD-10-CM

## 2024-10-02 DIAGNOSIS — M34.9 SCLERODERMA (H): ICD-10-CM

## 2024-10-02 LAB
ALBUMIN SERPL BCG-MCNC: 4 G/DL (ref 3.5–5.2)
ALP SERPL-CCNC: 75 U/L (ref 40–150)
ALT SERPL W P-5'-P-CCNC: 17 U/L (ref 0–50)
ANION GAP SERPL CALCULATED.3IONS-SCNC: 11 MMOL/L (ref 7–15)
AST SERPL W P-5'-P-CCNC: 28 U/L (ref 0–45)
BASOPHILS # BLD AUTO: 0 10E3/UL (ref 0–0.2)
BASOPHILS NFR BLD AUTO: 1 %
BILIRUB SERPL-MCNC: 0.5 MG/DL
BUN SERPL-MCNC: 11.5 MG/DL (ref 8–23)
CALCIUM SERPL-MCNC: 9 MG/DL (ref 8.8–10.4)
CHLORIDE SERPL-SCNC: 106 MMOL/L (ref 98–107)
CREAT SERPL-MCNC: 0.64 MG/DL (ref 0.51–0.95)
EGFRCR SERPLBLD CKD-EPI 2021: >90 ML/MIN/1.73M2
EOSINOPHIL # BLD AUTO: 0.1 10E3/UL (ref 0–0.7)
EOSINOPHIL NFR BLD AUTO: 1 %
ERYTHROCYTE [DISTWIDTH] IN BLOOD BY AUTOMATED COUNT: 23.7 % (ref 10–15)
GLUCOSE SERPL-MCNC: 104 MG/DL (ref 70–99)
HCO3 SERPL-SCNC: 22 MMOL/L (ref 22–29)
HCT VFR BLD AUTO: 29.5 % (ref 35–47)
HGB BLD-MCNC: 9.5 G/DL (ref 11.7–15.7)
IMM GRANULOCYTES # BLD: 0 10E3/UL
IMM GRANULOCYTES NFR BLD: 0 %
LYMPHOCYTES # BLD AUTO: 0.6 10E3/UL (ref 0.8–5.3)
LYMPHOCYTES NFR BLD AUTO: 17 %
MCH RBC QN AUTO: 31.3 PG (ref 26.5–33)
MCHC RBC AUTO-ENTMCNC: 32.2 G/DL (ref 31.5–36.5)
MCV RBC AUTO: 97 FL (ref 78–100)
MONOCYTES # BLD AUTO: 0.5 10E3/UL (ref 0–1.3)
MONOCYTES NFR BLD AUTO: 13 %
NEUTROPHILS # BLD AUTO: 2.6 10E3/UL (ref 1.6–8.3)
NEUTROPHILS NFR BLD AUTO: 68 %
NRBC # BLD AUTO: 0 10E3/UL
NRBC BLD AUTO-RTO: 0 /100
PLATELET # BLD AUTO: 174 10E3/UL (ref 150–450)
POTASSIUM SERPL-SCNC: 4 MMOL/L (ref 3.4–5.3)
PROT SERPL-MCNC: 6.6 G/DL (ref 6.4–8.3)
RBC # BLD AUTO: 3.04 10E6/UL (ref 3.8–5.2)
SODIUM SERPL-SCNC: 139 MMOL/L (ref 135–145)
WBC # BLD AUTO: 3.9 10E3/UL (ref 4–11)

## 2024-10-02 PROCEDURE — 85025 COMPLETE CBC W/AUTO DIFF WBC: CPT

## 2024-10-02 PROCEDURE — 36591 DRAW BLOOD OFF VENOUS DEVICE: CPT

## 2024-10-02 PROCEDURE — 84155 ASSAY OF PROTEIN SERUM: CPT

## 2024-10-02 PROCEDURE — 250N000011 HC RX IP 250 OP 636: Performed by: INTERNAL MEDICINE

## 2024-10-02 PROCEDURE — G1010 CDSM STANSON: HCPCS | Performed by: RADIOLOGY

## 2024-10-02 PROCEDURE — 71260 CT THORAX DX C+: CPT | Mod: MG | Performed by: RADIOLOGY

## 2024-10-02 PROCEDURE — 74177 CT ABD & PELVIS W/CONTRAST: CPT | Mod: MG | Performed by: RADIOLOGY

## 2024-10-02 RX ORDER — HEPARIN SODIUM (PORCINE) LOCK FLUSH IV SOLN 100 UNIT/ML 100 UNIT/ML
5 SOLUTION INTRAVENOUS ONCE
Status: COMPLETED | OUTPATIENT
Start: 2024-10-02 | End: 2024-10-02

## 2024-10-02 RX ORDER — IOPAMIDOL 755 MG/ML
67 INJECTION, SOLUTION INTRAVASCULAR ONCE
Status: COMPLETED | OUTPATIENT
Start: 2024-10-02 | End: 2024-10-02

## 2024-10-02 RX ORDER — HEPARIN SODIUM (PORCINE) LOCK FLUSH IV SOLN 100 UNIT/ML 100 UNIT/ML
5 SOLUTION INTRAVENOUS
Status: COMPLETED | OUTPATIENT
Start: 2024-10-02 | End: 2024-10-02

## 2024-10-02 RX ADMIN — Medication 5 ML: at 08:19

## 2024-10-02 RX ADMIN — HEPARIN SODIUM (PORCINE) LOCK FLUSH IV SOLN 100 UNIT/ML 5 ML: 100 SOLUTION at 09:25

## 2024-10-02 RX ADMIN — IOPAMIDOL 67 ML: 755 INJECTION, SOLUTION INTRAVASCULAR at 09:27

## 2024-10-02 NOTE — DISCHARGE INSTRUCTIONS

## 2024-10-07 ENCOUNTER — ONCOLOGY VISIT (OUTPATIENT)
Dept: ONCOLOGY | Facility: CLINIC | Age: 69
End: 2024-10-07
Attending: INTERNAL MEDICINE
Payer: MEDICARE

## 2024-10-07 VITALS
RESPIRATION RATE: 16 BRPM | HEART RATE: 87 BPM | SYSTOLIC BLOOD PRESSURE: 102 MMHG | TEMPERATURE: 97.9 F | OXYGEN SATURATION: 97 % | DIASTOLIC BLOOD PRESSURE: 65 MMHG | BODY MASS INDEX: 18.44 KG/M2 | WEIGHT: 110 LBS

## 2024-10-07 DIAGNOSIS — M34.9 SCLERODERMA (H): ICD-10-CM

## 2024-10-07 DIAGNOSIS — C25.0 MALIGNANT NEOPLASM OF HEAD OF PANCREAS (H): Primary | ICD-10-CM

## 2024-10-07 PROCEDURE — G0463 HOSPITAL OUTPT CLINIC VISIT: HCPCS | Performed by: INTERNAL MEDICINE

## 2024-10-07 PROCEDURE — 99214 OFFICE O/P EST MOD 30 MIN: CPT | Performed by: INTERNAL MEDICINE

## 2024-10-07 ASSESSMENT — PAIN SCALES - GENERAL: PAINLEVEL: NO PAIN (0)

## 2024-10-07 NOTE — PROGRESS NOTES
Tiana Weathers returns today in follow-up of locally recurrent pancreatic cancer.     She is 69 years old and originally had a Whipple more than a decade ago with subsequent biopsy-proven recurrence 10/14 in the resection bed which encases her SMA.  She had an excellent response to FOLFIRINOX which was eventually been reduced to maintenance single agent Xeloda with ongoing control ever since.  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  She follows closely with dermatology and has been on several different agents to try and provide better control of that. She recently stopped otezla with no change and continues on plaquenil.  She  is swallowing quite well right now. As always she eats robustly but never gains any weight.  She thinks her weight today is pretty much the same as it has been.  She continues to exercise quite regularly, but less biking and a lot of time walking her new dog, logging 15-20,000 steps a day. She has a sore/cracking on her lip that hasn't healed over  2-3 weeks.      On exam today she appears frail as always.  She modest erythema of her lids  She has a 1 cm fissure on the lower right lip that is otherwise unremarkable  She has fair bit of dry desquamation on her hands with enlarged joints and contractures that appear about the same.     I personally reviewed her CT scan and went over the results with her. That shows no evidence of her disease other than some vague changes in her prior resection bed.     Her electrolytes and renal function are normal.  Her bilirubin, liver enzymes and albumin are normal.  She has stable pancytopenia consistent with her chemotherapy with a total white count of 3.9, hemoglobin 9.5 and platelets of 174.     Assessment/plan: Recurrent pancreatic cancer with now a decade of disease control currently on maintenance capecitabine.  We talked today  again about whether she was willing to stop active treatment trying to balance the uncertainty of whether it is keeping her cancer at bay and how much it is contributing to her systemic sclerosis.  She ultimately decided she wanted to not make any changes as she finds her currently good quality of life something she does not want to risk disrupting by stopping treatment and possibly have her cancer grow again.  We will plan on seeing her back again for another assessment of her disease status in about 4 months. I asked her to let us know if the fissure on her doesn't heal over the next couple of weeks.

## 2024-10-07 NOTE — LETTER
10/7/2024      Emelia Weathers  [address]  [facility] [address]      Dear Colleague,    Thank you for referring your patient, Emelia Weathers, to the [facility] CANCER [facility]. Please see a copy of my visit note below.      Tiana Weathers returns today in follow-up of locally recurrent pancreatic cancer.     She is 69 years old and originally had a Whipple more than a decade ago with subsequent biopsy-proven recurrence 10/14 in the resection bed which encases her SMA.  She had an excellent response to FOLFIRINOX which was eventually been reduced to maintenance single agent Xeloda with ongoing control ever since.  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  She follows closely with dermatology and has been on several different agents to try and provide better control of that. She recently stopped otezla with no change and continues on plaquenil.  She  is swallowing quite well right now. As always she eats robustly but never gains any weight.  She thinks her weight today is pretty much the same as it has been.  She continues to exercise quite regularly, but less biking and a lot of time walking her new dog, logging 15-20,000 steps a day. She has a sore/cracking on her lip that hasn't healed over  2-3 weeks.      On exam today she appears frail as always.  She modest erythema of her lids  She has a 1 cm fissure on the lower right lip that is otherwise unremarkable  She has fair bit of dry desquamation on her hands with enlarged joints and contractures that appear about the same.     I personally reviewed her CT scan and went over the results with her. That shows no evidence of her disease other than some vague changes in her prior resection bed.     Her electrolytes and renal function are normal.  Her bilirubin, liver enzymes and albumin are normal.  She has stable pancytopenia  consistent with her chemotherapy with a total white count of 3.9, hemoglobin 9.5 and platelets of 174.     Assessment/plan: Recurrent pancreatic cancer with now a decade of disease control currently on maintenance capecitabine.  We talked today again about whether she was willing to stop active treatment trying to balance the uncertainty of whether it is keeping her cancer at bay and how much it is contributing to her systemic sclerosis.  She ultimately decided she wanted to not make any changes as she finds her currently good quality of life something she does not want to risk disrupting by stopping treatment and possibly have her cancer grow again.  We will plan on seeing her back again for another assessment of her disease status in about 4 months. I asked her to let us know if the fissure on her doesn't heal over the next couple of weeks.      Again, thank you for allowing me to participate in the care of your patient.        Sincerely,        Yasmany Wade MD   Adjacent Tissue Transfer Text: The defect edges were debeveled with a #15 scalpel blade. Given the location of the defect and the proximity to free margins an adjacent tissue transfer was deemed most appropriate. Using a sterile surgical marker, an appropriate flap was drawn incorporating the defect and placing the expected incisions within the relaxed skin tension lines where possible. The area thus outlined was incised deep to adipose tissue with a #15 scalpel blade. The skin margins were undermined to an appropriate distance in all directions utilizing iris scissors and carried over to close the primary defect.

## 2024-10-07 NOTE — NURSING NOTE
"Oncology Rooming Note    October 7, 2024 5:17 PM   Emelia Weathers is a 69 year old female who presents for:    Chief Complaint   Patient presents with    Oncology Clinic Visit     Pancreatic CA     Initial Vitals: /65   Pulse 87   Temp 97.9  F (36.6  C) (Oral)   Resp 16   Wt 49.9 kg (110 lb)   SpO2 97%   BMI 18.44 kg/m   Estimated body mass index is 18.44 kg/m  as calculated from the following:    Height as of 1/16/24: 1.645 m (5' 4.76\").    Weight as of this encounter: 49.9 kg (110 lb). Body surface area is 1.51 meters squared.  No Pain (0) Comment: Data Unavailable   No LMP recorded. Patient is postmenopausal.  Allergies reviewed: Yes  Medications reviewed: Yes    Medications: Medication refills not needed today.  Pharmacy name entered into EPIC:    [facility] - [place], [place] - [address] [facility] [place], [place] [phone]  [facility] [place] - [address]  [facility] [id number] [place] [facility] - [place], [place] - [address] [id number] - [place], [place] - [address]    Frailty Screening:   Is the patient here for a new oncology consult visit in cancer care? 2. No      Clinical concerns:        Aylin Smith              "

## 2024-10-07 NOTE — LETTER
10/7/2024         RE: Emelia Weathers  [address]  [facility] [address]        Tiana Weathers returns today in follow-up of locally recurrent pancreatic cancer.     She is 69 years old and originally had a Whipple more than a decade ago with subsequent biopsy-proven recurrence 10/14 in the resection bed which encases her SMA.  She had an excellent response to FOLFIRINOX which was eventually been reduced to maintenance single agent Xeloda with ongoing control ever since.  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  She follows closely with dermatology and has been on several different agents to try and provide better control of that. She recently stopped otezla with no change and continues on plaquenil.  She  is swallowing quite well right now. As always she eats robustly but never gains any weight.  She thinks her weight today is pretty much the same as it has been.  She continues to exercise quite regularly, but less biking and a lot of time walking her new dog, logging 15-20,000 steps a day. She has a sore/cracking on her lip that hasn't healed over  2-3 weeks.      On exam today she appears frail as always.  She modest erythema of her lids  She has a 1 cm fissure on the lower right lip that is otherwise unremarkable  She has fair bit of dry desquamation on her hands with enlarged joints and contractures that appear about the same.     I personally reviewed her CT scan and went over the results with her. That shows no evidence of her disease other than some vague changes in her prior resection bed.     Her electrolytes and renal function are normal.  Her bilirubin, liver enzymes and albumin are normal.  She has stable pancytopenia consistent with her chemotherapy with a total white count of 3.9, hemoglobin 9.5 and platelets of 174.     Assessment/plan: Recurrent pancreatic cancer with now a  decade of disease control currently on maintenance capecitabine.  We talked today again about whether she was willing to stop active treatment trying to balance the uncertainty of whether it is keeping her cancer at bay and how much it is contributing to her systemic sclerosis.  She ultimately decided she wanted to not make any changes as she finds her currently good quality of life something she does not want to risk disrupting by stopping treatment and possibly have her cancer grow again.  We will plan on seeing her back again for another assessment of her disease status in about 4 months. I asked her to let us know if the fissure on her doesn't heal over the next couple of weeks.        Yasmany Wade MD

## 2024-10-14 DIAGNOSIS — C25.0 MALIGNANT NEOPLASM OF HEAD OF PANCREAS (H): Primary | ICD-10-CM

## 2024-10-14 RX ORDER — CAPECITABINE 500 MG/1
TABLET, FILM COATED ORAL
Qty: 42 TABLET | Refills: 0 | Status: SHIPPED | OUTPATIENT
Start: 2024-10-14

## 2024-10-21 DIAGNOSIS — C25.0 MALIGNANT NEOPLASM OF HEAD OF PANCREAS (H): ICD-10-CM

## 2024-10-22 NOTE — TELEPHONE ENCOUNTER
Medication requested: Creon    Last prescribing provider: Olvin Wade MD on 8/20/24    Last clinic visit date: 10/7/24 with Dr. Wade    Recommendations for requested medication (if none, N/A): NA    Any other pertinent information (if none, N/A): NA    Refilled: Y/N, if NO, why?    Pended and Routed to Dr. Yasmany Wade

## 2024-11-01 DIAGNOSIS — C25.0 MALIGNANT NEOPLASM OF HEAD OF PANCREAS (H): Primary | ICD-10-CM

## 2024-11-01 RX ORDER — CAPECITABINE 500 MG/1
TABLET, FILM COATED ORAL
Qty: 42 TABLET | Refills: 0 | Status: SHIPPED | OUTPATIENT
Start: 2024-11-01

## 2024-11-03 ENCOUNTER — HEALTH MAINTENANCE LETTER (OUTPATIENT)
Age: 69
End: 2024-11-03

## 2024-11-06 ENCOUNTER — LAB (OUTPATIENT)
Dept: LAB | Facility: CLINIC | Age: 69
End: 2024-11-06
Attending: INTERNAL MEDICINE
Payer: MEDICARE

## 2024-11-06 ENCOUNTER — PATIENT OUTREACH (OUTPATIENT)
Dept: ONCOLOGY | Facility: CLINIC | Age: 69
End: 2024-11-06

## 2024-11-06 DIAGNOSIS — M34.9 SCLERODERMA (H): ICD-10-CM

## 2024-11-06 DIAGNOSIS — C25.0 MALIGNANT NEOPLASM OF HEAD OF PANCREAS (H): ICD-10-CM

## 2024-11-06 LAB
ALBUMIN SERPL BCG-MCNC: 3.9 G/DL (ref 3.5–5.2)
ALP SERPL-CCNC: 79 U/L (ref 40–150)
ALT SERPL W P-5'-P-CCNC: 20 U/L (ref 0–50)
ANION GAP SERPL CALCULATED.3IONS-SCNC: 9 MMOL/L (ref 7–15)
AST SERPL W P-5'-P-CCNC: 40 U/L (ref 0–45)
BASOPHILS # BLD AUTO: 0 10E3/UL (ref 0–0.2)
BASOPHILS NFR BLD AUTO: 0 %
BILIRUB SERPL-MCNC: 0.4 MG/DL
BUN SERPL-MCNC: 9 MG/DL (ref 8–23)
CALCIUM SERPL-MCNC: 9 MG/DL (ref 8.8–10.4)
CHLORIDE SERPL-SCNC: 105 MMOL/L (ref 98–107)
CREAT SERPL-MCNC: 0.85 MG/DL (ref 0.51–0.95)
EGFRCR SERPLBLD CKD-EPI 2021: 73 ML/MIN/1.73M2
EOSINOPHIL # BLD AUTO: 0.1 10E3/UL (ref 0–0.7)
EOSINOPHIL NFR BLD AUTO: 2 %
ERYTHROCYTE [DISTWIDTH] IN BLOOD BY AUTOMATED COUNT: 22.8 % (ref 10–15)
GLUCOSE SERPL-MCNC: 95 MG/DL (ref 70–99)
HCO3 SERPL-SCNC: 26 MMOL/L (ref 22–29)
HCT VFR BLD AUTO: 26.7 % (ref 35–47)
HGB BLD-MCNC: 8.8 G/DL (ref 11.7–15.7)
IMM GRANULOCYTES # BLD: 0 10E3/UL
IMM GRANULOCYTES NFR BLD: 0 %
LYMPHOCYTES # BLD AUTO: 1.1 10E3/UL (ref 0.8–5.3)
LYMPHOCYTES NFR BLD AUTO: 23 %
MCH RBC QN AUTO: 31.7 PG (ref 26.5–33)
MCHC RBC AUTO-ENTMCNC: 33 G/DL (ref 31.5–36.5)
MCV RBC AUTO: 96 FL (ref 78–100)
MONOCYTES # BLD AUTO: 0.6 10E3/UL (ref 0–1.3)
MONOCYTES NFR BLD AUTO: 12 %
NEUTROPHILS # BLD AUTO: 2.9 10E3/UL (ref 1.6–8.3)
NEUTROPHILS NFR BLD AUTO: 62 %
NRBC # BLD AUTO: 0 10E3/UL
NRBC BLD AUTO-RTO: 0 /100
PLATELET # BLD AUTO: 202 10E3/UL (ref 150–450)
POTASSIUM SERPL-SCNC: 4.2 MMOL/L (ref 3.4–5.3)
PROT SERPL-MCNC: 6.2 G/DL (ref 6.4–8.3)
RBC # BLD AUTO: 2.78 10E6/UL (ref 3.8–5.2)
SODIUM SERPL-SCNC: 140 MMOL/L (ref 135–145)
WBC # BLD AUTO: 4.7 10E3/UL (ref 4–11)

## 2024-11-06 PROCEDURE — 84132 ASSAY OF SERUM POTASSIUM: CPT

## 2024-11-06 PROCEDURE — 250N000011 HC RX IP 250 OP 636: Performed by: INTERNAL MEDICINE

## 2024-11-06 PROCEDURE — 36591 DRAW BLOOD OFF VENOUS DEVICE: CPT

## 2024-11-06 PROCEDURE — 85004 AUTOMATED DIFF WBC COUNT: CPT

## 2024-11-06 RX ORDER — HEPARIN SODIUM (PORCINE) LOCK FLUSH IV SOLN 100 UNIT/ML 100 UNIT/ML
5 SOLUTION INTRAVENOUS ONCE
Status: COMPLETED | OUTPATIENT
Start: 2024-11-06 | End: 2024-11-06

## 2024-11-06 RX ADMIN — HEPARIN 5 ML: 100 SYRINGE at 15:18

## 2024-11-06 NOTE — NURSING NOTE
Chief Complaint   Patient presents with    Port Draw     Labs drawn via port by RN in lab.      Labs drawn via port by RN. Port accessed with 20g flat needle. Flushed with saline and heparin. Pt de-accessed.  Pt tolerated well.     Maite Mancuso RN

## 2024-11-06 NOTE — PROGRESS NOTES
[facility]: Cancer Care                                                                                          FAX  Date Received in Clinic: 11/6/24  /Name/Type of Form: Creon free drug application   Date Completed: 11/6/24  Copy Mailed to patient: yes  Disposition of Form: Faxed to [place]: [phone]        Kg Contreras RN, BSN, OCN   RN Care Coordinator   [facility]

## 2024-11-24 NOTE — NURSING NOTE
Chief Complaint   Patient presents with     Port Draw     port accessed by RN, labs collected and sent, line flushed with NS and heparin and de-accessed immediately.     Itzel Benitez    
Becoming argumentative/Refusing to talk to staff
Single Mechanical/Accidental Fall

## 2024-11-25 DIAGNOSIS — C25.0 MALIGNANT NEOPLASM OF HEAD OF PANCREAS (H): Primary | ICD-10-CM

## 2024-11-25 RX ORDER — CAPECITABINE 500 MG/1
TABLET, FILM COATED ORAL
Qty: 42 TABLET | Refills: 0 | Status: SHIPPED | OUTPATIENT
Start: 2024-12-09

## 2024-12-03 ENCOUNTER — TELEPHONE (OUTPATIENT)
Dept: DERMATOLOGY | Facility: CLINIC | Age: 69
End: 2024-12-03
Payer: MEDICARE

## 2024-12-03 NOTE — TELEPHONE ENCOUNTER
Financial assistance not needed. Closing encounter.          Thank you,     Jim Dailey CPhT  Pharmacy Clinic [facility]  [email or web address]   Phone: [phone]  Fax: [phone]

## 2024-12-03 NOTE — TELEPHONE ENCOUNTER
Sent patient MyChart message to determine if patient is eligible for FPAP for the 2025 year.    Thank you,     Jim Dailey [facility]  Pharmacy Clinic [facility]  [email or web address]   Phone: [phone]  Fax: [phone]

## 2024-12-05 ENCOUNTER — LAB (OUTPATIENT)
Dept: LAB | Facility: CLINIC | Age: 69
End: 2024-12-05
Attending: INTERNAL MEDICINE
Payer: MEDICARE

## 2024-12-05 ENCOUNTER — OFFICE VISIT (OUTPATIENT)
Dept: DERMATOLOGY | Facility: CLINIC | Age: 69
End: 2024-12-05
Attending: DERMATOLOGY
Payer: MEDICARE

## 2024-12-05 DIAGNOSIS — D84.9 IMMUNOSUPPRESSION (H): ICD-10-CM

## 2024-12-05 DIAGNOSIS — M34.9 SCLERODERMA (H): ICD-10-CM

## 2024-12-05 DIAGNOSIS — C25.0 MALIGNANT NEOPLASM OF HEAD OF PANCREAS (H): ICD-10-CM

## 2024-12-05 DIAGNOSIS — M34.9 SYSTEMIC SCLEROSIS (H): ICD-10-CM

## 2024-12-05 DIAGNOSIS — Z85.828 HISTORY OF SKIN CANCER: ICD-10-CM

## 2024-12-05 DIAGNOSIS — L40.8 PALMOPLANTAR PSORIASIS: Primary | ICD-10-CM

## 2024-12-05 PROBLEM — E11.9 DIABETES MELLITUS, TYPE 2 (H): Status: ACTIVE | Noted: 2024-12-05

## 2024-12-05 LAB
ALBUMIN SERPL BCG-MCNC: 3.9 G/DL (ref 3.5–5.2)
ALP SERPL-CCNC: 80 U/L (ref 40–150)
ALT SERPL W P-5'-P-CCNC: 15 U/L (ref 0–50)
ANION GAP SERPL CALCULATED.3IONS-SCNC: 8 MMOL/L (ref 7–15)
AST SERPL W P-5'-P-CCNC: 24 U/L (ref 0–45)
BASOPHILS # BLD AUTO: 0 10E3/UL (ref 0–0.2)
BASOPHILS NFR BLD AUTO: 1 %
BILIRUB SERPL-MCNC: 0.5 MG/DL
BUN SERPL-MCNC: 11.6 MG/DL (ref 8–23)
CALCIUM SERPL-MCNC: 9 MG/DL (ref 8.8–10.4)
CHLORIDE SERPL-SCNC: 107 MMOL/L (ref 98–107)
CREAT SERPL-MCNC: 0.71 MG/DL (ref 0.51–0.95)
EGFRCR SERPLBLD CKD-EPI 2021: 90 ML/MIN/1.73M2
EOSINOPHIL # BLD AUTO: 0.1 10E3/UL (ref 0–0.7)
EOSINOPHIL NFR BLD AUTO: 2 %
ERYTHROCYTE [DISTWIDTH] IN BLOOD BY AUTOMATED COUNT: 22 % (ref 10–15)
GLUCOSE SERPL-MCNC: 100 MG/DL (ref 70–99)
HCO3 SERPL-SCNC: 26 MMOL/L (ref 22–29)
HCT VFR BLD AUTO: 26.8 % (ref 35–47)
HGB BLD-MCNC: 8.7 G/DL (ref 11.7–15.7)
IMM GRANULOCYTES # BLD: 0 10E3/UL
IMM GRANULOCYTES NFR BLD: 0 %
LYMPHOCYTES # BLD AUTO: 0.7 10E3/UL (ref 0.8–5.3)
LYMPHOCYTES NFR BLD AUTO: 18 %
MCH RBC QN AUTO: 31.6 PG (ref 26.5–33)
MCHC RBC AUTO-ENTMCNC: 32.5 G/DL (ref 31.5–36.5)
MCV RBC AUTO: 98 FL (ref 78–100)
MONOCYTES # BLD AUTO: 0.4 10E3/UL (ref 0–1.3)
MONOCYTES NFR BLD AUTO: 10 %
NEUTROPHILS # BLD AUTO: 2.9 10E3/UL (ref 1.6–8.3)
NEUTROPHILS NFR BLD AUTO: 71 %
NRBC # BLD AUTO: 0 10E3/UL
NRBC BLD AUTO-RTO: 0 /100
PLATELET # BLD AUTO: 168 10E3/UL (ref 150–450)
POTASSIUM SERPL-SCNC: 4 MMOL/L (ref 3.4–5.3)
PROT SERPL-MCNC: 6.3 G/DL (ref 6.4–8.3)
RBC # BLD AUTO: 2.75 10E6/UL (ref 3.8–5.2)
SODIUM SERPL-SCNC: 141 MMOL/L (ref 135–145)
WBC # BLD AUTO: 4.1 10E3/UL (ref 4–11)

## 2024-12-05 PROCEDURE — 99214 OFFICE O/P EST MOD 30 MIN: CPT | Mod: GC | Performed by: DERMATOLOGY

## 2024-12-05 PROCEDURE — 80053 COMPREHEN METABOLIC PANEL: CPT

## 2024-12-05 PROCEDURE — 85018 HEMOGLOBIN: CPT

## 2024-12-05 PROCEDURE — 82565 ASSAY OF CREATININE: CPT

## 2024-12-05 PROCEDURE — 36591 DRAW BLOOD OFF VENOUS DEVICE: CPT

## 2024-12-05 PROCEDURE — 85004 AUTOMATED DIFF WBC COUNT: CPT

## 2024-12-05 PROCEDURE — 250N000011 HC RX IP 250 OP 636: Performed by: INTERNAL MEDICINE

## 2024-12-05 PROCEDURE — 84295 ASSAY OF SERUM SODIUM: CPT

## 2024-12-05 RX ORDER — BETAMETHASONE DIPROPIONATE 0.5 MG/G
CREAM TOPICAL 2 TIMES DAILY
Qty: 50 G | Refills: 11 | Status: SHIPPED | OUTPATIENT
Start: 2024-12-05

## 2024-12-05 RX ORDER — HEPARIN SODIUM (PORCINE) LOCK FLUSH IV SOLN 100 UNIT/ML 100 UNIT/ML
5 SOLUTION INTRAVENOUS ONCE
Status: COMPLETED | OUTPATIENT
Start: 2024-12-05 | End: 2024-12-05

## 2024-12-05 RX ADMIN — HEPARIN 5 ML: 100 SYRINGE at 14:06

## 2024-12-05 ASSESSMENT — PAIN SCALES - GENERAL: PAINLEVEL_OUTOF10: NO PAIN (0)

## 2024-12-05 NOTE — NURSING NOTE
Dermatology Rooming Note    Emelia Weathers's goals for this visit include:   Chief Complaint   Patient presents with    Derm Problem     Systemic sclerosis follow up, medications aren't helping.      Ivy Sanford RN

## 2024-12-05 NOTE — PROGRESS NOTES
[facility] Dermatology Note    Encounter Date: Dec 5, 2024    Dermatology Problem List:  1.  BCC, R nasal tip,  s/p shave bx 8/18/23, s/p MMS 10/30/23, Takedown 11/20/23   2.  Actinic Keratosis, ulcerated, L nasal bridge  - s/p shave bx 8/18/23,    - s/p cryotherapy 12/14/2023 x 9-face  3. 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 Pancreatic cancer: on capecitabine ~10 years  - recurrent hand-foot syndrome  - augmented betamethasone ointment  5. Xeroderma  - current tx: Augmented Betamethasone 0.05% ointment BID  - consider for future: Apremailast 30 mg  6. Palmoplantar Pustular Psoriasis  - started augmented betamethasone 12/05/24, will discuss with onc about a biologic option    ______________________________________    Impression/Plan:  1. 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 No taper needed given longer time for metabolization.   - Need to avoid immunosuppressive medications in the setting of pancreatic neoplasm     2. 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  - Will discuss with oncology if an immunologic would be possible given her pancreatic neoplasm.  - No clear improvement after starting apremilast 5 months ago, has stopped the apremilast a month after our last visit.   - started augmented betamethasone cream 12/05/24   - Need to avoid  immunosuppressive medications in the setting of pancreatic neoplasm    Jose J Herman MD PGY 2 [facility]  Staffed with Dr. William Stoddard    Staff Physician Comments:   I saw and evaluated the patient with the resident and I edited the assessment and plan as documented in the note. I was present for the examination.    William Stoddard MD   of Dermatology  Department of Dermatology  [facility] [employer]          CC:   Follow up on Scleroderma and palmoplantar psoriasis.     History of Present Illness:  Ms. Emelia Weathers is a 69 year old female who presents as a return patient.    Patient is here to follow up on her scleroderma and Palmoplantar psoriasis. Regarding the scleroderma, she feels that the hydroxychlorquine has been unhelpful and is curious about tapering off of the medication.    For her palmoplantar psoriasis, she has had a painful which started about 2-3 weeks ago. She finds it difficult to walk on her heal although after walking for a while does find some relief. She has not been using the steroids on her feet for the past couple weeks out of concern that these might dry them out.     She has had no other lesions on her face that she would like Dr. Stoddard to freeze off today.     She has healed well from the Moh's procedure that she underwent. She has noticed that her right nostril is more narrow with the scar tissue than the area had been prior to her surgery. She is wondering if surgery could do anything to help with her breathing.     Labs:  N/A    Physical exam:  Vitals: There were no vitals taken for this visit.  GEN: This is a well developed, well-nourished female in no acute distress, in a pleasant mood.    SKIN: Pinzon phototype II  - Focused examination of the face and hands was performed.  - previously seen Scaly erythematous macules on the face have not returned.   - Erythema and hyperkeratotic scaling on the fingers and palms with  swelling of the BIPs and DIPs.   - Diffuse ridging of the nails.  - No other lesions of concern on areas examined.     Past Medical History:   Past Medical History:   Diagnosis Date    Arthritis     Cervical high risk HPV (human papillomavirus) test positive 2008    + HPV 83    Diabetes mellitus (H) 06/27/2013    type 2    Diffuse cystic mastopathy     Disorder of bone and cartilage, unspecified     moderate osteopenia of spine    Endometrial hyperplasia, unspecified 09/14/2005    Gastro-oesophageal reflux disease     Heart murmur     Herpes simplex without mention of complication     History of blood transfusion 2014    Related to chemo    Pancreatic cancer (H)     Papanicolaou smear of cervix with low grade squamous intraepithelial lesion (LGSIL) 11/2006    Colpo negative    Post-menopausal     Rosacea      Past Surgical History:   Procedure Laterality Date    BREAST BIOPSY, RT/LT  1994    Breat Biopsy B9    BREAST SURGERY      Rt breast biopsy in 1994    CHOLECYSTECTOMY  9/20/13    Part of Whipple    ENDOSCOPIC RETROGRADE CHOLANGIOPANCREATOGRAM  6/27/2013    Procedure: ENDOSCOPIC RETROGRADE CHOLANGIOPANCREATOGRAM;  EUS with FNA, biliary sphincterotomy and biliary stent placement. ;  Surgeon: Timoteo Maki MD;  Location: [facility] OR    ENDOSCOPIC RETROGRADE CHOLANGIOPANCREATOGRAM  9/3/2013    Procedure: ENDOSCOPIC RETROGRADE CHOLANGIOPANCREATOGRAM;  Endoscopic retrograde cholangiopancreatogram with dilation of bile duct and bile duct stent placement.;  Surgeon: Devon Rubalcava MD;  Location: [facility] OR    ENDOSCOPIC ULTRASOUND UPPER GASTROINTESTINAL TRACT (GI)  6/27/2013    Procedure: ENDOSCOPIC ULTRASOUND UPPER GASTROINTESTINAL TRACT (GI);  Upper Endoscopy with Ultrasound, Fine Needle Aspiration, Endoscopic Retrograde Cholangiopancreatogram ;  Surgeon: Devon Rubalcava MD;  Location: [facility] OR    ESOPHAGOSCOPY, GASTROSCOPY, DUODENOSCOPY (EGD), COMBINED N/A 10/8/2014    Procedure: COMBINED ENDOSCOPIC  ULTRASOUND, ESOPHAGOSCOPY, GASTROSCOPY, DUODENOSCOPY (EGD), FINE NEEDLE ASPIRATE/BIOPSY;  Surgeon: Carson Paulino MD;  Location: [facility]    ESOPHAGOSCOPY, GASTROSCOPY, DUODENOSCOPY (EGD), COMBINED N/A 2/22/2024    Procedure: ESOPHAGOGASTRODUODENOSCOPY, WITH BIOPSY;  Surgeon: Ron Landon MD;  Location:  GI    GYN SURGERY      TONSILLECTOMY & ADENOIDECTOMY      WHIPPLE PROCEDURE  9/20/2013    Procedure: WHIPPLE PROCEDURE;  Open Whipple Procedure, Jujunostomy Feeding Tube Placement, Bile Duct Stent and Pancreatic Stent Placement;  Surgeon: Issa John MD;  Location:  OR    [facility] COLONOSCOPY THRU STOMA, DIAGNOSTIC  07/06    due 10 years       Social History:   reports that she quit smoking about 31 years ago. Her smoking use included cigarettes. She has never used smokeless tobacco. She reports that she does not currently use alcohol. She reports that she does not use drugs.    Family History:  Family History   Problem Relation Age of Onset    Osteoporosis Mother     Gastrointestinal Disease Mother         gallbladder removal    Hypertension Mother     Diabetes Father     Hypertension Father     Cancer Father         oral    Alcohol/Drug Father     Other Cancer Father         Oral cancer    Substance Abuse Father     Diabetes Sister     Alcohol/Drug Brother     Hypertension Brother     Diabetes Brother     Depression Brother     Anxiety Disorder Brother     Mental Illness Brother     Substance Abuse Brother     Cancer - colorectal Maternal Grandfather     Other Cancer Maternal Grandfather     Skin Cancer No family hx of     Melanoma No family hx of        Medications:  Current Outpatient Medications   Medication Sig Dispense Refill    acetaminophen (TYLENOL) 325 MG tablet Take 500 mg by mouth every 6 hours as needed (Patient not taking: Reported on 6/10/2024)      alendronate (FOSAMAX) 70 MG tablet Take 1 tablet (70 mg) by mouth every 7 days 12 tablet 4    Apremilast (OTEZLA) 10 & 20 & 30 MG  TBPK Take 1 tablet in the morning on day 1, then take 1 tablet every 12 hours on day 2 through 14, according to the instructions on the packet. (Patient not taking: Reported on 10/7/2024) 55 each 0    apremilast (OTEZLA) 30 MG tablet Take 1 tablet (30 mg) by mouth 2 times daily (Patient not taking: Reported on 10/7/2024) 60 tablet 11    augmented betamethasone dipropionate (DIPROLENE-AF) 0.05 % external ointment Apply topically 2 times daily (Patient not taking: Reported on 6/10/2024) 50 g 3    CALCIUM CITRATE + D PO 2 tablets daily (Patient not taking: Reported on 6/10/2024)      [START ON 12/9/2024] capecitabine (XELODA) 500 MG tablet Take 2 tabs in the AM and 1 tab in the PM, Days 1 through 14, then off for 7 days. Take within 30 mins after meal. 42 tablet 0    clobetasol (TEMOVATE) 0.05 % external ointment Apply topically 2 times daily 60 g 5    cycloSPORINE (RESTASIS) 0.05 % ophthalmic emulsion 1 drop 2 times daily      fluorouracil (EFUDEX) 5 % external cream Apply topically 2 times daily For 2 weeks 40 g 3    hydroxychloroquine (PLAQUENIL) 200 MG tablet Take 1 tablet (200 mg) by mouth daily 90 tablet 3    IBUPROFEN PO  (Patient not taking: Reported on 6/10/2024)      lidocaine-prilocaine (EMLA) 2.5-2.5 % external cream Apply topically as needed for moderate pain 30 g 3    lipase-protease-amylase (CREON) [id number] units CPEP TAKE THREE CAPSULES BY MOUTH THREE TIMES DAILY WITH MEALS 540 capsule 0    lisinopril (ZESTRIL) 10 MG tablet Take 1 tablet (10 mg) by mouth daily 90 tablet 4    pantoprazole (PROTONIX) 40 MG EC tablet Take 1 tablet (40 mg) by mouth daily before breakfast 90 tablet 11    STATIN NOT PRESCRIBED (INTENTIONAL) Please choose reason not prescribed from choices below. (Patient not taking: Reported on 6/10/2024)      urea (GORMEL) 20 % external cream Apply topically 2 times daily (Patient not taking: Reported on 6/10/2024) 480 g 11     No Known Allergies

## 2024-12-05 NOTE — NURSING NOTE
Chief Complaint   Patient presents with    Port Draw     Labs drawn via port by RN in lab.      Labs drawn via port by RN. Port accessed with 20g flat needle. Flushed with saline and heparin. De-accessed. Pt tolerated well.     Maite Mancuso RN

## 2024-12-08 DIAGNOSIS — C25.0 MALIGNANT NEOPLASM OF HEAD OF PANCREAS (H): Primary | ICD-10-CM

## 2024-12-19 DIAGNOSIS — C25.0 MALIGNANT NEOPLASM OF HEAD OF PANCREAS (H): ICD-10-CM

## 2024-12-19 NOTE — TELEPHONE ENCOUNTER
Lidocaine-prilocaine cream   Last prescribing provider: Dr. Yasmany Wade    Last clinic visit date: 10/7/24    Recommendations for requested medication (if none, N/A): NA    Any other pertinent information (if none, N/A): NA    Refilled: Y/N, if NO, why?

## 2024-12-22 RX ORDER — LIDOCAINE/PRILOCAINE 2.5 %-2.5%
CREAM (GRAM) TOPICAL PRN
Qty: 30 G | Refills: 3 | Status: SHIPPED | OUTPATIENT
Start: 2024-12-22

## 2025-01-06 DIAGNOSIS — C25.0 MALIGNANT NEOPLASM OF HEAD OF PANCREAS (H): Primary | ICD-10-CM

## 2025-01-06 RX ORDER — CAPECITABINE 500 MG/1
TABLET, FILM COATED ORAL
Qty: 42 TABLET | Refills: 0 | Status: SHIPPED | OUTPATIENT
Start: 2025-02-03

## 2025-01-08 ENCOUNTER — LAB (OUTPATIENT)
Dept: LAB | Facility: CLINIC | Age: 70
End: 2025-01-08
Attending: INTERNAL MEDICINE
Payer: MEDICARE

## 2025-01-08 DIAGNOSIS — C25.0 MALIGNANT NEOPLASM OF HEAD OF PANCREAS (H): ICD-10-CM

## 2025-01-08 LAB
ALBUMIN SERPL BCG-MCNC: 3.9 G/DL (ref 3.5–5.2)
ALP SERPL-CCNC: 91 U/L (ref 40–150)
ALT SERPL W P-5'-P-CCNC: 18 U/L (ref 0–50)
ANION GAP SERPL CALCULATED.3IONS-SCNC: 6 MMOL/L (ref 7–15)
AST SERPL W P-5'-P-CCNC: 26 U/L (ref 0–45)
BASOPHILS # BLD AUTO: 0 10E3/UL (ref 0–0.2)
BASOPHILS NFR BLD AUTO: 0 %
BILIRUB SERPL-MCNC: 0.4 MG/DL
BUN SERPL-MCNC: 10.5 MG/DL (ref 8–23)
CALCIUM SERPL-MCNC: 8.8 MG/DL (ref 8.8–10.4)
CHLORIDE SERPL-SCNC: 101 MMOL/L (ref 98–107)
CREAT SERPL-MCNC: 0.7 MG/DL (ref 0.51–0.95)
EGFRCR SERPLBLD CKD-EPI 2021: >90 ML/MIN/1.73M2
EOSINOPHIL # BLD AUTO: 0.1 10E3/UL (ref 0–0.7)
EOSINOPHIL NFR BLD AUTO: 2 %
ERYTHROCYTE [DISTWIDTH] IN BLOOD BY AUTOMATED COUNT: 21.5 % (ref 10–15)
GLUCOSE SERPL-MCNC: 96 MG/DL (ref 70–99)
HCO3 SERPL-SCNC: 29 MMOL/L (ref 22–29)
HCT VFR BLD AUTO: 28.3 % (ref 35–47)
HGB BLD-MCNC: 9 G/DL (ref 11.7–15.7)
IMM GRANULOCYTES # BLD: 0 10E3/UL
IMM GRANULOCYTES NFR BLD: 1 %
LYMPHOCYTES # BLD AUTO: 0.8 10E3/UL (ref 0.8–5.3)
LYMPHOCYTES NFR BLD AUTO: 16 %
MCH RBC QN AUTO: 30.9 PG (ref 26.5–33)
MCHC RBC AUTO-ENTMCNC: 31.8 G/DL (ref 31.5–36.5)
MCV RBC AUTO: 97 FL (ref 78–100)
MONOCYTES # BLD AUTO: 0.6 10E3/UL (ref 0–1.3)
MONOCYTES NFR BLD AUTO: 12 %
NEUTROPHILS # BLD AUTO: 3.5 10E3/UL (ref 1.6–8.3)
NEUTROPHILS NFR BLD AUTO: 70 %
NRBC # BLD AUTO: 0 10E3/UL
NRBC BLD AUTO-RTO: 0 /100
PLATELET # BLD AUTO: 266 10E3/UL (ref 150–450)
POTASSIUM SERPL-SCNC: 4.4 MMOL/L (ref 3.4–5.3)
PROT SERPL-MCNC: 6.5 G/DL (ref 6.4–8.3)
RBC # BLD AUTO: 2.91 10E6/UL (ref 3.8–5.2)
SODIUM SERPL-SCNC: 136 MMOL/L (ref 135–145)
WBC # BLD AUTO: 5 10E3/UL (ref 4–11)

## 2025-01-08 PROCEDURE — 82247 BILIRUBIN TOTAL: CPT

## 2025-01-08 PROCEDURE — 82947 ASSAY GLUCOSE BLOOD QUANT: CPT

## 2025-01-08 PROCEDURE — 82310 ASSAY OF CALCIUM: CPT

## 2025-01-08 PROCEDURE — 85004 AUTOMATED DIFF WBC COUNT: CPT

## 2025-01-08 PROCEDURE — 250N000011 HC RX IP 250 OP 636: Performed by: INTERNAL MEDICINE

## 2025-01-08 PROCEDURE — 36591 DRAW BLOOD OFF VENOUS DEVICE: CPT

## 2025-01-08 RX ORDER — HEPARIN SODIUM (PORCINE) LOCK FLUSH IV SOLN 100 UNIT/ML 100 UNIT/ML
5 SOLUTION INTRAVENOUS ONCE
Status: COMPLETED | OUTPATIENT
Start: 2025-01-08 | End: 2025-01-08

## 2025-01-08 RX ADMIN — HEPARIN 5 ML: 100 SYRINGE at 13:21

## 2025-01-08 NOTE — NURSING NOTE
Chief Complaint   Patient presents with    Labs Only    Port Draw     Labs drawn via port access by lab RN       Port blood draw with heparin flush by lab RN.     Sunita Corrales RN

## 2025-01-24 PROBLEM — M85.89 OSTEOPENIA OF MULTIPLE SITES: Status: ACTIVE | Noted: 2025-01-24

## 2025-01-24 PROBLEM — G62.0 DRUG-INDUCED POLYNEUROPATHY: Status: ACTIVE | Noted: 2025-01-24

## 2025-02-07 ENCOUNTER — ANCILLARY PROCEDURE (OUTPATIENT)
Dept: CT IMAGING | Facility: CLINIC | Age: 70
End: 2025-02-07
Attending: INTERNAL MEDICINE
Payer: MEDICARE

## 2025-02-07 ENCOUNTER — LAB (OUTPATIENT)
Dept: LAB | Facility: CLINIC | Age: 70
End: 2025-02-07
Attending: INTERNAL MEDICINE
Payer: MEDICARE

## 2025-02-07 DIAGNOSIS — C25.0 MALIGNANT NEOPLASM OF HEAD OF PANCREAS (H): ICD-10-CM

## 2025-02-07 DIAGNOSIS — M34.9 SCLERODERMA (H): ICD-10-CM

## 2025-02-07 DIAGNOSIS — E11.40 TYPE 2 DIABETES MELLITUS WITH DIABETIC NEUROPATHY, WITHOUT LONG-TERM CURRENT USE OF INSULIN (H): ICD-10-CM

## 2025-02-07 DIAGNOSIS — Z13.220 LIPID SCREENING: ICD-10-CM

## 2025-02-07 LAB
ALBUMIN SERPL BCG-MCNC: 4.1 G/DL (ref 3.5–5.2)
ALP SERPL-CCNC: 86 U/L (ref 40–150)
ALT SERPL W P-5'-P-CCNC: 17 U/L (ref 0–50)
ANION GAP SERPL CALCULATED.3IONS-SCNC: 9 MMOL/L (ref 7–15)
AST SERPL W P-5'-P-CCNC: 28 U/L (ref 0–45)
BASOPHILS # BLD AUTO: 0 10E3/UL (ref 0–0.2)
BASOPHILS NFR BLD AUTO: 1 %
BILIRUB SERPL-MCNC: 0.6 MG/DL
BUN SERPL-MCNC: 13.6 MG/DL (ref 8–23)
CALCIUM SERPL-MCNC: 8.8 MG/DL (ref 8.8–10.4)
CHLORIDE SERPL-SCNC: 104 MMOL/L (ref 98–107)
CHOLEST SERPL-MCNC: 130 MG/DL
CREAT SERPL-MCNC: 0.67 MG/DL (ref 0.51–0.95)
EGFRCR SERPLBLD CKD-EPI 2021: >90 ML/MIN/1.73M2
EOSINOPHIL # BLD AUTO: 0 10E3/UL (ref 0–0.7)
EOSINOPHIL NFR BLD AUTO: 1 %
ERYTHROCYTE [DISTWIDTH] IN BLOOD BY AUTOMATED COUNT: 20.9 % (ref 10–15)
EST. AVERAGE GLUCOSE BLD GHB EST-MCNC: 128 MG/DL
FASTING STATUS PATIENT QL REPORTED: YES
FASTING STATUS PATIENT QL REPORTED: YES
GLUCOSE SERPL-MCNC: 120 MG/DL (ref 70–99)
HBA1C MFR BLD: 6.1 %
HCO3 SERPL-SCNC: 26 MMOL/L (ref 22–29)
HCT VFR BLD AUTO: 29.7 % (ref 35–47)
HDLC SERPL-MCNC: 60 MG/DL
HGB BLD-MCNC: 9.5 G/DL (ref 11.7–15.7)
IMM GRANULOCYTES # BLD: 0 10E3/UL
IMM GRANULOCYTES NFR BLD: 0 %
LDLC SERPL CALC-MCNC: 57 MG/DL
LYMPHOCYTES # BLD AUTO: 0.6 10E3/UL (ref 0.8–5.3)
LYMPHOCYTES NFR BLD AUTO: 13 %
MCH RBC QN AUTO: 30.8 PG (ref 26.5–33)
MCHC RBC AUTO-ENTMCNC: 32 G/DL (ref 31.5–36.5)
MCV RBC AUTO: 96 FL (ref 78–100)
MONOCYTES # BLD AUTO: 0.4 10E3/UL (ref 0–1.3)
MONOCYTES NFR BLD AUTO: 9 %
NEUTROPHILS # BLD AUTO: 3.6 10E3/UL (ref 1.6–8.3)
NEUTROPHILS NFR BLD AUTO: 76 %
NONHDLC SERPL-MCNC: 70 MG/DL
NRBC # BLD AUTO: 0 10E3/UL
NRBC BLD AUTO-RTO: 0 /100
PLATELET # BLD AUTO: 198 10E3/UL (ref 150–450)
POTASSIUM SERPL-SCNC: 4.1 MMOL/L (ref 3.4–5.3)
PROT SERPL-MCNC: 6.8 G/DL (ref 6.4–8.3)
RBC # BLD AUTO: 3.08 10E6/UL (ref 3.8–5.2)
SODIUM SERPL-SCNC: 139 MMOL/L (ref 135–145)
TRIGL SERPL-MCNC: 67 MG/DL
WBC # BLD AUTO: 4.7 10E3/UL (ref 4–11)

## 2025-02-07 PROCEDURE — 74177 CT ABD & PELVIS W/CONTRAST: CPT | Mod: GC | Performed by: STUDENT IN AN ORGANIZED HEALTH CARE EDUCATION/TRAINING PROGRAM

## 2025-02-07 PROCEDURE — 85004 AUTOMATED DIFF WBC COUNT: CPT

## 2025-02-07 PROCEDURE — 85048 AUTOMATED LEUKOCYTE COUNT: CPT

## 2025-02-07 PROCEDURE — 84520 ASSAY OF UREA NITROGEN: CPT

## 2025-02-07 PROCEDURE — 36591 DRAW BLOOD OFF VENOUS DEVICE: CPT

## 2025-02-07 PROCEDURE — 71260 CT THORAX DX C+: CPT | Mod: GC | Performed by: STUDENT IN AN ORGANIZED HEALTH CARE EDUCATION/TRAINING PROGRAM

## 2025-02-07 PROCEDURE — 82947 ASSAY GLUCOSE BLOOD QUANT: CPT

## 2025-02-07 PROCEDURE — 250N000011 HC RX IP 250 OP 636: Performed by: INTERNAL MEDICINE

## 2025-02-07 PROCEDURE — 80061 LIPID PANEL: CPT

## 2025-02-07 PROCEDURE — 83036 HEMOGLOBIN GLYCOSYLATED A1C: CPT

## 2025-02-07 PROCEDURE — 82435 ASSAY OF BLOOD CHLORIDE: CPT

## 2025-02-07 RX ORDER — IOPAMIDOL 755 MG/ML
69 INJECTION, SOLUTION INTRAVASCULAR ONCE
Status: COMPLETED | OUTPATIENT
Start: 2025-02-07 | End: 2025-02-07

## 2025-02-07 RX ORDER — HEPARIN SODIUM (PORCINE) LOCK FLUSH IV SOLN 100 UNIT/ML 100 UNIT/ML
500 SOLUTION INTRAVENOUS ONCE
Status: COMPLETED | OUTPATIENT
Start: 2025-02-07 | End: 2025-02-07

## 2025-02-07 RX ORDER — HEPARIN SODIUM (PORCINE) LOCK FLUSH IV SOLN 100 UNIT/ML 100 UNIT/ML
5 SOLUTION INTRAVENOUS ONCE
Status: COMPLETED | OUTPATIENT
Start: 2025-02-07 | End: 2025-02-07

## 2025-02-07 RX ADMIN — HEPARIN SODIUM (PORCINE) LOCK FLUSH IV SOLN 100 UNIT/ML 500 UNITS: 100 SOLUTION at 09:50

## 2025-02-07 RX ADMIN — HEPARIN 5 ML: 100 SYRINGE at 09:28

## 2025-02-07 RX ADMIN — IOPAMIDOL 69 ML: 755 INJECTION, SOLUTION INTRAVASCULAR at 09:41

## 2025-02-07 NOTE — DISCHARGE INSTRUCTIONS

## 2025-02-07 NOTE — NURSING NOTE
Chief Complaint   Patient presents with    Port Draw     Labs drawn via port by RN in lab.      Labs drawn via port by RN. Port accessed with 20g flat needle. Flushed with saline and heparin. Pt tolerated well. Left in place for CT appt.     Maite Mancuso RN

## 2025-02-10 ENCOUNTER — ONCOLOGY VISIT (OUTPATIENT)
Dept: ONCOLOGY | Facility: CLINIC | Age: 70
End: 2025-02-10
Attending: INTERNAL MEDICINE
Payer: MEDICARE

## 2025-02-10 ENCOUNTER — MYC MEDICAL ADVICE (OUTPATIENT)
Dept: FAMILY MEDICINE | Facility: CLINIC | Age: 70
End: 2025-02-10

## 2025-02-10 VITALS
DIASTOLIC BLOOD PRESSURE: 81 MMHG | SYSTOLIC BLOOD PRESSURE: 155 MMHG | BODY MASS INDEX: 19.44 KG/M2 | TEMPERATURE: 97.9 F | WEIGHT: 116 LBS | HEART RATE: 76 BPM | OXYGEN SATURATION: 99 % | RESPIRATION RATE: 16 BRPM

## 2025-02-10 DIAGNOSIS — M34.9 SCLERODERMA (H): ICD-10-CM

## 2025-02-10 DIAGNOSIS — C25.0 MALIGNANT NEOPLASM OF HEAD OF PANCREAS (H): Primary | ICD-10-CM

## 2025-02-10 PROCEDURE — 99215 OFFICE O/P EST HI 40 MIN: CPT | Performed by: INTERNAL MEDICINE

## 2025-02-10 PROCEDURE — G0463 HOSPITAL OUTPT CLINIC VISIT: HCPCS | Performed by: INTERNAL MEDICINE

## 2025-02-10 PROCEDURE — G2211 COMPLEX E/M VISIT ADD ON: HCPCS | Performed by: INTERNAL MEDICINE

## 2025-02-10 RX ORDER — CHOLECALCIFEROL (VITAMIN D3) 50 MCG
1 TABLET ORAL DAILY
COMMUNITY

## 2025-02-10 ASSESSMENT — PAIN SCALES - GENERAL: PAINLEVEL_OUTOF10: NO PAIN (0)

## 2025-02-10 NOTE — LETTER
2/10/2025         RE: Emelia Weathers  [address]  [facility] [address]        I am seeing Emelia Weathers back today in follow-up of locally recurrent pancreatic cancer.    She is 69 years old and had a Whipple more than a decade ago with subsequent biopsy-proven recurrence encasing her SMA in 2014.  She had an excellent response to initial treatment with FOLFIRINOX which has since been reduced to maintenance single agent Xeloda for nearly 10 years now.  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  She follows closely with dermatology and has been on several different agents to try and provide better control of that without much improvement.  Her overall condition his continued to be quite good through all this with ongoing vigorous exercise and a generally good quality of life.  She reports that since our last visit she has had a little more trouble with the toxicity in her feet with enough cracking and peeling that she sometimes has trouble walking and has been getting less exercise than typical as a consequence.    On exam today she is quite cheerful and very thin as always.    She has dry screening and contractures in her hands which do not appear much changed.  On her feet she has significant fissuring in her heels with a lot of desquamation.    I personally reviewed her CT and went over the results with her that continues to show minimal to no evidence of disease.    Her electrolytes and renal function are normal.  Her bilirubin, albumin and liver enzymes are normal.  She has stable normocytic anemia with hemoglobin 9.5 and otherwise unremarkable blood counts.    Assessment/plan: Locally recurrent pancreatic cancer with ongoing disease control with single agent Xeloda.  We had a long talk again about whether she is willing to consider taking a break from treatment.  It is impossible to know at  this point for ongoing control is because we have eradicated her disease or because we have ongoing treatment she understandably still not willing to stop but we did talk about changing her schedule a little bit and she was willing to extend her time off treatment each cycle to 2 weeks so that she will now be on a 2-week on 2-week off cycle and hopefully that will allow some healing of her toxicity in her feet.  We talked also about some more aggressive moisturizing strategies although I think she is probably exhausted the possibilities in that regard.  Will plan on seeing her back for another response assessment in about 4 months.  She will let us know in the interim if she has any new or worsening symptoms.        Yasmany Wade MD

## 2025-02-10 NOTE — PROGRESS NOTES
I am seeing Emelia Weathers back today in follow-up of locally recurrent pancreatic cancer.    She is 69 years old and had a Whipple more than a decade ago with subsequent biopsy-proven recurrence encasing her SMA in 2014.  She had an excellent response to initial treatment with FOLFIRINOX which has since been reduced to maintenance single agent Xeloda for nearly 10 years now.  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  She follows closely with dermatology and has been on several different agents to try and provide better control of that without much improvement.  Her overall condition his continued to be quite good through all this with ongoing vigorous exercise and a generally good quality of life.  She reports that since our last visit she has had a little more trouble with the toxicity in her feet with enough cracking and peeling that she sometimes has trouble walking and has been getting less exercise than typical as a consequence.    On exam today she is quite cheerful and very thin as always.    She has dry screening and contractures in her hands which do not appear much changed.  On her feet she has significant fissuring in her heels with a lot of desquamation.    I personally reviewed her CT and went over the results with her that continues to show minimal to no evidence of disease.    Her electrolytes and renal function are normal.  Her bilirubin, albumin and liver enzymes are normal.  She has stable normocytic anemia with hemoglobin 9.5 and otherwise unremarkable blood counts.    Assessment/plan: Locally recurrent pancreatic cancer with ongoing disease control with single agent Xeloda.  We had a long talk again about whether she is willing to consider taking a break from treatment.  It is impossible to know at this point for ongoing control is because we have eradicated her disease or because we  have ongoing treatment she understandably still not willing to stop but we did talk about changing her schedule a little bit and she was willing to extend her time off treatment each cycle to 2 weeks so that she will now be on a 2-week on 2-week off cycle and hopefully that will allow some healing of her toxicity in her feet.  We talked also about some more aggressive moisturizing strategies although I think she is probably exhausted the possibilities in that regard.  Will plan on seeing her back for another response assessment in about 4 months.  She will let us know in the interim if she has any new or worsening symptoms.

## 2025-02-10 NOTE — NURSING NOTE
"Oncology Rooming Note    February 10, 2025 5:04 PM   Emelia Weathers is a 69 year old female who presents for:    Chief Complaint   Patient presents with    Oncology Clinic Visit     Pancreatic Cancer     Initial Vitals: BP (!) 155/81 (BP Location: Left arm, Patient Position: Sitting, Cuff Size: Adult Regular)   Pulse 76   Temp 97.9  F (36.6  C) (Tympanic)   Resp 16   Wt 52.6 kg (116 lb)   SpO2 99%   BMI 19.44 kg/m   Estimated body mass index is 19.44 kg/m  as calculated from the following:    Height as of 1/16/24: 1.645 m (5' 4.76\").    Weight as of this encounter: 52.6 kg (116 lb). Body surface area is 1.55 meters squared.  No Pain (0) Comment: Data Unavailable   No LMP recorded. Patient is postmenopausal.  Allergies reviewed: Yes  Medications reviewed: Yes    Medications: Medication refills not needed today.  Pharmacy name entered into [facility]:    [facility] - [place], [place] - [address] [facility] [place] [phone]  [facility] [place], [place] - [address]  [id number] [place] [facility] - [place], [place] - [address] [id number] - [place], [place] - [address]    Frailty Screening:   Is the patient here for a new oncology consult visit in cancer care? 2. No      Clinical concerns: None       Michelle Colbert LPN  2/10/2025              "

## 2025-02-10 NOTE — LETTER
2/10/2025      Emelia Weathers  [address]  [facility] [address]      Dear Colleague,    Thank you for referring your patient, Emelia Weathers, to the [facility]. Please see a copy of my visit note below.      I am seeing Emelia Weathers back today in follow-up of locally recurrent pancreatic cancer.    She is 69 years old and had a Whipple more than a decade ago with subsequent biopsy-proven recurrence encasing her SMA in 2014.  She had an excellent response to initial treatment with FOLFIRINOX which has since been reduced to maintenance single agent Xeloda for nearly 10 years now.  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  She follows closely with dermatology and has been on several different agents to try and provide better control of that without much improvement.  Her overall condition his continued to be quite good through all this with ongoing vigorous exercise and a generally good quality of life.  She reports that since our last visit she has had a little more trouble with the toxicity in her feet with enough cracking and peeling that she sometimes has trouble walking and has been getting less exercise than typical as a consequence.    On exam today she is quite cheerful and very thin as always.    She has dry screening and contractures in her hands which do not appear much changed.  On her feet she has significant fissuring in her heels with a lot of desquamation.    I personally reviewed her CT and went over the results with her that continues to show minimal to no evidence of disease.    Her electrolytes and renal function are normal.  Her bilirubin, albumin and liver enzymes are normal.  She has stable normocytic anemia with hemoglobin 9.5 and otherwise unremarkable blood counts.    Assessment/plan: Locally recurrent pancreatic cancer with ongoing disease control  with single agent Xeloda.  We had a long talk again about whether she is willing to consider taking a break from treatment.  It is impossible to know at this point for ongoing control is because we have eradicated her disease or because we have ongoing treatment she understandably still not willing to stop but we did talk about changing her schedule a little bit and she was willing to extend her time off treatment each cycle to 2 weeks so that she will now be on a 2-week on 2-week off cycle and hopefully that will allow some healing of her toxicity in her feet.  We talked also about some more aggressive moisturizing strategies although I think she is probably exhausted the possibilities in that regard.  Will plan on seeing her back for another response assessment in about 4 months.  She will let us know in the interim if she has any new or worsening symptoms.      Again, thank you for allowing me to participate in the care of your patient.      Sincerely,    Yasmany Wade MD    Electronically signed

## 2025-02-11 NOTE — TELEPHONE ENCOUNTER
Dr. Tubbs:    Can you please clarify the Gabapentin taper instructions per patient request?      DENZEL Dick

## 2025-02-12 ENCOUNTER — PATIENT OUTREACH (OUTPATIENT)
Dept: ONCOLOGY | Facility: CLINIC | Age: 70
End: 2025-02-12
Payer: MEDICARE

## 2025-02-12 NOTE — PROGRESS NOTES
[facility]: Cancer Care                                                                                          Chart review completed in follow-up from recent visit with Dr Wade. RNCC will continue to follow.       Cinthia Contreras RN, BSN, OCN   RN Care Coordinator   [facility]

## 2025-02-17 DIAGNOSIS — C25.0 MALIGNANT NEOPLASM OF HEAD OF PANCREAS (H): Primary | ICD-10-CM

## 2025-02-17 RX ORDER — CAPECITABINE 500 MG/1
TABLET, FILM COATED ORAL
Qty: 42 TABLET | Refills: 0 | Status: SHIPPED | OUTPATIENT
Start: 2025-03-04

## 2025-03-07 ENCOUNTER — LAB (OUTPATIENT)
Dept: LAB | Facility: CLINIC | Age: 70
End: 2025-03-07
Attending: INTERNAL MEDICINE
Payer: MEDICARE

## 2025-03-07 DIAGNOSIS — C25.0 MALIGNANT NEOPLASM OF HEAD OF PANCREAS (H): ICD-10-CM

## 2025-03-07 LAB
ALBUMIN SERPL BCG-MCNC: 3.9 G/DL (ref 3.5–5.2)
ALP SERPL-CCNC: 103 U/L (ref 40–150)
ALT SERPL W P-5'-P-CCNC: 18 U/L (ref 0–50)
ANION GAP SERPL CALCULATED.3IONS-SCNC: 8 MMOL/L (ref 7–15)
AST SERPL W P-5'-P-CCNC: 29 U/L (ref 0–45)
BASOPHILS # BLD AUTO: 0 10E3/UL (ref 0–0.2)
BASOPHILS NFR BLD AUTO: 1 %
BILIRUB SERPL-MCNC: 0.3 MG/DL
BUN SERPL-MCNC: 10.7 MG/DL (ref 8–23)
CALCIUM SERPL-MCNC: 8.7 MG/DL (ref 8.8–10.4)
CHLORIDE SERPL-SCNC: 105 MMOL/L (ref 98–107)
CREAT SERPL-MCNC: 0.77 MG/DL (ref 0.51–0.95)
EGFRCR SERPLBLD CKD-EPI 2021: 82 ML/MIN/1.73M2
EOSINOPHIL # BLD AUTO: 0.1 10E3/UL (ref 0–0.7)
EOSINOPHIL NFR BLD AUTO: 1 %
ERYTHROCYTE [DISTWIDTH] IN BLOOD BY AUTOMATED COUNT: 19.1 % (ref 10–15)
GLUCOSE SERPL-MCNC: 137 MG/DL (ref 70–99)
HCO3 SERPL-SCNC: 26 MMOL/L (ref 22–29)
HCT VFR BLD AUTO: 29.8 % (ref 35–47)
HGB BLD-MCNC: 9 G/DL (ref 11.7–15.7)
IMM GRANULOCYTES # BLD: 0 10E3/UL
IMM GRANULOCYTES NFR BLD: 0 %
LYMPHOCYTES # BLD AUTO: 0.9 10E3/UL (ref 0.8–5.3)
LYMPHOCYTES NFR BLD AUTO: 15 %
MCH RBC QN AUTO: 28.9 PG (ref 26.5–33)
MCHC RBC AUTO-ENTMCNC: 30.2 G/DL (ref 31.5–36.5)
MCV RBC AUTO: 96 FL (ref 78–100)
MONOCYTES # BLD AUTO: 0.6 10E3/UL (ref 0–1.3)
MONOCYTES NFR BLD AUTO: 10 %
NEUTROPHILS # BLD AUTO: 4.3 10E3/UL (ref 1.6–8.3)
NEUTROPHILS NFR BLD AUTO: 73 %
NRBC # BLD AUTO: 0 10E3/UL
NRBC BLD AUTO-RTO: 0 /100
PLATELET # BLD AUTO: 208 10E3/UL (ref 150–450)
POTASSIUM SERPL-SCNC: 4.3 MMOL/L (ref 3.4–5.3)
PROT SERPL-MCNC: 6.5 G/DL (ref 6.4–8.3)
RBC # BLD AUTO: 3.11 10E6/UL (ref 3.8–5.2)
SODIUM SERPL-SCNC: 139 MMOL/L (ref 135–145)
WBC # BLD AUTO: 5.9 10E3/UL (ref 4–11)

## 2025-03-07 PROCEDURE — 85004 AUTOMATED DIFF WBC COUNT: CPT

## 2025-03-07 PROCEDURE — 250N000011 HC RX IP 250 OP 636: Performed by: INTERNAL MEDICINE

## 2025-03-07 PROCEDURE — 36591 DRAW BLOOD OFF VENOUS DEVICE: CPT

## 2025-03-07 PROCEDURE — 85014 HEMATOCRIT: CPT

## 2025-03-07 PROCEDURE — 80053 COMPREHEN METABOLIC PANEL: CPT

## 2025-03-07 PROCEDURE — 82040 ASSAY OF SERUM ALBUMIN: CPT

## 2025-03-07 RX ORDER — HEPARIN SODIUM (PORCINE) LOCK FLUSH IV SOLN 100 UNIT/ML 100 UNIT/ML
5 SOLUTION INTRAVENOUS ONCE
Status: COMPLETED | OUTPATIENT
Start: 2025-03-07 | End: 2025-03-07

## 2025-03-07 RX ADMIN — HEPARIN 5 ML: 100 SYRINGE at 14:06

## 2025-03-17 DIAGNOSIS — C25.0 MALIGNANT NEOPLASM OF HEAD OF PANCREAS (H): Primary | ICD-10-CM

## 2025-03-17 RX ORDER — CAPECITABINE 500 MG/1
TABLET, FILM COATED ORAL
Qty: 42 TABLET | Refills: 0 | Status: SHIPPED | OUTPATIENT
Start: 2025-04-01

## 2025-03-22 DIAGNOSIS — M81.6 LOCALIZED OSTEOPOROSIS, UNSPECIFIED PATHOLOGICAL FRACTURE PRESENCE: ICD-10-CM

## 2025-03-25 RX ORDER — ALENDRONATE SODIUM 70 MG/1
70 TABLET ORAL
Qty: 12 TABLET | Refills: 4 | Status: SHIPPED | OUTPATIENT
Start: 2025-03-25

## 2025-03-28 DIAGNOSIS — C25.0 MALIGNANT NEOPLASM OF HEAD OF PANCREAS (H): ICD-10-CM

## 2025-03-31 RX ORDER — PANTOPRAZOLE SODIUM 40 MG/1
40 TABLET, DELAYED RELEASE ORAL
Qty: 90 TABLET | Refills: 11 | Status: SHIPPED | OUTPATIENT
Start: 2025-03-31

## 2025-03-31 NOTE — TELEPHONE ENCOUNTER
Requested Prescriptions   Pending Prescriptions Disp Refills    pantoprazole (PROTONIX) 40 MG EC tablet [Pharmacy Med Name: PANTOPRAZOLE SOD DR 40 MG TAB] 90 tablet 11     Sig: TAKE 1 TABLET BY MOUTH DAILY BEFORE BREAKFAST.       PPI Protocol Failed - 3/31/2025  9:11 AM        Failed - Medication is active on med list and the sig matches. RN to manually verify dose and sig if red X/fail.     If the protocol passes (green check), you do not need to verify med dose and sig.    A prescription matches if they are the same clinical intention.    For Example: once daily and every morning are the same.    The protocol can not identify upper and lower case letters as matching and will fail.     For Example: Take 1 tablet (50 mg) by mouth daily     TAKE 1 TABLET (50 MG) BY MOUTH DAILY    For all fails (red x), verify dose and sig.    If the refill does match what is on file, the RN can still proceed to approve the refill request.       If they do not match, route to the appropriate provider.             Failed - Medication indicated for associated diagnosis     The medication is prescribed for one or more of the following conditions:     Erosive esophagitis    Gastritis   Gastric hypersecretion   Gastric ulcer   Gastroesophageal reflux disease   Helicobacter pylori gastrointestinal tract infection   Ulcer of duodenum   Drug-induced peptic ulcer   Esophageal stricture   Gastrointestinal hemorrhage   Indigestion   Stress ulcer   Zollinger-Muñoz syndrome   Morrison s esophagus   Laryngopharyngeal reflux   Epigastric Pain   Morbid Obesity   Cough   History of Peptic Ulcer   Esophageal Atresia, Stenosis and Fistula   Cystic Fibrosis  Bronchiectasis          Passed - Recent (12 mo) or future (90 days) visit within the authorizing provider's specialty     The patient must have completed an in-person or virtual visit within the past 12 months or has a future visit scheduled within the next 90 days with the authorizing provider s  specialty.  Urgent care and e-visits do not qualify as an office visit for this protocol.          Passed - Patient is age 18 or older        Passed - No active pregnacy on record        Passed - No positive pregnancy test in past 12 months

## 2025-04-07 DIAGNOSIS — C25.0 MALIGNANT NEOPLASM OF HEAD OF PANCREAS (H): Primary | ICD-10-CM

## 2025-04-08 ENCOUNTER — LAB (OUTPATIENT)
Dept: LAB | Facility: CLINIC | Age: 70
End: 2025-04-08
Attending: INTERNAL MEDICINE
Payer: MEDICARE

## 2025-04-08 DIAGNOSIS — C25.0 MALIGNANT NEOPLASM OF HEAD OF PANCREAS (H): ICD-10-CM

## 2025-04-08 LAB
ALBUMIN SERPL BCG-MCNC: 3.7 G/DL (ref 3.5–5.2)
ALP SERPL-CCNC: 115 U/L (ref 40–150)
ALT SERPL W P-5'-P-CCNC: 23 U/L (ref 0–50)
ANION GAP SERPL CALCULATED.3IONS-SCNC: 8 MMOL/L (ref 7–15)
AST SERPL W P-5'-P-CCNC: 42 U/L (ref 0–45)
BASOPHILS # BLD AUTO: 0 10E3/UL (ref 0–0.2)
BASOPHILS NFR BLD AUTO: 1 %
BILIRUB SERPL-MCNC: 0.3 MG/DL
BUN SERPL-MCNC: 6.9 MG/DL (ref 8–23)
CALCIUM SERPL-MCNC: 8.9 MG/DL (ref 8.8–10.4)
CHLORIDE SERPL-SCNC: 105 MMOL/L (ref 98–107)
CREAT SERPL-MCNC: 0.81 MG/DL (ref 0.51–0.95)
EGFRCR SERPLBLD CKD-EPI 2021: 77 ML/MIN/1.73M2
EOSINOPHIL # BLD AUTO: 0.1 10E3/UL (ref 0–0.7)
EOSINOPHIL NFR BLD AUTO: 2 %
ERYTHROCYTE [DISTWIDTH] IN BLOOD BY AUTOMATED COUNT: 18.9 % (ref 10–15)
GLUCOSE SERPL-MCNC: 145 MG/DL (ref 70–99)
HCO3 SERPL-SCNC: 26 MMOL/L (ref 22–29)
HCT VFR BLD AUTO: 27.6 % (ref 35–47)
HGB BLD-MCNC: 8.7 G/DL (ref 11.7–15.7)
IMM GRANULOCYTES # BLD: 0 10E3/UL
IMM GRANULOCYTES NFR BLD: 0 %
LYMPHOCYTES # BLD AUTO: 1.1 10E3/UL (ref 0.8–5.3)
LYMPHOCYTES NFR BLD AUTO: 22 %
MCH RBC QN AUTO: 29.4 PG (ref 26.5–33)
MCHC RBC AUTO-ENTMCNC: 31.5 G/DL (ref 31.5–36.5)
MCV RBC AUTO: 93 FL (ref 78–100)
MONOCYTES # BLD AUTO: 0.5 10E3/UL (ref 0–1.3)
MONOCYTES NFR BLD AUTO: 10 %
NEUTROPHILS # BLD AUTO: 3.1 10E3/UL (ref 1.6–8.3)
NEUTROPHILS NFR BLD AUTO: 66 %
NRBC # BLD AUTO: 0 10E3/UL
NRBC BLD AUTO-RTO: 0 /100
PLATELET # BLD AUTO: 205 10E3/UL (ref 150–450)
POTASSIUM SERPL-SCNC: 4 MMOL/L (ref 3.4–5.3)
PROT SERPL-MCNC: 6.3 G/DL (ref 6.4–8.3)
RBC # BLD AUTO: 2.96 10E6/UL (ref 3.8–5.2)
SODIUM SERPL-SCNC: 139 MMOL/L (ref 135–145)
WBC # BLD AUTO: 4.8 10E3/UL (ref 4–11)

## 2025-04-08 PROCEDURE — 84155 ASSAY OF PROTEIN SERUM: CPT

## 2025-04-08 PROCEDURE — 250N000011 HC RX IP 250 OP 636: Performed by: INTERNAL MEDICINE

## 2025-04-08 PROCEDURE — 85004 AUTOMATED DIFF WBC COUNT: CPT

## 2025-04-08 PROCEDURE — 82374 ASSAY BLOOD CARBON DIOXIDE: CPT

## 2025-04-08 PROCEDURE — 36591 DRAW BLOOD OFF VENOUS DEVICE: CPT

## 2025-04-08 RX ORDER — HEPARIN SODIUM (PORCINE) LOCK FLUSH IV SOLN 100 UNIT/ML 100 UNIT/ML
5 SOLUTION INTRAVENOUS ONCE
Status: COMPLETED | OUTPATIENT
Start: 2025-04-08 | End: 2025-04-08

## 2025-04-08 RX ADMIN — Medication 5 ML: at 14:38

## 2025-04-08 NOTE — NURSING NOTE
"Chief Complaint   Patient presents with    Labs Only    Port Draw     Labs drawn via port by RN.     Port accessed with 20 gauge, 3/4\" power needle by RN, labs collected, line flushed with saline and heparin, then de-accessed.    Tessa Gan RN    "

## 2025-04-10 ENCOUNTER — TELEPHONE (OUTPATIENT)
Dept: DERMATOLOGY | Facility: CLINIC | Age: 70
End: 2025-04-10

## 2025-04-10 ENCOUNTER — OFFICE VISIT (OUTPATIENT)
Dept: DERMATOLOGY | Facility: CLINIC | Age: 70
End: 2025-04-10
Payer: MEDICARE

## 2025-04-10 DIAGNOSIS — D84.9 IMMUNOSUPPRESSION: ICD-10-CM

## 2025-04-10 DIAGNOSIS — Z12.83 SKIN CANCER SCREENING: ICD-10-CM

## 2025-04-10 DIAGNOSIS — L82.1 SEBORRHEIC KERATOSIS: ICD-10-CM

## 2025-04-10 DIAGNOSIS — L40.3 PPP (PALMOPLANTAR PUSTULOSIS): ICD-10-CM

## 2025-04-10 DIAGNOSIS — M34.9 SYSTEMIC SCLEROSIS (H): ICD-10-CM

## 2025-04-10 DIAGNOSIS — C25.0 MALIGNANT NEOPLASM OF HEAD OF PANCREAS (H): ICD-10-CM

## 2025-04-10 DIAGNOSIS — L57.0 ACTINIC KERATOSIS: ICD-10-CM

## 2025-04-10 DIAGNOSIS — L40.0 PLAQUE PSORIASIS: Primary | ICD-10-CM

## 2025-04-10 DIAGNOSIS — Z11.59 ENCOUNTER FOR SCREENING FOR OTHER VIRAL DISEASES: ICD-10-CM

## 2025-04-10 DIAGNOSIS — L81.4 SOLAR LENTIGO: ICD-10-CM

## 2025-04-10 DIAGNOSIS — D18.01 CHERRY ANGIOMA: ICD-10-CM

## 2025-04-10 ASSESSMENT — PAIN SCALES - GENERAL: PAINLEVEL_OUTOF10: NO PAIN (0)

## 2025-04-10 NOTE — TELEPHONE ENCOUNTER
PA Initiation    Medication: COSENTYX UNOREADY 300 MG/2ML SC SOAJ  Insurance Company: [employer] - Phone [phone] Fax [phone]  Pharmacy Filling the Rx: [facility] - [place], [place] - [address]  Filling Pharmacy Phone:    Filling Pharmacy Fax:    Start Date: 4/10/2025    DORYS

## 2025-04-10 NOTE — TELEPHONE ENCOUNTER
Prior Authorization Approval    Medication: COSENTYX UNOREADY 300 MG/2ML SC SOAJ  Authorization Effective Date: 4/10/2025  Authorization Expiration Date: 12/31/2030  Approved Dose/Quantity: Loading dose then 2ml per 28 days  Reference #: [id number]   [employer] Company: [employer] - Phone [phone] Fax [phone]  Expected CoPay: $ 1,979  CoPay Card Available: No    Financial Assistance Needed: Sent message  Which Pharmacy is filling the prescription: [facility] - [place], [place] - [address] [place] [address]  Pharmacy Notified: No  Patient Notified: Yes, Reji    Sending message for FPAP eligibility

## 2025-04-10 NOTE — NURSING NOTE
Dermatology Rooming Note    Emelia Weathers's goals for this visit include:   Chief Complaint   Patient presents with    Derm Problem     Emelia states she has been stable now. She was flaring on her feet in the winter but that has been improvement.      Cortney TOMPKINS CMA

## 2025-04-10 NOTE — PROGRESS NOTES
[facility] Dermatology Note    Encounter Date: Apr 10, 2025    Dermatology Problem List:  Last skin check 4/10/25  1.  BCC, R nasal tip,  s/p shave bx 8/18/23, s/p MMS 10/30/23, Takedown 11/20/23   2.  Actinic Keratosis, ulcerated, L nasal bridge  - s/p shave bx 8/18/23,    - s/p cryotherapy 12/14/2023 x 9-face  3. 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 Pancreatic cancer: on capecitabine ~10 years  - recurrent hand-foot syndrome  - augmented betamethasone ointment  5. Xeroderma  - current tx: Augmented Betamethasone 0.05% ointment BID  - consider for future: Apremailast 30 mg  6. Palmoplantar Pustular Psoriasis  - started augmented betamethasone 12/05/24, secukinumab pending PA  - previous tx: apremilast    ______________________________________    Impression/Plan:    1. 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 No longer on hydroxychloroquine      2. 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 Failed apremiliast  Oncology ok with biologic. Recommended secukinumab and discussed risks/benefits.  - Start secukinumab. MTM referral today.   - continue augmented betamethasone cream   - Labs ordered today: Hep B, Hep C, Quant Gold    3. Actinic keratosis, face x 6  - Cryotherapy performed today. See procedure section.    4. History of NMSC. No clinical evidence recurrence    5. Reassurance provided for benign lesions  not treated today including cherry angiomata, solar lentigines, seborrheic keratoses, and banal-appearing melanocytic nevi.      Procedures:  Cryotherapy procedure note: After verbal consent and discussion of risks and benefits including, but not limited to, dyspigmentation/scar, blister, and pain, 6 AKs was(were) treated with 1-2 mm freeze border for 1-2 cycles with liquid nitrogen. Post cryotherapy instructions were provided.       Follow-up in 4-6 months.       Staff Involved:  Staff and Scribe  Scribe Disclosure:   I, Kelsey Pierre, am serving as a scribe to document services personally performed by William Stoddard MD based on data collection and the provider's statements to me.     Provider Disclosure:   The documentation recorded by the scribe accurately reflects the services I personally performed and the decisions made by me.    William Stoddard MD   of Dermatology  Department of Dermatology  [facility] [employer] [facility] [employer]        CC:   Chief Complaint   Patient presents with    Derm Problem     Emelia states she has been stable now. She was flaring on her feet in the winter but that has been improvement.        History of Present Illness:  Ms. Emelia Weathers is a 69 year old female who presents as a return patient.    Today, patient reports she has been mostly stable. Had a flare on the feet this winter but that has been improving.     Labs:  2013 Hepatitis panel reviewed. 4/2025 CBC, CMP reviewed.    Physical exam:  Vitals: There were no vitals taken for this visit.  GEN: This is a well developed, well-nourished female in no acute distress, in a pleasant mood.    SKIN: Pinzon phototype II  - Full skin, which includes the head/face, both arms, chest, back, abdomen,both legs, genitalia and/or groin buttocks, digits and/or nails, was examined.  - Erythematous scaly macules on the face x 6.  - There are dome shaped bright red papules on the trunk and extremities .   -  Multiple regular brown pigmented macules and papules are identified on the trunk and extremities. .   - Scattered brown macules on sun exposed areas.  - Waxy stuck on papules and plaques on trunk and extremities.   - Sclerodactyly with erythematous hyperkeratotic plaques with fissuring on the palms and soles.   - No other lesions of concern on areas examined.     Past Medical History:   Past Medical History:   Diagnosis Date    Arthritis     Cervical high risk HPV (human papillomavirus) test positive 2008    + HPV 83    Diabetes mellitus (H) 06/27/2013    type 2    Diffuse cystic mastopathy     Disorder of bone and cartilage, unspecified     moderate osteopenia of spine    Endometrial hyperplasia, unspecified 09/14/2005    Gastro-oesophageal reflux disease     Heart murmur     Herpes simplex without mention of complication     History of blood transfusion 2014    Related to chemo    Pancreatic cancer (H)     Papanicolaou smear of cervix with low grade squamous intraepithelial lesion (LGSIL) 11/2006    Colpo negative    Post-menopausal     Rosacea      Past Surgical History:   Procedure Laterality Date    BREAST BIOPSY, RT/LT  1994    Breat Biopsy B9    BREAST SURGERY      Rt breast biopsy in 1994    CHOLECYSTECTOMY  9/20/13    Part of Whipple    ENDOSCOPIC RETROGRADE CHOLANGIOPANCREATOGRAM  6/27/2013    Procedure: ENDOSCOPIC RETROGRADE CHOLANGIOPANCREATOGRAM;  EUS with FNA, biliary sphincterotomy and biliary stent placement. ;  Surgeon: Timoteo Maki MD;  Location: [facility] OR    ENDOSCOPIC RETROGRADE CHOLANGIOPANCREATOGRAM  9/3/2013    Procedure: ENDOSCOPIC RETROGRADE CHOLANGIOPANCREATOGRAM;  Endoscopic retrograde cholangiopancreatogram with dilation of bile duct and bile duct stent placement.;  Surgeon: Devon Rubalcava MD;  Location: [facility] OR    ENDOSCOPIC ULTRASOUND UPPER GASTROINTESTINAL TRACT (GI)  6/27/2013    Procedure: ENDOSCOPIC ULTRASOUND UPPER GASTROINTESTINAL TRACT (GI);  Upper Endoscopy with  Ultrasound, Fine Needle Aspiration, Endoscopic Retrograde Cholangiopancreatogram ;  Surgeon: Devon Rubalcava MD;  Location: [facility] OR    ESOPHAGOSCOPY, GASTROSCOPY, DUODENOSCOPY (EGD), COMBINED N/A 10/8/2014    Procedure: COMBINED ENDOSCOPIC ULTRASOUND, ESOPHAGOSCOPY, GASTROSCOPY, DUODENOSCOPY (EGD), FINE NEEDLE ASPIRATE/BIOPSY;  Surgeon: Carson Paulino MD;  Location:  GI    ESOPHAGOSCOPY, GASTROSCOPY, DUODENOSCOPY (EGD), COMBINED N/A 2/22/2024    Procedure: ESOPHAGOGASTRODUODENOSCOPY, WITH BIOPSY;  Surgeon: Ron Landon MD;  Location:  GI    GYN SURGERY      TONSILLECTOMY & ADENOIDECTOMY      WHIPPLE PROCEDURE  9/20/2013    Procedure: WHIPPLE PROCEDURE;  Open Whipple Procedure, Jujunostomy Feeding Tube Placement, Bile Duct Stent and Pancreatic Stent Placement;  Surgeon: Issa John MD;  [facility]  [facility]    [facility]  [facility]/06    due 10 years       Social History:   reports that she quit smoking about 31 years ago. Her smoking use included cigarettes. She has never used smokeless tobacco. She reports that she does not currently use alcohol. She reports that she does not use drugs.    Family History:  Family History   Problem Relation Age of Onset    Osteoporosis Mother     Gastrointestinal Disease Mother         gallbladder removal    Hypertension Mother     Diabetes Father     Hypertension Father     Cancer Father         oral    Alcohol/Drug Father     Other Cancer Father         Oral cancer    Substance Abuse Father     Diabetes Sister     Alcohol/Drug Brother     Hypertension Brother     Diabetes Brother     Depression Brother     Anxiety Disorder Brother     Mental Illness Brother     Substance Abuse Brother     Cancer - colorectal Maternal Grandfather     Other Cancer Maternal Grandfather     Skin Cancer No family hx of     Melanoma No family hx of        Medications:  Current Outpatient Medications   Medication Sig Dispense Refill    acetaminophen  (TYLENOL) 325 MG tablet Take 500 mg by mouth every 6 hours as needed.      alendronate (FOSAMAX) 70 MG tablet TAKE 1 TABLET BY MOUTH EVERY 7 DAYS 12 tablet 4    augmented betamethasone dipropionate (DIPROLENE AF) 0.05 % external cream Apply topically 2 times daily. 50 g 11    augmented betamethasone dipropionate (DIPROLENE-AF) 0.05 % external ointment Apply topically 2 times daily 50 g 3    CALCIUM CITRATE + D PO Take 1 tablet by mouth daily.      capecitabine (XELODA) 500 MG tablet Take 2 tabs in the AM and 1 tab in the PM, Days 1 through 14, then off for 14 days. Take within 30 mins after meal. 42 tablet 0    capecitabine (XELODA) 500 MG tablet Take 2 tabs in the AM and 1 tab in the PM, Days 1 through 14, then off for 14 days. Take within 30 mins after meal. 42 tablet 0    capecitabine (XELODA) 500 MG tablet Take 2 tabs in the AM and 1 tab in the PM, Days 1 through 14, then off for 7 days. Take within 30 mins after meal. 42 tablet 0    capecitabine (XELODA) 500 MG tablet Take 2 tabs in the AM and 1 tab in the PM, Days 1 through 14, then off for 7 days. Take within 30 mins after meal. 42 tablet 0    capecitabine (XELODA) 500 MG tablet Take 2 tabs in the AM and 1 tab in the PM, Days 1 through 14, then off for 7 days. Take within 30 mins after meal. 42 tablet 0    capecitabine (XELODA) 500 MG tablet Take 2 tabs in the AM and 1 tab in the PM, Days 1 through 14, then off for 7 days. Take within 30 mins after meal. 42 tablet 0    clobetasol (TEMOVATE) 0.05 % external ointment Apply topically 2 times daily 60 g 5    cycloSPORINE (RESTASIS) 0.05 % ophthalmic emulsion 1 drop 2 times daily      fluorouracil (EFUDEX) 5 % external cream Apply topically 2 times daily For 2 weeks 40 g 3    gabapentin (NEURONTIN) 100 MG capsule Take 1 capsule (100 mg) by mouth 3 times daily. - taper up to three times/day 270 capsule 1    IBUPROFEN PO Take by mouth.      lidocaine-prilocaine (EMLA) 2.5-2.5 % external cream APPLY TOPICALLY AS  NEEDED FOR MODERATE PAIN 30 g 3    lipase-protease-amylase (CREON) 13023-02383-98680 units CPEP TAKE THREE CAPSULES BY MOUTH THREE TIMES DAILY WITH MEALS 540 capsule 0    lisinopril (ZESTRIL) 5 MG tablet Take 1 tablet (5 mg) by mouth daily. 90 tablet 1    pantoprazole (PROTONIX) 40 MG EC tablet TAKE 1 TABLET BY MOUTH DAILY BEFORE BREAKFAST. 90 tablet 11    STATIN NOT PRESCRIBED (INTENTIONAL) Please choose reason not prescribed from choices below.      urea (GORMEL) 20 % external cream Apply topically 2 times daily 480 g 11    vitamin D3 (CHOLECALCIFEROL) 50 mcg (2000 units) tablet Take 1 tablet by mouth daily.       No Known Allergies

## 2025-04-10 NOTE — LETTER
4/10/2025       RE: Emelia Weathers  [address]  [facility] [address]     Dear Colleague,    Thank you for referring your patient, Emelia Weathers, to the [facility] DERMATOLOGY CLINIC [place] at [facility]. Please see a copy of my visit note below.    [facility] Dermatology Note    Encounter Date: Apr 10, 2025    Dermatology Problem List:  Last skin check 4/10/25  1.  BCC, R nasal tip,  s/p shave bx 8/18/23, s/p MMS 10/30/23, Takedown 11/20/23   2.  Actinic Keratosis, ulcerated, L nasal bridge  - s/p shave bx 8/18/23,    - s/p cryotherapy 12/14/2023 x 9-face  3. 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 Pancreatic cancer: on capecitabine ~10 years  - recurrent hand-foot syndrome  - augmented betamethasone ointment  5. Xeroderma  - current tx: Augmented Betamethasone 0.05% ointment BID  - consider for future: Apremailast 30 mg  6. Palmoplantar Pustular Psoriasis  - started augmented betamethasone 12/05/24, secukinumab pending PA  - previous tx: apremilast    ______________________________________    Impression/Plan:    1. 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 No longer on hydroxychloroquine      2. 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 Failed apremiliast  Oncology ok with biologic. Recommended secukinumab and discussed risks/benefits.  - Start  secukinumab. [facility] referral today.   - continue augmented betamethasone cream   - Labs ordered today: Hep B, Hep C, Quant Gold    3. Actinic keratosis, face x 6  - Cryotherapy performed today. See procedure section.    4. History of NMSC. No clinical evidence recurrence    5. Reassurance provided for benign lesions not treated today including cherry angiomata, solar lentigines, seborrheic keratoses, and banal-appearing melanocytic nevi.      Procedures:  Cryotherapy procedure note: After verbal consent and discussion of risks and benefits including, but not limited to, dyspigmentation/scar, blister, and pain, 6 AKs was(were) treated with 1-2 mm freeze border for 1-2 cycles with liquid nitrogen. Post cryotherapy instructions were provided.       Follow-up in 4-6 months.       Staff Involved:  Staff and Scribe  Scribe Disclosure:   I, Kelsey Pierre, am serving as a scribe to document services personally performed by William Stoddard MD based on data collection and the provider's statements to me.     Provider Disclosure:   The documentation recorded by the scribe accurately reflects the services I personally performed and the decisions made by me.    William Stoddard MD   of Dermatology  Department of Dermatology  [facility] [employer]        CC:   Chief Complaint   Patient presents with     Derm Problem     Emelia states she has been stable now. She was flaring on her feet in the winter but that has been improvement.        History of Present Illness:  Ms. Emelia Weathers is a 69 year old female who presents as a return patient.    Today, patient reports she has been mostly stable. Had a flare on the feet this winter but that has been improving.     Labs:  2013 Hepatitis panel reviewed. 4/2025 CBC, CMP reviewed.    Physical exam:  Vitals: There were no vitals taken for this visit.  GEN: This is a well developed, well-nourished female in no acute distress, in a pleasant mood.     SKIN: Pinzon phototype II  - Full skin, which includes the head/face, both arms, chest, back, abdomen,both legs, genitalia and/or groin buttocks, digits and/or nails, was examined.  - Erythematous scaly macules on the face x 6.  - There are dome shaped bright red papules on the trunk and extremities .   - Multiple regular brown pigmented macules and papules are identified on the trunk and extremities. .   - Scattered brown macules on sun exposed areas.  - Waxy stuck on papules and plaques on trunk and extremities.   - Sclerodactyly with erythematous hyperkeratotic plaques with fissuring on the palms and soles.   - No other lesions of concern on areas examined.     Past Medical History:   Past Medical History:   Diagnosis Date     Arthritis      Cervical high risk HPV (human papillomavirus) test positive 2008    + HPV 83     Diabetes mellitus (H) 06/27/2013    type 2     Diffuse cystic mastopathy      Disorder of bone and cartilage, unspecified     moderate osteopenia of spine     Endometrial hyperplasia, unspecified 09/14/2005     Gastro-oesophageal reflux disease      Heart murmur      Herpes simplex without mention of complication      History of blood transfusion 2014    Related to chemo     Pancreatic cancer (H)      Papanicolaou smear of cervix with low grade squamous intraepithelial lesion (LGSIL) 11/2006    Colpo negative     Post-menopausal      Rosacea      Past Surgical History:   Procedure Laterality Date     BREAST BIOPSY, RT/LT  1994    Breat Biopsy B9     BREAST SURGERY      Rt breast biopsy in 1994     CHOLECYSTECTOMY  9/20/13    Part of Whipple     ENDOSCOPIC RETROGRADE CHOLANGIOPANCREATOGRAM  6/27/2013    Procedure: ENDOSCOPIC RETROGRADE CHOLANGIOPANCREATOGRAM;  EUS with FNA, biliary sphincterotomy and biliary stent placement. ;  Surgeon: Timoteo Maki MD;  Location:  OR     ENDOSCOPIC RETROGRADE CHOLANGIOPANCREATOGRAM  9/3/2013    Procedure: ENDOSCOPIC RETROGRADE  CHOLANGIOPANCREATOGRAM;  Endoscopic retrograde cholangiopancreatogram with dilation of bile duct and bile duct stent placement.;  Surgeon: Devon Rubalcava MD;  Location: [facility] OR     ENDOSCOPIC ULTRASOUND UPPER GASTROINTESTINAL TRACT (GI)  6/27/2013    Procedure: ENDOSCOPIC ULTRASOUND UPPER GASTROINTESTINAL TRACT (GI);  Upper Endoscopy with Ultrasound, Fine Needle Aspiration, Endoscopic Retrograde Cholangiopancreatogram ;  Surgeon: Devon Rubalcava MD;  Location:  OR     ESOPHAGOSCOPY, GASTROSCOPY, DUODENOSCOPY (EGD), COMBINED N/A 10/8/2014    Procedure: COMBINED ENDOSCOPIC ULTRASOUND, ESOPHAGOSCOPY, GASTROSCOPY, DUODENOSCOPY (EGD), FINE NEEDLE ASPIRATE/BIOPSY;  Surgeon: Carson Paulino MD;  Location:  GI     ESOPHAGOSCOPY, GASTROSCOPY, DUODENOSCOPY (EGD), COMBINED N/A 2/22/2024    Procedure: ESOPHAGOGASTRODUODENOSCOPY, WITH BIOPSY;  Surgeon: Ron Landon MD;  Location:  GI     GYN SURGERY       TONSILLECTOMY & ADENOIDECTOMY       WHIPPLE PROCEDURE  9/20/2013    Procedure: WHIPPLE PROCEDURE;  Open Whipple Procedure, Jujunostomy Feeding Tube Placement, Bile Duct Stent and Pancreatic Stent Placement;  Surgeon: Issa John MD;  Location:  OR     [facility]  07/[facility]    due 10 years       Social History:   reports that she quit smoking about 31 years ago. Her smoking use included cigarettes. She has never used smokeless tobacco. She reports that she does not currently use alcohol. She reports that she does not use drugs.    Family History:  Family History   Problem Relation Age of Onset     Osteoporosis Mother      Gastrointestinal Disease Mother         gallbladder removal     Hypertension Mother      Diabetes Father      Hypertension Father      Cancer Father         oral     Alcohol/Drug Father      Other Cancer Father         Oral cancer     Substance Abuse Father      Diabetes Sister      Alcohol/Drug Brother      Hypertension Brother      Diabetes  Brother      Depression Brother      Anxiety Disorder Brother      Mental Illness Brother      Substance Abuse Brother      Cancer - colorectal Maternal Grandfather      Other Cancer Maternal Grandfather      Skin Cancer No family hx of      Melanoma No family hx of        Medications:  Current Outpatient Medications   Medication Sig Dispense Refill     acetaminophen (TYLENOL) 325 MG tablet Take 500 mg by mouth every 6 hours as needed.       alendronate (FOSAMAX) 70 MG tablet TAKE 1 TABLET BY MOUTH EVERY 7 DAYS 12 tablet 4     augmented betamethasone dipropionate (DIPROLENE AF) 0.05 % external cream Apply topically 2 times daily. 50 g 11     augmented betamethasone dipropionate (DIPROLENE-AF) 0.05 % external ointment Apply topically 2 times daily 50 g 3     CALCIUM CITRATE + D PO Take 1 tablet by mouth daily.       capecitabine (XELODA) 500 MG tablet Take 2 tabs in the AM and 1 tab in the PM, Days 1 through 14, then off for 14 days. Take within 30 mins after meal. 42 tablet 0     capecitabine (XELODA) 500 MG tablet Take 2 tabs in the AM and 1 tab in the PM, Days 1 through 14, then off for 14 days. Take within 30 mins after meal. 42 tablet 0     capecitabine (XELODA) 500 MG tablet Take 2 tabs in the AM and 1 tab in the PM, Days 1 through 14, then off for 7 days. Take within 30 mins after meal. 42 tablet 0     capecitabine (XELODA) 500 MG tablet Take 2 tabs in the AM and 1 tab in the PM, Days 1 through 14, then off for 7 days. Take within 30 mins after meal. 42 tablet 0     capecitabine (XELODA) 500 MG tablet Take 2 tabs in the AM and 1 tab in the PM, Days 1 through 14, then off for 7 days. Take within 30 mins after meal. 42 tablet 0     capecitabine (XELODA) 500 MG tablet Take 2 tabs in the AM and 1 tab in the PM, Days 1 through 14, then off for 7 days. Take within 30 mins after meal. 42 tablet 0     clobetasol (TEMOVATE) 0.05 % external ointment Apply topically 2 times daily 60 g 5     cycloSPORINE (RESTASIS) 0.05 %  ophthalmic emulsion 1 drop 2 times daily       fluorouracil (EFUDEX) 5 % external cream Apply topically 2 times daily For 2 weeks 40 g 3     gabapentin (NEURONTIN) 100 MG capsule Take 1 capsule (100 mg) by mouth 3 times daily. - taper up to three times/day 270 capsule 1     IBUPROFEN PO Take by mouth.       lidocaine-prilocaine (EMLA) 2.5-2.5 % external cream APPLY TOPICALLY AS NEEDED FOR MODERATE PAIN 30 g 3     lipase-protease-amylase (CREON) 76897-92694-65150 units CPEP TAKE THREE CAPSULES BY MOUTH THREE TIMES DAILY WITH MEALS 540 capsule 0     lisinopril (ZESTRIL) 5 MG tablet Take 1 tablet (5 mg) by mouth daily. 90 tablet 1     pantoprazole (PROTONIX) 40 MG EC tablet TAKE 1 TABLET BY MOUTH DAILY BEFORE BREAKFAST. 90 tablet 11     STATIN NOT PRESCRIBED (INTENTIONAL) Please choose reason not prescribed from choices below.       urea (GORMEL) 20 % external cream Apply topically 2 times daily 480 g 11     vitamin D3 (CHOLECALCIFEROL) 50 mcg (2000 units) tablet Take 1 tablet by mouth daily.       No Known Allergies                Again, thank you for allowing me to participate in the care of your patient.      Sincerely,    William Stoddard MD

## 2025-04-14 DIAGNOSIS — C25.0 MALIGNANT NEOPLASM OF HEAD OF PANCREAS (H): Primary | ICD-10-CM

## 2025-04-14 RX ORDER — CAPECITABINE 500 MG/1
TABLET, FILM COATED ORAL
Qty: 42 TABLET | Refills: 0 | Status: SHIPPED | OUTPATIENT
Start: 2025-04-29

## 2025-04-24 ENCOUNTER — TRANSFERRED RECORDS (OUTPATIENT)
Dept: MULTI SPECIALTY CLINIC | Facility: CLINIC | Age: 70
End: 2025-04-24
Payer: MEDICARE

## 2025-04-24 LAB — RETINOPATHY: NORMAL

## 2025-04-28 NOTE — TELEPHONE ENCOUNTER
Sent follow-up [employer] message. She would like to enroll in FPAP but doesn't have the tax documents ready yet.    Thank you,     Jim Dailey [facility]  Pharmacy Clinic Liaison   [facility] [email or web address]   Phone: [phone]  Fax: [phone]

## 2025-04-29 NOTE — TELEPHONE ENCOUNTER
FANI INITIATED    Medication: COSENTYX UNOREADY 300 MG/2ML SC SOAJ  [facility] Name: [facility]  Date submitted: 4/10/2025  3:28 PM   [facility] Phone:    Foundation Fax:          Thank you,     Jim Dailey [facility]  Pharmacy Clinic [facility]  [email or web address]   Phone: [phone]  Fax: [phone]

## 2025-04-30 NOTE — TELEPHONE ENCOUNTER
FANI APPROVED    Medication: COSENTYX UNOREADY 300 MG/2ML SC SOAJ  Amount: $ 116,140.08  [facility] Name: [facility] Effective Date: 4/30/2025  Foundation Expiration Date: 12/31/2025  Additional Information:   Patient Notified: Yes           Thank you,     Jim Dailey [facility]  Pharmacy Clinic [facility]  [email or web address]   Phone: [phone]  Fax: [phone]

## 2025-05-05 ENCOUNTER — MYC MEDICAL ADVICE (OUTPATIENT)
Dept: FAMILY MEDICINE | Facility: CLINIC | Age: 70
End: 2025-05-05

## 2025-05-05 NOTE — LETTER
May 16, 2025      Emelia Weathers  [address]  [facility] [address]        {Comm Man dear:[id number]}              Sincerely,        Aylin Franklin MA    Electronically signed

## 2025-05-05 NOTE — TELEPHONE ENCOUNTER
Patient Quality Outreach    Patient is due for the following:   Diabetes -  Eye Exam and Foot Exam  Hypertension -  BP check    (Follow up with dr regan due August)    Action(s) Taken:   Schedule a nurse only visit for blood pressure check if pt not able to check from home office visit for diabetes/ bp will be due in August.     Type of outreach:    Sent [employer] message.    Records also received disclosing diabetes eye exam, sent to be abstracted and scanned into chart.     Eye exam with ophthalmology on this date: 4/24/25 Exam Location: [facility]    Questions for provider review:    None         Aylin Franklin MA  Chart routed to [employer].

## 2025-05-05 NOTE — LETTER
July 22, 2025      Emelia Weathers  [address]  [facility] [address]        Dear Emelia,       Your care team at [facility] values your health and well-being.   Our records indicate that you may due to follow up with your primary provider.     Please make your next appointment at your earliest convenience.         Thank you,    [facility] Clinic Care Team  [phone]

## 2025-05-06 ENCOUNTER — LAB (OUTPATIENT)
Dept: LAB | Facility: CLINIC | Age: 70
End: 2025-05-06
Attending: INTERNAL MEDICINE
Payer: MEDICARE

## 2025-05-06 DIAGNOSIS — Z11.59 ENCOUNTER FOR SCREENING FOR OTHER VIRAL DISEASES: ICD-10-CM

## 2025-05-06 DIAGNOSIS — D84.9 IMMUNOSUPPRESSION: ICD-10-CM

## 2025-05-06 DIAGNOSIS — C25.0 MALIGNANT NEOPLASM OF HEAD OF PANCREAS (H): ICD-10-CM

## 2025-05-06 LAB
ALBUMIN SERPL BCG-MCNC: 3.8 G/DL (ref 3.5–5.2)
ALP SERPL-CCNC: 124 U/L (ref 40–150)
ALT SERPL W P-5'-P-CCNC: 43 U/L (ref 0–50)
ANION GAP SERPL CALCULATED.3IONS-SCNC: 8 MMOL/L (ref 7–15)
AST SERPL W P-5'-P-CCNC: 62 U/L (ref 0–45)
BASOPHILS # BLD AUTO: 0 10E3/UL (ref 0–0.2)
BASOPHILS NFR BLD AUTO: 1 %
BILIRUB SERPL-MCNC: 0.3 MG/DL
BUN SERPL-MCNC: 11.5 MG/DL (ref 8–23)
CALCIUM SERPL-MCNC: 9.2 MG/DL (ref 8.8–10.4)
CHLORIDE SERPL-SCNC: 104 MMOL/L (ref 98–107)
CREAT SERPL-MCNC: 0.71 MG/DL (ref 0.51–0.95)
EGFRCR SERPLBLD CKD-EPI 2021: 90 ML/MIN/1.73M2
EOSINOPHIL # BLD AUTO: 0.1 10E3/UL (ref 0–0.7)
EOSINOPHIL NFR BLD AUTO: 1 %
ERYTHROCYTE [DISTWIDTH] IN BLOOD BY AUTOMATED COUNT: 19.8 % (ref 10–15)
GLUCOSE SERPL-MCNC: 183 MG/DL (ref 70–99)
HBV CORE AB SERPL QL IA: NONREACTIVE
HBV SURFACE AB SERPL IA-ACNC: 23.5 M[IU]/ML
HBV SURFACE AB SERPL IA-ACNC: REACTIVE M[IU]/ML
HBV SURFACE AG SERPL QL IA: NONREACTIVE
HCO3 SERPL-SCNC: 25 MMOL/L (ref 22–29)
HCT VFR BLD AUTO: 27.5 % (ref 35–47)
HCV AB SERPL QL IA: NONREACTIVE
HGB BLD-MCNC: 8.6 G/DL (ref 11.7–15.7)
IMM GRANULOCYTES # BLD: 0 10E3/UL
IMM GRANULOCYTES NFR BLD: 0 %
LYMPHOCYTES # BLD AUTO: 0.9 10E3/UL (ref 0.8–5.3)
LYMPHOCYTES NFR BLD AUTO: 22 %
MCH RBC QN AUTO: 28.3 PG (ref 26.5–33)
MCHC RBC AUTO-ENTMCNC: 31.3 G/DL (ref 31.5–36.5)
MCV RBC AUTO: 91 FL (ref 78–100)
MONOCYTES # BLD AUTO: 0.4 10E3/UL (ref 0–1.3)
MONOCYTES NFR BLD AUTO: 10 %
NEUTROPHILS # BLD AUTO: 2.8 10E3/UL (ref 1.6–8.3)
NEUTROPHILS NFR BLD AUTO: 67 %
NRBC # BLD AUTO: 0 10E3/UL
NRBC BLD AUTO-RTO: 0 /100
PLATELET # BLD AUTO: 184 10E3/UL (ref 150–450)
POTASSIUM SERPL-SCNC: 4.3 MMOL/L (ref 3.4–5.3)
PROT SERPL-MCNC: 6.4 G/DL (ref 6.4–8.3)
RBC # BLD AUTO: 3.04 10E6/UL (ref 3.8–5.2)
SODIUM SERPL-SCNC: 137 MMOL/L (ref 135–145)
WBC # BLD AUTO: 4.2 10E3/UL (ref 4–11)

## 2025-05-06 PROCEDURE — 86481 TB AG RESPONSE T-CELL SUSP: CPT

## 2025-05-06 PROCEDURE — 85025 COMPLETE CBC W/AUTO DIFF WBC: CPT

## 2025-05-06 PROCEDURE — 87340 HEPATITIS B SURFACE AG IA: CPT

## 2025-05-06 PROCEDURE — 82310 ASSAY OF CALCIUM: CPT

## 2025-05-06 PROCEDURE — 250N000011 HC RX IP 250 OP 636: Performed by: INTERNAL MEDICINE

## 2025-05-06 PROCEDURE — 36591 DRAW BLOOD OFF VENOUS DEVICE: CPT

## 2025-05-06 PROCEDURE — 86803 HEPATITIS C AB TEST: CPT

## 2025-05-06 PROCEDURE — 86704 HEP B CORE ANTIBODY TOTAL: CPT

## 2025-05-06 PROCEDURE — 86706 HEP B SURFACE ANTIBODY: CPT

## 2025-05-06 RX ORDER — HEPARIN SODIUM (PORCINE) LOCK FLUSH IV SOLN 100 UNIT/ML 100 UNIT/ML
5 SOLUTION INTRAVENOUS ONCE
Status: COMPLETED | OUTPATIENT
Start: 2025-05-06 | End: 2025-05-06

## 2025-05-06 RX ADMIN — HEPARIN 3 ML: 100 SYRINGE at 13:37

## 2025-05-06 NOTE — NURSING NOTE
Chief Complaint   Patient presents with    Port Draw     Labs drawn via port     Port accessed with 20g flat needle by RN, labs collected, line flushed with saline and heparin.  Vitals taken. Pt checked in for appointment(s).     Nazia TOMPKINS RN PHN BSN  BMT/Oncology Lab

## 2025-05-07 LAB
GAMMA INTERFERON BACKGROUND BLD IA-ACNC: 0.09 IU/ML
M TB IFN-G BLD-IMP: NEGATIVE
M TB IFN-G CD4+ BCKGRND COR BLD-ACNC: 5.82 IU/ML
MITOGEN IGNF BCKGRD COR BLD-ACNC: -0.01 IU/ML
MITOGEN IGNF BCKGRD COR BLD-ACNC: 0.04 IU/ML
QUANTIFERON MITOGEN: 5.91 IU/ML
QUANTIFERON NIL TUBE: 0.09 IU/ML
QUANTIFERON TB1 TUBE: 0.08 IU/ML
QUANTIFERON TB2 TUBE: 0.13

## 2025-05-09 ENCOUNTER — RESULTS FOLLOW-UP (OUTPATIENT)
Dept: DERMATOLOGY | Facility: CLINIC | Age: 70
End: 2025-05-09

## 2025-05-09 NOTE — TELEPHONE ENCOUNTER
Impression/Plan: 4/10/2025     1. 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 No longer on hydroxychloroquine       2. 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 Failed apremiliast  Oncology ok with biologic. Recommended secukinumab and discussed risks/benefits.  - Start secukinumab. [facility] referral today.   - continue augmented betamethasone cream   - Labs ordered today: Hep B, Hep C, Quant Gold     3. Actinic keratosis, face x 6  - Cryotherapy performed today. See procedure section.     4. History of NMSC. No clinical evidence recurrence     5. Reassurance provided for benign lesions not treated today including cherry angiomata, solar lentigines, seborrheic keratoses, and banal-appearing melanocytic nevi.        Procedures:  Cryotherapy procedure note: After verbal consent and discussion of risks and benefits including, but not limited to, dyspigmentation/scar, blister, and pain, 6 AKs was(were) treated with 1-2 mm freeze border for 1-2 cycles with liquid nitrogen. Post cryotherapy instructions were provided.         Follow-up in 4-6 months.

## 2025-05-16 NOTE — TELEPHONE ENCOUNTER
Patient Quality Outreach    Patient is due for the following:   Diabetes -  Foot Exam  Hypertension -  BP check    Action(s) Taken:   Schedule a nurse only visit for bp check - foot exam can be done at next diabetic visit  Patient read [employer] message but no response.   ( Last read by Emelia Weathers at 9:20AM on 5/9/2025. )      Type of outreach:    None at this time-  will postpone until end of July.  Pt due for follow up visit (August)    Questions for provider review:    None         Aylin Franklin MA  Chart routed to None.

## 2025-05-21 ENCOUNTER — MYC MEDICAL ADVICE (OUTPATIENT)
Dept: FAMILY MEDICINE | Facility: CLINIC | Age: 70
End: 2025-05-21
Payer: MEDICARE

## 2025-05-21 DIAGNOSIS — R73.03 PRE-DIABETES: Primary | ICD-10-CM

## 2025-06-04 ENCOUNTER — ANCILLARY PROCEDURE (OUTPATIENT)
Dept: MAMMOGRAPHY | Facility: CLINIC | Age: 70
End: 2025-06-04
Attending: FAMILY MEDICINE
Payer: MEDICARE

## 2025-06-04 DIAGNOSIS — C25.0 MALIGNANT NEOPLASM OF HEAD OF PANCREAS (H): Primary | ICD-10-CM

## 2025-06-04 DIAGNOSIS — Z12.31 VISIT FOR SCREENING MAMMOGRAM: ICD-10-CM

## 2025-06-04 PROCEDURE — 77063 BREAST TOMOSYNTHESIS BI: CPT | Performed by: RADIOLOGY

## 2025-06-04 PROCEDURE — 77067 SCR MAMMO BI INCL CAD: CPT | Performed by: RADIOLOGY

## 2025-06-04 RX ORDER — CAPECITABINE 500 MG/1
TABLET, FILM COATED ORAL
Qty: 42 TABLET | Refills: 0 | Status: SHIPPED | OUTPATIENT
Start: 2025-06-04

## 2025-06-06 ENCOUNTER — LAB (OUTPATIENT)
Dept: LAB | Facility: CLINIC | Age: 70
End: 2025-06-06
Attending: INTERNAL MEDICINE
Payer: MEDICARE

## 2025-06-06 ENCOUNTER — ANCILLARY PROCEDURE (OUTPATIENT)
Dept: CT IMAGING | Facility: CLINIC | Age: 70
End: 2025-06-06
Attending: INTERNAL MEDICINE
Payer: MEDICARE

## 2025-06-06 DIAGNOSIS — M34.9 SCLERODERMA (H): ICD-10-CM

## 2025-06-06 DIAGNOSIS — C25.0 MALIGNANT NEOPLASM OF HEAD OF PANCREAS (H): ICD-10-CM

## 2025-06-06 DIAGNOSIS — R73.03 PRE-DIABETES: ICD-10-CM

## 2025-06-06 LAB
ALBUMIN SERPL BCG-MCNC: 4 G/DL (ref 3.5–5.2)
ALP SERPL-CCNC: 106 U/L (ref 40–150)
ALT SERPL W P-5'-P-CCNC: 17 U/L (ref 0–50)
ANION GAP SERPL CALCULATED.3IONS-SCNC: 11 MMOL/L (ref 7–15)
AST SERPL W P-5'-P-CCNC: 28 U/L (ref 0–45)
BASOPHILS # BLD AUTO: 0 10E3/UL (ref 0–0.2)
BASOPHILS NFR BLD AUTO: 1 %
BILIRUB SERPL-MCNC: 0.5 MG/DL
BUN SERPL-MCNC: 10.7 MG/DL (ref 8–23)
CALCIUM SERPL-MCNC: 8.9 MG/DL (ref 8.8–10.4)
CHLORIDE SERPL-SCNC: 102 MMOL/L (ref 98–107)
CREAT SERPL-MCNC: 0.64 MG/DL (ref 0.51–0.95)
EGFRCR SERPLBLD CKD-EPI 2021: >90 ML/MIN/1.73M2
EOSINOPHIL # BLD AUTO: 0 10E3/UL (ref 0–0.7)
EOSINOPHIL NFR BLD AUTO: 1 %
ERYTHROCYTE [DISTWIDTH] IN BLOOD BY AUTOMATED COUNT: 20.7 % (ref 10–15)
EST. AVERAGE GLUCOSE BLD GHB EST-MCNC: 137 MG/DL
GLUCOSE SERPL-MCNC: 103 MG/DL (ref 70–99)
HBA1C MFR BLD: 6.4 %
HCO3 SERPL-SCNC: 23 MMOL/L (ref 22–29)
HCT VFR BLD AUTO: 29.5 % (ref 35–47)
HGB BLD-MCNC: 9.3 G/DL (ref 11.7–15.7)
IMM GRANULOCYTES # BLD: 0 10E3/UL
IMM GRANULOCYTES NFR BLD: 1 %
LYMPHOCYTES # BLD AUTO: 0.9 10E3/UL (ref 0.8–5.3)
LYMPHOCYTES NFR BLD AUTO: 21 %
MCH RBC QN AUTO: 27.9 PG (ref 26.5–33)
MCHC RBC AUTO-ENTMCNC: 31.5 G/DL (ref 31.5–36.5)
MCV RBC AUTO: 89 FL (ref 78–100)
MONOCYTES # BLD AUTO: 0.5 10E3/UL (ref 0–1.3)
MONOCYTES NFR BLD AUTO: 11 %
NEUTROPHILS # BLD AUTO: 2.7 10E3/UL (ref 1.6–8.3)
NEUTROPHILS NFR BLD AUTO: 66 %
NRBC # BLD AUTO: 0 10E3/UL
NRBC BLD AUTO-RTO: 0 /100
PLATELET # BLD AUTO: 201 10E3/UL (ref 150–450)
POTASSIUM SERPL-SCNC: 4.1 MMOL/L (ref 3.4–5.3)
PROT SERPL-MCNC: 6.8 G/DL (ref 6.4–8.3)
RBC # BLD AUTO: 3.33 10E6/UL (ref 3.8–5.2)
SODIUM SERPL-SCNC: 136 MMOL/L (ref 135–145)
WBC # BLD AUTO: 4.1 10E3/UL (ref 4–11)

## 2025-06-06 PROCEDURE — 36591 DRAW BLOOD OFF VENOUS DEVICE: CPT

## 2025-06-06 PROCEDURE — 85025 COMPLETE CBC W/AUTO DIFF WBC: CPT

## 2025-06-06 PROCEDURE — 74177 CT ABD & PELVIS W/CONTRAST: CPT | Performed by: RADIOLOGY

## 2025-06-06 PROCEDURE — 71260 CT THORAX DX C+: CPT | Performed by: RADIOLOGY

## 2025-06-06 PROCEDURE — 82435 ASSAY OF BLOOD CHLORIDE: CPT

## 2025-06-06 PROCEDURE — 250N000011 HC RX IP 250 OP 636: Performed by: INTERNAL MEDICINE

## 2025-06-06 PROCEDURE — 83036 HEMOGLOBIN GLYCOSYLATED A1C: CPT

## 2025-06-06 RX ORDER — HEPARIN SODIUM (PORCINE) LOCK FLUSH IV SOLN 100 UNIT/ML 100 UNIT/ML
500 SOLUTION INTRAVENOUS ONCE
Status: COMPLETED | OUTPATIENT
Start: 2025-06-06 | End: 2025-06-06

## 2025-06-06 RX ORDER — HEPARIN SODIUM (PORCINE) LOCK FLUSH IV SOLN 100 UNIT/ML 100 UNIT/ML
5 SOLUTION INTRAVENOUS
Status: COMPLETED | OUTPATIENT
Start: 2025-06-06 | End: 2025-06-06

## 2025-06-06 RX ORDER — IOPAMIDOL 755 MG/ML
70 INJECTION, SOLUTION INTRAVASCULAR ONCE
Status: COMPLETED | OUTPATIENT
Start: 2025-06-06 | End: 2025-06-06

## 2025-06-06 RX ADMIN — IOPAMIDOL 70 ML: 755 INJECTION, SOLUTION INTRAVASCULAR at 09:46

## 2025-06-06 RX ADMIN — HEPARIN SODIUM (PORCINE) LOCK FLUSH IV SOLN 100 UNIT/ML 500 UNITS: 100 SOLUTION at 09:54

## 2025-06-06 RX ADMIN — Medication 5 ML: at 09:04

## 2025-06-06 NOTE — DISCHARGE INSTRUCTIONS

## 2025-06-06 NOTE — NURSING NOTE
"Chief Complaint   Patient presents with    Lab Only     Labs drawn from port by rn.     Port accessed with 20 gauge 3/4\" Power needle and labs drawn by rn.  Port flushed with NS and heparin.  Pt tolerated well.     Quin Perdue RN        "

## 2025-06-07 ENCOUNTER — RESULTS FOLLOW-UP (OUTPATIENT)
Dept: FAMILY MEDICINE | Facility: CLINIC | Age: 70
End: 2025-06-07

## 2025-06-09 ENCOUNTER — ONCOLOGY VISIT (OUTPATIENT)
Dept: ONCOLOGY | Facility: CLINIC | Age: 70
End: 2025-06-09
Attending: INTERNAL MEDICINE
Payer: MEDICARE

## 2025-06-09 VITALS
HEIGHT: 66 IN | OXYGEN SATURATION: 98 % | BODY MASS INDEX: 18.64 KG/M2 | RESPIRATION RATE: 16 BRPM | DIASTOLIC BLOOD PRESSURE: 72 MMHG | SYSTOLIC BLOOD PRESSURE: 112 MMHG | WEIGHT: 116 LBS | HEART RATE: 78 BPM | TEMPERATURE: 98.4 F

## 2025-06-09 DIAGNOSIS — C25.0 MALIGNANT NEOPLASM OF HEAD OF PANCREAS (H): Primary | ICD-10-CM

## 2025-06-09 DIAGNOSIS — M34.9 SCLERODERMA (H): ICD-10-CM

## 2025-06-09 PROCEDURE — G0463 HOSPITAL OUTPT CLINIC VISIT: HCPCS | Performed by: INTERNAL MEDICINE

## 2025-06-09 ASSESSMENT — PAIN SCALES - GENERAL: PAINLEVEL_OUTOF10: NO PAIN (0)

## 2025-06-09 NOTE — LETTER
6/9/2025         RE: Emelia Weathers  [address]  [facility] [address]      Emelia Weathers returns today in follow-up of recurrent pancreatic cancer.    She is 69 and had a Whipple more than a decade ago with subsequent biopsy-proven recurrence encasing her SMA in 2014.  She had an excellent response to initial treatment FOLFIRINOX which is since been reduced to maintenance single agent Xeloda for nearly 10 years now.  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  She follows closely with dermatology and has been on several different agents to try and provide better control of that without much improvement.  In her last visit she agreed to stretch out her treatment cycle so she has 2 weeks on and 2 weeks off because of some worsening of the hand-foot toxicity.  She returns today for ongoing assessment of her disease status.  She tells me her overall condition is stable.  She has an excellent quality of life with regular vigorous exercise.  She is excited that her daughter is now pregnant with what will be her second grandchild.    On exam she appears well though quite slender as always.  She has dry desquamation on her hands with significant contractures which do not appear much changed.    I personally viewed her CT scan and went over the results with her.  That continues to show minimal to no evidence of disease.    Her electrolytes and renal function are normal.  Her bilirubin, albumin and liver enzymes are normal.  She has stable mild normocytic anemia with hemoglobin 9.3 and otherwise unremarkable blood counts.    Assessment/plan: Locally recurrent pancreatic cancer with ongoing disease control now for more than a decade on single agent Xeloda.  She is not willing to stop therapy and finds level of toxicity acceptable.  She continues on Creon without significant symptoms of malabsorption.  She will  continue with management of her scleroderma-like syndrome through dermatology on secukinumab.    We will plan on seeing her back in another 4 to 5 months for ongoing assessment of her disease status and continuing her current capecitabine.  She will let us know if she develops any new or worsening symptoms in the interim.      Yasmany Wade MD

## 2025-06-09 NOTE — LETTER
6/9/2025      Emelia Weathers  [address]  [facility] [address]      Dear Colleague,    Thank you for referring your patient, Emelia Weathers, to the [facility] CANCER CLINIC. Please see a copy of my visit note below.    Emelia Weathers returns today in follow-up of recurrent pancreatic cancer.    She is 69 and had a Whipple more than a decade ago with subsequent biopsy-proven recurrence encasing her SMA in 2014.  She had an excellent response to initial treatment FOLFIRINOX which is since been reduced to maintenance single agent Xeloda for nearly 10 years now.  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  She follows closely with dermatology and has been on several different agents to try and provide better control of that without much improvement.  In her last visit she agreed to stretch out her treatment cycle so she has 2 weeks on and 2 weeks off because of some worsening of the hand-foot toxicity.  She returns today for ongoing assessment of her disease status.  She tells me her overall condition is stable.  She has an excellent quality of life with regular vigorous exercise.  She is excited that her daughter is now pregnant with what will be her second grandchild.    On exam she appears well though quite slender as always.  She has dry desquamation on her hands with significant contractures which do not appear much changed.    I personally viewed her CT scan and went over the results with her.  That continues to show minimal to no evidence of disease.    Her electrolytes and renal function are normal.  Her bilirubin, albumin and liver enzymes are normal.  She has stable mild normocytic anemia with hemoglobin 9.3 and otherwise unremarkable blood counts.    Assessment/plan: Locally recurrent pancreatic cancer with ongoing disease control now for more than a decade on single agent Xeloda.   She is not willing to stop therapy and finds level of toxicity acceptable.  She continues on Creon without significant symptoms of malabsorption.  She will continue with management of her scleroderma-like syndrome through dermatology on secukinumab.    We will plan on seeing her back in another 4 to 5 months for ongoing assessment of her disease status and continuing her current capecitabine.  She will let us know if she develops any new or worsening symptoms in the interim.    Again, thank you for allowing me to participate in the care of your patient.        Sincerely,        Yasmany Wade MD    Electronically signed

## 2025-06-09 NOTE — NURSING NOTE
"Oncology Rooming Note    June 9, 2025 4:59 PM   Emelia Weathers is a 69 year old female who presents for:    Chief Complaint   Patient presents with    Oncology Clinic Visit     Pancreatic Cancer      Initial Vitals: /72   Pulse 78   Temp 98.4  F (36.9  C) (Tympanic)   Resp 16   Ht 1.676 m (5' 6\")   Wt 52.6 kg (116 lb)   SpO2 98%   BMI 18.72 kg/m   Estimated body mass index is 18.72 kg/m  as calculated from the following:    Height as of this encounter: 1.676 m (5' 6\").    Weight as of this encounter: 52.6 kg (116 lb). Body surface area is 1.57 meters squared.  No Pain (0) Comment: Data Unavailable   No LMP recorded. Patient is postmenopausal.  Allergies reviewed: Yes  Medications reviewed: Yes    Medications: Medication refills not needed today.  Pharmacy name entered into EPIC:    [facility] - [place], [place] - [address] [facility] - [place], [place] - [phone]  [facility] [place] [address]  [id number] - [place], [place] - [address] [id number] - [place], [place] - [address]    Frailty Screening:   Is the patient here for a new oncology consult visit in cancer care? 2. No    PHQ9:  Did this patient require a PHQ9?: No      Clinical concerns: none      Jimmy Lobo LPN            "

## 2025-06-11 NOTE — PROGRESS NOTES
Emelia Weathers returns today in follow-up of recurrent pancreatic cancer.    She is 69 and had a Whipple more than a decade ago with subsequent biopsy-proven recurrence encasing her SMA in 2014.  She had an excellent response to initial treatment FOLFIRINOX which is since been reduced to maintenance single agent Xeloda for nearly 10 years now.  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  She follows closely with dermatology and has been on several different agents to try and provide better control of that without much improvement.  In her last visit she agreed to stretch out her treatment cycle so she has 2 weeks on and 2 weeks off because of some worsening of the hand-foot toxicity.  She returns today for ongoing assessment of her disease status.  She tells me her overall condition is stable.  She has an excellent quality of life with regular vigorous exercise.  She is excited that her daughter is now pregnant with what will be her second grandchild.    On exam she appears well though quite slender as always.  She has dry desquamation on her hands with significant contractures which do not appear much changed.    I personally viewed her CT scan and went over the results with her.  That continues to show minimal to no evidence of disease.    Her electrolytes and renal function are normal.  Her bilirubin, albumin and liver enzymes are normal.  She has stable mild normocytic anemia with hemoglobin 9.3 and otherwise unremarkable blood counts.    Assessment/plan: Locally recurrent pancreatic cancer with ongoing disease control now for more than a decade on single agent Xeloda.  She is not willing to stop therapy and finds level of toxicity acceptable.  She continues on Creon without significant symptoms of malabsorption.  She will continue with management of her scleroderma-like syndrome through dermatology on  secukinumab.    We will plan on seeing her back in another 4 to 5 months for ongoing assessment of her disease status and continuing her current capecitabine.  She will let us know if she develops any new or worsening symptoms in the interim.

## 2025-07-01 DIAGNOSIS — C25.0 MALIGNANT NEOPLASM OF HEAD OF PANCREAS (H): Primary | ICD-10-CM

## 2025-07-01 RX ORDER — CAPECITABINE 500 MG/1
TABLET, FILM COATED ORAL
Qty: 42 TABLET | Refills: 0 | Status: SHIPPED | OUTPATIENT
Start: 2025-07-02

## 2025-07-09 ENCOUNTER — LAB (OUTPATIENT)
Dept: LAB | Facility: CLINIC | Age: 70
End: 2025-07-09
Attending: FAMILY MEDICINE
Payer: MEDICARE

## 2025-07-09 DIAGNOSIS — C25.0 MALIGNANT NEOPLASM OF HEAD OF PANCREAS (H): ICD-10-CM

## 2025-07-09 LAB
ALBUMIN SERPL BCG-MCNC: 4 G/DL (ref 3.5–5.2)
ALP SERPL-CCNC: 79 U/L (ref 40–150)
ALT SERPL W P-5'-P-CCNC: 14 U/L (ref 0–50)
ANION GAP SERPL CALCULATED.3IONS-SCNC: 10 MMOL/L (ref 7–15)
AST SERPL W P-5'-P-CCNC: 25 U/L (ref 0–45)
BASOPHILS # BLD AUTO: 0 10E3/UL (ref 0–0.2)
BASOPHILS NFR BLD AUTO: 0 %
BILIRUB SERPL-MCNC: 0.4 MG/DL
BUN SERPL-MCNC: 14.3 MG/DL (ref 8–23)
CALCIUM SERPL-MCNC: 9.2 MG/DL (ref 8.8–10.4)
CHLORIDE SERPL-SCNC: 102 MMOL/L (ref 98–107)
CREAT SERPL-MCNC: 0.72 MG/DL (ref 0.51–0.95)
EGFRCR SERPLBLD CKD-EPI 2021: 89 ML/MIN/1.73M2
EOSINOPHIL # BLD AUTO: 0.1 10E3/UL (ref 0–0.7)
EOSINOPHIL NFR BLD AUTO: 2 %
ERYTHROCYTE [DISTWIDTH] IN BLOOD BY AUTOMATED COUNT: 22.6 % (ref 10–15)
GLUCOSE SERPL-MCNC: 106 MG/DL (ref 70–99)
HCO3 SERPL-SCNC: 25 MMOL/L (ref 22–29)
HCT VFR BLD AUTO: 28.6 % (ref 35–47)
HGB BLD-MCNC: 9.1 G/DL (ref 11.7–15.7)
IMM GRANULOCYTES # BLD: 0 10E3/UL
IMM GRANULOCYTES NFR BLD: 0 %
LYMPHOCYTES # BLD AUTO: 1.1 10E3/UL (ref 0.8–5.3)
LYMPHOCYTES NFR BLD AUTO: 21 %
MCH RBC QN AUTO: 28.4 PG (ref 26.5–33)
MCHC RBC AUTO-ENTMCNC: 31.8 G/DL (ref 31.5–36.5)
MCV RBC AUTO: 89 FL (ref 78–100)
MONOCYTES # BLD AUTO: 0.5 10E3/UL (ref 0–1.3)
MONOCYTES NFR BLD AUTO: 10 %
NEUTROPHILS # BLD AUTO: 3.7 10E3/UL (ref 1.6–8.3)
NEUTROPHILS NFR BLD AUTO: 68 %
NRBC # BLD AUTO: 0 10E3/UL
NRBC BLD AUTO-RTO: 0 /100
PLATELET # BLD AUTO: 246 10E3/UL (ref 150–450)
POTASSIUM SERPL-SCNC: 4.5 MMOL/L (ref 3.4–5.3)
PROT SERPL-MCNC: 6.7 G/DL (ref 6.4–8.3)
RBC # BLD AUTO: 3.2 10E6/UL (ref 3.8–5.2)
SODIUM SERPL-SCNC: 137 MMOL/L (ref 135–145)
WBC # BLD AUTO: 5.5 10E3/UL (ref 4–11)

## 2025-07-09 PROCEDURE — 85004 AUTOMATED DIFF WBC COUNT: CPT

## 2025-07-09 PROCEDURE — 82310 ASSAY OF CALCIUM: CPT

## 2025-07-09 PROCEDURE — 250N000011 HC RX IP 250 OP 636: Performed by: FAMILY MEDICINE

## 2025-07-09 PROCEDURE — 36415 COLL VENOUS BLD VENIPUNCTURE: CPT

## 2025-07-09 RX ORDER — HEPARIN SODIUM (PORCINE) LOCK FLUSH IV SOLN 100 UNIT/ML 100 UNIT/ML
5 SOLUTION INTRAVENOUS ONCE
Status: COMPLETED | OUTPATIENT
Start: 2025-07-09 | End: 2025-07-09

## 2025-07-09 RX ADMIN — Medication 5 ML: at 13:41

## 2025-07-09 NOTE — NURSING NOTE
Chief Complaint   Patient presents with    Port Draw     Labs drawn via port by RN. Flushed with saline and heparin. De-accessed     Liz Barrios RN

## 2025-07-14 DIAGNOSIS — K13.79 MOUTH SORES: Primary | ICD-10-CM

## 2025-07-14 DIAGNOSIS — E11.40 TYPE 2 DIABETES MELLITUS WITH DIABETIC NEUROPATHY, WITHOUT LONG-TERM CURRENT USE OF INSULIN (H): ICD-10-CM

## 2025-07-16 RX ORDER — LISINOPRIL 5 MG/1
5 TABLET ORAL DAILY
Qty: 90 TABLET | Refills: 1 | Status: SHIPPED | OUTPATIENT
Start: 2025-07-16

## 2025-07-22 NOTE — TELEPHONE ENCOUNTER
Patient Quality Outreach    Patient is due for the following:   Diabetes -  Diabetic Follow-Up Visit    Action(s) Taken:   Schedule a office visit for diabetes    Type of outreach:    Pt disregard previous [employer] message so will send letter.     Questions for provider review:    None         Aylin Franklin MA  Chart routed to None.

## 2025-07-28 DIAGNOSIS — C25.0 MALIGNANT NEOPLASM OF HEAD OF PANCREAS (H): Primary | ICD-10-CM

## 2025-07-28 RX ORDER — CAPECITABINE 500 MG/1
TABLET, FILM COATED ORAL
Qty: 42 TABLET | Refills: 0 | Status: SHIPPED | OUTPATIENT
Start: 2025-07-30

## 2025-08-05 ENCOUNTER — LAB (OUTPATIENT)
Dept: LAB | Facility: CLINIC | Age: 70
End: 2025-08-05
Attending: INTERNAL MEDICINE
Payer: MEDICARE

## 2025-08-05 DIAGNOSIS — C25.0 MALIGNANT NEOPLASM OF HEAD OF PANCREAS (H): ICD-10-CM

## 2025-08-05 LAB
ALBUMIN SERPL BCG-MCNC: 4 G/DL (ref 3.5–5.2)
ALP SERPL-CCNC: 78 U/L (ref 40–150)
ALT SERPL W P-5'-P-CCNC: 12 U/L (ref 0–50)
ANION GAP SERPL CALCULATED.3IONS-SCNC: 11 MMOL/L (ref 7–15)
AST SERPL W P-5'-P-CCNC: 27 U/L (ref 0–45)
BASOPHILS # BLD AUTO: 0 10E3/UL (ref 0–0.2)
BASOPHILS NFR BLD AUTO: 0 %
BILIRUB SERPL-MCNC: 0.5 MG/DL
BUN SERPL-MCNC: 17.8 MG/DL (ref 8–23)
CALCIUM SERPL-MCNC: 9.1 MG/DL (ref 8.8–10.4)
CHLORIDE SERPL-SCNC: 99 MMOL/L (ref 98–107)
CREAT SERPL-MCNC: 0.82 MG/DL (ref 0.51–0.95)
EGFRCR SERPLBLD CKD-EPI 2021: 76 ML/MIN/1.73M2
EOSINOPHIL # BLD AUTO: 0 10E3/UL (ref 0–0.7)
EOSINOPHIL NFR BLD AUTO: 1 %
ERYTHROCYTE [DISTWIDTH] IN BLOOD BY AUTOMATED COUNT: 22.7 % (ref 10–15)
GLUCOSE SERPL-MCNC: 119 MG/DL (ref 70–99)
HCO3 SERPL-SCNC: 23 MMOL/L (ref 22–29)
HCT VFR BLD AUTO: 29.1 % (ref 35–47)
HGB BLD-MCNC: 9.1 G/DL (ref 11.7–15.7)
IMM GRANULOCYTES # BLD: 0 10E3/UL
IMM GRANULOCYTES NFR BLD: 0 %
LYMPHOCYTES # BLD AUTO: 1 10E3/UL (ref 0.8–5.3)
LYMPHOCYTES NFR BLD AUTO: 18 %
MCH RBC QN AUTO: 28.3 PG (ref 26.5–33)
MCHC RBC AUTO-ENTMCNC: 31.3 G/DL (ref 31.5–36.5)
MCV RBC AUTO: 90 FL (ref 78–100)
MONOCYTES # BLD AUTO: 0.6 10E3/UL (ref 0–1.3)
MONOCYTES NFR BLD AUTO: 11 %
NEUTROPHILS # BLD AUTO: 4.1 10E3/UL (ref 1.6–8.3)
NEUTROPHILS NFR BLD AUTO: 70 %
NRBC # BLD AUTO: 0 10E3/UL
NRBC BLD AUTO-RTO: 0 /100
PLATELET # BLD AUTO: 238 10E3/UL (ref 150–450)
POTASSIUM SERPL-SCNC: 4.5 MMOL/L (ref 3.4–5.3)
PROT SERPL-MCNC: 6.8 G/DL (ref 6.4–8.3)
RBC # BLD AUTO: 3.22 10E6/UL (ref 3.8–5.2)
SODIUM SERPL-SCNC: 133 MMOL/L (ref 135–145)
WBC # BLD AUTO: 5.8 10E3/UL (ref 4–11)

## 2025-08-05 PROCEDURE — 80051 ELECTROLYTE PANEL: CPT

## 2025-08-05 PROCEDURE — 250N000011 HC RX IP 250 OP 636: Performed by: INTERNAL MEDICINE

## 2025-08-05 PROCEDURE — 85018 HEMOGLOBIN: CPT

## 2025-08-05 PROCEDURE — 36591 DRAW BLOOD OFF VENOUS DEVICE: CPT

## 2025-08-05 RX ORDER — HEPARIN SODIUM (PORCINE) LOCK FLUSH IV SOLN 100 UNIT/ML 100 UNIT/ML
5 SOLUTION INTRAVENOUS ONCE
Status: COMPLETED | OUTPATIENT
Start: 2025-08-05 | End: 2025-08-05

## 2025-08-05 RX ADMIN — Medication 5 ML: at 13:41

## 2025-08-11 ENCOUNTER — VIRTUAL VISIT (OUTPATIENT)
Dept: PHARMACY | Facility: CLINIC | Age: 70
End: 2025-08-11
Attending: DERMATOLOGY
Payer: MEDICARE

## 2025-08-11 DIAGNOSIS — L40.8 PALMOPLANTAR PSORIASIS: Primary | ICD-10-CM

## 2025-08-19 ENCOUNTER — OFFICE VISIT (OUTPATIENT)
Dept: FAMILY MEDICINE | Facility: CLINIC | Age: 70
End: 2025-08-19
Payer: MEDICARE

## 2025-08-19 VITALS
HEART RATE: 76 BPM | WEIGHT: 109.4 LBS | BODY MASS INDEX: 17.58 KG/M2 | RESPIRATION RATE: 16 BRPM | TEMPERATURE: 97.1 F | DIASTOLIC BLOOD PRESSURE: 70 MMHG | OXYGEN SATURATION: 98 % | SYSTOLIC BLOOD PRESSURE: 130 MMHG | HEIGHT: 66 IN

## 2025-08-19 DIAGNOSIS — K12.0 APHTHOUS ULCER: Primary | ICD-10-CM

## 2025-08-19 DIAGNOSIS — E11.9 TYPE 2 DIABETES MELLITUS WITHOUT COMPLICATION, WITHOUT LONG-TERM CURRENT USE OF INSULIN (H): ICD-10-CM

## 2025-08-19 DIAGNOSIS — D84.9 IMMUNOSUPPRESSION: ICD-10-CM

## 2025-08-19 DIAGNOSIS — E16.9: ICD-10-CM

## 2025-08-19 DIAGNOSIS — R63.30 FEEDING DIFFICULTIES, UNSPECIFIED: ICD-10-CM

## 2025-08-19 DIAGNOSIS — M34.9 SCLERODERMA (H): ICD-10-CM

## 2025-08-19 DIAGNOSIS — C25.0 MALIGNANT NEOPLASM OF HEAD OF PANCREAS (H): ICD-10-CM

## 2025-08-19 DIAGNOSIS — E43 UNSPECIFIED SEVERE PROTEIN-CALORIE MALNUTRITION: ICD-10-CM

## 2025-08-19 LAB
FOLATE SERPL-MCNC: 37.6 NG/ML (ref 4.6–34.8)
VIT B12 SERPL-MCNC: 564 PG/ML (ref 232–1245)
VIT D+METAB SERPL-MCNC: 24 NG/ML (ref 20–50)

## 2025-08-19 PROCEDURE — 82746 ASSAY OF FOLIC ACID SERUM: CPT | Performed by: FAMILY MEDICINE

## 2025-08-19 PROCEDURE — 99213 OFFICE O/P EST LOW 20 MIN: CPT | Performed by: FAMILY MEDICINE

## 2025-08-19 PROCEDURE — 82306 VITAMIN D 25 HYDROXY: CPT | Performed by: FAMILY MEDICINE

## 2025-08-19 PROCEDURE — 36415 COLL VENOUS BLD VENIPUNCTURE: CPT | Performed by: FAMILY MEDICINE

## 2025-08-19 PROCEDURE — G2211 COMPLEX E/M VISIT ADD ON: HCPCS | Performed by: FAMILY MEDICINE

## 2025-08-19 PROCEDURE — 84630 ASSAY OF ZINC: CPT | Mod: 90 | Performed by: FAMILY MEDICINE

## 2025-08-19 PROCEDURE — 3078F DIAST BP <80 MM HG: CPT | Performed by: FAMILY MEDICINE

## 2025-08-19 PROCEDURE — 3075F SYST BP GE 130 - 139MM HG: CPT | Performed by: FAMILY MEDICINE

## 2025-08-19 PROCEDURE — 82607 VITAMIN B-12: CPT | Performed by: FAMILY MEDICINE

## 2025-08-19 RX ORDER — TRIAMCINOLONE ACETONIDE 0.1 %
PASTE (GRAM) DENTAL 2 TIMES DAILY
Qty: 5 G | Refills: 1 | Status: SHIPPED | OUTPATIENT
Start: 2025-08-19

## 2025-08-21 LAB — ZINC SERPL-MCNC: 73.6 UG/DL

## 2025-08-27 DIAGNOSIS — C25.0 MALIGNANT NEOPLASM OF HEAD OF PANCREAS (H): Primary | ICD-10-CM

## 2025-08-27 RX ORDER — CAPECITABINE 500 MG/1
TABLET, FILM COATED ORAL
Qty: 42 TABLET | Refills: 0 | Status: SHIPPED | OUTPATIENT
Start: 2025-08-27

## (undated) RX ORDER — HEPARIN SODIUM (PORCINE) LOCK FLUSH IV SOLN 100 UNIT/ML 100 UNIT/ML
SOLUTION INTRAVENOUS
Status: DISPENSED
Start: 2023-06-14

## (undated) RX ORDER — HEPARIN SODIUM (PORCINE) LOCK FLUSH IV SOLN 100 UNIT/ML 100 UNIT/ML
SOLUTION INTRAVENOUS
Status: DISPENSED
Start: 2019-09-06

## (undated) RX ORDER — HEPARIN SODIUM (PORCINE) LOCK FLUSH IV SOLN 100 UNIT/ML 100 UNIT/ML
SOLUTION INTRAVENOUS
Status: DISPENSED
Start: 2024-10-02

## (undated) RX ORDER — HEPARIN SODIUM (PORCINE) LOCK FLUSH IV SOLN 100 UNIT/ML 100 UNIT/ML
SOLUTION INTRAVENOUS
Status: DISPENSED
Start: 2021-04-09

## (undated) RX ORDER — HEPARIN SODIUM (PORCINE) LOCK FLUSH IV SOLN 100 UNIT/ML 100 UNIT/ML
SOLUTION INTRAVENOUS
Status: DISPENSED
Start: 2024-02-07

## (undated) RX ORDER — HEPARIN SODIUM (PORCINE) LOCK FLUSH IV SOLN 100 UNIT/ML 100 UNIT/ML
SOLUTION INTRAVENOUS
Status: DISPENSED
Start: 2020-04-24

## (undated) RX ORDER — HEPARIN SODIUM (PORCINE) LOCK FLUSH IV SOLN 100 UNIT/ML 100 UNIT/ML
SOLUTION INTRAVENOUS
Status: DISPENSED
Start: 2025-02-07

## (undated) RX ORDER — HEPARIN SODIUM (PORCINE) LOCK FLUSH IV SOLN 100 UNIT/ML 100 UNIT/ML
SOLUTION INTRAVENOUS
Status: DISPENSED
Start: 2021-07-12

## (undated) RX ORDER — HEPARIN SODIUM (PORCINE) LOCK FLUSH IV SOLN 100 UNIT/ML 100 UNIT/ML
SOLUTION INTRAVENOUS
Status: DISPENSED
Start: 2020-12-11

## (undated) RX ORDER — HEPARIN SODIUM (PORCINE) LOCK FLUSH IV SOLN 100 UNIT/ML 100 UNIT/ML
SOLUTION INTRAVENOUS
Status: DISPENSED
Start: 2025-06-06

## (undated) RX ORDER — HEPARIN SODIUM (PORCINE) LOCK FLUSH IV SOLN 100 UNIT/ML 100 UNIT/ML
SOLUTION INTRAVENOUS
Status: DISPENSED
Start: 2023-10-11

## (undated) RX ORDER — HEPARIN SODIUM (PORCINE) LOCK FLUSH IV SOLN 100 UNIT/ML 100 UNIT/ML
SOLUTION INTRAVENOUS
Status: DISPENSED
Start: 2018-05-18

## (undated) RX ORDER — HEPARIN SODIUM (PORCINE) LOCK FLUSH IV SOLN 100 UNIT/ML 100 UNIT/ML
SOLUTION INTRAVENOUS
Status: DISPENSED
Start: 2022-03-04

## (undated) RX ORDER — HEPARIN SODIUM (PORCINE) LOCK FLUSH IV SOLN 100 UNIT/ML 100 UNIT/ML
SOLUTION INTRAVENOUS
Status: DISPENSED
Start: 2019-05-17

## (undated) RX ORDER — FENTANYL CITRATE 50 UG/ML
INJECTION, SOLUTION INTRAMUSCULAR; INTRAVENOUS
Status: DISPENSED
Start: 2024-02-22

## (undated) RX ORDER — HEPARIN SODIUM (PORCINE) LOCK FLUSH IV SOLN 100 UNIT/ML 100 UNIT/ML
SOLUTION INTRAVENOUS
Status: DISPENSED
Start: 2019-01-18

## (undated) RX ORDER — HEPARIN SODIUM (PORCINE) LOCK FLUSH IV SOLN 100 UNIT/ML 100 UNIT/ML
SOLUTION INTRAVENOUS
Status: DISPENSED
Start: 2019-12-30

## (undated) RX ORDER — HEPARIN SODIUM (PORCINE) LOCK FLUSH IV SOLN 100 UNIT/ML 100 UNIT/ML
SOLUTION INTRAVENOUS
Status: DISPENSED
Start: 2022-10-21

## (undated) RX ORDER — HEPARIN SODIUM (PORCINE) LOCK FLUSH IV SOLN 100 UNIT/ML 100 UNIT/ML
SOLUTION INTRAVENOUS
Status: DISPENSED
Start: 2022-06-24

## (undated) RX ORDER — HEPARIN SODIUM (PORCINE) LOCK FLUSH IV SOLN 100 UNIT/ML 100 UNIT/ML
SOLUTION INTRAVENOUS
Status: DISPENSED
Start: 2023-02-17

## (undated) RX ORDER — HEPARIN SODIUM (PORCINE) LOCK FLUSH IV SOLN 100 UNIT/ML 100 UNIT/ML
SOLUTION INTRAVENOUS
Status: DISPENSED
Start: 2021-04-13

## (undated) RX ORDER — HEPARIN SODIUM (PORCINE) LOCK FLUSH IV SOLN 100 UNIT/ML 100 UNIT/ML
SOLUTION INTRAVENOUS
Status: DISPENSED
Start: 2021-11-08

## (undated) RX ORDER — HEPARIN SODIUM (PORCINE) LOCK FLUSH IV SOLN 100 UNIT/ML 100 UNIT/ML
SOLUTION INTRAVENOUS
Status: DISPENSED
Start: 2018-02-16

## (undated) RX ORDER — HEPARIN SODIUM (PORCINE) LOCK FLUSH IV SOLN 100 UNIT/ML 100 UNIT/ML
SOLUTION INTRAVENOUS
Status: DISPENSED
Start: 2018-09-14

## (undated) RX ORDER — HEPARIN SODIUM (PORCINE) LOCK FLUSH IV SOLN 100 UNIT/ML 100 UNIT/ML
SOLUTION INTRAVENOUS
Status: DISPENSED
Start: 2024-06-07

## (undated) RX ORDER — HEPARIN SODIUM (PORCINE) LOCK FLUSH IV SOLN 100 UNIT/ML 100 UNIT/ML
SOLUTION INTRAVENOUS
Status: DISPENSED
Start: 2020-08-21